# Patient Record
Sex: FEMALE | Race: WHITE | NOT HISPANIC OR LATINO | Employment: OTHER | URBAN - METROPOLITAN AREA
[De-identification: names, ages, dates, MRNs, and addresses within clinical notes are randomized per-mention and may not be internally consistent; named-entity substitution may affect disease eponyms.]

---

## 2017-01-11 ENCOUNTER — TELEPHONE (OUTPATIENT)
Dept: PHYSICAL THERAPY | Facility: CLINIC | Age: 82
End: 2017-01-11

## 2017-02-08 DIAGNOSIS — I10 BENIGN ESSENTIAL HYPERTENSION: Primary | ICD-10-CM

## 2017-02-08 DIAGNOSIS — E78.5 HYPERLIPIDEMIA LDL GOAL <130: ICD-10-CM

## 2017-02-08 DIAGNOSIS — E87.6 LOW BLOOD POTASSIUM: ICD-10-CM

## 2017-02-08 RX ORDER — CAPTOPRIL 25 MG/1
TABLET ORAL
Qty: 180 TABLET | Refills: 1 | Status: SHIPPED | OUTPATIENT
Start: 2017-02-08 | End: 2017-07-17

## 2017-02-08 RX ORDER — POTASSIUM CHLORIDE 750 MG/1
TABLET, EXTENDED RELEASE ORAL
Qty: 180 TABLET | Refills: 1 | Status: ON HOLD | OUTPATIENT
Start: 2017-02-08 | End: 2017-11-27

## 2017-02-08 RX ORDER — PRAVASTATIN SODIUM 10 MG
TABLET ORAL
Qty: 90 TABLET | Refills: 1 | Status: SHIPPED | OUTPATIENT
Start: 2017-02-08 | End: 2017-07-17

## 2017-02-08 RX ORDER — HYDROCHLOROTHIAZIDE 25 MG/1
TABLET ORAL
Qty: 180 TABLET | Refills: 1 | Status: SHIPPED | OUTPATIENT
Start: 2017-02-08 | End: 2017-07-17

## 2017-02-08 NOTE — TELEPHONE ENCOUNTER
Routing refill request to provider for review/approval because:  Labs not current:  Creatinine and FLP    Naa Armendariz, RN  [facility]

## 2017-02-08 NOTE — TELEPHONE ENCOUNTER
Capoten      Last Written Prescription Date: 10/17/16  Last Fill Quantity: 180, # refills: 1  Last Office Visit with [facility] or [facility] prescribing provider: 9/27/16       POTASSIUM   Date Value Ref Range Status   04/18/2016 3.4 3.4 - 5.3 mmol/L Final     CREATININE   Date Value Ref Range Status   05/11/2015 0.57 0.52 - 1.04 mg/dL Final     BP Readings from Last 3 Encounters:   10/19/16 136/74   09/27/16 161/88   08/17/16 148/72     HCTZ      Last Written Prescription Date: 10/17/16  Last Fill Quantity: 180, # refills: 1  Last Office Visit with [facility] or [facility] prescribing provider: 9/27/16       POTASSIUM   Date Value Ref Range Status   04/18/2016 3.4 3.4 - 5.3 mmol/L Final     CREATININE   Date Value Ref Range Status   05/11/2015 0.57 0.52 - 1.04 mg/dL Final     BP Readings from Last 3 Encounters:   10/19/16 136/74   09/27/16 161/88   08/17/16 148/72     Potassium      Last Written Prescription Date: 10/17/16  Last Fill Quantity: 180, # refills: 1  Last Office Visit with [facility] or [facility] prescribing provider: 9/27/16       POTASSIUM   Date Value Ref Range Status   04/18/2016 3.4 3.4 - 5.3 mmol/L Final     CREATININE   Date Value Ref Range Status   05/11/2015 0.57 0.52 - 1.04 mg/dL Final     BP Readings from Last 3 Encounters:   10/19/16 136/74   09/27/16 161/88   08/17/16 148/72     Pravastatin     Last Written Prescription Date: 10/17/16  Last Fill Quantity: 90, # refills: 1  Last Office Visit with [facility] or [facility] prescribing provider: 9/27/16       CHOL      170   5/11/2015  HDL       85   5/11/2015  LDL       72   5/11/2015  TRIG       64   5/11/2015  CHOLHDLRATIO      2.0   5/11/2015

## 2017-02-09 DIAGNOSIS — M54.50 CHRONIC LOW BACK PAIN WITHOUT SCIATICA, UNSPECIFIED BACK PAIN LATERALITY: Primary | ICD-10-CM

## 2017-02-09 DIAGNOSIS — G89.29 CHRONIC LOW BACK PAIN WITHOUT SCIATICA, UNSPECIFIED BACK PAIN LATERALITY: Primary | ICD-10-CM

## 2017-02-09 RX ORDER — MELOXICAM 7.5 MG/1
TABLET ORAL
Qty: 90 TABLET | Refills: 0 | OUTPATIENT
Start: 2017-02-09

## 2017-02-09 NOTE — TELEPHONE ENCOUNTER
[facility]me      Last Written Prescription Date: 12/22/16  Last Quantity: 90, # refills: 0  Last Office Visit with [facility] or [facility] prescribing provider: 9/27/16       CREATININE   Date Value Ref Range Status   05/11/2015 0.57 0.52 - 1.04 mg/dL Final     AST       30   5/11/2015  ALT       27   5/11/2015  BP Readings from Last 3 Encounters:   10/19/16 136/74   09/27/16 161/88   08/17/16 148/72

## 2017-02-09 NOTE — TELEPHONE ENCOUNTER
Prescription was sent 12/22/16 for #90 . Patient should have medication through 3/22/17.   Pharmacy notified via E-Prescribe refusal.     Naa Armendariz RN  [facility]

## 2017-02-15 ENCOUNTER — TRANSFERRED RECORDS (OUTPATIENT)
Dept: HEALTH INFORMATION MANAGEMENT | Facility: CLINIC | Age: 82
End: 2017-02-15

## 2017-04-25 ENCOUNTER — OFFICE VISIT (OUTPATIENT)
Dept: FAMILY MEDICINE | Facility: OTHER | Age: 82
End: 2017-04-25
Payer: COMMERCIAL

## 2017-04-25 VITALS
BODY MASS INDEX: 17.71 KG/M2 | HEART RATE: 94 BPM | TEMPERATURE: 97.1 F | RESPIRATION RATE: 16 BRPM | OXYGEN SATURATION: 98 % | DIASTOLIC BLOOD PRESSURE: 84 MMHG | WEIGHT: 84.4 LBS | HEIGHT: 58 IN | SYSTOLIC BLOOD PRESSURE: 136 MMHG

## 2017-04-25 DIAGNOSIS — M25.552 HIP PAIN, LEFT: Primary | ICD-10-CM

## 2017-04-25 DIAGNOSIS — F41.1 GENERALIZED ANXIETY DISORDER: ICD-10-CM

## 2017-04-25 DIAGNOSIS — E78.5 HYPERLIPIDEMIA LDL GOAL <130: ICD-10-CM

## 2017-04-25 DIAGNOSIS — G47.9 SLEEP DISORDER: ICD-10-CM

## 2017-04-25 LAB
ALBUMIN SERPL-MCNC: 3.9 G/DL (ref 3.4–5)
ALP SERPL-CCNC: 63 U/L (ref 40–150)
ALT SERPL W P-5'-P-CCNC: 25 U/L (ref 0–50)
ANION GAP SERPL CALCULATED.3IONS-SCNC: 10 MMOL/L (ref 3–14)
AST SERPL W P-5'-P-CCNC: 21 U/L (ref 0–45)
BILIRUB SERPL-MCNC: 0.2 MG/DL (ref 0.2–1.3)
BUN SERPL-MCNC: 23 MG/DL (ref 7–30)
CALCIUM SERPL-MCNC: 9.2 MG/DL (ref 8.5–10.1)
CHLORIDE SERPL-SCNC: 95 MMOL/L (ref 94–109)
CO2 SERPL-SCNC: 28 MMOL/L (ref 20–32)
CREAT SERPL-MCNC: 0.48 MG/DL (ref 0.52–1.04)
GFR SERPL CREATININE-BSD FRML MDRD: ABNORMAL ML/MIN/1.7M2
GLUCOSE SERPL-MCNC: 76 MG/DL (ref 70–99)
LDLC SERPL DIRECT ASSAY-MCNC: 66 MG/DL
POTASSIUM SERPL-SCNC: 3.9 MMOL/L (ref 3.4–5.3)
PROT SERPL-MCNC: 6.9 G/DL (ref 6.8–8.8)
SODIUM SERPL-SCNC: 133 MMOL/L (ref 133–144)
TSH SERPL DL<=0.05 MIU/L-ACNC: 2.4 MU/L (ref 0.4–4)

## 2017-04-25 PROCEDURE — 36415 COLL VENOUS BLD VENIPUNCTURE: CPT | Performed by: FAMILY MEDICINE

## 2017-04-25 PROCEDURE — 80053 COMPREHEN METABOLIC PANEL: CPT | Performed by: FAMILY MEDICINE

## 2017-04-25 PROCEDURE — 99213 OFFICE O/P EST LOW 20 MIN: CPT | Performed by: FAMILY MEDICINE

## 2017-04-25 PROCEDURE — 84443 ASSAY THYROID STIM HORMONE: CPT | Performed by: FAMILY MEDICINE

## 2017-04-25 PROCEDURE — 83721 ASSAY OF BLOOD LIPOPROTEIN: CPT | Performed by: FAMILY MEDICINE

## 2017-04-25 RX ORDER — DIAZEPAM 5 MG
5 TABLET ORAL 2 TIMES DAILY
Qty: 180 TABLET | Refills: 3 | Status: ON HOLD | OUTPATIENT
Start: 2017-04-25 | End: 2017-11-27

## 2017-04-25 ASSESSMENT — PAIN SCALES - GENERAL: PAINLEVEL: EXTREME PAIN (8)

## 2017-04-25 NOTE — PROGRESS NOTES
SUBJECTIVE:                                                    Yuni Otoole is a 81 year old female who presents to clinic today for the following health issues:    Chief Complaint   Patient presents with     Musculoskeletal Problem     c/o both hips hurting left usually worse than right, when walks feels like left foot turns inward         Problem list and histories reviewed & adjusted, as indicated.    C: NEGATIVE for fever, chills, change in weight  E/M: NEGATIVE for ear, mouth and throat problems  R: NEGATIVE for significant cough or SOB  CV: NEGATIVE for chest pain, palpitations or peripheral edema  MUSCULOSKELETAL: hip pain and joint instability left hip    OBJECTIVE:                                                    There were no vitals taken for this visit.  There is no height or weight on file to calculate BMI.    See dictated note     ASSESSMENT/PLAN:                                                    Refer ortho    Jonathan Vincent MD, MD  [facility]

## 2017-04-25 NOTE — LETTER
[facility]  [address] [facility] [address]  Phone: [phone]          April 26, 2017    Yuni Otoole  [address] [place] [facility] [address]          Dear Yuni,      LAB RESULTS:     The results of your recent labs were essentially normal.  If you have any further questions or problems, please contact our office.          Sincerely,      SEDA Vincent M.D.

## 2017-04-25 NOTE — PROGRESS NOTES
SUBJECTIVE:  Ms. Yuni Otoole is a 81-year-old woman in accompanied by her .       Yuni is coming down the pedersen with an awkward gait using a walker.  She is severely kyphotic bent over really looking at the floor and tends to walk with a gait with a bit of in-turning of the left foot.  She has been having a lot of pain in the hips, especially left hip and is aware of a klonking feeling in the hip when she moves.  She has had a longstanding problem of progressive spine difficulties and seen by [facility] in the past.        MEDICATIONS:  She uses occasional hydrocodone for pain; has been on Meloxicam which she has not felt has been helping and will discontinue it in favor of some Tylenol.        She is asking for a different walker; the one she uses has got wheels in front but not on the back.  She finds it too heavy to move and is requesting 1 with wheels and a brake.  I do call the drugstore find out that these are available there with 4 wheels, brakes, a basket and also a seat and on discount day would cost only about $70.  She and her  are agreeable to just purchase this rather than trying to get it through her insurance, which is partly involved with a prior fall accident.  Reviewing her chart, she did have pain in the left hip area in the past.  I had injected the trochanteric bursa with apparently some improvement.      OBJECTIVE:  On examination, severely kyphotic, left hip protrudes outward and when I move her leg there is an audible klonking; clicking sound certainly suggesting this is in the joint.  Somewhat tender over the trochanteric bursa, not particularly tender over the sacroiliac.      ASSESSMENT AND PLAN:   I am going to ask for an orthopedic consult see what they think of her hip problem and in-turning leg.  If they want special imaging they could certainly go ahead and do so.  The question is whether she should be seen by a Pain Center.  She is willing to do so if it is  indicated.  She has gone to physical therapy and they have decided they cannot help her any further.        She is due for labs today, these are drawn and we will notify her of results.         DAWSON JENSEN M.D.             D: 2017 14:06   T: 2017 14:49   MT: [id number]      Name:     RYDER KULKARNI   MRN:      [id number]        Account:      [id number]   :      1935           Visit Date:   2017      Document: [id number]

## 2017-04-25 NOTE — MR AVS SNAPSHOT
After Visit Summary   4/25/2017    Yuni Otoole    MRN: [id number]           Patient Information     Date Of Birth          1935        Visit Information        Provider Department      4/25/2017 11:30 AM Jonathan Vincent MD [facility]        Today's Diagnoses     Hip pain, left    -  1    Generalized anxiety disorder        Hyperlipidemia LDL goal <130        Sleep disorder           Follow-ups after your visit        Additional Services     ORTHOPEDICS ADULT REFERRAL       Your provider has referred you to: [facility]: [facility] [phone]   [email or web address]    Please be aware that coverage of these services is subject to the terms and limitations of your health insurance plan.  Call member services at your health plan with any benefit or coverage questions.      Please bring the following to your appointment:    >>   Any x-rays, CTs or MRIs which have been performed.  Contact the facility where they were done to arrange for  prior to your scheduled appointment.  Any new CT, MRI or other procedures ordered by your specialist must be performed at a [place] facility or coordinated by your clinic's referral office.    >>   List of current medications   >>   This referral request   >>   Any documents/labs given to you for this referral                  Who to contact     If you have questions or need follow up information about today's clinic visit or your schedule please contact [facility] directly at [phone].  Normal or non-critical lab and imaging results will be communicated to you by MyChart, letter or phone within 4 business days after the clinic has received the results. If you do not hear from us within 7 days, please contact the clinic through MyChart or phone. If you have a critical or abnormal lab result, we will notify you by phone as soon as possible.  Submit refill requests through  "[employer] or call your pharmacy and they will forward the refill request to us. Please allow 3 business days for your refill to be completed.          Additional Information About Your Visit        MyChart Information     [employer] gives you secure access to your electronic health record. If you see a primary care provider, you can also send messages to your care team and make appointments. If you have questions, please call your primary care clinic.  If you do not have a primary care provider, please call [phone] and they will assist you.        Care EveryWhere ID     This is your Care EveryWhere ID. This could be used by other organizations to access your [facility] medical records  [id number]        Your Vitals Were     Pulse Temperature Respirations Height Pulse Oximetry BMI (Body Mass Index)    94 97.1  F (36.2  C) (Temporal) 16 4' 10\" (1.473 m) 98% 17.64 kg/m2       Blood Pressure from Last 3 Encounters:   04/25/17 136/84   10/19/16 136/74   09/27/16 161/88    Weight from Last 3 Encounters:   04/25/17 84 lb 6.4 oz (38.3 kg)   09/27/16 87 lb 11.2 oz (39.8 kg)   08/17/16 83 lb 11.2 oz (38 kg)              We Performed the Following     Comprehensive metabolic panel     LDL cholesterol direct     ORTHOPEDICS ADULT REFERRAL     TSH          Where to get your medicines      Some of these will need a paper prescription and others can be bought over the counter.  Ask your nurse if you have questions.     Bring a paper prescription for each of these medications     diazepam 5 MG tablet          Primary Care Provider Office Phone # Fax #    Jonathan Vincent [phone]       [facility] [address] [facility] [address]        Thank you!     Thank you for choosing [facility]  for your care. Our goal is always to provide you with excellent care. Hearing back from our patients is one way we can continue to improve our services. Please take a few minutes to complete the " written survey that you may receive in the mail after your visit with us. Thank you!             Your Updated Medication List - Protect others around you: Learn how to safely use, store and throw away your medicines at www.disposemymeds.org.          This list is accurate as of: 4/25/17 12:39 PM.  Always use your most recent med list.                   Brand Name Dispense Instructions for use    aspirin 81 MG tablet      Take 1 tablet by mouth daily.       calcium + D 600-200 MG-UNIT Tabs   Generic drug:  calcium carbonate-vitamin D     30    1 TABLET DAILY       captopril 25 MG tablet    CAPOTEN    180 tablet    Take 1 tablet by mouth  twice a day       diazepam 5 MG tablet    VALIUM    180 tablet    Take 1 tablet (5 mg) by mouth 2 times daily       hydrochlorothiazide 25 MG tablet    HYDRODIURIL    180 tablet    Take 2 tablets by mouth  daily       HYDROcodone-acetaminophen 5-325 MG per tablet    NORCO    90 tablet    Take 1-2 tablets by mouth every 4 hours as needed for moderate to severe pain or pain       meloxicam 7.5 MG tablet    MOBIC    90 tablet    Take 1 tablet by mouth  daily       MULTIVITAMIN TABS   OR      1 tab daily       order for DME     1 Device    Equipment being ordered: Shower chair       potassium chloride SA 10 MEQ CR tablet    K-DUR/KLOR-CON M    180 tablet    Take 1 tablet by mouth  twice a day       pravastatin 10 MG tablet    PRAVACHOL    90 tablet    Take 1 tablet by mouth  daily at bedtime

## 2017-05-01 ENCOUNTER — OFFICE VISIT (OUTPATIENT)
Dept: ORTHOPEDICS | Facility: CLINIC | Age: 82
End: 2017-05-01
Payer: COMMERCIAL

## 2017-05-01 ENCOUNTER — RADIANT APPOINTMENT (OUTPATIENT)
Dept: GENERAL RADIOLOGY | Facility: CLINIC | Age: 82
End: 2017-05-01
Attending: ORTHOPAEDIC SURGERY
Payer: COMMERCIAL

## 2017-05-01 VITALS — BODY MASS INDEX: 17.63 KG/M2 | HEIGHT: 58 IN | TEMPERATURE: 96 F | WEIGHT: 84 LBS

## 2017-05-01 DIAGNOSIS — M16.0 PRIMARY OSTEOARTHRITIS OF BOTH HIPS: Primary | ICD-10-CM

## 2017-05-01 DIAGNOSIS — M25.552 HIP PAIN, LEFT: ICD-10-CM

## 2017-05-01 PROCEDURE — 99203 OFFICE O/P NEW LOW 30 MIN: CPT | Performed by: ORTHOPAEDIC SURGERY

## 2017-05-01 PROCEDURE — 73502 X-RAY EXAM HIP UNI 2-3 VIEWS: CPT | Mod: TC

## 2017-05-01 NOTE — PROGRESS NOTES
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 She has 4 children and 5 grandkids. She enjoys playing on the computer and playing solitaire on the computer.      She is being seen for   Chief Complaints and History of Present Illnesses   Patient presents with     Consult     left hip pain per Dr. Vincent         History of Present Illness:   Mechanism of Injury: patient had a fall on 4/18/2016. She had a vertebrae fracture at that time and was hospitalized for a while. Patient states she did not have much for hip pain at that time because she was not moving around but when she got out of the hospital she started to have this left hip pain.  Location: left hip pain, in the groin and also posterior hip  Duration of Pain: since about a week after/approximately 4/18/2016.  Rating of Pain: 8 out of 10 today  Pain Quality: ache and sharp  Pain is better with: rest  Pain is worse with: rotation of the hips, ambulation  Treatment so far consists of: heat which does help, Norco that helps sometimes and sometimes not. Mobic which does not seem to help and she is discontinuing now because it bothers her stomach. Patient also had physical therapy that was formal physical therapy in August 2016 that did not seem to help. Patient has not had any cortisone injections into her hip joint..   Associated Features: none other than her  noticing that the right hip sticks out a lot more.   Prior history of related problems: no previous surgery or major injuries to the hips.  Pain is Limiting: ambulation. Patient currently is ambulating with a walker.  Here to: assess what she can do next.  The Pain Has: gotten worse  Additional History: none    Patient's past medical, surgical, social and family histories reviewed.     Past Medical History:   Diagnosis Date     Allergic rhinitis,  cause unspecified      Pure hypercholesterolemia      Unspecified essential hypertension          Past Surgical History:   Procedure Laterality Date     CAROTID ENDARTERECTOMY  11/07/08     COLONOSCOPY  05/30/07    Diverticulosis-return in 5 yrs     HC DRAIN/INJ MAJOR JOINT/BURSA W/O US  2009    Right SI Joint     HC DRAIN/INJ MAJOR JOINT/BURSA W/O US  2010    Right interarticular hip injection     HC INJ EPIDURAL CERVICAL/THORACIC W/WO CONTRAST  2010    C6-7     HC INJ EPIDURAL LUMBAR/SACRAL W/WO CONTRAST  2009    L5-S1     HC INJ TRANSFORAMIN EPIDURAL, LUMB/SACR SINGLE  2010     HC REMOVAL OF TONSILS,14+ Y/O      Tonsils 14+y.o.       Medications:    Current Outpatient Prescriptions on File Prior to Visit:  diazepam (VALIUM) 5 MG tablet Take 1 tablet (5 mg) by mouth 2 times daily   captopril (CAPOTEN) 25 MG tablet Take 1 tablet by mouth  twice a day   potassium chloride SA (K-DUR/KLOR-CON M) 10 MEQ CR tablet Take 1 tablet by mouth  twice a day   hydrochlorothiazide (HYDRODIURIL) 25 MG tablet Take 2 tablets by mouth  daily   pravastatin (PRAVACHOL) 10 MG tablet Take 1 tablet by mouth  daily at bedtime   HYDROcodone-acetaminophen (NORCO) 5-325 MG per tablet Take 1-2 tablets by mouth every 4 hours as needed for moderate to severe pain or pain   meloxicam (MOBIC) 7.5 MG tablet Take 1 tablet by mouth  daily   order for DME Equipment being ordered: Shower chair   aspirin 81 MG tablet Take 1 tablet by mouth daily.   CALCIUM + D 600-200 MG-UNIT OR TABS 1 TABLET DAILY   MULTIVITAMIN TABS   OR 1 tab daily     No current facility-administered medications on file prior to visit.     Allergies   Allergen Reactions     Atorvastatin Calcium      Was on Lipitor and has muscle problem       Social History     Occupational History      (retired)      [employer].      Retired     Social History Main Topics     Smoking status: Former Smoker     Packs/day: 0.50     Years: 10.00     Quit date: 1/1/1996     Smokeless  "tobacco: Never Used     Alcohol use No     Drug use: No     Sexual activity: Not Currently       Family History   Problem Relation Age of Onset     CANCER Mother      colon cancer  at age 95 or 96   surgery done     GASTROINTESTINAL DISEASE Mother      stomach problems     OSTEOPOROSIS Mother      EYE* Mother      cataract and macular degen.     Cardiovascular Father       at age 60  MI     Hypertension Father      ?     Circulatory Paternal Grandmother      legs     CEREBROVASCULAR DISEASE Paternal Grandmother      Obesity Paternal Grandmother      Gynecology Sister      hysterectomy     Lipids Brother      was on meds.  ? still     Obesity Maternal Grandmother      Musculoskeletal Disorder Daughter      cancerous tumor left upper arm/shoulder- was told it was a breast type cancer     DIABETES Maternal Aunt        REVIEW OF SYSTEMS  10 point review systems performed otherwise negative as noted as per history of present illness.    Physical Exam:  Vitals: Temp 96  F (35.6  C)  Ht 1.473 m (4' 10\")  Wt 38.1 kg (84 lb)  BMI 17.56 kg/m2  BMI= Body mass index is 17.56 kg/(m^2).    Constitutional: healthy, alert and no acute distress   Psychiatric: mentation appears normal and affect normal/bright  NEURO: no focal deficits, CMS intact bilateral lower extremity  RESP: Normal with easy respirations and no use of accessory muscles to breathe, no audible wheezing or retractions  CV: warm calves and soft and nontender.  SKIN: No erythema, rashes, excoriation, or breakdown. No evidence of infection.   MUSCULOSKELETAL:    INSPECTION of bilateral hips: I can palpated deformity on the right greater than the left. The hip protrudes out laterally.    PALPATION: slight tenderness to palpation of the lateral and posterior left hip. No tenderness on palpation of the right hip and no tenderness on palpation of the bilateral thighs.    ROM: patient able to hip flex and internally next early rotate bilateral hips however the left " and right internal and external rotation is limited and all range of motion causes some pain but more on the left and the right. With range of motion on bilateral hips we are able to palpated significant crepitus.     STRENGTH: 5 out of 5 hip flexion and quad strength bilaterally.    SPECIAL TEST: none  GAIT: not evaluated today patient is in wheelchair.  Lymph: no palpable lymph nodes    Diagnostic Modalities:  Previous imaging reviewed.  We also reviewed the x-rays that were done today AP pelvis as well as lateral of the left hip. These x-rays show severe degenerative joint disease of bilateral hips and also migration superiorly in the acetabulum's. On the right greater than the left it appears that the humorous is riding on the superior rim of the acetabulum. No fractures location or tumor.  Independent visualization of the images was performed.    Impression: 1. Bilateral severe hip degenerative joint disease, right greater than the left.    Plan:  All of the above pertinent physical exam and imaging modalities findings was reviewed with Yuni and her .                                          CONSERVATIVE CARE:    Patient Instructions:  1. We can see that you have very bad arthritis on accessories. The right hip actually looks worse than the left although you are having more pain in the left.  2. Your thighbone is wearing into your pelvis and making the socket larger.  3. There is no cartilage left in your hip socket anymore.  4. We know you tried physical therapy but that did not help.  5. Mobic has been stopped secondary to stomach issues and it did not help you that much.  6. You can continue doing heat since it does help you.  7. We also offered you a cortisone injection into the left hip joint under x-ray guidance that they do in radiology. We put in an order for this today.  8. The only option is to replace the hips but this would be a very complicated surgery and you would probably have to have  both of them done.  9. Call us if you want the right one injected, we can put the order in for you.  10. Follow up with Jina Payton MD and/or Marlon Marshall PA-C on an as needed basis.   Re-x-ray on return: No unless there is injury or a change in her condition.    Scribed by Marlon Marshall PA-C on 5/1/2017 at 3:12 PM, based on Dr. Jina Payton's statements to me.    This note was dictated with Cloudmeter.    MARIA LUISA Mtz MD

## 2017-05-01 NOTE — PROGRESS NOTES
Appropriate assistive devices provided during their visit. yes (Yes, No, N/A) walker (list device)    Exam table and/or cart  placed in the lowest position. yes (Yes, No, N/A)    Brakes on tables/carts/wheelchairs used at all times. yes (Yes, No, N/A)    Non slip footwear applied. n/a (Yes, No, NA)    Patient was accompanied by staff throughout visit. yes (Yes, No, N/A)    Equipment safety straps used. n/a (Yes, No, N/A)    Assist with toileting. n/a (Yes, No, N/A)

## 2017-05-01 NOTE — MR AVS SNAPSHOT
After Visit Summary   5/1/2017    uYni Otoole    MRN: [id number]           Patient Information     Date Of Birth          1935        Visit Information        Provider Department      5/1/2017 2:30 PM Jina Payton MD [facility]        Today's Diagnoses     Primary osteoarthritis of both hips    -  1    Hip pain, left          Care Instructions    Encounter Diagnoses   Name Primary?     Hip pain, left      Primary osteoarthritis of both hips Yes     Rest, ice and elevate above heart level as needed for pain control  1. We can see that you have very bad arthritis on accessories. The right hip actually looks worse than the left although you are having more pain in the left.  2. Your thighbone is wearing into your pelvis and making the socket larger.  3. There is no cartilage left in your hip socket anymore.  4. We know you tried physical therapy but that did not help.  5. Mobic has been stopped secondary to stomach issues and it did not help you that much.  6. You can continue doing heat since it does help you.  7. We also offered you a cortisone injection into the left hip joint under x-ray guidance that they do in radiology. We put in an order for this today.  8. The only option is to replace the hips but this would be a very complicated surgery and you would probably have to have both of them done.  9. Call us if you want the right one injected, we can put the order in for you.  10. Follow up with Jina Payton MD and/or Marlon Marshall PA-C on an as needed basis.   [employer] and [employer] may offer reliable information regarding your diagnosis and treatment plan.    THANK YOU for coming in today. If you receive a survey via [employer] or mail please let us know if there was anything you especially appreciated today or if there is any way we can improve our clinic. We appreciate your input.    GENERAL INFORMATION:  Our hours are:  Monday :     Clinic 7:30 AM-430 PM  ([facility])  Tuesday:      Operating Room All Day ([facility])  Wednesday: Clinic 7:30 AM - 11:15 AM ([facility])             Clinic 1:00 PM - 4:00PM ([facility])  Thursday:     Administrative Day  Friday:          Clinic 7:30 AM - 11:15 AM ([facility])            Clinic 1:00 PM - 4:00 PM ([facility])    We are not in the office Thursdays. Therefore non- urgent calls and medical messages received on Thursday will be addressed when we are back in the office on Wednesday. Urgent matters will be reviewed and addressed by one of our partners in the office as needed.    If lab work was done today as part of your evaluation you will generally be contacted via [employer]Comm, mail, or phone with the results within 1-5 days. If there is an alarming result we will contact you by phone. Lab results come back at varying times, I generally wait until all labs are resulted before making comments on results. Please note labs are automatically released to ArrayComm (if you have signed up for it) once available-at times you may see these prior to my having a chance to review them as well.    If you need refills please contact your pharmacist. They will send a refill request to me to review. Please allow 3 business days for us to process all refill requests. All narcotic refills should be handled in the clinic at the time of your visit.         Follow-ups after your visit        Who to contact     If you have questions or need follow up information about today's clinic visit or your schedule please contact [facility] directly at [phone].  Normal or non-critical lab and imaging results will be communicated to you by Habeashart, letter or phone within 4 business days after the clinic has received the results. If you do not hear from us within 7 days, please contact the clinic through [employer] or  "phone. If you have a critical or abnormal lab result, we will notify you by phone as soon as possible.  Submit refill requests through [employer]mo or call your pharmacy and they will forward the refill request to us. Please allow 3 business days for your refill to be completed.          Additional Information About Your Visit        Isonashart Information     Dexmo gives you secure access to your electronic health record. If you see a primary care provider, you can also send messages to your care team and make appointments. If you have questions, please call your primary care clinic.  If you do not have a primary care provider, please call [phone] and they will assist you.        Care EveryWhere ID     This is your Care EveryWhere ID. This could be used by other organizations to access your [place] medical records  [id number]        Your Vitals Were     Temperature Height BMI (Body Mass Index)             96  F (35.6  C) 1.473 m (4' 10\") 17.56 kg/m2          Blood Pressure from Last 3 Encounters:   04/25/17 136/84   10/19/16 136/74   09/27/16 161/88    Weight from Last 3 Encounters:   05/01/17 38.1 kg (84 lb)   04/25/17 38.3 kg (84 lb 6.4 oz)   09/27/16 39.8 kg (87 lb 11.2 oz)               Primary Care Provider Office Phone # Fax #    Jonathan Vincent [phone]       [place] [address] [facility] [address]        Thank you!     Thank you for choosing [facility]  for your care. Our goal is always to provide you with excellent care. Hearing back from our patients is one way we can continue to improve our services. Please take a few minutes to complete the written survey that you may receive in the mail after your visit with us. Thank you!             Your Updated Medication List - Protect others around you: Learn how to safely use, store and throw away your medicines at www.disposemymeds.org.          This list is accurate as of: 5/1/17  3:22 PM.  " Always use your most recent med list.                   Brand Name Dispense Instructions for use    aspirin 81 MG tablet      Take 1 tablet by mouth daily.       calcium + D 600-200 MG-UNIT Tabs   Generic drug:  calcium carbonate-vitamin D     30    1 TABLET DAILY       captopril 25 MG tablet    CAPOTEN    180 tablet    Take 1 tablet by mouth  twice a day       diazepam 5 MG tablet    VALIUM    180 tablet    Take 1 tablet (5 mg) by mouth 2 times daily       hydrochlorothiazide 25 MG tablet    HYDRODIURIL    180 tablet    Take 2 tablets by mouth  daily       HYDROcodone-acetaminophen 5-325 MG per tablet    NORCO    90 tablet    Take 1-2 tablets by mouth every 4 hours as needed for moderate to severe pain or pain       meloxicam 7.5 MG tablet    MOBIC    90 tablet    Take 1 tablet by mouth  daily       MULTIVITAMIN TABS   OR      1 tab daily       order for DME     1 Device    Equipment being ordered: Shower chair       potassium chloride SA 10 MEQ CR tablet    K-DUR/KLOR-CON M    180 tablet    Take 1 tablet by mouth  twice a day       pravastatin 10 MG tablet    PRAVACHOL    90 tablet    Take 1 tablet by mouth  daily at bedtime

## 2017-05-01 NOTE — PROGRESS NOTES
Yuni Otoole is a 81 year old female who is seen in consultation at the request of Dr. diaz  History of Present illness:  Yuni presents for evaluation of:  1.) left hip  2.)   Onset:  mid, April and 2016    Symptoms brought on by fall.   Location:  left hip.    Character:  sharp.    Progression of symptoms:  worse.    Previous similar pain: YES.   Pain Level:  8/10.   Previous treatments:  heat. norco  Currently on Blood thinners? no  Diagnosis of Diabetes? no

## 2017-05-01 NOTE — LETTER
5/1/2017       RE: Yuni Otoole  [address]  [facility] [address]           Dear Colleague,    Thank you for referring your patient, Yuni Otoole, to the [facility]. Please see a copy of my visit note below.    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 She has 4 children and 5 grandkids. She enjoys playing on the computer and playing solitaire on the computer.      She is being seen for   Chief Complaints and History of Present Illnesses   Patient presents with     Consult     left hip pain per Dr. Vincent         History of Present Illness:   Mechanism of Injury: patient had a fall on 4/18/2016. She had a vertebrae fracture at that time and was hospitalized for a while. Patient states she did not have much for hip pain at that time because she was not moving around but when she got out of the hospital she started to have this left hip pain.  Location: left hip pain, in the groin and also posterior hip  Duration of Pain: since about a week after/approximately 4/18/2016.  Rating of Pain: 8 out of 10 today  Pain Quality: ache and sharp  Pain is better with: rest  Pain is worse with: rotation of the hips, ambulation  Treatment so far consists of: heat which does help, Norco that helps sometimes and sometimes not. Mobic which does not seem to help and she is discontinuing now because it bothers her stomach. Patient also had physical therapy that was formal physical therapy in August 2016 that did not seem to help. Patient has not had any cortisone injections into her hip joint..   Associated Features: none other than her  noticing that the right hip sticks out a lot more.   Prior history of related problems: no previous surgery or major injuries to the hips.  Pain is Limiting: ambulation. Patient currently is ambulating with a  walker.  Here to: assess what she can do next.  The Pain Has: gotten worse  Additional History: none    Patient's past medical, surgical, social and family histories reviewed.     Past Medical History:   Diagnosis Date     Allergic rhinitis, cause unspecified      Pure hypercholesterolemia      Unspecified essential hypertension          Past Surgical History:   Procedure Laterality Date     CAROTID ENDARTERECTOMY  11/07/08     COLONOSCOPY  05/30/07    Diverticulosis-return in 5 yrs     HC DRAIN/INJ MAJOR JOINT/BURSA W/O US  2009    Right SI Joint     HC DRAIN/INJ MAJOR JOINT/BURSA W/O US  2010    Right interarticular hip injection     HC INJ EPIDURAL CERVICAL/THORACIC W/WO CONTRAST  2010    C6-7     HC INJ EPIDURAL LUMBAR/SACRAL W/WO CONTRAST  2009    L5-S1     HC INJ TRANSFORAMIN EPIDURAL, LUMB/SACR SINGLE  2010     HC REMOVAL OF TONSILS,12+ Y/O      Tonsils 14+y.o.       Medications:    Current Outpatient Prescriptions on File Prior to Visit:  diazepam (VALIUM) 5 MG tablet Take 1 tablet (5 mg) by mouth 2 times daily   captopril (CAPOTEN) 25 MG tablet Take 1 tablet by mouth  twice a day   potassium chloride SA (K-DUR/KLOR-CON M) 10 MEQ CR tablet Take 1 tablet by mouth  twice a day   hydrochlorothiazide (HYDRODIURIL) 25 MG tablet Take 2 tablets by mouth  daily   pravastatin (PRAVACHOL) 10 MG tablet Take 1 tablet by mouth  daily at bedtime   HYDROcodone-acetaminophen (NORCO) 5-325 MG per tablet Take 1-2 tablets by mouth every 4 hours as needed for moderate to severe pain or pain   meloxicam (MOBIC) 7.5 MG tablet Take 1 tablet by mouth  daily   order for DME Equipment being ordered: Shower chair   aspirin 81 MG tablet Take 1 tablet by mouth daily.   CALCIUM + D 600-200 MG-UNIT OR TABS 1 TABLET DAILY   MULTIVITAMIN TABS   OR 1 tab daily     No current facility-administered medications on file prior to visit.     Allergies   Allergen Reactions     Atorvastatin Calcium      Was on Lipitor and has muscle problem  "      Social History     Occupational History      (retired)      [employer].      Retired     Social History Main Topics     Smoking status: Former Smoker     Packs/day: 0.50     Years: 10.00     Quit date: 1996     Smokeless tobacco: Never Used     Alcohol use No     Drug use: No     Sexual activity: Not Currently       Family History   Problem Relation Age of Onset     CANCER Mother      colon cancer  at age 95 or 96   surgery done     GASTROINTESTINAL DISEASE Mother      stomach problems     OSTEOPOROSIS Mother      EYE* Mother      cataract and macular degen.     Cardiovascular Father       at age 60  MI     Hypertension Father      ?     Circulatory Paternal Grandmother      legs     CEREBROVASCULAR DISEASE Paternal Grandmother      Obesity Paternal Grandmother      Gynecology Sister      hysterectomy     Lipids Brother      was on meds.  ? still     Obesity Maternal Grandmother      Musculoskeletal Disorder Daughter      cancerous tumor left upper arm/shoulder- was told it was a breast type cancer     DIABETES Maternal Aunt        REVIEW OF SYSTEMS  10 point review systems performed otherwise negative as noted as per history of present illness.    Physical Exam:  Vitals: Temp 96  F (35.6  C)  Ht 1.473 m (4' 10\")  Wt 38.1 kg (84 lb)  BMI 17.56 kg/m2  BMI= Body mass index is 17.56 kg/(m^2).    Constitutional: healthy, alert and no acute distress   Psychiatric: mentation appears normal and affect normal/bright  NEURO: no focal deficits, CMS intact bilateral lower extremity  RESP: Normal with easy respirations and no use of accessory muscles to breathe, no audible wheezing or retractions  CV: warm calves and soft and nontender.  SKIN: No erythema, rashes, excoriation, or breakdown. No evidence of infection.   MUSCULOSKELETAL:    INSPECTION of bilateral hips: I can palpated deformity on the right greater than the left. The hip protrudes out laterally.    PALPATION: slight tenderness to " palpation of the lateral and posterior left hip. No tenderness on palpation of the right hip and no tenderness on palpation of the bilateral thighs.    ROM: patient able to hip flex and internally next early rotate bilateral hips however the left and right internal and external rotation is limited and all range of motion causes some pain but more on the left and the right. With range of motion on bilateral hips we are able to palpated significant crepitus.     STRENGTH: 5 out of 5 hip flexion and quad strength bilaterally.    SPECIAL TEST: none  GAIT: not evaluated today patient is in wheelchair.  Lymph: no palpable lymph nodes    Diagnostic Modalities:  Previous imaging reviewed.  We also reviewed the x-rays that were done today AP pelvis as well as lateral of the left hip. These x-rays show severe degenerative joint disease of bilateral hips and also migration superiorly in the acetabulum's. On the right greater than the left it appears that the humorous is riding on the superior rim of the acetabulum. No fractures location or tumor.  Independent visualization of the images was performed.    Impression: 1. Bilateral severe hip degenerative joint disease, right greater than the left.    Plan:  All of the above pertinent physical exam and imaging modalities findings was reviewed with Yuni and her .                                          CONSERVATIVE CARE:    Patient Instructions:  1. We can see that you have very bad arthritis on accessories. The right hip actually looks worse than the left although you are having more pain in the left.  2. Your thighbone is wearing into your pelvis and making the socket larger.  3. There is no cartilage left in your hip socket anymore.  4. We know you tried physical therapy but that did not help.  5. Mobic has been stopped secondary to stomach issues and it did not help you that much.  6. You can continue doing heat since it does help you.  7. We also offered you a  cortisone injection into the left hip joint under x-ray guidance that they do in radiology. We put in an order for this today.  8. The only option is to replace the hips but this would be a very complicated surgery and you would probably have to have both of them done.  9. Call us if you want the right one injected, we can put the order in for you.  10. Follow up with Jina Payton MD and/or Marlon Marshall PA-C on an as needed basis.   Re-x-ray on return: No unless there is injury or a change in her condition.    Scribed by Marlon Marshall PA-C on 5/1/2017 at 3:12 PM, based on Dr. Jina Payton's statements to me.    This note was dictated with [employer].    MARIA LUISA Mtz MD      Yuni Otoole is a 81 year old female who is seen in consultation at the request of Dr. diaz  History of Present illness:  Yuni presents for evaluation of:  1.) left hip  2.)   Onset:  mid, April and 2016    Symptoms brought on by fall.   Location:  left hip.    Character:  sharp.    Progression of symptoms:  worse.    Previous similar pain: YES.   Pain Level:  8/10.   Previous treatments:  heat. norco  Currently on Blood thinners? no  Diagnosis of Diabetes? no      Again, thank you for allowing me to participate in the care of your patient.        Sincerely,              Jina Payton MD

## 2017-05-01 NOTE — PATIENT INSTRUCTIONS
Encounter Diagnoses   Name Primary?     Hip pain, left      Primary osteoarthritis of both hips Yes     Rest, ice and elevate above heart level as needed for pain control  1. We can see that you have very bad arthritis on accessories. The right hip actually looks worse than the left although you are having more pain in the left.  2. Your thighbone is wearing into your pelvis and making the socket larger.  3. There is no cartilage left in your hip socket anymore.  4. We know you tried physical therapy but that did not help.  5. Mobic has been stopped secondary to stomach issues and it did not help you that much.  6. You can continue doing heat since it does help you.  7. We also offered you a cortisone injection into the left hip joint under x-ray guidance that they do in radiology. We put in an order for this today.  8. The only option is to replace the hips but this would be a very complicated surgery and you would probably have to have both of them done.  9. Call us if you want the right one injected, we can put the order in for you.  10. Follow up with Jina Payton MD and/or Marlon Marshall PA-C on an as needed basis.   [employer] and [email or web address] may offer reliable information regarding your diagnosis and treatment plan.    THANK YOU for coming in today. If you receive a survey via [employer] or mail please let us know if there was anything you especially appreciated today or if there is any way we can improve our clinic. We appreciate your input.    GENERAL INFORMATION:  Our hours are:  Monday :     Clinic 7:30 AM-430 PM ([facility])  Tuesday:      Operating Room All Day ([facility])  Wednesday: Clinic 7:30 AM - 11:15 AM ([facility])             Clinic 1:00 PM - 4:00PM ([facility])  Thursday:     Administrative Day  Friday:          Clinic 7:30 AM - 11:15 AM ([facility])            Clinic 1:00 PM -  4:00 PM ([facility])    We are not in the office Thursdays. Therefore non- urgent calls and medical messages received on Thursday will be addressed when we are back in the office on Wednesday. Urgent matters will be reviewed and addressed by one of our partners in the office as needed.    If lab work was done today as part of your evaluation you will generally be contacted via [employer], mail, or phone with the results within 1-5 days. If there is an alarming result we will contact you by phone. Lab results come back at varying times, I generally wait until all labs are resulted before making comments on results. Please note labs are automatically released to [employer] (if you have signed up for it) once available-at times you may see these prior to my having a chance to review them as well.    If you need refills please contact your pharmacist. They will send a refill request to me to review. Please allow 3 business days for us to process all refill requests. All narcotic refills should be handled in the clinic at the time of your visit.

## 2017-05-01 NOTE — NURSING NOTE
"Chief Complaint   Patient presents with     Consult     left hip pain per Dr. Vincent       Initial Temp 96  F (35.6  C)  Ht 1.473 m (4' 10\")  Wt 38.1 kg (84 lb)  BMI 17.56 kg/m2 Estimated body mass index is 17.56 kg/(m^2) as calculated from the following:    Height as of this encounter: 1.473 m (4' 10\").    Weight as of this encounter: 38.1 kg (84 lb).  Medication Reconciliation: complete    BP completed using cuff size: NA (Not Taken)    Verenice Wagner MA      "

## 2017-05-09 ENCOUNTER — HOSPITAL ENCOUNTER (OUTPATIENT)
Dept: GENERAL RADIOLOGY | Facility: CLINIC | Age: 82
Discharge: HOME | End: 2017-05-09
Attending: ORTHOPAEDIC SURGERY | Admitting: ORTHOPAEDIC SURGERY
Payer: MEDICARE

## 2017-05-09 DIAGNOSIS — M16.0 PRIMARY OSTEOARTHRITIS OF BOTH HIPS: ICD-10-CM

## 2017-05-09 PROCEDURE — 20610 DRAIN/INJ JOINT/BURSA W/O US: CPT | Mod: LT

## 2017-05-09 PROCEDURE — 25000125 ZZHC RX 250: Performed by: RADIOLOGY

## 2017-05-09 PROCEDURE — 25500064 ZZH RX 255 OP 636: Performed by: RADIOLOGY

## 2017-05-09 PROCEDURE — S0020 INJECTION, BUPIVICAINE HYDRO: HCPCS | Performed by: RADIOLOGY

## 2017-05-09 PROCEDURE — 25000128 H RX IP 250 OP 636: Performed by: RADIOLOGY

## 2017-05-09 RX ORDER — BUPIVACAINE HYDROCHLORIDE 2.5 MG/ML
10 INJECTION, SOLUTION EPIDURAL; INFILTRATION; INTRACAUDAL ONCE
Status: COMPLETED | OUTPATIENT
Start: 2017-05-09 | End: 2017-05-09

## 2017-05-09 RX ORDER — LIDOCAINE HYDROCHLORIDE 10 MG/ML
5 INJECTION, SOLUTION EPIDURAL; INFILTRATION; INTRACAUDAL; PERINEURAL ONCE
Status: COMPLETED | OUTPATIENT
Start: 2017-05-09 | End: 2017-05-09

## 2017-05-09 RX ORDER — TRIAMCINOLONE ACETONIDE 40 MG/ML
40 INJECTION, SUSPENSION INTRA-ARTICULAR; INTRAMUSCULAR ONCE
Status: COMPLETED | OUTPATIENT
Start: 2017-05-09 | End: 2017-05-09

## 2017-05-09 RX ORDER — IOPAMIDOL 408 MG/ML
50 INJECTION, SOLUTION INTRAVASCULAR ONCE
Status: COMPLETED | OUTPATIENT
Start: 2017-05-09 | End: 2017-05-09

## 2017-05-09 RX ADMIN — TRIAMCINOLONE ACETONIDE 40 MG: 40 INJECTION, SUSPENSION INTRA-ARTICULAR; INTRAMUSCULAR at 13:45

## 2017-05-09 RX ADMIN — BUPIVACAINE HYDROCHLORIDE 4 ML: 2.5 INJECTION, SOLUTION EPIDURAL; INFILTRATION; INTRACAUDAL at 13:45

## 2017-05-09 RX ADMIN — LIDOCAINE HYDROCHLORIDE 8 ML: 10 INJECTION, SOLUTION EPIDURAL; INFILTRATION; INTRACAUDAL; PERINEURAL at 13:45

## 2017-05-09 RX ADMIN — SODIUM BICARBONATE 2 MEQ: 84 INJECTION, SOLUTION INTRAVENOUS at 13:45

## 2017-05-09 RX ADMIN — IOPAMIDOL 1 ML: 408 INJECTION, SOLUTION INTRAVASCULAR at 13:45

## 2017-05-17 ENCOUNTER — TELEPHONE (OUTPATIENT)
Dept: ORTHOPEDICS | Facility: CLINIC | Age: 82
End: 2017-05-17

## 2017-05-17 DIAGNOSIS — M16.11 OSTEOARTHRITIS OF RIGHT HIP, UNSPECIFIED OSTEOARTHRITIS TYPE: Primary | ICD-10-CM

## 2017-05-17 NOTE — TELEPHONE ENCOUNTER
Please put in an order for a right hip interarticular injection.  I have reviewed the chart and she can have this.  Thank you.  I called and let her know that we are ok with doing this.  I had to leave a voicemail.   She will just need the order placed.  Thank you

## 2017-05-17 NOTE — TELEPHONE ENCOUNTER
Reason for Call: Request for an order or referral:    Order or referral being requested: Would like to get right hip injection done. She had the left hip done and she was told that if she wanted to do the other hip to call us and we would put the order in.      Date needed: at your convenience    Has the patient been seen by the PCP for this problem? YES    Additional comments: none    Phone number Patient can be reached at:  Home number on file [phone] (home)     Best Time:  any    Can we leave a detailed message on this number?  YES    Call taken on 5/17/2017 at 2:25 PM by Pam Zaragoza

## 2017-05-19 NOTE — TELEPHONE ENCOUNTER
Patient called back. Would like injection to be scheduled in [place]. Told her MD will be back in office Monday. Patient will expect to hear back from us then.    Pt requested to talk with radiology. Transferred.

## 2017-05-22 NOTE — TELEPHONE ENCOUNTER
Pt notified that the orders for her injection would be placed today and she could call and schedule later today.

## 2017-06-06 ENCOUNTER — HOSPITAL ENCOUNTER (OUTPATIENT)
Dept: GENERAL RADIOLOGY | Facility: CLINIC | Age: 82
Discharge: HOME | End: 2017-06-06
Attending: ORTHOPAEDIC SURGERY | Admitting: ORTHOPAEDIC SURGERY
Payer: MEDICARE

## 2017-06-06 DIAGNOSIS — M16.11 OSTEOARTHRITIS OF RIGHT HIP, UNSPECIFIED OSTEOARTHRITIS TYPE: ICD-10-CM

## 2017-06-06 PROCEDURE — 25000128 H RX IP 250 OP 636: Performed by: RADIOLOGY

## 2017-06-06 PROCEDURE — 25500064 ZZH RX 255 OP 636: Performed by: RADIOLOGY

## 2017-06-06 PROCEDURE — 25000125 ZZHC RX 250: Performed by: RADIOLOGY

## 2017-06-06 PROCEDURE — 20610 DRAIN/INJ JOINT/BURSA W/O US: CPT | Mod: RT

## 2017-06-06 PROCEDURE — S0020 INJECTION, BUPIVICAINE HYDRO: HCPCS | Performed by: RADIOLOGY

## 2017-06-06 RX ORDER — TRIAMCINOLONE ACETONIDE 40 MG/ML
40 INJECTION, SUSPENSION INTRA-ARTICULAR; INTRAMUSCULAR ONCE
Status: COMPLETED | OUTPATIENT
Start: 2017-06-06 | End: 2017-06-06

## 2017-06-06 RX ORDER — BUPIVACAINE HYDROCHLORIDE 2.5 MG/ML
10 INJECTION, SOLUTION EPIDURAL; INFILTRATION; INTRACAUDAL ONCE
Status: COMPLETED | OUTPATIENT
Start: 2017-06-06 | End: 2017-06-06

## 2017-06-06 RX ORDER — LIDOCAINE HYDROCHLORIDE 10 MG/ML
10 INJECTION, SOLUTION INFILTRATION; PERINEURAL ONCE
Status: COMPLETED | OUTPATIENT
Start: 2017-06-06 | End: 2017-06-06

## 2017-06-06 RX ORDER — IOPAMIDOL 408 MG/ML
50 INJECTION, SOLUTION INTRAVASCULAR ONCE
Status: COMPLETED | OUTPATIENT
Start: 2017-06-06 | End: 2017-06-06

## 2017-06-06 RX ADMIN — IOPAMIDOL 2 ML: 408 INJECTION, SOLUTION INTRAVASCULAR at 13:54

## 2017-06-06 RX ADMIN — SODIUM BICARBONATE 0.3 MEQ: 84 INJECTION, SOLUTION INTRAVENOUS at 13:50

## 2017-06-06 RX ADMIN — BUPIVACAINE HYDROCHLORIDE 4 ML: 2.5 INJECTION, SOLUTION EPIDURAL; INFILTRATION; INTRACAUDAL at 13:55

## 2017-06-06 RX ADMIN — LIDOCAINE HYDROCHLORIDE 1.2 ML: 10 INJECTION, SOLUTION INFILTRATION; PERINEURAL at 13:50

## 2017-06-06 RX ADMIN — TRIAMCINOLONE ACETONIDE 40 MG: 40 INJECTION, SUSPENSION INTRA-ARTICULAR; INTRAMUSCULAR at 13:55

## 2017-06-06 NOTE — PROGRESS NOTES
Appropriate assistive devices provided during their visit. yes (Yes, No, N/A) wheelchair (list device)    Exam table and/or cart  placed in the lowest position. yes (Yes, No, N/A)    Brakes on tables/carts/wheelchairs used at all times. yes (Yes, No, N/A)    Non slip footwear applied. n/a (Yes, No, NA)    Patient was accompanied by staff throughout visit. yes (Yes, No, N/A)    Equipment safety straps used. n/a (Yes, No, N/A)    Assist with toileting. n/a (Yes, No, N/A)

## 2017-06-20 ENCOUNTER — OFFICE VISIT (OUTPATIENT)
Dept: FAMILY MEDICINE | Facility: OTHER | Age: 82
End: 2017-06-20
Payer: COMMERCIAL

## 2017-06-20 VITALS
RESPIRATION RATE: 16 BRPM | SYSTOLIC BLOOD PRESSURE: 142 MMHG | TEMPERATURE: 98.2 F | OXYGEN SATURATION: 100 % | WEIGHT: 83.5 LBS | DIASTOLIC BLOOD PRESSURE: 65 MMHG | HEART RATE: 82 BPM | HEIGHT: 58 IN | BODY MASS INDEX: 17.53 KG/M2

## 2017-06-20 DIAGNOSIS — M25.552 HIP PAIN, LEFT: Primary | ICD-10-CM

## 2017-06-20 PROCEDURE — 99213 OFFICE O/P EST LOW 20 MIN: CPT | Performed by: FAMILY MEDICINE

## 2017-06-20 ASSESSMENT — PAIN SCALES - GENERAL: PAINLEVEL: SEVERE PAIN (7)

## 2017-06-20 NOTE — MR AVS SNAPSHOT
After Visit Summary   6/20/2017    Yuni Otoole    MRN: [id number]           Patient Information     Date Of Birth          1935        Visit Information        Provider Department      6/20/2017 2:30 PM Jonathan Vincent MD [facility]        Today's Diagnoses     Hip pain, left    -  1       Follow-ups after your visit        Your next 10 appointments already scheduled     Jun 22, 2017 10:00 AM CDT   XR INJECTION with PHXR1, PH RAD   [facility] ([facility])    [address] [place] [address] [phone]           Stop drinking 1 hour before the exam.  You may take your medicines as usual, except for blood thinners (Coumadin, Plavix, Ticlid, Persantine, Aggrenox, Pletal, Effient, Brilliant). Talk to your doctor if you take these.  Tell your doctor if:   You have ever had an allergic reaction to X-ray dye (contrast fluid).   There is a chance you may be pregnant.  Please bring a list of your current medicines to your exam. Include vitamins, minerals and over-the-counter medicines.  Please call the Imaging Department at your exam site with any questions.              Future tests that were ordered for you today     Open Future Orders        Priority Expected Expires Ordered    XR Joint Injection Major Left Routine 6/20/2017 6/20/2018 6/20/2017            Who to contact     If you have questions or need follow up information about today's clinic visit or your schedule please contact [facility] directly at [phone].  Normal or non-critical lab and imaging results will be communicated to you by MyChart, letter or phone within 4 business days after the clinic has received the results. If you do not hear from us within 7 days, please contact the clinic through MyChart or phone. If you have a critical or abnormal lab result, we will notify you by phone as soon as possible.  Submit refill requests through [employer] or call  "your pharmacy and they will forward the refill request to us. Please allow 3 business days for your refill to be completed.          Additional Information About Your Visit        [employer]ap[employer] Information     [employer] gives you secure access to your electronic health record. If you see a primary care provider, you can also send messages to your care team and make appointments. If you have questions, please call your primary care clinic.  If you do not have a primary care provider, please call [phone] and they will assist you.        Care EveryWhere ID     This is your Care EveryWhere ID. This could be used by other organizations to access your [place] medical records  [id number]        Your Vitals Were     Pulse Temperature Respirations Height Pulse Oximetry BMI (Body Mass Index)    82 98.2  F (36.8  C) (Temporal) 16 4' 10\" (1.473 m) 100% 17.45 kg/m2       Blood Pressure from Last 3 Encounters:   06/20/17 142/65   04/25/17 136/84   10/19/16 136/74    Weight from Last 3 Encounters:   06/20/17 83 lb 8 oz (37.9 kg)   05/01/17 84 lb (38.1 kg)   04/25/17 84 lb 6.4 oz (38.3 kg)               Primary Care Provider Office Phone # Fax #    Jonathan Vincent [phone]       [facility] [address] [facility] [address]        Thank you!     Thank you for choosing [facility]  for your care. Our goal is always to provide you with excellent care. Hearing back from our patients is one way we can continue to improve our services. Please take a few minutes to complete the written survey that you may receive in the mail after your visit with us. Thank you!             Your Updated Medication List - Protect others around you: Learn how to safely use, store and throw away your medicines at www.disposemymeds.org.          This list is accurate as of: 6/20/17  3:29 PM.  Always use your most recent med list.                   Brand Name Dispense Instructions for use    aspirin 81 MG " tablet      Take 1 tablet by mouth daily.       calcium + D 600-200 MG-UNIT Tabs   Generic drug:  calcium carbonate-vitamin D     30    1 TABLET DAILY       captopril 25 MG tablet    CAPOTEN    180 tablet    Take 1 tablet by mouth  twice a day       diazepam 5 MG tablet    VALIUM    180 tablet    Take 1 tablet (5 mg) by mouth 2 times daily       hydrochlorothiazide 25 MG tablet    HYDRODIURIL    180 tablet    Take 2 tablets by mouth  daily       HYDROcodone-acetaminophen 5-325 MG per tablet    NORCO    90 tablet    Take 1-2 tablets by mouth every 4 hours as needed for moderate to severe pain or pain       order for DME     1 Device    Equipment being ordered: Shower chair       potassium chloride SA 10 MEQ CR tablet    K-DUR/KLOR-CON M    180 tablet    Take 1 tablet by mouth  twice a day       pravastatin 10 MG tablet    PRAVACHOL    90 tablet    Take 1 tablet by mouth  daily at bedtime

## 2017-06-20 NOTE — PROGRESS NOTES
"SUBJECTIVE:                                                    Yuni Otoole is a 81 year old female who presents to clinic today for the following health issues:    Chief Complaint   Patient presents with     Pain     recheck     Neck Pain     numbness in both hands and right arm         Problem list and histories reviewed & adjusted, as indicated.    C: NEGATIVE for fever, chills, change in weight  E/M: NEGATIVE for ear, mouth and throat problems  R: NEGATIVE for significant cough or SOB  CV: NEGATIVE for chest pain, palpitations or peripheral edema  MUSCULOSKELETAL: arthralgias severe    OBJECTIVE:                                                    /65  Pulse 82  Temp 98.2  F (36.8  C) (Temporal)  Resp 16  Ht 4' 10\" (1.473 m)  Wt 83 lb 8 oz (37.9 kg)  SpO2 100%  BMI 17.45 kg/m2  Body mass index is 17.45 kg/(m^2).    See dictated note     ASSESSMENT/PLAN:                                                    Refer radiology beatriz left hip joint injection    Jonathan Vincent MD, MD  [facility]          "

## 2017-06-21 NOTE — PROGRESS NOTES
SPECIALTIES CLINIC NOTE       SUBJECTIVE:  The patient Ryder Kulkarni is a 81-year-old woman with ongoing chronic pain, severe kyphoscoliosis, joint pain and an abnormal gait who walks with a walker.  She presents with worsening pain, particularly in her left hip and groin area.  We have seen her in the past.  She underwent a fairly successful injection of the left hip approximately 6 weeks ago, but now the pain is recurring.  Several weeks ago she had an injection of the right hip which was less successful, but that side has not been as bad.  She continues to have neck pain and is followed for that elsewhere.        The patient took a narcotic pain medication in the past, and this was particularly helpful.  However, it causing constipation, and she has discontinued that.  NSAIDs and other interventions have not been particularly helpful.  The heavy damp weather makes her symptoms worsen.        OBJECTIVE:  The patient has tenderness in the left hip area, not over the trochanter or sacroiliac.  She tends to turn her leg in somewhat when she walks with her walker.  She is so kyphotic she cannot even straighten up and generally looks toward the floor.  She is petite, and her activity is severely restricted.      IMPRESSION/PLAN:  The patient is really wondering whether she could try another injection of the hip.  Dr. Turner had done this quite successfully in the past.  Ordinarily we would not re-inject so soon, but her hip is so degenerative I do not think we can do any harm with another injection.  If Dr. Turner is willing to attempt this we will place a referral.         DAWSON JENSEN M.D.             D: 2017 15:34   T: 2017 02:40   MT: [id number]      Name:     RYDER KULKARNI   MRN:      [id number]        Account:      [id number]   :      1935           Visit Date:   2017      Document: [id number]       cc: Chandrakant Turner MD

## 2017-06-22 ENCOUNTER — HOSPITAL ENCOUNTER (OUTPATIENT)
Dept: GENERAL RADIOLOGY | Facility: CLINIC | Age: 82
Discharge: HOME | End: 2017-06-22
Attending: FAMILY MEDICINE | Admitting: FAMILY MEDICINE
Payer: MEDICARE

## 2017-06-22 DIAGNOSIS — M25.552 HIP PAIN, LEFT: ICD-10-CM

## 2017-06-22 PROCEDURE — S0020 INJECTION, BUPIVICAINE HYDRO: HCPCS | Performed by: RADIOLOGY

## 2017-06-22 PROCEDURE — 20610 DRAIN/INJ JOINT/BURSA W/O US: CPT | Mod: LT

## 2017-06-22 PROCEDURE — 25000125 ZZHC RX 250: Performed by: RADIOLOGY

## 2017-06-22 PROCEDURE — 25500064 ZZH RX 255 OP 636: Performed by: RADIOLOGY

## 2017-06-22 RX ORDER — TRIAMCINOLONE ACETONIDE 40 MG/ML
40 INJECTION, SUSPENSION INTRA-ARTICULAR; INTRAMUSCULAR ONCE
Status: COMPLETED | OUTPATIENT
Start: 2017-06-22 | End: 2017-06-22

## 2017-06-22 RX ORDER — LIDOCAINE HYDROCHLORIDE 10 MG/ML
20 INJECTION, SOLUTION INFILTRATION; PERINEURAL ONCE
Status: COMPLETED | OUTPATIENT
Start: 2017-06-22 | End: 2017-06-22

## 2017-06-22 RX ORDER — IOPAMIDOL 408 MG/ML
50 INJECTION, SOLUTION INTRAVASCULAR ONCE
Status: COMPLETED | OUTPATIENT
Start: 2017-06-22 | End: 2017-06-22

## 2017-06-22 RX ORDER — BUPIVACAINE HYDROCHLORIDE 2.5 MG/ML
10 INJECTION, SOLUTION EPIDURAL; INFILTRATION; INTRACAUDAL ONCE
Status: COMPLETED | OUTPATIENT
Start: 2017-06-22 | End: 2017-06-22

## 2017-06-22 RX ADMIN — TRIAMCINOLONE ACETONIDE 40 MG: 40 INJECTION, SUSPENSION INTRA-ARTICULAR; INTRAMUSCULAR at 10:12

## 2017-06-22 RX ADMIN — LIDOCAINE HYDROCHLORIDE 1 ML: 10 INJECTION, SOLUTION INFILTRATION; PERINEURAL at 10:08

## 2017-06-22 RX ADMIN — IOPAMIDOL 1 ML: 408 INJECTION, SOLUTION INTRAVASCULAR at 10:11

## 2017-06-22 RX ADMIN — BUPIVACAINE HYDROCHLORIDE 4 ML: 2.5 INJECTION, SOLUTION EPIDURAL; INFILTRATION; INTRACAUDAL at 10:12

## 2017-06-22 NOTE — PROGRESS NOTES
Appropriate assistive devices provided during their visit. yes (Yes, No, N/A) wheelchair (list device)    Exam table and/or cart  placed in the lowest position. yes(Yes, No, N/A)    Brakes on tables/carts/wheelchairs used at all times. yes (Yes, No, N/A)    Non slip footwear applied. N/A (Yes, No, NA)    Patient was accompanied by staff throughout visit. Yes (Yes, No, N/A)    Equipment safety straps used. N/A (Yes, No, N/A)    Assist with toileting. N/A (Yes, No, N/A)

## 2017-07-10 ENCOUNTER — TELEPHONE (OUTPATIENT)
Dept: NEUROSURGERY | Facility: CLINIC | Age: 82
End: 2017-07-10

## 2017-07-10 DIAGNOSIS — M54.12 CERVICAL RADICULOPATHY: ICD-10-CM

## 2017-07-10 DIAGNOSIS — S12.9XXD CERVICAL COMPRESSION FRACTURE, WITH ROUTINE HEALING, SUBSEQUENT ENCOUNTER: Primary | ICD-10-CM

## 2017-07-10 NOTE — TELEPHONE ENCOUNTER
Patient has appt scheduled with Bozena Stroud CNP on 7/18/17 at [facility] for f/u on cervical fracture. Patient will need x-ray prior.Ok per Bozena to order Cervical MRI d/t patient's reported symptoms and will review at her clinic appt on 7/18. Orders placed for x-ray and MRI in EPIC. Spoke to patient and in agreement with plan. Provided patient with central scheduling number and will call to schedule imaging.

## 2017-07-10 NOTE — TELEPHONE ENCOUNTER
Pt saw Bozena last July 2016 and thought that she wanted her to have another MRI done but nothing written in chart about this. Pt also wants to update Bozena with what has been going on with her. Pt stated that she is having a lot of pain in her neck, pain on the right side at base of neck radiates tingling pain up into her head, left arm is numb most of the time and has no feeling in left hand so she is dropping things a lot. Pt stated she is still using her walker but says it is hard on her shoulders. Informed pt that Bozena or one of the nurses will contact her what she should do.

## 2017-07-16 ENCOUNTER — HOSPITAL ENCOUNTER (OUTPATIENT)
Dept: MRI IMAGING | Facility: CLINIC | Age: 82
End: 2017-07-16
Attending: NURSE PRACTITIONER
Payer: MEDICARE

## 2017-07-16 ENCOUNTER — HOSPITAL ENCOUNTER (OUTPATIENT)
Dept: GENERAL RADIOLOGY | Facility: CLINIC | Age: 82
Discharge: HOME | End: 2017-07-16
Attending: NURSE PRACTITIONER | Admitting: NURSE PRACTITIONER
Payer: MEDICARE

## 2017-07-16 DIAGNOSIS — S12.9XXD CERVICAL COMPRESSION FRACTURE, WITH ROUTINE HEALING, SUBSEQUENT ENCOUNTER: ICD-10-CM

## 2017-07-16 DIAGNOSIS — M54.12 CERVICAL RADICULOPATHY: ICD-10-CM

## 2017-07-16 LAB — RADIOLOGIST FLAGS: ABNORMAL

## 2017-07-16 PROCEDURE — 72040 X-RAY EXAM NECK SPINE 2-3 VW: CPT | Mod: TC

## 2017-07-16 PROCEDURE — 72141 MRI NECK SPINE W/O DYE: CPT

## 2017-07-17 DIAGNOSIS — I10 BENIGN ESSENTIAL HYPERTENSION: ICD-10-CM

## 2017-07-17 DIAGNOSIS — E78.5 HYPERLIPIDEMIA LDL GOAL <130: ICD-10-CM

## 2017-07-17 NOTE — TELEPHONE ENCOUNTER
Hydrochlorothiazide,      Last Written Prescription Date: 2/8/17  Last Fill Quantity: 180, # refills: 1  Last Office Visit with [facility] prescribing provider: 6/20/17  Next 5 appointments (look out 90 days)     Jul 18, 2017 11:00 AM CDT   Return Visit with Bozena Stroud NP   [facility]    [address] [place] [address]   [phone]                   Potassium   Date Value Ref Range Status   04/25/2017 3.9 3.4 - 5.3 mmol/L Final     Creatinine   Date Value Ref Range Status   04/25/2017 0.48 (L) 0.52 - 1.04 mg/dL Final     BP Readings from Last 3 Encounters:   06/20/17 142/65   04/25/17 136/84   10/19/16 136/74     Captopril,       Last Written Prescription Date: 2/8/17  Last Fill Quantity: 180, # refills: 1  Last Office Visit with [facility] prescribing provider: 6/20/17  Next 5 appointments (look out 90 days)     Jul 18, 2017 11:00 AM CDT   Return Visit with Bozena Stroud NP   [facility]    [address] [place] [address]   [phone]                   Potassium   Date Value Ref Range Status   04/25/2017 3.9 3.4 - 5.3 mmol/L Final     Creatinine   Date Value Ref Range Status   04/25/2017 0.48 (L) 0.52 - 1.04 mg/dL Final     BP Readings from Last 3 Encounters:   06/20/17 142/65   04/25/17 136/84   10/19/16 136/74       Pravastatin      Last Written Prescription Date: 2/8/17  Last Fill Quantity: 90, # refills: 1  Last Office Visit with [facility] prescribing provider: 6/20/17  Next 5 appointments (look out 90 days)     Jul 18, 2017 11:00 AM CDT   Return Visit with Bozena Stroud NP   [facility]    [address] [facility] [address]   [phone]                   Lab Results   Component Value Date    CHOL 170 05/11/2015     Lab Results   Component Value Date    HDL 85 05/11/2015     Lab Results   Component  Value Date    LDL 66 04/25/2017    LDL 72 05/11/2015     Lab Results   Component Value Date    TRIG 64 05/11/2015     Lab Results   Component Value Date    CHOLHDLRATIO 2.0 05/11/2015

## 2017-07-18 ENCOUNTER — OFFICE VISIT (OUTPATIENT)
Dept: NEUROSURGERY | Facility: CLINIC | Age: 82
End: 2017-07-18
Payer: COMMERCIAL

## 2017-07-18 VITALS — DIASTOLIC BLOOD PRESSURE: 101 MMHG | OXYGEN SATURATION: 97 % | HEART RATE: 90 BPM | SYSTOLIC BLOOD PRESSURE: 189 MMHG

## 2017-07-18 DIAGNOSIS — M50.30 DEGENERATIVE DISC DISEASE, CERVICAL: Primary | ICD-10-CM

## 2017-07-18 PROCEDURE — 99213 OFFICE O/P EST LOW 20 MIN: CPT | Performed by: NURSE PRACTITIONER

## 2017-07-18 RX ORDER — CAPTOPRIL 25 MG/1
TABLET ORAL
Qty: 180 TABLET | Refills: 1 | Status: ON HOLD | OUTPATIENT
Start: 2017-07-18 | End: 2017-11-27

## 2017-07-18 RX ORDER — HYDROCHLOROTHIAZIDE 25 MG/1
TABLET ORAL
Qty: 180 TABLET | Refills: 1 | Status: ON HOLD | OUTPATIENT
Start: 2017-07-18 | End: 2017-11-27

## 2017-07-18 RX ORDER — PRAVASTATIN SODIUM 10 MG
TABLET ORAL
Qty: 90 TABLET | Refills: 1 | Status: ON HOLD | OUTPATIENT
Start: 2017-07-18 | End: 2017-11-27

## 2017-07-18 NOTE — PROGRESS NOTES
"[facility]  Neurosurgery followup:    HPI: Yuni Otoole is a 81 year female who was last seen on 7/19/16 for ongoing evaluation and treatment of C7 fracture that she sustained after a fall on 4/18/16.  She is here today for follow up with complaints of right sided neck pain.  She states she still has intermittent pain in her right arm.  She has started to notice pain radiating into the LUE with numbness in the right index finger.  She has noted weakness as well.  She states she normally has some weakness to RUE with difficulty raising her arm but this is not new.  She has been using a walker as she states she has trouble \"staying steady\" but she denies falls.  She denies pain in BLE or changes to bowel or bladder.      Exam: Patient using clinic wheelchair; ambulates slowly with assist of family.  Constitutional:  Alert, well nourished, NAD.  HEENT: Normocephalic, atraumatic.   Pulm:  Without shortness of breath   CV:  No pitting edema of BLE.     Neurological:  Awake  Alert  Oriented x 3  Motor exam:     Strength decreased to RUE when compared to LUE  Strength 5/5 BLE    Clonus negative  DTRs 1+ symmetric     Able to spontaneously move U/E bilaterally  Sensation intact throughout all U/E dermatomes    Cervical examination with tenderness to left posterior neck and upper trapezius musculature.  ROM limited due to pain.    Imaging:  MRI of Cervical Spine 7/16/17:  IMPRESSION:  1. Probable fracture line through the tip of the dens with new edema  in the dens and soft tissue swelling of the transverse cervical  ligament. These findings were not present on 7/13/2016. There is also joint effusions of the right C1-C2 and bilateral occipital condyle-C1.  2. Increased spinal canal narrowing at the level of C1 due to joint effusion and increased thickness of the transverse cervical ligament.  3. Marked kinking of the cervical spinal cord is again seen at C2-C3 due to anterolisthesis of C2 on C3. No abnormal " signal identified within the visualized spinal cord. This finding is not significantlychanged since prior.    A/P:   Cervical DDD  Cervical Radiculpathy  R/o odontoid fracture    Yuni Otoole is a 81 year female who was last seen on 7/19/16 for ongoing evaluation and treatment of C7 fracture that she sustained after a fall on 4/18/16.  She is here today for follow up with complaints of right sided neck pain.  She states she still has intermittent pain in her right arm.  She has started to notice pain radiating into the LUE with numbness in the right index finger.  She has noted weakness as well.  She states she normally has some weakness to RUE with difficulty raising her arm but this is not new. We reviewed her cervical MRI results today.  Dr. Felix had reviewed previously as well and due to possible odontoid fracture he is recommending a CT of cervical spine and referral to [facility] neurosurgery.  They will schedule the CT within the next 1-2 days.  She was advised to wear her hard collar 24/7 until results of CT reviewed.  We will call her with results and direction regarding brace.  She does have a brace at home but is concerned it is too big.  When orthotics calls to fit her for a brace she will bring her previous brace to hopefully be modified.  The patient and her family will contact us if worsening symptoms such as increased pain, increased weakness or changes to bowel or bladder or difficulty with gait.    Patient Instructions   -Schedule CT of cervical spine  -We will call you with results.  -Wear hard collar continuously until results of CT  -Schedule with [facility] for consult per Dr. Felix (someone will contact you)  -Please contact the clinic if pain persists at [phone].          Bozena Stroud [facility]  [facility]  [address]  [address] [place] [address]    Tel [phone]  Pager [phone]

## 2017-07-18 NOTE — TELEPHONE ENCOUNTER
HCTZ  Routing refill request to provider for review/approval because:  Labs out of range:  Creatinine    Captopril  Routing refill request to provider for review/approval because:  Labs out of range:  Creatinine    Pravastatin  Routing refill request to provider for review/approval because:  Labs not current:  MANJU Armendariz RN  [facility]

## 2017-07-18 NOTE — MR AVS SNAPSHOT
After Visit Summary   7/18/2017    Yuni Otoole    MRN: [id number]           Patient Information     Date Of Birth          1935        Visit Information        Provider Department      7/18/2017 1:00 PM Bozena Stroud NP [facility]        Today's Diagnoses     Degenerative disc disease, cervical    -  1      Care Instructions    -Schedule CT of cervical spine  -We will call you with results.  -Wear hard collar continuously until results of CT  -Schedule with [facility] neurosurgery for consult per Dr. Felix (someone will contact you)  -Please contact the clinic if pain persists at [phone].            Follow-ups after your visit        Additional Services     NEUROSURGERY REFERRAL       Your provider has referred you to: [facility]: Neurosurgery Clinic - [place] [phone]   [email or web address]    Please be aware that coverage of these services is subject to the terms and limitations of your health insurance plan.  Call member services at your health plan with any benefit or coverage questions.      Please bring the following with you to your appointment:    (1) Any X-Rays, CTs or MRIs which have been performed.  Contact the facility where they were done to arrange for  prior to your scheduled appointment.   (2) List of current medications  (3) This referral request   (4) Any documents/labs given to you for this referral            ORTHOTICS REFERRAL       **This referral order prints off in the [facility]  (Orthotics & Prosthetics) Central Scheduling Office**    The [facility] Central Scheduling Staff will contact the patient to schedule appointments.     Central Scheduling Contact Information: [phone] ([place])    Orthotics: Cervical Collar/Aspen; needs to be fitted.    Please be aware that coverage of these services is subject to the terms and limitations of your health insurance plan.  Call  member services at your health plan with any benefit or coverage questions.      Please bring the following to your appointment:    >>   Any x-rays, CTs or MRIs which have been performed.  Contact the facility where they were done to arrange for  prior to your scheduled appointment.    >>   List of current medications   >>   This referral request   >>   Any documents/labs given to you for this referral                  Future tests that were ordered for you today     Open Future Orders        Priority Expected Expires Ordered    CT Cervical Spine w/o Contrast Routine  7/18/2018 7/18/2017            Who to contact     If you have questions or need follow up information about today's clinic visit or your schedule please contact [facility] directly at [phone].  Normal or non-critical lab and imaging results will be communicated to you by MyChart, letter or phone within 4 business days after the clinic has received the results. If you do not hear from us within 7 days, please contact the clinic through [employer] or phone. If you have a critical or abnormal lab result, we will notify you by phone as soon as possible.  Submit refill requests through [employer] or call your pharmacy and they will forward the refill request to us. Please allow 3 business days for your refill to be completed.          Additional Information About Your Visit        [employer] Information     [employer] gives you secure access to your electronic health record. If you see a primary care provider, you can also send messages to your care team and make appointments. If you have questions, please call your primary care clinic.  If you do not have a primary care provider, please call [phone] and they will assist you.        Care EveryWhere ID     This is your Care EveryWhere ID. This could be used by other organizations to access your [place] medical records  [id number]        Your Vitals Were     Pulse Pulse Oximetry  Breastfeeding?             90 97% No          Blood Pressure from Last 3 Encounters:   07/18/17 (!) 189/101   06/20/17 142/65   04/25/17 136/84    Weight from Last 3 Encounters:   06/20/17 83 lb 8 oz (37.9 kg)   05/01/17 84 lb (38.1 kg)   04/25/17 84 lb 6.4 oz (38.3 kg)              We Performed the Following     NEUROSURGERY REFERRAL     ORTHOTICS REFERRAL        Primary Care Provider Office Phone # Fax #    Jonathan Vincent [phone]       [facility] [address] [facility] [address]        Equal Access to Services     CARLOS MCKNIGHT : Hadii mata connoro Sodana, waaxda luqadaha, qaybta kaalmada adesaundrayada, elias bruno . So [facility] [phone].    ATENCIÓN: Si habla español, tiene a pierson disposición servicios gratuitos de asistencia lingüística. Llame al [phone].    We comply with applicable federal civil rights laws and [place] laws. We do not discriminate on the basis of race, color, national origin, age, disability sex, sexual orientation or gender identity.            Thank you!     Thank you for choosing [facility]  for your care. Our goal is always to provide you with excellent care. Hearing back from our patients is one way we can continue to improve our services. Please take a few minutes to complete the written survey that you may receive in the mail after your visit with us. Thank you!             Your Updated Medication List - Protect others around you: Learn how to safely use, store and throw away your medicines at www.disposemymeds.org.          This list is accurate as of: 7/18/17  1:31 PM.  Always use your most recent med list.                   Brand Name Dispense Instructions for use Diagnosis    aspirin 81 MG tablet      Take 1 tablet by mouth daily.        calcium + D 600-200 MG-UNIT Tabs   Generic drug:  calcium carbonate-vitamin D     30    1 TABLET DAILY    Scoliosis, Osteopenia       captopril 25 MG  tablet    CAPOTEN    180 tablet    Take 1 tablet by mouth  twice a day    Benign essential hypertension       diazepam 5 MG tablet    VALIUM    180 tablet    Take 1 tablet (5 mg) by mouth 2 times daily    Generalized anxiety disorder, Sleep disorder       hydrochlorothiazide 25 MG tablet    HYDRODIURIL    180 tablet    Take 2 tablets by mouth  daily    Benign essential hypertension       HYDROcodone-acetaminophen 5-325 MG per tablet    NORCO    90 tablet    Take 1-2 tablets by mouth every 4 hours as needed for moderate to severe pain or pain    Closed nondisplaced fracture of seventh cervical vertebra, unspecified fracture morphology, sequela       order for DME     1 Device    Equipment being ordered: Shower chair    Nondisplaced fracture of seventh cervical vertebra, unspecified fracture morphology, initial encounter (H)       potassium chloride SA 10 MEQ CR tablet    K-DUR/KLOR-CON M    180 tablet    Take 1 tablet by mouth  twice a day    Low blood potassium       pravastatin 10 MG tablet    PRAVACHOL    90 tablet    Take 1 tablet by mouth  daily at bedtime    Hyperlipidemia LDL goal <130

## 2017-07-18 NOTE — NURSING NOTE
"Yuni Otoole is a 81 year old female who presents for:  Chief Complaint   Patient presents with     Neurologic Problem     follow up c-7 fracture         Initial Vitals:  There were no vitals taken for this visit. Estimated body mass index is 17.45 kg/(m^2) as calculated from the following:    Height as of 6/20/17: 4' 10\" (1.473 m).    Weight as of 6/20/17: 83 lb 8 oz (37.9 kg).. There is no height or weight on file to calculate BSA. BP completed using cuff size: pediatric  Data Unavailable    Do you feel safe in your environment?  Yes  Do you need any refills today? No    Nursing Comments: follow up c-7 fracture         Glo Maddox    "

## 2017-07-18 NOTE — PATIENT INSTRUCTIONS
-Schedule CT of cervical spine  -We will call you with results.  -Wear hard collar continuously until results of CT  -Schedule with [facility] for consult per Dr. Felix (someone will contact you)  -Please contact the clinic if pain persists at [phone].

## 2017-07-20 ENCOUNTER — HOSPITAL ENCOUNTER (OUTPATIENT)
Dept: CT IMAGING | Facility: CLINIC | Age: 82
Discharge: HOME | End: 2017-07-20
Attending: NURSE PRACTITIONER | Admitting: NURSE PRACTITIONER
Payer: MEDICARE

## 2017-07-20 DIAGNOSIS — M50.30 DEGENERATIVE DISC DISEASE, CERVICAL: ICD-10-CM

## 2017-07-20 PROCEDURE — 72125 CT NECK SPINE W/O DYE: CPT

## 2017-07-20 NOTE — PROGRESS NOTES
Appropriate assistive devices provided during their visit. y (Yes, No, N/A) wheelchair (list device)    Exam table and/or cart  placed in the lowest position. y (Yes, No, N/A)    Brakes on tables/carts/wheelchairs used at all times. y (Yes, No, N/A)    Non slip footwear applied. y (Yes, No, NA)    Patient was accompanied by staff throughout visit. y (Yes, No, N/A)    Equipment safety straps used. na (Yes, No, N/A)    Assist with toileting. na (Yes, No, N/A)

## 2017-07-25 ENCOUNTER — TELEPHONE (OUTPATIENT)
Dept: NEUROSURGERY | Facility: CLINIC | Age: 82
End: 2017-07-25

## 2017-07-25 NOTE — TELEPHONE ENCOUNTER
Attempt to call pt, but line was busy.  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 Felix.

## 2017-07-25 NOTE — TELEPHONE ENCOUNTER
Called and spoke to patient and informed her per Bozena Stroud CNP her C7 fracture has healed so she can wear the brace per comfort and we still want her to f/u with [facility] for spine consult. Patient verbalized understanding and provided patient with phone number to [facility] neuro[facility] to call to schedule. Patient said she is going to think about it as she may go somewhere in [place] for a second opinion. Patient did not state where.

## 2017-08-02 ENCOUNTER — OFFICE VISIT (OUTPATIENT)
Dept: FAMILY MEDICINE | Facility: OTHER | Age: 82
End: 2017-08-02
Payer: COMMERCIAL

## 2017-08-02 VITALS
TEMPERATURE: 97.6 F | OXYGEN SATURATION: 97 % | DIASTOLIC BLOOD PRESSURE: 87 MMHG | RESPIRATION RATE: 16 BRPM | HEIGHT: 58 IN | SYSTOLIC BLOOD PRESSURE: 129 MMHG | WEIGHT: 83.5 LBS | HEART RATE: 84 BPM | BODY MASS INDEX: 17.53 KG/M2

## 2017-08-02 DIAGNOSIS — M54.2 NECK PAIN: Primary | ICD-10-CM

## 2017-08-02 PROCEDURE — 99213 OFFICE O/P EST LOW 20 MIN: CPT | Performed by: FAMILY MEDICINE

## 2017-08-02 ASSESSMENT — PAIN SCALES - GENERAL: PAINLEVEL: SEVERE PAIN (7)

## 2017-08-02 NOTE — PROGRESS NOTES
"SUBJECTIVE:                                                    Yuni Otoole is a 81 year old female who presents to clinic today for the following health issues:    Chief Complaint   Patient presents with     Neck Pain     c/o right side of neck going up skull into head causing an itching sensation in scalp becoming painful and left arm and fingers becoming numb unable to hold things in hand,          Problem list and histories reviewed & adjusted, as indicated.    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         ASSESSMENT/PLAN:                                                    Refer neurosurgery [facility] as recommended but not set up at [place] visit. Refer , [facility] neursurgery    Jonathan Vincent MD, MD  [facility]          "

## 2017-08-02 NOTE — MR AVS SNAPSHOT
"              After Visit Summary   8/2/2017    Yuni Otoole    MRN: [id number]           Patient Information     Date Of Birth          1935        Visit Information        Provider Department      8/2/2017 11:45 AM Jonathan Vincent MD [facility]         Follow-ups after your visit        Who to contact     If you have questions or need follow up information about today's clinic visit or your schedule please contact [facility] directly at [phone].  Normal or non-critical lab and imaging results will be communicated to you by [employer], letter or phone within 4 business days after the clinic has received the results. If you do not hear from us within 7 days, please contact the clinic through [employer] or phone. If you have a critical or abnormal lab result, we will notify you by phone as soon as possible.  Submit refill requests through [employer] or call your pharmacy and they will forward the refill request to us. Please allow 3 business days for your refill to be completed.          Additional Information About Your Visit        MyChart Information     [employer] gives you secure access to your electronic health record. If you see a primary care provider, you can also send messages to your care team and make appointments. If you have questions, please call your primary care clinic.  If you do not have a primary care provider, please call [phone] and they will assist you.        Care EveryWhere ID     This is your Care EveryWhere ID. This could be used by other organizations to access your [facility] medical records  [id number]        Your Vitals Were     Pulse Temperature Respirations Height Pulse Oximetry BMI (Body Mass Index)    84 97.6  F (36.4  C) (Temporal) 16 4' 10\" (1.473 m) 97% 17.45 kg/m2       Blood Pressure from Last 3 Encounters:   08/02/17 129/87   07/18/17 (!) 189/101   06/20/17 142/65    Weight from Last 3 Encounters:   08/02/17 83 lb 8 oz (37.9 kg)   06/20/17 " 83 lb 8 oz (37.9 kg)   05/01/17 84 lb (38.1 kg)              Today, you had the following     No orders found for display       Primary Care Provider Office Phone # Fax #    Jonathan Vincent [phone]       [facility] [address] [facility] [address]        Equal Access to Services     [facility] Peninsula[facility] : Hadii aad ku hadasho Soomaali, waaxda luqadaha, qaybta kaalmada adeegyada, waxay idiin hayaan adeeg kharash laearnestn ah. So [facility] [phone].    ATENCIÓN: Si habla español, tiene a pierson disposición servicios gratuitos de asistencia lingüística. Haame al [phone].    We comply with applicable federal civil rights laws and [place] laws. We do not discriminate on the basis of race, color, national origin, age, disability sex, sexual orientation or gender identity.            Thank you!     Thank you for choosing [facility]  for your care. Our goal is always to provide you with excellent care. Hearing back from our patients is one way we can continue to improve our services. Please take a few minutes to complete the written survey that you may receive in the mail after your visit with us. Thank you!             Your Updated Medication List - Protect others around you: Learn how to safely use, store and throw away your medicines at www.disposemymeds.org.          This list is accurate as of: 8/2/17  2:08 PM.  Always use your most recent med list.                   Brand Name Dispense Instructions for use Diagnosis    aspirin 81 MG tablet      Take 1 tablet by mouth daily.        calcium + D 600-200 MG-UNIT Tabs   Generic drug:  calcium carbonate-vitamin D     30    1 TABLET DAILY    Scoliosis, Osteopenia       captopril 25 MG tablet    CAPOTEN    180 tablet    Take 1 tablet by mouth  twice a day    Benign essential hypertension       diazepam 5 MG tablet    VALIUM    180 tablet    Take 1 tablet (5 mg) by mouth 2 times daily    Generalized anxiety disorder, Sleep  disorder       hydrochlorothiazide 25 MG tablet    HYDRODIURIL    180 tablet    Take 2 tablets by mouth  daily    Benign essential hypertension       HYDROcodone-acetaminophen 5-325 MG per tablet    NORCO    90 tablet    Take 1-2 tablets by mouth every 4 hours as needed for moderate to severe pain or pain    Closed nondisplaced fracture of seventh cervical vertebra, unspecified fracture morphology, sequela       order for DME     1 Device    Equipment being ordered: Shower chair    Nondisplaced fracture of seventh cervical vertebra, unspecified fracture morphology, initial encounter (H)       potassium chloride SA 10 MEQ CR tablet    K-DUR/KLOR-CON M    180 tablet    Take 1 tablet by mouth  twice a day    Low blood potassium       pravastatin 10 MG tablet    PRAVACHOL    90 tablet    Take 1 tablet by mouth  daily at bedtime    Hyperlipidemia LDL goal <130

## 2017-08-03 ENCOUNTER — TELEPHONE (OUTPATIENT)
Dept: FAMILY MEDICINE | Facility: OTHER | Age: 82
End: 2017-08-03

## 2017-08-03 NOTE — TELEPHONE ENCOUNTER
Called and scheduled patient to be seen by Dr Felix at [facility] in [place].  Patient has agreed to this plan.    Alesia Stearns XRO/

## 2017-08-14 ENCOUNTER — TELEPHONE (OUTPATIENT)
Dept: NEUROSURGERY | Facility: CLINIC | Age: 82
End: 2017-08-14

## 2017-08-14 NOTE — TELEPHONE ENCOUNTER
Spoke with patient. Clarified with patient that Dr. Felix is recommending patient get in for consult at [facility] at [facility] for chin on chest deformity. Patient states she had not set that appt up yet. Patient stated that someone she knows has seen Dr Emmanuel and was highly recommended. Patient was inquiring about a second opinion with Dr Emmanuel at [facility] since its closer to patient's home. Informed patient I will discuss with Dr. Emmanuel and his team and get back to patient.     Dr. Emmanuel in agreement with Dr. Felix and recommends patient f/u at [facility]. Called and informed patient. Provided phone number to [facility] to set up appt. Patient verbalized understanding.

## 2017-09-19 ENCOUNTER — OFFICE VISIT (OUTPATIENT)
Dept: NEUROSURGERY | Facility: CLINIC | Age: 82
End: 2017-09-19

## 2017-09-19 ENCOUNTER — PRE VISIT (OUTPATIENT)
Dept: NEUROSURGERY | Facility: CLINIC | Age: 82
End: 2017-09-19

## 2017-09-19 VITALS
HEART RATE: 84 BPM | WEIGHT: 84 LBS | DIASTOLIC BLOOD PRESSURE: 78 MMHG | SYSTOLIC BLOOD PRESSURE: 168 MMHG | BODY MASS INDEX: 17.63 KG/M2 | HEIGHT: 58 IN

## 2017-09-19 DIAGNOSIS — M47.812 CERVICAL SPONDYLOSIS WITHOUT MYELOPATHY: ICD-10-CM

## 2017-09-19 DIAGNOSIS — M50.30 DEGENERATIVE DISC DISEASE, CERVICAL: Primary | ICD-10-CM

## 2017-09-19 DIAGNOSIS — M43.8X2 SWAN NECK DEFORMITY OF CERVICAL SPINE: ICD-10-CM

## 2017-09-19 ASSESSMENT — PAIN SCALES - GENERAL: PAINLEVEL: EXTREME PAIN (8)

## 2017-09-19 NOTE — LETTER
9/19/2017       RE: Yuni Otoole  [address]  [place] [address]     Dear Colleague,    Thank you for referring your patient, Yuni Otoole, to the [facility] NEUROSURGERY at [facility]. Please see a copy of my visit note below.      Neurosurgery Clinic Consult  Date of Visit: 9/19/2017  Referred for: Cervical deformity  Referring Provider:  Dany Felix      Dear Dr. Felix,    We were happy to see Yuni Otoole, a pleasant 82 year old year old female for chin on chest deformity.    HPI: Ms. Otoole is well established at [facility] where she was seen after a fall sustained in April 2016 in which she suffered a fracture of C7. She was seen by nurse practitioner Bozena Stroud. She did rather well, healed nicely, but in follow-up was noted to have right-sided neck pain,, intermittent right arm pain and pain radiating into the left upper extremity with numbness. She also had at least one episode where her left leg just wouldn't work.  Plain film Imaging revealed the C7 fracture to be healed, but an MRI revealed cervical stenosis, and probable dens fracture with soft tissue to swelling and markedly kinking of the cervical spinal cord. There was no spinal cord signal change noted  A CT scan was ordered to better define the bony anatomy. The CT did confirm the C7 fracture was healed. But given the significant issues, as well as a reversal of cervical lordosis from prior autofusion below the level of C3 the patient was recommended to seek a consultation at the [facility]. There was some delay in her coming here because she [place] north of [facility], and she wished to go instead to [place]. After some more discussion she realized the [facility] has resources for this complex problem not available to her in the [place] area. She was referred directly to a neurosurgeon but because of availability was scheduled for her appointment in the [place]  clinic.  Treatment thus far has been essentially only for the fracture, which was a collar, now she can no longer sustain it as her head droops too far forward.     She describes pain in the back of her neck, down the backs of both arms, and numbness into 2 fingers of the left hand. She feels generally weak and deconditioned, and had one episode where her left leg did not work, she is in a wheelchair today but is able to walk ordinarily, and even goes up and down stairs so long that she holds tightly to the railing. Bowel and bladder function are unchanged recently.    She presents for evaluation, and treatment recommendations with the understanding that we are a surgical team.      Current Outpatient Prescriptions:      pravastatin (PRAVACHOL) 10 MG tablet, Take 1 tablet by mouth  daily at bedtime, Disp: 90 tablet, Rfl: 1     captopril (CAPOTEN) 25 MG tablet, Take 1 tablet by mouth  twice a day, Disp: 180 tablet, Rfl: 1     hydrochlorothiazide (HYDRODIURIL) 25 MG tablet, Take 2 tablets by mouth  daily, Disp: 180 tablet, Rfl: 1     diazepam (VALIUM) 5 MG tablet, Take 1 tablet (5 mg) by mouth 2 times daily, Disp: 180 tablet, Rfl: 3     potassium chloride SA (K-DUR/KLOR-CON M) 10 MEQ CR tablet, Take 1 tablet by mouth  twice a day, Disp: 180 tablet, Rfl: 1     HYDROcodone-acetaminophen (NORCO) 5-325 MG per tablet, Take 1-2 tablets by mouth every 4 hours as needed for moderate to severe pain or pain, Disp: 90 tablet, Rfl: 0     order for DME, Equipment being ordered: Shower chair, Disp: 1 Device, Rfl: 0     aspirin 81 MG tablet, Take 1 tablet by mouth daily., Disp: , Rfl:      CALCIUM + D 600-200 MG-UNIT OR TABS, 1 TABLET DAILY, Disp: 30, Rfl: 0    Allergies   Allergen Reactions     Atorvastatin Calcium      Was on Lipitor and has muscle problem       Past Medical History:   Diagnosis Date     Allergic rhinitis, cause unspecified      Pure hypercholesterolemia      Unspecified essential hypertension        Past Surgical  History:   Procedure Laterality Date     CAROTID ENDARTERECTOMY  08     COLONOSCOPY  07    Diverticulosis-return in 5 yrs     HC DRAIN/INJ MAJOR JOINT/BURSA W/O US      Right SI Joint     HC DRAIN/INJ MAJOR JOINT/BURSA W/O US      Right interarticular hip injection     HC INJ EPIDURAL CERVICAL/THORACIC W/WO CONTRAST      C6-7     HC INJ EPIDURAL LUMBAR/SACRAL W/WO CONTRAST      L5-S1     HC INJ TRANSFORAMIN EPIDURAL, LUMB/SACR SINGLE       HC REMOVAL OF TONSILS,14+ Y/O      Tonsils 14+y.o.       Family History   Problem Relation Age of Onset     CANCER Mother      colon cancer  at age 95 or 96   surgery done     GASTROINTESTINAL DISEASE Mother      stomach problems     OSTEOPOROSIS Mother      EYE* Mother      cataract and macular degen.     Cardiovascular Father       at age 60  MI     Hypertension Father      ?     Circulatory Paternal Grandmother      legs     CEREBROVASCULAR DISEASE Paternal Grandmother      Obesity Paternal Grandmother      Gynecology Sister      hysterectomy     Lipids Brother      was on meds.  ? still     Obesity Maternal Grandmother      Musculoskeletal Disorder Daughter      cancerous tumor left upper arm/shoulder- was told it was a breast type cancer     DIABETES Maternal Aunt        Social History     Social History     Marital status:      Spouse name: Mateus     Number of children: 4     Years of education: 12     Occupational History      (retired)      [employer].      Retired     Social History Main Topics     Smoking status: Former Smoker     Packs/day: 0.50     Years: 10.00     Quit date: 1996     Smokeless tobacco: Never Used     Alcohol use No     Drug use: No     Sexual activity: Not Currently     Other Topics Concern      Service No     Blood Transfusions No     Caffeine Concern No     4-6 cups / day     Occupational Exposure No      (retired now)     Hobby Hazards No     gardening     Sleep  "Concern No     Stress Concern No     Weight Concern Yes     w'd like to be 10# less     Special Diet No     Back Care Yes     wears a magnet belt at home     Exercise Yes     gardening and flower beds-walking in the school     Bike Helmet No     Seat Belt Yes     Self-Exams Yes     Social History Narrative     Problem list and 13 point review of systems: is reviewed in Epic and is negative with the exception of those symptoms associated with HPI and PMH.      OBJECTIVE:   /78  Pulse 84  Ht 1.473 m (4' 10\")  Wt 38.1 kg (84 lb)  BMI 17.56 kg/m2    Imaging:  These are the pertinent radiologist's findings from:  CT cervical 7/20/17  1. Radiolucent lesion in the odontoid is probably an erosion from gout  or CPPD and not of fracture.  2. Interval healing of C7 fracture.  3. Acquired fusions from C3 through T1 as described above.  4. Reversal of lordosis and C2-C3 subluxation causing moderate spinal  canal stenosis at C2-C3.  MRI cervical without 7/16/17  1. Probable fracture line through the tip of the dens with new edema  in the dens and soft tissue swelling of the transverse cervical  ligament. These findings were not present on 7/13/2016. There is also  joint effusions of the right C1-C2 and bilateral occipital condyle-C1.  2. Increased spinal canal narrowing at the level of C1 due to joint  effusion and increased thickness of the transverse cervical ligament.  3. Marked kinking of the cervical spinal cord is again seen at C2-C3  due to anterolisthesis of C2 on C3. No abnormal signal identified  within the visualized spinal cord. This finding is not significantly  changed since prior.  4. Additional levels of degenerative change throughout the cervical  spine as described above are not significantly change since prior.  Cervical spine images with dynamic views 7/16 /17  Lateral views were obtained of the cervical spine in both  flexion and extension. No acute fracture is demonstrated. Again seen  is moderate " anterior subluxation of C2 on C3. This is similar to the  previous study and does not significantly change between flexion and  extension. Again seen is moderately prominent kyphosis of the mid to  upper cervical spine. Vertebral body alignment is otherwise normal.  Again suspected is bone bridging across the anterior aspect of the  C3-C4 disc space. There is marked disc height loss elsewhere. No other  abnormality is demonstrated. I see no significant change since a  previous study.   See the full report in EPIC/Media tab.  I personally reviewed the images with the patient.      Exam:  *   Well developed, well nourished frail-appearing female found seated comfortably in wheelchair.   She is accompanied by daughter and .    *  Mental Status  A&O X3.  Bright, alert, affable, interactive. Language fluid, fund of knowledge intact. Good historian.  Mood and affect congruent and WNL.  *  Cranial Nerves  II: Able to read printed forms, VF full to gross confrontation.  III: IV, VI:  PERRLA, EOMI, No nystagmus, no ptosis.  V: Sensation intact in bilateral V1, V2, and V3. Jaw clench symmetric.    VII:  Intact to voice bilaterally.  IX:  Pushes tongue against bilateral cheeks.  X:  Palate elevates, uvula midline, phonation intact.  XI: Elevates shoulders, head turn intact.  XII: Tongue midline. No fasciculations.  *  Cervical Spine:  DTR's at Biceps, Triceps, and Brachioradialis 2/4 and symmetric.  Sensation intact.    No Elias's. No Phalen's.  No Tinel's. No fasciculations.   Muscle bulk diminished symmetrically and tone WNL.  Upper Extremity Strength. Deconditioned throughout              RIGHT                LEFT     Deltoid              5/5                   5/5       Biceps              5/5                   5/5        Triceps              5/5                   5/5       Wrist Extensor              5/5                   5/5                     5/5                    5/5       Interossei              5/5                    5/5       EPL              5/5                    5/5       Pinch              5/5                  5/5            *  Lumbar Spine:    DTR's Patellar and Achilles 2/4 and symmetric.   No fasciculations.  Muscle diminished symmetrically and tone WNL.  No Clonus.  Sensation intact.    Lower Extremity Strength                   RIGHT                   LEFT     Iliopsoas                    5/5                      5/5       Quad                    5/5                        5/5       Hamstring                      5/5                        5/5         Gastrocs                    5/5                        5/5       Tib. Anterior                     5/5                        5/5       EHL                      5/5                        5/5       Babinski               mute                                     mute                *  Structural Exam  Inspection of the spine reveals chin on chest deformity cervical spine.    Cervical spine ROM minimal. Moderate posterior spasming. Mild tenderness to palpation.  Unable to assess Spurling's or L'Hermitte's as there is no cervical motion.   Gait did not assess.    ASSESSMENT/PLAN:  1. Degenerative disc disease, cervical    2. Cervical spondylosis without myelopathy    3. [place] neck deformity of cervical spine      Reversal of cervical lordosis into cervical kyphosis, autofusion of multiple cervical levels, cervical listhesis C2/3, severe stenosis C2/3. Over all resulting in both chin on chest deformity as well as severe cervical stenosis, fortunately without a edmond myelopathy. She was referred for 2 issues:  the chin on chest deformity,  and secondly stenosis.  The issues of course are can her deformity be corrected to an extent to restore some functionality, and can stenosis be alleviated by simple decompression or will fusion also be required. Given the stenosis is at the level of the deformity essentially fusion will almost certainly be required. Minimally to  C3, but possibly more distally for stability.    The cervical lordosis from her autofusion below the level of C3 really cannot be corrected. And so we will have to create construct that takes that into account.    Other issues she has that would be nice to address are:  arm pain bilaterally right greater than left, and this is most likely coming from right C4 5 and C5 6. Left arm pain coming from C5 6 and C6 7  The question can this be decompressed from posteriorly, and then does the fusion need to be extended C7, which would be huge.       Dr. Yuan's schedule is booked solid for new patients all the way through to nearly November. Because of the scheduling mix up where going to get this nice patient in a little early by having her come onto my schedule, and be presented to Dr. Yuan through me as a new patient later on this week.   Since I really don't want her going into another winter, with its ice and snow and fall danger with her cervical spine looking as it does.      I answered all of their questions, which indicated good understanding of the situation.  They are willing to move forward with the recommendations. I supplied them with business cards and telephone numbers and urged them to call should they have questions, comments, or concerns. It's been a pleasure participating in the care of this nice patient. We thank you for your confidence in the [facility], Department of Neurosurgery.      Best Regards,    Lana Hauser PA-C  [facility]  Department of Neurosurgery  Phone: [phone]  Fax: [phone]        Total time: 60 minutes with more than 30 minutes spent in direct face to face contact reviewing films, providing education, counseling, the importance of good health habits including cessation of nicotine, non-operative therapies,and indications for surgery as well as further follow up.    This note was generated using voice recognition software. While edited for  content some inaccurate phrasing may be found.    Again, thank you for allowing me to participate in the care of your patient.      Sincerely,    Lana Hauser PA-C

## 2017-09-19 NOTE — MR AVS SNAPSHOT
After Visit Summary   9/19/2017    Yuni Otoole    MRN: [id number]           Patient Information     Date Of Birth          1935        Visit Information        Provider Department      9/19/2017 12:30 PM Lana Hauser PA-C [facility] Neurosurgery        Today's Diagnoses     Degenerative disc disease, cervical    -  1    Cervical spondylosis without myelopathy        Rock neck deformity of cervical spine           Follow-ups after your visit        Your next 10 appointments already scheduled     Sep 21, 2017  1:30 PM CDT   (Arrive by 1:15 PM)   Return Visit with MARIA LUISA Alcaraz [facility] ([facility])    [address]  [address] [facility] [address] [phone]              Who to contact     Please call your clinic at [phone] to:    Ask questions about your health    Make or cancel appointments    Discuss your medicines    Learn about your test results    Speak to your doctor   If you have compliments or concerns about an experience at your clinic, or if you wish to file a complaint, please contact [facility] Patient Relations at [phone] or email us at [email or web address]         Additional Information About Your Visit        MyChart Information     [employer] gives you secure access to your electronic health record. If you see a primary care provider, you can also send messages to your care team and make appointments. If you have questions, please call your primary care clinic.  If you do not have a primary care provider, please call [phone] and they will assist you.      [employer] is an electronic gateway that provides easy, online access to your medical records. With [employer], you can request a clinic appointment, read your test results, renew a prescription or communicate with your care team.     To access your existing account, please contact your [facility]  "Clinic or call [phone] for assistance.        Care EveryWhere ID     This is your Care EveryWhere ID. This could be used by other organizations to access your [place] medical records  [id number]        Your Vitals Were     Pulse Height BMI (Body Mass Index)             84 1.473 m (4' 10\") 17.56 kg/m2          Blood Pressure from Last 3 Encounters:   09/19/17 168/78   08/02/17 129/87   07/18/17 (!) 189/101    Weight from Last 3 Encounters:   09/19/17 38.1 kg (84 lb)   08/02/17 37.9 kg (83 lb 8 oz)   06/20/17 37.9 kg (83 lb 8 oz)              Today, you had the following     No orders found for display       Primary Care Provider Office Phone # Fax #    Jonathan Vincent [phone]       [address] [facility] [address]        Equal Access to Services     CARLOS MCKNIGHT : Hadii mata ku hadasho Soomaali, waaxda luqadaha, qaybta kaalmada adeegyada, elias bruno . So [facility] [phone].    ATENCIÓN: Si habla español, tiene a pierson disposición servicios gratuitos de asistencia lingüística. Llame al [phone].    We comply with applicable federal civil rights laws and [place] laws. We do not discriminate on the basis of race, color, national origin, age, disability sex, sexual orientation or gender identity.            Thank you!     Thank you for choosing [facility]  for your care. Our goal is always to provide you with excellent care. Hearing back from our patients is one way we can continue to improve our services. Please take a few minutes to complete the written survey that you may receive in the mail after your visit with us. Thank you!             Your Updated Medication List - Protect others around you: Learn how to safely use, store and throw away your medicines at www.disposemymeds.org.          This list is accurate as of: 9/19/17 11:59 PM.  Always use your most recent med list.                   Brand Name Dispense Instructions for use " Diagnosis    aspirin 81 MG tablet      Take 1 tablet by mouth daily.        calcium + D 600-200 MG-UNIT Tabs   Generic drug:  calcium carbonate-vitamin D     30    1 TABLET DAILY    Scoliosis, Osteopenia       captopril 25 MG tablet    CAPOTEN    180 tablet    Take 1 tablet by mouth  twice a day    Benign essential hypertension       diazepam 5 MG tablet    VALIUM    180 tablet    Take 1 tablet (5 mg) by mouth 2 times daily    Generalized anxiety disorder, Sleep disorder       hydrochlorothiazide 25 MG tablet    HYDRODIURIL    180 tablet    Take 2 tablets by mouth  daily    Benign essential hypertension       HYDROcodone-acetaminophen 5-325 MG per tablet    NORCO    90 tablet    Take 1-2 tablets by mouth every 4 hours as needed for moderate to severe pain or pain    Closed nondisplaced fracture of seventh cervical vertebra, unspecified fracture morphology, sequela       order for DME     1 Device    Equipment being ordered: Shower chair    Nondisplaced fracture of seventh cervical vertebra, unspecified fracture morphology, initial encounter (H)       potassium chloride SA 10 MEQ CR tablet    K-DUR/KLOR-CON M    180 tablet    Take 1 tablet by mouth  twice a day    Low blood potassium       pravastatin 10 MG tablet    PRAVACHOL    90 tablet    Take 1 tablet by mouth  daily at bedtime    Hyperlipidemia LDL goal <130

## 2017-09-19 NOTE — PROGRESS NOTES
Neurosurgery Clinic Consult  Date of Visit: 9/19/2017  Referred for: Cervical deformity  Referring Provider:  Dany Felix      Dear Dr. Felix,    We were happy to see Yuni Otoole, a pleasant 82 year old year old female for chin on chest deformity.    HPI: Ms. Otoole is well established at [facility] where she was seen after a fall sustained in April 2016 in which she suffered a fracture of C7. She was seen by nurse practitioner Bozena Stroud. She did rather well, healed nicely, but in follow-up was noted to have right-sided neck pain,, intermittent right arm pain and pain radiating into the left upper extremity with numbness. She also had at least one episode where her left leg just wouldn't work.  Plain film Imaging revealed the C7 fracture to be healed, but an MRI revealed cervical stenosis, and probable dens fracture with soft tissue to swelling and markedly kinking of the cervical spinal cord. There was no spinal cord signal change noted  A CT scan was ordered to better define the bony anatomy. The CT did confirm the C7 fracture was healed. But given the significant issues, as well as a reversal of cervical lordosis from prior autofusion below the level of C3 the patient was recommended to seek a consultation at the [facility]. There was some delay in her coming here because she [place] north of [facility], and she wished to go instead to [place]. After some more discussion she realized the [place] has resources for this complex problem not available to her in the [place] area. She was referred directly to a neurosurgeon but because of availability was scheduled for her appointment in the [place] clinic.  Treatment thus far has been essentially only for the fracture, which was a collar, now she can no longer sustain it as her head droops too far forward.     She describes pain in the back of her neck, down the backs of both arms, and numbness into 2 fingers of the left  hand. She feels generally weak and deconditioned, and had one episode where her left leg did not work, she is in a wheelchair today but is able to walk ordinarily, and even goes up and down stairs so long that she holds tightly to the railing. Bowel and bladder function are unchanged recently.    She presents for evaluation, and treatment recommendations with the understanding that we are a surgical team.      Current Outpatient Prescriptions:      pravastatin (PRAVACHOL) 10 MG tablet, Take 1 tablet by mouth  daily at bedtime, Disp: 90 tablet, Rfl: 1     captopril (CAPOTEN) 25 MG tablet, Take 1 tablet by mouth  twice a day, Disp: 180 tablet, Rfl: 1     hydrochlorothiazide (HYDRODIURIL) 25 MG tablet, Take 2 tablets by mouth  daily, Disp: 180 tablet, Rfl: 1     diazepam (VALIUM) 5 MG tablet, Take 1 tablet (5 mg) by mouth 2 times daily, Disp: 180 tablet, Rfl: 3     potassium chloride SA (K-DUR/KLOR-CON M) 10 MEQ CR tablet, Take 1 tablet by mouth  twice a day, Disp: 180 tablet, Rfl: 1     HYDROcodone-acetaminophen (NORCO) 5-325 MG per tablet, Take 1-2 tablets by mouth every 4 hours as needed for moderate to severe pain or pain, Disp: 90 tablet, Rfl: 0     order for DME, Equipment being ordered: Shower chair, Disp: 1 Device, Rfl: 0     aspirin 81 MG tablet, Take 1 tablet by mouth daily., Disp: , Rfl:      CALCIUM + D 600-200 MG-UNIT OR TABS, 1 TABLET DAILY, Disp: 30, Rfl: 0    Allergies   Allergen Reactions     Atorvastatin Calcium      Was on Lipitor and has muscle problem       Past Medical History:   Diagnosis Date     Allergic rhinitis, cause unspecified      Pure hypercholesterolemia      Unspecified essential hypertension        Past Surgical History:   Procedure Laterality Date     CAROTID ENDARTERECTOMY  11/07/08     COLONOSCOPY  05/30/07    Diverticulosis-return in 5 yrs     HC DRAIN/INJ MAJOR JOINT/BURSA W/O US  2009    Right SI Joint     HC DRAIN/INJ MAJOR JOINT/BURSA W/O US  2010    Right interarticular hip  injection     HC INJ EPIDURAL CERVICAL/THORACIC W/WO CONTRAST      C6-7     HC INJ EPIDURAL LUMBAR/SACRAL W/WO CONTRAST      L5-S1     HC INJ TRANSFORAMIN EPIDURAL, LUMB/SACR SINGLE  2010     HC REMOVAL OF TONSILS,14+ Y/O      Tonsils 14+y.o.       Family History   Problem Relation Age of Onset     CANCER Mother      colon cancer  at age 95 or 96   surgery done     GASTROINTESTINAL DISEASE Mother      stomach problems     OSTEOPOROSIS Mother      EYE* Mother      cataract and macular degen.     Cardiovascular Father       at age 60  MI     Hypertension Father      ?     Circulatory Paternal Grandmother      legs     CEREBROVASCULAR DISEASE Paternal Grandmother      Obesity Paternal Grandmother      Gynecology Sister      hysterectomy     Lipids Brother      was on meds.  ? still     Obesity Maternal Grandmother      Musculoskeletal Disorder Daughter      cancerous tumor left upper arm/shoulder- was told it was a breast type cancer     DIABETES Maternal Aunt        Social History     Social History     Marital status:      Spouse name: Mateus     Number of children: 4     Years of education: 12     Occupational History      (retired)      [employer].      Retired     Social History Main Topics     Smoking status: Former Smoker     Packs/day: 0.50     Years: 10.00     Quit date: 1996     Smokeless tobacco: Never Used     Alcohol use No     Drug use: No     Sexual activity: Not Currently     Other Topics Concern      Service No     Blood Transfusions No     Caffeine Concern No     4-6 cups / day     Occupational Exposure No      (retired now)     Hobby Hazards No     gardening     Sleep Concern No     Stress Concern No     Weight Concern Yes     w'd like to be 10# less     Special Diet No     Back Care Yes     wears a magnet belt at home     Exercise Yes     gardening and flower beds-walking in the school     Bike Helmet No     Seat Belt Yes     Self-Exams Yes  "    Social History Narrative     Problem list and 13 point review of systems: is reviewed in Epic and is negative with the exception of those symptoms associated with HPI and PMH.      OBJECTIVE:   /78  Pulse 84  Ht 1.473 m (4' 10\")  Wt 38.1 kg (84 lb)  BMI 17.56 kg/m2    Imaging:  These are the pertinent radiologist's findings from:  CT cervical 7/20/17  1. Radiolucent lesion in the odontoid is probably an erosion from gout  or CPPD and not of fracture.  2. Interval healing of C7 fracture.  3. Acquired fusions from C3 through T1 as described above.  4. Reversal of lordosis and C2-C3 subluxation causing moderate spinal  canal stenosis at C2-C3.  MRI cervical without 7/16/17  1. Probable fracture line through the tip of the dens with new edema  in the dens and soft tissue swelling of the transverse cervical  ligament. These findings were not present on 7/13/2016. There is also  joint effusions of the right C1-C2 and bilateral occipital condyle-C1.  2. Increased spinal canal narrowing at the level of C1 due to joint  effusion and increased thickness of the transverse cervical ligament.  3. Marked kinking of the cervical spinal cord is again seen at C2-C3  due to anterolisthesis of C2 on C3. No abnormal signal identified  within the visualized spinal cord. This finding is not significantly  changed since prior.  4. Additional levels of degenerative change throughout the cervical  spine as described above are not significantly change since prior.  Cervical spine images with dynamic views 7/16 /17  Lateral views were obtained of the cervical spine in both  flexion and extension. No acute fracture is demonstrated. Again seen  is moderate anterior subluxation of C2 on C3. This is similar to the  previous study and does not significantly change between flexion and  extension. Again seen is moderately prominent kyphosis of the mid to  upper cervical spine. Vertebral body alignment is otherwise normal.  Again " suspected is bone bridging across the anterior aspect of the  C3-C4 disc space. There is marked disc height loss elsewhere. No other  abnormality is demonstrated. I see no significant change since a  previous study.   See the full report in EPIC/Media tab.  I personally reviewed the images with the patient.      Exam:  *   Well developed, well nourished frail-appearing female found seated comfortably in wheelchair.   She is accompanied by daughter and .    *  Mental Status  A&O X3.  Bright, alert, affable, interactive. Language fluid, fund of knowledge intact. Good historian.  Mood and affect congruent and WNL.  *  Cranial Nerves  II: Able to read printed forms, VF full to gross confrontation.  III: IV, VI:  PERRLA, EOMI, No nystagmus, no ptosis.  V: Sensation intact in bilateral V1, V2, and V3. Jaw clench symmetric.    VII:  Intact to voice bilaterally.  IX:  Pushes tongue against bilateral cheeks.  X:  Palate elevates, uvula midline, phonation intact.  XI: Elevates shoulders, head turn intact.  XII: Tongue midline. No fasciculations.  *  Cervical Spine:  DTR's at Biceps, Triceps, and Brachioradialis 2/4 and symmetric.  Sensation intact.    No Elias's. No Phalen's.  No Tinel's. No fasciculations.   Muscle bulk diminished symmetrically and tone WNL.  Upper Extremity Strength. Deconditioned throughout              RIGHT                LEFT     Deltoid              5/5                   5/5       Biceps              5/5                   5/5        Triceps              5/5                   5/5       Wrist Extensor              5/5                   5/5                     5/5                    5/5       Interossei              5/5                   5/5       EPL              5/5                    5/5       Pinch              5/5                  5/5            *  Lumbar Spine:    DTR's Patellar and Achilles 2/4 and symmetric.   No fasciculations.  Muscle diminished symmetrically and tone WNL.  No  Clonus.  Sensation intact.    Lower Extremity Strength                   RIGHT                   LEFT     Iliopsoas                    5/5                      5/5       Quad                    5/5                        5/5       Hamstring                      5/5                        5/5         Gastrocs                    5/5                        5/5       Tib. Anterior                     5/5                        5/5       EHL                      5/5                        5/5       Babinski               mute                                     mute                *  Structural Exam  Inspection of the spine reveals chin on chest deformity cervical spine.    Cervical spine ROM minimal. Moderate posterior spasming. Mild tenderness to palpation.  Unable to assess Spurling's or L'Hermitte's as there is no cervical motion.   Gait did not assess.    ASSESSMENT/PLAN:  1. Degenerative disc disease, cervical    2. Cervical spondylosis without myelopathy    3. [place] neck deformity of cervical spine      Reversal of cervical lordosis into cervical kyphosis, autofusion of multiple cervical levels, cervical listhesis C2/3, severe stenosis C2/3. Over all resulting in both chin on chest deformity as well as severe cervical stenosis, fortunately without a edmond myelopathy. She was referred for 2 issues:  the chin on chest deformity,  and secondly stenosis.  The issues of course are can her deformity be corrected to an extent to restore some functionality, and can stenosis be alleviated by simple decompression or will fusion also be required. Given the stenosis is at the level of the deformity essentially fusion will almost certainly be required. Minimally to C3, but possibly more distally for stability.    The cervical lordosis from her autofusion below the level of C3 really cannot be corrected. And so we will have to create construct that takes that into account.    Other issues she has that would be nice to address  are:  arm pain bilaterally right greater than left, and this is most likely coming from right C4 5 and C5 6. Left arm pain coming from C5 6 and C6 7  The question can this be decompressed from posteriorly, and then does the fusion need to be extended C7, which would be huge.       Dr. Yuan's schedule is booked solid for new patients all the way through to nearly November. Because of the scheduling mix up where going to get this nice patient in a little early by having her come onto my schedule, and be presented to Dr. Yuan through me as a new patient later on this week.   Since I really don't want her going into another winter, with its ice and snow and fall danger with her cervical spine looking as it does.      I answered all of their questions, which indicated good understanding of the situation.  They are willing to move forward with the recommendations. I supplied them with business cards and telephone numbers and urged them to call should they have questions, comments, or concerns. It's been a pleasure participating in the care of this nice patient. We thank you for your confidence in the [facility], Department of Neurosurgery.      Best Regards,    Lana Hauser PA-C  [facility]  Department of Neurosurgery  Phone: [phone]  Fax: [phone]        Total time: 60 minutes with more than 30 minutes spent in direct face to face contact reviewing films, providing education, counseling, the importance of good health habits including cessation of nicotine, non-operative therapies,and indications for surgery as well as further follow up.    This note was generated using voice recognition software. While edited for content some inaccurate phrasing may be found.

## 2017-09-21 ENCOUNTER — OFFICE VISIT (OUTPATIENT)
Dept: NEUROSURGERY | Facility: CLINIC | Age: 82
End: 2017-09-21

## 2017-09-21 VITALS
HEART RATE: 74 BPM | TEMPERATURE: 97.8 F | WEIGHT: 87.5 LBS | RESPIRATION RATE: 20 BRPM | SYSTOLIC BLOOD PRESSURE: 172 MMHG | DIASTOLIC BLOOD PRESSURE: 82 MMHG | BODY MASS INDEX: 18.37 KG/M2 | HEIGHT: 58 IN | OXYGEN SATURATION: 97 %

## 2017-09-21 DIAGNOSIS — M47.812 CERVICAL SPONDYLOSIS WITHOUT MYELOPATHY: Primary | ICD-10-CM

## 2017-09-21 DIAGNOSIS — M43.8X2 SWAN NECK DEFORMITY OF CERVICAL SPINE: ICD-10-CM

## 2017-09-21 ASSESSMENT — PAIN SCALES - GENERAL: PAINLEVEL: EXTREME PAIN (8)

## 2017-09-21 NOTE — NURSING NOTE
Chief Complaint   Patient presents with     RECHECK     UMP- CERVICAL DEGENERATIVE DISC DISEASE F/U     Eloy Vasquez, COLTON

## 2017-09-21 NOTE — PROGRESS NOTES
Neurosurgery Clinic Follow-up  Date of Visit: 9/21/2017  Referred for: Cervical deformity  Referring Provider:  Dany Felix      Dear Dr. 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    HPI: Ms. Otoole is well established at [facility] where she was seen after a fall sustained in April 2016 in which she suffered a fracture of C7. She was seen by nurse practitioner Bozena Stroud. She did rather well, healed nicely, but in follow-up was noted to have right-sided neck pain,, intermittent right arm pain and pain radiating into the left upper extremity with numbness. She also had at least one episode where her left leg just wouldn't work.  Plain film Imaging revealed the C7 fracture to be healed, but an MRI revealed cervical stenosis, and probable dens fracture with soft tissue to swelling and markedly kinking of the cervical spinal cord. There was no spinal cord signal change noted  A CT scan was ordered to better define the bony anatomy. The CT did confirm the C7 fracture was healed. But given the significant issues, as well as a reversal of cervical lordosis from prior autofusion below the level of C3 the patient was recommended to seek a consultation at the [facility]. There was some delay in her coming here because she lives north of [facility], and she wished to go instead to [place]. After some more discussion she realized the [place] has resources for this complex problem not available to her in the [place] area. She was referred directly to a neurosurgeon but because of availability was scheduled for her appointment in the [facility].  Treatment thus far has been essentially only for the fracture, which was a collar, now she can no longer sustain it as her head droops too far forward.     She describes pain in the back of her neck, and into the bilateral trapezius. Her left arm is numb,  and all the fingers of the left hand. She can't feel much in the left hand and frequently drops things. Bowel and bladder function are unchanged recently. She used to have some arm pain bilaterally, that is no longer a problem.    She presents for evaluation, and treatment recommendations with the understanding that we are a surgical team.      Current Outpatient Prescriptions:      pravastatin (PRAVACHOL) 10 MG tablet, Take 1 tablet by mouth  daily at bedtime, Disp: 90 tablet, Rfl: 1     captopril (CAPOTEN) 25 MG tablet, Take 1 tablet by mouth  twice a day, Disp: 180 tablet, Rfl: 1     hydrochlorothiazide (HYDRODIURIL) 25 MG tablet, Take 2 tablets by mouth  daily, Disp: 180 tablet, Rfl: 1     diazepam (VALIUM) 5 MG tablet, Take 1 tablet (5 mg) by mouth 2 times daily, Disp: 180 tablet, Rfl: 3     potassium chloride SA (K-DUR/KLOR-CON M) 10 MEQ CR tablet, Take 1 tablet by mouth  twice a day, Disp: 180 tablet, Rfl: 1     HYDROcodone-acetaminophen (NORCO) 5-325 MG per tablet, Take 1-2 tablets by mouth every 4 hours as needed for moderate to severe pain or pain, Disp: 90 tablet, Rfl: 0     order for DME, Equipment being ordered: Shower chair, Disp: 1 Device, Rfl: 0     aspirin 81 MG tablet, Take 1 tablet by mouth daily., Disp: , Rfl:      CALCIUM + D 600-200 MG-UNIT OR TABS, 1 TABLET DAILY, Disp: 30, Rfl: 0    Allergies   Allergen Reactions     Atorvastatin Calcium      Was on Lipitor and has muscle problem       Past medical history, Social, Family, Problem list: reviewed in Epic.      OBJECTIVE:   There were no vitals taken for this visit.    Imaging:  These are the pertinent radiologist's findings from:  CT cervical 7/20/17  1. Radiolucent lesion in the odontoid is probably an erosion from gout  or CPPD and not of fracture.  2. Interval healing of C7 fracture.  3. Acquired fusions from C3 through T1 as described above.  4. Reversal of lordosis and C2-C3 subluxation causing moderate spinal  canal stenosis at  C2-C3.  MRI cervical without 7/16/17  1. Probable fracture line through the tip of the dens with new edema  in the dens and soft tissue swelling of the transverse cervical  ligament. These findings were not present on 7/13/2016. There is also  joint effusions of the right C1-C2 and bilateral occipital condyle-C1.  2. Increased spinal canal narrowing at the level of C1 due to joint  effusion and increased thickness of the transverse cervical ligament.  3. Marked kinking of the cervical spinal cord is again seen at C2-C3  due to anterolisthesis of C2 on C3. No abnormal signal identified  within the visualized spinal cord. This finding is not significantly  changed since prior.  4. Additional levels of degenerative change throughout the cervical  spine as described above are not significantly change since prior.  Cervical spine images with dynamic views 7/16 /17  Lateral views were obtained of the cervical spine in both  flexion and extension. No acute fracture is demonstrated. Again seen  is moderate anterior subluxation of C2 on C3. This is similar to the  previous study and does not significantly change between flexion and  extension. Again seen is moderately prominent kyphosis of the mid to  upper cervical spine. Vertebral body alignment is otherwise normal.  Again suspected is bone bridging across the anterior aspect of the  C3-C4 disc space. There is marked disc height loss elsewhere. No other  abnormality is demonstrated. I see no significant change since a  previous study.   See the full report in EPIC/Media tab.  I personally reviewed the images with the patient.      Exam:  *   Well developed, well nourished frail-appearing female found seated comfortably in wheelchair.   She is accompanied by daughter and .    *  Mental Status  A&O X3.  Bright, alert, affable, interactive. Language fluid, fund of knowledge intact. Good historian.  Mood and affect congruent and WNL.  *  Cranial Nerves  II: Able to read  printed forms, VF full to gross confrontation.  III: IV, VI:  PERRLA, EOMI, No nystagmus, no ptosis.  V: Sensation intact in bilateral V1, V2, and V3. Jaw clench symmetric.    VII:  Intact to voice bilaterally.  IX:  Pushes tongue against bilateral cheeks.  X:  Palate elevates, uvula midline, phonation intact.  XI: Elevates shoulders, head turn intact.  XII: Tongue midline. No fasciculations.  *  Cervical Spine:  DTR's at Biceps, Triceps, and Brachioradialis 3/4 and symmetric.  Sensation intact.    No Elias's. No Phalen's.  No Tinel's. No fasciculations.   Muscle bulk diminished symmetrically and tone WNL.  Upper Extremity Strength. Deconditioned throughout              RIGHT                LEFT     Deltoid              5/5                   5/5       Biceps              5/5                   5/5        Triceps              5/5                   5/5       Wrist Extensor              5/5                   5/5                     5/5                    5/5       Interossei              5/5                   5/5       EPL              5/5                    5/5       Pinch              5/5                  5/5            *  Lumbar Spine:    DTR's Patellar and Achilles 2/4 and symmetric.   No fasciculations.  Muscle diminished symmetrically and tone WNL.  No Clonus.  Sensation intact.    Lower Extremity Strength                   RIGHT                   LEFT     Iliopsoas                    5/5                      5/5       Quad                    5/5                        5/5       Hamstring                      5/5                        5/5         Gastrocs                    5/5                        5/5       Tib. Anterior                     5/5                        5/5       EHL                      5/5                        5/5       Babinski               mute                                     mute                *  Structural Exam  Inspection of the spine reveals chin on chest deformity cervical  spine.    Cervical spine ROM minimal. Moderate posterior spasming. Mild tenderness to palpation.  Unable to assess Spurling's or L'Hermitte's as there is little to no cervical motion.   Gait did not assess.    ASSESSMENT/PLAN:  1. Cervical spondylosis without myelopathy    2. [place] neck deformity of cervical spine    3.      Reversal of cervical lordosis into cervical kyphosis, autofusion of multiple cervical levels, cervical listhesis C2/3, severe stenosis C2/3. Over all resulting in both chin on chest deformity as well as severe cervical stenosis, her reflexes are brisk, and her left arm is numb, she has no Sebas's or clonus.  She was referred for 2 issues:  the chin on chest deformity,  and secondly stenosis.    She was seen in the case reviewed with Dr. Yuan. Posterior cervical decompression proximally can be performed. The challenge will be that she will have to be fused and unfortunately would be fused in a still kyphotic posture. We discussed this with the patient. The advantage would be that her spinal cord would be decompressed, but her posture would not be changed significantly. Her pain may be improved somewhat.    Another option would be to add to the above surgery an osteotomy at the cervical thoracic junction to pull the cervical spine up into a more neutral position so that the patient has a gaze more level with the horizon. This would require extending the hardware down into the upper thoracic spine. This is a much more extensive procedure and we discussed that as well. The advantage would be that she has not only decompression of the cervical stenosis, but a more neutral posture, and her pain may be improved somewhat. As a more extensive procedure she would very likely spend more time in the hospital and more time in rehabilitation, the risks are a bit higher, i.e. infection rate, bleeding, neurologic compromise.    Finally last option is to do nothing. It's not ideal but these are both large  surgeries to consider and we felt it important to mention this as an option.    She did ask some questions about pain control in the interim. We explained we are a surgical team, and as such confine our pain management to the postoperative phase. Pain medication for ongoing chronic pain we defer to the primary care team or a pain management team.   If she were interested in pain management techniques other than simple medications we could refer her to PMR or pain management for consideration of cervical injection therapy: e.g. FREDY, or trigger points.     We answered all of their questions. The questions indicated good understanding of the issues at hand. She would like to think about how to proceed and let us know what she would like to do.She has our business cards and telephone numbers and urged them to call should they have questions, comments, or concerns. It's been a pleasure participating in the care of this nice patient. We thank you for your confidence in the [facility], Department of Neurosurgery.    Best Regards,    Lana Hauser PA-C  [facility]  Department of Neurosurgery  Phone: [phone]  Fax: [phone]    This patient was seen, examined and discussed with Dr. Yuan.  This note was generated using voice recognition software. While edited for content some inaccurate phrasing may be found.

## 2017-09-21 NOTE — MR AVS SNAPSHOT
After Visit Summary   9/21/2017    Yuni Otoole    MRN: [id number]           Patient Information     Date Of Birth          1935        Visit Information        Provider Department      9/21/2017 1:30 PM Lana Hauser PA-C [facility] Neurosurgery        Today's Diagnoses     Cervical spondylosis without myelopathy    -  1    [place] neck deformity of cervical spine           Follow-ups after your visit        Who to contact     Please call your clinic at [phone] to:    Ask questions about your health    Make or cancel appointments    Discuss your medicines    Learn about your test results    Speak to your doctor   If you have compliments or concerns about an experience at your clinic, or if you wish to file a complaint, please contact [facility] Patient Relations at [phone] or email us at [email or web address]         Additional Information About Your Visit        MyChart Information     [employer] gives you secure access to your electronic health record. If you see a primary care provider, you can also send messages to your care team and make appointments. If you have questions, please call your primary care clinic.  If you do not have a primary care provider, please call [phone] and they will assist you.      [employer] is an electronic gateway that provides easy, online access to your medical records. With [employer], you can request a clinic appointment, read your test results, renew a prescription or communicate with your care team.     To access your existing account, please contact your [facility] or call [phone] for assistance.        Care EveryWhere ID     This is your Care EveryWhere ID. This could be used by other organizations to access your [place] medical records  [id number]        Your Vitals Were     Pulse Temperature Respirations Height Pulse Oximetry Breastfeeding?    74 97.8  F (36.6  C) 20 1.473 m  "(4' 10\") 97% No    BMI (Body Mass Index)                   18.29 kg/m2            Blood Pressure from Last 3 Encounters:   09/21/17 172/82   09/19/17 168/78   08/02/17 129/87    Weight from Last 3 Encounters:   09/21/17 39.7 kg (87 lb 8 oz)   09/19/17 38.1 kg (84 lb)   08/02/17 37.9 kg (83 lb 8 oz)               Primary Care Provider Office Phone # Fax #    Jonathan Vincent [phone]       [address] [facility] [address]        Equal Access to Services     [facility]: Hadii mata Maldonado, waaxda luamarilis, qaybta kaalmada shena, elias bruno . So [facility] [phone].    ATENCIÓN: Si habla español, tiene a pierson disposición servicios gratuitos de asistencia lingüística. [facility] [phone].    We comply with applicable federal civil rights laws and [place] laws. We do not discriminate on the basis of race, color, national origin, age, disability sex, sexual orientation or gender identity.            Thank you!     Thank you for choosing [facility]  for your care. Our goal is always to provide you with excellent care. Hearing back from our patients is one way we can continue to improve our services. Please take a few minutes to complete the written survey that you may receive in the mail after your visit with us. Thank you!             Your Updated Medication List - Protect others around you: Learn how to safely use, store and throw away your medicines at www.disposemymeds.org.          This list is accurate as of: 9/21/17  3:57 PM.  Always use your most recent med list.                   Brand Name Dispense Instructions for use Diagnosis    aspirin 81 MG tablet      Take 1 tablet by mouth daily.        calcium + D 600-200 MG-UNIT Tabs   Generic drug:  calcium carbonate-vitamin D     30    1 TABLET DAILY    Scoliosis, Osteopenia       captopril 25 MG tablet    CAPOTEN    180 tablet    Take 1 tablet by mouth  twice a day    Benign " essential hypertension       diazepam 5 MG tablet    VALIUM    180 tablet    Take 1 tablet (5 mg) by mouth 2 times daily    Generalized anxiety disorder, Sleep disorder       hydrochlorothiazide 25 MG tablet    HYDRODIURIL    180 tablet    Take 2 tablets by mouth  daily    Benign essential hypertension       HYDROcodone-acetaminophen 5-325 MG per tablet    NORCO    90 tablet    Take 1-2 tablets by mouth every 4 hours as needed for moderate to severe pain or pain    Closed nondisplaced fracture of seventh cervical vertebra, unspecified fracture morphology, sequela       order for DME     1 Device    Equipment being ordered: Shower chair    Nondisplaced fracture of seventh cervical vertebra, unspecified fracture morphology, initial encounter (H)       potassium chloride SA 10 MEQ CR tablet    K-DUR/KLOR-CON M    180 tablet    Take 1 tablet by mouth  twice a day    Low blood potassium       pravastatin 10 MG tablet    PRAVACHOL    90 tablet    Take 1 tablet by mouth  daily at bedtime    Hyperlipidemia LDL goal <130

## 2017-09-21 NOTE — LETTER
9/21/2017       RE: Yuni Otoole  [address]  [facility] [address]     Dear Colleague,    Thank you for referring your patient, Yuni Otoole, to the [facility] NEUROSURGERY at [facility]. Please see a copy of my visit note below.      Neurosurgery Clinic Follow-up  Date of Visit: 9/21/2017  Referred for: Cervical deformity  Referring Provider:  Dany Felix      Dear Dr. 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    HPI: Ms. Otoole is well established at [facility] where she was seen after a fall sustained in April 2016 in which she suffered a fracture of C7. She was seen by nurse practitioner Bozena Stroud. She did rather well, healed nicely, but in follow-up was noted to have right-sided neck pain,, intermittent right arm pain and pain radiating into the left upper extremity with numbness. She also had at least one episode where her left leg just wouldn't work.  Plain film Imaging revealed the C7 fracture to be healed, but an MRI revealed cervical stenosis, and probable dens fracture with soft tissue to swelling and markedly kinking of the cervical spinal cord. There was no spinal cord signal change noted  A CT scan was ordered to better define the bony anatomy. The CT did confirm the C7 fracture was healed. But given the significant issues, as well as a reversal of cervical lordosis from prior autofusion below the level of C3 the patient was recommended to seek a consultation at the [facility]. There was some delay in her coming here because she [place] north of [facility], and she wished to go instead to [place]. After some more discussion she realized the [place] has resources for this complex problem not available to her in the [place] area. She was referred directly to a neurosurgeon but because of availability was scheduled for her appointment  in the [facility] clinic.  Treatment thus far has been essentially only for the fracture, which was a collar, now she can no longer sustain it as her head droops too far forward.     She describes pain in the back of her neck, and into the bilateral trapezius. Her left arm is numb, and all the fingers of the left hand. She can't feel much in the left hand and frequently drops things. Bowel and bladder function are unchanged recently. She used to have some arm pain bilaterally, that is no longer a problem.    She presents for evaluation, and treatment recommendations with the understanding that we are a surgical team.      Current Outpatient Prescriptions:      pravastatin (PRAVACHOL) 10 MG tablet, Take 1 tablet by mouth  daily at bedtime, Disp: 90 tablet, Rfl: 1     captopril (CAPOTEN) 25 MG tablet, Take 1 tablet by mouth  twice a day, Disp: 180 tablet, Rfl: 1     hydrochlorothiazide (HYDRODIURIL) 25 MG tablet, Take 2 tablets by mouth  daily, Disp: 180 tablet, Rfl: 1     diazepam (VALIUM) 5 MG tablet, Take 1 tablet (5 mg) by mouth 2 times daily, Disp: 180 tablet, Rfl: 3     potassium chloride SA (K-DUR/KLOR-CON M) 10 MEQ CR tablet, Take 1 tablet by mouth  twice a day, Disp: 180 tablet, Rfl: 1     HYDROcodone-acetaminophen (NORCO) 5-325 MG per tablet, Take 1-2 tablets by mouth every 4 hours as needed for moderate to severe pain or pain, Disp: 90 tablet, Rfl: 0     order for DME, Equipment being ordered: Shower chair, Disp: 1 Device, Rfl: 0     aspirin 81 MG tablet, Take 1 tablet by mouth daily., Disp: , Rfl:      CALCIUM + D 600-200 MG-UNIT OR TABS, 1 TABLET DAILY, Disp: 30, Rfl: 0    Allergies   Allergen Reactions     Atorvastatin Calcium      Was on Lipitor and has muscle problem       Past medical history, Social, Family, Problem list: reviewed in Epic.      OBJECTIVE:   There were no vitals taken for this visit.    Imaging:  These are the pertinent radiologist's findings from:  CT cervical 7/20/17  1. Radiolucent lesion  in the odontoid is probably an erosion from gout  or CPPD and not of fracture.  2. Interval healing of C7 fracture.  3. Acquired fusions from C3 through T1 as described above.  4. Reversal of lordosis and C2-C3 subluxation causing moderate spinal  canal stenosis at C2-C3.  MRI cervical without 7/16/17  1. Probable fracture line through the tip of the dens with new edema  in the dens and soft tissue swelling of the transverse cervical  ligament. These findings were not present on 7/13/2016. There is also  joint effusions of the right C1-C2 and bilateral occipital condyle-C1.  2. Increased spinal canal narrowing at the level of C1 due to joint  effusion and increased thickness of the transverse cervical ligament.  3. Marked kinking of the cervical spinal cord is again seen at C2-C3  due to anterolisthesis of C2 on C3. No abnormal signal identified  within the visualized spinal cord. This finding is not significantly  changed since prior.  4. Additional levels of degenerative change throughout the cervical  spine as described above are not significantly change since prior.  Cervical spine images with dynamic views 7/16 /17  Lateral views were obtained of the cervical spine in both  flexion and extension. No acute fracture is demonstrated. Again seen  is moderate anterior subluxation of C2 on C3. This is similar to the  previous study and does not significantly change between flexion and  extension. Again seen is moderately prominent kyphosis of the mid to  upper cervical spine. Vertebral body alignment is otherwise normal.  Again suspected is bone bridging across the anterior aspect of the  C3-C4 disc space. There is marked disc height loss elsewhere. No other  abnormality is demonstrated. I see no significant change since a  previous study.   See the full report in EPIC/Media tab.  I personally reviewed the images with the patient.      Exam:  *   Well developed, well nourished frail-appearing female found seated  comfortably in wheelchair.   She is accompanied by daughter and .    *  Mental Status  A&O X3.  Bright, alert, affable, interactive. Language fluid, fund of knowledge intact. Good historian.  Mood and affect congruent and WNL.  *  Cranial Nerves  II: Able to read printed forms, VF full to gross confrontation.  III: IV, VI:  PERRLA, EOMI, No nystagmus, no ptosis.  V: Sensation intact in bilateral V1, V2, and V3. Jaw clench symmetric.    VII:  Intact to voice bilaterally.  IX:  Pushes tongue against bilateral cheeks.  X:  Palate elevates, uvula midline, phonation intact.  XI: Elevates shoulders, head turn intact.  XII: Tongue midline. No fasciculations.  *  Cervical Spine:  DTR's at Biceps, Triceps, and Brachioradialis 3/4 and symmetric.  Sensation intact.    No Elias's. No Phalen's.  No Tinel's. No fasciculations.   Muscle bulk diminished symmetrically and tone WNL.  Upper Extremity Strength. Deconditioned throughout              RIGHT                LEFT     Deltoid              5/5                   5/5       Biceps              5/5                   5/5        Triceps              5/5                   5/5       Wrist Extensor              5/5                   5/5                     5/5                    5/5       Interossei              5/5                   5/5       EPL              5/5                    5/5       Pinch              5/5                  5/5            *  Lumbar Spine:    DTR's Patellar and Achilles 2/4 and symmetric.   No fasciculations.  Muscle diminished symmetrically and tone WNL.  No Clonus.  Sensation intact.    Lower Extremity Strength                   RIGHT                   LEFT     Iliopsoas                    5/5                      5/5       Quad                    5/5                        5/5       Hamstring                      5/5                        5/5         Gastrocs                    5/5                        5/5       Tib. Anterior                      5/5                        5/5       EHL                      5/5                        5/5       Babinski               mute                                     mute                *  Structural Exam  Inspection of the spine reveals chin on chest deformity cervical spine.    Cervical spine ROM minimal. Moderate posterior spasming. Mild tenderness to palpation.  Unable to assess Spurling's or L'Hermitte's as there is little to no cervical motion.   Gait did not assess.    ASSESSMENT/PLAN:  1. Cervical spondylosis without myelopathy    2. [place] neck deformity of cervical spine    3.      Reversal of cervical lordosis into cervical kyphosis, autofusion of multiple cervical levels, cervical listhesis C2/3, severe stenosis C2/3. Over all resulting in both chin on chest deformity as well as severe cervical stenosis, her reflexes are brisk, and her left arm is numb, she has no Sebas's or clonus.  She was referred for 2 issues:  the chin on chest deformity,  and secondly stenosis.    She was seen in the case reviewed with Dr. Yuan. Posterior cervical decompression proximally can be performed. The challenge will be that she will have to be fused and unfortunately would be fused in a still kyphotic posture. We discussed this with the patient. The advantage would be that her spinal cord would be decompressed, but her posture would not be changed significantly. Her pain may be improved somewhat.    Another option would be to add to the above surgery an osteotomy at the cervical thoracic junction to pull the cervical spine up into a more neutral position so that the patient has a gaze more level with the horizon. This would require extending the hardware down into the upper thoracic spine. This is a much more extensive procedure and we discussed that as well. The advantage would be that she has not only decompression of the cervical stenosis, but a more neutral posture, and her pain may be improved somewhat. As a more  extensive procedure she would very likely spend more time in the hospital and more time in rehabilitation, the risks are a bit higher, i.e. infection rate, bleeding, neurologic compromise.    Finally last option is to do nothing. It's not ideal but these are both large surgeries to consider and we felt it important to mention this as an option.    She did ask some questions about pain control in the interim. We explained we are a surgical team, and as such confine our pain management to the postoperative phase. Pain medication for ongoing chronic pain we defer to the primary care team or a pain management team.   If she were interested in pain management techniques other than simple medications we could refer her to PMR or pain management for consideration of cervical injection therapy: e.g. FREDY, or trigger points.     We answered all of their questions. The questions indicated good understanding of the issues at hand. She would like to think about how to proceed and let us know what she would like to do.She has our business cards and telephone numbers and urged them to call should they have questions, comments, or concerns. It's been a pleasure participating in the care of this nice patient. We thank you for your confidence in the [facility], Department of Neurosurgery.    Best Regards,    Lana Hauser PA-C  [facility]  Department of Neurosurgery  Phone: [phone]  Fax: [phone]    This patient was seen, examined and discussed with Dr. Yuan.  This note was generated using voice recognition software. While edited for content some inaccurate phrasing may be found.    Again, thank you for allowing me to participate in the care of your patient.      Sincerely,    Lana Hauser PA-C

## 2017-10-11 ENCOUNTER — OFFICE VISIT (OUTPATIENT)
Dept: FAMILY MEDICINE | Facility: OTHER | Age: 82
End: 2017-10-11
Payer: COMMERCIAL

## 2017-10-11 VITALS
RESPIRATION RATE: 16 BRPM | SYSTOLIC BLOOD PRESSURE: 138 MMHG | TEMPERATURE: 96.3 F | DIASTOLIC BLOOD PRESSURE: 70 MMHG | HEART RATE: 76 BPM | OXYGEN SATURATION: 98 %

## 2017-10-11 DIAGNOSIS — Z23 NEED FOR PROPHYLACTIC VACCINATION AND INOCULATION AGAINST INFLUENZA: ICD-10-CM

## 2017-10-11 DIAGNOSIS — M81.0 AGE RELATED OSTEOPOROSIS: ICD-10-CM

## 2017-10-11 DIAGNOSIS — M43.8X2 SWAN NECK DEFORMITY OF CERVICAL SPINE: Primary | ICD-10-CM

## 2017-10-11 DIAGNOSIS — M54.2 NECK PAIN: Primary | ICD-10-CM

## 2017-10-11 PROCEDURE — 99213 OFFICE O/P EST LOW 20 MIN: CPT | Mod: 25 | Performed by: FAMILY MEDICINE

## 2017-10-11 PROCEDURE — G0008 ADMIN INFLUENZA VIRUS VAC: HCPCS | Performed by: FAMILY MEDICINE

## 2017-10-11 PROCEDURE — 90662 IIV NO PRSV INCREASED AG IM: CPT | Performed by: FAMILY MEDICINE

## 2017-10-11 RX ORDER — FENTANYL 25 UG/1
1 PATCH TRANSDERMAL
Qty: 4 PATCH | Refills: 0 | Status: ON HOLD | OUTPATIENT
Start: 2017-10-11 | End: 2017-11-27

## 2017-10-11 ASSESSMENT — PAIN SCALES - GENERAL: PAINLEVEL: MODERATE PAIN (5)

## 2017-10-11 NOTE — NURSING NOTE
"Chief Complaint   Patient presents with     Knee Pain     bilateral knee pain, x2 weeks       Initial /70 (BP Location: Right arm, Patient Position: Chair, Cuff Size: Adult Regular)  Pulse 76  Temp 96.3  F (35.7  C) (Oral)  Resp 16  SpO2 98% Estimated body mass index is 18.29 kg/(m^2) as calculated from the following:    Height as of 9/21/17: 4' 10\" (1.473 m).    Weight as of 9/21/17: 87 lb 8 oz (39.7 kg).  Medication Reconciliation: complete       Steph DE ANDA LPN      "

## 2017-10-11 NOTE — PROGRESS NOTES
SUBJECTIVE:   Yuni Otoole is a 82 year old female who presents to clinic today for the following health issues:      Joint Pain    Onset: 2 weeks    Description:   Location: left knee and right knee  Character: weakness    Intensity: moderate, severe    Progression of Symptoms: worse    Accompanying Signs & Symptoms:  Other symptoms: swelling    History:   Previous similar pain: no       Precipitating factors:   Trauma or overuse: YES    Alleviating factors:  Improved by: nothing    Therapies Tried and outcome: Rest, Tylenol; slight relief              Problem list and histories reviewed & adjusted, as indicated.  Additional history: as documented    Labs reviewed in EPIC    Reviewed and updated as needed this visit by clinical staffTobacco  Allergies  Med Hx  Surg Hx  Fam Hx  Soc Hx      Reviewed and updated as needed this visit by Provider

## 2017-10-11 NOTE — PROGRESS NOTES
Injectable Influenza Immunization Documentation    1.  Is the person to be vaccinated sick today?   No    2. Does the person to be vaccinated have an allergy to a component   of the vaccine?   No    3. Has the person to be vaccinated ever had a serious reaction   to influenza vaccine in the past?   No    4. Has the person to be vaccinated ever had Guillain-Barré syndrome?   No  Prior to injection verified patient identity using patient's name and date of birth.    Form completed by Beata Maza MA     10/11/2017

## 2017-10-11 NOTE — MR AVS SNAPSHOT
After Visit Summary   10/11/2017    Yuni Otoole    MRN: [id number]           Patient Information     Date Of Birth          1935        Visit Information        Provider Department      10/11/2017 2:30 PM Jonathan Vincent MD [facility]        Today's Diagnoses     Neck pain    -  1       Follow-ups after your visit        Future tests that were ordered for you today     Open Future Orders        Priority Expected Expires Ordered    Dexa hip/pelvis/spine [[id number]] Routine  10/11/2018 10/11/2017    OB/GYN REFERRAL Routine 10/27/2017 10/11/2018 10/11/2017            Who to contact     If you have questions or need follow up information about today's clinic visit or your schedule please contact [facility] directly at [phone].  Normal or non-critical lab and imaging results will be communicated to you by MyChart, letter or phone within 4 business days after the clinic has received the results. If you do not hear from us within 7 days, please contact the clinic through [employer] or phone. If you have a critical or abnormal lab result, we will notify you by phone as soon as possible.  Submit refill requests through [employer] or call your pharmacy and they will forward the refill request to us. Please allow 3 business days for your refill to be completed.          Additional Information About Your Visit        MyChart Information     [employer] gives you secure access to your electronic health record. If you see a primary care provider, you can also send messages to your care team and make appointments. If you have questions, please call your primary care clinic.  If you do not have a primary care provider, please call [phone] and they will assist you.        Care EveryWhere ID     This is your Care EveryWhere ID. This could be used by other organizations to access your [place] medical records  [id number]        Your Vitals Were     Pulse Temperature Respirations  Pulse Oximetry          76 96.3  F (35.7  C) (Oral) 16 98%         Blood Pressure from Last 3 Encounters:   10/11/17 138/70   09/21/17 172/82   09/19/17 168/78    Weight from Last 3 Encounters:   09/21/17 87 lb 8 oz (39.7 kg)   09/19/17 84 lb (38.1 kg)   08/02/17 83 lb 8 oz (37.9 kg)              Today, you had the following     No orders found for display         Today's Medication Changes          These changes are accurate as of: 10/11/17  3:33 PM.  If you have any questions, ask your nurse or doctor.               Start taking these medicines.        Dose/Directions    fentaNYL 25 mcg/hr 72 hr patch   Commonly known as:  DURAGESIC   Used for:  Neck pain   Started by:  Jonathan Vincent MD        Dose:  1 patch   Place 1 patch onto the skin every 72 hours   Quantity:  4 patch   Refills:  0            Where to get your medicines      Some of these will need a paper prescription and others can be bought over the counter.  Ask your nurse if you have questions.     Bring a paper prescription for each of these medications     fentaNYL 25 mcg/hr 72 hr patch                Primary Care Provider Office Phone # Fax #    Jonathan Vincent [phone]       [address] [facility] [address]        Equal Access to Services     CARLOS MCKNIGHT AH: Hadii mata jean-baptiste hadasho Soomaali, waaxda luqadaha, qaybta kaalmada adeegyada, elias pierre hayquique ramirez. So [facility] [phone].    ATENCIÓN: Si habla español, tiene a pierson disposición servicios gratuitos de asistencia lingüística. Llame al [phone].    We comply with applicable federal civil rights laws and [place] laws. We do not discriminate on the basis of race, color, national origin, age, disability, sex, sexual orientation, or gender identity.            Thank you!     Thank you for choosing [facility]  for your care. Our goal is always to provide you with excellent care. Hearing back from our patients is one way we can  continue to improve our services. Please take a few minutes to complete the written survey that you may receive in the mail after your visit with us. Thank you!             Your Updated Medication List - Protect others around you: Learn how to safely use, store and throw away your medicines at www.disposemymeds.org.          This list is accurate as of: 10/11/17  3:33 PM.  Always use your most recent med list.                   Brand Name Dispense Instructions for use Diagnosis    aspirin 81 MG tablet      Take 1 tablet by mouth daily.        calcium + D 600-200 MG-UNIT Tabs   Generic drug:  calcium carbonate-vitamin D     30    1 TABLET DAILY    Scoliosis, Osteopenia       captopril 25 MG tablet    CAPOTEN    180 tablet    Take 1 tablet by mouth  twice a day    Benign essential hypertension       diazepam 5 MG tablet    VALIUM    180 tablet    Take 1 tablet (5 mg) by mouth 2 times daily    Generalized anxiety disorder, Sleep disorder       fentaNYL 25 mcg/hr 72 hr patch    DURAGESIC    4 patch    Place 1 patch onto the skin every 72 hours    Neck pain       hydrochlorothiazide 25 MG tablet    HYDRODIURIL    180 tablet    Take 2 tablets by mouth  daily    Benign essential hypertension       HYDROcodone-acetaminophen 5-325 MG per tablet    NORCO    90 tablet    Take 1-2 tablets by mouth every 4 hours as needed for moderate to severe pain or pain    Closed nondisplaced fracture of seventh cervical vertebra, unspecified fracture morphology, sequela       order for DME     1 Device    Equipment being ordered: Shower chair    Nondisplaced fracture of seventh cervical vertebra, unspecified fracture morphology, initial encounter       potassium chloride SA 10 MEQ CR tablet    K-DUR/KLOR-CON M    180 tablet    Take 1 tablet by mouth  twice a day    Low blood potassium       pravastatin 10 MG tablet    PRAVACHOL    90 tablet    Take 1 tablet by mouth  daily at bedtime    Hyperlipidemia LDL goal <130

## 2017-10-12 NOTE — PROGRESS NOTES
SUBJECTIVE:  Ryder Kulkarni is an 82-year-old woman in accompanied by her , has severe spinal problems with scoliosis, kyphosis, [place] neck, chin on chest and neck deformity.  This has been causing compromise of the cervical nerves into her arms.  The arms are becoming weak and numb and painful, has a lot of neck pain.  She also has low back pain.  Has been using some opiate for pain.  She had been referred the [facility] and they had agreed to doing extensive surgery on her neck to alleviate the nerve compression and also straighten her neck.  It would be quite an extensive procedure, the patient and her  have been discussing it and feel that they do not have any choice but to go ahead with it.  In the meantime, she has been wheelchair bound, is getting contractured, she has got pretty obvious quad atrophy, stiffness in her knees.  She cannot fully extend the left leg.  She also cannot raise her arms up and her head jets forward staring downward.  We do discuss this, I am going to see if we can arrange some physical therapy for her arms and legs.  They will call the [place] and agree to the surgery in which case she would likely need a preop.  Sounds like it would be a couple months before she can get this scheduled.  In the meantime, her pain is persistent.  I am going to start her on a fentanyl patch 25 mcg to be changed every 72 hours, physical therapy preferably would be done in her home or possibly at the [facility] as that is likely where she would be recuperating from the surgery and would continue the therapy.  They are going to investigate the options through physical therapy at the nursing home.         DAWSON JENSEN M.D.             D: 10/11/2017 16:11   T: 10/12/2017 18:09   MT: [id number]      Name:     RYDER KULKARNI   MRN:      [id number]        Account:      [id number]   :      1935           Visit Date:   10/11/2017      Document: [id number]

## 2017-10-30 DIAGNOSIS — M54.2 NECK PAIN: ICD-10-CM

## 2017-10-30 DIAGNOSIS — M41.9 SCOLIOSIS: Primary | ICD-10-CM

## 2017-10-31 ENCOUNTER — TELEPHONE (OUTPATIENT)
Dept: FAMILY MEDICINE | Facility: OTHER | Age: 82
End: 2017-10-31

## 2017-10-31 DIAGNOSIS — M41.35 THORACOGENIC SCOLIOSIS OF THORACOLUMBAR REGION: Primary | ICD-10-CM

## 2017-10-31 NOTE — TELEPHONE ENCOUNTER
Reason for Call: Request for an order or referral:    Order or referral being requested: Pt needs orders to be changed to home health from [facility]. She is now unable to leave the home, so she cannot have her PT done at the [facility]. The orders need to read RN, PT, OT, and home health aide. Please fax to [phone]. Home care RN will call back with more detailed orders after the first home visit. Can an MD only place orders due to Dr. Raya absence. They need this addressed asap    Date needed: as soon as possible    Has the patient been seen by the PCP for this problem? YES    Additional comments:     Phone number Patient can be reached at:  Other phone number:  [phone]    Best Time:  any    Can we leave a detailed message on this number?  YES    Call taken on 10/31/2017 at 9:51 AM by Aliza Stovall

## 2017-11-07 NOTE — TELEPHONE ENCOUNTER
Patients  Ed calling to follow up on message for Physical Therapy.   [phone]  Is there a covering provider able to please orders?  Thank you,  Saba Jay  Patient Representative

## 2017-11-10 ENCOUNTER — HOSPITAL ENCOUNTER (INPATIENT)
Facility: CLINIC | Age: 82
LOS: 17 days | Discharge: NURSING, REHAB OR LONG-TERM CARE | DRG: 453 | End: 2017-11-27
Attending: NEUROLOGICAL SURGERY | Admitting: NEUROLOGICAL SURGERY
Payer: MEDICARE

## 2017-11-10 ENCOUNTER — HOSPITAL ENCOUNTER (EMERGENCY)
Facility: CLINIC | Age: 82
Discharge: ANOTHER HOSPITAL | End: 2017-11-10
Attending: FAMILY MEDICINE | Admitting: FAMILY MEDICINE
Payer: MEDICARE

## 2017-11-10 ENCOUNTER — APPOINTMENT (OUTPATIENT)
Dept: GENERAL RADIOLOGY | Facility: CLINIC | Age: 82
End: 2017-11-10
Attending: FAMILY MEDICINE
Payer: MEDICARE

## 2017-11-10 VITALS
OXYGEN SATURATION: 96 % | TEMPERATURE: 98.8 F | BODY MASS INDEX: 18.18 KG/M2 | SYSTOLIC BLOOD PRESSURE: 146 MMHG | WEIGHT: 87 LBS | DIASTOLIC BLOOD PRESSURE: 100 MMHG | RESPIRATION RATE: 14 BRPM

## 2017-11-10 DIAGNOSIS — I10 BENIGN ESSENTIAL HYPERTENSION: ICD-10-CM

## 2017-11-10 DIAGNOSIS — E78.5 HYPERLIPIDEMIA LDL GOAL <130: ICD-10-CM

## 2017-11-10 DIAGNOSIS — M41.9 SCOLIOSIS: ICD-10-CM

## 2017-11-10 DIAGNOSIS — E87.6 LOW BLOOD POTASSIUM: ICD-10-CM

## 2017-11-10 DIAGNOSIS — M47.12 CERVICAL SPONDYLOSIS WITH MYELOPATHY: ICD-10-CM

## 2017-11-10 DIAGNOSIS — M85.80 OSTEOPENIA: ICD-10-CM

## 2017-11-10 DIAGNOSIS — Z98.1 S/P CERVICAL SPINAL FUSION: ICD-10-CM

## 2017-11-10 DIAGNOSIS — M48.02 CERVICAL STENOSIS OF SPINE: Primary | ICD-10-CM

## 2017-11-10 LAB
ALBUMIN SERPL-MCNC: 3.5 G/DL (ref 3.4–5)
ALBUMIN UR-MCNC: NEGATIVE MG/DL
ALP SERPL-CCNC: 68 U/L (ref 40–150)
ALT SERPL W P-5'-P-CCNC: 18 U/L (ref 0–50)
ANION GAP SERPL CALCULATED.3IONS-SCNC: 8 MMOL/L (ref 3–14)
APPEARANCE UR: CLEAR
AST SERPL W P-5'-P-CCNC: 16 U/L (ref 0–45)
BASE EXCESS BLDV CALC-SCNC: 4.4 MMOL/L
BASOPHILS # BLD AUTO: 0 10E9/L (ref 0–0.2)
BASOPHILS NFR BLD AUTO: 0.2 %
BILIRUB SERPL-MCNC: 0.4 MG/DL (ref 0.2–1.3)
BILIRUB UR QL STRIP: NEGATIVE
BUN SERPL-MCNC: 11 MG/DL (ref 7–30)
CALCIUM SERPL-MCNC: 8.4 MG/DL (ref 8.5–10.1)
CHLORIDE SERPL-SCNC: 91 MMOL/L (ref 94–109)
CO2 SERPL-SCNC: 28 MMOL/L (ref 20–32)
COLOR UR AUTO: ABNORMAL
CREAT SERPL-MCNC: 0.38 MG/DL (ref 0.52–1.04)
DIFFERENTIAL METHOD BLD: NORMAL
EOSINOPHIL # BLD AUTO: 0 10E9/L (ref 0–0.7)
EOSINOPHIL NFR BLD AUTO: 0.1 %
ERYTHROCYTE [DISTWIDTH] IN BLOOD BY AUTOMATED COUNT: 13.2 % (ref 10–15)
GFR SERPL CREATININE-BSD FRML MDRD: >90 ML/MIN/1.7M2
GLUCOSE SERPL-MCNC: 92 MG/DL (ref 70–99)
GLUCOSE UR STRIP-MCNC: NEGATIVE MG/DL
HCO3 BLDV-SCNC: 29 MMOL/L (ref 21–28)
HCT VFR BLD AUTO: 35.5 % (ref 35–47)
HGB BLD-MCNC: 11.8 G/DL (ref 11.7–15.7)
HGB UR QL STRIP: NEGATIVE
IMM GRANULOCYTES # BLD: 0 10E9/L (ref 0–0.4)
IMM GRANULOCYTES NFR BLD: 0.2 %
KETONES UR STRIP-MCNC: 20 MG/DL
LEUKOCYTE ESTERASE UR QL STRIP: NEGATIVE
LIPASE SERPL-CCNC: 126 U/L (ref 73–393)
LYMPHOCYTES # BLD AUTO: 0.8 10E9/L (ref 0.8–5.3)
LYMPHOCYTES NFR BLD AUTO: 8.8 %
MCH RBC QN AUTO: 29.9 PG (ref 26.5–33)
MCHC RBC AUTO-ENTMCNC: 33.2 G/DL (ref 31.5–36.5)
MCV RBC AUTO: 90 FL (ref 78–100)
MONOCYTES # BLD AUTO: 0.5 10E9/L (ref 0–1.3)
MONOCYTES NFR BLD AUTO: 5.1 %
MUCOUS THREADS #/AREA URNS LPF: PRESENT /LPF
NEUTROPHILS # BLD AUTO: 7.5 10E9/L (ref 1.6–8.3)
NEUTROPHILS NFR BLD AUTO: 85.6 %
NITRATE UR QL: NEGATIVE
NT-PROBNP SERPL-MCNC: 241 PG/ML (ref 0–1800)
O2/TOTAL GAS SETTING VFR VENT: 21 %
PCO2 BLDV: 44 MM HG (ref 40–50)
PH BLDV: 7.43 PH (ref 7.32–7.43)
PH UR STRIP: 7 PH (ref 5–7)
PLATELET # BLD AUTO: 372 10E9/L (ref 150–450)
PO2 BLDV: 23 MM HG (ref 25–47)
POTASSIUM SERPL-SCNC: 3.4 MMOL/L (ref 3.4–5.3)
PROT SERPL-MCNC: 6.5 G/DL (ref 6.8–8.8)
RBC # BLD AUTO: 3.95 10E12/L (ref 3.8–5.2)
RBC #/AREA URNS AUTO: 1 /HPF (ref 0–2)
SODIUM SERPL-SCNC: 127 MMOL/L (ref 133–144)
SOURCE: ABNORMAL
SP GR UR STRIP: 1.01 (ref 1–1.03)
TROPONIN I SERPL-MCNC: <0.015 UG/L (ref 0–0.04)
UROBILINOGEN UR STRIP-MCNC: 0 MG/DL (ref 0–2)
WBC # BLD AUTO: 8.8 10E9/L (ref 4–11)
WBC #/AREA URNS AUTO: 1 /HPF (ref 0–2)

## 2017-11-10 PROCEDURE — 36415 COLL VENOUS BLD VENIPUNCTURE: CPT | Performed by: FAMILY MEDICINE

## 2017-11-10 PROCEDURE — 84484 ASSAY OF TROPONIN QUANT: CPT | Performed by: FAMILY MEDICINE

## 2017-11-10 PROCEDURE — 71010 XR CHEST PORT 1 VW: CPT | Mod: TC

## 2017-11-10 PROCEDURE — 81001 URINALYSIS AUTO W/SCOPE: CPT | Performed by: FAMILY MEDICINE

## 2017-11-10 PROCEDURE — 99285 EMERGENCY DEPT VISIT HI MDM: CPT | Mod: 25 | Performed by: FAMILY MEDICINE

## 2017-11-10 PROCEDURE — 12000001 ZZH R&B MED SURG/OB UMMC

## 2017-11-10 PROCEDURE — 96361 HYDRATE IV INFUSION ADD-ON: CPT | Performed by: FAMILY MEDICINE

## 2017-11-10 PROCEDURE — 82803 BLOOD GASES ANY COMBINATION: CPT | Performed by: FAMILY MEDICINE

## 2017-11-10 PROCEDURE — 80053 COMPREHEN METABOLIC PANEL: CPT | Performed by: FAMILY MEDICINE

## 2017-11-10 PROCEDURE — 25000128 H RX IP 250 OP 636: Performed by: FAMILY MEDICINE

## 2017-11-10 PROCEDURE — 83880 ASSAY OF NATRIURETIC PEPTIDE: CPT | Performed by: FAMILY MEDICINE

## 2017-11-10 PROCEDURE — 85025 COMPLETE CBC W/AUTO DIFF WBC: CPT | Performed by: FAMILY MEDICINE

## 2017-11-10 PROCEDURE — 99284 EMERGENCY DEPT VISIT MOD MDM: CPT | Mod: Z6 | Performed by: FAMILY MEDICINE

## 2017-11-10 PROCEDURE — 96360 HYDRATION IV INFUSION INIT: CPT | Performed by: FAMILY MEDICINE

## 2017-11-10 PROCEDURE — 83690 ASSAY OF LIPASE: CPT | Performed by: FAMILY MEDICINE

## 2017-11-10 RX ORDER — PRAVASTATIN SODIUM 10 MG
10 TABLET ORAL AT BEDTIME
Status: DISCONTINUED | OUTPATIENT
Start: 2017-11-11 | End: 2017-11-20

## 2017-11-10 RX ORDER — POTASSIUM CHLORIDE 750 MG/1
10 TABLET, EXTENDED RELEASE ORAL 2 TIMES DAILY
Status: DISCONTINUED | OUTPATIENT
Start: 2017-11-11 | End: 2017-11-12

## 2017-11-10 RX ORDER — SODIUM CHLORIDE 9 MG/ML
1000 INJECTION, SOLUTION INTRAVENOUS CONTINUOUS
Status: DISCONTINUED | OUTPATIENT
Start: 2017-11-10 | End: 2017-11-10 | Stop reason: HOSPADM

## 2017-11-10 RX ORDER — CAPTOPRIL 25 MG/1
25 TABLET ORAL 2 TIMES DAILY
Status: DISCONTINUED | OUTPATIENT
Start: 2017-11-11 | End: 2017-11-12

## 2017-11-10 RX ORDER — HYDROCODONE BITARTRATE AND ACETAMINOPHEN 5; 325 MG/1; MG/1
1 TABLET ORAL EVERY 6 HOURS PRN
Status: DISCONTINUED | OUTPATIENT
Start: 2017-11-10 | End: 2017-11-19

## 2017-11-10 RX ORDER — DIAZEPAM 5 MG
5 TABLET ORAL 2 TIMES DAILY PRN
Status: DISCONTINUED | OUTPATIENT
Start: 2017-11-10 | End: 2017-11-17 | Stop reason: ALTCHOICE

## 2017-11-10 RX ADMIN — SODIUM CHLORIDE 1000 ML: 9 INJECTION, SOLUTION INTRAVENOUS at 16:52

## 2017-11-10 ASSESSMENT — ENCOUNTER SYMPTOMS
VOMITING: 0
SHORTNESS OF BREATH: 0
BLOOD IN STOOL: 0
FREQUENCY: 1
ABDOMINAL PAIN: 0
NAUSEA: 0
APPETITE CHANGE: 1
DIARRHEA: 0
FATIGUE: 1
COUGH: 0
BACK PAIN: 1
DYSURIA: 0
CHILLS: 1
FEVER: 0
CONSTIPATION: 0
DIFFICULTY URINATING: 1

## 2017-11-10 NOTE — LETTER
"Transition Communication Hand-off for Care Transitions to Next Level of Care Provider    Name: Yuni Otoole  MRN #: [id number]  Primary Care Provider: Jonathan Vincent MD     Primary Clinic: [address] [facility] [address]     Reason for Hospitalization:  S/P cervical spinal fusion [Z98.1]  Admit Date/Time: 11/10/2017 10:52 PM  Discharge Date:   11/27/17  Payor Source: Payor: MEDICA / Plan: MEDICA PRIME SOLUTION / Product Type: Indemnity /              Reason for Communication Hand-off Referral:     Discharge Plan:    Social Work Services Discharge Note     Patient Name:  Yuni Otoole     Anticipated Discharge Date:  11/27/17     Discharge Disposition:   [facility] ([phone])     Following MD:  Facility Assignment     Pre-Admission Screening (PAS) online form has been completed.  The Level of Care (LOC) is:  Determined  Confirmation Code is:  [id number].  Patient/caregiver informed of referral to [facility] Line for Pre-Admission Screening for skilled nursing facility (SNF) placement and to expect a phone call post discharge from SNF.     Additional Services/Equipment Arranged:  Confirmed readiness for discharge with Kat Beebe NP.   Confirmed acceptance to [facility] with Admissions ([place]).  Arranged for [employer] ([phone]) to provide stretcher transport at 2pm,  Completed a \"Physicians Certification for Transportation,\" form.       Patient / Family response to discharge plan:  Pt and son (Rex) voice understanding of the discharge plan and agreement with the discharge plan.     Persons notified of above discharge plan:  Pt, son (Rex), 6A nursing and Kat Beebe (NP)     Staff Discharge Instructions:  Please fax discharge orders and signed hard scripts for any controlled substances  (SW will complete this task).  Please print a packet and send with patient.      CTS Handoff completed:  YES     Medicare Notice of Rights provided to the patient/family:  " YES     LANG Zapata  Social Work, 6A  Phone:  [phone]  Pager:  [phone]  11/27/2017

## 2017-11-10 NOTE — ED PROVIDER NOTES
History     Chief Complaint   Patient presents with     Generalized Weakness     HPI  Yuni Otoole is a 82 year old female, PMHx of HTN, HLD and C7 fracture in 2016, who presents to the ED for generalized weakness. Patient was seen in clinic three weeks ago for follow up on neck pain associated with a fall last year. She was prescribed a fentanyl patch and told to start PT. The following day, she applied the fentanyl patch and began feeling weak. She removed the patch, but continued to feel weak. Patient tried another patch the following day, had similar feelings of weakness, and again removed the patch. Since that time, patient has had progressive generalized weakness. She has been unable to ambulate on her own. She feels equally weak in her arms and legs. Patient also has had appetite loss. Over the last few days, the patient developed chills and general malaise. She has also had difficulty urinating and increased urinary frequency. No dysuria or hematuria. No CP or SOB. No N/V/D or abdominal pain. No rashes. No new medications. Of note, patient sustained C7 fracture following a fall about one year ago. She saw a neurosurgeon last month for persistent symptoms associated with the fracture, including neck pain, left UE numbness and pain and one episode of leg weakness. MRI showed cervical stenosis, likely dens fracture and kinking of cervical spinal cord. Neurosurgery recommended surgical intervention, but she is undecided about pursuing that at this point.     Problem List:    Patient Active Problem List    Diagnosis Date Noted     Neck pain 10/11/2017     Priority: Medium     Benign essential hypertension 10/17/2016     Priority: Medium     Health Care Home 04/22/2016     Priority: Medium     Status:  Accepted  Care Coordinator:  Mulu Kearns    See Letters for [facility] Care Plan  Date:  April 22, 2016          C7 cervical fracture (H) 04/18/2016     Priority: Medium     Low back pain without sciatica 12/22/2015      Priority: Medium     Advanced directives, counseling/discussion 10/24/2011     Priority: Medium     Advance Directive Problem List Overview:   Name Relationship Phone    Primary Health Care Agent            Alternative Health Care Agent          Discussed advance care planning with patient; information given to patient to review. at last visit. Faby Lzieth [facility], 10/24/2011          HYPERLIPIDEMIA LDL GOAL <130 10/31/2010     Priority: Medium     Osteopenia 2009     Priority: Medium     Scoliosis 2009     Priority: Medium     Anxiety state 10/19/2007     Priority: Medium     Problem list name updated by automated process. Provider to review          Past Medical History:    Past Medical History:   Diagnosis Date     Allergic rhinitis, cause unspecified      Pure hypercholesterolemia      Unspecified essential hypertension        Past Surgical History:    Past Surgical History:   Procedure Laterality Date     CAROTID ENDARTERECTOMY  08     COLONOSCOPY  07    Diverticulosis-return in 5 yrs     HC DRAIN/INJ MAJOR JOINT/BURSA W/O US      Right SI Joint     HC DRAIN/INJ MAJOR JOINT/BURSA W/O US      Right interarticular hip injection     HC INJ EPIDURAL CERVICAL/THORACIC W/WO CONTRAST      C6-7     HC INJ EPIDURAL LUMBAR/SACRAL W/WO CONTRAST      L5-S1     HC INJ TRANSFORAMIN EPIDURAL, LUMB/SACR SINGLE  2010     HC REMOVAL OF TONSILS,12+ Y/O      Tonsils 14+y.o.       Family History:    Family History   Problem Relation Age of Onset     CANCER Mother      colon cancer  at age 95 or 96   surgery done     GASTROINTESTINAL DISEASE Mother      stomach problems     OSTEOPOROSIS Mother      EYE* Mother      cataract and macular degen.     Cardiovascular Father       at age 60  MI     Hypertension Father      ?     Circulatory Paternal Grandmother      legs     CEREBROVASCULAR DISEASE Paternal Grandmother      Obesity Paternal Grandmother      Gynecology Sister      hysterectomy      Lipids Brother      was on meds.  ? still     Obesity Maternal Grandmother      Musculoskeletal Disorder Daughter      cancerous tumor left upper arm/shoulder- was told it was a breast type cancer     DIABETES Maternal Aunt        Social History:  Marital Status:   [2]  Social History   Substance Use Topics     Smoking status: Former Smoker     Packs/day: 0.50     Years: 10.00     Quit date: 1/1/1996     Smokeless tobacco: Never Used     Alcohol use No        Medications:      pravastatin (PRAVACHOL) 10 MG tablet   captopril (CAPOTEN) 25 MG tablet   hydrochlorothiazide (HYDRODIURIL) 25 MG tablet   HYDROcodone-acetaminophen (NORCO) 5-325 MG per tablet   fentaNYL (DURAGESIC) 25 mcg/hr 72 hr patch   diazepam (VALIUM) 5 MG tablet   potassium chloride SA (K-DUR/KLOR-CON M) 10 MEQ CR tablet   order for DME   aspirin 81 MG tablet   CALCIUM + D 600-200 MG-UNIT OR TABS     Review of Systems   Constitutional: Positive for appetite change, chills and fatigue. Negative for fever.   Respiratory: Negative for cough and shortness of breath.    Cardiovascular: Negative for chest pain and leg swelling.   Gastrointestinal: Negative for abdominal pain, blood in stool, constipation, diarrhea, nausea and vomiting.   Genitourinary: Positive for difficulty urinating and frequency. Negative for dysuria.   Musculoskeletal: Positive for back pain.   Skin: Negative for rash.     Physical Exam   BP: (!) 166/91  Heart Rate: 86  Temp: 98.8  F (37.1  C)  Resp: 14  Weight: 39.5 kg (87 lb)  SpO2: 97 %  Lying Orthostatic BP: 172/95  Lying Orthostatic Pulse: 97 bpm  Sitting Orthostatic BP: 146/100  Sitting Orthostatic Pulse: 92 bpm    Physical Exam   Nursing note and vitals reviewed.     General Appearance: Elderly appearing female, No acute distress  HEENT: Normocephalic, Atraumatic, PERRL, EOMI, Oropharynx clear and moist  Neck: No adenopathy, limited ROM due to pain  CV: Regular rate and rhythm, No murmurs, rubs or gallops, Good radial and  DP pulses  Respiratory: Clear to ausculation bilaterally, Normal work of breathing, No wheezes or crackles  Abdomen: soft, minimal pain with palpation of RLQ, no guarding or rebound tenderness  Neurologic: alert and oriented x3, CN II-XII intact, 3/5 strength in bilateral LE, 4/5 strength in bilateral UE, normal sensation throughout.   Skin: Warm, dry, no rashes    ED Course     ED Course     Procedures         Results for orders placed or performed during the hospital encounter of 11/10/17   XR Chest Port 1 View    Narrative    PORTABLE CHEST ONE VIEW   11/10/2017  6:22 PM      HISTORY: Weakness.    COMPARISON: None.    FINDINGS: Upright portable chest. The heart size is normal. The lungs  are clear. No pneumothorax. The thoracic aorta is calcified. The right  humeral head appears to be dislocated superiorly and medially. The  left humeral head is subluxed superiorly suggesting rotator cuff tear.      Impression    IMPRESSION:  1. No acute chest abnormality.  2. 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 The right  humeral head may be dislocated.   UA with Microscopic   Result Value Ref Range    Color Urine Straw     Appearance Urine Clear     Glucose Urine Negative NEG^Negative mg/dL    Bilirubin Urine Negative NEG^Negative    Ketones Urine 20 (A) NEG^Negative mg/dL    Specific Gravity Urine 1.010 1.003 - 1.035    Blood Urine Negative NEG^Negative    pH Urine 7.0 5.0 - 7.0 pH    Protein Albumin Urine Negative NEG^Negative mg/dL    Urobilinogen mg/dL 0.0 0.0 - 2.0 mg/dL    Nitrite Urine Negative NEG^Negative    Leukocyte Esterase Urine Negative NEG^Negative    Source Catheterized Urine     WBC Urine 1 0 - 2 /HPF    RBC Urine 1 0 - 2 /HPF    Mucous Urine Present (A) NEG^Negative /LPF   Blood gas venous   Result Value Ref Range    Ph Venous 7.43 7.32 - 7.43 pH    PCO2 Venous 44 40 - 50 mm Hg    PO2 Venous 23 (L) 25 - 47 mm Hg    Bicarbonate Venous 29 (H) 21 - 28 mmol/L    Base Excess Venous 4.4 mmol/L     FIO2 21    CBC with platelets differential   Result Value Ref Range    WBC 8.8 4.0 - 11.0 10e9/L    RBC Count 3.95 3.8 - 5.2 10e12/L    Hemoglobin 11.8 11.7 - 15.7 g/dL    Hematocrit 35.5 35.0 - 47.0 %    MCV 90 78 - 100 fl    MCH 29.9 26.5 - 33.0 pg    MCHC 33.2 31.5 - 36.5 g/dL    RDW 13.2 10.0 - 15.0 %    Platelet Count 372 150 - 450 10e9/L    Diff Method Automated Method     % Neutrophils 85.6 %    % Lymphocytes 8.8 %    % Monocytes 5.1 %    % Eosinophils 0.1 %    % Basophils 0.2 %    % Immature Granulocytes 0.2 %    Absolute Neutrophil 7.5 1.6 - 8.3 10e9/L    Absolute Lymphocytes 0.8 0.8 - 5.3 10e9/L    Absolute Monocytes 0.5 0.0 - 1.3 10e9/L    Absolute Eosinophils 0.0 0.0 - 0.7 10e9/L    Absolute Basophils 0.0 0.0 - 0.2 10e9/L    Abs Immature Granulocytes 0.0 0 - 0.4 10e9/L   Comprehensive metabolic panel   Result Value Ref Range    Sodium 127 (L) 133 - 144 mmol/L    Potassium 3.4 3.4 - 5.3 mmol/L    Chloride 91 (L) 94 - 109 mmol/L    Carbon Dioxide 28 20 - 32 mmol/L    Anion Gap 8 3 - 14 mmol/L    Glucose 92 70 - 99 mg/dL    Urea Nitrogen 11 7 - 30 mg/dL    Creatinine 0.38 (L) 0.52 - 1.04 mg/dL    GFR Estimate >90 >60 mL/min/1.7m2    GFR Estimate If Black >90 >60 mL/min/1.7m2    Calcium 8.4 (L) 8.5 - 10.1 mg/dL    Bilirubin Total 0.4 0.2 - 1.3 mg/dL    Albumin 3.5 3.4 - 5.0 g/dL    Protein Total 6.5 (L) 6.8 - 8.8 g/dL    Alkaline Phosphatase 68 40 - 150 U/L    ALT 18 0 - 50 U/L    AST 16 0 - 45 U/L   Lipase   Result Value Ref Range    Lipase 126 73 - 393 U/L   Nt probnp inpatient (BNP)   Result Value Ref Range    N-Terminal Pro BNP Inpatient 241 0 - 1800 pg/mL   Troponin I   Result Value Ref Range    Troponin I ES <0.015 0.000 - 0.045 ug/L     Medications   0.9% sodium chloride BOLUS (1,000 mLs Intravenous New Bag 11/10/17 7890)     Followed by   0.9% sodium chloride infusion (not administered)       Patient presents today for generalized weakness. Weakness began about three weeks ago when she started using  fentanyl patches for neck pain. Since that time, she has had progressively worsening weakness of her arms and legs.  Over the last few days, she has felt chilled and had appetite loss. Patient also endorses urinary retention and increased urinary frequency. No N/V/D, abdominal pain or changes in bowel habits. BP elevated, but vitals otherwise stable. Exam largely unremarkable, patient has symmetric UE and LE weakness but has no focal neurologic deficits. She is unable to stand without assistance. Labs notable for mild hyponatremia. Troponin, BNP, lipase WNL. UA without signs of infection. CXR shows severe shoulder arthritis but no signs of infection or other abnormality. Spoke with on call [facility] neurosurgeon, Dr. Merritt. They recommend transferring patient to their facility as the patient's weakness is likely related to spinal cord compression.  They don't feel like any conservative management like therapy would be helpful at this point.  Patient agreeable to this plan.          Assessments & Plan (with Medical Decision Making)  Cervical Spondylosis with myelopathy     I have reviewed the nursing notes.    I have reviewed the findings, diagnosis, plan and need for follow up with the patient.    Current Discharge Medication List        Final diagnoses:   Cervical spondylosis with myelopathy     Guerita Dawson, MS3  [facility]    I, Guerita Dawson, am serving as a scribe; to document services personally performed by Leobardo Engle MD - based on data collection and the provider's statements to me.    Provider Disclosure:  I agree with above History, Review of Systems, Physical exam and Plan. I have reviewed the content of the documentation and have edited it as needed. I have personally performed the services documented here and the documentation accurately represents those services and the decisions I have made.    Oniel Booth MD       11/10/2017   [facility] EMERGENCY DEPARTMENT     Oniel Booth  MD Paddy  11/1935

## 2017-11-10 NOTE — IP AVS SNAPSHOT
` ` Patient Information     Patient Name Sex     Yuni Otoole ([id number]) Female 1935       Room Bed    6214 6214-01      Patient Demographics     Address Phone    [address] [facility] [address] [phone] (Home)      Patient Ethnicity & Race     Ethnic Group Patient Race    American White      Emergency Contact(s)     Name Relation Home Work Mobile    Mateus Otoole Spouse [phone]      Allyssa Daughter none  [phone]    Mulu Roldan Daughter [phone]    Rex Otoole Son   [phone]    Marlene Daughter   [phone]      Documents on File        Status Date Received Description       Documents for the Patient    Privacy Notice - [place] Received 10/22/10     Insurance Card Received () 10/22/10     Face Sheet Received () 10/09/08     Face Sheet Received () 07     Insurance Card Received () 10/22/10     Insurance Card Received () 10/22/10     Privacy Notice - [facility]       Insurance Card Received () 10/22/10     External Medication Information Consent Accepted () 09     Face Sheet Received () 10/21/09     External Medication Information Consent Accepted () 08/25/10     Patient ID Received 09/10/14 exp 2018 [place]     Consent for Services - Hospital/Clinic Received () 10/22/10     Face Sheet Received ()      HIM FRANCISCO JAVIER Authorization  12/23/10 DR GAMEZ -2-10    Insurance Card Received () 11     Consent for Services - Hospital/Clinic Received () 11     External Medication Information Consent Patient Refused () 11     Insurance Card Received () 12     HIM FRANCISCO JAVIER Authorization   patient    Consent for Services - Hospital/Clinic Received () 12     Physical Therapy Certification Received () 12     External Medication Information Consent Accepted 10/15/12     Insurance Card Received () 12 Medicare / Medica     Consent for EHR Access  13 Copied from existing Consent for services - C/HOD collected on 2012    [facility] Specified Other       Insurance Card Received () 13 Jung Britt    Insurance Card Received () 13 Medicare    Consent for Services - Hospital/Clinic Received () 13     Physical Therapy Certification Received 10/25/13     Physical Therapy Re-Certification Received 13     Insurance Card Received () 14 MEDICA    Insurance Card Received 14 MEDICARE    HIM FRANCISCO JAVIER Authorization - File Only  14 [facility] - 3/18/2014    HIM FRANCISCO AJVIER Authorization - File Only Received 14 CD all hip pelvis xrays    Consent for Services - Hospital/Clinic Received () 09/10/14     HIM FRANCISCO JAVIER Authorization - File Only   CD with images/patient     Physical Therapy Certification Received 14 to 12/10/14    Physical Therapy Re-Certification Received 02/20/15 12-5-14 to 1-4-15    Business/Insurance/Care Coordination/Health Form - Patient  06/19/15 ADULT REHABILITATION ATTENDANCE POLICY    Consent for Services - Hospital/Clinic Received () 09/29/15     Consent for Services/Privacy Notice - Hospital/Clinic Received () 16     Insurance Card Received 16 medicare    HIM FRANCISCO JAVIER Authorization  16 RYDER KULKARNI 16    Physical Therapy Certification Received 16 start and end 16    HIM FRANCISCO JAVIER Authorization - File Only  16 [facility] - 16    HIM FRANCISCO JAVIER Authorization - File Only  16 RYDER KULKARNI - 16    Physical Therapy Certification Received 16 through 10-7-16    HIM FRANCISCO JAVIER Authorization  16     Physical Therapy Re-Certification Received 16 to 10-16-16    Physical Therapy Re-Certification   10-19-16    Physical Therapy Re-Certification   16    Business/Insurance/Care Coordination/Health Form - Patient  17 ADULT  REHABILITATION ATTENDANCE POLICY    Consent for Services/Privacy Notice - Hospital/Clinic Received 04/25/17     Care Everywhere Prospective Auth Received 11/10/17     Physical Therapy Re-Certification Received (Deleted) 07/18/12     Physical Therapy Re-Certification Received (Deleted) 08/09/12     Physical Therapy Re-Certification Received (Deleted) 09/04/12     Patient Photo   Photo of Patient       Documents for the Encounter    CMS IM for Patient Signature Received 11/24/17     EMS/Ambulance Record  11/14/17 [facility] AMBULANCE SERVICE    Assessment/Questionnaire  11/14/17 MRI HISTORY QUESTIONNAIRE AND CONTRAST NOTICE    Consent for Services - Informed  11/21/17 INFORMED CONSENT FOR SURGERY OR DESIGNATED PROCEDURE (LONG VERSION)    Transfusion Record  11/21/17 AUTOLOGOUS CELL SAVING PROCESSING RECORD    Transfer Form  11/21/17 EMTALA TRANSFER FORM    Monitoring Device Output  11/21/17 WAVEFORM REPORT      Admission Information     Attending Provider Admitting Provider Admission Type Admission Date/Time    Enma López MD Hunt, Casey Cornelius MD Urgent 11/10/17  2252    Discharge Date Hospital Service Auth/Cert Status Service Area     Neurosurgery [facility]    Unit Room/Bed Admission Status       UU A 6214/6214-01 Admission (Confirmed)       Admission     Complaint    Cervical stenosis w/ myopathy, Cervical stenosis of spine, Cervical Stenosis, Cervical Myelopathy , Cervical Myelopathy       Hospital Account     Name Acct ID Class Status Primary Coverage    Yuni Otoole [id number] Inpatient Open MEDICARE - MEDICARE FOR HB SUPPLEMENT            Guarantor Account (for Hospital Account #[id number])     Name Relation to Pt Service Area Active? Acct Type    Yuni Otoole  FCS Yes Personal/Family    Address Phone          [address] [address] [phone](H)              Coverage Information (for Hospital Account #[id number])     1.  MEDICARE/MEDICARE FOR HB SUPPLEMENT     F/O Payor/Plan Precert #    MEDICARE/MEDICARE FOR HB SUPPLEMENT     Subscriber Subscriber #    Yuni Otoole [id number]    Address Phone    [employer] CLAIMS  [address]  [place] [address] [phone]          2. MEDICA/MEDICA PRIME SOLUTION     F/O Payor/Plan Precert #    MEDICA/MEDICA PRIME SOLUTION     Subscriber Subscriber #    Yuni Otoole [id number]    Address Phone    [address] [address]  [place] [address] [phone]

## 2017-11-10 NOTE — IP AVS SNAPSHOT
"    [address] [facility]: [phone]                                              INTERAGENCY TRANSFER FORM - PHYSICIAN ORDERS   11/10/2017                    Hospital Admission Date: 11/10/2017  RYDER KULKARNI   : 1935  Sex: Female        Attending Provider: Enma López MD     Allergies:  Atorvastatin Calcium    Infection:  None   Service:  NEUROSURGERY    Ht:  1.499 m (4' 11\")   Wt:  42 kg (92 lb 9.5 oz)   Admission Wt:  36.5 kg (80 lb 8 oz)    BMI:  18.7 kg/m 2   BSA:  1.32 m 2            Patient PCP Information     Provider PCP Type    Jonathan Vincent MD, MD General      ED Clinical Impression     Diagnosis Description Comment Added By Time Added    Cervical stenosis of spine [M48.02] Cervical stenosis of spine [M48.02]  Carina Beebe APRN CNP 2017 12:00 PM    S/P cervical spinal fusion [Z98.1] S/P cervical spinal fusion [Z98.1]  Carina Beebe APRN CNP 2017 12:01 PM    Hyperlipidemia LDL goal <130 [E78.5] Hyperlipidemia LDL goal <130 [E78.5]  Carina Beebe APRN CNP 2017 12:02 PM    Benign essential hypertension [I10] Benign essential hypertension [I10]  Carina Beebe APRN CNP 2017 12:02 PM    Scoliosis [M41.9] Scoliosis [M41.9]  Carina Beebe APRN CNP 2017 12:05 PM    Osteopenia [M85.80] Osteopenia [M85.80]  Carina Beebe APRN CNP 2017 12:05 PM    Low blood potassium [E87.6] Low blood potassium [E87.6]  Carina Beebe APRN CNP 2017 12:05 PM      Hospital Problems as of 2017              Priority Class Noted POA    Cervical stenosis of spine Medium  11/10/2017 Yes      Non-Hospital Problems as of 2017              Priority Class Noted    Anxiety state Medium  10/19/2007    Scoliosis Medium  8/3/2009    Osteopenia Medium  2009    HYPERLIPIDEMIA LDL GOAL <130 Medium  10/31/2010    Advanced directives, counseling/discussion Medium  10/24/2011    " Low back pain without sciatica Medium  12/22/2015    C7 cervical fracture (H) Medium  4/18/2016    Health Care Home Medium  4/22/2016    Benign essential hypertension Medium  10/17/2016    Neck pain Medium  10/11/2017      Code Status History     Date Active Date Inactive Code Status Order ID Comments User Context    11/27/2017 12:31 PM  Full Code [id number]  Carina Beebe APRN CNP Outpatient    4/18/2016  7:36 PM 4/20/2016  5:20 PM Full Code [id number]  Jabier Abbasi MD Inpatient    11/3/2006  3:57 PM 11/3/2006  3:57 PM None None survey mailed 11-3-06 Belinda Chadwick Demographics         Medication Review      START taking        Dose / Directions Comments    acetaminophen 325 MG tablet   Commonly known as:  TYLENOL   Used for:  S/P cervical spinal fusion        Dose:  650 mg   Take 2 tablets (650 mg) by mouth every 4 hours   Quantity:  100 tablet   Refills:  0    Indication: Mild to Moderate Post-Operative Pain; Fever       calcium carbonate 1250 MG tablet   Commonly known as:  OS-ANNETTA 500 mg [place]. Ca   Used for:  S/P cervical spinal fusion        Dose:  1250 mg   Take 1 tablet (1,250 mg) by mouth 2 times daily (with meals)   Quantity:  90 tablet   Refills:  0    Indication: Vitamin Supplementation       cyclobenzaprine 5 MG tablet   Commonly known as:  FLEXERIL   Used for:  S/P cervical spinal fusion        Dose:  2.5 mg   Take 0.5 tablets (2.5 mg) by mouth 3 times daily as needed for muscle spasms   Quantity:  60 tablet   Refills:  0    Indication: Neck Pain; Cervical Paraspinous Muscle Spasm       heparin sodium PF 5000 UNIT/0.5ML injection   Used for:  S/P cervical spinal fusion        Dose:  5000 Units   Inject 0.5 mLs (5,000 Units) Subcutaneous every 8 hours   Refills:  0    - Indication: DVT Prophylaxis    - Note: May discontinue as patient becomes more ambulatory       metoprolol 25 MG tablet   Commonly known as:  LOPRESSOR   Used for:  S/P cervical spinal fusion        Dose:  12.5 mg   Take  0.5 tablets (12.5 mg) by mouth 2 times daily   Quantity:  60 tablet   Refills:  0    Indication: Hypertension       oxyCODONE IR 5 MG tablet   Commonly known as:  ROXICODONE   Used for:  S/P cervical spinal fusion        Dose:  5 mg   Take 1 tablet (5 mg) by mouth every 3 hours as needed for moderate to severe pain   Quantity:  90 tablet   Refills:  0    Indication: Moderate to Severe Post-Operative Pain       polyethylene glycol Packet   Commonly known as:  MIRALAX/GLYCOLAX   Used for:  S/P cervical spinal fusion        Dose:  17 g   17 g by Oral or Feeding Tube route 3 times daily as needed for constipation   Quantity:  7 packet   Refills:  0    Indication: Prevention of Constipation with Concurrent use of Opioid Analagesics       senna-docusate 8.6-50 MG per tablet   Commonly known as:  SENOKOT-S;PERICOLACE   Used for:  S/P cervical spinal fusion        Dose:  2 tablet   2 tablets by Oral or Feeding Tube route 2 times daily   Quantity:  100 tablet   Refills:  0    Indication: Prevention of Constipation with Concurrent use of Opioid Analgesics       sodium chloride 1 GM tablet   Used for:  S/P cervical spinal fusion        Dose:  1 g   Take 1 tablet (1 g) by mouth 2 times daily (with meals)   Refills:  0    Indication: Hyponatremia         CONTINUE these medications which may have CHANGED, or have new prescriptions. If we are uncertain of the size of tablets/capsules you have at home, strength may be listed as something that might have changed.        Dose / Directions Comments    pravastatin 10 MG tablet   Commonly known as:  PRAVACHOL   This may have changed:  See the new instructions.   Used for:  S/P cervical spinal fusion        Dose:  10 mg   1 tablet (10 mg) by Oral or Feeding Tube route At Bedtime   Quantity:  30 tablet   Refills:  0    Indication: Hyperlipidemia         CONTINUE these medications which have NOT CHANGED        Dose / Directions Comments    multivitamin CF formula Caps per capsule   Used  for:  S/P cervical spinal fusion        Dose:  1 capsule   Take 1 capsule by mouth daily   Refills:  0    Indication: Supplementation       order for DME   Used for:  Nondisplaced fracture of seventh cervical vertebra, unspecified fracture morphology, initial encounter        Equipment being ordered: Shower chair   Quantity:  1 Device   Refills:  0        vitamin D 2000 UNITS tablet        Dose:  3000 Units   Take 3,000 Units by mouth daily   Quantity:  100 tablet   Refills:  0    Indication: Supplementation       vitamin D 73216 UNIT capsule   Commonly known as:  ERGOCALCIFEROL        Dose:  59489 Units   Take 1 capsule (50,000 Units) by mouth every 7 days   Quantity:  10 capsule   Refills:  0    Indication: Supplementation         STOP taking     aspirin 81 MG tablet           calcium + D 600-200 MG-UNIT Tabs   Generic drug:  calcium carbonate-vitamin D           captopril 25 MG tablet   Commonly known as:  CAPOTEN           diazepam 5 MG tablet   Commonly known as:  VALIUM           fentaNYL 25 mcg/hr 72 hr patch   Commonly known as:  DURAGESIC           hydrochlorothiazide 25 MG tablet   Commonly known as:  HYDRODIURIL           HYDROcodone-acetaminophen 5-325 MG per tablet   Commonly known as:  NORCO           potassium chloride SA 10 MEQ CR tablet   Commonly known as:  K-DUR/KLOR-CON M                   Summary of Visit     Reason for your hospital stay       The patient was hospitalized to undergo a C1-C3 Laminectomy, C2-C3 Posterior Cervical Fusion; C2/3, C3/4, C4/5, and C6/7 Anterior Cervical Decompressions and Fusion; C2/3, C4/5, and C6/7 Facet Osteotomies; C2-T3 Posterior Instrumentation. The procedure was performed by Dr. Enma López on 11/10/2017 for the treatment of kyphotic deformity, unhealed dens fracture, and bilateral upper extremity pain, weakness, and paresthesias.             After Care     Activity - Up ad jake       - The patient must wear Cervical Collar at all times when out of bed  - Up  in Chair TID  - Ambulate with Assist TID; Increase activity as tolerated  - Please continue PT and OT Evaluations and Treatments       Advance Diet as Tolerated       Follow this diet upon discharge: Orders Placed This Encounter      Calorie Counts      Room Service      Snacks/Supplements Adult: ProStat Sugar Free; With Meals      Snacks/Supplements Adult: Between Meals      Fluid restriction 1000 ML FLUID      Regular Diet Adult Thin Liquids (water, ice chips, juice, milk, gelatin, ice cream, etc)      May remove anterior portion of cervical collar for meals/eating       Fall precautions           Desir catheter       To straight gravity drainage. Monitor and record urine output every 8 hours. May discontinue desir catheter as patient increases ambulation.       General info for SNF       Length of Stay Estimate: Short Term Care: Estimated # of Days <30  Condition at Discharge: Improving  Level of care:skilled   Rehabilitation Potential: Good  Admission H&P remains valid and up-to-date: Yes  Recent Chemotherapy: N/A  Use Nursing Home Standing Orders: Yes       Mantoux instructions       Give two-step Mantoux (PPD) Per Facility Policy Yes       Wound care       Site:  Posterior Midline Neck Incision  Instructions:    - Sutures should be removed 4 weeks following surgery by a Neurosurgery Provider at the [facility] ONLY  - After the surgical dressing is removed, keep your incision open to air.   - You may shower on post-operative day #3 (11/13/2017).   - After your incision gets wet, pat your incision dry. Do not vigorously rub your incision.  - Do not apply any creams, gels, lotions, or ointments to your incision.  - Do not submerge your incision in water for 4-6 weeks post-operatively.  - Monitor your incision for signs of infection including redness, swelling, drainage or warmth at the surgical site.       Wound care (specify)       Site:   Anterior Neck Incision  Instructions:    - The steri strips  will come off on their own over a period of 2-3 weeks. DO NOT remove them.   - After the surgical dressing is removed, keep your incision open to air.   - You may shower on post-operative day #3 (11/13/2017).   - After your incision gets wet, pat your incision dry. Do not vigorously rub your incision.  - Do not apply any creams, gels, lotions, or ointments to your incision.  - Do not submerge your incision in water for 4-6 weeks post-operatively.  - Monitor your incision for signs of infection including redness, swelling, drainage or warmth at the surgical site.             Referrals     Occupational Therapy Adult Consult       Evaluate and treat as clinically indicated.    Reason:  S/P Anterior/Posterior Cervical Fusion       Physical Therapy Adult Consult       Evaluate and treat as clinically indicated.    Reason:  S/P Anterior/Posterior Cervical Fusion       Speech Language Path Adult Consult       Evaluate and treat as clinically indicated.    Reason:  S/P Anterior/Posterior Cervical Fusion; Dysphagia             Follow-Up Appointment Instructions     Future Labs/Procedures    Follow Up and recommended labs and tests     Comments:    - Please follow-up in the Neurosurgery Clinic with Lana Hauser PA-C in approximately 2 weeks for wound evaluation and suture removal. Please call [phone] to schedule your appointment.     - Please follow-up with Dr. Enma López in the Neurosurgery Clinic in 3 months (About February 15th) for post-operative evaluation. Please call [phone] to schedule your appointment.    - Please follow-up with Endocrinology upon discharge from TCU for re-evaluation of hyponatremia.     - Please obtain daily Sodium Level and every other day BMP. Goal Sodium is normonatremia. The patient is currently on Salt Tablets 1G BID. Please taper the salt tablets to off according to the patient's sodium levels. Please check Urine Osmolality 1-2 times per week.      Follow-Up Appointment  Instructions     Follow Up and recommended labs and tests       - Please follow-up in the Neurosurgery Clinic with Lana Hauser PA-C in approximately 2 weeks for wound evaluation and suture removal. Please call [phone] to schedule your appointment.     - Please follow-up with Dr. Enma López in the Neurosurgery Clinic in 3 months (About February 15th) for post-operative evaluation. Please call [phone] to schedule your appointment.    - Please follow-up with Endocrinology upon discharge from TCU for re-evaluation of hyponatremia.     - Please obtain daily Sodium Level and every other day BMP. Goal Sodium is normonatremia. The patient is currently on Salt Tablets 1G BID. Please taper the salt tablets to off according to the patient's sodium levels. Please check Urine Osmolality 1-2 times per week.             Statement of Approval     Ordered          11/27/17 1231  I have reviewed and agree with all the recommendations and orders detailed in this document.  EFFECTIVE NOW     Approved and electronically signed by:  Carina Beebe APRN CNP

## 2017-11-10 NOTE — IP AVS SNAPSHOT
` `     [address] [facility]: [phone]                 INTERAGENCY TRANSFER FORM - NOTES (H&P, Discharge Summary, Consults, Procedures, Therapies)   11/10/2017                    Hospital Admission Date: 11/10/2017  RYDER KULKARNI   : 1935  Sex: Female        Patient PCP Information     Provider PCP Type    Jonathan Vincent MD, MD General         History & Physicals      H&P by Mike Parekh MD at 2017 12:17 AM     Author:  Miek Parekh MD Service:  Neurosurgery Author Type:  Resident    Filed:  2017  7:50 AM Date of Service:  2017 12:17 AM Creation Time:  2017 12:17 AM    Status:  Attested :  Mike Parekh MD (Resident)    Cosigner:  Casey Merritt MD at 2017  2:54 PM        Attestation signed by Casey Merritt MD at 2017  2:54 PM        Attestation:  Physician Attestation   I, Casey Merritt, saw this patient with the resident and agree with the resident s findings and plan of care as documented in the resident s note.      I personally reviewed vital signs and imaging.    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  OR soon. The risks benefits and alternatives to the procedure were discussed, questions solicited and answered, and the patient wishes to proceed with surgery.       Casey Merritt  Date of Service (when I saw the patient): 17                               [facility]  Neurosurgery     History and Physical    CC: weakness    HPI:   Ms Kulkarni is an 82 year old female with a PMH of HTN and HLD. She sustained a fall in 2016 and was found to have a non-displaced C7 fracture that was treated with conservative measures. Ms Kulkarni complained of continued neck pain with the development of left arm pain, numbness, and weakness. Repeat images demonstrated a healed C7 fracture, however, a dens fracture was noted with kyphotic deformity.  MRI was concerning for high cervical spinal canal stenosis without cord signal change. She was referred to the [facility] at that time for evaluation. She was seen in clinic on 9/21/17 and offered surgical intervention for her cervical pathology. The patient was not sure if she wanted surgery and failed to follow up with the clinic. Since that time, she has noted a gradual worsening of her weakness. Her hand dexterity has also also suffered. She is at the point where she has no longer been able to feed herself, rather relying on her  to feed her. She has lost the ability to ambulate independently. Ms Otoole also describes urinary hesitancy. She presented to [facility] due to her progress weakness for evaluation. Given the complexity of her cervical pathology, she was transferred to [facility] for further cares.[BL1.1]    Ms Otoole's primary complaint on arrival is weakness. She states the finger tips bilaterally will at times become numb and tingle as well as below her ankles bilaterally. No radicular pain, just neck pain.[BL1.2]    She takes an 81 ASA. Non-smoker. Does not use steroids. Has lost ~20lbs over the last few months.    Past Medical History   has a past medical history of Allergic rhinitis, cause unspecified; Pure hypercholesterolemia; and Unspecified essential hypertension.    Past Surgical History   has a past surgical history that includes REMOVAL OF TONSILS,12+ Y/O; colonoscopy (05/30/07); carotid endarterectomy (11/07/08); INJ EPIDURAL LUMBAR/SACRAL W/WO CONTRAST (2009); DRAIN/INJECT LARGE JOINT/BURSA (2009); DRAIN/INJECT LARGE JOINT/BURSA (2010); INJ EPIDURAL CERVICAL/THORACIC W/WO CONTRAST (2010); and INJ TRANSFORAMIN EPIDURAL, LUMB/SACR SINGLE (2010).    Family History  family history includes CANCER in her mother; CEREBROVASCULAR DISEASE in her paternal grandmother; Cardiovascular in her father; Circulatory in her paternal grandmother; DIABETES in her maternal aunt; EYE* in her  mother; GASTROINTESTINAL DISEASE in her mother; Gynecology in her sister; Hypertension in her father; Lipids in her brother; Musculoskeletal Disorder in her daughter; OSTEOPOROSIS in her mother; Obesity in her maternal grandmother and paternal grandmother.    Social History  - Occupation: retired  - Marital Status:     reports that she quit smoking about 21 years ago. She has a 5.00 pack-year smoking history. She has never used smokeless tobacco. She reports that she does not drink alcohol or use illicit drugs.    Medications  Prescriptions Prior to Admission   Medication Sig Dispense Refill Last Dose     fentaNYL (DURAGESIC) 25 mcg/hr 72 hr patch Place 1 patch onto the skin every 72 hours 4 patch 0 Unknown at Unknown time     pravastatin (PRAVACHOL) 10 MG tablet Take 1 tablet by mouth  daily at bedtime 90 tablet 1 11/9/2017 at 1800     captopril (CAPOTEN) 25 MG tablet Take 1 tablet by mouth  twice a day 180 tablet 1 11/10/2017 at 0700     hydrochlorothiazide (HYDRODIURIL) 25 MG tablet Take 2 tablets by mouth  daily 180 tablet 1 11/10/2017 at 0700     diazepam (VALIUM) 5 MG tablet Take 1 tablet (5 mg) by mouth 2 times daily 180 tablet 3 Unknown at Unknown time     potassium chloride SA (K-DUR/KLOR-CON M) 10 MEQ CR tablet Take 1 tablet by mouth  twice a day 180 tablet 1 Unknown at Unknown time     HYDROcodone-acetaminophen (NORCO) 5-325 MG per tablet Take 1-2 tablets by mouth every 4 hours as needed for moderate to severe pain or pain 90 tablet 0 Past Week at Unknown time     order for DME Equipment being ordered: Shower chair 1 Device 0 Taking     aspirin 81 MG tablet Take 1 tablet by mouth daily.    at 1800     CALCIUM + D 600-200 MG-UNIT OR TABS 1 TABLET DAILY 30 0 Unknown at Unknown time        Allergies  Allergies   Allergen Reactions     Atorvastatin Calcium      Was on Lipitor and has muscle problem       ROS: 10 point ROS of systems were all negative except for pertinent positives noted in  HPI.    PE:  Blood pressure 172/85, pulse 88, temperature 98  F (36.7  C), temperature source Oral, resp. rate 16, SpO2 96 %, not currently breastfeeding.  NEUROLOGIC:  -- Awake; Alert; oriented x 3  -- Follows commands briskly  -- Speech fluent, spontaneous. No aphasia or dysarthria.  -- no gaze preference. No apparent hemineglect.  Cranial Nerves:  -- visual fields full to confrontation, PERRL 3-2mm bilat and brisk, extraocular movements intact  -- face symmetrical, tongue midline  -- sensory V1-V3 intact bilaterally  -- palate elevates symmetrically, uvula midline  -- hearing grossly intact bilat  -- Trapezii 5/5 strength bilat symmetric  -- Cerebellar: intact rapid alternating motions bilaterally    Motor:  Decreased bulk; no tremor, rigidity, or bradykinesia.  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    Plan:  - Neurosurgical intervention warranted, surgical options will be discussed with the patient after a new MRI is obtained.  - Admit to 6A.  - Serial neuro exams  - Pain control  - Bladder scan  - Regular diet  - Nutrition consult[BL1.1]  - Orthopedics consult in AM for right shoulder dislocation[BL1.3]  - Dedicated shoulder xray  - Medicine pre-op evaluation[BL1.4]  - SCDs for DVT proph      The patient was discussed with the neurosurgery chief resident, who agrees with the above stated plan.      Mike  MD Iglesia  Neurosurgery PGY2[BL1.1]         Revision History        User Key Date/Time User Provider Type Action    > BL1.4 2017  7:50 AM Mike Parekh MD Resident Sign     BL1.3 2017  1:10 AM Mike Parekh MD Resident Sign     BL1.2 2017  1:06 AM Mike Parekh MD Resident Sign     BL1.1 2017 12:17 AM Mike Parekh MD Resident                   Discharge Summaries     No notes of this type exist for this encounter.         Consult Notes      Consults by Parvin Walker MD at 2017  1:44 PM     Author:  Parvin Walker MD Service:  Endocrinology Author Type:  Physician    Filed:  2017  9:52 PM Date of Service:  2017  1:44 PM Creation Time:  2017  1:44 PM    Status:  Signed :  Parvin Walker MD (Physician)     Consult Orders:    1. 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 Pt originally developed a nondisplaced C7 fx in 2016 after a fall. This had healed but then pt continued to experience debilitating pain. More recently requiring a fentanyl patch for pain ctrl. Repeating imaging however subsequently revealed a dens fx with kyphotic deformity. Also found evidence concerning for spinal canal stenosis. Pt was seen in neurosurgery clinic on 17 and offered surgery. Pt returned home to consider her options and before able to schedule subsequent evaluation,  presented to [facility] with progressive weakness. Subsequently admitted to [facility] for urgent eval for C spine surgery. Then found to be hyponatremic. Given lengthy C-spin surgery and need for optimization of Na lvl Endocrinology was consulted to assist with mgmt of hyponatremia.[MZ1.2]  The patient denies headaches, nausea, vomiting, confusion.  She describes her appetite as being fair.  She states that she has lost some weight.  According to her , her baseline weight used to be around 100-110 pounds.  Her current weight is 80 pounds.  The  thinks that, at home, she has been eating less. She always makes healthy food choices. The weight loss occurred gradually, over a period of approximately one year. During this hospitalization, she was evaluated by the dietitian.  The patient doesn't remember being diagnosed with osteoporosis in the past. She is not aware of any other fractures, with the exception of the C7 cervical fracture, which occurred after a fall in which he landed and hit the back of her head. The DXA scan from 2009 documents the lowest T score of -2.9 at 33% distal radius. At the hips, the lowest T score was -1.3 at the left femoral neck.  The bone mineral density at the lumbar spine was not interpretable, due to severe degenerative changes. She was prescribed of vitamin D but she hasn't been taking it at home.  During this hospitalization, she was restarted on calcium and vitamin D supplements. As per patient, she went through menopause at age 52.  With the exception of the back pain and some of long-standing constipation, the patient denies any other symptoms.  During this hospitalization, hydrochlorothiazide was discontinued and she was placed on 750 ml water restriction, on 11/11/17. The lab work revealed normal TSH and cortisol levels and mild hyponatremia, intermittently present since 2008, with the lowest sodium of 126, on 11/11/17. Serum and urine osmolality, urinary sodium, suggestive  of SIADH.  Her blood pressure has been normal.  I/O yesterday around 900 ml.[AR1.1]   Past Medical History:   Past Medical History:   Diagnosis Date     Allergic rhinitis, cause unspecified      Pure hypercholesterolemia      Unspecified essential hypertension[MZ1.1]    Subluxation of the right shoulder humeral head  B/L hip dysplasia and OA; ? Avascular necrosis - on XRays from 9/2016  OA shoulders[AR1.1]     Past Surgical History:   Procedure Laterality Date     CAROTID ENDARTERECTOMY  11/07/08     COLONOSCOPY  05/30/07    Diverticulosis-return in 5 yrs     HC DRAIN/INJ MAJOR JOINT/BURSA W/O US  2009    Right SI Joint     HC DRAIN/INJ MAJOR JOINT/BURSA W/O US  2010    Right interarticular hip injection     HC INJ EPIDURAL CERVICAL/THORACIC W/WO CONTRAST  2010    C6-7     HC INJ EPIDURAL LUMBAR/SACRAL W/WO CONTRAST  2009    L5-S1     HC INJ TRANSFORAMIN EPIDURAL, LUMB/SACR SINGLE  2010     HC REMOVAL OF TONSILS,12+ Y/O      Tonsils 14+y.o.   Medications:   Current Facility-Administered Medications   Medication     senna-docusate (SENOKOT-S;PERICOLACE) 8.6-50 MG per tablet 2 tablet     polyethylene glycol (MIRALAX/GLYCOLAX) Packet 17 g     heparin sodium PF injection 5,000 Units     zinc sulfate solution 220 mg     vitamin A capsule 10,000 Units     vitamin D (ERGOCALCIFEROL) capsule 50,000 Units     cholecalciferol (vitamin D3) capsule CAPS 5,000 Units     calcium-vitamin D (CALTRATE) 600-400 MG-UNIT per tablet 1 tablet     ascorbic acid (VITAMIN C) tablet 1,000 mg     metoprolol (LOPRESSOR) half-tab 12.5 mg     hydrALAZINE (APRESOLINE) injection 10-20 mg     naloxone (NARCAN) injection 0.1-0.4 mg     potassium chloride SA (K-DUR/KLOR-CON M) CR tablet 20-40 mEq     potassium chloride (KLOR-CON) Packet 20-40 mEq     potassium chloride 10 mEq in 100 mL sterile water intermittent infusion (premix)     potassium chloride 10 mEq in 100 mL intermittent infusion with 10 mg lidocaine     potassium chloride 20 mEq in 50 mL  intermittent infusion     magnesium sulfate 2 g in NS intermittent infusion (PharMEDium or FV Cmpd)     magnesium sulfate 4 g in 100 mL sterile water (premade)     potassium phosphate 10 mmol in D5W 250 mL intermittent infusion     potassium phosphate 15 mmol in D5W 250 mL intermittent infusion     potassium phosphate 20 mmol in D5W 500 mL intermittent infusion     potassium phosphate 20 mmol in D5W 250 mL intermittent infusion     potassium phosphate 25 mmol in D5W 500 mL intermittent infusion     ondansetron (ZOFRAN-ODT) ODT tab 4 mg    Or     ondansetron (ZOFRAN) injection 4 mg     prochlorperazine (COMPAZINE) injection 5 mg    Or     prochlorperazine (COMPAZINE) tablet 5 mg    Or     prochlorperazine (COMPAZINE) Suppository 12.5 mg     metoclopramide (REGLAN) tablet 5 mg    Or     metoclopramide (REGLAN) injection 5 mg     diazepam (VALIUM) tablet 5 mg     HYDROcodone-acetaminophen (NORCO) 5-325 MG per tablet 1 tablet     pravastatin (PRAVACHOL) tablet 10 mg          Allergies   Allergen Reactions     Atorvastatin Calcium      Was on Lipitor and has muscle problem         Social History:   Social History   Substance Use Topics     Smoking status: Former Smoker     Packs/day: 0.50     Years: 10.00     Quit date: 1996     Smokeless tobacco: Never Used     Alcohol use No       Family History:   Family History   Problem Relation Age of Onset     CANCER Mother      colon cancer  at age 95 or 96   surgery done     GASTROINTESTINAL DISEASE Mother      stomach problems     OSTEOPOROSIS Mother      EYE* Mother      cataract and macular degen.     Cardiovascular Father       at age 60  MI     Hypertension Father      ?     Circulatory Paternal Grandmother      legs     CEREBROVASCULAR DISEASE Paternal Grandmother      Obesity Paternal Grandmother      Gynecology Sister      hysterectomy     Lipids Brother      was on meds.  ? still     Obesity Maternal Grandmother      Musculoskeletal Disorder Daughter       cancerous tumor left upper arm/shoulder- was told it was a breast type cancer     DIABETES Maternal Aunt        Review of System:   A 12-pt ROS was negative except as noted in HPI.      Physical Examination:   Blood pressure 124/69, pulse 71, temperature 98.8  F (37.1  C), temperature source Oral, resp. rate 14, SpO2 96 %, not currently breastfeeding.  General:[MZ1.1] she appears pale, thin, malnourished[AR1.1]  HEENT: EOMI. Sclerae and conjunctivae clear.   Neck:[MZ1.1] no thyromegaly[AR1.1]. No cervical or supraclavicular lymphadenopathy.   CV: S1/S2 heard without murmur  Lungs: normal breath sounds, no crackles or wheezes appreciated.   Abdomen: Soft, non tender, and non-distended. Bowel sounds active  Extremities: No peripheral edema.   Skin: No rash or lesions.   Neuro:[MZ1.1] decreased[AR1.1] proximal strength.[MZ1.1] Unable to elicit knee reflexes.[AR1.1] Tremor[MZ1.1] none[MZ1.2].     Labs and Studies:[MZ1.1]   AM cortisol 21.6  TSH 2.91  Urine Na 82  Urine   Plasma [MZ1.2]    Assessment:[MZ1.1]   Hyponatremia   The patient has mild, chronic hyponatremia.  She doesn't endorse symptoms suggestive of hyponatremia and she appears to be euvolemic. Hypothyroidism and adrenal insufficiency were ruled out by laboratory testing. The serum and urine osmolality, serum sodium, are highly suggestive of SIADH. Prior to this hospitalization, she was taking hydrochlorothiazide, which might had contributed to hyponatremia. In the last couple of days, the patient has been on water restriction, with no significant improvement of the sodium levels.  The e[AR1.1]tiology of SIADH[MZ1.2] is[AR1.1] less obvious.[MZ1.2] While the weight loss might be due to poor po intake, malignancy remains a concern. The CXR from 11/10/17 was unremarkable. The patient has no recent brain imaging tests. The head CT from 4/2016 revealed generalized atrophy of the brain and ischemic vessel disease.   In the context of[AR1.1] imminent  surgery[MZ1.2] and given the mild degree of hyponatremia, we would prefer to increase the[AR1.1] sodium intake rather than treating with ADH receptor antagonist.[MZ1.2]   Recommendations:[MZ1.1]   1[MZ1.2].[MZ1.1] Sodium chloride tablets 3 g at 17:00[MZ1.2] pm today[AR1.1] and 00:00[MZ1.2] midnight[AR1.1]   2. Check BMP in AM  3.[MZ1.2] Continue water restriction   4.[AR1.1] Pt safe for cervical surgery with Na level > 130[MZ1.2]; she will require close monitoring of the sodium level following surgery[AR1.1]  5. Endocrine will continue to follow[MZ1.2]  Osteoporosis   Diagnosis is based on the history of vertebral fracture after falling from a standing height.  Risk factors for osteoporosis identified: age, low weight, family history of osteoporosis, prior history of smoking, questionable malnutrition.  On the prior DXA scan, the lowest bone mineral density was at the wrists level, which raises concern for hyperparathyroidism. We added tissue transglutaminase antibodies and the parathyroid hormone level to her morning labs.[AR1.1]  Patient was seen and discussed with[MZ1.1] attending physician,[MZ1.2] [MZ1.1] Parvin Walker[MZ1.2].     Marlon Cameron MD, PhD  Internal Medicine, PGY2  Pager [phone][MZ1.1]    I have personally examined the patient, reviewed and edited the resident's note and agree with the plan of care.    Parvin Walker MD.[AR1.1]        Revision History        User Key Date/Time User Provider Type Action    > AR1.1 11/14/2017  9:52 PM Parvin Walker MD Physician Sign     MZ1.2 11/14/2017  6:10 PM Marlon Cameron MD Resident Sign     MZ1.1 11/14/2017  1:44 PM Marlon Cameron MD Resident             Consults by Ajit Akins MD at 11/11/2017  3:04 PM     Author:  Ajit Akins MD Service:  General Medicine Author Type:  Physician    Filed:  11/11/2017  3:04 PM Date of Service:  11/11/2017  3:04 PM Creation Time:  11/11/2017  2:31 PM    Status:  Signed :  Ajit Akins,  MD (Physician)     Consult Orders:    1. Internal Medicine Adult IP Consult for [place]: Patient to be seen: Routine within 24 hrs; Call back #: [phone]; pre-op evaluation and clearance for surgery tomorrow (11/12); Consultant may enter orders: Yes [[id number]] ordered by Giulia Chang MD at 11/11/17 0652                        [facility] Consult Service - Internal Medicine Note          Patient: Yuni Otoole  MRN: [id number]  Admission Date: 11/10/2017  Hospital Day # 1    Reason for Consultation  I was asked to see Yuni Otoole For pre-op evaluation evaluation before her C spine surgery..    Assessment & Plan: Yuni Otoole is a 82 year old female with HTN controlled with captopril and hydrochlorthiazide, and hyperlipidemia on statin. She has been struggling with C spine issue and symptoms related to that since her fall in 2016. 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 She has no history of any CAD or heart failure or stroke. No recent stroke or ACS or chest pain episodes.    1-Preop evaluation- Given her age and partially dependent functional status and normal creatinine, no major cardiopulmonary issues, she has a small (0.31%) risk of myocardial infarction or cardiac arrest per [facility] Perioperative cardiac risk assessment score.    -Okay to proceed with surgery as long as patient understands risks and benefits  -Medicine service will continue to follow  -Continue Captopril and Statin  -optimizing pulmonary function and if GA is planned we recommend good pulmonary toilet, Incentive spirometry and RT assessment.  -She has a murmur but asymptomatic and has had carotid USG in 2014 and all her Health maintenance seems to be up to date per chart review.      2- hyponatremia - mild and asymptomatic and likely chronic. 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   -free water restriction up to 800cc/day  -K and mag levels optimized    3-HTN- continuing captopril and holding HCTZ    4-HLD- continuing  statin    CODE: prior per 2016 FC  DVT: per primary service  Diet/fluids: orals     Disposition: depends upon post-op course      Pt's care was discussed with bedside RN    Ajit Akins  Internal Medicine Staff Hospitalist Service   [facility]   Pager: [phone]    Consult Team: Cathryn Barlow 9  Page Cross Cover after 5 pm: pager [phone]     ___________________________________________________________________    Subjective & Interval Hx:  Feels well, unable to use hands and unable to feed herself ?weakness and numbness. Hearing aid batteries not here yet and so she has tough time hearing.    Last 24 hr care team notes reviewed.   ROS:  4 point ROS including Respiratory, CV, GI and , other than that noted in the HPI, is negative.     Medications: Reviewed in EPIC.     Physical Exam:    Blood pressure 131/73, pulse 71, temperature 98.2  F (36.8  C), temperature source Oral, resp. rate 16, SpO2 100 %, not currently breastfeeding.  GENERAL: Appears alert and oriented times three.   HEENT: Eye symmetrical and free of discharge bilaterally. Mucous membranes moist and without lesions.  NECK: Supple and without lymphadenopathy.   CV: RRR, S1S2 present systolic ejection murmur  RESPIRATORY: Respirations regular, even, and unlabored. Lungs CTA throughout.   GI: Soft and non distended with normoactive bowel sounds present in all quadrants. No tenderness, rebound, guarding. No organomegaly.   EXTREMITIES: No peripheral edema. 2+ bilateral pedal pulses.   SKIN: No jaundice. No rashes or lesions.       ADMIT DATE: 11/10/2017  DATE OF CONSULT: 11/11/2017    PCP: Jonathan Vincent  ROS: A 10 point review of systems is negative unless otherwise noted in HPI.     PMH:[RA1.1]  Past Medical History:   Diagnosis Date     Allergic rhinitis, cause unspecified      Pure hypercholesterolemia      Unspecified essential hypertension[RA1.2]        PSH:[RA1.1]  Past Surgical History:   Procedure Laterality Date      CAROTID ENDARTERECTOMY  11/07/08     COLONOSCOPY  05/30/07    Diverticulosis-return in 5 yrs     HC DRAIN/INJ MAJOR JOINT/BURSA W/O US  2009    Right SI Joint     HC DRAIN/INJ MAJOR JOINT/BURSA W/O US  2010    Right interarticular hip injection     HC INJ EPIDURAL CERVICAL/THORACIC W/WO CONTRAST  2010    C6-7     HC INJ EPIDURAL LUMBAR/SACRAL W/WO CONTRAST  2009    L5-S1     HC INJ TRANSFORAMIN EPIDURAL, LUMB/SACR SINGLE  2010     HC REMOVAL OF TONSILS,12+ Y/O      Tonsils 14+y.o.[RA1.2]       MEDICATIONS:[RA1.1]  Current Facility-Administered Medications   Medication     naloxone (NARCAN) injection 0.1-0.4 mg     potassium chloride SA (K-DUR/KLOR-CON M) CR tablet 20-40 mEq     potassium chloride (KLOR-CON) Packet 20-40 mEq     potassium chloride 10 mEq in 100 mL sterile water intermittent infusion (premix)     potassium chloride 10 mEq in 100 mL intermittent infusion with 10 mg lidocaine     potassium chloride 20 mEq in 50 mL intermittent infusion     magnesium sulfate 2 g in NS intermittent infusion ([facility] or [facility])     magnesium sulfate 4 g in 100 mL sterile water (premade)     potassium phosphate 10 mmol in D5W 250 mL intermittent infusion     potassium phosphate 15 mmol in D5W 250 mL intermittent infusion     potassium phosphate 20 mmol in D5W 500 mL intermittent infusion     potassium phosphate 20 mmol in D5W 250 mL intermittent infusion     potassium phosphate 25 mmol in D5W 500 mL intermittent infusion     ondansetron (ZOFRAN-ODT) ODT tab 4 mg    Or     ondansetron (ZOFRAN) injection 4 mg     prochlorperazine (COMPAZINE) injection 5 mg    Or     prochlorperazine (COMPAZINE) tablet 5 mg    Or     prochlorperazine (COMPAZINE) Suppository 12.5 mg     metoclopramide (REGLAN) tablet 5 mg    Or     metoclopramide (REGLAN) injection 5 mg     captopril (CAPOTEN) tablet 25 mg     diazepam (VALIUM) tablet 5 mg     HYDROcodone-acetaminophen (NORCO) 5-325 MG per tablet 1 tablet     potassium chloride SA  (K-DUR/KLOR-CON M) CR tablet 10 mEq     pravastatin (PRAVACHOL) tablet 10 mg[RA1.2]       ALLERGIES:[RA1.1]     Allergies   Allergen Reactions     Atorvastatin Calcium      Was on Lipitor and has muscle problem[RA1.2]       FAMILY HISTORY:[RA1.1]  Family History   Problem Relation Age of Onset     CANCER Mother      colon cancer  at age 95 or 96   surgery done     GASTROINTESTINAL DISEASE Mother      stomach problems     OSTEOPOROSIS Mother      EYE* Mother      cataract and macular degen.     Cardiovascular Father       at age 60  MI     Hypertension Father      ?     Circulatory Paternal Grandmother      legs     CEREBROVASCULAR DISEASE Paternal Grandmother      Obesity Paternal Grandmother      Gynecology Sister      hysterectomy     Lipids Brother      was on meds.  ? still     Obesity Maternal Grandmother      Musculoskeletal Disorder Daughter      cancerous tumor left upper arm/shoulder- was told it was a breast type cancer     DIABETES Maternal Aunt[RA1.2]          PHYSICAL EXAM:[RA1.1]  Blood pressure 146/76, pulse 84, temperature 98.1  F (36.7  C), temperature source Oral, resp. rate 16, SpO2 99 %, not currently breastfeeding.[RA1.2]    LABS:  CMP[RA1.1]  Recent Labs  Lab 17  0801 11/10/17  1723   * 127*   POTASSIUM 3.3* 3.4   CHLORIDE 92* 91*   CO2 27 28   ANIONGAP 7 8   GLC 77 92   BUN 11 11   CR 0.36* 0.38*   GFRESTIMATED >90 >90   GFRESTBLACK >90 >90   ANNETTA 8.9 8.4*   MAG 1.7  --    PHOS 2.9  --    PROTTOTAL  --  6.5*   ALBUMIN 3.1* 3.5   BILITOTAL  --  0.4   ALKPHOS  --  68   AST  --  16   ALT  --  18[RA1.2]     CBC[RA1.1]  Recent Labs  Lab 17  0801 11/10/17  1723   WBC 6.5 8.8   RBC 3.69* 3.95   HGB 10.9* 11.8   HCT 33.1* 35.5   MCV 90 90   MCH 29.5 29.9   MCHC 32.9 33.2   RDW 13.5 13.2    372[RA1.2]     INR[RA1.1]No lab results found in last 7 days.[RA1.2]    IMAGING: official MRI c-spine read pending, CXR: 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 Lungs  "normal.[RA1.1]         Revision History        User Key Date/Time User Provider Type Action    > RA1.2 11/11/2017  3:04 PM Ajit Akins MD Physician Sign     RA1.1 11/11/2017  2:31 PM Ajit Akins MD Physician                      Progress Notes - Physician (Notes from 11/24/17 through 11/27/17)      Progress Notes by Hernan Escobedo at 11/27/2017 12:01 PM     Author:  Hernan Escobedo Service:  (none) Author Type:      Filed:  11/27/2017 12:03 PM Date of Service:  11/27/2017 12:01 PM Creation Time:  11/27/2017 12:01 PM    Status:  Signed :  Hernan Escobedo ([facility])         SPIRITUAL HEALTH SERVICES  SPIRITUAL ASSESSMENT Progress Note  [facility] ([place]) 6A    REFERRAL SOURCE: Follow up visit at [facility].    Yuni and her daughter Marlene were arranging lunch for Yuni when I stopped by for follow up. They celebrated that Yuni would be going home today, \"It won't be as easy for my children.\" Marlene stated in response, \"But dad will be happy.\" Prayer for safe transport requested and shared.    PLAN: Need met. No follow up needed.                                                                                                                                           Hernan Escobedo  [facility] Intern  Pager [phone][NM1.1]         Revision History        User Key Date/Time User Provider Type Action    > NM1.1 11/27/2017 12:03 PM Hernan Escobedo  Sign            Progress Notes by Taye Andrews MD at 11/27/2017  3:37 AM     Author:  Taye Andrews MD Service:  Neurosurgery Author Type:  Resident    Filed:  11/27/2017  3:41 AM Date of Service:  11/27/2017  3:37 AM Creation Time:  11/27/2017  3:37 AM    Status:  Attested :  Taye Andrews MD (Resident)    Cosigner:  Enma López MD at 11/27/2017 11:52 AM        Attestation signed by Enma López MD at 11/27/2017 11:52 AM        Attestation:  Physician Attestation   Enma JEREZ " "Kathy López, personally examined and evaluated this patient.  I discussed the patient with the resident and care team, and agree with the assessment and plan of care as documented in the resident s note of 11/27/17      I personally reviewed vital signs and medications.    Key findings: doing well, significant improvement in strength, swallowing, and nutrition compared to her baseline at admission.  Discharging to rehab today  Upgraded to regular diet  Needs sutures in for 4 weeks- followup in 4 weeks with PA for suture removal with long-cassette xrays, and at 3 months with Dr. López with long-cassette xrays  Wear brace only when out of bed, remove front for PO intake  Enma López  Date of Service (when I saw the patient): 11/27/17                               Neurosurgery Daily Progress Note  11/27/2017    Overnight events/subjective: no acute events overnight, awaiting rehab placement.      O/ /65 (BP Location: Right arm)  Pulse 74  Temp 97.4  F (36.3  C) (Oral)  Resp 16  Ht 1.499 m (4' 11\")  Wt 43.6 kg (96 lb 1.9 oz)  SpO2 94%  BMI 19.41 kg/m2    Exam:   Gen: Laying in bed, comfortable appearing  MS: A&Ox3, Speech fluent and conversant   CN: Pupils round and reactive, extraocular movements intact, face symmetric, tongue midline, uvula & palate elevate symmetrically   Motor:      Delt Bi Tri WE WF    R 1 4- 4+ 4+ 4 4   L 2 4- 4+ 4+ 4 4     IP Quad Ham DF PF EHL   R 3 4+ 4+ 4+ 4+ 4+   L 3 4+ 4+ 4+ 4+ 4+     Sensory: patient endorses intact light touch sensation    Non labored breathing    LABS: reviewed     A/P: Yuni Otoole is a 82 year old POD#12 s/p C1-C3 laminectomies, C2-T3 posterior segmental instrumentation, C2/3, 4/5, 6/7 facette osteotomies. C2/3, 4/5, 6/7 ACDF. C2-T3 posterior fusion w/ autograft and allograft with reduction of cervical spine.     - Neuro checks  - Left subclavian CVC removed   - CTP brace; remove front for PO intake  - Na tabs 1 BID per endocrine " "recommendations.   - Sutures to be left in for 4 weeks  - Post-op images completed, with brace on, upright/sitting  - Diet: DD3 Calorie counts improved.   - Padilla replaced due to high PVRs  - SCDs & SQH for DVT ppx  - PO diet for GI ppx  - PT/OT: TCU    Dispo: Originally anticipated d/c 11/24, now 11/28. Barriers:rehab accepting near her home did not accept placement over the weekend       Please contact the neurosurgery resident on call with questions by dialing * * [phone], then entering [phone] when prompted[SC1.1]           Revision History        User Key Date/Time User Provider Type Action    > SC1.1 11/27/2017  3:41 AM Taye Andrews MD Resident Sign            Progress Notes by Celia Gonzalez BSW at 11/27/2017 11:21 AM     Author:  Celia Gonzalez BSW Service:  (none) Author Type:      Filed:  11/27/2017 11:26 AM Date of Service:  11/27/2017 11:21 AM Creation Time:  11/27/2017 11:21 AM    Status:  Signed :  Celia Gonzalez BSW ()         Social Work Services Discharge Note      Patient Name:  Yuni Otoole     Anticipated Discharge Date:  11/27/17    Discharge Disposition:   [facility] ([phone])    Following MD:  Facility Assignment     Pre-Admission Screening (PAS) online form has been completed.  The Level of Care (LOC) is:  Determined  Confirmation Code is:  [id number].  Patient/caregiver informed of referral to [facility] for Pre-Admission Screening for skilled nursing facility (SNF) placement and to expect a phone call post discharge from SNF.     Additional Services/Equipment Arranged:  Confirmed readiness for discharge with Kat Beebe NP.   Confirmed acceptance to [facility] with Admissions ([place]).  Arranged for [facility] ([phone]) to provide stretcher transport at 2pm,  Completed a \"Physicians Certification for Transportation,\" form.       Patient / Family response to discharge plan:  Pt and son (Rex) voice " understanding of the discharge plan and agreement with the discharge plan.     Persons notified of above discharge plan:  Pt, son (Rex), 6A nursing and Kat Beebe (NP)    Staff Discharge Instructions:  Please fax discharge orders and signed hard scripts for any controlled substances  (SW will complete this task).  Please print a packet and send with patient.     CTS Handoff completed:  YES    Medicare Notice of Rights provided to the patient/family:  YES    LANG Zapata  Social Work, 6A  Phone:  [phone]  Pager:  [phone]  11/27/2017[TK1.1]           Revision History        User Key Date/Time User Provider Type Action    > TK1.1 11/27/2017 11:26 AM Celia Gonzalez BSW  Sign            Progress Notes by Mike Parekh MD at 11/18/2017 10:24 AM     Author:  Mike Parekh MD Service:  Neurosurgery Author Type:  Resident    Filed:  11/18/2017 10:36 AM Date of Service:  11/18/2017 10:24 AM Creation Time:  11/18/2017 10:24 AM    Status:  Attested :  Mike Parekh MD (Resident)    Cosigner:  Jose Snider MD at 11/25/2017  3:58 PM        Attestation signed by Jose Snider MD at 11/25/2017  3:58 PM        Attestation:  I have seen and examined the patient and agree with the residents assessment and plan.                               Neurosurgery Daily Progress Note  11/18/2017    Overnight events/subjective: Transferred to the floor. Padilla replaced d/t urinary retention.    O/ /74 (BP Location: Right arm)  Pulse 84  Temp 98.2  F (36.8  C) (Oral)  Resp 16  Wt 40.9 kg (90 lb 1.6 oz)  SpO2 98%  BMI 18.83 kg/m2    Exam:   Gen: Laying in bed, not in acute distress  MS: A&Ox3, Speech fluent and conversant   CN: Pupils round and reactive, extraocular movements intact, face symmetric, tongue midline, uvula & palate elevate symmetrically   Motor:      Delt Bi Tri WE WF    R 1 4- 4 4 4 4-   L 1 4- 4 4 4 4-     IP Quad Ham DF PF EHL   R 3  4+ 4+ 4+ 4+ 4+   L 3 4+ 4+ 4+ 4+ 4+     Sensory: patient endorses intact light touch sensation, but exhibits extinction of left    Non labored breathing    LABS: reviewed     A/P: Yuni Otoole is a 82 year old POD#3 s/p C1-C3 laminectomies, C2-T3 posterior segmental instrumentation, C2/3, 4/5, 6/7 facette osteotomies. C2/3, 4/5, 6/7 ACDF. C2-T3 posterior fusion w/ autograft and allograft with reduction of cervical spine.     - Neuro checks  - CTP brace; remove front for PO intake  - Suture to be left in for 4 weeks  - Post-op images when tolerated, with brace on, upright/sitting  - Pull posterior KELLIE today; ok to start DVT ppx after drain out  - Diet: Tube feeds + FLD; nutrition following.  - SCDs for DVT ppx  - PO diet for GI ppx  - PT/OT: TCU (OT)    Dispo: Anticipated d/c 11/24. Barriers: removal of surgical drain, evaluation by therapies, ambulating, voiding w/o Padilla or plan to d/c with Padilla, tolerating adequate PO diet to meet nutritional needs.      Please contact the neurosurgery resident on call with questions by dialing * * [phone], then entering [phone] when prompted[BL1.1]             Revision History        User Key Date/Time User Provider Type Action    > BL1.1 11/18/2017 10:36 AM Mike Parekh MD Resident Sign            Progress Notes by Taye Andrews MD at 11/19/2017  8:13 AM     Author:  Taye Andrews MD Service:  Neurosurgery Author Type:  Resident    Filed:  11/19/2017 10:21 AM Date of Service:  11/19/2017  8:13 AM Creation Time:  11/19/2017  8:13 AM    Status:  Attested :  Taye Andrews MD (Resident)    Cosigner:  Jose Snider MD at 11/25/2017  3:58 PM        Attestation signed by Jose Snider MD at 11/25/2017  3:58 PM        Attestation:  I have seen and examined the patient and agree with the residents assessment and plan.                               Neurosurgery Daily Progress Note  11/19/2017    Overnight events/subjective: Drain  "removed last night.  Having frequent loose stools.  No acute events overnight.      O/ /81  Pulse 84  Temp 97.7  F (36.5  C) (Oral)  Resp 16  Wt 40.9 kg (90 lb 1.6 oz)  SpO2 98%  BMI 18.83 kg/m2    Exam:   Gen: Laying in bed, uncomfortable \"skin\" on back and neck  MS: A&Ox3, Speech fluent and conversant   CN: Pupils round and reactive, extraocular movements intact, face symmetric, tongue midline, uvula & palate elevate symmetrically   Motor:      Delt Bi Tri WE WF    R 1 4- 4 4 4 4-   L 1 4- 4 4 4 4-     IP Quad Ham DF PF EHL   R 3 4+ 4+ 4+ 4+ 4+   L 3 4+ 4+ 4+ 4+ 4+     Sensory: patient endorses intact light touch sensation, but exhibits extinction of left    Non labored breathing    LABS: reviewed     A/P: Yuni Otoole is a 82 year old POD#4 s/p C1-C3 laminectomies, C2-T3 posterior segmental instrumentation, C2/3, 4/5, 6/7 facette osteotomies. C2/3, 4/5, 6/7 ACDF. C2-T3 posterior fusion w/ autograft and allograft with reduction of cervical spine.     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  - SCDs for DVT ppx  - PO diet for GI ppx  - PT/OT: TCU (OT)    Dispo: Anticipated d/c 11/24. Barriers: removal of surgical drain, evaluation by therapies, ambulating, voiding w/o Padilla or plan to d/c with Padilla, tolerating adequate PO diet to meet nutritional needs.      Please contact the neurosurgery resident on call with questions by dialing * * [phone], then entering [phone] when prompted[SC1.1]             Revision History        User Key Date/Time User Provider Type Action    > SC1.3 11/19/2017 10:21 AM Taye Andrews MD Resident Sign     SC1.2 11/19/2017  8:57 AM Juliana, " Taye Lawson MD Resident Sign     SC1.1 11/19/2017  8:13 AM Taye Andrews MD Resident             Progress Notes by Celeste Wilkins MSW at 11/25/2017  1:30 PM     Author:  Celeste Wilkins MSW Service:  Social Work Author Type:      Filed:  11/25/2017  2:58 PM Date of Service:  11/25/2017  1:30 PM Creation Time:  11/25/2017  1:30 PM    Status:  Signed :  Celeste Wilkins MSW ()         Social Work Services Progress Note    Hospital Day: 15  Date of Initial Social Work Evaluation:  11/21/17  Collaborated with: SNF admissions at [facility] and [facility], Neurosurgery team[LH1.1], Pt, spouse, son in law[LH1.2]    Data:  Pt is a 82 year old[LH1.1] fe[LH1.2]male being followed by SW for[LH1.1] discharge planning to TCU[LH1.2].[LH1.1]  Plan today is for the naso feeding tube to be removed.[LH1.2]    Intervention:[LH1.1]  SW discussed POC with neurosurgery team.  Pt is medically being followed through the weekend for placement to TCU.  SW has also contacted the following facilities for discharge planning:   [facility] - left a message with admissions.  Family states this is the closest facility and easiest to transport to with pt's spouse.  Family would like to know Monday if this facility has openings.  PH: [phone]  F: [phone]    [facility] - accepted the pt for TCU.  Facility has openings tomorrow and Monday.  Family state they would like this to be a second option as this is further from home.  PH: [phone]  F: [phone]    Family is aware that pt will need a stretcher ride at discharge and are aware that insurance may not cover the cost of the ride.  Monday SW will need to set up a stretcher transport to TCU due to physical limitations of riding in a passenger car.[LH1.2]    Assessment: P[LH1.1]t and family in agreement with TCU discharge planning.  Family preferring [facility] if possible as this is closest to pt's spouse. If [facility]  is unable to accommodate pt's needs, family would choose [facility] as this facility has formally accepted pt for cares once the naso feeding tube has been removed.[LH1.2]    Plan:    Anticipated Disposition:[LH1.1]  TCU[LH1.2]    Barriers to d/c plan:  Medical[LH1.1] stability off naso feeding tube.[LH1.2]    Follow Up:[LH1.1] SW to hand off to weekday unit SW to assist with discharge planning.  Neurosurgery team updated on weekend.    MISAEL Vargas, ANN MARIE  Weekend Adult Acute Care   Pager [phone]  Care Management Dept [phone][LH1.2]         Revision History        User Key Date/Time User Provider Type Action    > LH1.2 11/25/2017  2:58 PM Celeste Wilkins MSW  Sign     LH1.1 11/25/2017  1:30 PM Celeste Wilkins MSW              Progress Notes by Giulia Chang MD at 11/25/2017  1:41 PM     Author:  Giulia Chang MD Service:  Neurosurgery Author Type:  Resident    Filed:  11/25/2017  1:42 PM Date of Service:  11/25/2017  1:41 PM Creation Time:  11/25/2017  1:41 PM    Status:  Signed :  Giulia Chang MD (Resident)         Neurosurgery Brief Note     Okay to remove feeding tube now. Will continue advancing PO diet as tolerated.     Giulia Chang MD  Neurosurgery resident PGY3[LS1.1]     Revision History        User Key Date/Time User Provider Type Action    > LS1.1 11/25/2017  1:42 PM Giulia Chang MD Resident Sign            Progress Notes by Hernan Escobedo at 11/25/2017 10:47 AM     Author:  Hernan Escobedo Service:  (none) Author Type:      Filed:  11/25/2017 10:52 AM Date of Service:  11/25/2017 10:47 AM Creation Time:  11/25/2017 10:47 AM    Status:  Signed :  Hernan Escobedo ()         SPIRITUAL HEALTH SERVICES  SPIRITUAL ASSESSMENT Progress Note  [facility] ([place]) 6A    REFERRAL SOURCE: Length of Stay.    Yuni was accompanied by her  Ed, and son Rex, during my visit. Short  "visit. Reflective conversation with Yuni and family, touching on elements of Yuni's health narrative and goals of care. She expressed a desire to \"eat more solid food,\" but is prevented by \"this tube... which requires me to eat soft foods.\" Ed and Yuni are [employer]ist. Petr Goodman is [employer]. Prayer welcomed and offered. Follow up  care is welcomed.    PLAN: I will follow up once per week for spiritual support while Yuni remains on 6A.                                                                                                                                           Hernan Escobedo   Intern  Pager [phone][NM1.1]         Revision History        User Key Date/Time User Provider Type Action    > NM1.1 11/25/2017 10:52 AM Hernan Escobedo Sign            Progress Notes by Gunjan Ann MD at 11/24/2017  1:04 PM     Author:  Gunjan Ann MD Service:  Endocrinology Author Type:  Physician    Filed:  11/25/2017  9:21 AM Date of Service:  11/24/2017  1:04 PM Creation Time:  11/24/2017  1:04 PM    Status:  Signed :  Gunjan Ann MD (Physician)         [facility] Endocrinology Progress Note          Assessment and Plan:     Assessment and Plan:  Yuni Otoole is a 82 year old  s/p C1-C3 laminectomies, C2-T3 posterior segmental instrumentation, C2/3, 4/5, 6/7 facette osteotomies. C2/3, 4/5, 6/7 ACDF. C2-T3 posterior fusion w/ autograft and allograft with reduction of cervical spine.  This is POD #10.     Endocrinology team following from possible SIADH stand point; which has resulted in hyponatremia.  Cause of SIADH: pain, pain medication, intervention.  Urine osmolality was in the 400's at the time of her admission.[KZ1.1]    Intake/Output Summary (Last 24 hours) at 11/24/17 1307  Last data filed at 11/24/17 0700   Gross per 24 hour   Intake              810 ml   Output             1090 ml   Net             -280 ml[KZ1.2]     Urine total output was 1500 ml yesterday; " "while registered input was 480.  She is on water restriction @ 1000 ml daily.   Last Basic Metabolic Panel:  Lab Results   Component Value Date     11/24/2017           Restart sodium tablets at 1 gm twice per day when discharged to TCU.  Rationale for this is; current intake is much less than urine output and water restriction will not make a difference.     To confirm the dx again; we have ordered urine osmolality.      Na check 1 a week at TCU .      Needs follow up with Nephrology or endocrinology as an outpatient.             Interval History:   Complaining of pain. 'sore everywhere'.               Medications:[KZ1.1]       thiamine  100 mg Oral or Feeding Tube Daily     acetaminophen  650 mg Oral Q4H     ascorbic acid  1,000 mg Oral or Feeding Tube Daily     pravastatin  10 mg Oral or Feeding Tube At Bedtime     metoprolol  12.5 mg Oral or Feeding Tube BID     [START ON 11/25/2017] vitamin D  50,000 Units Oral or Feeding Tube Q7 Days     calcium carbonate  1,250 mg Oral or Feeding Tube BID w/meals     cholecalciferol  3,000 Units Oral or Feeding Tube Daily     vitamin A  10,000 Units Oral or Feeding Tube Daily     zinc sulfate  220 mg Oral or Feeding Tube TID     senna-docusate  2 tablet Oral or Feeding Tube BID     heparin  5,000 Units Subcutaneous Q8H     bisacodyl  10 mg Rectal Daily     heparin lock flush  5-10 mL Intracatheter Q24H     multivitamins with minerals  15 mL Per Feeding Tube Daily[KZ1.3]        Physical Examinations:  /69  Pulse 74  Temp 97.7  F (36.5  C) (Oral)  Resp 16  Ht 1.499 m (4' 11\")  Wt 43.6 kg (96 lb 1.9 oz)  SpO2 95%  BMI 19.41 kg/m2  Body mass index is 19.41 kg/(m^2).  Constitutional: no distress, comfortable, pleasant   Neurological: alert and oriented.   Psychological: appropriate mood           Data:   Last Basic Metabolic Panel:  Lab Results   Component Value Date     11/24/2017      Lab Results   Component Value Date    POTASSIUM 4.1 11/24/2017     Lab " Results   Component Value Date    CHLORIDE 98 11/24/2017     Lab Results   Component Value Date    ANNETTA 8.3 11/24/2017     Lab Results   Component Value Date    CO2 28 11/24/2017     Lab Results   Component Value Date    BUN 22 11/24/2017     Lab Results   Component Value Date    CR 0.40 11/24/2017     Lab Results   Component Value Date     11/24/2017         Patient seen and discussed with Endocrinology attending Dr. Ann.      Janny Martínez MD  Endocrinology Fellow /[phone][KZ1.1]        --- Addendum----  I saw the patient with endocrine fellow Dr. Martínez and directly examined patient and discussed. Agree above note and plan.     Upon discharge to NH, Sodium tablet to resume and recheck Na level once a week at NH. Outpatient follow up need once.     Gunjan Ann MD  Staff Physician  Endocrinology and Metabolism  [facility]  License: [id number]  Pager: [phone][TA1.1]       Revision History        User Key Date/Time User Provider Type Action    > TA1.1 11/25/2017  9:21 AM Gunjan Ann MD Physician Sign     KZ1.3 11/24/2017  1:14 PM Janny Martínez MD Fellow Sign     KZ1.2 11/24/2017  1:07 PM Janny Martínez MD Fellow      KZ1.1 11/24/2017  1:04 PM Janny Martínez MD Fellow             Progress Notes by Celia Gonzalez BSW at 11/24/2017  2:28 PM     Author:  Celia Gonzalez BSW Service:  (none) Author Type:      Filed:  11/24/2017  2:42 PM Date of Service:  11/24/2017  2:28 PM Creation Time:  11/24/2017  2:28 PM    Status:  Signed :  Celia Gonzalez BSW ()         Social Work Services Progress Note[TK1.1]    Hospital Day: 15[TK1.2]  Date of Initial Social Work Evaluation:  11/21/17  Collaborated with:  SNF Admissions Coordinator's, pt's floor nurse (Cyndee), pt's daughter in law (Dior) and Dr. Huerta    Data:  Rehab placement is being pursued.  Per Dr. Huerta, pt's calorie counts have improved and they may be able to  remove the nasal tube feeding this weekend.  If so, pt would be medically ready for discharge.      Intervention:  Spoke with Dr. Huerta.  Spoke with pt's floor nurse (Cyndee).  Per Cyndee, pt will require stretcher transport to the receiving facility as she is not able to tolerate sitting up the length of the ride due to pain .  SW contacted Admissions Coordinator's at the following facilities:  1.  [place] [facility] - spoke with Lavaca, they will not accept pt for admit on the weekend, The soonest they would accept is on Monday.  They accept nasal tube feedings  2.  [place] [facility] - spoke with Emiliano.  Emiliano indicates that they are full with no anticipated weekend openings.  Per Emiliano, they do not accept nasal tube feedings  3.  [facility] - spoke with Lucy and weekend openings are anticipated. Faxed assessment materials.  They do not accept nasal tube feedings  4.  [facility] - spoke with Mónica who states that they may have a weekend opening.  Faxed assessment materials. They do not accept nasal tube feedings.    Returned a call to pt's daughter in law, Dior ([phone]) and updated in regards to discharge planning.    Assessment: Pt is not medically ready for discharge today.    Plan:    Anticipated Disposition:  Short term rehab placement in a community SNF.    Barriers to d/c plan:  Medical readiness    Follow Up:  SW will continue to follow for discharge planning.    LANG Zapata  Social Work, 6A  Phone:  [phone]  Pager:  [phone]  11/24/2017[TK1.1]           Revision History        User Key Date/Time User Provider Type Action    > TK1.2 11/24/2017  2:42 PM Celia Gonzalez BSW  Sign     TK1.1 11/24/2017  2:28 PM Celia Gonzalez BSW              Progress Notes by Mike Parekh MD at 11/24/2017  6:02 AM     Author:  Mike Parekh MD Service:  Neurosurgery Author Type:  Resident    Filed:  11/24/2017  6:05 AM Date of Service:   "11/24/2017  6:02 AM Creation Time:  11/24/2017  6:02 AM    Status:  Attested :  Mike Parekh MD (Resident)    Cosigner:  Enma López MD at 11/24/2017  1:41 PM        Attestation signed by Enma López MD at 11/24/2017  1:41 PM        Attestation:  Physician Attestation   I, Enma López, personally examined and evaluated this patient.  I discussed the patient with the resident and care team, and agree with the assessment and plan of care as documented in the resident s note of 11/24/17     I personally reviewed vital signs, medications and labs.    Key findings:   Strength and sensation stable  Has diffuse \"body aches\" for which we are trying a very low dose of flexeril 2.5mg prn, since oxycodone is only marginally helpful. If confusion results we will need to stop the flexeril.    Complaints of pain have coincided with family members leaving bedside. Nursing has been very attentive and has repositioned patient several times within the last hour and continues to closely care for patient.    If no improvement in PO intake by Monday, will need to consider PEG tube placement, because cannot discharge with nasojejunal tube feeds, and if PO intake is not adequate, due to cachexia preoperatively the patient is at high risk of surgical wound dehiscence and nonunion.        Enma López  Date of Service (when I saw the patient): 11/24/17                               Neurosurgery Daily Progress Note  11/24/2017    Overnight events/subjective: No delirium. Continuing to work with PT/OT/SLP.    O/ /62 (BP Location: Right arm)  Pulse 74  Temp 98.2  F (36.8  C) (Axillary)  Resp 16  Ht 1.499 m (4' 11\")  Wt 43.6 kg (96 lb 1.9 oz)  SpO2 92%  BMI 19.41 kg/m2    Exam:   Gen: Laying in bed, comfortable appearing  MS: A&Ox3, Speech fluent and conversant   CN: Pupils round and reactive, extraocular movements intact, face symmetric, tongue midline, uvula & " palate elevate symmetrically   Motor:      Delt Bi Tri WE WF    R 1 4- 4+ 4+ 4 4   L 2 4- 4+ 4+ 4 4     IP Quad Ham DF PF EHL   R 3 4+ 4+ 4+ 4+ 4+   L 3 4+ 4+ 4+ 4+ 4+     Sensory: patient endorses intact light touch sensation, but exhibits extinction of left    Non labored breathing    LABS: reviewed     A/P: Yuni Otoole is a 82 year old POD#9 s/p C1-C3 laminectomies, C2-T3 posterior segmental instrumentation, C2/3, 4/5, 6/7 facette osteotomies. C2/3, 4/5, 6/7 ACDF. C2-T3 posterior fusion w/ autograft and allograft with reduction of cervical spine.     - Neuro checks  - CTP brace; remove front for PO intake  - Following Na, no salt tabs yesterday per Endocrine; will f/u on AM Na.  - Sutures to be left in for 4 weeks  - Post-op images completed, with brace on, upright/sitting  - Diet: Tube feeds (holding today) + DD3; nutrition following. Continued calorie counts. Nocturnal TFs.   - Remove CVC  - Padilla back in due to high PVRs  - SCDs & SQH for DVT ppx  - PO diet for GI ppx  - PT/OT: TCU    Dispo: Anticipated d/c 11/24. Barriers: tolerating adequate PO diet to meet nutritional needs, rehab placement.      Please contact the neurosurgery resident on call with questions by dialing * [phone], then entering [phone] when prompted[BL1.1]           Revision History        User Key Date/Time User Provider Type Action    > BL1.1 11/24/2017  6:05 AM Mike Parekh MD Resident Sign            Progress Notes by Gunjan Ann MD at 11/23/2017  2:31 PM     Author:  Gunjan Ann MD Service:  Endocrinology Author Type:  Physician    Filed:  11/24/2017  9:57 AM Date of Service:  11/23/2017  2:31 PM Creation Time:  11/23/2017  2:31 PM    Status:  Signed :  Gunjan Ann MD (Physician)         Received a call from primary team regarding this patient. 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   Na 131 this am. On 1000 water restriction.  No  clinical changes per report.  Recommendation given to continue water restriction for now and monitor Na in am.  If Na continues to drop will consider restarting the Na tabs.[KZ1.1]   Janny Martínez[KZ1.2]  Endocrine Fellow.[KZ1.1]   --- Addendum----  I discussed plan the patient with endocrine fellow Dr. Martínez. Agree above note and plan.       Gunjan Ann MD  Staff Physician  Endocrinology and Metabolism  [facility]  License: [id number]  Pager: [phone][TA1.1]       Revision History        User Key Date/Time User Provider Type Action    > TA1.1 11/24/2017  9:57 AM Gunjan Ann MD Physician Sign     KZ1.2 11/23/2017  2:34 PM Janny Martínez MD Fellow Sign     KZ1.1 11/23/2017  2:31 PM Janny Martínez MD Fellow             Progress Notes by Gunjan Ann MD at 11/22/2017  8:46 AM     Author:  Gunjan Ann MD Service:  Endocrinology Author Type:  Physician    Filed:  11/24/2017  9:29 AM Date of Service:  11/22/2017  8:46 AM Creation Time:  11/22/2017  8:46 AM    Status:  Signed :  Gunjan Ann MD (Physician)         Brief Endocrinology Note:    Patient's chart was reviewed. We did not see the patient today. Sodium 137. 25 Vit D total came back <20 (prior to initiation of replacement). Of note, her calcium is low at 6.9, was 8.8 the day prior. She is on calcium replacement 1250 calcium carbonate BID. Will check albumin with BMP in the AM and replace calcium prn.    Plan:  1) repeat Ca level and Albumin in the AM  2) water restriction 1000 cc/day  3) Stop salt tabs  4) 3000 U liquid vit D daily  5) 1250 mg liquid calcium carbonate BID     Chris Garcia MD (PGY-4)  Diabetes and Endocrinology Fellow  [facility][RL1.1]    --- Addendum----  I discussed the patient with Dr. Garcia. Agree above note and plan.     Gunjan Ann MD  Staff Physician  Endocrinology and Metabolism  [facility]  License: [id number]  Pager: [phone][TA1.1]        Revision History        User Key Date/Time User Provider Type Action    > TA1.1 11/24/2017  9:29 AM Gunjan Ann MD Physician Sign     RL1.1 11/22/2017  8:53 AM Chris Garcia MD Fellow Sign            Progress Notes by Mariana Shen at 11/24/2017  9:01 AM     Author:  Mariana Shen Service:  Nutrition Author Type:  Nutrition Associate    Filed:  11/24/2017  9:03 AM Date of Service:  11/24/2017  9:01 AM Creation Time:  11/24/2017  9:01 AM    Status:  Signed :  Mariana Shen (Nutrition Associate)         Calorie Count  Intake recorded for 11/23 Kcals: 1024  Protein: 58g  # Meals Recorded 3 meals  # Supplements Recorded 25% magic cup[AF1.1]    Mariana Shen[AF1.2]  Nutrition Associate[AF1.1]         Revision History        User Key Date/Time User Provider Type Action    > AF1.2 11/24/2017  9:03 AM Mariana Shen Nutrition  Sign     AF1.1 11/24/2017  9:01 AM Mariana Shen Nutrition                       Procedure Notes      Procedures by Kelly Rainey RD at 11/16/2017 10:50 AM     Author:  Kelly Rainey RD Service:  Nutrition Author Type:  Registered Dietitian    Filed:  11/16/2017 10:50 AM Date of Service:  11/16/2017 10:50 AM Creation Time:  11/16/2017 10:49 AM    Status:  Signed :  Kelly Rainey RD (Registered Dietitian)         Bridle Placement:   Reason for bridle placement: Securement of feeding tube requested by RN  Medicine delivered during procedure: lubricating jelly   Procedure: Successful   Location of top of clip on FT: @ 86 cm marker   Condition of nose/skin at time of bridle placement: Unremarkable  Face to Face time with patient: <5  minutes.[EH1.1]             Revision History        User Key Date/Time User Provider Type Action    > EH1.1 11/16/2017 10:50 AM Kelly Rainey RD Registered Dietitian Sign            Procedures by Kelly Rainey RD at 11/16/2017 10:49 AM     Author:  Kelly Rainey RD Service:  Nutrition Author Type:   Registered Dietitian    Filed:  11/16/2017 10:49 AM Date of Service:  11/16/2017 10:49 AM Creation Time:  11/16/2017 10:48 AM    Status:  Signed :  Kelly Rainey RD (Registered Dietitian)         Small Bowel Feeding Tube Placement Assessment  Reason for Feeding Tube Placement: MD request for post pyloric feeding tube  Cortrak Start Time:10:21   Cortrak End Time: 10:29  Medicine Delivered During Procedure: Lidocaine  Placement Successful: Presume post-pyloric (pending AXR confirmation).  Procedure Complications: None  Final Placement Chandrakant at exit of nare 85 cm  Face to Face time with patient: 15 minutes      Kelly Rainey RD, MS, LD[EH1.1]         Revision History        User Key Date/Time User Provider Type Action    > EH1.1 11/16/2017 10:49 AM Kelly Rainey RD Registered Dietitian Sign                  Progress Notes - Therapies (Notes from 11/24/17 through 11/27/17)     No notes of this type exist for this encounter.

## 2017-11-10 NOTE — IP AVS SNAPSHOT
"    [address] [facility] BANK: [phone]                                              INTERAGENCY TRANSFER FORM - LAB / IMAGING / EKG / EMG RESULTS   11/10/2017                    Hospital Admission Date: 11/10/2017  RYDER KULKARNI   : 1935  Sex: Female        Attending Provider: Enma López MD     Allergies:  Atorvastatin Calcium    Infection:  None   Service:  NEUROSURGERY    Ht:  1.499 m (4' 11\")   Wt:  42 kg (92 lb 9.5 oz)   Admission Wt:  36.5 kg (80 lb 8 oz)    BMI:  18.7 kg/m 2   BSA:  1.32 m 2            Patient PCP Information     Provider PCP Type    Jonathan Vincent MD, MD General         Lab Results - 3 Days      Prealbumin [[id number]]  Resulted: 17 1133, Result status: Final result    Ordering provider: Taye Andrews MD  17 0000 Resulting lab: [facility]    Specimen Information    Type Source Collected On   Blood  17 08          Components       Value Reference Range Flag Lab   Prealbumin 18 15 - 45 mg/dL  51            Sodium [[id number]]  Resulted: 17 0854, Result status: Final result    Ordering provider: Taye Andrews MD  17 0000 Resulting lab: [facility]    Specimen Information    Type Source Collected On   Blood  17 08          Components       Value Reference Range Flag Lab   Sodium 134 133 - 144 mmol/L  51            Albumin level [[id number]] (Abnormal)  Resulted: 17 0854, Result status: Final result    Ordering provider: Taye Andrews MD  17 0000 Resulting lab: [facility]    Specimen Information    Type Source Collected On   Blood  17 08          Components       Value Reference Range Flag Lab   Albumin 2.5 3.4 - 5.0 g/dL L 51            Sodium [[id number]] (Abnormal)  Resulted: 17 1829, Result status: Final result    Ordering provider: Mike Parekh MD  17 1000 " Resulting lab: [facility]    Specimen Information    Type Source Collected On   Blood  11/26/17 1757          Components       Value Reference Range Flag Lab   Sodium 132 133 - 144 mmol/L L 51            Basic metabolic panel [[id number]] (Abnormal)  Resulted: 11/26/17 1023, Result status: Final result    Ordering provider: Mike Parekh MD  11/25/17 2200 Resulting lab: [facility]    Specimen Information    Type Source Collected On   Blood  11/26/17 0941          Components       Value Reference Range Flag Lab   Sodium 132 133 - 144 mmol/L L 51   Potassium 4.3 3.4 - 5.3 mmol/L  51   Chloride 96 94 - 109 mmol/L  51   Carbon Dioxide 30 20 - 32 mmol/L  51   Anion Gap 6 3 - 14 mmol/L  51   Glucose 86 70 - 99 mg/dL  51   Urea Nitrogen 18 7 - 30 mg/dL  51   Creatinine 0.35 0.52 - 1.04 mg/dL L 51   GFR Estimate >90 >60 mL/min/1.7m2  51   Comment:  Non  GFR Calc   GFR Estimate If Black >90 >60 mL/min/1.7m2  51   Comment:  African American GFR Calc   Calcium 8.8 8.5 - 10.1 mg/dL  51            Sodium [[id number]] (Abnormal)  Resulted: 11/25/17 1624, Result status: Final result    Ordering provider: Mike Parekh MD  11/25/17 1000 Resulting lab: [facility]    Specimen Information    Type Source Collected On   Blood  11/25/17 1557          Components       Value Reference Range Flag Lab   Sodium 132 133 - 144 mmol/L L 51            Urine Culture Aerobic Bacterial [[id number]] (Abnormal)  Resulted: 11/25/17 1334, Result status: Final result    Ordering provider: Enma López MD  11/17/17 1416 Resulting lab: [facility]    Specimen Information    Type Source Collected On   Catheterized Urine Urine catheter 11/24/17 1730          Components       Value Reference Range Flag Lab   Specimen Description Catheterized Urine      Special Requests Specimen received in  preservative   75   Culture Micro --  A 225   Result:         10,000 to 50,000 colonies/mL  Candida albicans / dubliniensis              Basic metabolic panel [[id number]] (Abnormal)  Resulted: 11/25/17 0941, Result status: Final result    Ordering provider: Mike Parekh MD  11/24/17 2200 Resulting lab: [facility]    Specimen Information    Type Source Collected On   Blood  11/25/17 0909          Components       Value Reference Range Flag Lab   Sodium 132 133 - 144 mmol/L L 51   Potassium 4.3 3.4 - 5.3 mmol/L  51   Chloride 97 94 - 109 mmol/L  51   Carbon Dioxide 27 20 - 32 mmol/L  51   Anion Gap 8 3 - 14 mmol/L  51   Glucose 122 70 - 99 mg/dL H 51   Urea Nitrogen 22 7 - 30 mg/dL  51   Creatinine 0.37 0.52 - 1.04 mg/dL L 51   GFR Estimate >90 >60 mL/min/1.7m2  51   Comment:  Non  GFR Calc   GFR Estimate If Black >90 >60 mL/min/1.7m2  51   Comment:  African American GFR Calc   Calcium 8.4 8.5 - 10.1 mg/dL L 51            Osmolality urine [[id number]]  Resulted: 11/24/17 2006, Result status: Final result    Ordering provider: Janny Martínez MD  11/24/17 1309 Resulting lab: [facility]    Specimen Information    Type Source Collected On   Urine Urine catheter 11/24/17 1730          Components       Value Reference Range Flag Lab   Urine Osmolality 665 100 - 1200 mmol/kg  51            Sodium [[id number]]  Resulted: 11/24/17 1645, Result status: Final result    Ordering provider: Mike Parekh MD  11/24/17 1000 Resulting lab: [facility]    Specimen Information    Type Source Collected On   Blood  11/24/17 1602          Components       Value Reference Range Flag Lab   Sodium 133 133 - 144 mmol/L  51            25 Hydroxyvitamin D2 and D3 [[id number]]  Resulted: 11/24/17 1435, Result status: Final result    Ordering provider: Giulia Chang MD  11/20/17 1835 Resulting lab: [place]  [facility]    Specimen Information    Type Source Collected On   Blood  11/20/17 1927          Components       Value Reference Range Flag Lab   25 OH Vit D2 <5 ug/L  51   25 OH Vit D3 21 ug/L  51   25 OH Vit D total <26 20 - 75 ug/L  51   Comment:         Season, race, dietary intake, and treatment affect the concentration of   25-hydroxy-Vitamin D. Values may decrease during winter months and increase   during summer months. Values 20-29 ug/L may indicate Vitamin D insufficiency   and values <20 ug/L may indicate Vitamin D deficiency.  This test was developed and its performance characteristics determined by the   [facility],  [facility] Laboratory. It has   not been cleared or approved by the FDA. The laboratory is regulated under   CLIA as qualified to perform high-complexity testing. This test is used for   clinical purposes. It should not be regarded as investigational or for   research.              Basic metabolic panel [[id number]] (Abnormal)  Resulted: 11/24/17 0828, Result status: Final result    Ordering provider: Mike Praekh MD  11/23/17 2200 Resulting lab: [facility]    Specimen Information    Type Source Collected On   Blood  11/24/17 0758          Components       Value Reference Range Flag Lab   Sodium 132 133 - 144 mmol/L L 51   Potassium 4.1 3.4 - 5.3 mmol/L  51   Chloride 98 94 - 109 mmol/L  51   Carbon Dioxide 28 20 - 32 mmol/L  51   Anion Gap 6 3 - 14 mmol/L  51   Glucose 118 70 - 99 mg/dL H 51   Urea Nitrogen 22 7 - 30 mg/dL  51   Creatinine 0.40 0.52 - 1.04 mg/dL L 51   GFR Estimate >90 >60 mL/min/1.7m2  51   Comment:  Non  GFR Calc   GFR Estimate If Black >90 >60 mL/min/1.7m2  51   Comment:  African American GFR Calc   Calcium 8.3 8.5 - 10.1 mg/dL L 51            Testing Performed By     Lab - Abbreviation Name Director Address Valid Date Range    51 - Unknown [facility]  "[facility] Unknown [address] [address] 12/31/14 1010 - Present    75 - Unknown [facility] Unknown [address] [facility] [address] 01/15/15 1019 - Present    225 - Unknown [facility] Unknown [address] [address] 12/19/14 0954 - Present            Unresulted Labs (24h ago through future)    Start       Ordered    Unscheduled  Potassium  (Potassium Replacement - \"High\" - Replacement for all levels less than 4.1 mmol/L - UU,UR,UA,RH,SH,PH,WY )  CONDITIONAL (SPECIFY),   Routine     Comments:  Obtain Potassium Level for these conditions:  *IF no potassium result within 24 hrs before initiation of order set, draw potassium level with next lab collect.    *2 HOURS AFTER last IV potassium replacement dose and 4 hours after an oral replacement dose when potassium replacement given for level less than 3.4.  *Next morning after potassium dose.     Repeat Potassium Replacement if necessary.    11/11/17 0154    Unscheduled  Magnesium  (Magnesium Replacement - Adult - \"High\" - Replacement for all levels less than or equal to 2 mg/dL)  CONDITIONAL (SPECIFY),   Routine     Comments:  Obtain Magnesium Level for these conditions:  *IF no magnesium result within 24 hrs before initiation of order set, draw magnesium level with next lab collect.    *2 HOURS AFTER last magnesium replacement dose when magnesium replacement given for level less than 1.6  *Next morning after magnesium dose.     Repeat Magnesium Replacement if necessary.    11/11/17 0154    Unscheduled  Phosphorus  (POTASSIUM Phosphate - \"High\" - Replacement for all levels less than 2.8 mg/dL )  CONDITIONAL (SPECIFY),   Routine     Comments:  Obtain Phosphorus Level for these conditions:  *IF no phosphorus result within 24 hrs before initiation of order set, draw phosphorus level with next lab collect.    *2 HOURS AFTER last phosphorus replacement dose when " phosphorus replacement given for level less than 2.0  *Next morning after phosphorus dose.     Repeat Phosphorus Replacement if necessary.    11/11/17 0154      Encounter-Level Documents:     There are no encounter-level documents.      Order-Level Documents:     There are no order-level documents.

## 2017-11-10 NOTE — IP AVS SNAPSHOT
` `     [address] [facility] BANK: [phone]            Medication Administration Report for Yuni Otoole as of 11/27/17 1336   Legend:    Given Hold Not Given Due Canceled Entry Other Actions    Time Time (Time) Time  Time-Action       Inactive    Active    Linked        Medications 11/21/17 11/22/17 11/23/17 11/24/17 11/25/17 11/26/17 11/27/17    acetaminophen (TYLENOL) tablet 650 mg  Dose: 650 mg Freq: EVERY 4 HOURS Route: PO  Start: 11/26/17 0030   Admin Instructions: Maximum acetaminophen dose from all sources = 75 mg/kg/day not to exceed 4 grams/day.          0009 (650 mg)-Given       0533 (650 mg)-Given       0947 (650 mg)-Given       1244 (650 mg)-Given       1643 (650 mg)-Given       2111 (650 mg)-Given        0142 (650 mg)-Given       (0510)-Not Given [C]       0808 (650 mg)-Given       [ ] 1300       [ ] 1700       [ ] 2100           ascorbic acid (VITAMIN C) tablet 1,000 mg  Dose: 1,000 mg Freq: DAILY Route: ORAL OR FEED  Start: 11/21/17 0800    0939 (1,000 mg)-Given        0858 (1,000 mg)-Given        0908 (1,000 mg)-Given        0831 (1,000 mg)-Given        0924 (1,000 mg)-Given        0946 (1,000 mg)-Given        0808 (1,000 mg)-Given           bisacodyl (DULCOLAX) Suppository 10 mg  Dose: 10 mg Freq: DAILY Route: RE  Start: 11/17/17 1445    (1117)-Not Given [C]        (0859)-Not Given        (0943)-Not Given [C]        (0832)-Not Given        (1043)-Not Given        (0947)-Not Given        (0802)-Not Given           calcium carbonate (OS-ANNETTA 500 mg [place]. Ca) tablet 1,250 mg  Dose: 1,250 mg Freq: 2 TIMES DAILY WITH MEALS Route: PO  Start: 11/25/17 2015   Admin Instructions: OS-ANNETTA 500 = 1250 mg calcium carbonate         (2213)-Not Given [C]        0947 (1,250 mg)-Given       1902 (1,250 mg)-Given        0808 (1,250 mg)-Given       [ ] 1800           cholecalciferol (vitamin D/D-VI-SOL) liquid 3,000 Units  Dose: 3,000 Units Freq: DAILY Route: ORAL OR FEED  Start: 11/21/17 0800    0937 (3,000  Units)-Given        0858 (3,000 Units)-Given        0908 (3,000 Units)-Given        0832 (3,000 Units)-Given        (0922)-Not Given        0946 (3,000 Units)-Given        0904 (3,000 Units)-Given           cyclobenzaprine (FLEXERIL) solution 2.5 mg  Dose: 2.5 mg Freq: 3 TIMES DAILY PRN Route: PO  PRN Reason: muscle spasms  Start: 11/24/17 1337   Admin Instructions: Shake well        1639 (2.5 mg)-Given              dextrose 10 % 1,000 mL infusion  Freq: CONTINUOUS PRN Route: IV  PRN Comment: Hypoglycemia prevention  Start: 11/16/17 0925   Admin Instructions: For Hypoglycemia Prevention for patients on long-acting subcutaneous basal insulin (Glargine, Detemir, NPH) or continuous insulin infusion. Whenever nutrition support is held or interrupted:   1) Infuse IV D10W at nutrition support rate  2) Notify provider for further instructions               heparin lock flush 10 UNIT/ML injection 5-10 mL  Dose: 5-10 mL Freq: EVERY 1 HOUR PRN Route: IK  PRN Reason: other  PRN Comment: to lock each CVC - Open Ended (Tunneled and Non-Tunneled) dormant lumen.  Start: 11/17/17 2226   Admin Instructions: MAX: 5 mL per lumen.     0837 (5 mL)-Given       1539 (5 mL)-Given [C]       2224 (5 mL)-Given         0021 (5 mL)-Given [C]       1356 (5 mL)-Given        2256 (5 mL)-Given         1754 (5 mL)-Given            heparin lock flush 10 UNIT/ML injection 5-10 mL  Dose: 5-10 mL Freq: EVERY 24 HOURS Route: IK  Start: 11/17/17 2230   Admin Instructions: To lock each CVC - Open Ended (Tunneled and Non-Tunneled) dormant lumen. Check PRN heparin flush order to see when last dose of PRN heparin was given before administering.  MAX: 5 mL per lumen..     2220 (5 mL)-Given        2212 (5 mL)-Given        2136 (10 mL)-Given        2255 (10 mL)-Given        0910 (5 mL)-Given       2138 (5 mL)-Given [C]        0938 (5 mL)-Given       (2235)-Not Given [C]        [ ] 2230           heparin sodium PF injection 5,000 Units  Dose: 5,000 Units Freq:  EVERY 8 HOURS Route: SC  Start: 11/19/17 0045   Admin Instructions: High concentration HEParin. Not for line flush or cath care.     0500 (5,000 Units)-Given       1353 (5,000 Units)-Given       2021 (5,000 Units)-Given        0444 (5,000 Units)-Given       1118 (5,000 Units)-Given       2016 (5,000 Units)-Given        0339 (5,000 Units)-Given       1204 (5,000 Units)-Given       2122 (5,000 Units)-Given        0411 (5,000 Units)-Given       1155 (5,000 Units)-Given       1959 (5,000 Units)-Given        0334 (5,000 Units)-Given       1331 (5,000 Units)-Given       2001 (5,000 Units)-Given        0325 (5,000 Units)-Given       1244 (5,000 Units)-Given       2110 (5,000 Units)-Given        0506 (5,000 Units)-Given       [ ] 1200       [ ] 2000           hydrALAZINE (APRESOLINE) injection 10-20 mg  Dose: 10-20 mg Freq: EVERY 30 MIN PRN Route: IV  PRN Reason: high blood pressure  Start: 11/17/17 1911   Admin Instructions: IF Heart Rate less than 60 initiate hydrALAZINE (APRESOLINE) for hypertension. For Systolic Blood Pressure greater than 150 mmHg. Give 10 mg, wait 30 minutes. If not effective then repeat 10 mg. Wait 30 minutes. If not effective then give 20 mg. If still not effective then start niCARdipine (CARDENE) IV infusion IF ORDERED. Notify provider within 1 hour if Blood Pressure parameters are not met.  For ordered doses up to 40 mg, give IV Push undiluted over 1 minute.               labetalol (NORMODYNE/TRANDATE) injection 10 mg  Dose: 10 mg Freq: EVERY 1 HOUR PRN Route: IV  PRN Reason: high blood pressure  PRN Comment: For SBP > 150 mmHg and HR  > 60 bpm  Start: 11/17/17 1911   Admin Instructions: If heart rate greater than or equal to 60 bpm use labetalol first, if heart rate is less than 60 bpm use hydralazine first.    For ordered doses up to 80 mg, give IV Push undiluted. Give each 20 mg over 2 minutes.     0107 (10 mg)-Given                 magnesium sulfate 2 g in NS intermittent infusion (PharMEDium  or FV Cmpd)  Dose: 2 g Freq: DAILY PRN Route: IV  PRN Reason: magnesium supplementation  Start: 11/11/17 0154   Admin Instructions: For Serum Mg++ 1.6 - 2 mg/dL  Give 2 g and recheck magnesium level next AM.     1629 (2 g)-New Bag                 magnesium sulfate 4 g in 100 mL sterile water (premade)  Dose: 4 g Freq: EVERY 4 HOURS PRN Route: IV  PRN Reason: magnesium supplementation  Last Dose: 4 g (11/15/17 2317)  Start: 11/11/17 0154   Admin Instructions: For serum Mg++ less than 1.6 mg/dL  Give 4 g and recheck magnesium level 2 hours after dose, and next AM.               metoprolol (LOPRESSOR) half-tab 12.5 mg  Dose: 12.5 mg Freq: 2 TIMES DAILY Route: PO  Start: 11/25/17 2015        (2214)-Not Given [C]        0945 (12.5 mg)-Given       2111 (12.5 mg)-Given        0904 (12.5 mg)-Given       [ ] 2000           multivitamins with minerals (CERTAVITE/CEROVITE) liquid 15 mL  Dose: 15 mL Freq: DAILY Route: PER FEEDING   Start: 11/16/17 0930    0939 (15 mL)-Given        0857 (15 mL)-Given        0908 (15 mL)-Given        0832 (15 mL)-Given        0923 (15 mL)-Given        0944 (15 mL)-Given        0904 (15 mL)-Given           naloxone (NARCAN) injection 0.1-0.4 mg  Dose: 0.1-0.4 mg Freq: EVERY 2 MIN PRN Route: IV  PRN Reason: opioid reversal  Start: 11/11/17 0000   Admin Instructions: For respiratory rate LESS than or EQUAL to 8.  Partial reversal dose:  0.1 mg titrated q 2 minutes for Analgesia Side Effects Monitoring Sedation Level of 3 (frequently drowsy, arousable, drifts to sleep during conversation).Full reversal dose:  0.4 mg bolus for Analgesia Side Effects Monitoring Sedation Level of 4 (somnolent, minimal or no response to stimulation).  Give IV Push undiluted up to 2mg. Give each 0.4mg over 15 seconds in emergency situations. For non-emergent situations further dilute in 9mL of NS to facilitate titration of response.               ondansetron (ZOFRAN-ODT) ODT tab 4-8 mg  Dose: 4-8 mg Freq: EVERY 6 HOURS  PRN Route: PO  PRN Reasons: nausea,vomiting  Start: 11/15/17 2215   Admin Instructions: This is Step 1 of nausea and vomiting management.  If nausea not resolved in 15 minutes, go to Step 2 prochlorperazine (COMPAZINE). Do not push through foil backing. Peel back foil and gently remove. Place on tongue immediately. Administration with liquid unnecessary      0916 (8 mg)-Given         0846 (8 mg)-Given          0945 (4 mg)-Given          Or  ondansetron (ZOFRAN) injection 4-8 mg  Dose: 4-8 mg Freq: EVERY 6 HOURS PRN Route: IV  PRN Reasons: nausea,vomiting  Start: 11/15/17 2215   Admin Instructions: This is Step 1 of nausea and vomiting management.  If nausea not resolved in 15 minutes, go to Step 2 prochlorperazine (COMPAZINE).  Irritant. For ordered doses up to 4 mg, give IV Push undiluted over 2-5 minutes.                                    oxyCODONE IR (ROXICODONE) tablet 5 mg  Dose: 5 mg Freq: EVERY 3 HOURS PRN Route: PO  PRN Reason: moderate to severe pain  Start: 11/25/17 2300         0009 (5 mg)-Given       (0325)-Not Given [C]       0529 (5 mg)-Given       0944 (5 mg)-Given       1244 (5 mg)-Given       1643 (5 mg)-Given       2103 (5 mg)-Given        0325 (5 mg)-Given       0639 (5 mg)-Given       1045 (5 mg)-Given           polyethylene glycol (MIRALAX/GLYCOLAX) Packet 17 g  Dose: 17 g Freq: 3 TIMES DAILY PRN Route: ORAL OR FEED  PRN Reason: constipation  Start: 11/20/17 2142   Admin Instructions: 1 Packet = 17 grams. Mixed prescribed dose in 8 ounces of water. Follow with 8 oz. of water.               potassium chloride (KLOR-CON) Packet 20-40 mEq  Dose: 20-40 mEq Freq: EVERY 2 HOURS PRN Route: ORAL OR FEED  PRN Reason: potassium supplementation  Start: 11/11/17 0153   Admin Instructions: Use if unable to tolerate tablets.    If Serum K+ 3.4-4.0, dose = 20 mEq x1. Recheck K+ level the next AM.  If Serum K+ 3.0-3.3, dose = 60 mEq po total dose (40 mEq x 1 followed in 2 hours by 20 mEq X1). Recheck K+ level  4 hours after dose and the next AM.  If Serum K+ 2.5-2.9, dose = 80 mEq po total dose (40 mEq Q2H x2). Recheck K+ level 4 hours after dose and the next AM.  If Serum K+ less than 2.5, See IV order.  Dissolve packet contents in 4-8 ounces of cold water or juice.      0857 (40 mEq)-Given       1155 (20 mEq)-Given                potassium chloride 10 mEq in 100 mL intermittent infusion with 10 mg lidocaine  Dose: 10 mEq Freq: EVERY 1 HOUR PRN Route: IV  PRN Reason: potassium supplementation  Start: 11/11/17 0153   Admin Instructions: Infuse via PERIPHERAL LINE. Use potassium with lidocaine for pain with peripheral administration.  If Serum K+ 3.4-4.0, dose = 10 mEq/hr x2 doses. Recheck K+ level the next AM.  If Serum K+ 3.0-3.3, dose = 10 mEq/hr x4 doses (40 mEq IV total dose). Recheck K+ level 2 hours after dose and the next AM.  If Serum K+ less than 3.0, dose = 10 mEq/hr x6 doses (60 mEq IV total dose). Recheck K+ level 2 hours after dose and the next AM.               potassium chloride 10 mEq in 100 mL sterile water intermittent infusion (premix)  Dose: 10 mEq Freq: EVERY 1 HOUR PRN Route: IV  PRN Reason: potassium supplementation  Start: 11/11/17 0153   Admin Instructions: Infuse via PERIPHERAL LINE or CENTRAL LINE. Use for central line replacement if patient weight less than 65 kg, if patient is on TPN with high potassium content or if unit does not stock 20 mEq bags.  If Serum K+ 3.4-4.0, dose = 10 mEq/hr x2 doses. Recheck K+ level the next AM.  If Serum K+ 3.0-3.3, dose = 10 mEq/hr x4 doses (40 mEq IV total dose). Recheck K+ level 2 hours after dose and the next AM.  If Serum K+ less than 3.0, dose = 10 mEq/hr x6 doses (60 mEq IV total dose). Recheck K+ level 2 hours after dose and the next AM.               potassium chloride SA (K-DUR/KLOR-CON M) CR tablet 20-40 mEq  Dose: 20-40 mEq Freq: EVERY 2 HOURS PRN Route: PO  PRN Reason: potassium supplementation  Start: 11/20/17 2148   Admin Instructions: Use if  able to take PO.   If Serum K+ 3.4-4.0, dose = 20 mEq x1. Recheck K+ level the next AM.  If Serum K+ 3.0-3.3, dose = 60 mEq po total dose (40 mEq x1 followed in 2 hours by 20 mEq x1). Recheck K+ level 4 hours after dose and the next AM.  If Serum K+ 2.5-2.9, dose = 80 mEq po total dose (40 mEq Q2H x2). Recheck K+ level 4 hours after dose and the next AM.  If Serum K+ less than 2.5, See IV order.  DO NOT CRUSH.               potassium phosphate 10 mmol in D5W 250 mL intermittent infusion  Dose: 10 mmol Freq: DAILY PRN Route: IV  PRN Reason: phosphorous supplementation  Start: 11/11/17 0154   Admin Instructions: For serum phosphorus level 2.5-2.7  Do not infuse Phosphorus in the same line as TPN.   Give 10 mmol and recheck phosphorus level the next AM.               potassium phosphate 15 mmol in D5W 250 mL intermittent infusion  Dose: 15 mmol Freq: DAILY PRN Route: IV  PRN Reason: phosphorous supplementation  Last Dose: 15 mmol (11/16/17 0228)  Start: 11/11/17 0154   Admin Instructions: For serum phosphorus level 2.0-2.4  Do not infuse Phosphorus in the same line as TPN.   Give 15 mmol and recheck phosphorus level next AM.               potassium phosphate 20 mmol in D5W 250 mL intermittent infusion  Dose: 20 mmol Freq: EVERY 6 HOURS PRN Route: IV  PRN Reason: phosphorous supplementation  Start: 11/11/17 0154   Admin Instructions: For serum phosphorus level 1.1-1.9  For CENTRAL Line ONLY  Do not infuse Phosphorus in the same line as TPN.   Give 20 mmol and recheck phosphorus level 2 hours after dose and next AM.               potassium phosphate 20 mmol in D5W 500 mL intermittent infusion  Dose: 20 mmol Freq: EVERY 6 HOURS PRN Route: IV  PRN Reason: phosphorous supplementation  Start: 11/11/17 0154   Admin Instructions: For serum phosphorus level 1.1-1.9  For peripheral line  Do not infuse Phosphorus in the same line as TPN.   Give 20 mmol and recheck phosphorus level 2 hours after dose and next AM. Repeat if  necessary.               potassium phosphate 25 mmol in D5W 500 mL intermittent infusion  Dose: 25 mmol Freq: EVERY 8 HOURS PRN Route: IV  PRN Reason: phosphorous supplementation  Start: 11/11/17 0154   Admin Instructions: For serum phosphorus level less than 1.1  Do not infuse Phosphorus in the same line as TPN.   Give 25 mmol and recheck phosphorus level 2 hours after dose and next AM.               pravastatin (PRAVACHOL) tablet 10 mg  Dose: 10 mg Freq: AT BEDTIME Route: ORAL OR FEED  Start: 11/21/17 2200    2219 (10 mg)-Given        2212 (10 mg)-Given        2122 (10 mg)-Given        2255 (10 mg)-Given        2138 (10 mg)-Given        2111 (10 mg)-Given        [ ] 2200           prochlorperazine (COMPAZINE) injection 5 mg  Dose: 5 mg Freq: EVERY 6 HOURS PRN Route: IV  PRN Reasons: nausea,vomiting  Start: 11/20/17 2142   Admin Instructions: This is Step 2 of nausea and vomiting management. Give if nausea not resolved 15 minutes after giving ondansetron (ZOFRAN). If nausea not resolved in 15 minutes, go to Step 3 metoclopramide (REGLAN), if ordered.  For ordered doses up to 10 mg, give IV Push undiluted. Each 5mg over 1 minute.              Or  prochlorperazine (COMPAZINE) tablet 5 mg  Dose: 5 mg Freq: EVERY 6 HOURS PRN Route: ORAL OR NG T  PRN Reason: vomiting  Start: 11/20/17 2142   Admin Instructions: This is Step 2 of nausea and vomiting management. Give if nausea not resolved 15 minutes after giving ondansetron (ZOFRAN). If nausea not resolved in 15 minutes, go to Step 3 metoclopramide (REGLAN), if ordered.              Or  prochlorperazine (COMPAZINE) Suppository 12.5 mg  Dose: 12.5 mg Freq: EVERY 12 HOURS PRN Route: RE  PRN Reasons: nausea,vomiting  Start: 11/20/17 2142   Admin Instructions: This is Step 2 of nausea and vomiting management. Give if nausea not resolved 15 minutes after giving ondansetron (ZOFRAN). If nausea not resolved in 15 minutes, go to Step 3 metoclopramide (REGLAN), if ordered.                senna-docusate (SENOKOT-S;PERICOLACE) 8.6-50 MG per tablet 2 tablet  Dose: 2 tablet Freq: 2 TIMES DAILY Route: ORAL OR FEED  Start: 11/21/17 0800    0940 (2 tablet)-Given       2022 (1 tablet)-Given        0856 (2 tablet)-Given       (2157)-Not Given        (0943)-Not Given [C]       2137 (2 tablet)-Given        (0833)-Not Given       1959 (2 tablet)-Given        (1043)-Not Given       2001 (1 tablet)-Given [C]        (0947)-Not Given       (2111)-Not Given        (0802)-Not Given       [ ] 2000           sodium chloride (PF) 0.9% PF flush 10-20 mL  Dose: 10-20 mL Freq: EVERY 1 HOUR PRN Route: IK  PRN Reasons: line flush,post meds or blood draw  Start: 11/17/17 2226   Admin Instructions: to flush CVC - Open Ended (Tunneled and Non-Tunneled).   10 mL post IV meds; 20 mL post blood draw.     0836 (20 mL)-Given       1535 (20 mL)-Given [C]        0724 (20 mL)-Given        0828 (20 mL)-Given        1601 (20 mL)-Given        0909 (20 mL)-Given        0937 (20 mL)-Given       1753 (20 mL)-Given            sodium chloride tablet 1 g  Dose: 1 g Freq: 2 TIMES DAILY WITH MEALS Route: PO  Start: 11/24/17 1845       1907 (1 g)-Given        0923 (1 g)-Given       1858 (1 g)-Given        0946 (1 g)-Given       1902 (1 g)-Given        0904 (1 g)-Given       [ ] 1800           thiamine 100 MG/ML suspension 100 mg  Dose: 100 mg Freq: DAILY Route: ORAL OR FEED  Start: 11/24/17 0800       0832 (100 mg)-Given        0922 (100 mg)-Given        0946 (100 mg)-Given        0904 (100 mg)-Given           vitamin A capsule 10,000 Units  Dose: 10,000 Units Freq: DAILY Route: ORAL OR FEED  Start: 11/21/17 0800    (0946)-Not Given [C]        0858 (10,000 Units)-Given        0909 (10,000 Units)-Given        0829 (10,000 Units)-Given        (0923)-Not Given        0946 (10,000 Units)-Given        0808 (10,000 Units)-Given           vitamin D (ERGOCALCIFEROL) capsule 50,000 Units  Dose: 50,000 Units Freq: EVERY 7 DAYS Route: ORAL OR  FEED  Start: 11/25/17 0800        0924 (50,000 Units)-Given             zinc sulfate solution 220 mg  Dose: 220 mg Freq: 3 TIMES DAILY Route: ORAL OR FEED  Start: 11/21/17 0800   Admin Instructions: Dose expressed in mg zinc sulfate.     0938 (220 mg)-Given       1353 (220 mg)-Given       2021 (220 mg)-Given        0858 (220 mg)-Given       1406 (220 mg)-Given       2211 (220 mg)-Given        0908 (220 mg)-Given       1355 (220 mg)-Given       2123 (220 mg)-Given        0832 (220 mg)-Given       1524 (220 mg)-Given       1958 (220 mg)-Given        0922 (220 mg)-Given       1636 (220 mg)-Given       2004 (220 mg)-Given        0945 (220 mg)-Given       1437 (220 mg)-Given       2113 (220 mg)-Given        0904 (220 mg)-Given       [ ] 1400       [ ] 2000          Discontinued Medications  Medications 11/21/17 11/22/17 11/23/17 11/24/17 11/25/17 11/26/17 11/27/17         Dose: 650 mg Freq: EVERY 4 HOURS Route: PO  Start: 11/22/17 1400   End: 11/26/17 0004   Admin Instructions: Maximum acetaminophen dose from all sources= 75 mg/kg/day not to exceed 4 grams/day.      1406 (650 mg)-Given              (2011)-Not Given        (0248)-Not Given       0339 (650 mg)-Given       0908 (650 mg)-Given       1204 (650 mg)-Given       1649 (650 mg)-Given       2120 (650 mg)-Given        0057 (650 mg)-Given       0411 (650 mg)-Given       0831 (650 mg)-Given       1156 (650 mg)-Given       1639 (650 mg)-Given       1957 (650 mg)-Given       2356-Hold [C]        0335 (650 mg)-Given       1043 (650 mg)-Given       1636 (650 mg)-Given       1637 (650 mg)-Negative       2000 (650 mg)-Given               0004-Med Discontinued          Dose: 650 mg Freq: EVERY 4 HOURS PRN Route: ORAL OR FEED  PRN Reasons: mild pain,fever  Start: 11/20/17 2201   End: 11/26/17 0004   Admin Instructions: Maximum acetaminophen dose from all sources = 75 mg/kg/day not to exceed 4 grams/day.     1629 (650 mg)-Given       2224 (650 mg)-Given        0444 (650  mg)-Given       0857 (650 mg)-Given           0004-Med Discontinued          Dose: 1,250 mg Freq: 2 TIMES DAILY WITH MEALS Route: ORAL OR FEED  Start: 11/21/17 0800   End: 11/25/17 2009   Admin Instructions: Shake Well     0939 (1,250 mg)-Given       1800 (1,250 mg)-Given        0857 (1,250 mg)-Given       1810 (1,250 mg)-Given        0908 (1,250 mg)-Given       1820 (1,250 mg)-Given        0832 (1,250 mg)-Given       1905 (1,250 mg)-Given        0923 (1,250 mg)-Given       1858 (1,250 mg)-Given       2009-Med Discontinued           Dose: 5 mg Freq: 3 TIMES DAILY PRN Route: PO  PRN Reason: muscle spasms  Start: 11/24/17 1326   End: 11/24/17 1337   Admin Instructions: Shake well        1337-Med Discontinued            Dose: 12.5 mg Freq: 2 TIMES DAILY Route: ORAL OR FEED  Start: 11/21/17 0800   End: 11/25/17 2009   Admin Instructions: Shake well.     0938 (12.5 mg)-Given       2021 (12.5 mg)-Given        0857 (12.5 mg)-Given       2212 (12.5 mg)-Given        0908 (12.5 mg)-Given       2123 (12.5 mg)-Given        0831 (12.5 mg)-Given       1958 (12.5 mg)-Given        0922 (12.5 mg)-Given       2001 (12.5 mg)-Given       2009-Med Discontinued           Dose: 5 mg Freq: EVERY 3 HOURS PRN Route: PO  PRN Reason: moderate to severe pain  Start: 11/24/17 1215   End: 11/25/17 2029       1524 (5 mg)-Given       1905 (5 mg)-Given       2356 (5 mg)-Given        0334 (5 mg)-Given       0636 (5 mg)-Given       0922 (5 mg)-Given       1331 (5 mg)-Given       1636 (5 mg)-Given       2001 (5 mg)-Given       2029-Med Discontinued           Dose: 20 mEq Freq: EVERY 1 HOUR PRN Route: IV  PRN Reason: potassium supplementation  Last Dose: 20 mEq (11/16/17 0217)  Start: 11/11/17 0153   End: 11/25/17 7557   Admin Instructions: Infuse via CENTRAL LINE Only.  May need EKG if less than 65 kg or on TPN - Max rate is 0.3 mEq/kg/hr for patients not on EKG monitoring.    If Serum K+ 3.4-4.0, dose = 20 mEq/hr x1 doses. Recheck K+ level the next  AM.  If Serum K+ 3.0-3.3, dose = 20 mEq/hr x2 doses (40 mEq IV total dose).  Recheck K+ level 2 hours after dose and the next AM.  If Serum K+ less than 3.0, dose = 20 mEq/hr x3 doses (60 mEq IV total dose). Recheck K+ level 2 hours after dose and the next AM.         1645-Med Discontinued      Medications 11/21/17 11/22/17 11/23/17 11/24/17 11/25/17 11/26/17 11/27/17

## 2017-11-10 NOTE — IP AVS SNAPSHOT
[address] [address]    Phone:  [phone]                                       After Visit Summary   11/10/2017    Yuni Otoole    MRN: [id number]           After Visit Summary Signature Page     I have received my discharge instructions, and my questions have been answered. I have discussed any challenges I see with this plan with the nurse or doctor.    ..........................................................................................................................................  Patient/Patient Representative Signature      ..........................................................................................................................................  Patient Representative Print Name and Relationship to Patient    ..................................................               ................................................  Date                                            Time    ..........................................................................................................................................  Reviewed by Signature/Title    ...................................................              ..............................................  Date                                                            Time

## 2017-11-10 NOTE — IP AVS SNAPSHOT
"` `           [address] [facility]: [phone]                                              INTERAGENCY TRANSFER FORM - NURSING   11/10/2017                    Hospital Admission Date: 11/10/2017  RYDER KULKARNI   : 1935  Sex: Female        Attending Provider: Enma López MD     Allergies:  Atorvastatin Calcium    Infection:  None   Service:  NEUROSURGERY    Ht:  1.499 m (4' 11\")   Wt:  42 kg (92 lb 9.5 oz)   Admission Wt:  36.5 kg (80 lb 8 oz)    BMI:  18.7 kg/m 2   BSA:  1.32 m 2            Patient PCP Information     Provider PCP Type    Jonathan Vincent MD, MD General      Current Code Status     Date Active Code Status Order ID Comments User Context       Prior      Code Status History     Date Active Date Inactive Code Status Order ID Comments User Context    2017 12:31 PM  Full Code [id number]  Carina Beebe APRN CNP Outpatient    2016  7:36 PM 2016  5:20 PM Full Code [id number]  Jabier Abbasi MD Inpatient    11/3/2006  3:57 PM 11/3/2006  3:57 PM None None survey mailed 11-3-06 Belinda Chadwick Demographics      Advance Directives        Does patient have a scanned Advance Directive/ACP document in EPIC?           No        Hospital Problems as of 2017              Priority Class Noted POA    Cervical stenosis of spine Medium  11/10/2017 Yes      Non-Hospital Problems as of 2017              Priority Class Noted    Anxiety state Medium  10/19/2007    Scoliosis Medium  8/3/2009    Osteopenia Medium  2009    HYPERLIPIDEMIA LDL GOAL <130 Medium  10/31/2010    Advanced directives, counseling/discussion Medium  10/24/2011    Low back pain without sciatica Medium  2015    C7 cervical fracture (H) Medium  2016    Health Care Home Medium  2016    Benign essential hypertension Medium  10/17/2016    Neck pain Medium  10/11/2017      Immunizations     Name Date      Influenza (High Dose) 3 valent vaccine 10/11/17     Influenza " (High Dose) 3 valent vaccine 10/20/15     Influenza (High Dose) 3 valent vaccine 10/22/13     Influenza (High Dose) 3 valent vaccine 10/15/12     Influenza (High Dose) 3 valent vaccine 09/28/11     Pneumococcal 23 valent 10/24/11     TD (ADULT, 7+) 02/25/09     Zoster vaccine, live 02/04/13          END      ASSESSMENT     Discharge Profile Flowsheet     EXPECTED DISCHARGE     Inspection of bony prominences  Full 11/27/17 0834    Expected Discharge Date  11/17/17 11/22/17 0931   Inspection under devices  Full 11/27/17 0834    DISCHARGE NEEDS ASSESSMENT     Skin WDL  ex 11/27/17 0834    Equipment Currently Used at Home  walker, standard 11/18/17 1601   Skin Temperature  warm 11/27/17 0056    Transportation Available  car;family or friend will provide 11/18/17 1601   Skin Moisture  dry 11/27/17 0056    GASTROINTESTINAL (ADULT,PEDIATRIC,OB)     Skin Elasticity  quick return to original state 11/27/17 0056    GI WDL  WDL 11/27/17 0834   Skin Integrity  incision(s);bruise(s) 11/27/17 0834    Abdominal Appearance  contour irregular 11/22/17 1602   Full except areas not inspected   Coccyx;Sacrum;Buttock, right;Buttock, left 11/27/17 0056    All Quadrants Bowel Sounds  audible and normoactive 11/26/17 1725   Skin Color/Characteristics  bruised (ecchymotic);redness blanchable 11/27/17 0834    Last Bowel Movement  11/25/17 11/25/17 1954   SAFETY      GI Signs/Symptoms  fecal incontinence 11/22/17 1602   Safety WDL  WDL 11/27/17 0834    Passing flatus  yes 11/27/17 0834   All Alarms  alarm(s) activated and audible 11/27/17 0834    COMMUNICATION ASSESSMENT     Aspiration Risk Screen  swallowing difficulty 11/27/17 0105    Patient's communication style  spoken language (English or Bilingual) 11/10/17 1536   Airway Safety Measures  all equipment/monitors on and audible;suction equipment;oxygen flowmeter 11/27/17 0105    SKIN     Safety Equipment  oxygen flowmeter 11/27/17 0105                 Assessment WDL (Within Defined  "Limits) Definitions           Safety WDL     Effective: 09/28/15    Row Information: <b>WDL Definition:</b> Bed in low position, wheels locked; call light in reach; upper side rails up x 2; ID band on<br> <font color=\"gray\"><i>Item=AS safety wdl>>List=AS safety wdl>>Version=F14</i></font>      Skin WDL     Effective: 09/28/15    Row Information: <b>WDL Definition:</b> Warm; dry; intact; elastic; without discoloration; pressure points without redness<br> <font color=\"gray\"><i>Item=AS skin wdl>>List=AS skin wdl>>Version=F14</i></font>      Vitals     Vital Signs Flowsheet     COMMENTS     Body Movements  1 11/16/17 1247    Comments  -- 11/16/17 0531   Compliance w/ventilator (intubated patients)  Extubated 11/16/17 1247    VITAL SIGNS     Vocalization (extubated patients)  0 11/16/17 1247    Temp  97.3  F (36.3  C) 11/27/17 1245   Muscle Tension  1 11/16/17 1247    Temp src  Oral 11/27/17 1245   Total  3 11/16/17 1247    Resp  18 11/27/17 1245   ANALGESIA SIDE EFFECTS MONITORING      Pulse  77 11/27/17 1245   Side Effects Monitoring: Respiratory Quality  R 11/27/17 1315    Heart Rate  81 11/27/17 0828   Side Effects Monitoring: Respiratory Depth  N 11/27/17 1315    Pulse/Heart Rate Source  Monitor 11/27/17 0424   Side Effects Monitoring: Sedation Level  S 11/27/17 1315    BP  139/57 11/27/17 1245   HEIGHT AND WEIGHT      BP Location  Right arm 11/27/17 0424   Height  1.499 m (4' 11\") 11/22/17 1430    OXYGEN THERAPY     Height Method  Stated 11/22/17 1430    SpO2  95 % 11/27/17 1245   Weight  42 kg (92 lb 9.5 oz) 11/27/17 1247    O2 Device  None (Room air) 11/27/17 1245   Weight Method  Bed scale 11/27/17 1247    FiO2 (%)  30 % 11/16/17 0914   POSITIONING      Oxygen Delivery  2 LPM 11/21/17 0758   Body Position  side-lying, right 11/27/17 1247    Suction Occurrance  1 11/16/17 1120   Head of Bed (HOB)  HOB at 15 degrees;HOB at 20 degrees 11/27/17 1247    PAIN/COMFORT     Chair  Upright in chair 11/27/17 1140    " Patient Currently in Pain  yes 11/27/17 1149   Positioning/Transfer Devices  pillows 11/27/17 1247    Preferred Pain Scale  CAPA (Clinically Aligned Pain Assessment) ([facility] Adults Only) 11/27/17 1149   DAILY CARE      Pain Location  Back 11/27/17 1149   Activity Management  activity adjusted per tolerance;activity encouraged 11/27/17 0817    Pain Orientation  Mid 11/27/17 1149   Activity Assistance Provided  assistance, 2 people 11/27/17 0817    Pain Descriptors  Aching;Sore 11/27/17 1149   Assistive Device Utilized  mechanical lift 11/27/17 0817    Pain Management Interventions  analgesia administered 11/27/17 0644   Additional Documentation  Activity Device Assistance (Row) 11/19/17 1616    Pain Intervention(s)  Medication (See eMAR);Repositioned 11/27/17 1149   ECG      Response to Interventions  Decrease in pain 11/27/17 1315   ECG Rhythm  Sinus rhythm 11/17/17 1621    CLINICALLY ALIGNED PAIN ASSESSMENT (CAPA) ([facility] ADULTS ONLY)     Ectopy  None 11/17/17 1621    Comfort  tolerable with discomfort 11/27/17 1149   Lead Monitored  Lead II;V 1 11/17/17 1621    Change in Pain  about the same 11/27/17 1149   Equipment  electrodes changed 11/17/17 0757    Pain Control  partially effective 11/27/17 1149   POINT OF CARE TESTING      Functioning  can do most things, but pain gets in the way of some 11/27/17 1149   Puncture Site  arterial line 11/17/17 0754    Sleep  awake with occasional pain 11/27/17 0644   Bedside Glucose (mg/dl )   150 mg/dl 11/16/17 0530    CRITICAL-CARE PAIN OBSERVATION TOOL (CPOT)     DRUG CALCULATION WEIGHT      Facial Expression  1 11/16/17 1247   Drug Calculation Weight  36.5 kg (80 lb 8 oz) 11/15/17 2224            Patient Lines/Drains/Airways Status    Active LINES/DRAINS/AIRWAYS     Name: Placement date: Placement time: Site: Days: Last dressing change:    Urethral Catheter 11/21/17   1230      6     Incision/Surgical Site 11/15/17 Mouth 11/15/17    2104 11     Incision/Surgical Site 11/15/17 Back 11/15/17   2104    11     Incision/Surgical Site 11/15/17 Anterior Neck 11/15/17   2126    11             Patient Lines/Drains/Airways Status    Active PICC/CVC     None            Intake/Output Detail Report     Date Intake             Output       Net    Shift P.O. I.V. NG/GT IV Piggyback Colloid Enteral Blood Components Total Urine Emesis/NG output Drains Blood Total       Day 11/26/17 0000 - 11/26/17 0659 50 -- -- -- -- -- -- 50 700 -- -- -- 700 -650    Felicity 11/26/17 0700 - 11/26/17 1459 120 20 -- -- -- -- -- 140 650 -- -- -- 650 -510    Noc 11/26/17 1500 - 11/26/17 2359 120 20 -- -- -- -- -- 140 400 -- -- -- 400 -260    Day 11/27/17 0000 - 11/27/17 0659 90 -- -- -- -- -- -- 90 550 -- -- -- 550 -460    Felicity 11/27/17 0700 - 11/27/17 1459 480 -- -- -- -- -- -- 480 -- -- -- -- -- 480      Last Void/BM       Most Recent Value    Urine Occurrence 1 at 11/21/2017 1030    Stool Occurrence 1 at 11/25/2017 0359      Case Management/Discharge Planning     Case Management/Discharge Planning Flowsheet     REFERRAL INFORMATION     FINAL RESOURCES      Admission Type  inpatient 11/13/17 1329   Equipment Currently Used at Home  walker, standard 11/18/17 1601    LIVING ENVIRONMENT     MH/ CAREGIVER      Lives With  spouse 11/18/17 1601   Filed Complexity Screen Score  10 11/13/17 1330    Living Arrangements  [place] ([facility]) 11/18/17 1601   ABUSE RISK SCREEN      COPING/STRESS     QUESTION TO PATIENT:  Has a member of your family or a partner(now or in the past) intimidated, hurt, manipulated, or controlled you in any way?  no 11/11/17 1434    Major Change/Loss/Stressor  medical condition/diagnosis 11/11/17 1435   QUESTION TO PATIENT: Do you feel safe going back to the place where you are living?  yes 11/11/17 1434    EXPECTED DISCHARGE     OBSERVATION: Is there reason to believe there has been maltreatment of a vulnerable adult (ie. Physical/Sexual/Emotional abuse, self  neglect, lack of adequate food, shelter, medical care, or financial exploitation)?  no 11/11/17 1434    Expected Discharge Date  11/17/17 11/22/17 0931   (R) MENTAL HEALTH SUICIDE RISK      DISCHARGE PLANNING     Are you depressed or being treated for depression?  No 11/11/17 1434    Transportation Available  car;family or friend will provide 11/18/17 8579

## 2017-11-10 NOTE — IP AVS SNAPSHOT
MRN:[id number]                      After Visit Summary   11/10/2017    Yuni Otoole    MRN: [id number]           Thank you!     Thank you for choosing [facility] for your care. Our goal is always to provide you with excellent care. Hearing back from our patients is one way we can continue to improve our services. Please take a few minutes to complete the written survey that you may receive in the mail after you visit with us. Thank you!        Patient Information     Date Of Birth          1935        Designated Caregiver       Most Recent Value    Caregiver    Will someone help with your care after discharge? yes    Name of designated caregiver Mateus     Phone number of caregiver in chart    Caregiver address same as pt       About your hospital stay     You were admitted on:  November 10, 2017 You last received care in the:  [address] [facility]    You were discharged on:  November 27, 2017        Reason for your hospital stay       The patient was hospitalized to undergo a C1-C3 Laminectomy, C2-C3 Posterior Cervical Fusion; C2/3, C3/4, C4/5, and C6/7 Anterior Cervical Decompressions and Fusion; C2/3, C4/5, and C6/7 Facet Osteotomies; C2-T3 Posterior Instrumentation. The procedure was performed by Dr. Enma López on 11/10/2017 for the treatment of kyphotic deformity, unhealed dens fracture, and bilateral upper extremity pain, weakness, and paresthesias.                  Who to Call     For medical emergencies, please call 911.  For non-urgent questions about your medical care, please call your primary care provider or clinic, [phone]  For questions related to your surgery, please call your surgery clinic        Attending Provider     Provider Specialty    Caesy Merritt MD Neurosurgery    Enma López MD Neurosurgery       Primary Care Provider Office Phone # Fax #    Jonathan Vincent [phone]      After Care Instructions     Activity - Up  ad jake       - The patient must wear Cervical Collar at all times when out of bed  - Up in Chair TID  - Ambulate with Assist TID; Increase activity as tolerated  - Please continue PT and OT Evaluations and Treatments            Advance Diet as Tolerated       Follow this diet upon discharge: Orders Placed This Encounter      Calorie Counts      Room Service      Snacks/Supplements Adult: ProStat Sugar Free; With Meals      Snacks/Supplements Adult: Between Meals      Fluid restriction 1000 ML FLUID      Regular Diet Adult Thin Liquids (water, ice chips, juice, milk, gelatin, ice cream, etc)      May remove anterior portion of cervical collar for meals/eating            Fall precautions           Desir catheter       To straight gravity drainage. Monitor and record urine output every 8 hours. May discontinue desir catheter as patient increases ambulation.            General info for SNF       Length of Stay Estimate: Short Term Care: Estimated # of Days <30  Condition at Discharge: Improving  Level of care:skilled   Rehabilitation Potential: Good  Admission H&P remains valid and up-to-date: Yes  Recent Chemotherapy: N/A  Use Nursing Home Standing Orders: Yes            Mantoux instructions       Give two-step Mantoux (PPD) Per Facility Policy Yes            Wound care       Site:  Posterior Midline Neck Incision  Instructions:    - Sutures should be removed 4 weeks following surgery by a Neurosurgery Provider at the [facility] ONLY  - After the surgical dressing is removed, keep your incision open to air.   - You may shower on post-operative day #3 (11/13/2017).   - After your incision gets wet, pat your incision dry. Do not vigorously rub your incision.  - Do not apply any creams, gels, lotions, or ointments to your incision.  - Do not submerge your incision in water for 4-6 weeks post-operatively.  - Monitor your incision for signs of infection including redness, swelling, drainage or warmth at the  surgical site.            Wound care (specify)       Site:   Anterior Neck Incision  Instructions:    - The steri strips will come off on their own over a period of 2-3 weeks. DO NOT remove them.   - After the surgical dressing is removed, keep your incision open to air.   - You may shower on post-operative day #3 (11/13/2017).   - After your incision gets wet, pat your incision dry. Do not vigorously rub your incision.  - Do not apply any creams, gels, lotions, or ointments to your incision.  - Do not submerge your incision in water for 4-6 weeks post-operatively.  - Monitor your incision for signs of infection including redness, swelling, drainage or warmth at the surgical site.                  Follow-up Appointments     Follow Up and recommended labs and tests       - Please follow-up in the Neurosurgery Clinic with Lana Hauser PA-C in approximately 2 weeks for wound evaluation and suture removal. Please call [phone] to schedule your appointment.     - Please follow-up with Dr. Enma López in the Neurosurgery Clinic in 3 months (About February 15th) for post-operative evaluation. Please call [phone] to schedule your appointment.    - Please follow-up with Endocrinology upon discharge from TCU for re-evaluation of hyponatremia.     - Please obtain daily Sodium Level and every other day BMP. Goal Sodium is normonatremia. The patient is currently on Salt Tablets 1G BID. Please taper the salt tablets to off according to the patient's sodium levels. Please check Urine Osmolality 1-2 times per week.                  Additional Services     Occupational Therapy Adult Consult       Evaluate and treat as clinically indicated.    Reason:  S/P Anterior/Posterior Cervical Fusion            Physical Therapy Adult Consult       Evaluate and treat as clinically indicated.    Reason:  S/P Anterior/Posterior Cervical Fusion            Speech Language Path Adult Consult       Evaluate and treat as clinically  "indicated.    Reason:  S/P Anterior/Posterior Cervical Fusion; Dysphagia                  Pending Results     No orders found from 11/8/2017 to 11/11/2017.            Statement of Approval     Ordered          11/27/17 1231  I have reviewed and agree with all the recommendations and orders detailed in this document.  EFFECTIVE NOW     Approved and electronically signed by:  Carina Beebe APRN CNP             Admission Information     Date & Time Provider Department Dept. Phone    11/10/2017 Enma López MD [address] [facility] [place] [phone]      Your Vitals Were     Blood Pressure Pulse Temperature Respirations Height Weight    139/57 77 97.3  F (36.3  C) (Oral) 18 1.499 m (4' 11\") 42 kg (92 lb 9.5 oz)    Pulse Oximetry BMI (Body Mass Index)                95% 18.7 kg/m2          MyChart Information     [facility] gives you secure access to your electronic health record. If you see a primary care provider, you can also send messages to your care team and make appointments. If you have questions, please call your primary care clinic.  If you do not have a primary care provider, please call [phone] and they will assist you.        Care EveryWhere ID     This is your Care EveryWhere ID. This could be used by other organizations to access your [facility] medical records  [id number]        Equal Access to Services     CARLOS MCKNIGHT : Rajeev Maldonado, waaxda luqadaha, qaybta kaalmada shena, elias ramirez. So [facility] [phone].    ATENCIÓN: Si habla español, tiene a pierson disposición servicios gratuitos de asistencia lingüística. Llame al [phone].    We comply with applicable federal civil rights laws and [place] laws. We do not discriminate on the basis of race, color, national origin, age, disability, sex, sexual orientation, or gender identity.               Review of your medicines      START taking        Dose / Directions    acetaminophen " 325 MG tablet   Commonly known as:  TYLENOL   Used for:  S/P cervical spinal fusion        Dose:  650 mg   Take 2 tablets (650 mg) by mouth every 4 hours   Quantity:  100 tablet   Refills:  0       calcium carbonate 1250 MG tablet   Commonly known as:  OS-ANNETTA 500 mg [place]. Ca   Used for:  S/P cervical spinal fusion        Dose:  1250 mg   Take 1 tablet (1,250 mg) by mouth 2 times daily (with meals)   Quantity:  90 tablet   Refills:  0       cyclobenzaprine 5 MG tablet   Commonly known as:  FLEXERIL   Used for:  S/P cervical spinal fusion        Dose:  2.5 mg   Take 0.5 tablets (2.5 mg) by mouth 3 times daily as needed for muscle spasms   Quantity:  60 tablet   Refills:  0       heparin sodium PF 5000 UNIT/0.5ML injection   Used for:  S/P cervical spinal fusion        Dose:  5000 Units   Inject 0.5 mLs (5,000 Units) Subcutaneous every 8 hours   Refills:  0       metoprolol 25 MG tablet   Commonly known as:  LOPRESSOR   Used for:  S/P cervical spinal fusion        Dose:  12.5 mg   Take 0.5 tablets (12.5 mg) by mouth 2 times daily   Quantity:  60 tablet   Refills:  0       oxyCODONE IR 5 MG tablet   Commonly known as:  ROXICODONE   Used for:  S/P cervical spinal fusion        Dose:  5 mg   Take 1 tablet (5 mg) by mouth every 3 hours as needed for moderate to severe pain   Quantity:  90 tablet   Refills:  0       polyethylene glycol Packet   Commonly known as:  MIRALAX/GLYCOLAX   Used for:  S/P cervical spinal fusion        Dose:  17 g   17 g by Oral or Feeding Tube route 3 times daily as needed for constipation   Quantity:  7 packet   Refills:  0       senna-docusate 8.6-50 MG per tablet   Commonly known as:  SENOKOT-S;PERICOLACE   Used for:  S/P cervical spinal fusion        Dose:  2 tablet   2 tablets by Oral or Feeding Tube route 2 times daily   Quantity:  100 tablet   Refills:  0       sodium chloride 1 GM tablet   Used for:  S/P cervical spinal fusion        Dose:  1 g   Take 1 tablet (1 g) by mouth 2 times daily  (with meals)   Refills:  0         CONTINUE these medicines which may have CHANGED, or have new prescriptions. If we are uncertain of the size of tablets/capsules you have at home, strength may be listed as something that might have changed.        Dose / Directions    pravastatin 10 MG tablet   Commonly known as:  PRAVACHOL   This may have changed:  See the new instructions.   Used for:  S/P cervical spinal fusion        Dose:  10 mg   1 tablet (10 mg) by Oral or Feeding Tube route At Bedtime   Quantity:  30 tablet   Refills:  0         CONTINUE these medicines which have NOT CHANGED        Dose / Directions    multivitamin CF formula Caps per capsule   Used for:  S/P cervical spinal fusion        Dose:  1 capsule   Take 1 capsule by mouth daily   Refills:  0       order for DME   Used for:  Nondisplaced fracture of seventh cervical vertebra, unspecified fracture morphology, initial encounter        Equipment being ordered: Shower chair   Quantity:  1 Device   Refills:  0       vitamin D 2000 UNITS tablet        Dose:  3000 Units   Take 3,000 Units by mouth daily   Quantity:  100 tablet   Refills:  0       vitamin D 24522 UNIT capsule   Commonly known as:  ERGOCALCIFEROL        Dose:  70809 Units   Take 1 capsule (50,000 Units) by mouth every 7 days   Quantity:  10 capsule   Refills:  0         STOP taking     aspirin 81 MG tablet           calcium + D 600-200 MG-UNIT Tabs   Generic drug:  calcium carbonate-vitamin D           captopril 25 MG tablet   Commonly known as:  CAPOTEN           diazepam 5 MG tablet   Commonly known as:  VALIUM           fentaNYL 25 mcg/hr 72 hr patch   Commonly known as:  DURAGESIC           hydrochlorothiazide 25 MG tablet   Commonly known as:  HYDRODIURIL           HYDROcodone-acetaminophen 5-325 MG per tablet   Commonly known as:  NORCO           potassium chloride SA 10 MEQ CR tablet   Commonly known as:  K-DUR/KLOR-CON M                Where to get your medicines      Some of these  will need a paper prescription and others can be bought over the counter. Ask your nurse if you have questions.     Bring a paper prescription for each of these medications     cyclobenzaprine 5 MG tablet    oxyCODONE IR 5 MG tablet                Protect others around you: Learn how to safely use, store and throw away your medicines at www.disposemymeds.org.             Medication List: This is a list of all your medications and when to take them. Check marks below indicate your daily home schedule. Keep this list as a reference.      Medications           Morning Afternoon Evening Bedtime As Needed    acetaminophen 325 MG tablet   Commonly known as:  TYLENOL   Take 2 tablets (650 mg) by mouth every 4 hours   Last time this was given:  650 mg on 11/27/2017  8:08 AM                                calcium carbonate 1250 MG tablet   Commonly known as:  OS-ANNETTA 500 mg [place]. Ca   Take 1 tablet (1,250 mg) by mouth 2 times daily (with meals)   Last time this was given:  1,250 mg on 11/27/2017  8:08 AM                                cyclobenzaprine 5 MG tablet   Commonly known as:  FLEXERIL   Take 0.5 tablets (2.5 mg) by mouth 3 times daily as needed for muscle spasms                                heparin sodium PF 5000 UNIT/0.5ML injection   Inject 0.5 mLs (5,000 Units) Subcutaneous every 8 hours   Last time this was given:  5,000 Units on 11/27/2017  5:06 AM                                metoprolol 25 MG tablet   Commonly known as:  LOPRESSOR   Take 0.5 tablets (12.5 mg) by mouth 2 times daily   Last time this was given:  12.5 mg on 11/27/2017  9:04 AM                                multivitamin CF formula Caps per capsule   Take 1 capsule by mouth daily                                order for DME   Equipment being ordered: Shower chair                                oxyCODONE IR 5 MG tablet   Commonly known as:  ROXICODONE   Take 1 tablet (5 mg) by mouth every 3 hours as needed for moderate to severe pain   Last time  this was given:  5 mg on 11/27/2017 10:45 AM                                polyethylene glycol Packet   Commonly known as:  MIRALAX/GLYCOLAX   17 g by Oral or Feeding Tube route 3 times daily as needed for constipation   Last time this was given:  17 g on 11/17/2017  7:59 PM                                pravastatin 10 MG tablet   Commonly known as:  PRAVACHOL   1 tablet (10 mg) by Oral or Feeding Tube route At Bedtime   Last time this was given:  10 mg on 11/26/2017  9:11 PM                                senna-docusate 8.6-50 MG per tablet   Commonly known as:  SENOKOT-S;PERICOLACE   2 tablets by Oral or Feeding Tube route 2 times daily   Last time this was given:  1 tablet on 11/25/2017  8:01 PM                                sodium chloride 1 GM tablet   Take 1 tablet (1 g) by mouth 2 times daily (with meals)   Last time this was given:  1 g on 11/27/2017  9:04 AM                                vitamin D 2000 UNITS tablet   Take 3,000 Units by mouth daily                                vitamin D 93968 UNIT capsule   Commonly known as:  ERGOCALCIFEROL   Take 1 capsule (50,000 Units) by mouth every 7 days   Last time this was given:  50,000 Units on 11/25/2017  9:24 AM

## 2017-11-11 ENCOUNTER — APPOINTMENT (OUTPATIENT)
Dept: MRI IMAGING | Facility: CLINIC | Age: 82
DRG: 453 | End: 2017-11-11
Attending: STUDENT IN AN ORGANIZED HEALTH CARE EDUCATION/TRAINING PROGRAM
Payer: MEDICARE

## 2017-11-11 ENCOUNTER — ANESTHESIA EVENT (OUTPATIENT)
Dept: SURGERY | Facility: CLINIC | Age: 82
DRG: 453 | End: 2017-11-11
Payer: MEDICARE

## 2017-11-11 ENCOUNTER — APPOINTMENT (OUTPATIENT)
Dept: GENERAL RADIOLOGY | Facility: CLINIC | Age: 82
DRG: 453 | End: 2017-11-11
Attending: STUDENT IN AN ORGANIZED HEALTH CARE EDUCATION/TRAINING PROGRAM
Payer: MEDICARE

## 2017-11-11 LAB
ALBUMIN SERPL-MCNC: 3.1 G/DL (ref 3.4–5)
ANION GAP SERPL CALCULATED.3IONS-SCNC: 7 MMOL/L (ref 3–14)
BUN SERPL-MCNC: 11 MG/DL (ref 7–30)
CALCIUM SERPL-MCNC: 8.9 MG/DL (ref 8.5–10.1)
CHLORIDE SERPL-SCNC: 92 MMOL/L (ref 94–109)
CO2 SERPL-SCNC: 27 MMOL/L (ref 20–32)
CREAT SERPL-MCNC: 0.36 MG/DL (ref 0.52–1.04)
ERYTHROCYTE [DISTWIDTH] IN BLOOD BY AUTOMATED COUNT: 13.5 % (ref 10–15)
GFR SERPL CREATININE-BSD FRML MDRD: >90 ML/MIN/1.7M2
GLUCOSE SERPL-MCNC: 77 MG/DL (ref 70–99)
HCT VFR BLD AUTO: 33.1 % (ref 35–47)
HGB BLD-MCNC: 10.9 G/DL (ref 11.7–15.7)
MAGNESIUM SERPL-MCNC: 1.7 MG/DL (ref 1.6–2.3)
MCH RBC QN AUTO: 29.5 PG (ref 26.5–33)
MCHC RBC AUTO-ENTMCNC: 32.9 G/DL (ref 31.5–36.5)
MCV RBC AUTO: 90 FL (ref 78–100)
PHOSPHATE SERPL-MCNC: 2.9 MG/DL (ref 2.5–4.5)
PLATELET # BLD AUTO: 360 10E9/L (ref 150–450)
POTASSIUM SERPL-SCNC: 3.3 MMOL/L (ref 3.4–5.3)
POTASSIUM SERPL-SCNC: 4.8 MMOL/L (ref 3.4–5.3)
RADIOLOGIST FLAGS: ABNORMAL
RBC # BLD AUTO: 3.69 10E12/L (ref 3.8–5.2)
SODIUM SERPL-SCNC: 126 MMOL/L (ref 133–144)
SODIUM SERPL-SCNC: 130 MMOL/L (ref 133–144)
WBC # BLD AUTO: 6.5 10E9/L (ref 4–11)

## 2017-11-11 PROCEDURE — 12000008 ZZH R&B INTERMEDIATE UMMC

## 2017-11-11 PROCEDURE — 84295 ASSAY OF SERUM SODIUM: CPT | Performed by: STUDENT IN AN ORGANIZED HEALTH CARE EDUCATION/TRAINING PROGRAM

## 2017-11-11 PROCEDURE — A9270 NON-COVERED ITEM OR SERVICE: HCPCS | Mod: GY | Performed by: STUDENT IN AN ORGANIZED HEALTH CARE EDUCATION/TRAINING PROGRAM

## 2017-11-11 PROCEDURE — 25000132 ZZH RX MED GY IP 250 OP 250 PS 637: Mod: GY | Performed by: STUDENT IN AN ORGANIZED HEALTH CARE EDUCATION/TRAINING PROGRAM

## 2017-11-11 PROCEDURE — 36415 COLL VENOUS BLD VENIPUNCTURE: CPT | Performed by: NEUROLOGICAL SURGERY

## 2017-11-11 PROCEDURE — 85027 COMPLETE CBC AUTOMATED: CPT | Performed by: STUDENT IN AN ORGANIZED HEALTH CARE EDUCATION/TRAINING PROGRAM

## 2017-11-11 PROCEDURE — 25000125 ZZHC RX 250: Performed by: STUDENT IN AN ORGANIZED HEALTH CARE EDUCATION/TRAINING PROGRAM

## 2017-11-11 PROCEDURE — 83735 ASSAY OF MAGNESIUM: CPT | Performed by: STUDENT IN AN ORGANIZED HEALTH CARE EDUCATION/TRAINING PROGRAM

## 2017-11-11 PROCEDURE — 86901 BLOOD TYPING SEROLOGIC RH(D): CPT | Performed by: STUDENT IN AN ORGANIZED HEALTH CARE EDUCATION/TRAINING PROGRAM

## 2017-11-11 PROCEDURE — 73010 X-RAY EXAM OF SHOULDER BLADE: CPT | Mod: RT

## 2017-11-11 PROCEDURE — 99233 SBSQ HOSP IP/OBS HIGH 50: CPT | Performed by: INTERNAL MEDICINE

## 2017-11-11 PROCEDURE — 72141 MRI NECK SPINE W/O DYE: CPT

## 2017-11-11 PROCEDURE — 80069 RENAL FUNCTION PANEL: CPT | Performed by: STUDENT IN AN ORGANIZED HEALTH CARE EDUCATION/TRAINING PROGRAM

## 2017-11-11 PROCEDURE — 86923 COMPATIBILITY TEST ELECTRIC: CPT | Performed by: STUDENT IN AN ORGANIZED HEALTH CARE EDUCATION/TRAINING PROGRAM

## 2017-11-11 PROCEDURE — 25000128 H RX IP 250 OP 636: Performed by: STUDENT IN AN ORGANIZED HEALTH CARE EDUCATION/TRAINING PROGRAM

## 2017-11-11 PROCEDURE — 86850 RBC ANTIBODY SCREEN: CPT | Performed by: STUDENT IN AN ORGANIZED HEALTH CARE EDUCATION/TRAINING PROGRAM

## 2017-11-11 PROCEDURE — 84132 ASSAY OF SERUM POTASSIUM: CPT | Performed by: NEUROLOGICAL SURGERY

## 2017-11-11 PROCEDURE — 93010 ELECTROCARDIOGRAM REPORT: CPT | Performed by: INTERNAL MEDICINE

## 2017-11-11 PROCEDURE — 36415 COLL VENOUS BLD VENIPUNCTURE: CPT | Performed by: STUDENT IN AN ORGANIZED HEALTH CARE EDUCATION/TRAINING PROGRAM

## 2017-11-11 PROCEDURE — 86900 BLOOD TYPING SEROLOGIC ABO: CPT | Performed by: STUDENT IN AN ORGANIZED HEALTH CARE EDUCATION/TRAINING PROGRAM

## 2017-11-11 PROCEDURE — 93005 ELECTROCARDIOGRAM TRACING: CPT

## 2017-11-11 RX ORDER — SODIUM CHLORIDE 9 MG/ML
INJECTION, SOLUTION INTRAVENOUS CONTINUOUS
Status: DISCONTINUED | OUTPATIENT
Start: 2017-11-11 | End: 2017-11-12

## 2017-11-11 RX ORDER — ONDANSETRON 2 MG/ML
4 INJECTION INTRAMUSCULAR; INTRAVENOUS EVERY 6 HOURS PRN
Status: DISCONTINUED | OUTPATIENT
Start: 2017-11-11 | End: 2017-11-15

## 2017-11-11 RX ORDER — PROCHLORPERAZINE MALEATE 5 MG
5 TABLET ORAL EVERY 6 HOURS PRN
Status: DISCONTINUED | OUTPATIENT
Start: 2017-11-11 | End: 2017-11-20

## 2017-11-11 RX ORDER — POTASSIUM CHLORIDE 7.45 MG/ML
10 INJECTION INTRAVENOUS
Status: DISCONTINUED | OUTPATIENT
Start: 2017-11-11 | End: 2017-11-11

## 2017-11-11 RX ORDER — POTASSIUM CL/LIDO/0.9 % NACL 10MEQ/0.1L
10 INTRAVENOUS SOLUTION, PIGGYBACK (ML) INTRAVENOUS
Status: DISCONTINUED | OUTPATIENT
Start: 2017-11-11 | End: 2017-11-11

## 2017-11-11 RX ORDER — POTASSIUM CHLORIDE 1.5 G/1.58G
20-40 POWDER, FOR SOLUTION ORAL
Status: DISCONTINUED | OUTPATIENT
Start: 2017-11-11 | End: 2017-11-27 | Stop reason: HOSPADM

## 2017-11-11 RX ORDER — ONDANSETRON 4 MG/1
4 TABLET, ORALLY DISINTEGRATING ORAL EVERY 6 HOURS PRN
Status: DISCONTINUED | OUTPATIENT
Start: 2017-11-11 | End: 2017-11-15

## 2017-11-11 RX ORDER — POTASSIUM CHLORIDE 750 MG/1
20-40 TABLET, EXTENDED RELEASE ORAL
Status: DISCONTINUED | OUTPATIENT
Start: 2017-11-11 | End: 2017-11-20

## 2017-11-11 RX ORDER — POTASSIUM CHLORIDE 29.8 MG/ML
20 INJECTION INTRAVENOUS
Status: DISCONTINUED | OUTPATIENT
Start: 2017-11-11 | End: 2017-11-11

## 2017-11-11 RX ORDER — POTASSIUM CL/LIDO/0.9 % NACL 10MEQ/0.1L
10 INTRAVENOUS SOLUTION, PIGGYBACK (ML) INTRAVENOUS
Status: DISCONTINUED | OUTPATIENT
Start: 2017-11-11 | End: 2017-11-27 | Stop reason: HOSPADM

## 2017-11-11 RX ORDER — NALOXONE HYDROCHLORIDE 0.4 MG/ML
.1-.4 INJECTION, SOLUTION INTRAMUSCULAR; INTRAVENOUS; SUBCUTANEOUS
Status: DISCONTINUED | OUTPATIENT
Start: 2017-11-11 | End: 2017-11-27 | Stop reason: HOSPADM

## 2017-11-11 RX ORDER — METOCLOPRAMIDE HYDROCHLORIDE 5 MG/ML
5 INJECTION INTRAMUSCULAR; INTRAVENOUS EVERY 6 HOURS PRN
Status: DISCONTINUED | OUTPATIENT
Start: 2017-11-11 | End: 2017-11-15

## 2017-11-11 RX ORDER — CEFAZOLIN SODIUM 1 G/3ML
1 INJECTION, POWDER, FOR SOLUTION INTRAMUSCULAR; INTRAVENOUS SEE ADMIN INSTRUCTIONS
Status: DISCONTINUED | OUTPATIENT
Start: 2017-11-11 | End: 2017-11-12 | Stop reason: HOSPADM

## 2017-11-11 RX ORDER — POTASSIUM CHLORIDE 1.5 G/1.58G
20-40 POWDER, FOR SOLUTION ORAL
Status: DISCONTINUED | OUTPATIENT
Start: 2017-11-11 | End: 2017-11-11

## 2017-11-11 RX ORDER — MAGNESIUM SULFATE HEPTAHYDRATE 40 MG/ML
4 INJECTION, SOLUTION INTRAVENOUS EVERY 4 HOURS PRN
Status: DISCONTINUED | OUTPATIENT
Start: 2017-11-11 | End: 2017-11-27 | Stop reason: HOSPADM

## 2017-11-11 RX ORDER — CEFAZOLIN SODIUM 2 G/100ML
2 INJECTION, SOLUTION INTRAVENOUS
Status: DISCONTINUED | OUTPATIENT
Start: 2017-11-11 | End: 2017-11-12 | Stop reason: HOSPADM

## 2017-11-11 RX ORDER — POTASSIUM CHLORIDE 7.45 MG/ML
10 INJECTION INTRAVENOUS
Status: DISCONTINUED | OUTPATIENT
Start: 2017-11-11 | End: 2017-11-27 | Stop reason: HOSPADM

## 2017-11-11 RX ORDER — MAGNESIUM SULFATE HEPTAHYDRATE 40 MG/ML
4 INJECTION, SOLUTION INTRAVENOUS EVERY 4 HOURS PRN
Status: DISCONTINUED | OUTPATIENT
Start: 2017-11-11 | End: 2017-11-11

## 2017-11-11 RX ORDER — POTASSIUM CHLORIDE 29.8 MG/ML
20 INJECTION INTRAVENOUS
Status: DISCONTINUED | OUTPATIENT
Start: 2017-11-11 | End: 2017-11-25 | Stop reason: RX

## 2017-11-11 RX ORDER — METOCLOPRAMIDE 5 MG/1
5 TABLET ORAL EVERY 6 HOURS PRN
Status: DISCONTINUED | OUTPATIENT
Start: 2017-11-11 | End: 2017-11-15

## 2017-11-11 RX ORDER — LIDOCAINE 40 MG/G
CREAM TOPICAL
Status: DISCONTINUED | OUTPATIENT
Start: 2017-11-11 | End: 2017-11-12 | Stop reason: HOSPADM

## 2017-11-11 RX ORDER — POTASSIUM CHLORIDE 750 MG/1
20-40 TABLET, EXTENDED RELEASE ORAL
Status: DISCONTINUED | OUTPATIENT
Start: 2017-11-11 | End: 2017-11-11

## 2017-11-11 RX ORDER — PROCHLORPERAZINE 25 MG
12.5 SUPPOSITORY, RECTAL RECTAL EVERY 12 HOURS PRN
Status: DISCONTINUED | OUTPATIENT
Start: 2017-11-11 | End: 2017-11-20

## 2017-11-11 RX ADMIN — ONDANSETRON 4 MG: 4 TABLET, ORALLY DISINTEGRATING ORAL at 12:50

## 2017-11-11 RX ADMIN — HYDROCODONE BITARTRATE AND ACETAMINOPHEN 1 TABLET: 5; 325 TABLET ORAL at 07:19

## 2017-11-11 RX ADMIN — HYDROCODONE BITARTRATE AND ACETAMINOPHEN 1 TABLET: 5; 325 TABLET ORAL at 14:25

## 2017-11-11 RX ADMIN — SODIUM CHLORIDE: 9 INJECTION, SOLUTION INTRAVENOUS at 20:27

## 2017-11-11 RX ADMIN — CAPTOPRIL 25 MG: 25 TABLET ORAL at 08:32

## 2017-11-11 RX ADMIN — PRAVASTATIN SODIUM 10 MG: 10 TABLET ORAL at 22:46

## 2017-11-11 RX ADMIN — PRAVASTATIN SODIUM 10 MG: 10 TABLET ORAL at 00:23

## 2017-11-11 RX ADMIN — CAPTOPRIL 25 MG: 25 TABLET ORAL at 20:29

## 2017-11-11 RX ADMIN — POTASSIUM CHLORIDE 40 MEQ: 1.5 POWDER, FOR SOLUTION ORAL at 14:42

## 2017-11-11 RX ADMIN — POTASSIUM CHLORIDE 10 MEQ: 750 TABLET, EXTENDED RELEASE ORAL at 00:23

## 2017-11-11 RX ADMIN — CAPTOPRIL 25 MG: 25 TABLET ORAL at 00:27

## 2017-11-11 RX ADMIN — POTASSIUM CHLORIDE 10 MEQ: 750 TABLET, EXTENDED RELEASE ORAL at 20:29

## 2017-11-11 RX ADMIN — POTASSIUM CHLORIDE 10 MEQ: 750 TABLET, EXTENDED RELEASE ORAL at 08:32

## 2017-11-11 RX ADMIN — PROCHLORPERAZINE EDISYLATE 5 MG: 5 INJECTION INTRAMUSCULAR; INTRAVENOUS at 15:16

## 2017-11-11 RX ADMIN — HYDROCODONE BITARTRATE AND ACETAMINOPHEN 1 TABLET: 5; 325 TABLET ORAL at 00:23

## 2017-11-11 RX ADMIN — POTASSIUM CHLORIDE 20 MEQ: 750 TABLET, EXTENDED RELEASE ORAL at 16:45

## 2017-11-11 NOTE — H&P
[facility]  Neurosurgery     History and Physical    CC: weakness    HPI:   Ms Otoole is an 82 year old female with a PMH of HTN and HLD. She sustained a fall in 2016 and was found to have a non-displaced C7 fracture that was treated with conservative measures. Ms Otoole complained of continued neck pain with the development of left arm pain, numbness, and weakness. Repeat images demonstrated a healed C7 fracture, however, a dens fracture was noted with kyphotic deformity. MRI was concerning for high cervical spinal canal stenosis without cord signal change. She was referred to the [facility] at that time for evaluation. She was seen in clinic on 9/21/17 and offered surgical intervention for her cervical pathology. The patient was not sure if she wanted surgery and failed to follow up with the clinic. Since that time, she has noted a gradual worsening of her weakness. Her hand dexterity has also also suffered. She is at the point where she has no longer been able to feed herself, rather relying on her  to feed her. She has lost the ability to ambulate independently. Ms Otoole also describes urinary hesitancy. She presented to [facility] due to her progress weakness for evaluation. Given the complexity of her cervical pathology, she was transferred to [facility] for further cares.    Ms Otoole's primary complaint on arrival is weakness. She states the finger tips bilaterally will at times become numb and tingle as well as below her ankles bilaterally. No radicular pain, just neck pain.    She takes an 81 ASA. Non-smoker. Does not use steroids. Has lost ~20lbs over the last few months.    Past Medical History   has a past medical history of Allergic rhinitis, cause unspecified; Pure hypercholesterolemia; and Unspecified essential hypertension.    Past Surgical History   has a past surgical history that includes REMOVAL OF TONSILS,14+ Y/O; colonoscopy (05/30/07); carotid  endarterectomy (11/07/08); INJ EPIDURAL LUMBAR/SACRAL W/WO CONTRAST (2009); DRAIN/INJECT LARGE JOINT/BURSA (2009); DRAIN/INJECT LARGE JOINT/BURSA (2010); INJ EPIDURAL CERVICAL/THORACIC W/WO CONTRAST (2010); and INJ TRANSFORAMIN EPIDURAL, LUMB/SACR SINGLE (2010).    Family History  family history includes CANCER in her mother; CEREBROVASCULAR DISEASE in her paternal grandmother; Cardiovascular in her father; Circulatory in her paternal grandmother; DIABETES in her maternal aunt; EYE* in her mother; GASTROINTESTINAL DISEASE in her mother; Gynecology in her sister; Hypertension in her father; Lipids in her brother; Musculoskeletal Disorder in her daughter; OSTEOPOROSIS in her mother; Obesity in her maternal grandmother and paternal grandmother.    Social History  - Occupation: retired  - Marital Status:     reports that she quit smoking about 21 years ago. She has a 5.00 pack-year smoking history. She has never used smokeless tobacco. She reports that she does not drink alcohol or use illicit drugs.    Medications  Prescriptions Prior to Admission   Medication Sig Dispense Refill Last Dose     fentaNYL (DURAGESIC) 25 mcg/hr 72 hr patch Place 1 patch onto the skin every 72 hours 4 patch 0 Unknown at Unknown time     pravastatin (PRAVACHOL) 10 MG tablet Take 1 tablet by mouth  daily at bedtime 90 tablet 1 11/9/2017 at 1800     captopril (CAPOTEN) 25 MG tablet Take 1 tablet by mouth  twice a day 180 tablet 1 11/10/2017 at 0700     hydrochlorothiazide (HYDRODIURIL) 25 MG tablet Take 2 tablets by mouth  daily 180 tablet 1 11/10/2017 at 0700     diazepam (VALIUM) 5 MG tablet Take 1 tablet (5 mg) by mouth 2 times daily 180 tablet 3 Unknown at Unknown time     potassium chloride SA (K-DUR/KLOR-CON M) 10 MEQ CR tablet Take 1 tablet by mouth  twice a day 180 tablet 1 Unknown at Unknown time     HYDROcodone-acetaminophen (NORCO) 5-325 MG per tablet Take 1-2 tablets by mouth every 4 hours as needed for moderate to severe  pain or pain 90 tablet 0 Past Week at Unknown time     order for DME Equipment being ordered: Shower chair 1 Device 0 Taking     aspirin 81 MG tablet Take 1 tablet by mouth daily.    at 1800     CALCIUM + D 600-200 MG-UNIT OR TABS 1 TABLET DAILY 30 0 Unknown at Unknown time        Allergies  Allergies   Allergen Reactions     Atorvastatin Calcium      Was on Lipitor and has muscle problem       ROS: 10 point ROS of systems were all negative except for pertinent positives noted in HPI.    PE:  Blood pressure 172/85, pulse 88, temperature 98  F (36.7  C), temperature source Oral, resp. rate 16, SpO2 96 %, not currently breastfeeding.  NEUROLOGIC:  -- Awake; Alert; oriented x 3  -- Follows commands briskly  -- Speech fluent, spontaneous. No aphasia or dysarthria.  -- no gaze preference. No apparent hemineglect.  Cranial Nerves:  -- visual fields full to confrontation, PERRL 3-2mm bilat and brisk, extraocular movements intact  -- face symmetrical, tongue midline  -- sensory V1-V3 intact bilaterally  -- palate elevates symmetrically, uvula midline  -- hearing grossly intact bilat  -- Trapezii 5/5 strength bilat symmetric  -- Cerebellar: intact rapid alternating motions bilaterally    Motor:  Decreased bulk; no tremor, rigidity, or bradykinesia.  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    Plan:  - Neurosurgical intervention warranted,  surgical options will be discussed with the patient after a new MRI is obtained.  - Admit to 6A.  - Serial neuro exams  - Pain control  - Bladder scan  - Regular diet  - Nutrition consult  - Orthopedics consult in AM for right shoulder dislocation  - Dedicated shoulder xray  - Medicine pre-op evaluation  - SCDs for DVT proph      The patient was discussed with the neurosurgery chief resident, who agrees with the above stated plan.      Mike Parekh MD  Neurosurgery PGY2

## 2017-11-11 NOTE — PROGRESS NOTES
"CLINICAL NUTRITION SERVICES - ASSESSMENT NOTE     Nutrition Prescription    RECOMMENDATIONS FOR MDs/PROVIDERS TO ORDER:  -If patient unable to meet at least 2/3rds of calorie/protein needs (~790kcals and 32g protein) will need to consider nutrition support pending POC.  -Consider appetite stimulant  -Recommend starting thera-vit-m (MVI w/ minerals) 1 tablet daily to meet micronutrient needs.     Malnutrition Status:    -Severe    Recommendations already ordered by Registered Dietitian (RD):  -Ordered ensure BID    Future/Additional Recommendations:  -If nutrition support warranted recommend goal: Isosource 1.5 @ goal 35 ml/hr (840 ml/day) to provide 1260 kcals (32 kcal/kg/day), 57 g PRO (1.4 g/kg/day), 647 ml free H2O.       REASON FOR ASSESSMENT  Yuni Otoole is a/an 82 year old female assessed by the dietitian for Provider Order - severe malnutrition    NUTRITION HISTORY  Information obtained from chart and patient's daughter. Pt at procedure at time of visit.     Pt unable to feed herself d/t poor hand dexterity so she relies on her  to feed her. Per daughter pt's appetite has continued to decrease and she has had weight loss. Daughter reports patient weighed ~130lbs 1-1.5 years ago.      Per H&P: Has lost 20lbs over the last few months.     PMH: htn, hld    CURRENT NUTRITION ORDERS  Diet: Regular  Fluid restriction: 750mL (free water only)  Supplements: ensure shake between meals    Calorie counts ordered 11/11-13    LABS  Na-126  K+ 3.3  11/10 +ketones in urine    MEDICATIONS  KCl  K+/mg++/phos lyte replacements    ANTHROPOMETRICS  Height: 58\"  Most Recent Weight: 39.5kg     IBW: 43.2kg  BMI: Underweight BMI <18.5  Weight History:   Wt Readings from Last 10 Encounters:   11/10/17 39.5 kg (87 lb)   09/21/17 39.7 kg (87 lb 8 oz)   09/19/17 38.1 kg (84 lb)   08/02/17 37.9 kg (83 lb 8 oz)   06/20/17 37.9 kg (83 lb 8 oz)   05/01/17 38.1 kg (84 lb)   04/25/17 38.3 kg (84 lb 6.4 oz)   09/27/16 39.8 kg (87 lb " 11.2 oz)   08/17/16 38 kg (83 lb 11.2 oz)   07/19/16 40.8 kg (90 lb)   -Weights stable over last year per hospital records however daughter reports mother has lost up to 43lbs in the last 1-1.5 years (~33% weight loss)  Dosing Weight: 40 kg    ASSESSED NUTRITION NEEDS  Estimated Energy Needs: 0630-1991+ kcals/day (30 - 35 kcals/kg )  Justification: Repletion and Underweight  Estimated Protein Needs: 48-60 grams protein/day (1.2 - 1.5 grams of pro/kg)  Justification: increased needs/repletion  Estimated Fluid Needs: 1 mL/kcal   Justification: Maintenance    PHYSICAL FINDINGS  See malnutrition section below.    MALNUTRITION  % Intake: </=75% for >/= 1 month (severe)  % Weight Loss: > 20% in 1 year (severe)  Subcutaneous Fat Loss: Unable to assess  Muscle Loss: Unable to assess  Fluid Accumulation/Edema: None noted  Malnutrition Diagnosis: Severe malnutrition in the context of chronic illness    NUTRITION DIAGNOSIS  Inadequate oral intake related to decreased appetite/intake and inability to feed self as evidenced by pt's daughter report and ~33% weight loss in 1-1.5 years (per pts UBW) w/ +ketones in urine.    INTERVENTIONS  Implementation  Nutrition Education: Unable to complete w/ pt due to patient not in room at time of visit. Left handouts at bedside: tips to increase calories in your diet and tips to increase protein in your diet.  Discussed with daughter starting supplements between meals.    Goals  Calorie counts to meet at least 2/3rds of estimated needs (~790kcals and 32g protein)     Monitoring/Evaluation  Progress toward goals will be monitored and evaluated per protocol.    Janine Hawkins RD, LD  W/E: [phone]

## 2017-11-11 NOTE — PLAN OF CARE
Problem: Fall Risk (Adult)  Goal: Identify Related Risk Factors and Signs and Symptoms  Related risk factors and signs and symptoms are identified upon initiation of Human Response Clinical Practice Guideline (CPG).   Outcome: No Change  VS with HNT, oral pta antihypertensive given. A&Ox4. Neuros with BUE 2/5, BLE 3/5, weak hand grasp, numbness/tingling in fingertips, intermittent n/t in toes, [place] baseline wears hearing aides,  L<R. Pain managed with norco. Voiding via commode, pt retaining, cont to check pvr's. BS +. PIV SL. Up with A2/GB pivot to commode. Reg. Diet, total feed. Unable to complete admission this shift d/t [place], family bringing hearing aid batteries today. Cont to monitor and with POC.

## 2017-11-11 NOTE — CONSULTS
[facility] Consult Service - Internal Medicine Note          Patient: Yuni Otoole  MRN: [id number]  Admission Date: 11/10/2017  Hospital Day # 1    Reason for Consultation  I was asked to see Yuni Otoole For pre-op evaluation evaluation before her C spine surgery..    Assessment & Plan: Yuni Otoole is a 82 year old female with HTN controlled with captopril and hydrochlorthiazide, and hyperlipidemia on statin. She has been struggling with C spine issue and symptoms related to that since her fall in 2016. 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 She has no history of any CAD or heart failure or stroke. No recent stroke or ACS or chest pain episodes.    1-Preop evaluation- Given her age and partially dependent functional status and normal creatinine, no major cardiopulmonary issues, she has a small (0.31%) risk of myocardial infarction or cardiac arrest per [facility] Perioperative cardiac risk assessment score.    -Okay to proceed with surgery as long as patient understands risks and benefits  -Medicine service will continue to follow  -Continue Captopril and Statin  -optimizing pulmonary function and if GA is planned we recommend good pulmonary toilet, Incentive spirometry and RT assessment.  -She has a murmur but asymptomatic and has had carotid USG in 2014 and all her Health maintenance seems to be up to date per chart review.      2- hyponatremia - mild and asymptomatic and likely chronic. 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   -free water restriction up to 800cc/day  -K and mag levels optimized    3-HTN- continuing captopril and holding HCTZ    4-HLD- continuing statin    CODE: prior per 2016 FC  DVT: per primary service  Diet/fluids: orals     Disposition: depends upon post-op course      Pt's care was discussed with bedside RN    Ajit Akins  Internal Medicine Staff Hospitalist Service   [facility]   Pager: [phone]    Consult Team: Medicine [facility] 9  Page Cross Cover  after 5 pm: pager [phone]     ___________________________________________________________________    Subjective & Interval Hx:  Feels well, unable to use hands and unable to feed herself ?weakness and numbness. Hearing aid batteries not here yet and so she has tough time hearing.    Last 24 hr care team notes reviewed.   ROS:  4 point ROS including Respiratory, CV, GI and , other than that noted in the HPI, is negative.     Medications: Reviewed in EPIC.     Physical Exam:    Blood pressure 131/73, pulse 71, temperature 98.2  F (36.8  C), temperature source Oral, resp. rate 16, SpO2 100 %, not currently breastfeeding.  GENERAL: Appears alert and oriented times three.   HEENT: Eye symmetrical and free of discharge bilaterally. Mucous membranes moist and without lesions.  NECK: Supple and without lymphadenopathy.   CV: RRR, S1S2 present systolic ejection murmur  RESPIRATORY: Respirations regular, even, and unlabored. Lungs CTA throughout.   GI: Soft and non distended with normoactive bowel sounds present in all quadrants. No tenderness, rebound, guarding. No organomegaly.   EXTREMITIES: No peripheral edema. 2+ bilateral pedal pulses.   SKIN: No jaundice. No rashes or lesions.       ADMIT DATE: 11/10/2017  DATE OF CONSULT: 11/11/2017    PCP: Jonathan Vincent  ROS: A 10 point review of systems is negative unless otherwise noted in HPI.     PMH:  Past Medical History:   Diagnosis Date     Allergic rhinitis, cause unspecified      Pure hypercholesterolemia      Unspecified essential hypertension        PSH:  Past Surgical History:   Procedure Laterality Date     CAROTID ENDARTERECTOMY  11/07/08     COLONOSCOPY  05/30/07    Diverticulosis-return in 5 yrs     HC DRAIN/INJ MAJOR JOINT/BURSA W/O US  2009    Right SI Joint     HC DRAIN/INJ MAJOR JOINT/BURSA W/O US  2010    Right interarticular hip injection     HC INJ EPIDURAL CERVICAL/THORACIC W/WO CONTRAST  2010    C6-7     HC INJ EPIDURAL LUMBAR/SACRAL W/WO CONTRAST   2009    L5-S1     HC INJ TRANSFORAMIN EPIDURAL, LUMB/SACR SINGLE  2010     HC REMOVAL OF TONSILS,12+ Y/O      Tonsils 14+y.o.       MEDICATIONS:  Current Facility-Administered Medications   Medication     naloxone (NARCAN) injection 0.1-0.4 mg     potassium chloride SA (K-DUR/KLOR-CON M) CR tablet 20-40 mEq     potassium chloride (KLOR-CON) Packet 20-40 mEq     potassium chloride 10 mEq in 100 mL sterile water intermittent infusion (premix)     potassium chloride 10 mEq in 100 mL intermittent infusion with 10 mg lidocaine     potassium chloride 20 mEq in 50 mL intermittent infusion     magnesium sulfate 2 g in NS intermittent infusion (PharMEDium or FV Cmpd)     magnesium sulfate 4 g in 100 mL sterile water (premade)     potassium phosphate 10 mmol in D5W 250 mL intermittent infusion     potassium phosphate 15 mmol in D5W 250 mL intermittent infusion     potassium phosphate 20 mmol in D5W 500 mL intermittent infusion     potassium phosphate 20 mmol in D5W 250 mL intermittent infusion     potassium phosphate 25 mmol in D5W 500 mL intermittent infusion     ondansetron (ZOFRAN-ODT) ODT tab 4 mg    Or     ondansetron (ZOFRAN) injection 4 mg     prochlorperazine (COMPAZINE) injection 5 mg    Or     prochlorperazine (COMPAZINE) tablet 5 mg    Or     prochlorperazine (COMPAZINE) Suppository 12.5 mg     metoclopramide (REGLAN) tablet 5 mg    Or     metoclopramide (REGLAN) injection 5 mg     captopril (CAPOTEN) tablet 25 mg     diazepam (VALIUM) tablet 5 mg     HYDROcodone-acetaminophen (NORCO) 5-325 MG per tablet 1 tablet     potassium chloride SA (K-DUR/KLOR-CON M) CR tablet 10 mEq     pravastatin (PRAVACHOL) tablet 10 mg       ALLERGIES:     Allergies   Allergen Reactions     Atorvastatin Calcium      Was on Lipitor and has muscle problem       FAMILY HISTORY:  Family History   Problem Relation Age of Onset     CANCER Mother      colon cancer  at age 95 or 96   surgery done     GASTROINTESTINAL DISEASE Mother       stomach problems     OSTEOPOROSIS Mother      EYE* Mother      cataract and macular degen.     Cardiovascular Father       at age 60  MI     Hypertension Father      ?     Circulatory Paternal Grandmother      legs     CEREBROVASCULAR DISEASE Paternal Grandmother      Obesity Paternal Grandmother      Gynecology Sister      hysterectomy     Lipids Brother      was on meds.  ? still     Obesity Maternal Grandmother      Musculoskeletal Disorder Daughter      cancerous tumor left upper arm/shoulder- was told it was a breast type cancer     DIABETES Maternal Aunt          PHYSICAL EXAM:  Blood pressure 146/76, pulse 84, temperature 98.1  F (36.7  C), temperature source Oral, resp. rate 16, SpO2 99 %, not currently breastfeeding.    LABS:  CMP  Recent Labs  Lab 17  0801 11/10/17  1723   * 127*   POTASSIUM 3.3* 3.4   CHLORIDE 92* 91*   CO2 27 28   ANIONGAP 7 8   GLC 77 92   BUN 11 11   CR 0.36* 0.38*   GFRESTIMATED >90 >90   GFRESTBLACK >90 >90   ANNETTA 8.9 8.4*   MAG 1.7  --    PHOS 2.9  --    PROTTOTAL  --  6.5*   ALBUMIN 3.1* 3.5   BILITOTAL  --  0.4   ALKPHOS  --  68   AST  --  16   ALT  --  18     CBC  Recent Labs  Lab 17  0801 11/10/17  1723   WBC 6.5 8.8   RBC 3.69* 3.95   HGB 10.9* 11.8   HCT 33.1* 35.5   MCV 90 90   MCH 29.5 29.9   MCHC 32.9 33.2   RDW 13.5 13.2    372     INRNo lab results found in last 7 days.    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 Lungs normal.

## 2017-11-11 NOTE — PLAN OF CARE
Problem: Patient Care Overview  Goal: Plan of Care/Patient Progress Review  Outcome: No Change  Pt admitted for increased weakness and known cervical stenosis. VSS. Pain controlled with Norco prn q6. Neuros unchanged; Aox4, all ext 2/3, unable to feed self, [place], n/t hands and numbness in ankles. Pt joints very stiff and creaky. Voiding on commode, unable to void via bedpan. No BM. Regular diet, anthony counts, needs to be fed. Up with heavy A2. PIV SL. K+ replaced, needs another 20meq at 1645. Plan for OR tomorrow. Continue to monitor. 750ml H20 restriction d/t NA level, can drink other liquids; had 200ml H2O this shift.

## 2017-11-11 NOTE — PROGRESS NOTES
SPIRITUAL HEALTH SERVICES  SPIRITUAL ASSESSMENT Progress Note  [facility] ([place]) 6A     REFERRAL SOURCE: Hospital  Request    Attempted to visit Yuni but she was sleeping    PLAN: I will try back later or make the on call 11/12 aware of this request     Anastasia Koenig  Chaplain Resident  Pager [phone]

## 2017-11-12 ENCOUNTER — ANESTHESIA (OUTPATIENT)
Dept: SURGERY | Facility: CLINIC | Age: 82
DRG: 453 | End: 2017-11-12
Payer: MEDICARE

## 2017-11-12 LAB
ABO + RH BLD: NORMAL
ABO + RH BLD: NORMAL
ALBUMIN UR-MCNC: NEGATIVE MG/DL
ANION GAP SERPL CALCULATED.3IONS-SCNC: 5 MMOL/L (ref 3–14)
APPEARANCE UR: ABNORMAL
APTT PPP: 25 SEC (ref 22–37)
BILIRUB UR QL STRIP: NEGATIVE
BLD GP AB SCN SERPL QL: NORMAL
BLD PROD TYP BPU: NORMAL
BLOOD BANK CMNT PATIENT-IMP: NORMAL
BUN SERPL-MCNC: 11 MG/DL (ref 7–30)
CALCIUM SERPL-MCNC: 8.6 MG/DL (ref 8.5–10.1)
CHLORIDE SERPL-SCNC: 100 MMOL/L (ref 94–109)
CO2 SERPL-SCNC: 27 MMOL/L (ref 20–32)
COLOR UR AUTO: ABNORMAL
CREAT SERPL-MCNC: 0.42 MG/DL (ref 0.52–1.04)
ERYTHROCYTE [DISTWIDTH] IN BLOOD BY AUTOMATED COUNT: 13.7 % (ref 10–15)
GFR SERPL CREATININE-BSD FRML MDRD: >90 ML/MIN/1.7M2
GLUCOSE SERPL-MCNC: 90 MG/DL (ref 70–99)
GLUCOSE UR STRIP-MCNC: NEGATIVE MG/DL
HCT VFR BLD AUTO: 33.2 % (ref 35–47)
HGB BLD-MCNC: 10.7 G/DL (ref 11.7–15.7)
HGB UR QL STRIP: NEGATIVE
INR PPP: 1.01 (ref 0.86–1.14)
KETONES UR STRIP-MCNC: NEGATIVE MG/DL
LEUKOCYTE ESTERASE UR QL STRIP: NEGATIVE
MAGNESIUM SERPL-MCNC: 1.8 MG/DL (ref 1.6–2.3)
MCH RBC QN AUTO: 29.6 PG (ref 26.5–33)
MCHC RBC AUTO-ENTMCNC: 32.2 G/DL (ref 31.5–36.5)
MCV RBC AUTO: 92 FL (ref 78–100)
NITRATE UR QL: NEGATIVE
NUM BPU REQUESTED: 2
PH UR STRIP: 7 PH (ref 5–7)
PHOSPHATE SERPL-MCNC: 2.5 MG/DL (ref 2.5–4.5)
PLATELET # BLD AUTO: 293 10E9/L (ref 150–450)
POTASSIUM SERPL-SCNC: 4.7 MMOL/L (ref 3.4–5.3)
RBC # BLD AUTO: 3.62 10E12/L (ref 3.8–5.2)
RBC #/AREA URNS AUTO: 1 /HPF (ref 0–2)
SODIUM SERPL-SCNC: 132 MMOL/L (ref 133–144)
SOURCE: ABNORMAL
SP GR UR STRIP: 1.01 (ref 1–1.03)
SPECIMEN EXP DATE BLD: NORMAL
SQUAMOUS #/AREA URNS AUTO: <1 /HPF (ref 0–1)
UROBILINOGEN UR STRIP-MCNC: NORMAL MG/DL (ref 0–2)
WBC # BLD AUTO: 6.7 10E9/L (ref 4–11)
WBC #/AREA URNS AUTO: 6 /HPF (ref 0–2)

## 2017-11-12 PROCEDURE — 86850 RBC ANTIBODY SCREEN: CPT | Performed by: NEUROLOGICAL SURGERY

## 2017-11-12 PROCEDURE — 36415 COLL VENOUS BLD VENIPUNCTURE: CPT | Performed by: STUDENT IN AN ORGANIZED HEALTH CARE EDUCATION/TRAINING PROGRAM

## 2017-11-12 PROCEDURE — A9270 NON-COVERED ITEM OR SERVICE: HCPCS | Mod: GY | Performed by: STUDENT IN AN ORGANIZED HEALTH CARE EDUCATION/TRAINING PROGRAM

## 2017-11-12 PROCEDURE — 85027 COMPLETE CBC AUTOMATED: CPT | Performed by: STUDENT IN AN ORGANIZED HEALTH CARE EDUCATION/TRAINING PROGRAM

## 2017-11-12 PROCEDURE — 99232 SBSQ HOSP IP/OBS MODERATE 35: CPT | Performed by: INTERNAL MEDICINE

## 2017-11-12 PROCEDURE — 25000132 ZZH RX MED GY IP 250 OP 250 PS 637: Mod: GY | Performed by: INTERNAL MEDICINE

## 2017-11-12 PROCEDURE — 81001 URINALYSIS AUTO W/SCOPE: CPT | Performed by: STUDENT IN AN ORGANIZED HEALTH CARE EDUCATION/TRAINING PROGRAM

## 2017-11-12 PROCEDURE — 25000132 ZZH RX MED GY IP 250 OP 250 PS 637: Mod: GY | Performed by: STUDENT IN AN ORGANIZED HEALTH CARE EDUCATION/TRAINING PROGRAM

## 2017-11-12 PROCEDURE — 83735 ASSAY OF MAGNESIUM: CPT | Performed by: STUDENT IN AN ORGANIZED HEALTH CARE EDUCATION/TRAINING PROGRAM

## 2017-11-12 PROCEDURE — 80048 BASIC METABOLIC PNL TOTAL CA: CPT | Performed by: STUDENT IN AN ORGANIZED HEALTH CARE EDUCATION/TRAINING PROGRAM

## 2017-11-12 PROCEDURE — 84100 ASSAY OF PHOSPHORUS: CPT | Performed by: STUDENT IN AN ORGANIZED HEALTH CARE EDUCATION/TRAINING PROGRAM

## 2017-11-12 PROCEDURE — 12000008 ZZH R&B INTERMEDIATE UMMC

## 2017-11-12 PROCEDURE — 86901 BLOOD TYPING SEROLOGIC RH(D): CPT | Performed by: NEUROLOGICAL SURGERY

## 2017-11-12 PROCEDURE — 27210794 ZZH OR GENERAL SUPPLY STERILE: Performed by: NEUROLOGICAL SURGERY

## 2017-11-12 PROCEDURE — 85730 THROMBOPLASTIN TIME PARTIAL: CPT | Performed by: STUDENT IN AN ORGANIZED HEALTH CARE EDUCATION/TRAINING PROGRAM

## 2017-11-12 PROCEDURE — 25000128 H RX IP 250 OP 636: Performed by: STUDENT IN AN ORGANIZED HEALTH CARE EDUCATION/TRAINING PROGRAM

## 2017-11-12 PROCEDURE — 85610 PROTHROMBIN TIME: CPT | Performed by: STUDENT IN AN ORGANIZED HEALTH CARE EDUCATION/TRAINING PROGRAM

## 2017-11-12 PROCEDURE — 36415 COLL VENOUS BLD VENIPUNCTURE: CPT | Performed by: NEUROLOGICAL SURGERY

## 2017-11-12 PROCEDURE — 86900 BLOOD TYPING SEROLOGIC ABO: CPT | Performed by: NEUROLOGICAL SURGERY

## 2017-11-12 RX ORDER — LIDOCAINE 40 MG/G
CREAM TOPICAL
Status: DISCONTINUED | OUTPATIENT
Start: 2017-11-12 | End: 2017-11-12 | Stop reason: HOSPADM

## 2017-11-12 RX ORDER — SODIUM CHLORIDE, SODIUM LACTATE, POTASSIUM CHLORIDE, CALCIUM CHLORIDE 600; 310; 30; 20 MG/100ML; MG/100ML; MG/100ML; MG/100ML
INJECTION, SOLUTION INTRAVENOUS CONTINUOUS
Status: DISCONTINUED | OUTPATIENT
Start: 2017-11-12 | End: 2017-11-12

## 2017-11-12 RX ORDER — SODIUM CHLORIDE, SODIUM LACTATE, POTASSIUM CHLORIDE, CALCIUM CHLORIDE 600; 310; 30; 20 MG/100ML; MG/100ML; MG/100ML; MG/100ML
INJECTION, SOLUTION INTRAVENOUS CONTINUOUS
Status: DISCONTINUED | OUTPATIENT
Start: 2017-11-12 | End: 2017-11-12 | Stop reason: HOSPADM

## 2017-11-12 RX ORDER — HYDRALAZINE HYDROCHLORIDE 20 MG/ML
10-20 INJECTION INTRAMUSCULAR; INTRAVENOUS EVERY 30 MIN PRN
Status: DISCONTINUED | OUTPATIENT
Start: 2017-11-12 | End: 2017-11-17

## 2017-11-12 RX ADMIN — DIAZEPAM 5 MG: 5 TABLET ORAL at 22:22

## 2017-11-12 RX ADMIN — HYDROCODONE BITARTRATE AND ACETAMINOPHEN 1 TABLET: 5; 325 TABLET ORAL at 19:32

## 2017-11-12 RX ADMIN — PRAVASTATIN SODIUM 10 MG: 10 TABLET ORAL at 21:33

## 2017-11-12 RX ADMIN — HYDRALAZINE HYDROCHLORIDE 10 MG: 20 INJECTION INTRAMUSCULAR; INTRAVENOUS at 22:23

## 2017-11-12 RX ADMIN — HYDROCODONE BITARTRATE AND ACETAMINOPHEN 1 TABLET: 5; 325 TABLET ORAL at 06:26

## 2017-11-12 RX ADMIN — CAPTOPRIL 25 MG: 25 TABLET ORAL at 07:41

## 2017-11-12 RX ADMIN — HYDROCODONE BITARTRATE AND ACETAMINOPHEN 1 TABLET: 5; 325 TABLET ORAL at 13:03

## 2017-11-12 RX ADMIN — METOPROLOL TARTRATE 12.5 MG: 25 TABLET, FILM COATED ORAL at 19:32

## 2017-11-12 ASSESSMENT — LIFESTYLE VARIABLES: TOBACCO_USE: 1

## 2017-11-12 NOTE — PLAN OF CARE
Problem: Fall Risk (Adult)  Goal: Identify Related Risk Factors and Signs and Symptoms  Related risk factors and signs and symptoms are identified upon initiation of Human Response Clinical Practice Guideline (CPG).   Outcome: No Change  VSS. A&Ox4. Neuros with BUE and BLE 2/5, weak hand grasp, numbness/tingling in fingertips, intermittent n/t in toes, [place] baseline wears hearing aides,  L<R. Pain managed with norco but declined medication overnight. Voiding via commode, refused toileting offer overnight. BS +. PIV  At 50mL/hr. Up with heavy A2/GB pivot to commode. NPO. Plan for OR today. Cont to monitor and with POC.

## 2017-11-12 NOTE — PLAN OF CARE
Problem: Patient Care Overview  Goal: Plan of Care/Patient Progress Review  Outcome: No Change  Pt admitted for increased weakness and known cervical stenosis, plan for surgery tomorrow . VSS. Pain controlled with Norco prn q6. Neuros unchanged; Aox4, all ext 2/3, unable to feed self, [place], n/t hands and numbness in ankles. Pt joints very stiff and creaky. Voiding on commode, transfers with assist of two and gait belt. Pt being repositioned q2-3hrs. No BM. Regular diet, anthony counts, needs to be fed. NS @ 50cc/hr. K+ replaced, recheck 4.8.  750ml H20 restriction d/t NA level, can drink other liquids; had 360ml H2O this shift. Family present beginning of shift, wants to be updated if surgery time is known. Will continue to monitor.

## 2017-11-12 NOTE — PROGRESS NOTES
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  Plan for posterior and anterior cervical fusion today. MRI cervical spine obtained.     Plan:  - Neuro checks q4h  - Pain control  - NPO with mIVF @ 50 cc/hr  - Nutrition consult appreciated  - Shoulder XR demonstrated severe R shoulder subluxation. Discussed with orthopedics who ok'd for surgery, and recommended normal precautions when moving arm.   - Medicine pre-op evaluation completed.   - SCDs for DVT proph      The patient was discussed with the neurosurgery chief resident, who agrees with the above stated plan.      Giulia Chang MD  Neurosurgery PGY3      Overnight events:   No acute overnight events, stable. Discussed with Dr. Merritt yesterday surgical options and elected to proceed with posterior/anterior fusion today. Consented and marked.     PE:  Blood pressure 134/81, pulse 69, temperature 97.9  F (36.6  C), temperature source Oral, resp. rate 16, SpO2 97 %, not currently breastfeeding.  NEUROLOGIC:  -- Awake; Alert; oriented x 3  -- Follows commands briskly  -- Speech fluent, spontaneous. No aphasia or dysarthria.  -- no gaze preference. No apparent hemineglect.  Cranial Nerves:  -- visual fields full to confrontation, PERRL 3-2mm bilat and brisk, extraocular movements intact  -- face symmetrical, tongue midline  -- sensory V1-V3 intact bilaterally  -- palate elevates symmetrically, uvula midline  -- hearing grossly intact bilat  -- Trapezii 5/5 strength bilat symmetric  -- Cerebellar: intact rapid alternating motions bilaterally    Motor:  Decreased bulk; no tremor, rigidity, or bradykinesia.  Clear muscle wasting without fasciculations  No Pronator Drift     Delt Bi Tri FE IP Quad Hamst TibAnt EHL Gastroc    C5 C6 C7 C8/T1 L2 L3  L4-S1 L4 L5 S1   R 2 4 4 4 2 4 4 5 5 5   L 2 4 4 4 2 4 4 5 5 5     Sensory:   intact to light touch    Reflexes:     Bi Tri BR Roman Pat Ach Bab    C5-6 C7-8 C6 UMN L2-4 S1 UMN   R 2+ 2+ 2+ Norm 3+ 3+ Upgoing   L 2+ 2+ 2+ Norm 3+ 3+ Upgoing     3-4 beats of clonus in LLE    Gait: Deferred

## 2017-11-12 NOTE — ANESTHESIA PREPROCEDURE EVALUATION
Anesthesia Evaluation     . Pt has had prior anesthetic. Type: General    No history of anesthetic complications          ROS/MED HX    ENT/Pulmonary:     (+)tobacco use, Past use , . .    Neurologic: Comment: Cervical stenosis      Cardiovascular: Comment: Pt will hold captopril related to tomorrow's surgery with concern for BP management    (+) hypertension----. : . . . :. . Previous cardiac testing date:results:date: results:ECG reviewed date: results:LAE date: results:          METS/Exercise Tolerance:     Hematologic:     (+) Anemia, -      Musculoskeletal: Comment: LEFT HUMERAL HEAD IS SUBLUXED and unstable. Will be addressed after cervical surgery.         GI/Hepatic:  - neg GI/hepatic ROS       Renal/Genitourinary:  - ROS Renal section negative       Endo:  - neg endo ROS       Psychiatric:  - neg psychiatric ROS       Infectious Disease:  - neg infectious disease ROS       Malignancy:      - no malignancy   Other:    (+) H/O Chronic Pain,H/O chronic opiod use ,                    Physical Exam      Airway   Mallampati: II  TM distance: <3 FB  Neck ROM: limited  Comment: Pt with no loose teeth or dentures. Some missing. All teeth present with expected signs of aging. NO ability to move neck without paraesthesias in limbs.  Did not ask pt to move neck given issues with potentially unstable C-spine.    Dental   (+) missing    Cardiovascular   Rhythm and rate: regular and normal      Pulmonary    breath sounds clear to auscultation                    Anesthesia Plan      History & Physical Review  History and physical reviewed and following examination; no interval change.    ASA Status:  3 .    NPO Status:  > 8 hours    Plan for General and ETT with Intravenous induction. Maintenance will be TIVA.    PONV prophylaxis:  Ondansetron (or other 5HT-3) and Dexamethasone or Solumedrol  Additional equipment: Videolaryngoscope, 2nd IV, Arterial Line, Central Line, Fiberoptic bronchoscope and Difficult Airway Cart (Pt  with good mouth opening. Given concern for C-spine stability will perform awake FOB-assisted intubation with neuro assessment immediately afterwards.)   - Discussed with patient and family with risks of prone positioning for surgery including vision loss and further neck injury.    - Careful positioning of left shoulder as the humerous is dislocated at this time.    - Standard ASA monitors    - Abx per surgical team    - Verbally instructed floor nursing teams to withhold ACE(I) tonight but to give metoprolol as ordered. BP systolic max at ~140 this afternoon.    _____________________________________________  11/15/2017@0725:  Last dose ACEI 11/12.  I have review Dr. López protocol for coag/blood management for major spine surgery and it is reasonable.  Will maintain MAP goal 85-95 (baseline MAP ~) to maintain adequate spinal cord perfusion.  I discussed the risks and benefits of general anesthesia, awake FOB intubation, [place], CVC with the patient, including, but not limited to, bleeding, infection, pneumothorax requiring chest tube, stroke MI, postoperative respiratory failure, and death.  Questions were sought and answered.      Deon Singh MD  Attending Anesthesiologist        Postoperative Care  Postoperative pain management:  IV analgesics and Oral pain medications.  Plan for postoperative opioid use.    Consents  Anesthetic plan, risks, benefits and alternatives discussed with:  Patient, Daughter/Son and Spouse.  Use of blood products discussed: Yes.   Use of blood products discussed with Patient.  Consented to blood products.  .        ANESTHESIA PREOP EVALUATION    Procedure: Procedure(s):  3 Stage Surgery,  Stage 1- Stealth Posterior C1-T3 Fusion, Earl, Prone, Velez, OARM, Monitoring, Synthes Synapse, Cell Saver. Stage 2 -Anterior Supine, Oni fernandez, Reg Table,  Stage 3 Posterior position Earl Table  - Wound Class: I-Clean    HPI: Yuni Otoole is a 82 year old female  presenting for above procedure for cervical stenosis.    PMHx/PSHx/ROS:  Past Medical History:   Diagnosis Date     Allergic rhinitis, cause unspecified      Pure hypercholesterolemia      Unspecified essential hypertension        Past Surgical History:   Procedure Laterality Date     CAROTID ENDARTERECTOMY  11/07/08     COLONOSCOPY  05/30/07    Diverticulosis-return in 5 yrs     HC DRAIN/INJ MAJOR JOINT/BURSA W/O US  2009    Right SI Joint     HC DRAIN/INJ MAJOR JOINT/BURSA W/O US  2010    Right interarticular hip injection     HC INJ EPIDURAL CERVICAL/THORACIC W/WO CONTRAST  2010    C6-7     HC INJ EPIDURAL LUMBAR/SACRAL W/WO CONTRAST  2009    L5-S1     HC INJ TRANSFORAMIN EPIDURAL, LUMB/SACR SINGLE  2010     HC REMOVAL OF TONSILS,12+ Y/O      Tonsils 14+y.o.         Past Anes Hx: No personal or family h/o anesthesia problems    Soc Hx:   Social History   Substance Use Topics     Smoking status: Former Smoker     Packs/day: 0.50     Years: 10.00     Quit date: 1/1/1996     Smokeless tobacco: Never Used     Alcohol use No       Allergies:   Allergies   Allergen Reactions     Atorvastatin Calcium      Was on Lipitor and has muscle problem       Meds:   Prescriptions Prior to Admission   Medication Sig Dispense Refill Last Dose     pravastatin (PRAVACHOL) 10 MG tablet Take 1 tablet by mouth  daily at bedtime 90 tablet 1 11/10/2017 at Unknown time     captopril (CAPOTEN) 25 MG tablet Take 1 tablet by mouth  twice a day 180 tablet 1 11/10/2017 at Unknown time     hydrochlorothiazide (HYDRODIURIL) 25 MG tablet Take 2 tablets by mouth  daily 180 tablet 1 11/10/2017 at Unknown time     potassium chloride SA (K-DUR/KLOR-CON M) 10 MEQ CR tablet Take 1 tablet by mouth  twice a day 180 tablet 1 11/10/2017 at Unknown time     HYDROcodone-acetaminophen (NORCO) 5-325 MG per tablet Take 1-2 tablets by mouth every 4 hours as needed for moderate to severe pain or pain 90 tablet 0 11/10/2017 at Unknown time     aspirin 81 MG tablet  Take 1 tablet by mouth daily.   Past Week at Unknown time     CALCIUM + D 600-200 MG-UNIT OR TABS 1 TABLET DAILY 30 0 11/10/2017 at Unknown time     fentaNYL (DURAGESIC) 25 mcg/hr 72 hr patch Place 1 patch onto the skin every 72 hours 4 patch 0 More than a month at Unknown time     diazepam (VALIUM) 5 MG tablet Take 1 tablet (5 mg) by mouth 2 times daily 180 tablet 3 More than a month at Unknown time     order for DME Equipment being ordered: Shower chair 1 Device 0 Taking       No current outpatient prescriptions on file.       Physical Exam:  Vitals: /80  Pulse 71  Temp 36.3  C (97.4  F) (Oral)  Resp 16  SpO2 98%  BMI= There is no height or weight on file to calculate BMI.      Labs:  UPT: No results found for: HCGQUANT      BMP:  Recent Labs   Lab Test  11/12/17   0546   NA  132*   POTASSIUM  4.7   CHLORIDE  100   CO2  27   BUN  11   CR  0.42*   GLC  90   ANNETTA  8.6     CBC:   Recent Labs   Lab Test  11/11/17   0801   WBC  6.5   RBC  3.69*   HGB  10.9*   HCT  33.1*   MCV  90   MCH  29.5   MCHC  32.9   RDW  13.5   PLT  360     Coags:  Recent Labs   Lab Test  11/12/17   0546   INR  1.01   PTT  25       Assessment/Plan:  - ASA 3  - GETA with standard ASA monitors, IV induction, balanced anesthetic  - PIV x2 with arterial line  - Antibiotics per surgery  - PONV prophylaxis  - Blood products available, possible administration discussed with patient  - Relevant risks, benefits, alternatives and the anesthetic plan were discussed with patient/family or family representative.  All questions were answered and there was agreement to proceed.      Demetrius TYSON, D.O.    11/12/2017  6:37 AM  ______________________________________________________________________  (Below from earlier planned surgery day):  History and physical assessed; Patient examined. I have reviewed and agree with this pre-op assessment and anesthetic plan with addendums as necessary.     Risks and alternatives presented and discussed.      *After  discussion with Dr. Merritt regarding patient taking her captopril this morning, decision made by both anesthesia and neurosurgery to delay surgery due to potential for significant post-induction and intra-operative hypotension due to ace-inhibitor which would be dangerous in this patient with severe cervical stenosis.*    Jayme Matute MD  Staff Anesthesiologist  *[phone]  ____________________________________________________

## 2017-11-12 NOTE — PROGRESS NOTES
Calorie Counts  Intake recorded for: 11/11 Kcals: 1222  Protein: 52g  # Meals Recorded: 100% scrambled eggs with cheese, rice krispy bar, chicken noodle soup, 75% banana bread with butter, 33% pears  # Supplements Recorded: 100% Ensure Shake

## 2017-11-12 NOTE — PROGRESS NOTES
[facility] - Internal Medicine follow up Note   Date of Service: 11/12/2017  Patient: Yuni Otoole  MRN: [id number]  Admission Date: 11/10/2017  Hospital Day # 2     Updates today:  -HTnesive may be in the setting of not receiving HCTZ-starting metoprolol 12.5 bid  -K higher side with being on ACE (captopril), we will stop the scheduled potassium    Assessment & Plan: Yuni Otoole is a 82 year old female with HTN controlled with captopril and hydrochlorthiazide, and hyperlipidemia on statin. She has been struggling with C spine issue and symptoms related to that since her fall in 2016. 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 She has no history of any CAD or heart failure or stroke. No recent stroke or ACS or chest pain episodes.     1-Preop evaluation- Given her age and partially dependent functional status and normal creatinine, no major cardiopulmonary issues, she has a small (0.31%) risk of myocardial infarction or cardiac arrest per [facility] Perioperative cardiac risk assessment score.     -Okay to proceed with surgery as long as patient understands risks and benefits  -Medicine service will continue to follow  -Continue Captopril and Statin  -optimizing pulmonary function and if GA is planned we recommend good pulmonary toilet, Incentive spirometry and RT assessment.  -She has a murmur but asymptomatic and has had carotid USG in 2014 and all her Health maintenance seems to be up to date per chart review.       2- hyponatremia - resolved. mild and asymptomatic and likely chronic. 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   -free water restriction up to 800cc/day  -K and mag levels optimized     3-HTN- continuing captopril and holding HCTZ, started metoprolol.     4-HLD- continuing statin     CODE: prior per 2016 FC  DVT: per primary service  Diet/fluids: orals                                                                   Disposition: depends upon post-op course        Pt's care was  discussed with bedside BULMARO Akins  Internal Medicine Staff Hospitalist Service   [facility]   Pager: [phone]     Consult Team: Cathryn Barlow 9  Page Cross Cover after 5 pm: pager [phone]    ___________________________________________________________________    Subjective & Interval Hx: feels well, no new complains    Last 24 hr care team notes reviewed.   ROS:  4 point ROS including Respiratory, CV, GI and , other than that noted in the HPI, is negative    Medications: Reviewed in EPIC. List below for reference    Physical Exam:    Blood pressure 170/82, pulse 82, temperature 99  F (37.2  C), temperature source Oral, resp. rate 16, SpO2 98 %, not currently breastfeeding.    GENERAL: Appears alert and oriented times three.   HEENT: Eye symmetrical and free of discharge bilaterally. Mucous membranes moist and without lesions.  NECK: Supple and without lymphadenopathy.   CV: RRR, S1S2 present systolic ejection murmur  RESPIRATORY: Respirations regular, even, and unlabored. Lungs CTA throughout.   GI: Soft and non distended with normoactive bowel sounds present in all quadrants. No tenderness, rebound, guarding. No organomegaly.   EXTREMITIES: No peripheral edema. 2+ bilateral pedal pulses.   SKIN: No jaundice. No rashes or lesions.     Lines/Tubes:   Peripheral IV 11/10/17 Left Upper arm (Active)   Site Assessment WDL 11/12/2017 12:16 PM   Line Status Saline locked 11/12/2017 12:16 PM   Phlebitis Scale 0-->no symptoms 11/12/2017 12:16 PM   Infiltration Scale 0 11/12/2017 12:16 PM   Number of days:2       Labs & Studies of Note: I personally reviewed the following studies:labs and imaging    Blood cultures:  Unresulted Labs Ordered in the Past 30 Days of this Admission     No orders found from 9/11/2017 to 11/11/2017.          No results found for: CULT      BMP  Recent Labs  Lab 11/12/17  0546 11/11/17  2138 11/11/17  0801 11/10/17  1723   * 130* 126* 127*   POTASSIUM 4.7 4.8  3.3* 3.4   CHLORIDE 100  --  92* 91*   ANNETTA 8.6  --  8.9 8.4*   CO2 27  --  27 28   BUN 11  --  11 11   CR 0.42*  --  0.36* 0.38*   GLC 90  --  77 92       CBC  Recent Labs  Lab 11/12/17  0546 11/11/17  0801 11/10/17  1723   WBC 6.7 6.5 8.8   RBC 3.62* 3.69* 3.95   HGB 10.7* 10.9* 11.8   HCT 33.2* 33.1* 35.5   MCV 92 90 90   MCH 29.6 29.5 29.9   MCHC 32.2 32.9 33.2   RDW 13.7 13.5 13.2    360 372       INR  Recent Labs  Lab 11/12/17  0546   INR 1.01       LFTs  Recent Labs  Lab 11/11/17  0801 11/10/17  1723   ALKPHOS  --  68   AST  --  16   ALT  --  18   BILITOTAL  --  0.4   PROTTOTAL  --  6.5*   ALBUMIN 3.1* 3.5        PANC  Recent Labs  Lab 11/10/17  1723   LIPASE 126         Medications list for Reference (delete if desired)  Current Facility-Administered Medications   Medication     dexmedetomidine (PRECEDEX) 400 mcg in NaCl 0.9 % 100 mL infusion     phenylephrine (TRICIA-SYNEPHRINE) 50 mg in NaCl 0.9 % 250 mL infusion     SUFentanil (SUFENTA) 100 mcg in NaCl 0.9 % 50 mL infusion     metoprolol (LOPRESSOR) half-tab 12.5 mg     naloxone (NARCAN) injection 0.1-0.4 mg     potassium chloride SA (K-DUR/KLOR-CON M) CR tablet 20-40 mEq     potassium chloride (KLOR-CON) Packet 20-40 mEq     potassium chloride 10 mEq in 100 mL sterile water intermittent infusion (premix)     potassium chloride 10 mEq in 100 mL intermittent infusion with 10 mg lidocaine     potassium chloride 20 mEq in 50 mL intermittent infusion     magnesium sulfate 2 g in NS intermittent infusion (PharMEDium or FV Cmpd)     magnesium sulfate 4 g in 100 mL sterile water (premade)     potassium phosphate 10 mmol in D5W 250 mL intermittent infusion     potassium phosphate 15 mmol in D5W 250 mL intermittent infusion     potassium phosphate 20 mmol in D5W 500 mL intermittent infusion     potassium phosphate 20 mmol in D5W 250 mL intermittent infusion     potassium phosphate 25 mmol in D5W 500 mL intermittent infusion     ondansetron (ZOFRAN-ODT) ODT tab  4 mg    Or     ondansetron (ZOFRAN) injection 4 mg     prochlorperazine (COMPAZINE) injection 5 mg    Or     prochlorperazine (COMPAZINE) tablet 5 mg    Or     prochlorperazine (COMPAZINE) Suppository 12.5 mg     metoclopramide (REGLAN) tablet 5 mg    Or     metoclopramide (REGLAN) injection 5 mg     0.9% sodium chloride infusion     captopril (CAPOTEN) tablet 25 mg     diazepam (VALIUM) tablet 5 mg     HYDROcodone-acetaminophen (NORCO) 5-325 MG per tablet 1 tablet     pravastatin (PRAVACHOL) tablet 10 mg     Ajit Akins MD  Internal Medicine  [phone]

## 2017-11-12 NOTE — PROGRESS NOTES
"SPIRITUAL HEALTH SERVICES  [facility] ([place] 6A  ON-CALL VISIT     REFERRAL SOURCE: On-call  visit with pt, per request for hospital  visit as noted in initial nursing assessment.     Pt had gone down to OR for a procedure, but procedure was cancelled. Pt welcomed  visit, said \"they cancelled my surgery, which would have been a long one, because of a drug I was given this morning.\" Pt said she has good support from family and [employer] ([facility]). I oriented pt to Spiritual Health Services, and shared prayer and encouragement.     PLAN: unit  will be informed of visit.     Renny Vega) Keisha Love M.Div., [facility]  Staff   Pager [phone]                                                                                          "

## 2017-11-12 NOTE — PLAN OF CARE
Problem: Patient Care Overview  Goal: Plan of Care/Patient Progress Review  Outcome: No Change  Pt admitted for increased weakness and known cervical stenosis. VSS. Pain controlled with Norco prn q6. Neuros unchanged; AOx4, all ext 2/3, unable to feed self, [place], n/t hands and numbness in ankles. Pt joints very stiff and creaky. Voiding. No BM. NPO until 1300. Regular diet, anthony counts, needs to be fed. Up with heavy A2. PIV SL. 750ml H20 restriction d/t NA level, can drink other liquids; 100 ml this shift. OR cancelled today d/t HTN meds. Continue to monitor.

## 2017-11-12 NOTE — OR NURSING
Face to face pre op time [id number].  Pt report called up to floor.  Transport back to floor arranged.

## 2017-11-13 ENCOUNTER — APPOINTMENT (OUTPATIENT)
Dept: CT IMAGING | Facility: CLINIC | Age: 82
DRG: 453 | End: 2017-11-13
Attending: NURSE PRACTITIONER
Payer: MEDICARE

## 2017-11-13 DIAGNOSIS — M40.12 OTHER SECONDARY KYPHOSIS, CERVICAL REGION: ICD-10-CM

## 2017-11-13 DIAGNOSIS — G95.9 CERVICAL MYELOPATHY (H): Primary | ICD-10-CM

## 2017-11-13 LAB
ALBUMIN SERPL-MCNC: 3 G/DL (ref 3.4–5)
ANION GAP SERPL CALCULATED.3IONS-SCNC: 7 MMOL/L (ref 3–14)
BLD PROD TYP BPU: NORMAL
BLD PROD TYP BPU: NORMAL
BLD UNIT ID BPU: 0
BLD UNIT ID BPU: 0
BLOOD PRODUCT CODE: NORMAL
BLOOD PRODUCT CODE: NORMAL
BPU ID: NORMAL
BPU ID: NORMAL
BUN SERPL-MCNC: 7 MG/DL (ref 7–30)
CALCIUM SERPL-MCNC: 8.7 MG/DL (ref 8.5–10.1)
CHLORIDE SERPL-SCNC: 95 MMOL/L (ref 94–109)
CO2 SERPL-SCNC: 27 MMOL/L (ref 20–32)
CREAT SERPL-MCNC: 0.32 MG/DL (ref 0.52–1.04)
DEPRECATED CALCIDIOL+CALCIFEROL SERPL-MC: 17 UG/L (ref 20–75)
GFR SERPL CREATININE-BSD FRML MDRD: >90 ML/MIN/1.7M2
GLUCOSE SERPL-MCNC: 92 MG/DL (ref 70–99)
INTERPRETATION ECG - MUSE: NORMAL
OSMOLALITY SERPL: 264 MMOL/KG (ref 280–301)
OSMOLALITY UR: 489 MMOL/KG (ref 100–1200)
POTASSIUM SERPL-SCNC: 3.6 MMOL/L (ref 3.4–5.3)
SODIUM SERPL-SCNC: 126 MMOL/L (ref 133–144)
SODIUM SERPL-SCNC: 129 MMOL/L (ref 133–144)
SODIUM UR-SCNC: 82 MMOL/L
TRANSFUSION STATUS PATIENT QL: NORMAL

## 2017-11-13 PROCEDURE — 84295 ASSAY OF SERUM SODIUM: CPT | Performed by: NURSE PRACTITIONER

## 2017-11-13 PROCEDURE — 80048 BASIC METABOLIC PNL TOTAL CA: CPT | Performed by: INTERNAL MEDICINE

## 2017-11-13 PROCEDURE — 83930 ASSAY OF BLOOD OSMOLALITY: CPT | Performed by: NURSE PRACTITIONER

## 2017-11-13 PROCEDURE — 70498 CT ANGIOGRAPHY NECK: CPT

## 2017-11-13 PROCEDURE — 12000008 ZZH R&B INTERMEDIATE UMMC

## 2017-11-13 PROCEDURE — 25000128 H RX IP 250 OP 636: Performed by: NURSE PRACTITIONER

## 2017-11-13 PROCEDURE — 36415 COLL VENOUS BLD VENIPUNCTURE: CPT | Performed by: NURSE PRACTITIONER

## 2017-11-13 PROCEDURE — A9270 NON-COVERED ITEM OR SERVICE: HCPCS | Mod: GY | Performed by: NEUROLOGICAL SURGERY

## 2017-11-13 PROCEDURE — 25000132 ZZH RX MED GY IP 250 OP 250 PS 637: Mod: GY | Performed by: STUDENT IN AN ORGANIZED HEALTH CARE EDUCATION/TRAINING PROGRAM

## 2017-11-13 PROCEDURE — 83935 ASSAY OF URINE OSMOLALITY: CPT | Performed by: INTERNAL MEDICINE

## 2017-11-13 PROCEDURE — 25000128 H RX IP 250 OP 636: Performed by: STUDENT IN AN ORGANIZED HEALTH CARE EDUCATION/TRAINING PROGRAM

## 2017-11-13 PROCEDURE — A9270 NON-COVERED ITEM OR SERVICE: HCPCS | Mod: GY | Performed by: STUDENT IN AN ORGANIZED HEALTH CARE EDUCATION/TRAINING PROGRAM

## 2017-11-13 PROCEDURE — 36415 COLL VENOUS BLD VENIPUNCTURE: CPT | Performed by: INTERNAL MEDICINE

## 2017-11-13 PROCEDURE — 82040 ASSAY OF SERUM ALBUMIN: CPT | Performed by: INTERNAL MEDICINE

## 2017-11-13 PROCEDURE — 25000132 ZZH RX MED GY IP 250 OP 250 PS 637: Mod: GY | Performed by: NEUROLOGICAL SURGERY

## 2017-11-13 PROCEDURE — 25000132 ZZH RX MED GY IP 250 OP 250 PS 637: Mod: GY | Performed by: INTERNAL MEDICINE

## 2017-11-13 PROCEDURE — 82306 VITAMIN D 25 HYDROXY: CPT | Performed by: INTERNAL MEDICINE

## 2017-11-13 PROCEDURE — 84300 ASSAY OF URINE SODIUM: CPT | Performed by: INTERNAL MEDICINE

## 2017-11-13 PROCEDURE — 25000125 ZZHC RX 250: Performed by: STUDENT IN AN ORGANIZED HEALTH CARE EDUCATION/TRAINING PROGRAM

## 2017-11-13 PROCEDURE — 99232 SBSQ HOSP IP/OBS MODERATE 35: CPT | Performed by: INTERNAL MEDICINE

## 2017-11-13 RX ORDER — MULTIVIT WITH MINERALS/LUTEIN
250 TABLET ORAL DAILY
Status: DISCONTINUED | OUTPATIENT
Start: 2017-11-13 | End: 2017-11-13

## 2017-11-13 RX ORDER — AMOXICILLIN 250 MG
2 CAPSULE ORAL 2 TIMES DAILY
Status: DISCONTINUED | OUTPATIENT
Start: 2017-11-13 | End: 2017-11-20

## 2017-11-13 RX ORDER — ERGOCALCIFEROL 1.25 MG/1
50000 CAPSULE, LIQUID FILLED ORAL
Status: DISCONTINUED | OUTPATIENT
Start: 2017-11-13 | End: 2017-11-16

## 2017-11-13 RX ORDER — HEPARIN SODIUM 5000 [USP'U]/.5ML
5000 INJECTION, SOLUTION INTRAVENOUS; SUBCUTANEOUS EVERY 8 HOURS
Status: DISCONTINUED | OUTPATIENT
Start: 2017-11-13 | End: 2017-11-14

## 2017-11-13 RX ORDER — BISACODYL 10 MG
10 SUPPOSITORY, RECTAL RECTAL ONCE
Status: COMPLETED | OUTPATIENT
Start: 2017-11-13 | End: 2017-11-13

## 2017-11-13 RX ORDER — IOPAMIDOL 755 MG/ML
75 INJECTION, SOLUTION INTRAVASCULAR ONCE
Status: COMPLETED | OUTPATIENT
Start: 2017-11-13 | End: 2017-11-13

## 2017-11-13 RX ORDER — SODIUM CHLORIDE 9 MG/ML
INJECTION, SOLUTION INTRAVENOUS CONTINUOUS
Status: DISCONTINUED | OUTPATIENT
Start: 2017-11-13 | End: 2017-11-13

## 2017-11-13 RX ORDER — POLYETHYLENE GLYCOL 3350 17 G/17G
17 POWDER, FOR SOLUTION ORAL 3 TIMES DAILY
Status: DISCONTINUED | OUTPATIENT
Start: 2017-11-13 | End: 2017-11-19

## 2017-11-13 RX ORDER — ASCORBIC ACID 500 MG
1000 TABLET ORAL DAILY
Status: DISCONTINUED | OUTPATIENT
Start: 2017-11-14 | End: 2017-11-20

## 2017-11-13 RX ADMIN — HEPARIN SODIUM 5000 UNITS: 5000 INJECTION, SOLUTION INTRAVENOUS; SUBCUTANEOUS at 06:21

## 2017-11-13 RX ADMIN — ONDANSETRON 4 MG: 2 INJECTION INTRAMUSCULAR; INTRAVENOUS at 13:20

## 2017-11-13 RX ADMIN — VITAMIN E CAP 100 UNIT 100 UNITS: 100 CAP at 11:30

## 2017-11-13 RX ADMIN — HYDROCODONE BITARTRATE AND ACETAMINOPHEN 1 TABLET: 5; 325 TABLET ORAL at 23:06

## 2017-11-13 RX ADMIN — SODIUM CHLORIDE: 9 INJECTION, SOLUTION INTRAVENOUS at 17:38

## 2017-11-13 RX ADMIN — BISACODYL 10 MG: 10 SUPPOSITORY RECTAL at 21:30

## 2017-11-13 RX ADMIN — CHOLECALCIFEROL CAP 125 MCG (5000 UNIT) 5000 UNITS: 125 CAP at 14:34

## 2017-11-13 RX ADMIN — Medication 1 TABLET: at 18:28

## 2017-11-13 RX ADMIN — PROCHLORPERAZINE EDISYLATE 5 MG: 5 INJECTION INTRAMUSCULAR; INTRAVENOUS at 17:56

## 2017-11-13 RX ADMIN — Medication 220 MG: at 23:06

## 2017-11-13 RX ADMIN — ASCORBIC ACID TAB 250 MG 250 MG: 250 TAB at 11:31

## 2017-11-13 RX ADMIN — PRAVASTATIN SODIUM 10 MG: 10 TABLET ORAL at 21:30

## 2017-11-13 RX ADMIN — HEPARIN SODIUM 5000 UNITS: 5000 INJECTION, SOLUTION INTRAVENOUS; SUBCUTANEOUS at 14:34

## 2017-11-13 RX ADMIN — SENNOSIDES AND DOCUSATE SODIUM 2 TABLET: 8.6; 5 TABLET ORAL at 11:30

## 2017-11-13 RX ADMIN — SODIUM CHLORIDE, PRESERVATIVE FREE 90 ML: 5 INJECTION INTRAVENOUS at 17:27

## 2017-11-13 RX ADMIN — Medication 220 MG: at 11:30

## 2017-11-13 RX ADMIN — POLYETHYLENE GLYCOL 3350 17 G: 17 POWDER, FOR SOLUTION ORAL at 18:27

## 2017-11-13 RX ADMIN — Medication 220 MG: at 18:27

## 2017-11-13 RX ADMIN — IOPAMIDOL 75 ML: 755 INJECTION, SOLUTION INTRAVENOUS at 17:27

## 2017-11-13 RX ADMIN — POLYETHYLENE GLYCOL 3350 17 G: 17 POWDER, FOR SOLUTION ORAL at 23:06

## 2017-11-13 RX ADMIN — METOPROLOL TARTRATE 12.5 MG: 25 TABLET, FILM COATED ORAL at 09:38

## 2017-11-13 RX ADMIN — HYDROCODONE BITARTRATE AND ACETAMINOPHEN 1 TABLET: 5; 325 TABLET ORAL at 01:14

## 2017-11-13 RX ADMIN — Medication 10000 UNITS: at 11:31

## 2017-11-13 RX ADMIN — POLYETHYLENE GLYCOL 3350 17 G: 17 POWDER, FOR SOLUTION ORAL at 11:31

## 2017-11-13 RX ADMIN — SENNOSIDES AND DOCUSATE SODIUM 2 TABLET: 8.6; 5 TABLET ORAL at 21:30

## 2017-11-13 RX ADMIN — HEPARIN SODIUM 5000 UNITS: 5000 INJECTION, SOLUTION INTRAVENOUS; SUBCUTANEOUS at 21:30

## 2017-11-13 RX ADMIN — METOPROLOL TARTRATE 12.5 MG: 25 TABLET, FILM COATED ORAL at 21:30

## 2017-11-13 NOTE — PROGRESS NOTES
Neurosurgery Daily Progress Note  11/13/2017    Overnight events/subjective: Surgery postponed due to possible medication interactions. Continued difficulty with initiating a urine stream.    O/ /78 (BP Location: Right arm)  Pulse 82  Temp 98  F (36.7  C) (Oral)  Resp 16  SpO2 98%  Exam:   Gen: Laying in bed, not in acute distress  MS: A&Ox3, Speech fluent and conversant   CN: Pupils round and reactive, extraocular movements intact, face symmetric, tongue midline, uvula & palate elevate symmetrically, difficult of hearing  Motor:    Delt Bi Tri FE IP Quad Hamst TibAnt EHL Gastroc     C5 C6 C7 C8/T1 L2 L3 L4-S1 L4 L5 S1   R 2 4 4 4 2 4 4 5 5 5   L 2 4 4 4 2 4 4 5 5 5     Sensory: intact to light touch   Unable to test drift testing    Non labored breathing    IMG: reviewed     LABS: reviewed     A/P: Yuni Otoole is a 82 year old with known C2/3 spinal canal stenosis and kyphotic deformity presenting with continued worsening weakness and an inability to care for herself at home.    - Hold PTA captopril  - Hold PTA ASA  - Bladder scan  - 800mL fluid restriction for hyponatremia  - Regular diet  - Medicine pre-op evaluation: safe to proceed with surgery  - Orthopedics shoulder evaluation: safe to proceed with surgery, normal arm manipulation precautions  - Plan to stage case: Stage 1 Wednesday, Stage 2 Friday  - SCDs and SQH for DVT ppx  - PO diet for GI ppx    Per Dr. López:  - Will work with Nutritionist to meet 1.5g protein/kg body weight/day  - Serum albumin level  - Vitamin A, C, E, and zinc supplementation  - cortisol @ 0800 to assess for adrenal insufficiency  - 25-OH vitamin D level and treat to a goal of 50    Dispo: anticipated d/c 11/24, pending surgical intervention and general post-operative barriers to discharge.      Please contact the neurosurgery resident on call with questions by dialing [phone], then entering [phone] when prompted

## 2017-11-13 NOTE — PROGRESS NOTES
Nutrition Brief:    MD consult received regarding patient at high-risk for post-operative wound complications because of exceedingly poor nutritional status. Consulted for goal protein intake of 1.5 grams protein per kg body weight per day. Please also suggest supplement equivocal to Arginaid and Kai.    Nutrition Services already following pt per Provider Order on 11/11 for malnutrition. Please send RD note on 11/11. Pt currently on a Regular diet and is receiving Ensure Plus betw meals. Pt commented that she didn't care for the supplement and would rather consume yogurt and cottage.     Calorie Counts ordered on 11/11 x 3 days. Yesterday's intake poor (347 kcals and 12 g PRO) since pt was NPO for part of the day d/t plan for surgery. Intake on 11/11 was her best intake so far at 1222 kcals and 52 g PRO which meets  31 kcals/kg and 1.3 g PRO/kg.    Intervention;  1. Discontinued Ensure Plus shake per pt request.  2. Order entered for pt to receive cottage cheese at 10 am and yogurt at 2 pm.  3. Provide education and samples of additional protein modulars (Pro-Stat Sugar Free - provides 100 calories and 15 g PRO per serving and Arginaid which contains 25 kcals and 4.5 g L-Arginine per serving. Recommendation is 2 servings per day of Arginaid supplement).     Belinda Briggs RD,LD  Pager [phone]

## 2017-11-13 NOTE — PROGRESS NOTES
[facility] - Internal Medicine Daily Note   Date of Service: 11/13/2017  Patient: Yuni Otoole  MRN: [id number]  Admission Date: 11/10/2017  Hospital Day # 3     New recommendations:    -Please feel free to increase metoprolol to 25 bid for BP control as needed for surgery  -TSH, and urine and serum Osm and urine Na to help diagnose etiology for euvolemic hyponatremia    Assessment & Plan: Yuni Otoole is a 82 year old female with HTN controlled with captopril and hydrochlorthiazide, and hyperlipidemia on statin. She has been struggling with C spine issue and symptoms related to that since her fall in 2016. 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 She has no history of any CAD or heart failure or stroke. No recent stroke or ACS or chest pain episodes.      1-Preop evaluation- Given her age and partially dependent functional status and normal creatinine, no major cardiopulmonary issues, she has a small (0.31%) risk of myocardial infarction or cardiac arrest per [facility] Perioperative cardiac risk assessment score.      -Okay to proceed with surgery as long as patient understands risks and benefits  -Medicine service will continue to follow  -Captopril and Statin-per primary service  -optimizing pulmonary function and if GA is planned we recommend good pulmonary toilet, Incentive spirometry and RT assessment.  -She has a murmur but asymptomatic and has had carotid USG in 2014 and all her Health maintenance seems to be up to date per chart review.        2- hyponatremia -mild asymptomatic and likely chronic. 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   -free water restriction up to 800cc/day  -K and mag levels optimized  -Urine Na and urine and serum Osm pending,  -TSH to rule out hypothyroidism      3-HTN- holding captopril and holding HCTZ, started metoprolol-okay to titrate this as needed and as tolerated.      4-HLD- continuing statin      CODE: prior per 2016 FC  DVT: per primary  service  Diet/fluids: orals                                                                   Disposition: depends upon post-op course          Pt's care was discussed with bedside RN      Ajit Akins  Internal Medicine Staff Hospitalist Service   [facility]   Pager: [phone]  Team: Cathryn Bernardo  Page Cross Cover after 5 pm: pager [phone]     ___________________________________________________________________    Subjective & Interval Hx: feels well, no more difficulty with urination.    Last 24 hr care team notes reviewed.   ROS:  4 point ROS including Respiratory, CV, GI and , other than that noted in the HPI, is negative    Medications: Reviewed in EPIC. List below for reference    Physical Exam:    Blood pressure 138/78, pulse 82, temperature 98  F (36.7  C), temperature source Oral, resp. rate 16, SpO2 98 %, not currently breastfeeding.    GENERAL: Appears alert and oriented times three. Hard of hearing   CV: RRR, S1S2 present systolic ejection murmur  RESPIRATORY: Respirations regular, even, and unlabored. Lungs CTA throughout.   GI: Soft and non distended with normoactive bowel sounds present in all quadrants. No tenderness, rebound, guarding. No organomegaly.   EXTREMITIES: No peripheral edema. 2+ bilateral pedal pulses.   SKIN: No jaundice. No rashes or lesions.   Lines/Tubes:   Peripheral IV 11/10/17 Left Upper arm (Active)   Site Assessment WDL 11/13/2017  3:57 AM   Line Status Saline locked 11/13/2017  3:57 AM   Phlebitis Scale 0-->no symptoms 11/13/2017  3:57 AM   Infiltration Scale 0 11/13/2017  3:57 AM   Number of days:3       Labs & Studies of Note: I personally reviewed the following studies:labs and imaging    Blood cultures:  Unresulted Labs Ordered in the Past 30 Days of this Admission     Date and Time Order Name Status Description    11/13/2017 0759 Vitamin D Deficiency In process           No results found for: CULT      BMP  Recent Labs  Lab 11/13/17  0759  11/12/17  0546 11/11/17  2138 11/11/17  0801 11/10/17  1723   * 132* 130* 126* 127*   POTASSIUM 3.6 4.7 4.8 3.3* 3.4   CHLORIDE 95 100  --  92* 91*   ANNETTA 8.7 8.6  --  8.9 8.4*   CO2 27 27  --  27 28   BUN 7 11  --  11 11   CR 0.32* 0.42*  --  0.36* 0.38*   GLC 92 90  --  77 92       CBC  Recent Labs  Lab 11/12/17  0546 11/11/17  0801 11/10/17  1723   WBC 6.7 6.5 8.8   RBC 3.62* 3.69* 3.95   HGB 10.7* 10.9* 11.8   HCT 33.2* 33.1* 35.5   MCV 92 90 90   MCH 29.6 29.5 29.9   MCHC 32.2 32.9 33.2   RDW 13.7 13.5 13.2    360 372       INR  Recent Labs  Lab 11/12/17  0546   INR 1.01       LFTs  Recent Labs  Lab 11/13/17  0759 11/11/17  0801 11/10/17  1723   ALKPHOS  --   --  68   AST  --   --  16   ALT  --   --  18   BILITOTAL  --   --  0.4   PROTTOTAL  --   --  6.5*   ALBUMIN 3.0* 3.1* 3.5        PANC  Recent Labs  Lab 11/10/17  1723   LIPASE 126         Medications list for Reference (delete if desired)  Current Facility-Administered Medications   Medication     senna-docusate (SENOKOT-S;PERICOLACE) 8.6-50 MG per tablet 2 tablet     polyethylene glycol (MIRALAX/GLYCOLAX) Packet 17 g     heparin sodium PF injection 5,000 Units     zinc sulfate solution 220 mg     ascorbic acid (VITAMIN C) tablet 250 mg     vitamin A capsule 10,000 Units     vitamin E (TOCOPHEROL) capsule 100 Units     0.9% sodium chloride infusion     metoprolol (LOPRESSOR) half-tab 12.5 mg     hydrALAZINE (APRESOLINE) injection 10-20 mg     naloxone (NARCAN) injection 0.1-0.4 mg     potassium chloride SA (K-DUR/KLOR-CON M) CR tablet 20-40 mEq     potassium chloride (KLOR-CON) Packet 20-40 mEq     potassium chloride 10 mEq in 100 mL sterile water intermittent infusion (premix)     potassium chloride 10 mEq in 100 mL intermittent infusion with 10 mg lidocaine     potassium chloride 20 mEq in 50 mL intermittent infusion     magnesium sulfate 2 g in NS intermittent infusion (PharMEDium or FV Cmpd)     magnesium sulfate 4 g in 100 mL sterile water  (premade)     potassium phosphate 10 mmol in D5W 250 mL intermittent infusion     potassium phosphate 15 mmol in D5W 250 mL intermittent infusion     potassium phosphate 20 mmol in D5W 500 mL intermittent infusion     potassium phosphate 20 mmol in D5W 250 mL intermittent infusion     potassium phosphate 25 mmol in D5W 500 mL intermittent infusion     ondansetron (ZOFRAN-ODT) ODT tab 4 mg    Or     ondansetron (ZOFRAN) injection 4 mg     prochlorperazine (COMPAZINE) injection 5 mg    Or     prochlorperazine (COMPAZINE) tablet 5 mg    Or     prochlorperazine (COMPAZINE) Suppository 12.5 mg     metoclopramide (REGLAN) tablet 5 mg    Or     metoclopramide (REGLAN) injection 5 mg     diazepam (VALIUM) tablet 5 mg     HYDROcodone-acetaminophen (NORCO) 5-325 MG per tablet 1 tablet     pravastatin (PRAVACHOL) tablet 10 mg     Ajit Akins MD  Internal Medicine  [phone]

## 2017-11-13 NOTE — PLAN OF CARE
Problem: Patient Care Overview  Goal: Individualization & Mutuality  Outcome: No Change  Pt here with cervical stenosis; plan was for all day surgery yesterday but had to be canceled d/t having BP meds in the AM. AVSS   HTN with in parameters. Neuros unchanged: AOx4, generalized weakness (BUE & BLE 2/3), unable to feed self, [place], and n/t hands and bilat. Feet at times. Pt joints very stiff and creaky. Pain well controlled with PRN [place] this shift. On regular diet with fluid restriction (just water restricted other fluids ok) of 750 ml; pt only drank apple juice this shift. VDSP on commode and incont. In bed while sleeping x1. Plan for OR on Wednesday and possible Friday too if needed. Will continue to monitor and follow with POC.

## 2017-11-13 NOTE — PLAN OF CARE
Problem: Patient Care Overview  Goal: Plan of Care/Patient Progress Review  Outcome: No Change  Pt is on 6A with cervical stenosis and progressive weakness. 2/5 strength in all extremities. Pt was disoriented to place this morning, thinking she was at home. Became agitated and refused to take morning meds. This afternoon, pt has been cooperative and pleasant. Denies pain. Up with lift to commode and recliner. Pt has not had incontinence today. Voiding. Urine osmolality collected. Calorie counts. Total feed. Fair appetite. Now NPO until CT scan this afternoon is completed. Vascular to place new PIV for continuous fluids. 750mL water restriction. No bm. BS present. Zofran given for nausea and gagging. No emesis. Denied pain. Percocet available. Surgery likely on Wednesday. Continue to monitor.

## 2017-11-13 NOTE — PROGRESS NOTES
"    Neurosurgery Attending Progress Note    Yuni Otoole is an 83 yo female admitted 11/11/17 with progressively worsening cervical kyphotic deformity with severe cervical stenosis, admitted with progressive quadriparesis and inability to feed or care for self. Patient was initially seen in clinic by Dr. Yuan 9/21/17, then admitted while Dr. Merritt was on call 11/11/17, and I was asked to take over her care.  She endorses neck pain, weakness/numbness in bilateral upper extremities, inability to feed herself due to inability to grasp utensils, difficulty with urination, loss of appetite, and weight loss.        IMAGING per my own measurement and interpretation:  Xrays: standing 36\" cassette 9/21/17  My impression is of severe cervical kyphoscoliosis with chin on chest deformity, and lumbar kyphoscoliosis with flatback deformity.    CBVA 49 degrees     C2-C7 kyphosis: 63 degrees  C2-C7 SVA: 57 mm  Sagittal vertical alignment (C7 mady line to posterior corner of sacrum): 38mm  T1PA: 5 degrees  Central sacral vertical line (coronal C7 mady line to center of sacrum):15mm to left of midline  Scoliosis: 47 degrees left L1-L4; cervical scoliosis measured from coronal supine CT is 21 degrees left C2-C7    Pelvic Incidence: 61 degrees degrees  Lumbar Lordosis: 26 degrees  PI-LL mismatch: negative 35 degrees    Thoracic kyphosis T5-T12:  34 degrees  T10-L2 kyphosis:0 degrees        Resulted Imaging/Labs:  Bone Density: no DEXA available    MR CERVICAL SPINE W/O CONTRAST 11/11/2017   5.5 mm anterolisthesis of C2 on C3 which has progressed since 7/16/2017 (previously 4.5 mm). Severe spinal canal stenosis at C2-3 level which has worsened since the prior exam. Narrowest point of the canal is measures 3.5 mm. There is severe cord compression at C1-C3.  Severe bilateral foraminal stenosis essentially C3-T1.    Vitamin D:  Vitamin D Deficiency Screening Results:  Lab Results   Component Value Date    VITDT 17 (L) 11/13/2017 "     Nutritional Status:  BMI 18.18    Lab Results   Component Value Date    ALBUMIN 3.0 11/13/2017       Diabetes Screening:  No results found for: A1C    Nicotine Usage:  No (quit smoking cigarettes in 1996)                Physical Exam   Constitutional: Oriented to person, place, and time. Appears frail and malnourished. Unable to hold chin off of chest for more than 1 minute due to severe kyphosis.    Impaired short-term memory  Cognition screening: impaired. Names 5 animals in 60 seconds.  Unable to stand unassisted due to weakness and frailty.    Neck: Severely limited cervical mobility at baseline.   Chin-Brow Vertical Angle 45 degrees while seated    Neurological: CN II-XII intact bilaterally with note made of decreased hearing bilaterally.   Decreased sensation bilateral upper extremities throughout, hands worse than upper arms.      Displays upgoing toes/positive Babinski's sign bilaterally.      +inverted radial reflex bilaterally       +ankle clonus L>R            STRENGTH LEFT RIGHT   Deltoid 2 2 ** right humeral head superior subluxation   Bicep 3 4   Wrist Extensor 2 2   Tricep 2 2   Finger flexion 0 3   Finger abduction 0 3    0 3       Hip Flexion     3     3   Knee Extension 5 5   Ankle Dorsiflexion 5 5   Extensor Hallucis Longus 5 5   Plantar Flexion 5 5         ASSESSMENT:  Yuni Otoole is a 82 year old female with progressively worsening myelopathy due to severe cervical kyphoscoliosis deformity with stenosis, and severe malnutrition with failure to thrive.    PLAN:  I spent 64 (sixty-four) minutes in direct face-to-face time with patient and seven members of her family (her , three children and their spouses) discussing detailed plan.    C2-T3 posterior-anterior-posterior 540-degrees fusion; Part 1: C1-C3 posterior decompression and C2-T3 instrumentation with multilevel osteotomies; Part 2: C4-5, C6-7, possible C2-3 anterior diskectomy and interbody placement possible additional  levels, Part 3: C2-T3 posterior fusion with deformity correction.  Surgery may need to be staged due to patient's age and complexity.     Patient is highest-risk category for perioperative complications due to magnitude of surgery needed, malnutrition, age, suspected osteoporosis/known vitamin D deficiency.    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 I relayed to the patient and family my concern for high risk for perioperative morbidity and mortality.  I told her she absolutely will need a nasojejunal feeding tube for perioperative nutrition, and potentially a PEG tube.  She has high risk of ventilator dependency and potential need for trach.  She has very high risk of instrumentation complications from poor bone density.  She has very high risk of infection and wound non-healing due to poor nutritional status.       Hyponatremia- will check 8am cortisol to ensure no component of adrenal insufficiency. Patient is being treated currently with a fluid restriction.     Malnutrition- This patient is high-risk for post-operative wound healing complications because of exceedingly poor nutritional status.                         -Nutrition consult for goal protein intake of 1.5 grams protein per kg body weight per day- added Arginaid, continue Ensure Plus                        -serum albumin shows severe malnutrition (3.0 on 11/13/17)                        -vitamin A, C, E, and zinc supplementation ordered for wound healing supplementation     Osteoporosis- we cannot check DEXA as inpatient, will need to use intraoperative augmentation techniques if poor bone density encountered;   Vitamin D deficiency- 25-OH vitamin D level is 17; started on ergocalciferol 50,000IU every 3 days x 30 days, and cholecalciferol 5000 IU daily x 6 months; needs to have repeat 25-OH vitamin D level in 4 weeks.    We will obtain a CTA neck to evaluate vertebral artery  course for osteotomy planning.  She will need to have subcutanous heparin held 11/14 PM in preparation for surgery, and type and cross for 4 units of PRBC for surgery, anticipated blood loss will be 1000ml with 10-12 hours of operative time.      I discussed the above co-morbidities with patient and her family, and I also discussed surgical risks in great detail including high risks of possible paralysis and ventilator dependence, possible trach/PEG, possible failure to improve, or fracture of instrumentation or adjacent segment with need for further surgery, possible infection and failure of wound healing.     I spoke about the need for neck immobilization with cervical spine precautions for general endotracheal intubation for anesthesia, and also for the need to keep the blood pressure elevated to ensure continued spinal cord perfusion during induction of anesthesia and during surgery itself until pressure is relieved on the spinal cord.  I discussed surgical risk including bleeding, infection, nerve or spinal cord damage, failure to heal/failure to form fusion/instrumentation failure, CSF leak, weakness/paralysis, numbness, worsening pain or failure to improve including neuropathic pain, recurrence of problem, and potential for development of instability or adjacent segment disease, and potential need for further procedures or surgeries. I discussed potential of failure to improve or even worsening despite surgery.  I specifically discussed injury to the carotid artery and internal jugular vein and vertebral artery with potential bleeding or stroke, injury to the superior laryngeal nerve resulting in hoarseness, retraction injury to the esophagus or penetration injury to the esophagus causing dysphagia with potential need for temporary or permanent feeding tube placement, formation of a hematoma resulting in neurologic deficit, worse C5 palsy resulting in shoulder and bicep plegia, spinal cord injury with  resulting pain, numbness, or weakness that may be permanent or even paralysis.    I discussed the risks of general anesthesia including stroke, heart attack, pneumonia, prolonged intubation, blood clot, pulmonary embolism, and urinary tract infection. I discussed risks of positioning including pressure ulcerations, skin tears or abrasions, pressure neuropathies, or even rarely blindness.        I spent 64 (sixty-four) minutes in direct face-to-face time with patient and seven members of her family (her , three children and their spouses), answering questions to their stated satisfaction. I spent an additional 90 minutes in coordination of care, imaging review, laboratory review, discussing case with other providers including bedside nurse.          Enma López MD    [facility] Department of Neurosurgery  Office: [phone]  Pager: [phone]    11/13/2017  1:20 PM

## 2017-11-13 NOTE — PROGRESS NOTES
Calorie Counts    Intake Recorded For: 11/12 Kcals: 347 Protein: 12g    # Meals Recorded: 2 meals   (First: 100% chicken soup, 4 oz apple juice)    (Second: 100% pudding, orange)    # Supplements Recorded: 0

## 2017-11-13 NOTE — PROGRESS NOTES
83 yo female with progressively worsening cervical kyphotic deformity with severe cervical stenosis, admitted with progressive quadriparesis and inability to feed or care for self. Patient was initially seen in clinic by Dr. Yuan 9/21/17, then admitted while Dr. Merritt was on call 11/11/17, and I was asked to take over her care.    Patient is high-risk for perioperative complications due to malnutrition (BMI 18), age, suspected osteoporosis.  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    Hyponatremia- will check 8am cortisol to ensure no component of adrenal insufficiency. Patient is being treated currently with a fluid restriction.    Malnutrition- This patient is high-risk for post-operative wound healing complications because of exceedingly poor nutritional status.    -Nutrition consult for goal protein intake of 1.5 grams protein per kg body weight per day- asked Nutrition to please also suggest supplement equivocal to Arginaid and Kai.    -Check serum albumin level now for baseline.   -vitamin A, C, E, and zinc supplementation ordered for wound healing supplementation    Osteoporosis- we cannot check DEXA as inpatient, will need to use intraoperative augmentation techniques if poor bone density encountered; check 25-OH vitamin D level now and aggressively treat to obtain goal of 50

## 2017-11-13 NOTE — PLAN OF CARE
Problem: Patient Care Overview  Goal: Plan of Care/Patient Progress Review  Outcome: No Change  Pt admitted for increased weakness and known cervical stenosis. Pt /82 , spoke with MD at beginning of shift, order for scheduled metoprolol. BP still elevated after administration, MD updated. PRN hydralazine given with repeat /72.  Pain controlled with Norco and Valium prn q6. Neuros unchanged; AOx4, all ext 2/3, unable to feed self, [place], n/t hands and numbness in ankles. Pt joints very stiff and creaky. Pt up to chair and commode with heavy assist of 2. Regular diet, anthony counts, needs to be fed. . PIV SL. 750ml H20 restriction d/t NA level, can drink other liquids; 360 ml this shift. Pt has hesitancy with voiding, pt straight cathed for 275ml after voiding 150ml. Continue to monitor.

## 2017-11-14 LAB
ALBUMIN UR-MCNC: 10 MG/DL
AMORPH CRY #/AREA URNS HPF: ABNORMAL /HPF
ANION GAP SERPL CALCULATED.3IONS-SCNC: 8 MMOL/L (ref 3–14)
APPEARANCE UR: ABNORMAL
BACTERIA #/AREA URNS HPF: ABNORMAL /HPF
BILIRUB UR QL STRIP: NEGATIVE
BUN SERPL-MCNC: 18 MG/DL (ref 7–30)
CALCIUM SERPL-MCNC: 9.3 MG/DL (ref 8.5–10.1)
CHLORIDE SERPL-SCNC: 93 MMOL/L (ref 94–109)
CO2 SERPL-SCNC: 26 MMOL/L (ref 20–32)
COLOR UR AUTO: YELLOW
CORTIS SERPL-MCNC: 21.6 UG/DL (ref 4–22)
CREAT SERPL-MCNC: 0.36 MG/DL (ref 0.52–1.04)
GFR SERPL CREATININE-BSD FRML MDRD: >90 ML/MIN/1.7M2
GLUCOSE SERPL-MCNC: 90 MG/DL (ref 70–99)
GLUCOSE UR STRIP-MCNC: NEGATIVE MG/DL
HGB UR QL STRIP: NEGATIVE
KETONES UR STRIP-MCNC: NEGATIVE MG/DL
LEUKOCYTE ESTERASE UR QL STRIP: ABNORMAL
NITRATE UR QL: NEGATIVE
OSMOLALITY SERPL: 269 MMOL/KG (ref 280–301)
PH UR STRIP: 6.5 PH (ref 5–7)
POTASSIUM SERPL-SCNC: 4 MMOL/L (ref 3.4–5.3)
RBC #/AREA URNS AUTO: 1 /HPF (ref 0–2)
SODIUM SERPL-SCNC: 128 MMOL/L (ref 133–144)
SOURCE: ABNORMAL
SP GR UR STRIP: 1.02 (ref 1–1.03)
TSH SERPL DL<=0.005 MIU/L-ACNC: 2.91 MU/L (ref 0.4–4)
UROBILINOGEN UR STRIP-MCNC: 4 MG/DL (ref 0–2)
WBC #/AREA URNS AUTO: 4 /HPF (ref 0–2)

## 2017-11-14 PROCEDURE — 80048 BASIC METABOLIC PNL TOTAL CA: CPT | Performed by: NURSE PRACTITIONER

## 2017-11-14 PROCEDURE — A9270 NON-COVERED ITEM OR SERVICE: HCPCS | Mod: GY | Performed by: NEUROLOGICAL SURGERY

## 2017-11-14 PROCEDURE — 82533 TOTAL CORTISOL: CPT | Performed by: STUDENT IN AN ORGANIZED HEALTH CARE EDUCATION/TRAINING PROGRAM

## 2017-11-14 PROCEDURE — 25000132 ZZH RX MED GY IP 250 OP 250 PS 637: Mod: GY | Performed by: STUDENT IN AN ORGANIZED HEALTH CARE EDUCATION/TRAINING PROGRAM

## 2017-11-14 PROCEDURE — 81001 URINALYSIS AUTO W/SCOPE: CPT | Performed by: NURSE PRACTITIONER

## 2017-11-14 PROCEDURE — 12000008 ZZH R&B INTERMEDIATE UMMC

## 2017-11-14 PROCEDURE — A9270 NON-COVERED ITEM OR SERVICE: HCPCS | Mod: GY | Performed by: STUDENT IN AN ORGANIZED HEALTH CARE EDUCATION/TRAINING PROGRAM

## 2017-11-14 PROCEDURE — 83930 ASSAY OF BLOOD OSMOLALITY: CPT | Performed by: NURSE PRACTITIONER

## 2017-11-14 PROCEDURE — 25000132 ZZH RX MED GY IP 250 OP 250 PS 637: Mod: GY | Performed by: INTERNAL MEDICINE

## 2017-11-14 PROCEDURE — 84443 ASSAY THYROID STIM HORMONE: CPT | Performed by: NURSE PRACTITIONER

## 2017-11-14 PROCEDURE — 36415 COLL VENOUS BLD VENIPUNCTURE: CPT | Performed by: NURSE PRACTITIONER

## 2017-11-14 PROCEDURE — 25000132 ZZH RX MED GY IP 250 OP 250 PS 637: Mod: GY | Performed by: NEUROLOGICAL SURGERY

## 2017-11-14 PROCEDURE — 99232 SBSQ HOSP IP/OBS MODERATE 35: CPT | Performed by: PEDIATRICS

## 2017-11-14 PROCEDURE — 25000128 H RX IP 250 OP 636: Performed by: STUDENT IN AN ORGANIZED HEALTH CARE EDUCATION/TRAINING PROGRAM

## 2017-11-14 PROCEDURE — 36415 COLL VENOUS BLD VENIPUNCTURE: CPT | Performed by: STUDENT IN AN ORGANIZED HEALTH CARE EDUCATION/TRAINING PROGRAM

## 2017-11-14 RX ORDER — SODIUM CHLORIDE 1 G/1
3 TABLET ORAL
Status: COMPLETED | OUTPATIENT
Start: 2017-11-14 | End: 2017-11-14

## 2017-11-14 RX ORDER — CEFAZOLIN SODIUM 1 G/3ML
1 INJECTION, POWDER, FOR SOLUTION INTRAMUSCULAR; INTRAVENOUS SEE ADMIN INSTRUCTIONS
Status: DISCONTINUED | OUTPATIENT
Start: 2017-11-15 | End: 2017-11-15

## 2017-11-14 RX ORDER — SODIUM CHLORIDE 9 MG/ML
INJECTION, SOLUTION INTRAVENOUS CONTINUOUS
Status: DISCONTINUED | OUTPATIENT
Start: 2017-11-15 | End: 2017-11-15

## 2017-11-14 RX ORDER — CIPROFLOXACIN 2 MG/ML
400 INJECTION, SOLUTION INTRAVENOUS ONCE
Status: COMPLETED | OUTPATIENT
Start: 2017-11-15 | End: 2017-11-15

## 2017-11-14 RX ORDER — CEFAZOLIN SODIUM 1 G/3ML
1 INJECTION, POWDER, FOR SOLUTION INTRAMUSCULAR; INTRAVENOUS
Status: COMPLETED | OUTPATIENT
Start: 2017-11-14 | End: 2017-11-15

## 2017-11-14 RX ORDER — CEFTRIAXONE 1 G/1
1 INJECTION, POWDER, FOR SOLUTION INTRAMUSCULAR; INTRAVENOUS ONCE
Status: COMPLETED | OUTPATIENT
Start: 2017-11-15 | End: 2017-11-15

## 2017-11-14 RX ADMIN — Medication 10000 UNITS: at 07:45

## 2017-11-14 RX ADMIN — SODIUM CHLORIDE TAB 1 GM 3 G: 1 TAB at 17:30

## 2017-11-14 RX ADMIN — POLYETHYLENE GLYCOL 3350 17 G: 17 POWDER, FOR SOLUTION ORAL at 14:20

## 2017-11-14 RX ADMIN — Medication 220 MG: at 07:45

## 2017-11-14 RX ADMIN — Medication 1 TABLET: at 17:30

## 2017-11-14 RX ADMIN — PRAVASTATIN SODIUM 10 MG: 10 TABLET ORAL at 22:56

## 2017-11-14 RX ADMIN — SODIUM CHLORIDE TAB 1 GM 3 G: 1 TAB at 23:57

## 2017-11-14 RX ADMIN — HYDROCODONE BITARTRATE AND ACETAMINOPHEN 1 TABLET: 5; 325 TABLET ORAL at 07:44

## 2017-11-14 RX ADMIN — CEFTRIAXONE 1 G: 1 INJECTION, POWDER, FOR SOLUTION INTRAMUSCULAR; INTRAVENOUS at 23:53

## 2017-11-14 RX ADMIN — HYDROCODONE BITARTRATE AND ACETAMINOPHEN 1 TABLET: 5; 325 TABLET ORAL at 17:30

## 2017-11-14 RX ADMIN — Medication 220 MG: at 19:59

## 2017-11-14 RX ADMIN — SENNOSIDES AND DOCUSATE SODIUM 2 TABLET: 8.6; 5 TABLET ORAL at 07:45

## 2017-11-14 RX ADMIN — METOPROLOL TARTRATE 12.5 MG: 25 TABLET, FILM COATED ORAL at 19:58

## 2017-11-14 RX ADMIN — OXYCODONE HYDROCHLORIDE AND ACETAMINOPHEN 1000 MG: 500 TABLET ORAL at 07:45

## 2017-11-14 RX ADMIN — HEPARIN SODIUM 5000 UNITS: 5000 INJECTION, SOLUTION INTRAVENOUS; SUBCUTANEOUS at 06:02

## 2017-11-14 RX ADMIN — Medication 1 TABLET: at 07:45

## 2017-11-14 RX ADMIN — METOPROLOL TARTRATE 12.5 MG: 25 TABLET, FILM COATED ORAL at 07:45

## 2017-11-14 RX ADMIN — POLYETHYLENE GLYCOL 3350 17 G: 17 POWDER, FOR SOLUTION ORAL at 07:45

## 2017-11-14 RX ADMIN — CHOLECALCIFEROL CAP 125 MCG (5000 UNIT) 5000 UNITS: 125 CAP at 07:45

## 2017-11-14 RX ADMIN — Medication 220 MG: at 14:20

## 2017-11-14 NOTE — CONSULTS
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 Pt originally developed a nondisplaced C7 fx in  after a fall. This had healed but then pt continued to experience debilitating pain. More recently requiring a fentanyl patch for pain ctrl. Repeating imaging however subsequently revealed a dens fx with kyphotic deformity. Also found evidence concerning for spinal canal stenosis. Pt was seen in neurosurgery clinic on 17 and offered surgery. Pt returned home to consider her options and before able to schedule subsequent evaluation, presented to [facility] with progressive weakness. Subsequently admitted to [facility] for urgent eval for C spine surgery. Then found to be hyponatremic. Given lengthy C-spin surgery and need for optimization of Na lvl Endocrinology was consulted to assist with mgmt of hyponatremia.  The patient denies headaches, nausea, vomiting, confusion.  She describes her appetite as being fair.  She states that she has lost some weight.  According to her , her baseline weight used to be around 100-110 pounds.  Her current weight is 80 pounds.  The  thinks that, at home, she has been eating less. She always makes healthy food choices. The weight loss occurred gradually, over a period of approximately one year. During this hospitalization, she was evaluated by the dietitian.  The patient doesn't remember being diagnosed with osteoporosis in the past. She is not aware of any other fractures, with the exception of the C7 cervical fracture, which occurred after a fall in which he landed and hit the back of her head. The DXA scan from  documents the lowest T score of -2.9 at 33% distal  radius. At the hips, the lowest T score was -1.3 at the left femoral neck.  The bone mineral density at the lumbar spine was not interpretable, due to severe degenerative changes. She was prescribed of vitamin D but she hasn't been taking it at home.  During this hospitalization, she was restarted on calcium and vitamin D supplements. As per patient, she went through menopause at age 52.  With the exception of the back pain and some of long-standing constipation, the patient denies any other symptoms.  During this hospitalization, hydrochlorothiazide was discontinued and she was placed on 750 ml water restriction, on 11/11/17. The lab work revealed normal TSH and cortisol levels and mild hyponatremia, intermittently present since 2008, with the lowest sodium of 126, on 11/11/17. Serum and urine osmolality, urinary sodium, suggestive of SIADH.  Her blood pressure has been normal.  I/O yesterday around 900 ml.   Past Medical History:   Past Medical History:   Diagnosis Date     Allergic rhinitis, cause unspecified      Pure hypercholesterolemia      Unspecified essential hypertension    Subluxation of the right shoulder humeral head  B/L hip dysplasia and OA; ? Avascular necrosis - on XRays from 9/2016  OA shoulders     Past Surgical History:   Procedure Laterality Date     CAROTID ENDARTERECTOMY  11/07/08     COLONOSCOPY  05/30/07    Diverticulosis-return in 5 yrs     HC DRAIN/INJ MAJOR JOINT/BURSA W/O US  2009    Right SI Joint     HC DRAIN/INJ MAJOR JOINT/BURSA W/O US  2010    Right interarticular hip injection     HC INJ EPIDURAL CERVICAL/THORACIC W/WO CONTRAST  2010    C6-7     HC INJ EPIDURAL LUMBAR/SACRAL W/WO CONTRAST  2009    L5-S1     HC INJ TRANSFORAMIN EPIDURAL, LUMB/SACR SINGLE  2010     HC REMOVAL OF TONSILS,12+ Y/O      Tonsils 14+y.o.   Medications:   Current Facility-Administered Medications   Medication     senna-docusate (SENOKOT-S;PERICOLACE) 8.6-50 MG per tablet 2 tablet     polyethylene glycol  (MIRALAX/GLYCOLAX) Packet 17 g     heparin sodium PF injection 5,000 Units     zinc sulfate solution 220 mg     vitamin A capsule 10,000 Units     vitamin D (ERGOCALCIFEROL) capsule 50,000 Units     cholecalciferol (vitamin D3) capsule CAPS 5,000 Units     calcium-vitamin D (CALTRATE) 600-400 MG-UNIT per tablet 1 tablet     ascorbic acid (VITAMIN C) tablet 1,000 mg     metoprolol (LOPRESSOR) half-tab 12.5 mg     hydrALAZINE (APRESOLINE) injection 10-20 mg     naloxone (NARCAN) injection 0.1-0.4 mg     potassium chloride SA (K-DUR/KLOR-CON M) CR tablet 20-40 mEq     potassium chloride (KLOR-CON) Packet 20-40 mEq     potassium chloride 10 mEq in 100 mL sterile water intermittent infusion (premix)     potassium chloride 10 mEq in 100 mL intermittent infusion with 10 mg lidocaine     potassium chloride 20 mEq in 50 mL intermittent infusion     magnesium sulfate 2 g in NS intermittent infusion (PharMEDium or FV Cmpd)     magnesium sulfate 4 g in 100 mL sterile water (premade)     potassium phosphate 10 mmol in D5W 250 mL intermittent infusion     potassium phosphate 15 mmol in D5W 250 mL intermittent infusion     potassium phosphate 20 mmol in D5W 500 mL intermittent infusion     potassium phosphate 20 mmol in D5W 250 mL intermittent infusion     potassium phosphate 25 mmol in D5W 500 mL intermittent infusion     ondansetron (ZOFRAN-ODT) ODT tab 4 mg    Or     ondansetron (ZOFRAN) injection 4 mg     prochlorperazine (COMPAZINE) injection 5 mg    Or     prochlorperazine (COMPAZINE) tablet 5 mg    Or     prochlorperazine (COMPAZINE) Suppository 12.5 mg     metoclopramide (REGLAN) tablet 5 mg    Or     metoclopramide (REGLAN) injection 5 mg     diazepam (VALIUM) tablet 5 mg     HYDROcodone-acetaminophen (NORCO) 5-325 MG per tablet 1 tablet     pravastatin (PRAVACHOL) tablet 10 mg          Allergies   Allergen Reactions     Atorvastatin Calcium      Was on Lipitor and has muscle problem         Social History:   Social  History   Substance Use Topics     Smoking status: Former Smoker     Packs/day: 0.50     Years: 10.00     Quit date: 1996     Smokeless tobacco: Never Used     Alcohol use No       Family History:   Family History   Problem Relation Age of Onset     CANCER Mother      colon cancer  at age 95 or 96   surgery done     GASTROINTESTINAL DISEASE Mother      stomach problems     OSTEOPOROSIS Mother      EYE* Mother      cataract and macular degen.     Cardiovascular Father       at age 60  MI     Hypertension Father      ?     Circulatory Paternal Grandmother      legs     CEREBROVASCULAR DISEASE Paternal Grandmother      Obesity Paternal Grandmother      Gynecology Sister      hysterectomy     Lipids Brother      was on meds.  ? still     Obesity Maternal Grandmother      Musculoskeletal Disorder Daughter      cancerous tumor left upper arm/shoulder- was told it was a breast type cancer     DIABETES Maternal Aunt        Review of System:   A 12-pt ROS was negative except as noted in HPI.      Physical Examination:   Blood pressure 124/69, pulse 71, temperature 98.8  F (37.1  C), temperature source Oral, resp. rate 14, SpO2 96 %, not currently breastfeeding.  General: she appears pale, thin, malnourished  HEENT: EOMI. Sclerae and conjunctivae clear.   Neck: no thyromegaly. No cervical or supraclavicular lymphadenopathy.   CV: S1/S2 heard without murmur  Lungs: normal breath sounds, no crackles or wheezes appreciated.   Abdomen: Soft, non tender, and non-distended. Bowel sounds active  Extremities: No peripheral edema.   Skin: No rash or lesions.   Neuro: decreased proximal strength. Unable to elicit knee reflexes. Tremor none.     Labs and Studies:   AM cortisol 21.6  TSH 2.91  Urine Na 82  Urine   Plasma     Assessment:   Hyponatremia   The patient has mild, chronic hyponatremia.  She doesn't endorse symptoms suggestive of hyponatremia and she appears to be euvolemic. Hypothyroidism and adrenal  insufficiency were ruled out by laboratory testing. The serum and urine osmolality, serum sodium, are highly suggestive of SIADH. Prior to this hospitalization, she was taking hydrochlorothiazide, which might had contributed to hyponatremia. In the last couple of days, the patient has been on water restriction, with no significant improvement of the sodium levels.  The etiology of SIADH is less obvious. While the weight loss might be due to poor po intake, malignancy remains a concern. The CXR from 11/10/17 was unremarkable. The patient has no recent brain imaging tests. The head CT from 4/2016 revealed generalized atrophy of the brain and ischemic vessel disease.   In the context of imminent surgery and given the mild degree of hyponatremia, we would prefer to increase the sodium intake rather than treating with ADH receptor antagonist.   Recommendations:   1. Sodium chloride tablets 3 g at 17:00 pm today and 00:00 midnight   2. Check BMP in AM  3. Continue water restriction   4. Pt safe for cervical surgery with Na level > 130; she will require close monitoring of the sodium level following surgery  5. Endocrine will continue to follow  Osteoporosis   Diagnosis is based on the history of vertebral fracture after falling from a standing height.  Risk factors for osteoporosis identified: age, low weight, family history of osteoporosis, prior history of smoking, questionable malnutrition.  On the prior DXA scan, the lowest bone mineral density was at the wrists level, which raises concern for hyperparathyroidism. We added tissue transglutaminase antibodies and the parathyroid hormone level to her morning labs.  Patient was seen and discussed with attending physician, Dr. Parvin Walker.     Marlon Cameron MD, PhD  Internal Medicine, PGY2  Pager [phone]    I have personally examined the patient, reviewed and edited the resident's note and agree with the plan of care.    Parvin Walker MD.

## 2017-11-14 NOTE — PLAN OF CARE
Problem: Patient Care Overview  Goal: Plan of Care/Patient Progress Review  Outcome: No Change  Admit for cervical stenosis and kyphotic deformity/myelopathy. Surgery scheduled 11/15. VSS. Neuros intact ex. extremities 2/5. Numbness and tingling BUE and numbness BLE. Alert and oriented x4. Incontinent of urine. Regular diet with total feed, water restriction 750ml/day, hyponatremia 126. Up with 2/lift. PIV SL. Turn and repositioned q2h. Continue to monitor.

## 2017-11-14 NOTE — PROGRESS NOTES
Neurosurgery Daily Progress Note  11/14/2017    Overnight events/subjective: No acute overnight events. Continues to have hyponatremia.    O/ /69 (BP Location: Right arm)  Pulse 71  Temp 98.6  F (37  C) (Oral)  Resp 14  SpO2 98%  Exam:   Gen: Laying in bed, not in acute distress  MS: A&Ox3, Speech fluent and conversant   CN: Pupils round and reactive, extraocular movements intact, face symmetric, tongue midline, uvula & palate elevate symmetrically, difficult of hearing  Motor:    Delt Bi Tri FE IP Quad Hamst TibAnt EHL Gastroc     C5 C6 C7 C8/T1 L2 L3 L4-S1 L4 L5 S1   R 2 4 3 2 3 4 4 4 4 4   L 2 3 3 2 3 4 4 4 4 4     Sensory: intact to light touch   Unable to test drift testing    Non labored breathing    IMG: reviewed     LABS: reviewed     A/P: Yuni Otoole is a 82 year old with known C2/3 spinal canal stenosis and kyphotic deformity presenting with continued worsening weakness and an inability to care for herself at home.    - Hold PTA captopril  - Hold PTA ASA  - Bladder scan  - 750mL fluid restriction for hyponatremia  - Regular diet  - Medicine pre-op evaluation: safe to proceed with surgery  - Orthopedics shoulder evaluation: safe to proceed with surgery, normal arm manipulation precautions  - Plan to stage case: Stage 1 Wednesday, Stage 2 Friday  - SCDs and SQH for DVT ppx  - PO diet for GI ppx    Per Dr. López:  - Working with Nutritionist to meet 1.5g protein/kg body weight/day  - Vitamin A, C, E, and zinc supplementation  - Cortisol @ 0800 to assess for adrenal insufficiency  - 25-OH vitamin D level and treat to a goal of 50  - Will plan for sutures to remain in for 4 weeks following surgey      Dispo: anticipated d/c 11/24, pending surgical intervention and general post-operative barriers to discharge.      Please contact the neurosurgery resident on call with questions by dialing * [phone], then entering [phone] when prompted

## 2017-11-14 NOTE — PROGRESS NOTES
"      Neurosurgery Attending Progress Note     Yuni Otoole is an 83 yo female admitted 11/11/17 with progressively worsening cervical kyphotic deformity with severe cervical stenosis, admitted with progressive quadriparesis and inability to feed or care for self. Patient was initially seen in clinic by Dr. Yuan 9/21/17, then admitted while Dr. Merritt was on call 11/11/17, and I was asked to take over her care.    She endorses neck pain, weakness/numbness in bilateral upper extremities worst in the hands, inability to feed herself due to inability to grasp utensils, difficulty with urination, loss of appetite, and weight loss.         IMAGING per my own measurement and interpretation:  CTA neck 11/13/17 shows vertebral arteries entering foramen transversarium at C6; mild tortuosity at C1.    Xrays: standing 36\" cassette 9/21/17  My impression is of severe cervical kyphoscoliosis with chin on chest deformity, and lumbar kyphoscoliosis with flatback deformity.     CBVA 49 degrees      C2-C7 kyphosis: 63 degrees  C2-C7 SVA: 57 mm  Sagittal vertical alignment (C7 mady line to posterior corner of sacrum): 38mm  T1PA: 5 degrees  Central sacral vertical line (coronal C7 mady line to center of sacrum):15mm to left of midline  Scoliosis: 47 degrees left L1-L4; cervical scoliosis measured from coronal supine CT is 21 degrees left C2-C7     Pelvic Incidence: 61 degrees degrees  Lumbar Lordosis: 26 degrees  PI-LL mismatch: negative 35 degrees     Thoracic kyphosis T5-T12:  34 degrees  T10-L2 kyphosis:0 degrees           Resulted Imaging/Labs:  Bone Density: no DEXA available     MR CERVICAL SPINE W/O CONTRAST 11/11/2017   5.5 mm anterolisthesis of C2 on C3 which has progressed since 7/16/2017 (previously 4.5 mm). Severe spinal canal stenosis at C2-3 level which has worsened since the prior exam. Narrowest point of the canal is measures 3.5 mm. There is severe cord compression at C1-C3.  Severe bilateral foraminal stenosis " "essentially C3-T1.     Vitamin D:  Vitamin D Deficiency Screening Results:        Lab Results   Component Value Date     VITDT 17 (L) 11/13/2017      Nutritional Status:  BMI 18.18           Lab Results   Component Value Date     ALBUMIN 3.0 11/13/2017         Diabetes Screening:  No results found for: A1C     Nicotine Usage:  No (quit smoking cigarettes in 1996)                      Physical Exam   Constitutional: Oriented to person, place, and time.   Appears frail and malnourished. Unable to hold chin off of chest for more than 1 minute due to severe kyphosis.     Impaired short-term memory, names 0 of 3 objects at 5 minutes  Cognition screening: impaired. Names 5 animals in 60 seconds.  Unable to sit or stand unassisted due to weakness and frailty.     Neck: Severely limited cervical mobility at baseline.   Chin-Brow Vertical Angle 45 degrees while seated    Neurological: CN II-XII intact bilaterally with note made of decreased hearing bilaterally.   Decreased sensation bilateral upper extremities throughout, hands worse than upper arms. Describes hands as \"totally numb\"        Displays upgoing toes/positive Babinski's sign bilaterally.      +inverted radial reflex bilaterally       +ankle clonus L>R            STRENGTH LEFT RIGHT   Deltoid 2 2 ** right humeral head superior subluxation   Bicep 3 4   Wrist Extensor 2 2   Tricep 3 3   Finger flexion 3 3   Finger abduction 2 3    3 4         Hip Flexion       3       3   Knee Extension 4 4   Ankle Dorsiflexion 4 4   Extensor Hallucis Longus 4 4   Plantar Flexion 4 4            ASSESSMENT:  Yuni Otoole is a 82 year old female with progressively worsening myelopathy due to severe cervical kyphoscoliosis deformity with stenosis, and severe malnutrition with failure to thrive.     PLAN:  OR tomorrow- Anticipated blood loss will be 1000ml with 10-12 hours of operative time. Will need arterial line and central line. I spoke with Dr. Hinojosa of Anesthesia about need " to avoid IJ central line due to cervical manipulation intraoperatively with anterior and posterior approaches and the need to avoid femoral line because of prone positioning with pressure on inguinal area on Earl table- we will plan for subclavian line in OR. Anesthesia will evaluate patient this afternoon, does not want PICC line placed prior to surgery and prefers to place the arterial line and the subclavian central line in the OR.    She will need to be NPO after midnight, hold all antihypertensive oral medications, have subcutanous heparin held 11/14 PM in preparation for surgery 11/15/17, and type and cross for 4 units of PRBC for surgery,         Surgery plan:  C2-T3 posterior-anterior-posterior 540-degrees fusion; Part 1: C1-C3 posterior decompression and C2-T3 instrumentation with multilevel osteotomies; Part 2: C4-5, C6-7, possible C2-3 anterior diskectomy and interbody placement possible additional levels, Part 3: C2-T3 posterior fusion with deformity correction.  Surgery may need to be staged due to patient's age and complexity.      Hyponatremia- AM cortisol does not show adrenal insufficiency, TSH WNL. Patient has clinical picture consistent with hypotonic euvolemic hyponatremia (serum osm 264, urine osm 489, urine Na 82); Patient is being treated currently with a fluid restriction, but Na levels have fluctuated between 126 and 132 while on this without stable improvement.  Patient will be undergoing large (>12 hour) spinal deformity surgery on 11/15 and ideally we would like normonatremia for surgery. We will consult Endocrinology to see if they can suggest any alternative strategies for gradual sodium correction, and to see if conivaptan is a possibility- HOWEVER, WE MUST AVOID INDUCING ANY HYPOTENSION BECAUSE OF CRITICAL NEED TO MAINTAIN SPINAL CORD PERFUSION PRESSURE NOW AND THROUGHOUT THE PERIOPERATIVE PERIOD.      Malnutrition- This patient is high-risk for post-operative wound healing  complications because of exceedingly poor nutritional status.                         -Nutrition consult input is appreciated for calorie counts and for goal protein intake of 1.5 grams protein per kg body weight per day-             -added Arginaid, continue Ensure Plus                        -serum albumin shows severe malnutrition (3.0 on 11/13/17)                        -vitamin A, C, E, and zinc supplementation ordered for wound healing supplementation                        -I have discussed with patient and her family that due to severe malnutrition and anticipated long recovery from surgery with poor PO intake, that we will place a nasojejunal tube following surgery on POD 0 once in ICU and begin tube feeds ASAP to support nutrition.      Osteoporosis- we cannot check DEXA as inpatient, will need to use intraoperative augmentation techniques if poor bone density encountered as anticipated;     Vitamin D deficiency- 25-OH vitamin D level 17 on 11/13/17; started on ergocalciferol 50,000IU every 3 days x 30 days, and cholecalciferol 5000 IU daily x 6 months; needs to have repeat 25-OH vitamin D level in 4 weeks.     Nocturnal Confusion- patient exhibits signs of memory impairment and mild cognitive impairment on detailed questioning, anticipate high risk for postop delirium, discussed with family and patient. NO BENZODIAZEPINES for this patient- she had significant confusion and somnolence after receiving 5mg of valium previously.    Surgical Plan:        Patient is highest-risk category for perioperative complications due to magnitude of surgery needed, malnutrition, age, suspected osteoporosis/known vitamin D deficiency.     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 I relayed to the patient and family my concern for high risk for perioperative morbidity and mortality.  I told her she absolutely will need a nasojejunal feeding tube for  perioperative nutrition, and potentially a PEG tube.  She has high risk of ventilator dependency and potential need for trach.  She has very high risk of instrumentation complications from poor bone density.  She has very high risk of infection and wound non-healing due to poor nutritional status.        I discussed the above co-morbidities with patient and her family, and I also discussed surgical risks in great detail including high risks of possible paralysis and ventilator dependence, possible trach/PEG, possible failure to improve, or fracture of instrumentation or adjacent segment with need for further surgery, possible infection and failure of wound healing.     I spoke about the need for neck immobilization with cervical spine precautions for general endotracheal intubation for anesthesia, and also for the need to keep the blood pressure elevated to ensure continued spinal cord perfusion during induction of anesthesia and during surgery itself until pressure is relieved on the spinal cord.  I discussed surgical risk including bleeding, infection, nerve or spinal cord damage, failure to heal/failure to form fusion/instrumentation failure, CSF leak, weakness/paralysis, numbness, worsening pain or failure to improve including neuropathic pain, recurrence of problem, and potential for development of instability or adjacent segment disease, and potential need for further procedures or surgeries. I discussed potential of failure to improve or even worsening despite surgery.  I specifically discussed injury to the carotid artery and internal jugular vein and vertebral artery with potential bleeding or stroke, injury to the superior laryngeal nerve resulting in hoarseness, retraction injury to the esophagus or penetration injury to the esophagus causing dysphagia with potential need for temporary or permanent feeding tube placement, formation of a hematoma resulting in neurologic deficit, worse C5 palsy resulting in  shoulder and bicep plegia, spinal cord injury with resulting pain, numbness, or weakness that may be permanent or even paralysis.    I discussed the risks of general anesthesia including stroke, heart attack, pneumonia, prolonged intubation, blood clot, pulmonary embolism, and urinary tract infection. I discussed risks of positioning including pressure ulcerations, skin tears or abrasions, pressure neuropathies, or even rarely blindness.     Patient understands and wishes to proceed.              Enma López MD    [facility] Department of Neurosurgery  Office: [phone]  Pager: [phone]    10:29 AM  November 14, 2017    I spent 25 minutes in patient care with greater than 50% spent in counseling and/or coordination of care.    I performed independent visualization of radiographic imaging and entered my own interpretation, reviewed and/or ordered clinical laboratory tests, reviewed and/or ordered tests in radiology and Reviewed and summarized old records and/or discussed this case with another health care provider

## 2017-11-14 NOTE — PROGRESS NOTES
Calorie Count  Intake recorded for 11/13  Kcals: 1039  Protein: 41g  # Meals Recorded: 100% 3 apple juices, squash, milk, chicken breast, corn muffin, 50% oatmeal, 25% pudding   # Supplements Recorded: 0

## 2017-11-14 NOTE — PLAN OF CARE
Problem: Pain, Acute (Adult)  Goal: Identify Related Risk Factors and Signs and Symptoms  Related risk factors and signs and symptoms are identified upon initiation of Human Response Clinical Practice Guideline (CPG).   Pt is on 6A for cervical stenosis and progressive weakness. VSS. Disoriented to place. Neuro unchanged: Bilateral uppers and lowers 2/5, fingers numb/tingly. Denied pain most of shift; c/o pain at 2240 will give PRN norco. Up with 2 and lift. T&R Q2. Incontinent x1; voiding spontaneously late in shift. Regular diet; good intake. Total feed and anthony counts. Free water restriction of 750 mL per day. PIV SL. CT completed today. No BM; supp given awaiting result. Pt c/o nausea x1; compazine given with relief. OR likely on Wednesday. Continue to monitor and follow current POC.     Update as of 2315: Pt had 1 large BM; formed.

## 2017-11-14 NOTE — PROGRESS NOTES
[facility] - Internal Medicine Daily Note   Date of Service: 11/14/2017  Patient: Yuni Otoole  MRN: [id number]  Admission Date: 11/10/2017  Hospital Day # 4     No new recommendations    Assessment & Plan: Yuni Otoole is a 82 year old female with HTN controlled with captopril and hydrochlorthiazide, and hyperlipidemia on statin. She has been struggling with C spine issue and symptoms related to that since her fall in 2016. 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 She has no history of any CAD or heart failure or stroke. No recent stroke or ACS or chest pain episodes.        1- Hyponatremia -mild asymptomatic and likely chronic. Urine studies suggest SIADH (had been assessed to be eurovlemic at time.   -Free water restriction up to 100cc/day  -K and mag levels optimized  -TSH wnl      2. Preop evaluation (completed 11/13, please see original note)-         3-HTN- holding captopril and holding HCTZ, started metoprolol-okay to titrate this as needed and as tolerated.      4-HLD- continuing statin      CODE: prior per 2016 FC  DVT: per primary service  Diet/fluids: orals                                                                   Disposition: depends upon post-op course      Discussed with Dr. Ryan Butcher,   Internal Medicine Hospitalist Service   [facility]   Pager: [phone]  Team: Cathryn Barlow 9  Page Cross Cover after 5 pm: pager [phone]     ___________________________________________________________________    Subjective & Interval Hx:   No new issues, feels mildly sore from sitting up in chair in the morning.     Last 24 hr care team notes reviewed.   ROS:  4 point ROS including Respiratory, CV, GI and , other than that noted in the HPI, is negative    Medications: Reviewed in EPIC. List below for reference    Physical Exam:    Blood pressure 124/69, pulse 71, temperature 98.8  F (37.1  C), temperature source  Oral, resp. rate 14, SpO2 96 %, not currently breastfeeding.    GENERAL: Appears alert and oriented times three. Hard of hearing   CV: RRR, S1S2 present systolic ejection murmur  RESPIRATORY: Respirations regular, even, and unlabored. Lungs CTA throughout.   GI: Soft and non distended with normoactive bowel sounds present in all quadrants. No tenderness, rebound, guarding. No organomegaly.   EXTREMITIES: No peripheral edema. 2+ bilateral pedal pulses.   SKIN: No jaundice. No rashes or lesions.   Lines/Tubes:   Peripheral IV 11/10/17 Left Upper arm (Active)   Site Assessment WDL 11/13/2017  3:57 AM   Line Status Saline locked 11/13/2017  3:57 AM   Phlebitis Scale 0-->no symptoms 11/13/2017  3:57 AM   Infiltration Scale 0 11/13/2017  3:57 AM   Number of days:3       Labs & Studies of Note: I personally reviewed the following studies:labs and imaging      BMP    Recent Labs  Lab 11/14/17  0752 11/13/17  1539 11/13/17  0759 11/12/17  0546 11/11/17  2138 11/11/17  0801   * 126* 129* 132* 130* 126*   POTASSIUM 4.0  --  3.6 4.7 4.8 3.3*   CHLORIDE 93*  --  95 100  --  92*   ANNETTA 9.3  --  8.7 8.6  --  8.9   CO2 26  --  27 27  --  27   BUN 18  --  7 11  --  11   CR 0.36*  --  0.32* 0.42*  --  0.36*   GLC 90  --  92 90  --  77       CBC    Recent Labs  Lab 11/12/17  0546 11/11/17  0801 11/10/17  1723   WBC 6.7 6.5 8.8   RBC 3.62* 3.69* 3.95   HGB 10.7* 10.9* 11.8   HCT 33.2* 33.1* 35.5   MCV 92 90 90   MCH 29.6 29.5 29.9   MCHC 32.2 32.9 33.2   RDW 13.7 13.5 13.2    360 372       INR    Recent Labs  Lab 11/12/17  0546   INR 1.01       LFTs    Recent Labs  Lab 11/13/17  0759 11/11/17  0801 11/10/17  1723   ALKPHOS  --   --  68   AST  --   --  16   ALT  --   --  18   BILITOTAL  --   --  0.4   PROTTOTAL  --   --  6.5*   ALBUMIN 3.0* 3.1* 3.5        PANC    Recent Labs  Lab 11/10/17  1723   LIPASE 126         Medications list for Reference (delete if desired)  Current Facility-Administered Medications   Medication      senna-docusate (SENOKOT-S;PERICOLACE) 8.6-50 MG per tablet 2 tablet     polyethylene glycol (MIRALAX/GLYCOLAX) Packet 17 g     heparin sodium PF injection 5,000 Units     zinc sulfate solution 220 mg     vitamin A capsule 10,000 Units     vitamin D (ERGOCALCIFEROL) capsule 50,000 Units     cholecalciferol (vitamin D3) capsule CAPS 5,000 Units     calcium-vitamin D (CALTRATE) 600-400 MG-UNIT per tablet 1 tablet     ascorbic acid (VITAMIN C) tablet 1,000 mg     metoprolol (LOPRESSOR) half-tab 12.5 mg     hydrALAZINE (APRESOLINE) injection 10-20 mg     naloxone (NARCAN) injection 0.1-0.4 mg     potassium chloride SA (K-DUR/KLOR-CON M) CR tablet 20-40 mEq     potassium chloride (KLOR-CON) Packet 20-40 mEq     potassium chloride 10 mEq in 100 mL sterile water intermittent infusion (premix)     potassium chloride 10 mEq in 100 mL intermittent infusion with 10 mg lidocaine     potassium chloride 20 mEq in 50 mL intermittent infusion     magnesium sulfate 2 g in NS intermittent infusion (PharMEDium or FV Cmpd)     magnesium sulfate 4 g in 100 mL sterile water (premade)     potassium phosphate 10 mmol in D5W 250 mL intermittent infusion     potassium phosphate 15 mmol in D5W 250 mL intermittent infusion     potassium phosphate 20 mmol in D5W 500 mL intermittent infusion     potassium phosphate 20 mmol in D5W 250 mL intermittent infusion     potassium phosphate 25 mmol in D5W 500 mL intermittent infusion     ondansetron (ZOFRAN-ODT) ODT tab 4 mg    Or     ondansetron (ZOFRAN) injection 4 mg     prochlorperazine (COMPAZINE) injection 5 mg    Or     prochlorperazine (COMPAZINE) tablet 5 mg    Or     prochlorperazine (COMPAZINE) Suppository 12.5 mg     metoclopramide (REGLAN) tablet 5 mg    Or     metoclopramide (REGLAN) injection 5 mg     diazepam (VALIUM) tablet 5 mg     HYDROcodone-acetaminophen (NORCO) 5-325 MG per tablet 1 tablet     pravastatin (PRAVACHOL) tablet 10 mg       I have seen this patient with the resident  teaching team,  examined patient independently, and agree with above note and exam.    Jabier Davis MD  Med-Peds Hospitalist, pager [phone]

## 2017-11-15 ENCOUNTER — APPOINTMENT (OUTPATIENT)
Dept: GENERAL RADIOLOGY | Facility: CLINIC | Age: 82
DRG: 453 | End: 2017-11-15
Attending: NEUROLOGICAL SURGERY
Payer: MEDICARE

## 2017-11-15 ENCOUNTER — APPOINTMENT (OUTPATIENT)
Dept: GENERAL RADIOLOGY | Facility: CLINIC | Age: 82
DRG: 453 | End: 2017-11-15
Attending: ANESTHESIOLOGY
Payer: MEDICARE

## 2017-11-15 LAB
ANION GAP SERPL CALCULATED.3IONS-SCNC: 7 MMOL/L (ref 3–14)
ANION GAP SERPL CALCULATED.3IONS-SCNC: 8 MMOL/L (ref 3–14)
APTT PPP: 24 SEC (ref 22–37)
APTT PPP: 25 SEC (ref 22–37)
APTT PPP: 26 SEC (ref 22–37)
APTT PPP: 26 SEC (ref 22–37)
APTT PPP: 27 SEC (ref 22–37)
APTT PPP: 27 SEC (ref 22–37)
APTT PPP: 28 SEC (ref 22–37)
APTT PPP: 29 SEC (ref 22–37)
BASE DEFICIT BLDA-SCNC: 2.3 MMOL/L
BASE DEFICIT BLDA-SCNC: 2.6 MMOL/L
BASE DEFICIT BLDA-SCNC: 2.6 MMOL/L
BASE DEFICIT BLDA-SCNC: 2.7 MMOL/L
BASE DEFICIT BLDA-SCNC: 3.1 MMOL/L
BASE DEFICIT BLDA-SCNC: 3.2 MMOL/L
BASE DEFICIT BLDA-SCNC: 3.4 MMOL/L
BASE DEFICIT BLDA-SCNC: 3.9 MMOL/L
BASE DEFICIT BLDA-SCNC: 4.3 MMOL/L
BASE DEFICIT BLDA-SCNC: 5 MMOL/L
BASE EXCESS BLDA CALC-SCNC: 0.1 MMOL/L
BLD PROD TYP BPU: NORMAL
BLD UNIT ID BPU: 0
BLOOD PRODUCT CODE: NORMAL
BPU ID: NORMAL
BUN SERPL-MCNC: 14 MG/DL (ref 7–30)
BUN SERPL-MCNC: 21 MG/DL (ref 7–30)
CA-I BLD-MCNC: 4.4 MG/DL (ref 4.4–5.2)
CA-I BLD-MCNC: 4.4 MG/DL (ref 4.4–5.2)
CA-I BLD-MCNC: 4.5 MG/DL (ref 4.4–5.2)
CA-I BLD-MCNC: 4.6 MG/DL (ref 4.4–5.2)
CALCIUM SERPL-MCNC: 7.2 MG/DL (ref 8.5–10.1)
CALCIUM SERPL-MCNC: 8.6 MG/DL (ref 8.5–10.1)
CHLORIDE SERPL-SCNC: 100 MMOL/L (ref 94–109)
CHLORIDE SERPL-SCNC: 110 MMOL/L (ref 94–109)
CO2 SERPL-SCNC: 21 MMOL/L (ref 20–32)
CO2 SERPL-SCNC: 27 MMOL/L (ref 20–32)
CREAT SERPL-MCNC: 0.32 MG/DL (ref 0.52–1.04)
CREAT SERPL-MCNC: 0.39 MG/DL (ref 0.52–1.04)
ERYTHROCYTE [DISTWIDTH] IN BLOOD BY AUTOMATED COUNT: 14 % (ref 10–15)
ERYTHROCYTE [DISTWIDTH] IN BLOOD BY AUTOMATED COUNT: 14.4 % (ref 10–15)
FIBRINOGEN PPP-MCNC: 209 MG/DL (ref 200–420)
FIBRINOGEN PPP-MCNC: 214 MG/DL (ref 200–420)
FIBRINOGEN PPP-MCNC: 217 MG/DL (ref 200–420)
FIBRINOGEN PPP-MCNC: 224 MG/DL (ref 200–420)
FIBRINOGEN PPP-MCNC: 227 MG/DL (ref 200–420)
FIBRINOGEN PPP-MCNC: 231 MG/DL (ref 200–420)
FIBRINOGEN PPP-MCNC: 235 MG/DL (ref 200–420)
FIBRINOGEN PPP-MCNC: 240 MG/DL (ref 200–420)
FIBRINOGEN PPP-MCNC: 243 MG/DL (ref 200–420)
FIBRINOGEN PPP-MCNC: 245 MG/DL (ref 200–420)
FIBRINOGEN PPP-MCNC: 257 MG/DL (ref 200–420)
GFR SERPL CREATININE-BSD FRML MDRD: >90 ML/MIN/1.7M2
GFR SERPL CREATININE-BSD FRML MDRD: >90 ML/MIN/1.7M2
GLUCOSE BLD-MCNC: 118 MG/DL (ref 70–99)
GLUCOSE BLD-MCNC: 119 MG/DL (ref 70–99)
GLUCOSE BLD-MCNC: 120 MG/DL (ref 70–99)
GLUCOSE BLD-MCNC: 121 MG/DL (ref 70–99)
GLUCOSE BLD-MCNC: 121 MG/DL (ref 70–99)
GLUCOSE BLD-MCNC: 123 MG/DL (ref 70–99)
GLUCOSE BLD-MCNC: 125 MG/DL (ref 70–99)
GLUCOSE BLD-MCNC: 125 MG/DL (ref 70–99)
GLUCOSE BLD-MCNC: 127 MG/DL (ref 70–99)
GLUCOSE BLD-MCNC: 132 MG/DL (ref 70–99)
GLUCOSE BLD-MCNC: 135 MG/DL (ref 70–99)
GLUCOSE BLD-MCNC: 144 MG/DL (ref 70–99)
GLUCOSE BLDC GLUCOMTR-MCNC: 128 MG/DL (ref 70–99)
GLUCOSE BLDC GLUCOMTR-MCNC: 139 MG/DL (ref 70–99)
GLUCOSE BLDC GLUCOMTR-MCNC: 91 MG/DL (ref 70–99)
GLUCOSE SERPL-MCNC: 125 MG/DL (ref 70–99)
GLUCOSE SERPL-MCNC: 98 MG/DL (ref 70–99)
HCO3 BLD-SCNC: 20 MMOL/L (ref 21–28)
HCO3 BLD-SCNC: 20 MMOL/L (ref 21–28)
HCO3 BLD-SCNC: 21 MMOL/L (ref 21–28)
HCO3 BLD-SCNC: 22 MMOL/L (ref 21–28)
HCO3 BLD-SCNC: 24 MMOL/L (ref 21–28)
HCT VFR BLD AUTO: 31.7 % (ref 35–47)
HCT VFR BLD AUTO: 34.2 % (ref 35–47)
HGB BLD-MCNC: 10.3 G/DL (ref 11.7–15.7)
HGB BLD-MCNC: 10.6 G/DL (ref 11.7–15.7)
HGB BLD-MCNC: 11 G/DL (ref 11.7–15.7)
HGB BLD-MCNC: 8.4 G/DL (ref 11.7–15.7)
HGB BLD-MCNC: 8.6 G/DL (ref 11.7–15.7)
HGB BLD-MCNC: 8.7 G/DL (ref 11.7–15.7)
HGB BLD-MCNC: 9 G/DL (ref 11.7–15.7)
HGB BLD-MCNC: 9 G/DL (ref 11.7–15.7)
HGB BLD-MCNC: 9.2 G/DL (ref 11.7–15.7)
HGB BLD-MCNC: 9.3 G/DL (ref 11.7–15.7)
HGB BLD-MCNC: 9.3 G/DL (ref 11.7–15.7)
HGB BLD-MCNC: 9.7 G/DL (ref 11.7–15.7)
HGB BLD-MCNC: 9.7 G/DL (ref 11.7–15.7)
HGB BLD-MCNC: 9.9 G/DL (ref 11.7–15.7)
INR PPP: 0.97 (ref 0.86–1.14)
INR PPP: 1.05 (ref 0.86–1.14)
INR PPP: 1.06 (ref 0.86–1.14)
INR PPP: 1.08 (ref 0.86–1.14)
INR PPP: 1.12 (ref 0.86–1.14)
INR PPP: 1.15 (ref 0.86–1.14)
INR PPP: 1.15 (ref 0.86–1.14)
INR PPP: 1.17 (ref 0.86–1.14)
INR PPP: 1.17 (ref 0.86–1.14)
INR PPP: 1.19 (ref 0.86–1.14)
INR PPP: 1.2 (ref 0.86–1.14)
LACTATE BLD-SCNC: 1.5 MMOL/L (ref 0.7–2)
MAGNESIUM SERPL-MCNC: 1.4 MG/DL (ref 1.6–2.3)
MCH RBC QN AUTO: 29.8 PG (ref 26.5–33)
MCH RBC QN AUTO: 30 PG (ref 26.5–33)
MCHC RBC AUTO-ENTMCNC: 32.2 G/DL (ref 31.5–36.5)
MCHC RBC AUTO-ENTMCNC: 33.4 G/DL (ref 31.5–36.5)
MCV RBC AUTO: 90 FL (ref 78–100)
MCV RBC AUTO: 93 FL (ref 78–100)
O2/TOTAL GAS SETTING VFR VENT: 100 %
O2/TOTAL GAS SETTING VFR VENT: 100 %
O2/TOTAL GAS SETTING VFR VENT: 36 %
O2/TOTAL GAS SETTING VFR VENT: 37 %
O2/TOTAL GAS SETTING VFR VENT: 37 %
O2/TOTAL GAS SETTING VFR VENT: 50 %
O2/TOTAL GAS SETTING VFR VENT: 60 %
O2/TOTAL GAS SETTING VFR VENT: ABNORMAL %
PCO2 BLD: 32 MM HG (ref 35–45)
PCO2 BLD: 34 MM HG (ref 35–45)
PCO2 BLD: 34 MM HG (ref 35–45)
PCO2 BLD: 35 MM HG (ref 35–45)
PCO2 BLD: 36 MM HG (ref 35–45)
PCO2 BLD: 37 MM HG (ref 35–45)
PCO2 BLD: 37 MM HG (ref 35–45)
PCO2 BLD: 38 MM HG (ref 35–45)
PCO2 BLD: 39 MM HG (ref 35–45)
PH BLD: 7.36 PH (ref 7.35–7.45)
PH BLD: 7.36 PH (ref 7.35–7.45)
PH BLD: 7.37 PH (ref 7.35–7.45)
PH BLD: 7.38 PH (ref 7.35–7.45)
PH BLD: 7.39 PH (ref 7.35–7.45)
PH BLD: 7.39 PH (ref 7.35–7.45)
PH BLD: 7.4 PH (ref 7.35–7.45)
PH BLD: 7.41 PH (ref 7.35–7.45)
PH BLD: 7.45 PH (ref 7.35–7.45)
PHOSPHATE SERPL-MCNC: 2.4 MG/DL (ref 2.5–4.5)
PLATELET # BLD AUTO: 171 10E9/L (ref 150–450)
PLATELET # BLD AUTO: 191 10E9/L (ref 150–450)
PLATELET # BLD AUTO: 203 10E9/L (ref 150–450)
PLATELET # BLD AUTO: 214 10E9/L (ref 150–450)
PLATELET # BLD AUTO: 237 10E9/L (ref 150–450)
PLATELET # BLD AUTO: 249 10E9/L (ref 150–450)
PLATELET # BLD AUTO: 257 10E9/L (ref 150–450)
PLATELET # BLD AUTO: 273 10E9/L (ref 150–450)
PLATELET # BLD AUTO: 276 10E9/L (ref 150–450)
PLATELET # BLD AUTO: 298 10E9/L (ref 150–450)
PLATELET # BLD AUTO: 298 10E9/L (ref 150–450)
PLATELET # BLD AUTO: 307 10E9/L (ref 150–450)
PO2 BLD: 158 MM HG (ref 80–105)
PO2 BLD: 171 MM HG (ref 80–105)
PO2 BLD: 176 MM HG (ref 80–105)
PO2 BLD: 209 MM HG (ref 80–105)
PO2 BLD: 236 MM HG (ref 80–105)
PO2 BLD: 236 MM HG (ref 80–105)
PO2 BLD: 242 MM HG (ref 80–105)
PO2 BLD: 262 MM HG (ref 80–105)
PO2 BLD: 266 MM HG (ref 80–105)
PO2 BLD: 269 MM HG (ref 80–105)
PO2 BLD: 274 MM HG (ref 80–105)
PO2 BLD: 328 MM HG (ref 80–105)
PO2 BLD: 469 MM HG (ref 80–105)
POTASSIUM BLD-SCNC: 2.9 MMOL/L (ref 3.4–5.3)
POTASSIUM BLD-SCNC: 3.3 MMOL/L (ref 3.4–5.3)
POTASSIUM BLD-SCNC: 3.6 MMOL/L (ref 3.4–5.3)
POTASSIUM BLD-SCNC: 3.7 MMOL/L (ref 3.4–5.3)
POTASSIUM BLD-SCNC: 3.8 MMOL/L (ref 3.4–5.3)
POTASSIUM BLD-SCNC: 3.8 MMOL/L (ref 3.4–5.3)
POTASSIUM BLD-SCNC: 3.9 MMOL/L (ref 3.4–5.3)
POTASSIUM BLD-SCNC: 4 MMOL/L (ref 3.4–5.3)
POTASSIUM SERPL-SCNC: 3.3 MMOL/L (ref 3.4–5.3)
POTASSIUM SERPL-SCNC: 3.7 MMOL/L (ref 3.4–5.3)
PTH-INTACT SERPL-MCNC: 52 PG/ML (ref 12–72)
RBC # BLD AUTO: 3.53 10E12/L (ref 3.8–5.2)
RBC # BLD AUTO: 3.69 10E12/L (ref 3.8–5.2)
SODIUM BLD-SCNC: 130 MMOL/L (ref 133–144)
SODIUM BLD-SCNC: 131 MMOL/L (ref 133–144)
SODIUM BLD-SCNC: 132 MMOL/L (ref 133–144)
SODIUM BLD-SCNC: 133 MMOL/L (ref 133–144)
SODIUM BLD-SCNC: 134 MMOL/L (ref 133–144)
SODIUM SERPL-SCNC: 134 MMOL/L (ref 133–144)
SODIUM SERPL-SCNC: 139 MMOL/L (ref 133–144)
TRANSFUSION STATUS PATIENT QL: NORMAL
TTG IGA SER-ACNC: <1 U/ML
WBC # BLD AUTO: 14.6 10E9/L (ref 4–11)
WBC # BLD AUTO: 6.6 10E9/L (ref 4–11)

## 2017-11-15 PROCEDURE — 36000078 ZZH SURGERY LEVEL 6 W FLUORO 1ST 30 MIN - UMMC: Performed by: NEUROLOGICAL SURGERY

## 2017-11-15 PROCEDURE — 84132 ASSAY OF SERUM POTASSIUM: CPT | Performed by: ANESTHESIOLOGY

## 2017-11-15 PROCEDURE — 27210995 ZZH RX 272: Performed by: NEUROLOGICAL SURGERY

## 2017-11-15 PROCEDURE — 82330 ASSAY OF CALCIUM: CPT | Performed by: STUDENT IN AN ORGANIZED HEALTH CARE EDUCATION/TRAINING PROGRAM

## 2017-11-15 PROCEDURE — 82803 BLOOD GASES ANY COMBINATION: CPT | Performed by: ANESTHESIOLOGY

## 2017-11-15 PROCEDURE — 86900 BLOOD TYPING SEROLOGIC ABO: CPT | Performed by: STUDENT IN AN ORGANIZED HEALTH CARE EDUCATION/TRAINING PROGRAM

## 2017-11-15 PROCEDURE — 20000004 ZZH R&B ICU UMMC

## 2017-11-15 PROCEDURE — 40000196 ZZH STATISTIC RAPCV CVP MONITORING

## 2017-11-15 PROCEDURE — 94002 VENT MGMT INPAT INIT DAY: CPT

## 2017-11-15 PROCEDURE — 40000170 ZZH STATISTIC PRE-PROCEDURE ASSESSMENT II: Performed by: NEUROLOGICAL SURGERY

## 2017-11-15 PROCEDURE — 85027 COMPLETE CBC AUTOMATED: CPT | Performed by: STUDENT IN AN ORGANIZED HEALTH CARE EDUCATION/TRAINING PROGRAM

## 2017-11-15 PROCEDURE — 83970 ASSAY OF PARATHORMONE: CPT | Performed by: STUDENT IN AN ORGANIZED HEALTH CARE EDUCATION/TRAINING PROGRAM

## 2017-11-15 PROCEDURE — 85049 AUTOMATED PLATELET COUNT: CPT | Performed by: NEUROLOGICAL SURGERY

## 2017-11-15 PROCEDURE — 0RG2071 FUSION OF 2 OR MORE CERVICAL VERTEBRAL JOINTS WITH AUTOLOGOUS TISSUE SUBSTITUTE, POSTERIOR APPROACH, POSTERIOR COLUMN, OPEN APPROACH: ICD-10-PCS | Performed by: NEUROLOGICAL SURGERY

## 2017-11-15 PROCEDURE — 25000128 H RX IP 250 OP 636: Performed by: NEUROLOGICAL SURGERY

## 2017-11-15 PROCEDURE — 83516 IMMUNOASSAY NONANTIBODY: CPT | Performed by: STUDENT IN AN ORGANIZED HEALTH CARE EDUCATION/TRAINING PROGRAM

## 2017-11-15 PROCEDURE — 25000125 ZZHC RX 250: Performed by: STUDENT IN AN ORGANIZED HEALTH CARE EDUCATION/TRAINING PROGRAM

## 2017-11-15 PROCEDURE — 84132 ASSAY OF SERUM POTASSIUM: CPT | Performed by: NEUROLOGICAL SURGERY

## 2017-11-15 PROCEDURE — 40000277 XR SURGERY CARM FLUORO LESS THAN 5 MIN W STILLS: Mod: TC

## 2017-11-15 PROCEDURE — 25000128 H RX IP 250 OP 636: Performed by: NURSE ANESTHETIST, CERTIFIED REGISTERED

## 2017-11-15 PROCEDURE — 00NW0ZZ RELEASE CERVICAL SPINAL CORD, OPEN APPROACH: ICD-10-PCS | Performed by: NEUROLOGICAL SURGERY

## 2017-11-15 PROCEDURE — 85730 THROMBOPLASTIN TIME PARTIAL: CPT | Performed by: NEUROLOGICAL SURGERY

## 2017-11-15 PROCEDURE — 87081 CULTURE SCREEN ONLY: CPT | Performed by: NEUROLOGICAL SURGERY

## 2017-11-15 PROCEDURE — 87641 MR-STAPH DNA AMP PROBE: CPT | Performed by: STUDENT IN AN ORGANIZED HEALTH CARE EDUCATION/TRAINING PROGRAM

## 2017-11-15 PROCEDURE — 40000275 ZZH STATISTIC RCP TIME EA 10 MIN

## 2017-11-15 PROCEDURE — 80048 BASIC METABOLIC PNL TOTAL CA: CPT | Performed by: STUDENT IN AN ORGANIZED HEALTH CARE EDUCATION/TRAINING PROGRAM

## 2017-11-15 PROCEDURE — 37000009 ZZH ANESTHESIA TECHNICAL FEE, EACH ADDTL 15 MIN: Performed by: NEUROLOGICAL SURGERY

## 2017-11-15 PROCEDURE — 00000146 ZZHCL STATISTIC GLUCOSE BY METER IP

## 2017-11-15 PROCEDURE — 86850 RBC ANTIBODY SCREEN: CPT | Performed by: STUDENT IN AN ORGANIZED HEALTH CARE EDUCATION/TRAINING PROGRAM

## 2017-11-15 PROCEDURE — 25000566 ZZH SEVOFLURANE, EA 15 MIN: Performed by: NEUROLOGICAL SURGERY

## 2017-11-15 PROCEDURE — 25000128 H RX IP 250 OP 636

## 2017-11-15 PROCEDURE — 25000125 ZZHC RX 250: Performed by: NEUROLOGICAL SURGERY

## 2017-11-15 PROCEDURE — 0RG4071 FUSION OF CERVICOTHORACIC VERTEBRAL JOINT WITH AUTOLOGOUS TISSUE SUBSTITUTE, POSTERIOR APPROACH, POSTERIOR COLUMN, OPEN APPROACH: ICD-10-PCS | Performed by: NEUROLOGICAL SURGERY

## 2017-11-15 PROCEDURE — 37000008 ZZH ANESTHESIA TECHNICAL FEE, 1ST 30 MIN: Performed by: NEUROLOGICAL SURGERY

## 2017-11-15 PROCEDURE — 25000125 ZZHC RX 250: Performed by: ANESTHESIOLOGY

## 2017-11-15 PROCEDURE — 85384 FIBRINOGEN ACTIVITY: CPT | Performed by: NEUROLOGICAL SURGERY

## 2017-11-15 PROCEDURE — 82947 ASSAY GLUCOSE BLOOD QUANT: CPT | Performed by: NEUROLOGICAL SURGERY

## 2017-11-15 PROCEDURE — 87640 STAPH A DNA AMP PROBE: CPT | Performed by: STUDENT IN AN ORGANIZED HEALTH CARE EDUCATION/TRAINING PROGRAM

## 2017-11-15 PROCEDURE — 36415 COLL VENOUS BLD VENIPUNCTURE: CPT | Performed by: STUDENT IN AN ORGANIZED HEALTH CARE EDUCATION/TRAINING PROGRAM

## 2017-11-15 PROCEDURE — 40000014 ZZH STATISTIC ARTERIAL MONITORING DAILY

## 2017-11-15 PROCEDURE — 25000128 H RX IP 250 OP 636: Performed by: STUDENT IN AN ORGANIZED HEALTH CARE EDUCATION/TRAINING PROGRAM

## 2017-11-15 PROCEDURE — A9270 NON-COVERED ITEM OR SERVICE: HCPCS | Mod: GY | Performed by: STUDENT IN AN ORGANIZED HEALTH CARE EDUCATION/TRAINING PROGRAM

## 2017-11-15 PROCEDURE — 25000125 ZZHC RX 250: Performed by: NURSE ANESTHETIST, CERTIFIED REGISTERED

## 2017-11-15 PROCEDURE — 82803 BLOOD GASES ANY COMBINATION: CPT | Performed by: STUDENT IN AN ORGANIZED HEALTH CARE EDUCATION/TRAINING PROGRAM

## 2017-11-15 PROCEDURE — 82803 BLOOD GASES ANY COMBINATION: CPT | Performed by: NEUROLOGICAL SURGERY

## 2017-11-15 PROCEDURE — 40000986 XR CHEST PORT 1 VW

## 2017-11-15 PROCEDURE — 82040 ASSAY OF SERUM ALBUMIN: CPT | Performed by: STUDENT IN AN ORGANIZED HEALTH CARE EDUCATION/TRAINING PROGRAM

## 2017-11-15 PROCEDURE — 82330 ASSAY OF CALCIUM: CPT | Performed by: NEUROLOGICAL SURGERY

## 2017-11-15 PROCEDURE — 85384 FIBRINOGEN ACTIVITY: CPT | Performed by: STUDENT IN AN ORGANIZED HEALTH CARE EDUCATION/TRAINING PROGRAM

## 2017-11-15 PROCEDURE — 82947 ASSAY GLUCOSE BLOOD QUANT: CPT | Performed by: ANESTHESIOLOGY

## 2017-11-15 PROCEDURE — 0RB30ZZ EXCISION OF CERVICAL VERTEBRAL DISC, OPEN APPROACH: ICD-10-PCS | Performed by: NEUROLOGICAL SURGERY

## 2017-11-15 PROCEDURE — 86923 COMPATIBILITY TEST ELECTRIC: CPT | Performed by: STUDENT IN AN ORGANIZED HEALTH CARE EDUCATION/TRAINING PROGRAM

## 2017-11-15 PROCEDURE — 86901 BLOOD TYPING SEROLOGIC RH(D): CPT | Performed by: STUDENT IN AN ORGANIZED HEALTH CARE EDUCATION/TRAINING PROGRAM

## 2017-11-15 PROCEDURE — 85610 PROTHROMBIN TIME: CPT | Performed by: STUDENT IN AN ORGANIZED HEALTH CARE EDUCATION/TRAINING PROGRAM

## 2017-11-15 PROCEDURE — 85610 PROTHROMBIN TIME: CPT | Performed by: NEUROLOGICAL SURGERY

## 2017-11-15 PROCEDURE — 4A11X4G MONITORING OF PERIPHERAL NERVOUS ELECTRICAL ACTIVITY, INTRAOPERATIVE, EXTERNAL APPROACH: ICD-10-PCS | Performed by: NEUROLOGICAL SURGERY

## 2017-11-15 PROCEDURE — 84295 ASSAY OF SERUM SODIUM: CPT | Performed by: ANESTHESIOLOGY

## 2017-11-15 PROCEDURE — C1713 ANCHOR/SCREW BN/BN,TIS/BN: HCPCS | Performed by: NEUROLOGICAL SURGERY

## 2017-11-15 PROCEDURE — 25000128 H RX IP 250 OP 636: Performed by: ANESTHESIOLOGY

## 2017-11-15 PROCEDURE — 8E09XBZ COMPUTER ASSISTED PROCEDURE OF HEAD AND NECK REGION: ICD-10-PCS | Performed by: NEUROLOGICAL SURGERY

## 2017-11-15 PROCEDURE — 83605 ASSAY OF LACTIC ACID: CPT | Performed by: ANESTHESIOLOGY

## 2017-11-15 PROCEDURE — 27211020 ZZHC OR CELL SAVER OPNP: Performed by: NEUROLOGICAL SURGERY

## 2017-11-15 PROCEDURE — 92200048 ZZHC NEURO MONITORING SERVICE, 7 HOURS (TIER): Performed by: NEUROLOGICAL SURGERY

## 2017-11-15 PROCEDURE — 83735 ASSAY OF MAGNESIUM: CPT | Performed by: NEUROLOGICAL SURGERY

## 2017-11-15 PROCEDURE — 36000076 ZZH SURGERY LEVEL 6 EA 15 ADDTL MIN - UMMC: Performed by: NEUROLOGICAL SURGERY

## 2017-11-15 PROCEDURE — 80048 BASIC METABOLIC PNL TOTAL CA: CPT | Performed by: NEUROLOGICAL SURGERY

## 2017-11-15 PROCEDURE — 84295 ASSAY OF SERUM SODIUM: CPT | Performed by: NEUROLOGICAL SURGERY

## 2017-11-15 PROCEDURE — 0RG7071 FUSION OF 2 TO 7 THORACIC VERTEBRAL JOINTS WITH AUTOLOGOUS TISSUE SUBSTITUTE, POSTERIOR APPROACH, POSTERIOR COLUMN, OPEN APPROACH: ICD-10-PCS | Performed by: NEUROLOGICAL SURGERY

## 2017-11-15 PROCEDURE — 82330 ASSAY OF CALCIUM: CPT | Performed by: ANESTHESIOLOGY

## 2017-11-15 PROCEDURE — P9041 ALBUMIN (HUMAN),5%, 50ML: HCPCS | Performed by: NURSE ANESTHETIST, CERTIFIED REGISTERED

## 2017-11-15 PROCEDURE — 25000132 ZZH RX MED GY IP 250 OP 250 PS 637: Mod: GY | Performed by: STUDENT IN AN ORGANIZED HEALTH CARE EDUCATION/TRAINING PROGRAM

## 2017-11-15 PROCEDURE — C1762 CONN TISS, HUMAN(INC FASCIA): HCPCS | Performed by: NEUROLOGICAL SURGERY

## 2017-11-15 PROCEDURE — 0RG20A0 FUSION OF 2 OR MORE CERVICAL VERTEBRAL JOINTS WITH INTERBODY FUSION DEVICE, ANTERIOR APPROACH, ANTERIOR COLUMN, OPEN APPROACH: ICD-10-PCS | Performed by: NEUROLOGICAL SURGERY

## 2017-11-15 PROCEDURE — 84100 ASSAY OF PHOSPHORUS: CPT | Performed by: NEUROLOGICAL SURGERY

## 2017-11-15 PROCEDURE — P9016 RBC LEUKOCYTES REDUCED: HCPCS | Performed by: STUDENT IN AN ORGANIZED HEALTH CARE EDUCATION/TRAINING PROGRAM

## 2017-11-15 PROCEDURE — 92200051 ZZHC NEURO MONITORING SERVICE, EA ADDITIONAL HOUR (TIER_HR): Performed by: NEUROLOGICAL SURGERY

## 2017-11-15 PROCEDURE — 27210794 ZZH OR GENERAL SUPPLY STERILE: Performed by: NEUROLOGICAL SURGERY

## 2017-11-15 PROCEDURE — 0PS30ZZ REPOSITION CERVICAL VERTEBRA, OPEN APPROACH: ICD-10-PCS | Performed by: NEUROLOGICAL SURGERY

## 2017-11-15 PROCEDURE — 85027 COMPLETE CBC AUTOMATED: CPT | Performed by: NEUROLOGICAL SURGERY

## 2017-11-15 DEVICE — IMPLANTABLE DEVICE: Type: IMPLANTABLE DEVICE | Site: SPINE CERVICAL | Status: FUNCTIONAL

## 2017-11-15 DEVICE — GRAFT BONE CRUSH CANC 30ML 400080: Type: IMPLANTABLE DEVICE | Site: SPINE CERVICAL | Status: FUNCTIONAL

## 2017-11-15 DEVICE — GRAFT BONE PUTTY DBX 02.5ML 038025: Type: IMPLANTABLE DEVICE | Site: SPINE CERVICAL | Status: FUNCTIONAL

## 2017-11-15 RX ORDER — FENTANYL CITRATE 50 UG/ML
25-50 INJECTION, SOLUTION INTRAMUSCULAR; INTRAVENOUS
Status: DISCONTINUED | OUTPATIENT
Start: 2017-11-15 | End: 2017-11-15

## 2017-11-15 RX ORDER — CEFAZOLIN SODIUM 500 MG/2.2ML
500 INJECTION, POWDER, FOR SOLUTION INTRAMUSCULAR; INTRAVENOUS SEE ADMIN INSTRUCTIONS
Status: DISCONTINUED | OUTPATIENT
Start: 2017-11-15 | End: 2017-11-15 | Stop reason: HOSPADM

## 2017-11-15 RX ORDER — PROPOFOL 10 MG/ML
5-75 INJECTION, EMULSION INTRAVENOUS CONTINUOUS
Status: DISCONTINUED | OUTPATIENT
Start: 2017-11-15 | End: 2017-11-16

## 2017-11-15 RX ORDER — POTASSIUM CHLORIDE 29.8 MG/ML
INJECTION INTRAVENOUS PRN
Status: DISCONTINUED | OUTPATIENT
Start: 2017-11-15 | End: 2017-11-15

## 2017-11-15 RX ORDER — SODIUM CHLORIDE, SODIUM LACTATE, POTASSIUM CHLORIDE, CALCIUM CHLORIDE 600; 310; 30; 20 MG/100ML; MG/100ML; MG/100ML; MG/100ML
INJECTION, SOLUTION INTRAVENOUS CONTINUOUS PRN
Status: DISCONTINUED | OUTPATIENT
Start: 2017-11-15 | End: 2017-11-15

## 2017-11-15 RX ORDER — PROPOFOL 10 MG/ML
INJECTION, EMULSION INTRAVENOUS CONTINUOUS PRN
Status: DISCONTINUED | OUTPATIENT
Start: 2017-11-15 | End: 2017-11-15

## 2017-11-15 RX ORDER — ONDANSETRON 2 MG/ML
4 INJECTION INTRAMUSCULAR; INTRAVENOUS EVERY 30 MIN PRN
Status: DISCONTINUED | OUTPATIENT
Start: 2017-11-15 | End: 2017-11-15

## 2017-11-15 RX ORDER — ALBUTEROL SULFATE 0.83 MG/ML
2.5 SOLUTION RESPIRATORY (INHALATION) EVERY 4 HOURS PRN
Status: DISCONTINUED | OUTPATIENT
Start: 2017-11-15 | End: 2017-11-17

## 2017-11-15 RX ORDER — NALOXONE HYDROCHLORIDE 0.4 MG/ML
.1-.4 INJECTION, SOLUTION INTRAMUSCULAR; INTRAVENOUS; SUBCUTANEOUS
Status: DISCONTINUED | OUTPATIENT
Start: 2017-11-15 | End: 2017-11-15

## 2017-11-15 RX ORDER — SODIUM CHLORIDE, SODIUM LACTATE, POTASSIUM CHLORIDE, CALCIUM CHLORIDE 600; 310; 30; 20 MG/100ML; MG/100ML; MG/100ML; MG/100ML
INJECTION, SOLUTION INTRAVENOUS CONTINUOUS
Status: DISCONTINUED | OUTPATIENT
Start: 2017-11-15 | End: 2017-11-15

## 2017-11-15 RX ORDER — HYDROMORPHONE HYDROCHLORIDE 1 MG/ML
.3-.5 INJECTION, SOLUTION INTRAMUSCULAR; INTRAVENOUS; SUBCUTANEOUS EVERY 5 MIN PRN
Status: DISCONTINUED | OUTPATIENT
Start: 2017-11-15 | End: 2017-11-15

## 2017-11-15 RX ORDER — ONDANSETRON 4 MG/1
4-8 TABLET, ORALLY DISINTEGRATING ORAL EVERY 6 HOURS PRN
Status: DISCONTINUED | OUTPATIENT
Start: 2017-11-15 | End: 2017-11-27 | Stop reason: HOSPADM

## 2017-11-15 RX ORDER — DEXMEDETOMIDINE HYDROCHLORIDE 4 UG/ML
0.2-1.2 INJECTION, SOLUTION INTRAVENOUS CONTINUOUS
Status: DISCONTINUED | OUTPATIENT
Start: 2017-11-15 | End: 2017-11-15 | Stop reason: HOSPADM

## 2017-11-15 RX ORDER — FENTANYL CITRATE 50 UG/ML
50 INJECTION, SOLUTION INTRAMUSCULAR; INTRAVENOUS ONCE
Status: COMPLETED | OUTPATIENT
Start: 2017-11-15 | End: 2017-11-15

## 2017-11-15 RX ORDER — ALBUMIN, HUMAN INJ 5% 5 %
SOLUTION INTRAVENOUS CONTINUOUS PRN
Status: DISCONTINUED | OUTPATIENT
Start: 2017-11-15 | End: 2017-11-15

## 2017-11-15 RX ORDER — LABETALOL HYDROCHLORIDE 5 MG/ML
INJECTION, SOLUTION INTRAVENOUS PRN
Status: DISCONTINUED | OUTPATIENT
Start: 2017-11-15 | End: 2017-11-15

## 2017-11-15 RX ORDER — ONDANSETRON 2 MG/ML
4-8 INJECTION INTRAMUSCULAR; INTRAVENOUS EVERY 6 HOURS PRN
Status: DISCONTINUED | OUTPATIENT
Start: 2017-11-15 | End: 2017-11-27 | Stop reason: HOSPADM

## 2017-11-15 RX ORDER — FENTANYL CITRATE 50 UG/ML
INJECTION, SOLUTION INTRAMUSCULAR; INTRAVENOUS PRN
Status: DISCONTINUED | OUTPATIENT
Start: 2017-11-15 | End: 2017-11-15

## 2017-11-15 RX ORDER — MEPERIDINE HYDROCHLORIDE 50 MG/ML
12.5 INJECTION INTRAMUSCULAR; INTRAVENOUS; SUBCUTANEOUS EVERY 5 MIN PRN
Status: DISCONTINUED | OUTPATIENT
Start: 2017-11-15 | End: 2017-11-15

## 2017-11-15 RX ORDER — BUPIVACAINE HYDROCHLORIDE AND EPINEPHRINE 5; 5 MG/ML; UG/ML
INJECTION, SOLUTION PERINEURAL PRN
Status: DISCONTINUED | OUTPATIENT
Start: 2017-11-15 | End: 2017-11-15 | Stop reason: HOSPADM

## 2017-11-15 RX ORDER — LABETALOL HYDROCHLORIDE 5 MG/ML
10 INJECTION, SOLUTION INTRAVENOUS
Status: DISCONTINUED | OUTPATIENT
Start: 2017-11-15 | End: 2017-11-15 | Stop reason: CLARIF

## 2017-11-15 RX ORDER — FENTANYL CITRATE 50 UG/ML
25-50 INJECTION, SOLUTION INTRAMUSCULAR; INTRAVENOUS
Status: DISCONTINUED | OUTPATIENT
Start: 2017-11-15 | End: 2017-11-16

## 2017-11-15 RX ORDER — MEPERIDINE HYDROCHLORIDE 50 MG/ML
12.5 INJECTION INTRAMUSCULAR; INTRAVENOUS; SUBCUTANEOUS
Status: DISCONTINUED | OUTPATIENT
Start: 2017-11-15 | End: 2017-11-15

## 2017-11-15 RX ORDER — HYDROMORPHONE HYDROCHLORIDE 1 MG/ML
.3-.5 INJECTION, SOLUTION INTRAMUSCULAR; INTRAVENOUS; SUBCUTANEOUS EVERY 10 MIN PRN
Status: DISCONTINUED | OUTPATIENT
Start: 2017-11-15 | End: 2017-11-15

## 2017-11-15 RX ORDER — GLYCOPYRROLATE 0.2 MG/ML
INJECTION, SOLUTION INTRAMUSCULAR; INTRAVENOUS PRN
Status: DISCONTINUED | OUTPATIENT
Start: 2017-11-15 | End: 2017-11-15

## 2017-11-15 RX ORDER — PROPOFOL 10 MG/ML
INJECTION, EMULSION INTRAVENOUS PRN
Status: DISCONTINUED | OUTPATIENT
Start: 2017-11-15 | End: 2017-11-15

## 2017-11-15 RX ORDER — NITROGLYCERIN 10 MG/100ML
INJECTION INTRAVENOUS PRN
Status: DISCONTINUED | OUTPATIENT
Start: 2017-11-15 | End: 2017-11-15

## 2017-11-15 RX ORDER — ONDANSETRON 4 MG/1
4 TABLET, ORALLY DISINTEGRATING ORAL EVERY 30 MIN PRN
Status: DISCONTINUED | OUTPATIENT
Start: 2017-11-15 | End: 2017-11-15

## 2017-11-15 RX ORDER — SODIUM CHLORIDE 9 MG/ML
INJECTION, SOLUTION INTRAVENOUS
Status: DISCONTINUED
Start: 2017-11-15 | End: 2017-11-16 | Stop reason: HOSPADM

## 2017-11-15 RX ORDER — LABETALOL HYDROCHLORIDE 5 MG/ML
10 INJECTION, SOLUTION INTRAVENOUS
Status: DISCONTINUED | OUTPATIENT
Start: 2017-11-15 | End: 2017-11-15

## 2017-11-15 RX ADMIN — ALBUMIN (HUMAN): 12.5 SOLUTION INTRAVENOUS at 09:15

## 2017-11-15 RX ADMIN — FENTANYL CITRATE 25 MCG: 50 INJECTION, SOLUTION INTRAMUSCULAR; INTRAVENOUS at 14:50

## 2017-11-15 RX ADMIN — SODIUM CHLORIDE, POTASSIUM CHLORIDE, SODIUM LACTATE AND CALCIUM CHLORIDE: 600; 310; 30; 20 INJECTION, SOLUTION INTRAVENOUS at 19:39

## 2017-11-15 RX ADMIN — PHENYLEPHRINE HYDROCHLORIDE 50 MCG: 10 INJECTION, SOLUTION INTRAMUSCULAR; INTRAVENOUS; SUBCUTANEOUS at 14:56

## 2017-11-15 RX ADMIN — NOREPINEPHRINE BITARTRATE 6.4 MCG: 1 INJECTION INTRAVENOUS at 09:39

## 2017-11-15 RX ADMIN — CEFAZOLIN 500 MG: 225 INJECTION, POWDER, FOR SOLUTION INTRAMUSCULAR; INTRAVENOUS at 19:34

## 2017-11-15 RX ADMIN — PHENYLEPHRINE HYDROCHLORIDE 50 MCG: 10 INJECTION, SOLUTION INTRAMUSCULAR; INTRAVENOUS; SUBCUTANEOUS at 13:54

## 2017-11-15 RX ADMIN — NITROGLYCERIN 100 MCG: 10 INJECTION INTRAVENOUS at 21:28

## 2017-11-15 RX ADMIN — NOREPINEPHRINE BITARTRATE 6.4 MCG: 1 INJECTION INTRAVENOUS at 15:10

## 2017-11-15 RX ADMIN — CEFAZOLIN 500 MG: 225 INJECTION, POWDER, FOR SOLUTION INTRAMUSCULAR; INTRAVENOUS at 17:38

## 2017-11-15 RX ADMIN — CEFAZOLIN 1 G: 1 INJECTION, POWDER, FOR SOLUTION INTRAMUSCULAR; INTRAVENOUS at 09:34

## 2017-11-15 RX ADMIN — VASOPRESSIN 1 UNITS: 20 INJECTION, SOLUTION INTRAMUSCULAR; SUBCUTANEOUS at 09:39

## 2017-11-15 RX ADMIN — PHENYLEPHRINE HYDROCHLORIDE 50 MCG: 10 INJECTION, SOLUTION INTRAMUSCULAR; INTRAVENOUS; SUBCUTANEOUS at 13:23

## 2017-11-15 RX ADMIN — NOREPINEPHRINE BITARTRATE 6.4 MCG: 1 INJECTION INTRAVENOUS at 11:17

## 2017-11-15 RX ADMIN — SODIUM CHLORIDE, POTASSIUM CHLORIDE, SODIUM LACTATE AND CALCIUM CHLORIDE: 600; 310; 30; 20 INJECTION, SOLUTION INTRAVENOUS at 09:00

## 2017-11-15 RX ADMIN — PHENYLEPHRINE HYDROCHLORIDE 200 MCG: 10 INJECTION, SOLUTION INTRAMUSCULAR; INTRAVENOUS; SUBCUTANEOUS at 08:28

## 2017-11-15 RX ADMIN — CEFAZOLIN 500 MG: 225 INJECTION, POWDER, FOR SOLUTION INTRAMUSCULAR; INTRAVENOUS at 13:30

## 2017-11-15 RX ADMIN — NOREPINEPHRINE BITARTRATE 6.4 MCG: 1 INJECTION INTRAVENOUS at 09:19

## 2017-11-15 RX ADMIN — ALBUMIN (HUMAN): 12.5 SOLUTION INTRAVENOUS at 10:04

## 2017-11-15 RX ADMIN — HYDROCODONE BITARTRATE AND ACETAMINOPHEN 1 TABLET: 5; 325 TABLET ORAL at 00:07

## 2017-11-15 RX ADMIN — SODIUM CHLORIDE: 9 INJECTION, SOLUTION INTRAVENOUS at 01:15

## 2017-11-15 RX ADMIN — POTASSIUM CHLORIDE 20 MEQ: 29.8 INJECTION, SOLUTION INTRAVENOUS at 10:24

## 2017-11-15 RX ADMIN — DEXMEDETOMIDINE HYDROCHLORIDE 10 MCG: 100 INJECTION, SOLUTION INTRAVENOUS at 07:57

## 2017-11-15 RX ADMIN — SODIUM CHLORIDE: 9 INJECTION, SOLUTION INTRAVENOUS at 11:00

## 2017-11-15 RX ADMIN — CEFAZOLIN 500 MG: 225 INJECTION, POWDER, FOR SOLUTION INTRAMUSCULAR; INTRAVENOUS at 15:30

## 2017-11-15 RX ADMIN — FENTANYL CITRATE 25 MCG: 50 INJECTION, SOLUTION INTRAMUSCULAR; INTRAVENOUS at 20:10

## 2017-11-15 RX ADMIN — PROPOFOL 75 MCG/KG/MIN: 10 INJECTION, EMULSION INTRAVENOUS at 22:50

## 2017-11-15 RX ADMIN — NOREPINEPHRINE BITARTRATE 6.4 MCG: 1 INJECTION INTRAVENOUS at 15:36

## 2017-11-15 RX ADMIN — SODIUM CHLORIDE: 9 INJECTION, SOLUTION INTRAVENOUS at 10:15

## 2017-11-15 RX ADMIN — CEFAZOLIN 500 MG: 225 INJECTION, POWDER, FOR SOLUTION INTRAMUSCULAR; INTRAVENOUS at 11:30

## 2017-11-15 RX ADMIN — FENTANYL CITRATE 25 MCG: 50 INJECTION, SOLUTION INTRAMUSCULAR; INTRAVENOUS at 17:25

## 2017-11-15 RX ADMIN — VASOPRESSIN 1 UNITS: 20 INJECTION, SOLUTION INTRAMUSCULAR; SUBCUTANEOUS at 08:34

## 2017-11-15 RX ADMIN — PHENYLEPHRINE HYDROCHLORIDE 50 MCG: 10 INJECTION, SOLUTION INTRAMUSCULAR; INTRAVENOUS; SUBCUTANEOUS at 13:52

## 2017-11-15 RX ADMIN — NOREPINEPHRINE BITARTRATE 0.03 MCG/KG/MIN: 1 INJECTION INTRAVENOUS at 11:54

## 2017-11-15 RX ADMIN — PROPOFOL 35 MCG/KG/MIN: 10 INJECTION, EMULSION INTRAVENOUS at 20:36

## 2017-11-15 RX ADMIN — REMIFENTANIL HYDROCHLORIDE 0.05 MCG/KG/MIN: 1 INJECTION, POWDER, LYOPHILIZED, FOR SOLUTION INTRAVENOUS at 09:01

## 2017-11-15 RX ADMIN — Medication 0.2 MG: at 07:55

## 2017-11-15 RX ADMIN — LABETALOL HYDROCHLORIDE 5 MG: 5 INJECTION, SOLUTION INTRAVENOUS at 21:09

## 2017-11-15 RX ADMIN — Medication 2.5 MG/HR: at 23:01

## 2017-11-15 RX ADMIN — CIPROFLOXACIN 400 MG: 2 INJECTION, SOLUTION INTRAVENOUS at 01:31

## 2017-11-15 RX ADMIN — PHENYLEPHRINE HYDROCHLORIDE 0.5 MCG/KG/MIN: 10 INJECTION, SOLUTION INTRAMUSCULAR; INTRAVENOUS; SUBCUTANEOUS at 09:15

## 2017-11-15 RX ADMIN — MAGNESIUM SULFATE IN WATER 4 G: 40 INJECTION, SOLUTION INTRAVENOUS at 23:17

## 2017-11-15 RX ADMIN — NOREPINEPHRINE BITARTRATE 3.2 MCG: 1 INJECTION INTRAVENOUS at 12:44

## 2017-11-15 RX ADMIN — HYDRALAZINE HYDROCHLORIDE 10 MG: 20 INJECTION INTRAMUSCULAR; INTRAVENOUS at 01:27

## 2017-11-15 RX ADMIN — PROPOFOL 40 MG: 10 INJECTION, EMULSION INTRAVENOUS at 08:14

## 2017-11-15 RX ADMIN — PHENYLEPHRINE HYDROCHLORIDE 200 MCG: 10 INJECTION, SOLUTION INTRAMUSCULAR; INTRAVENOUS; SUBCUTANEOUS at 08:24

## 2017-11-15 RX ADMIN — NOREPINEPHRINE BITARTRATE 6.4 MCG: 1 INJECTION INTRAVENOUS at 08:56

## 2017-11-15 RX ADMIN — REMIFENTANIL HYDROCHLORIDE: 1 INJECTION, POWDER, LYOPHILIZED, FOR SOLUTION INTRAVENOUS at 20:03

## 2017-11-15 RX ADMIN — NOREPINEPHRINE BITARTRATE 6.4 MCG: 1 INJECTION INTRAVENOUS at 10:20

## 2017-11-15 RX ADMIN — PHENYLEPHRINE HYDROCHLORIDE 100 MCG: 10 INJECTION, SOLUTION INTRAMUSCULAR; INTRAVENOUS; SUBCUTANEOUS at 18:53

## 2017-11-15 RX ADMIN — FENTANYL CITRATE 25 MCG: 50 INJECTION, SOLUTION INTRAMUSCULAR; INTRAVENOUS at 08:10

## 2017-11-15 RX ADMIN — VASOPRESSIN 1 UNITS: 20 INJECTION, SOLUTION INTRAMUSCULAR; SUBCUTANEOUS at 11:22

## 2017-11-15 RX ADMIN — NITROGLYCERIN 100 MCG: 10 INJECTION INTRAVENOUS at 21:38

## 2017-11-15 RX ADMIN — PROPOFOL 20 MG: 10 INJECTION, EMULSION INTRAVENOUS at 09:05

## 2017-11-15 RX ADMIN — FENTANYL CITRATE 50 MCG: 50 INJECTION, SOLUTION INTRAMUSCULAR; INTRAVENOUS at 21:59

## 2017-11-15 RX ADMIN — NOREPINEPHRINE BITARTRATE 6.4 MCG: 1 INJECTION INTRAVENOUS at 15:41

## 2017-11-15 RX ADMIN — TRANEXAMIC ACID 370 MG: 100 INJECTION, SOLUTION INTRAVENOUS at 09:13

## 2017-11-15 RX ADMIN — PHENYLEPHRINE HYDROCHLORIDE 100 MCG: 10 INJECTION, SOLUTION INTRAMUSCULAR; INTRAVENOUS; SUBCUTANEOUS at 12:50

## 2017-11-15 RX ADMIN — HYDRALAZINE HYDROCHLORIDE 10 MG: 20 INJECTION INTRAMUSCULAR; INTRAVENOUS at 22:28

## 2017-11-15 RX ADMIN — LABETALOL HYDROCHLORIDE 10 MG: 5 INJECTION, SOLUTION INTRAVENOUS at 21:19

## 2017-11-15 RX ADMIN — TRANEXAMIC ACID 1 MG/KG/HR: 100 INJECTION, SOLUTION INTRAVENOUS at 09:31

## 2017-11-15 RX ADMIN — NOREPINEPHRINE BITARTRATE 6.4 MCG: 1 INJECTION INTRAVENOUS at 11:09

## 2017-11-15 RX ADMIN — NOREPINEPHRINE BITARTRATE 3.2 MCG: 1 INJECTION INTRAVENOUS at 12:35

## 2017-11-15 RX ADMIN — PHENYLEPHRINE HYDROCHLORIDE 50 MCG: 10 INJECTION, SOLUTION INTRAMUSCULAR; INTRAVENOUS; SUBCUTANEOUS at 13:16

## 2017-11-15 RX ADMIN — VASOPRESSIN 1 UNITS: 20 INJECTION, SOLUTION INTRAMUSCULAR; SUBCUTANEOUS at 08:41

## 2017-11-15 RX ADMIN — VASOPRESSIN 1 UNITS: 20 INJECTION, SOLUTION INTRAMUSCULAR; SUBCUTANEOUS at 08:46

## 2017-11-15 RX ADMIN — PHENYLEPHRINE HYDROCHLORIDE 50 MCG: 10 INJECTION, SOLUTION INTRAMUSCULAR; INTRAVENOUS; SUBCUTANEOUS at 15:04

## 2017-11-15 RX ADMIN — PROPOFOL 20 MG: 10 INJECTION, EMULSION INTRAVENOUS at 09:04

## 2017-11-15 RX ADMIN — PHENYLEPHRINE HYDROCHLORIDE 200 MCG: 10 INJECTION, SOLUTION INTRAMUSCULAR; INTRAVENOUS; SUBCUTANEOUS at 08:49

## 2017-11-15 NOTE — ANESTHESIA PROCEDURE NOTES
Central Line Procedure Note  Staff:     Anesthesiologist:  NAJMA BARRERA  Location: In OR after induction  Procedure Start/Stop Times:     patient identified, IV checked, site marked, risks and benefits discussed, informed consent, monitors and equipment checked, pre-op evaluation and at physician/surgeon's request      Correct Patient: Yes      Correct Position: Yes      Correct Site: Yes      Correct Procedure: Yes      Correct Laterality:  Yes    Site Marked:  Yes  Line Placement:     Procedure:  Central Line    Insertion laterality:  Left    Insertion site:  Subclavian    Position:  Trendelenburg      Maximal Sterile Barriers: All elements of maximal sterile barrier technique followed      (Maximal sterile barriers include:   Sterile gown, Sterile Gloves, Mask, Cap, Whole body draped, hand hygiene and acceptable skin prep).Skin Prep: Chloraprep         Injection Technique:  Ultrasound guided and Seldinger Technique    Sterile Ultrasound Technique:  Sterile probe cover and Sterile gel    Vein evaluated via U/S for patency/adequacy of catheter insertion and is adequate.  Using realtime U/S imaging the vein was punctured, and needle was observed entering vein on U/S      A permanent image is NOT entered into the patient's record.      Local skin infiltration:  None    Catheter size:  7 Fr, 3 lumen, 20 cm    Catheter length at skin (cm):  17    Cath secured with: suture      Dressing:  Tegaderm and Biopatch    Complications:  None obvious    Blood aspirated all lumens: Yes      All Lumens Flushed: Yes      Verification method:  Placement to be verified post-op  Assessment/Narrative:      Under direct ultrasound guidance, wire was confirmed to be intravenous prior to dilation.  After the procedure, lung sliding was confirmed at 3 points in the left thorax, ruling out PTX.

## 2017-11-15 NOTE — ANESTHESIA PROCEDURE NOTES
Arterial Line Procedure Note  Staff:     Anesthesiologist:  NAJMA BARRERA  Location: In OR After Induction  Procedure Start/Stop Times:     patient identified, IV checked, site marked, risks and benefits discussed, informed consent, monitors and equipment checked, pre-op evaluation and at physician/surgeon's request      Correct Patient: Yes      Correct Position: Yes      Correct Site: Yes      Correct Procedure: Yes      Correct Laterality:  Yes    Site Marked:  Yes  Line Placement:     Procedure:  Arterial Line    Insertion Site:  Radial    Insertion laterality:  Left    Skin Prep: Chloraprep      Patient Prep: patient draped, mask, sterile gloves, hat and hand hygiene      Local skin infiltration:  None    Ultrasound Guided?: Yes      Artery evaluated via ultrasound confirming patency.   Using realtime imaging, the artery was punctured and the needle was observed entering the artery.      A permanent image is entered into patient's chart.      Catheter size:  20 gauge, Quick cath    Cath secured with: suture      Dressing:  Tegaderm    Complications:  None obvious    Arterial waveform: Yes      IBP within 10% of NIBP: Yes    Assessment/Narrative:      There was resistance to passing wire at ~15cm depth (~10cm past end of catheter).  I attempted to thread both ends of wire past this resistance, but was unable to.  Had good flow via catheter with good arterial waveform, so I secured catheter.

## 2017-11-15 NOTE — PLAN OF CARE
Problem: Pain, Acute (Adult)  Goal: Identify Related Risk Factors and Signs and Symptoms  Related risk factors and signs and symptoms are identified upon initiation of Human Response Clinical Practice Guideline (CPG).   Outcome: Improving  Pt is on 6A awaiting surgery for cervical stenosis and progressive weakness. VSS except HTN within parameters. A&Ox4. Neuro include BUE and BLE 2/5, c/o n/t in bilateral hands, denies n/t in other extremities.   Pain controlled w/ PRN Norco x2. Up with 2 and lift. T&R Q2; occasionally declines repositioning, pt educated on importance. SC at 0930 for 275mL. Pt able to void at 1600 for 270; PVR of 101mL.  Tolerating a reg diet w/good intake; pt is a total feed. Free water restriction of 750 mL per day (400mL consumed this shift). PIV SL. No BM this shift, only a smear, bowel meds given. Plan is for OR tomorrow morning. Continue to monitor and follow POC.

## 2017-11-15 NOTE — PLAN OF CARE
Problem: Patient Care Overview  Goal: Plan of Care/Patient Progress Review  Outcome: Improving  Admit for s/p cervical stenosis and myelopathy and general weakness. VSS ex HTN 170s/90s. Hydralazine given @ 0130.  BP 130s/60s. Neuros unchanged. General weakness, all extremity 2/5. Alert and oriented x4. Surgical prep done at 0445. NPO at midnight. PIV @ 50ml/hr in between antibx. Up with lift and two. Unable to void and straight cath for 500ml @0500. OR @0600, report given PACU @0615.

## 2017-11-15 NOTE — PROGRESS NOTES
Neurosurgery Daily Progress Note  11/15/2017    Overnight events/subjective: No acute overnight events. UTI found, will Tx with IV abx d/t upcoming procedure tomorrow.    O/ /80  Pulse 84  Temp 98  F (36.7  C) (Oral)  Resp 16  Wt 36.5 kg (80 lb 8 oz)  SpO2 94%  BMI 16.82 kg/m2  Exam:   Gen: Laying in bed, not in acute distress  MS: A&Ox3, Speech fluent and conversant   CN: Pupils round and reactive, extraocular movements intact, face symmetric, tongue midline, uvula & palate elevate symmetrically, difficult of hearing  Motor:    Delt Bi Tri FE IP Quad Hamst TibAnt EHL Gastroc     C5 C6 C7 C8/T1 L2 L3 L4-S1 L4 L5 S1   R 2 4 3 2 3 4 4 4 4 4   L 2 3 3 2 3 4 4 4 4 4     Sensory: intact to light touch   Unable to test drift testing    Non labored breathing    IMG: reviewed     LABS: reviewed     A/P: Yuni Otoole is a 82 year old with known C2/3 spinal canal stenosis and kyphotic deformity presenting with continued worsening weakness and an inability to care for herself at home.    - Hold PTA captopril  - Hold PTA ASA  - Bladder scan  - 750mL fluid restriction for hyponatremia  - NPO  - mIVF while NPO  - 1x IV Cipro & IV Ceftriaxone for UTI  - Medicine pre-op evaluation: safe to proceed with surgery  - Orthopedics shoulder evaluation: safe to proceed with surgery, normal arm manipulation precautions  - To OR today  - SCDs for DVT ppx    Per Dr. López:  - Working with Nutritionist to meet 1.5g protein/kg body weight/day  - Vitamin A, C, E, and zinc supplementation  - 25-OH vitamin D level and treat to a goal of 50  - Will plan for sutures to remain in for 4 weeks following surgey      Dispo: anticipated d/c 11/24, pending surgical intervention and general post-operative barriers to discharge.      Please contact the neurosurgery resident on call with questions by dialing [phone], then entering [phone] when prompted

## 2017-11-15 NOTE — PLAN OF CARE
Problem: Patient Care Overview  Goal: Plan of Care/Patient Progress Review  Outcome: No Change  Cared for pt from 2714-5327. AVSS. Pt was unable to void despite attempts - Straight cath'd for 350ml of cloudy yellow and malodorous urine x1. Iv ABX ordered but have not received from pharmacy yet - night shift RN will give once available. Repositioned q 2 hours. Plan is for OR tomorrow and possible again on Friday. Will continue to monitor and follow with POC.

## 2017-11-16 ENCOUNTER — APPOINTMENT (OUTPATIENT)
Dept: GENERAL RADIOLOGY | Facility: CLINIC | Age: 82
DRG: 453 | End: 2017-11-16
Attending: NEUROLOGICAL SURGERY
Payer: MEDICARE

## 2017-11-16 LAB
ABO + RH BLD: NORMAL
ABO + RH BLD: NORMAL
ALBUMIN SERPL-MCNC: 3.5 G/DL (ref 3.4–5)
ALBUMIN SERPL-MCNC: 3.7 G/DL (ref 3.4–5)
ANION GAP SERPL CALCULATED.3IONS-SCNC: 8 MMOL/L (ref 3–14)
APTT PPP: 27 SEC (ref 22–37)
BACTERIA SPEC CULT: NORMAL
BACTERIA SPEC CULT: NORMAL
BASE DEFICIT BLDA-SCNC: 3.9 MMOL/L
BLD GP AB SCN SERPL QL: NORMAL
BLD PROD TYP BPU: NORMAL
BLOOD BANK CMNT PATIENT-IMP: NORMAL
BUN SERPL-MCNC: 14 MG/DL (ref 7–30)
CA-I BLD-MCNC: 4.5 MG/DL (ref 4.4–5.2)
CALCIUM SERPL-MCNC: 7.9 MG/DL (ref 8.5–10.1)
CHLORIDE SERPL-SCNC: 107 MMOL/L (ref 94–109)
CO2 SERPL-SCNC: 20 MMOL/L (ref 20–32)
CREAT SERPL-MCNC: 0.33 MG/DL (ref 0.52–1.04)
ERYTHROCYTE [DISTWIDTH] IN BLOOD BY AUTOMATED COUNT: 14.7 % (ref 10–15)
FIBRINOGEN PPP-MCNC: 246 MG/DL (ref 200–420)
FIBRINOGEN PPP-MCNC: 305 MG/DL (ref 200–420)
GFR SERPL CREATININE-BSD FRML MDRD: >90 ML/MIN/1.7M2
GLUCOSE BLDC GLUCOMTR-MCNC: 132 MG/DL (ref 70–99)
GLUCOSE BLDC GLUCOMTR-MCNC: 148 MG/DL (ref 70–99)
GLUCOSE BLDC GLUCOMTR-MCNC: 150 MG/DL (ref 70–99)
GLUCOSE SERPL-MCNC: 149 MG/DL (ref 70–99)
HCO3 BLD-SCNC: 20 MMOL/L (ref 21–28)
HCT VFR BLD AUTO: 34.5 % (ref 35–47)
HGB BLD-MCNC: 11.6 G/DL (ref 11.7–15.7)
INR PPP: 1.07 (ref 0.86–1.14)
LACTATE BLD-SCNC: 1.4 MMOL/L (ref 0.7–2)
MAGNESIUM SERPL-MCNC: 3 MG/DL (ref 1.6–2.3)
MCH RBC QN AUTO: 30.4 PG (ref 26.5–33)
MCHC RBC AUTO-ENTMCNC: 33.6 G/DL (ref 31.5–36.5)
MCV RBC AUTO: 90 FL (ref 78–100)
MRSA DNA SPEC QL NAA+PROBE: ABNORMAL
MRSA DNA SPEC QL NAA+PROBE: NEGATIVE
NUM BPU REQUESTED: 6
O2/TOTAL GAS SETTING VFR VENT: 40 %
PCO2 BLD: 32 MM HG (ref 35–45)
PH BLD: 7.4 PH (ref 7.35–7.45)
PLATELET # BLD AUTO: 182 10E9/L (ref 150–450)
PO2 BLD: 193 MM HG (ref 80–105)
POTASSIUM SERPL-SCNC: 4.3 MMOL/L (ref 3.4–5.3)
RBC # BLD AUTO: 3.82 10E12/L (ref 3.8–5.2)
SODIUM SERPL-SCNC: 135 MMOL/L (ref 133–144)
SPECIMEN EXP DATE BLD: NORMAL
SPECIMEN SOURCE: ABNORMAL
SPECIMEN SOURCE: NORMAL
SPECIMEN SOURCE: NORMAL
WBC # BLD AUTO: 16.1 10E9/L (ref 4–11)

## 2017-11-16 PROCEDURE — 25000132 ZZH RX MED GY IP 250 OP 250 PS 637: Mod: GY | Performed by: NURSE PRACTITIONER

## 2017-11-16 PROCEDURE — 00000146 ZZHCL STATISTIC GLUCOSE BY METER IP

## 2017-11-16 PROCEDURE — 40000275 ZZH STATISTIC RCP TIME EA 10 MIN

## 2017-11-16 PROCEDURE — 82330 ASSAY OF CALCIUM: CPT | Performed by: STUDENT IN AN ORGANIZED HEALTH CARE EDUCATION/TRAINING PROGRAM

## 2017-11-16 PROCEDURE — 94003 VENT MGMT INPAT SUBQ DAY: CPT

## 2017-11-16 PROCEDURE — 25000128 H RX IP 250 OP 636

## 2017-11-16 PROCEDURE — 25000125 ZZHC RX 250: Performed by: STUDENT IN AN ORGANIZED HEALTH CARE EDUCATION/TRAINING PROGRAM

## 2017-11-16 PROCEDURE — A9270 NON-COVERED ITEM OR SERVICE: HCPCS | Mod: GY | Performed by: INTERNAL MEDICINE

## 2017-11-16 PROCEDURE — A9270 NON-COVERED ITEM OR SERVICE: HCPCS | Mod: GY | Performed by: STUDENT IN AN ORGANIZED HEALTH CARE EDUCATION/TRAINING PROGRAM

## 2017-11-16 PROCEDURE — 25000128 H RX IP 250 OP 636: Performed by: STUDENT IN AN ORGANIZED HEALTH CARE EDUCATION/TRAINING PROGRAM

## 2017-11-16 PROCEDURE — 83605 ASSAY OF LACTIC ACID: CPT | Performed by: STUDENT IN AN ORGANIZED HEALTH CARE EDUCATION/TRAINING PROGRAM

## 2017-11-16 PROCEDURE — 25000128 H RX IP 250 OP 636: Performed by: NURSE PRACTITIONER

## 2017-11-16 PROCEDURE — 85730 THROMBOPLASTIN TIME PARTIAL: CPT | Performed by: STUDENT IN AN ORGANIZED HEALTH CARE EDUCATION/TRAINING PROGRAM

## 2017-11-16 PROCEDURE — 25000132 ZZH RX MED GY IP 250 OP 250 PS 637: Mod: GY | Performed by: STUDENT IN AN ORGANIZED HEALTH CARE EDUCATION/TRAINING PROGRAM

## 2017-11-16 PROCEDURE — 25000132 ZZH RX MED GY IP 250 OP 250 PS 637: Mod: GY | Performed by: NEUROLOGICAL SURGERY

## 2017-11-16 PROCEDURE — 80048 BASIC METABOLIC PNL TOTAL CA: CPT | Performed by: STUDENT IN AN ORGANIZED HEALTH CARE EDUCATION/TRAINING PROGRAM

## 2017-11-16 PROCEDURE — 44500 INTRO GASTROINTESTINAL TUBE: CPT

## 2017-11-16 PROCEDURE — 27210429 ZZH NUTRITION PRODUCT INTERMEDIATE LITER

## 2017-11-16 PROCEDURE — 82040 ASSAY OF SERUM ALBUMIN: CPT | Performed by: STUDENT IN AN ORGANIZED HEALTH CARE EDUCATION/TRAINING PROGRAM

## 2017-11-16 PROCEDURE — 20000004 ZZH R&B ICU UMMC

## 2017-11-16 PROCEDURE — 40000196 ZZH STATISTIC RAPCV CVP MONITORING

## 2017-11-16 PROCEDURE — 40000014 ZZH STATISTIC ARTERIAL MONITORING DAILY

## 2017-11-16 PROCEDURE — 85384 FIBRINOGEN ACTIVITY: CPT | Performed by: STUDENT IN AN ORGANIZED HEALTH CARE EDUCATION/TRAINING PROGRAM

## 2017-11-16 PROCEDURE — 83735 ASSAY OF MAGNESIUM: CPT | Performed by: STUDENT IN AN ORGANIZED HEALTH CARE EDUCATION/TRAINING PROGRAM

## 2017-11-16 PROCEDURE — 85027 COMPLETE CBC AUTOMATED: CPT | Performed by: STUDENT IN AN ORGANIZED HEALTH CARE EDUCATION/TRAINING PROGRAM

## 2017-11-16 PROCEDURE — 82803 BLOOD GASES ANY COMBINATION: CPT | Performed by: STUDENT IN AN ORGANIZED HEALTH CARE EDUCATION/TRAINING PROGRAM

## 2017-11-16 PROCEDURE — 85610 PROTHROMBIN TIME: CPT | Performed by: STUDENT IN AN ORGANIZED HEALTH CARE EDUCATION/TRAINING PROGRAM

## 2017-11-16 PROCEDURE — 25000132 ZZH RX MED GY IP 250 OP 250 PS 637: Mod: GY | Performed by: INTERNAL MEDICINE

## 2017-11-16 PROCEDURE — 40000986 XR ABDOMEN PORT F1 VW

## 2017-11-16 RX ORDER — FENTANYL CITRATE 50 UG/ML
INJECTION, SOLUTION INTRAMUSCULAR; INTRAVENOUS
Status: COMPLETED
Start: 2017-11-16 | End: 2017-11-16

## 2017-11-16 RX ORDER — LABETALOL HYDROCHLORIDE 5 MG/ML
10 INJECTION, SOLUTION INTRAVENOUS
Status: DISCONTINUED | OUTPATIENT
Start: 2017-11-16 | End: 2017-11-16

## 2017-11-16 RX ORDER — SODIUM CHLORIDE 9 MG/ML
INJECTION, SOLUTION INTRAVENOUS CONTINUOUS
Status: DISCONTINUED | OUTPATIENT
Start: 2017-11-16 | End: 2017-11-17

## 2017-11-16 RX ORDER — CALCIUM CARBONATE 1250 MG/5ML
1250 SUSPENSION ORAL 2 TIMES DAILY WITH MEALS
Status: DISCONTINUED | OUTPATIENT
Start: 2017-11-16 | End: 2017-11-20

## 2017-11-16 RX ORDER — LABETALOL HYDROCHLORIDE 5 MG/ML
10 INJECTION, SOLUTION INTRAVENOUS
Status: DISCONTINUED | OUTPATIENT
Start: 2017-11-16 | End: 2017-11-17

## 2017-11-16 RX ORDER — FENTANYL CITRATE 50 UG/ML
25-50 INJECTION, SOLUTION INTRAMUSCULAR; INTRAVENOUS
Status: DISCONTINUED | OUTPATIENT
Start: 2017-11-16 | End: 2017-11-19

## 2017-11-16 RX ADMIN — POTASSIUM PHOSPHATE, MONOBASIC AND POTASSIUM PHOSPHATE, DIBASIC 15 MMOL: 224; 236 INJECTION, SOLUTION INTRAVENOUS at 02:28

## 2017-11-16 RX ADMIN — FENTANYL CITRATE 50 MCG: 50 INJECTION INTRAMUSCULAR; INTRAVENOUS at 02:11

## 2017-11-16 RX ADMIN — SODIUM CHLORIDE: 9 INJECTION, SOLUTION INTRAVENOUS at 10:31

## 2017-11-16 RX ADMIN — FENTANYL CITRATE 50 MCG: 50 INJECTION INTRAMUSCULAR; INTRAVENOUS at 04:19

## 2017-11-16 RX ADMIN — HYDRALAZINE HYDROCHLORIDE 10 MG: 20 INJECTION INTRAMUSCULAR; INTRAVENOUS at 00:15

## 2017-11-16 RX ADMIN — MULTIVITAMIN 15 ML: LIQUID ORAL at 14:09

## 2017-11-16 RX ADMIN — FENTANYL CITRATE 25 MCG: 50 INJECTION INTRAMUSCULAR; INTRAVENOUS at 08:55

## 2017-11-16 RX ADMIN — OXYCODONE HYDROCHLORIDE AND ACETAMINOPHEN 1000 MG: 500 TABLET ORAL at 14:08

## 2017-11-16 RX ADMIN — Medication 220 MG: at 21:11

## 2017-11-16 RX ADMIN — POLYETHYLENE GLYCOL 3350 17 G: 17 POWDER, FOR SOLUTION ORAL at 21:10

## 2017-11-16 RX ADMIN — FENTANYL CITRATE 50 MCG: 50 INJECTION INTRAMUSCULAR; INTRAVENOUS at 16:06

## 2017-11-16 RX ADMIN — FENTANYL CITRATE 50 MCG: 50 INJECTION INTRAMUSCULAR; INTRAVENOUS at 13:00

## 2017-11-16 RX ADMIN — Medication 220 MG: at 14:10

## 2017-11-16 RX ADMIN — FENTANYL CITRATE 25 MCG: 50 INJECTION INTRAMUSCULAR; INTRAVENOUS at 09:23

## 2017-11-16 RX ADMIN — POLYETHYLENE GLYCOL 3350 17 G: 17 POWDER, FOR SOLUTION ORAL at 14:09

## 2017-11-16 RX ADMIN — NOREPINEPHRINE BITARTRATE 0.03 MCG/KG/MIN: 1 INJECTION INTRAVENOUS at 02:01

## 2017-11-16 RX ADMIN — LIDOCAINE HYDROCHLORIDE 5 ML: 20 SOLUTION ORAL; TOPICAL at 10:18

## 2017-11-16 RX ADMIN — HYDROCODONE BITARTRATE AND ACETAMINOPHEN 1 TABLET: 5; 325 TABLET ORAL at 21:05

## 2017-11-16 RX ADMIN — HYDRALAZINE HYDROCHLORIDE 10 MG: 20 INJECTION INTRAMUSCULAR; INTRAVENOUS at 09:34

## 2017-11-16 RX ADMIN — SENNOSIDES AND DOCUSATE SODIUM 2 TABLET: 8.6; 5 TABLET ORAL at 21:10

## 2017-11-16 RX ADMIN — FENTANYL CITRATE 25 MCG: 50 INJECTION INTRAMUSCULAR; INTRAVENOUS at 12:03

## 2017-11-16 RX ADMIN — FENTANYL CITRATE 25 MCG: 50 INJECTION INTRAMUSCULAR; INTRAVENOUS at 07:36

## 2017-11-16 RX ADMIN — HYDRALAZINE HYDROCHLORIDE 10 MG: 20 INJECTION INTRAMUSCULAR; INTRAVENOUS at 12:17

## 2017-11-16 RX ADMIN — FENTANYL CITRATE 25 MCG: 50 INJECTION INTRAMUSCULAR; INTRAVENOUS at 11:39

## 2017-11-16 RX ADMIN — HYDROCODONE BITARTRATE AND ACETAMINOPHEN 1 TABLET: 5; 325 TABLET ORAL at 14:08

## 2017-11-16 RX ADMIN — SENNOSIDES AND DOCUSATE SODIUM 2 TABLET: 8.6; 5 TABLET ORAL at 14:08

## 2017-11-16 RX ADMIN — FENTANYL CITRATE 50 MCG: 50 INJECTION INTRAMUSCULAR; INTRAVENOUS at 23:34

## 2017-11-16 RX ADMIN — CALCIUM CARBONATE 1250 MG: 1250 SUSPENSION ORAL at 21:06

## 2017-11-16 RX ADMIN — FENTANYL CITRATE 50 MCG: 50 INJECTION INTRAMUSCULAR; INTRAVENOUS at 21:26

## 2017-11-16 RX ADMIN — FENTANYL CITRATE 25 MCG: 50 INJECTION INTRAMUSCULAR; INTRAVENOUS at 10:47

## 2017-11-16 RX ADMIN — POTASSIUM CHLORIDE 20 MEQ: 29.8 INJECTION, SOLUTION INTRAVENOUS at 00:32

## 2017-11-16 RX ADMIN — POTASSIUM CHLORIDE 20 MEQ: 29.8 INJECTION, SOLUTION INTRAVENOUS at 02:17

## 2017-11-16 RX ADMIN — METOPROLOL TARTRATE 6.25 MG: 25 TABLET, FILM COATED ORAL at 21:10

## 2017-11-16 RX ADMIN — Medication 10 MG: at 13:45

## 2017-11-16 RX ADMIN — Medication 3000 UNITS: at 15:10

## 2017-11-16 RX ADMIN — FENTANYL CITRATE 25 MCG: 50 INJECTION INTRAMUSCULAR; INTRAVENOUS at 06:25

## 2017-11-16 ASSESSMENT — PAIN DESCRIPTION - DESCRIPTORS
DESCRIPTORS: ACHING

## 2017-11-16 NOTE — PROGRESS NOTES
Medicine team has signed off given admission to the ICU after surgery, and resolution of issues for which we were consulted.    Please page me 8am-5pm through 11/20  if further medicine consultation would be useful after she leaves the unit.   If off hours help needed, or help needed after 11/20 please contact medicine triage per ramona.    Jabier Davis MD  Med-Peds Hospitalist  [phone]

## 2017-11-16 NOTE — PROGRESS NOTES
Endocrinology Progress Note     Assessment:    1. Hyponatremia   Yuni Otoole is a 82 year old lady with a h/o HTN, HLD and C7 fx in the fall of 2016, c/w progressive quadriparesis 2/2 C-spine stenosis and kyphosis, who has been having has mild and asymptomatic hyponatremia, intermittently present since 2008. Hypothyroidism and adrenal insufficiency were ruled out by laboratory testing. The serum and urine osmolality, serum sodium, confimed SIADH. The etiology of SIADH is less obvious. The CXR from 11/10/17 was unremarkable. The patient has no recent brain imaging tests. The head CT from 4/2016 revealed generalized atrophy of the brain and ischemic vessel disease. Prior to this hospitalization, she was taking hydrochlorothiazide, which might had contributed to hyponatremia.   In an attempt to correct the hyponatremia prior to surgery, she was placed on 750 ml water restriction on 11/11/17 and she received 2 dosages of 3 gm salt tablets prior to surgery, when her sodium level normalized to 134.   She underwent cervical spine laminectomy/fusion yesterday. Her postop Na remains normal.   2. Malnutrition, gradual weight loss. Baseline weight 100-110 lbs.   On TFs.   3. Osteoporosis   Hypocalcemia noted on today's labs. Recommended IV calcium gluconate and resuming calcium and vitamin D supplements via TFs, at 3000 U liquid vitamin D daily and 500 mg calcium BID (1250 mg liquid calcium carbonate, BID). Check D2 and D3 level with next labwork.     Interval History:  Na today 135. Pt was extubated today, 30 minutes prior to the visit. Sedation is wearing off. She prefers not to talk, but she is nodding appropriately to answer questions. No focal motor deficit. A PEG tube was placed this morning. Target rate 35 cc/hr Isosource, around the clock. She is also receiving NS, at 50 cc/hr. Off pressors.   Calcium lowish on labwork.     Review of Systems:   The Review of Systems is negative other than pain related to surgery.       Physical Exam:  Vitals: /84 (BP Location: Right arm)  Pulse 84  Temp 99.9  F (37.7  C) (Axillary)  Resp 18  Wt 40.3 kg (88 lb 13.5 oz)  SpO2 98%  BMI 18.57 kg/m2  BMI= Body mass index is 18.57 kg/(m^2).  General: she appears pale, thin, malnourished  HEENT: EOMI. Sclerae and conjunctivae clear.   CV: S1/S2 heard with systolic murmur  Lungs: normal breath sounds on auscultation of the anterior chest.   Abdomen: Soft, non tender, and non-distended. Bowel sounds active  Extremities: No peripheral edema.   Skin: pale   Neuro: weakness, no focal motor deficit moted    Recent Labs  Lab 11/16/17  0803 11/16/17  0423 11/16/17  0410 11/15/17  2349 11/15/17  2204 11/15/17  2200 11/15/17  2000 11/15/17  1900 11/15/17  1800 11/15/17  1700  11/15/17  0626   GLC  --  149*  --   --   --  125* 119* 123* 127* 125*  < >  --    *  --  150* 139* 128*  --   --   --   --   --   --  91   < > = values in this interval not displayed.  Lab Results   Component Value Date    WBC 16.1 (H) 11/16/2017    HGB 11.6 (L) 11/16/2017    HCT 34.5 (L) 11/16/2017    MCV 90 11/16/2017     11/16/2017     Lab Results   Component Value Date     11/16/2017     11/15/2017     11/15/2017    POTASSIUM 4.3 11/16/2017    POTASSIUM 3.3 (L) 11/15/2017    POTASSIUM 3.3 (L) 11/15/2017    CHLORIDE 107 11/16/2017    CHLORIDE 110 (H) 11/15/2017    CHLORIDE 100 11/15/2017    CO2 20 11/16/2017    CO2 21 11/15/2017    CO2 27 11/15/2017     (H) 11/16/2017     (H) 11/15/2017     (H) 11/15/2017     Lab Results   Component Value Date    BUN 14 11/16/2017    BUN 14 11/15/2017    BUN 21 11/15/2017     Lab Results   Component Value Date    TSH 2.91 11/14/2017    TSH 2.40 04/25/2017    TSH 2.37 05/11/2015     Lab Results   Component Value Date    AST 16 11/10/2017    AST 21 04/25/2017    AST 30 05/11/2015    ALT 18 11/10/2017    ALT 25 04/25/2017    ALT 27 05/11/2015    ALKPHOS 68 11/10/2017    ALKPHOS 63  04/25/2017    ALKPHOS 68 05/11/2015

## 2017-11-16 NOTE — ANESTHESIA CARE TRANSFER NOTE
Patient: Yuni Otoole    Procedure(s):  Cervical 1-3 Posterior Decompression With Laminectomies, O-Arm/ Stealth Assisted Cervical 2-Thoracic 3 Posterior Instrumented Fusion With Osteotomies Cervical 2-3, Cervical C4-5; Cervical 6-7 ; Right Approach Cervical 2-3, Cervical 4-5 and Cervical 6-7 Anterior Cervical Discectomy And Fusion, [place] Cervical Traction; Cervical 2-Thoracic 3 Posterior Fusion With Autograft And Allograft - Wound Class: I-Clean   - Wound Class: I-Clean   - Wound Class: I-Clean   - Wound Class: I-Clean    Diagnosis: Cervical Myelopathy   Diagnosis Additional Information: No value filed.    Anesthesia Type:   General, ETT     Note:  Airway :ETT (Ambu bag)  Patient transferred to:ICU  Comments: Pt transported to ICU with monitors in place, propofol at 150mcg/kg/min. VSS other than HTN that increased with transport that was minimally responsive to nitroglycerine and labetalol. Discussed likelihood of increased pain with nursing staff on arrival with removal of remifentanil. Report given to nursing staff on arrival.ICU Handoff: Call for PAUSE to initiate/utilize ICU HANDOFF, Identified Patient, Identified Responsible Provider, Reviewed the Pertinent Medical History, Discussed Surgical Course, Reviewed Intra-OP Anesthesia Management and Issues during Anesthesia, Set Expectations for Post Procedure Period and Allowed Opportunity for Questions and Acknowledgement of Understanding      Vitals: (Last set prior to Anesthesia Care Transfer)    CRNA VITALS  11/15/2017 2053 - 11/15/2017 2150      11/15/2017             Resp Rate (observed): 22                Electronically Signed By: Edin Love MD  November 15, 2017  9:50 PM

## 2017-11-16 NOTE — BRIEF OP NOTE
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 Posterior segmental instrumentation C2-T3  2. Cervical laminectomy C1-C3  3. Facet Osteotomies Cervical 2-3, Cervical 4-5, Cervical 6-7,  4. Use of intraoperative neuromonitoring  5. Use of intraoperative Fluoroscopy  6. Use of intraoperative neuronavigation    Part 2:   1. Right Approach Cervical 2-3, Cervical 4-5 and Cervical 6-7 Anterior Cervical Discectomy And Fusion  2. [place] Cervical Traction;   3. Use of intraoperative microscope  4. Use of intraoperative monitoring  5. Use of intraoperative fluoroscopy    Part 3:   1. Cervical 2-Thoracic 3 Posterior Fusion With Autograft And Allograft  2. Reduction of cervical spine  3. Use of intraoperative monitoring  4. Use of intraoperative fluoroscopy  Surgeon: MD Rene  Co surgeon: MD René  Assistant(s): MD Alena   Anesthesia: GETA  EBL: 725 cc  Fluids:   600 cc PRBC  275 cc Cell saver  1700 cc crystalloid  1250 cc albumin    UOP: 800 cc  Drains: anterior: 7 Danish flat KELLIE to bulb suction; posterior: 7 Danish flat KELLIE to bulb suction  Specimens: none  Implants: synthes synapse system posteriorly; zero P lordotic implants anteriorly   Findings:   Significant stenosis at C1, well decompressed. Good reduction seen on final XR  Complications: None.  Condition: stable  Post op location:ICU  Comments: See dictated report for full details.    Plan:  - admit to 4 A post op  -  brace post op, will obtain tomorrow; may be off for extubation; no C spine precautions  - Upright XR when able, in   - needs NJ feeding tube placed tomorrow  - drains to bulb suction  - extubate as able  - Pain control   - will attempt to obtain exam tonight- hold sedation  Page [phone] with questions

## 2017-11-16 NOTE — PROGRESS NOTES
Neuroscience Intensive Care Progress Note  2017    Summary: Yuni Otoole is a 82 year old year old female admitted on 11/10/2017 with weakness and found to have cervical kyphoscoliosis with chin on chest deformity. Now s/p laminectoy C1-3; osteotomies C2-3, C4-5, C6/7; C2-3, C4-5, C6-7 ant cervical discectomy and fusion; and C2-T3 posterior fusion on POD#1.     Problem List:  1. Cervical kyphoscolosis s/p ant/post fusion  2. S/P intubation post-op    24 hour events:  Patient underwent surgery on 11/15. Intubated and off sedation when seen.    24 Hour Vital Signs Summary:  Temperatures:  Current - Temp: 99.9  F (37.7  C); Max - Temp  Av.9  F (37.2  C)  Min: 98.5  F (36.9  C)  Max: 99.9  F (37.7  C)  Respiration range: Resp  Av.3  Min: 16  Max: 18  Pulse range: No Data Recorded  Blood pressure range: Systolic (24hrs), Av , Min:158 , Max:181   ; Diastolic (24hrs), Av, Min:84, Max:100    Pulse oximetry range: SpO2  Av.3 %  Min: 98 %  Max: 100 %    Ventilator Settings  Ventilation Mode: CPAP/PS  FiO2 (%): 30 %  Rate Set (breaths/minute): 12 breaths/min  Tidal Volume Set (mL): 350 mL  PEEP (cm H2O): 5 cmH2O  Pressure Support (cm H2O): 7 cmH2O  Oxygen Concentration (%): 40 %  Resp: 18      Intake/Output Summary (Last 24 hours) at 17 1006  Last data filed at 17 0800   Gross per 24 hour   Intake           3408.8 ml   Output             2203 ml   Net           1205.8 ml       Arterial Line BP: ()/(36-94) 143/70  MAP:  [66 mmHg-138 mmHg] 98 mmHg  BP - Mean:  [114-128] 114  CVP:  [4 mmHg-12 mmHg] 4 mmHg    Current Medications:    lidocaine (viscous)  5 mL Topical Once     multivitamins with minerals  15 mL Per Feeding Tube Daily     senna-docusate  2 tablet Oral BID     polyethylene glycol  17 g Oral TID     zinc sulfate  220 mg Oral TID     vitamin A  10,000 Units Oral Daily     vitamin D  50,000 Units Oral Q3 Days     cholecalciferol  5,000 Units Oral Daily     calcium-vitamin  D  1 tablet Oral BID w/meals     ascorbic acid  1,000 mg Oral Daily     metoprolol  12.5 mg Oral BID     pravastatin  10 mg Oral At Bedtime       PRN Medications:  fentaNYL, IV fluid REPLACEMENT ONLY, albuterol, ondansetron **OR** ondansetron, hydrALAZINE, naloxone, potassium chloride, potassium chloride, potassium chloride, potassium chloride with lidocaine, potassium chloride, magnesium sulfate, magnesium sulfate, potassium phosphate (KPHOS) in D5W IV, potassium phosphate (KPHOS) in D5W IV, potassium phosphate (KPHOS) in D5W IV, potassium phosphate (KPHOS) in D5W IV, potassium phosphate (KPHOS) in D5W IV, prochlorperazine **OR** prochlorperazine **OR** prochlorperazine, diazepam, HYDROcodone-acetaminophen    Infusions:    norepinephrine Stopped (11/16/17 0838)     IV fluid REPLACEMENT ONLY       niCARdipine 40 mg in 200 mL 0.9% NaCl Stopped (11/16/17 0024)     propofol (DIPRIVAN) infusion 10 mcg/kg/min (11/16/17 0939)       Allergies   Allergen Reactions     Atorvastatin Calcium      Was on Lipitor and has muscle problem       Physical Examination:  /84 (BP Location: Right arm)  Pulse 84  Temp 99.9  F (37.7  C) (Axillary)  Resp 18  Wt 40.3 kg (88 lb 13.5 oz)  SpO2 98%  BMI 18.57 kg/m2    GENERAL: NAD, lying on bed  RESPIRATORY: No respiratory distress, no use of accessory muscles, intubated  EXTREMITIES: No edema.     NEUROLOGIC:  Mental Status: Fully alert, follows commands  Cranial Nerves: PERRL, EOMI. Face appears symmetric.   Motor: Mildly weak throughout UE proximal strength worse than distal strength. LE with slightly more power than UE  Sensory: Intact/symmetric to light touch throughout upper and lower extremities.   Coordination: Unable to test  Station/Gait: Deferred        Labs/Studies:  Recent Labs   Lab Test  11/16/17 0423  11/15/17   2200  11/15/17   2000  11/15/17   1900   11/15/17   0636   NA  135  139  134  133   < >  134   POTASSIUM  4.3  3.3*  3.3*  3.7   < >  3.7   CHLORIDE  107   110*   --    --    --   100   CO2  20  21   --    --    --   27   ANIONGAP  8  8   --    --    --   7   GLC  149*  125*  119*  123*   < >  98   BUN  14  14   --    --    --   21   CR  0.33*  0.32*   --    --    --   0.39*   ANNETTA  7.9*  7.2*   --    --    --   8.6   WBC  16.1*  14.6*   --    --    --   6.6   RBC  3.82  3.53*   --    --    --   3.69*   HGB  11.6*  10.6*  9.2*  10.3*   < >  11.0*   PLT  182  171   --   191   < >  307    < > = values in this interval not displayed.       Recent Labs   Lab Test  11/16/17   0423  11/15/17   2200  11/15/17   2000   INR  1.07  1.19*  1.20*   PTT  27  29  28           Recent Labs  Lab 11/16/17  0404 11/15/17  2342 11/15/17  2000 11/15/17  1900   PH 7.40 7.40 7.37 7.36   PCO2 32* 32* 34* 38   PO2 193* 262* 266* 176*   HCO3 20* 20* 20* 21   O2PER 40.0 60.0 57% 36       Assessment/Plan  Yuni Otoole is a 82 year old year old female admitted by [facility] [place] for anterior/posterior fusion of cervical spine now POD#1.    Neuro:  # Anterior/Posterior cervical fusion:  Surgery completed on 11/15, not staged. Patient stable in ICU post-op.   -Post-op recs per neurosurgery.  -Frequent neurochecks    Resp:  # Intubated post-op  Respiratory status improving. No evidence of damage to phrenic nerve - no respiratory distress. On pressure support trial when seen this morning. Plan was to extubate if stable on pressure support.    CV:  # HTN  -Continue home metoprolol    MAP goal 85-90 per neurosurgery    Renal:  # Hyponatremia:  -Continue 750 ml fluid restriction per medicine recs  -Strict I/O    Electrolyte replacement protocol    Endo:  No active concerns.    Heme:  No active issues.    GI:  No active issues  NG tube being placed today for nutrition    ID:  No active issues, afebrile. WBC increased could be 2/2 surgery. Continue to monitor    FEN: NG tube being placed  PPX:    DVT: per NSG recs    GI: none    Code Status: FULL      Dispo: NSG primary    Patient discussed with staff physician,   César.      Jj Lovell  Neurology PGY-2

## 2017-11-16 NOTE — PROGRESS NOTES
[facility]  Neurosurgery Daily ICU Note:          Assessment   Yuni Otoole is a 82 year old female who is postoperative day #1 from C1-C3 laminectomies, C2-T3 posterior segmental instrumentation, C2/3, 4/5, 6/7 facette osteotomies. C2/3, 4/5, 6/7 ACDF. C2-T3 posterior fusion w/ autograft and allograft with reduction of cervical spine.           Plan 1.   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 Notify MD for acute changes in exam.    Pain control     brace    Keep sutures in for 4 weeks    2x surgical drains in place, will leave for today    Hold sedation    Post op XRs when tolerated   2. CVS: hemodynamically stable    Hydralazine and labetolol PRN    Continuous cardiac monitoring while in ICU    MAP goal 85-90; levophed gtt available to titrate to meet goal    PTA Metoprolol, Pravastatin  3. Pulmonary: intubated    Continuous pulse oximetry    Plan to wean ventilator for extubation  4. GI:     Placement of a feeding tube today    Nutrition following; to recommend tube feeds    Bowel regimen. PRN anti-emetics.    Continued dietary supplements to promote wound healing  5. Renal: no issues    750cc fluid restriction / day per Medicine; minimizing IVF received    Padilla in, will remove when possible    Electrolyte replacement protocol    Continue to monitor intake/output    Hyponatremia - treated w/ salt tabs per Nephrology recs; has remained WNL  6. ID: afebrile, normal WBC count    Continue to monitor for fevers and/or signs of infection  7. Endocrine:     No ongoing concerns  8. Heme: no issues    Platelets > 100,000    INR < 1.5    Hemoglobin > 8  9. Prophylaxis    DVT: SCDs while in bed  10. Disposition: Surgical ICU    PT/OT    Above patient and plan has been discussed with the neurosurgery chief resident.     Please dial * * [phone] and enter [phone] to reach the on-call neurosurgery  resident if you have questions.          Subjective     Tolerated procedure well. Sedation weaned. Patient has been slow to return to her normal mental baseline.          Objective   Temp:  [98.5  F (36.9  C)-99.9  F (37.7  C)] 99.9  F (37.7  C)  Heart Rate:  [] 109  Resp:  [16-18] 18  BP: (158-181)/() 158/84  MAP:  [66 mmHg-138 mmHg] 98 mmHg  Arterial Line BP: ()/(36-94) 143/70  FiO2 (%):  [30 %-60 %] 30 %  SpO2:  [98 %-100 %] 98 %    Ventilation Mode: CPAP/PS  FiO2 (%): 30 %  Rate Set (breaths/minute): 12 breaths/min  Tidal Volume Set (mL): 350 mL  PEEP (cm H2O): 5 cmH2O  Pressure Support (cm H2O): 7 cmH2O  Oxygen Concentration (%): 40 %  Resp: 18    I/O last 3 completed shifts:  In: 4279.8 [I.V.:2429.8]  Out: 2261 [Urine:1500; Drains:211; Blood:550]    Physical Exam  General: NAD, intermittent gagging on ETT  Incisions: clean, dry, dressing intact  Neurologic    Mental Status:       -- Follows commands x4, nods head appropriately to questions      -- no gaze preference. No apparent hemineglect.    Cranial Nerves:      -- PERRL 3-2mm bilat and brisk      -- resistant to opening eyes    Motor:    Delt Bi Tri WE WF    R 1 3 3 2 2 3   L 1 3 3 2 2 3    IP Quad Ham DF PF    R 2 4 4 4 4    L 2 4 4 4 4    * Exam off of sedation, patient demonstrated understanding of movements intended, but had difficulty with sustained effort.        Labs and Imaging   BMP  Recent Labs  Lab 11/16/17  0423 11/15/17  2200 11/15/17  2000 11/15/17  1900  11/15/17  0636 11/14/17  0752    139 134 133  < > 134 128*   POTASSIUM 4.3 3.3* 3.3* 3.7  < > 3.7 4.0   CHLORIDE 107 110*  --   --   --  100 93*   CO2 20 21  --   --   --  27 26   BUN 14 14  --   --   --  21 18   CR 0.33* 0.32*  --   --   --  0.39* 0.36*   ANNETTA 7.9* 7.2*  --   --   --  8.6 9.3   < > = values in this interval not displayed.    CBC  Recent Labs  Lab 11/16/17  0423 11/15/17  2200 11/15/17  2000 11/15/17  1900 11/15/17  1800  11/15/17  0636  11/12/17  0546   WBC 16.1* 14.6*  --   --   --   --  6.6 6.7   HGB 11.6* 10.6* 9.2* 10.3* 9.3*  < > 11.0* 10.7*    171  --  191 203  < > 307 293   < > = values in this interval not displayed.    COAGS  Recent Labs  Lab 11/16/17  0423 11/15/17  2342 11/15/17  2200 11/15/17  2000 11/15/17  1900   INR 1.07  --  1.19* 1.20* 1.17*   PTT 27  --  29 28 28   FIBR 305 246  --  209 217       ABG  Recent Labs  Lab 11/16/17  0404 11/15/17  2342 11/15/17  2000 11/15/17  1900   PH 7.40 7.40 7.37 7.36   PCO2 32* 32* 34* 38   PO2 193* 262* 266* 176*   HCO3 20* 20* 20* 21       IMAGING: post-op images pending       Please contact the neurosurgery resident on call with questions by dialing [phone], then entering [phone] when prompted

## 2017-11-16 NOTE — PROCEDURES
Bridle Placement:   Reason for bridle placement: Securement of feeding tube requested by RN  Medicine delivered during procedure: lubricating jelly   Procedure: Successful   Location of top of clip on FT: @ 86 cm marker   Condition of nose/skin at time of bridle placement: Unremarkable  Face to Face time with patient: <5  minutes.

## 2017-11-16 NOTE — PROGRESS NOTES
CLINICAL NUTRITION SERVICES - BRIEF NOTE    Nutrition Prescription    RECOMMENDATIONS FOR MDs/PROVIDERS TO ORDER:  - total daily fluids/adjustments per MD  - replacement of electrolytes with EN initiation as indicated  - diet advancement per SLP recommendations for safe PO  - consider discontinuing 5000 units of daily vitamin D while pt is receiving 50,000 units (or vise versa) to prevent potential negative effects of excess vitamin D (including bone loss).  - consider d/c'ing high vitamin A dosage after 10 days due to potential for toxicity.   - consider d/c'ing high dosage of zinc after 10 days and or add additional copper supplementation to prevent zinc associated copper deficiency.    Recommendations already ordered by Registered Dietitian (RD):  - Once FT placed and confirmed post-pyloric, recommend, per prior recommendations: Isosource 1.5 @ goal 35 ml/hr (840 ml/day) to provide 1260 kcals (32 kcal/kg/day), 57 g PRO (1.4 g/kg/day), 647 ml free H2O.  - Initiate @ 15 ml/hr and advance by 10 ml q8hr as tolerated   - Do not start or advance until lytes (Mg++,K+) WNL and phos>1.9   - Recommend 30-60 ml q4hr fluid flushes for tube patency. Additional fluids and/or adjustments per MD.    - Order multivitamin/mineral (15 ml/day via FT) to help ensure micronutrient needs being met with suspected hypermetabolic demands and potential interruptions to TF infusions.    Future/Additional Recommendations:  - FT position   - EN initiation/tolerance/lytes  - diet advancement and EN adjustments      Nutrition Progress Note - f/u for progress towards previous nutrition POC (see previous 11/11 reassessment for details)     Bay Cruz RD, LD  Neuro ICU  Pager: [phone]

## 2017-11-16 NOTE — PLAN OF CARE
Problem: Patient Care Overview  Goal: Individualization & Mutuality  D: postop to 4A at 2130 following an all day spinal surgery.   A/I:   Neuro: sedation kept to a minimum. Rousing to voice/touch but fights to open eyes. Periorbital/eyelid swelling, JUAN EOMs. Pupils 3-4mm, brisk, equal.   Weakly lifts arms off bed, weak to no  strength. Gross motor movement of legs, but not wiggling toes.   Grimacing at times, nods yes/no to pain--managed with total 150mcg of prn fentanyl.   CV: SR-ST [id number]. No ectopy. Hypertensive as high as 207/90s: responded well to hydralazine.   CVPs 11, 12, 6.   Temps: 98.0-98.7ax.   Resp: vented on CMV. clear-coarse. Quite diminished in bases. Rate set at 12, but RR consistently 16-19.   GI: hypo-normoactive bowel sounds.   : UOP 40-70/h.   Drains/Incison: anterior KELLIE average output ~10mL/hr. posterior ~20mL/hr. Dressings CDI.   Lines: left TL subclavianleft radial a-line, leaking. . Lost both PIVs due to bleeding/leaking.   Labs: K, Mag, Phos all replaced. Hgb stable at 11.6.   P: pain control. feeding tube. Fit with neck brace. Possible extubation.   Family: daughters Marlene & Allyssa at bedside last night, home for night, to return this morning. They report vanessa Hardwick is primary contact.

## 2017-11-16 NOTE — OP NOTE
Pre-operative diagnosis:   1. 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 Cervical stenosis with myelopathy  3. Malnutrition  4. Osteoporosis and vitamin D deficiency  5. SIADH-induced hyponatremia    Post-operative diagnosis: same   Procedure: three intraoperative position changes:  Part 1:  1. Stealth-guided posterior segmental instrumentation C2-T3  2. Cervical laminectomy C1-C3  3. Facet osteotomies Cervical 2-3, Cervical 4-5, Cervical 6-7,  4. Use of intraoperative neuromonitoring  5. Use of intraoperative fluoroscopy  6. Use of intraoperative neuronavigation     Part 2:   1. Right Approach Cervical 2-3, Cervical 4-5 and Cervical 6-7 Anterior Cervical Discectomy And Fusion  2. B[place] tongs placement for cervical traction for deformity reduction  3. Use of intraoperative microscope  4. Use of intraoperative neuromonitoring  5. Use of intraoperative fluoroscopy     Part 3:   1. Cervical 2-Thoracic 3 Posterior Fusion With Autograft And Allograft  2. Reduction of cervical spine with osteotomy closure and kyphoscoliosis correction  3. Use of intraoperative neuromonitoring  4. Use of intraoperative fluoroscopy  Surgeon: Enma López MD  Co surgeon:  Casey Merritt MD  Assistant(s): Mila Carvajal MD, PGY-6 resident  Anesthesia: GETA  EBL: 725 cc  Fluids:   600 cc PRBC  275 cc Cell saver  1700 cc crystalloid  1250 cc albumin     UOP: 800 cc  Drains: anterior: 7 [place] flat KELLIE to bulb suction; posterior: 7 [place] flat KELLIE to bulb suction  Specimens: none  Implants: Synthes synapse screws with 3.5mm to 5mm transitional rods posteriorly; zero P lordotic anterior implants   Findings: Significant stenosis at C1-C2, well decompressed. Good reduction seen on final XR    Indications for Surgery:  Yuni Otoole is a 82 year old female presenting to the Emergency room with numbness in her hands bilaterally and severe bilateral upper extremity and proximal lower extremity weakness, referable  to severe C1-C2 cervical stenosis with progressive kyphotic deformity and kyphoscoliosis with chin on chest deformity. Also notable is severe malnutrition from poor oral intake without given of 3 and severe vitamin D deficiency with osteoporosis. The patient had previously seen Dr. Yuan in clinic who recommended considering surgical intervention, however the patient was lost to follow-up and presented in extremis. My partner on call at her admission Dr. Merritt asked me to take over surgical care and I met with the patient and her family in detail to explain operative risks, benefits, and alternatives.    Due to her 63 degrees rigid kyphosis from C2-C7 and severe cord compression with 3mm canal diameter at C1-C2, I recommended a C2-T3 posterior-anterior-posterior 540-degrees fusion; Part 1: C1-C3 posterior decompression and C2-T3 instrumentation with multilevel osteotomies; Part 2: C4-5, C6-7, possible C2-3 anterior diskectomy and interbody placement possible additional levels, Part 3: C2-T3 posterior fusion with deformity correction.  Surgery may need to be staged due to patient's age and complexity.      I explained that the patient is highest-risk category for perioperative complications due to magnitude of surgery needed, malnutrition, age, suspected osteoporosis/known vitamin D deficiency.     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 I relayed to the patient and family my concern for high risk for perioperative morbidity and mortality.  I told her she absolutely will need a nasojejunal feeding tube for perioperative nutrition, and potentially a PEG tube.  She has high risk of ventilator dependency and potential need for trach.  She has very high risk of instrumentation complications from poor bone density.  She has very high risk of infection and wound non-healing due to poor nutritional status.  We have been managing her SIADH-induced  hyponatremia with the assistance of Endocrinology,and ruled out hypothyroidism and adrenal insufficiency. We have been treating her malnutrition with goal protein intake of 1.5 grams protein per kg body weight per day with Arginaid and Ensure Plus as well as vitamin A, C, E, and zinc supplementation ordered for wound healing supplementation. Her 25-OH vitamin D level is 17; we started her on ergocalciferol 50,000IU every 3 days x 30 days, and cholecalciferol 5000 IU daily x 6 months; needs to have repeat 25-OH vitamin D level in 4 weeks.     I discussed the above co-morbidities with patient and her family, and I also discussed surgical risks in great detail including high risks of possible paralysis and ventilator dependence, possible trach/PEG, possible failure to improve, or fracture of instrumentation or adjacent segment with need for further surgery, possible infection and failure of wound healing.     I spoke about the need for neck immobilization with cervical spine precautions for general endotracheal intubation for anesthesia, and also for the need to keep the blood pressure elevated to ensure continued spinal cord perfusion during induction of anesthesia and during surgery itself until pressure is relieved on the spinal cord.  I discussed surgical risk including bleeding, infection, nerve or spinal cord damage, failure to heal/failure to form fusion/instrumentation failure, CSF leak, weakness/paralysis, numbness, worsening pain or failure to improve including neuropathic pain, recurrence of problem, and potential for development of instability or adjacent segment disease, and potential need for further procedures or surgeries. I discussed potential of failure to improve or even worsening despite surgery.  I specifically discussed injury to the carotid artery and internal jugular vein and vertebral artery with potential bleeding or stroke, injury to the superior laryngeal nerve resulting in hoarseness,  retraction injury to the esophagus or penetration injury to the esophagus causing dysphagia with potential need for temporary or permanent feeding tube placement, formation of a hematoma resulting in neurologic deficit, worse C5 palsy resulting in shoulder and bicep plegia, spinal cord injury with resulting pain, numbness, or weakness that may be permanent or even paralysis.    I discussed the risks of general anesthesia including stroke, heart attack, pneumonia, prolonged intubation, blood clot, pulmonary embolism, and urinary tract infection. I discussed risks of positioning including pressure ulcerations, skin tears or abrasions, pressure neuropathies, or even rarely blindness.     The patient and her family understood and wished to proceed.    Operative course/intraoperative findings:  The patient was identified and informed consent was verified. She was taken to operating room 21 where an arterial line was placed to monitor mean arterial pressure which was closely monitored between a map of 85-95 for the entire surgery, a subclavian central line was placed, and general endotracheal anesthesia was induced using awake fiberoptic intubation due to her severe cervical stenosis. A tranexamic acid bolus and infusion was begun, a Padilla catheter was placed, perioperative antibiotics were administered prior to skin incision, and neural monitoring leads were attached and pre-positional baselines were obtained. The patient was noted to have significantly decreased amount of sensory upper extremity potentials compared to lower extremities at baseline. C5 EMGs were also poor. The complex spine protocol with intraoperative labs checked every hour and resulted on a visualized dry erase board was instituted. The operating room team was briefed in detail on the plan which I illustrated on a poster board with step-by-step assignment of the protocol for all planned stages of surgery.    The [place] head tucker was attached, and  the patient was positioned prone on the Earl 4 post table with all extremities carefully padded. Post positional neural monitoring baselines were obtained and were stable. The patient's posterior cervical spine and lower occipital scalp were shaved prepped and draped in the standard sterile fashion. A timeout was performed. A midline skin incision extending from the base of the foramen magnum through approximately T3 was made with a 10 blade scalpel and monopolar electrocautery was used to perform subperiosteal exposure from C1 through T3. The Stealth spinous process tracker was attached and Stealth guided screw placement was begun starting at C2. The pedicles were significantly thin on the right side at C2 necessitating navigation with significant difficulty and a modifier 22 on the instrumentation. Navigation was used to identify the starting point and trajectory followed by burring a starting point followed by drilling the intended screw tract followed by under tapping the screw. This process was continued from C3 through T3. The screws were not placed at this time due to the need to do facet osteotomies and mass effect of the screw head interference with that, therefore we completed the osteotomies of decompression and then proceeded to insert the screws prior to closure.  The Synthes Synapse system was used with screws as follows:    Left C2 3.5 x 20 mm, right C2 4.0 x 34 mm, left C3 3.5 x 14 mm, right C3 lateral mass screw later removed due to reduction of C2-C3 with interference screw heads, bilateral C4 3.5 x 14 mm bilateral C5 3.5 x 14 mm bilateral C6 3.5 x 14 mm with later removal of left C6 screw due to realignment after reduction, left C7 pedicle screw 3.5 x 25 mm, the right C7 pedicle was skipped due to small pedicle size, left T1 5.5 x 25mm, left T2 5 x 25 mm, right T2 5 x 30 mm, bilateral T3 5 x 30 mm. Check CT spin was performed and adjustments in screws were made as follows: Left T2 and left T3  were redirected medially and superiorly to optimize purchase, Right T2 was redirected more medially for optimize purchase, left C2 was backed out slightly to reduce prominence towards the C1-C2 joint.     We then turned our attention towards the C1-C2 and C3 decompression. The C1 posterior arch was not able to be visualized and beginning the decompression because it was subluxed beneath the occipital bone at the foramen magnum. We therefore used a [place] to remove the posterior elements of C2 and C3 and used a high-speed electric drill to complete C2 and C3 laminectomies accompanied with a Kerrison 1 and #2. All bone was saved for local harvest autograft. We then were able to increase our visualization of the C1 posterior arch. There was an excessively large amount of scar tissue over the arch and this required extensive dissection to actually find the bony plane, adding a modifier 22 to the decompression. Kerrison #1 rongeur was used to remove the posterior arch after drilling it thinly with high-speed drill. Ella probe was used to palpate the lateral recesses and the lateral masses C1 could be palpated, and the base of the foramen magnum was visualized. The dura was well decompressed.    We then performed facet osteotomies at C2-C3 with complete posterior element resection and facet osteotomies bilaterally at C4-C5 and C6-C7 again saving all bone for local harvest autograft. Facet osteotomies were completed by using a high-speed electric drill to find the dura at the lateral recess followed by using Kerrison rongeur and angled curettes to dissect the opening of the foramen and performed complete foraminotomies followed by further resection of the lateral masses for adequate lateral release.    Ligamentum flavum was then resected bilaterally through the midline to allow for complete mobilization and decompression of the canal for osteotomy closure.    The incision was irrigated with saline and then provisional  temporary closure was performed with plans to then complete the anterior stage and returned to the posterior stage for osteotomy reduction and tomas placement. The incision was closed with 0 Vicryl running suture the fascia followed by 2-0 running nylon suture on the skin followed by placement of an [place] sterile dressing.    Neural monitoring signals remained at baseline throughout the first portion of the procedure and the patient was then turned supine onto the transport gurney in the [place] head tucker was removed. Bunn-Wells tongs were then placed to allow for cervical traction in the supine position for the anterior cervical approach. Next    [facility] and Bunn-Wells traction was attached with 15 pounds with neural monitoring verified as stable. Fluoroscopy was used to visualize mobilization. There is approximately a 15  reduction in the kyphosis with the posterior osteotomies alone but this was still significant and anterior approach was needed.    The patient's anterior cervical spine was prepped and draped in the standard sterile fashion and another timeout was performed.    Avoiding the pre-existing right carotid endarterectomy incision and using fluoroscopy to center the planned incision between C2 and C7, a incision was made from the midline to the medial border of the sternal head of the sternocleidomastoid muscle with a 10 blade scalpel and dissection was carried out with Metzenbaum scissors and bipolar electrocautery through the platysma and the subcutaneous tissues. The cervical fascial planes were followed deep until the anterior cervical spine was identified. Using fluoroscopy to verify level, we identified the C2-C3 disc space. The disc was largely gone and there were severe degenerative changes within the disc space. The microscope was brought into the field, [place] distractor pins are placed at C2 and C3 in a converging fashion to allow for  reduction with opening the distractor. Discectomy was completed with a 15 scalpel and curettes. The drill was used to visualize the posterior longitudinal ligament. Mobility of the C2-C3 interspace was verified. Interbody graft placement was then performed using the Synthes 0 profile lordotic implant system filled with DBX putty with an 8 mm cage at C2-C3 under fluoroscopic visualization. A single screw was placed to secure the graft but still allow for mobilization and reduction of the level posteriorly.    The [place] pins were then removed and we turned our attention to the C4-C5 level placing [place] pins at that level, although we are unable to identify the disc space because complete ossification and there is no movement with gentle distraction despite posterior osteotomies. Therefore we performed an opening wedge osteotomy at the site of the disc space which was already auto fused at C4-C5 using fluoroscopic assistance to ensure correct trajectory. The entire anterior osteophyte that had grown through the C4-C5 disc space was completely resected using high-speed electric drill. A Synthes 10 mm 0 profile cage was then placed in the C4-C5 disc space with a single screw placed to secure the graft.     We then turned our attention to the C6-C7 disc space. Again the [place] pins were placed there and there was no movement with distraction. Fluoroscopic assistance was used to drill the disc space open and perform an anterior wedge osteotomy at C6-C7, with placement of a 9 mm 0-profile cage filled with DBX and the site again with a screw through the single vertebral body to allow for posterior reduction and mobilization.    Final AP and lateral views were obtained and the incision was irrigated with saline and hemostasis verified. A 7 mm flat Earl-Hutchinson drain was placed in the subfascial space and the incision was closed with 0 Vicryl running suture in the platysma layer followed by 2-0 Vicryl running suture the  subcutaneous layer followed by 4-0 Monocryl subcuticular stitch on the skin, Dermabond, and a sterile dressing. The drain secured with 3-0 nylon. All neural monitoring signals remained stable throughout the completion of the procedure anteriorly.    The patient was then positioned prone on the C-Flex tucker on the [place] [id number] poster table. The pre-existing dressing was removed and the incision was prepped for suture removal followed by reprepping the incision and prepping draping the incision in sterile fashion. A timeout was performed. The incision was reopened and irrigated. Neuro monitoring baselines were verified and then gentle extension through the C-Flex was performed for osteotomy closure under direct visualization with fluoroscopic imaging.    The assistance of Dr. Csaey Merritt was required for osteotomy closure and deformity correction, due to complexity of maneuver, and there was no qualified resident available to assist.     Neural monitoring baselines remained stable. Tapered rods were custom cut and contoured. Titanium 3.5 cervical taper to 5.5 thoracic rods were placed one on the right and 2 on the left with the side-to-side connector on the left between C5 and C7. Set plugs are provisionally tightened and AP and lateral fluoroscopic views were taken, with coronal plane benders used on the right versus scoliosis correction. Alignment was verified to be significantly corrected with approximately 0  of kyphosis from C2 to C7 compared to 63  preoperatively. Scoliosis was reduced to less than 5  from 21 .    The set plugs were torqued off to 's recommended torque and the side-to-side connector was tightened. Final AP and lateral views were taken. The incision was irrigated with saline and hemostasis verified. High speed electric drill was used for perform posterior element decortication with posterior lateral arthrodesis performed with locally harvested autograft as well as supplemental  allograft. The canal was verified to be widely decompressed at C1 to C3. 2 g of vancomycin powder placed in the incision. A 10 mm flat Earl-Hutchinson drain was placed in the subfascial space and tunneled to exit the incision inferiorly into the right secured to the skin with a 3-0 nylon suture. Incision was closed with 0 Vicryl interrupted sutures in the fascia followed by 0 Vicryl running suture in the fascia, followed by 2-0 Vicryl interrupted sutures in the subcutaneous layer followed by 3-0 nylon vertical mattress sutures on the skin. A sterile dressing was applied.    The patient was positioned supine on the transport gurney and all neural monitoring signals remained at baseline with no intraoperative changes.    There were no intraoperative complications.    The patient was transported to the ICU in stable condition still intubated.    I was present and personally performed all key portions of the procedure and was present for the entire procedure except for the end of closing at which time I was immediately available while I met with the patient's family and updated them on her care.

## 2017-11-16 NOTE — PROGRESS NOTES
[facility], [place]     Extubation Procedure Note     Patient extubated at: November 16, 2017, 11:17 AM   Supplemental Oxygen: Via nasal cannula at 2 liters per minute   Cough: The cough is good and productive   Secretion Mode: Able to clear   Secretion Amount: Moderate amount, moderately thick and pale yellow in color   Respiratory Exam:: Breath sounds: clear and diminished     Location: lower lobes   Skin Exam:: Patient color: natural   Patient Status: Currently appears comfortable   Arterial Blood Gasses: pH Arterial (pH)   Date Value   11/16/2017 7.40     pO2 Arterial (mm Hg)   Date Value   11/16/2017 193 (H)     pCO2 Arterial (mm Hg)   Date Value   11/16/2017 32 (L)     Bicarbonate Arterial (mmol/L)   Date Value   11/16/2017 20 (L)              Recorded by Christopher Calzada

## 2017-11-16 NOTE — ANESTHESIA POSTPROCEDURE EVALUATION
Patient: Yuni Otoole    Procedure(s):  Cervical 1-3 Posterior Decompression With Laminectomies, O-Arm/ Stealth Assisted Cervical 2-Thoracic 3 Posterior Instrumented Fusion With Osteotomies Cervical 2-3, Cervical C4-5; Cervical 6-7 ; Right Approach Cervical 2-3, Cervical 4-5 and Cervical 6-7 Anterior Cervical Discectomy And Fusion, B[place] Cervical Traction; Cervical 2-Thoracic 3 Posterior Fusion With Autograft And Allograft - Wound Class: I-Clean   - Wound Class: I-Clean   - Wound Class: I-Clean   - Wound Class: I-Clean    Diagnosis:Cervical Myelopathy   Diagnosis Additional Information: No value filed.    Anesthesia Type:  General, ETT    Note:  Anesthesia Post Evaluation    Patient location during evaluation: PACU  Patient participation: Able to fully participate in evaluation  Level of consciousness: awake and alert  Pain management: adequate  Airway patency: patent  Cardiovascular status: hemodynamically stable  Respiratory status: acceptable  Hydration status: stable  PONV: none     Anesthetic complications: None          Last vitals:  Vitals:    11/15/17 0150 11/15/17 0415 11/15/17 0648   BP: 134/67 139/77 139/80   Pulse:  84 84   Resp:  16 16   Temp:  36.3  C (97.4  F) 36.7  C (98  F)   SpO2:  94%          Electronically Signed By: Kane Hernadez MD  November 15, 2017  9:54 PM

## 2017-11-16 NOTE — PROCEDURES
Small Bowel Feeding Tube Placement Assessment  Reason for Feeding Tube Placement: MD request for post pyloric feeding tube  Cortrak Start Time:10:21   Cortrak End Time: 10:29  Medicine Delivered During Procedure: Lidocaine  Placement Successful: Presume post-pyloric (pending AXR confirmation).  Procedure Complications: None  Final Placement [place] at exit of nare 85 cm  Face to Face time with patient: 15 minutes      Kelly Rainey RD, MS, LD

## 2017-11-16 NOTE — PHARMACY-CONSULT NOTE
Pharmacy Tube Feeding Consult    Medication reviewed for administration by feeding tube and for potential food/drug interactions.  Recommendation: The following medications are only available as a capsule and can't be give via feeding tube: Vitamin D 30063 units, Vitamin A 27609 units.   Pharmacy will continue to follow as new medications are ordered.

## 2017-11-17 ENCOUNTER — APPOINTMENT (OUTPATIENT)
Dept: SPEECH THERAPY | Facility: CLINIC | Age: 82
DRG: 453 | End: 2017-11-17
Attending: NURSE PRACTITIONER
Payer: MEDICARE

## 2017-11-17 ENCOUNTER — APPOINTMENT (OUTPATIENT)
Dept: OCCUPATIONAL THERAPY | Facility: CLINIC | Age: 82
DRG: 453 | End: 2017-11-17
Attending: NEUROLOGICAL SURGERY
Payer: MEDICARE

## 2017-11-17 LAB
ALBUMIN UR-MCNC: 10 MG/DL
ANION GAP SERPL CALCULATED.3IONS-SCNC: 8 MMOL/L (ref 3–14)
APPEARANCE UR: CLEAR
BILIRUB UR QL STRIP: NEGATIVE
BUN SERPL-MCNC: 15 MG/DL (ref 7–30)
CA-I BLD-MCNC: 4.8 MG/DL (ref 4.4–5.2)
CALCIUM SERPL-MCNC: 7.8 MG/DL (ref 8.5–10.1)
CHLORIDE SERPL-SCNC: 107 MMOL/L (ref 94–109)
CO2 SERPL-SCNC: 23 MMOL/L (ref 20–32)
COLOR UR AUTO: YELLOW
CREAT SERPL-MCNC: 0.28 MG/DL (ref 0.52–1.04)
ERYTHROCYTE [DISTWIDTH] IN BLOOD BY AUTOMATED COUNT: 14.9 % (ref 10–15)
GFR SERPL CREATININE-BSD FRML MDRD: >90 ML/MIN/1.7M2
GLUCOSE SERPL-MCNC: 144 MG/DL (ref 70–99)
GLUCOSE UR STRIP-MCNC: NEGATIVE MG/DL
HCT VFR BLD AUTO: 30.6 % (ref 35–47)
HGB BLD-MCNC: 10.1 G/DL (ref 11.7–15.7)
HGB UR QL STRIP: NEGATIVE
HYALINE CASTS #/AREA URNS LPF: 1 /LPF (ref 0–2)
KETONES UR STRIP-MCNC: NEGATIVE MG/DL
LEUKOCYTE ESTERASE UR QL STRIP: NEGATIVE
MAGNESIUM SERPL-MCNC: 2.1 MG/DL (ref 1.6–2.3)
MCH RBC QN AUTO: 30.1 PG (ref 26.5–33)
MCHC RBC AUTO-ENTMCNC: 33 G/DL (ref 31.5–36.5)
MCV RBC AUTO: 91 FL (ref 78–100)
MUCOUS THREADS #/AREA URNS LPF: PRESENT /LPF
NITRATE UR QL: NEGATIVE
PH UR STRIP: 6 PH (ref 5–7)
PHOSPHATE SERPL-MCNC: 1.5 MG/DL (ref 2.5–4.5)
PHOSPHATE SERPL-MCNC: 2.4 MG/DL (ref 2.5–4.5)
PLATELET # BLD AUTO: 171 10E9/L (ref 150–450)
POTASSIUM SERPL-SCNC: 3.3 MMOL/L (ref 3.4–5.3)
POTASSIUM SERPL-SCNC: 4.7 MMOL/L (ref 3.4–5.3)
RBC # BLD AUTO: 3.35 10E12/L (ref 3.8–5.2)
RBC #/AREA URNS AUTO: 3 /HPF (ref 0–2)
SODIUM SERPL-SCNC: 138 MMOL/L (ref 133–144)
SOURCE: ABNORMAL
SP GR UR STRIP: 1.01 (ref 1–1.03)
TRANS CELLS #/AREA URNS HPF: <1 /HPF (ref 0–1)
UROBILINOGEN UR STRIP-MCNC: NORMAL MG/DL (ref 0–2)
WBC # BLD AUTO: 10.6 10E9/L (ref 4–11)
WBC #/AREA URNS AUTO: 3 /HPF (ref 0–2)

## 2017-11-17 PROCEDURE — 84132 ASSAY OF SERUM POTASSIUM: CPT | Performed by: NURSE PRACTITIONER

## 2017-11-17 PROCEDURE — 25000128 H RX IP 250 OP 636: Performed by: NURSE PRACTITIONER

## 2017-11-17 PROCEDURE — A9270 NON-COVERED ITEM OR SERVICE: HCPCS | Mod: GY | Performed by: STUDENT IN AN ORGANIZED HEALTH CARE EDUCATION/TRAINING PROGRAM

## 2017-11-17 PROCEDURE — 25000132 ZZH RX MED GY IP 250 OP 250 PS 637: Mod: GY | Performed by: NEUROLOGICAL SURGERY

## 2017-11-17 PROCEDURE — 12000008 ZZH R&B INTERMEDIATE UMMC

## 2017-11-17 PROCEDURE — 84100 ASSAY OF PHOSPHORUS: CPT | Performed by: NURSE PRACTITIONER

## 2017-11-17 PROCEDURE — 81001 URINALYSIS AUTO W/SCOPE: CPT | Performed by: NEUROLOGICAL SURGERY

## 2017-11-17 PROCEDURE — 25000125 ZZHC RX 250: Performed by: STUDENT IN AN ORGANIZED HEALTH CARE EDUCATION/TRAINING PROGRAM

## 2017-11-17 PROCEDURE — 97530 THERAPEUTIC ACTIVITIES: CPT | Mod: GO | Performed by: OCCUPATIONAL THERAPIST

## 2017-11-17 PROCEDURE — 85027 COMPLETE CBC AUTOMATED: CPT | Performed by: STUDENT IN AN ORGANIZED HEALTH CARE EDUCATION/TRAINING PROGRAM

## 2017-11-17 PROCEDURE — 80048 BASIC METABOLIC PNL TOTAL CA: CPT | Performed by: STUDENT IN AN ORGANIZED HEALTH CARE EDUCATION/TRAINING PROGRAM

## 2017-11-17 PROCEDURE — 82330 ASSAY OF CALCIUM: CPT | Performed by: STUDENT IN AN ORGANIZED HEALTH CARE EDUCATION/TRAINING PROGRAM

## 2017-11-17 PROCEDURE — 25000132 ZZH RX MED GY IP 250 OP 250 PS 637: Mod: GY | Performed by: NURSE PRACTITIONER

## 2017-11-17 PROCEDURE — 97165 OT EVAL LOW COMPLEX 30 MIN: CPT | Mod: GO | Performed by: OCCUPATIONAL THERAPIST

## 2017-11-17 PROCEDURE — 25000132 ZZH RX MED GY IP 250 OP 250 PS 637: Mod: GY | Performed by: STUDENT IN AN ORGANIZED HEALTH CARE EDUCATION/TRAINING PROGRAM

## 2017-11-17 PROCEDURE — 40000133 ZZH STATISTIC OT WARD VISIT: Performed by: OCCUPATIONAL THERAPIST

## 2017-11-17 PROCEDURE — 84100 ASSAY OF PHOSPHORUS: CPT | Performed by: STUDENT IN AN ORGANIZED HEALTH CARE EDUCATION/TRAINING PROGRAM

## 2017-11-17 PROCEDURE — 25000128 H RX IP 250 OP 636: Performed by: STUDENT IN AN ORGANIZED HEALTH CARE EDUCATION/TRAINING PROGRAM

## 2017-11-17 PROCEDURE — 40000225 ZZH STATISTIC SLP WARD VISIT: Performed by: SPEECH-LANGUAGE PATHOLOGIST

## 2017-11-17 PROCEDURE — L0200 CERV COL SUPP ADJ BAR & THOR: HCPCS

## 2017-11-17 PROCEDURE — 92610 EVALUATE SWALLOWING FUNCTION: CPT | Mod: GN | Performed by: SPEECH-LANGUAGE PATHOLOGIST

## 2017-11-17 PROCEDURE — A9270 NON-COVERED ITEM OR SERVICE: HCPCS | Mod: GY | Performed by: NEUROLOGICAL SURGERY

## 2017-11-17 PROCEDURE — 83735 ASSAY OF MAGNESIUM: CPT | Performed by: STUDENT IN AN ORGANIZED HEALTH CARE EDUCATION/TRAINING PROGRAM

## 2017-11-17 PROCEDURE — 82306 VITAMIN D 25 HYDROXY: CPT | Performed by: STUDENT IN AN ORGANIZED HEALTH CARE EDUCATION/TRAINING PROGRAM

## 2017-11-17 PROCEDURE — A9270 NON-COVERED ITEM OR SERVICE: HCPCS | Mod: GY | Performed by: INTERNAL MEDICINE

## 2017-11-17 PROCEDURE — 25000132 ZZH RX MED GY IP 250 OP 250 PS 637: Mod: GY | Performed by: INTERNAL MEDICINE

## 2017-11-17 RX ORDER — LIDOCAINE 40 MG/G
CREAM TOPICAL
Status: DISCONTINUED | OUTPATIENT
Start: 2017-11-17 | End: 2017-11-22

## 2017-11-17 RX ORDER — HEPARIN SODIUM,PORCINE 10 UNIT/ML
5-10 VIAL (ML) INTRAVENOUS EVERY 24 HOURS
Status: DISCONTINUED | OUTPATIENT
Start: 2017-11-17 | End: 2017-11-27 | Stop reason: HOSPADM

## 2017-11-17 RX ORDER — HYDRALAZINE HYDROCHLORIDE 20 MG/ML
10-20 INJECTION INTRAMUSCULAR; INTRAVENOUS EVERY 30 MIN PRN
Status: DISCONTINUED | OUTPATIENT
Start: 2017-11-17 | End: 2017-11-27 | Stop reason: HOSPADM

## 2017-11-17 RX ORDER — THIAMINE HYDROCHLORIDE 100 MG/ML
100 INJECTION, SOLUTION INTRAMUSCULAR; INTRAVENOUS DAILY
Status: DISCONTINUED | OUTPATIENT
Start: 2017-11-17 | End: 2017-11-23

## 2017-11-17 RX ORDER — HEPARIN SODIUM,PORCINE 10 UNIT/ML
5-10 VIAL (ML) INTRAVENOUS
Status: DISCONTINUED | OUTPATIENT
Start: 2017-11-17 | End: 2017-11-27 | Stop reason: HOSPADM

## 2017-11-17 RX ORDER — LABETALOL HYDROCHLORIDE 5 MG/ML
10 INJECTION, SOLUTION INTRAVENOUS
Status: DISCONTINUED | OUTPATIENT
Start: 2017-11-17 | End: 2017-11-27 | Stop reason: HOSPADM

## 2017-11-17 RX ORDER — ERGOCALCIFEROL 1.25 MG/1
50000 CAPSULE, LIQUID FILLED ORAL
Status: DISCONTINUED | OUTPATIENT
Start: 2017-11-18 | End: 2017-11-20

## 2017-11-17 RX ORDER — BISACODYL 10 MG
10 SUPPOSITORY, RECTAL RECTAL DAILY
Status: DISCONTINUED | OUTPATIENT
Start: 2017-11-17 | End: 2017-11-27 | Stop reason: HOSPADM

## 2017-11-17 RX ADMIN — POTASSIUM PHOSPHATE, MONOBASIC AND POTASSIUM PHOSPHATE, DIBASIC 20 MMOL: 224; 236 INJECTION, SOLUTION INTRAVENOUS at 06:24

## 2017-11-17 RX ADMIN — POTASSIUM CHLORIDE 40 MEQ: 1.5 POWDER, FOR SOLUTION ORAL at 05:56

## 2017-11-17 RX ADMIN — FENTANYL CITRATE 50 MCG: 50 INJECTION INTRAMUSCULAR; INTRAVENOUS at 02:45

## 2017-11-17 RX ADMIN — OXYCODONE HYDROCHLORIDE AND ACETAMINOPHEN 1000 MG: 500 TABLET ORAL at 09:46

## 2017-11-17 RX ADMIN — HYDRALAZINE HYDROCHLORIDE 10 MG: 20 INJECTION INTRAMUSCULAR; INTRAVENOUS at 16:22

## 2017-11-17 RX ADMIN — CALCIUM CARBONATE 1250 MG: 1250 SUSPENSION ORAL at 17:42

## 2017-11-17 RX ADMIN — HYDRALAZINE HYDROCHLORIDE 10 MG: 20 INJECTION INTRAMUSCULAR; INTRAVENOUS at 01:37

## 2017-11-17 RX ADMIN — SENNOSIDES AND DOCUSATE SODIUM 2 TABLET: 8.6; 5 TABLET ORAL at 09:46

## 2017-11-17 RX ADMIN — Medication 220 MG: at 14:09

## 2017-11-17 RX ADMIN — THIAMINE HYDROCHLORIDE 100 MG: 100 INJECTION, SOLUTION INTRAMUSCULAR; INTRAVENOUS at 10:22

## 2017-11-17 RX ADMIN — PRAVASTATIN SODIUM 10 MG: 10 TABLET ORAL at 21:37

## 2017-11-17 RX ADMIN — POTASSIUM & SODIUM PHOSPHATES POWDER PACK 280-160-250 MG 1 PACKET: 280-160-250 PACK at 19:59

## 2017-11-17 RX ADMIN — POTASSIUM & SODIUM PHOSPHATES POWDER PACK 280-160-250 MG 1 PACKET: 280-160-250 PACK at 11:13

## 2017-11-17 RX ADMIN — Medication 10 MG: at 01:14

## 2017-11-17 RX ADMIN — FENTANYL CITRATE 50 MCG: 50 INJECTION INTRAMUSCULAR; INTRAVENOUS at 11:13

## 2017-11-17 RX ADMIN — PRAVASTATIN SODIUM 10 MG: 10 TABLET ORAL at 00:37

## 2017-11-17 RX ADMIN — HYDROCODONE BITARTRATE AND ACETAMINOPHEN 1 TABLET: 5; 325 TABLET ORAL at 09:46

## 2017-11-17 RX ADMIN — FENTANYL CITRATE 50 MCG: 50 INJECTION INTRAMUSCULAR; INTRAVENOUS at 00:36

## 2017-11-17 RX ADMIN — HYDRALAZINE HYDROCHLORIDE 20 MG: 20 INJECTION INTRAMUSCULAR; INTRAVENOUS at 02:14

## 2017-11-17 RX ADMIN — Medication 10 MG: at 02:35

## 2017-11-17 RX ADMIN — Medication 3000 UNITS: at 09:47

## 2017-11-17 RX ADMIN — CALCIUM CARBONATE 1250 MG: 1250 SUSPENSION ORAL at 09:45

## 2017-11-17 RX ADMIN — Medication 10 MG: at 14:09

## 2017-11-17 RX ADMIN — POLYETHYLENE GLYCOL 3350 17 G: 17 POWDER, FOR SOLUTION ORAL at 19:59

## 2017-11-17 RX ADMIN — POTASSIUM & SODIUM PHOSPHATES POWDER PACK 280-160-250 MG 1 PACKET: 280-160-250 PACK at 15:41

## 2017-11-17 RX ADMIN — Medication 10 MG: at 16:32

## 2017-11-17 RX ADMIN — FENTANYL CITRATE 50 MCG: 50 INJECTION INTRAMUSCULAR; INTRAVENOUS at 14:12

## 2017-11-17 RX ADMIN — METOPROLOL TARTRATE 6.25 MG: 100 TABLET, FILM COATED ORAL at 21:57

## 2017-11-17 RX ADMIN — POLYETHYLENE GLYCOL 3350 17 G: 17 POWDER, FOR SOLUTION ORAL at 14:09

## 2017-11-17 RX ADMIN — Medication 10000 UNITS: at 09:47

## 2017-11-17 RX ADMIN — MULTIVITAMIN 15 ML: LIQUID ORAL at 09:45

## 2017-11-17 RX ADMIN — HYDROCODONE BITARTRATE AND ACETAMINOPHEN 1 TABLET: 5; 325 TABLET ORAL at 03:07

## 2017-11-17 RX ADMIN — SENNOSIDES AND DOCUSATE SODIUM 2 TABLET: 8.6; 5 TABLET ORAL at 19:59

## 2017-11-17 RX ADMIN — Medication 10 MG: at 11:12

## 2017-11-17 RX ADMIN — Medication 220 MG: at 21:37

## 2017-11-17 RX ADMIN — Medication 10 MG: at 15:41

## 2017-11-17 RX ADMIN — HYDROCODONE BITARTRATE AND ACETAMINOPHEN 1 TABLET: 5; 325 TABLET ORAL at 15:41

## 2017-11-17 RX ADMIN — FENTANYL CITRATE 50 MCG: 50 INJECTION INTRAMUSCULAR; INTRAVENOUS at 20:12

## 2017-11-17 RX ADMIN — METOPROLOL TARTRATE 6.25 MG: 25 TABLET, FILM COATED ORAL at 09:45

## 2017-11-17 RX ADMIN — Medication 220 MG: at 09:46

## 2017-11-17 RX ADMIN — POTASSIUM CHLORIDE 20 MEQ: 1.5 POWDER, FOR SOLUTION ORAL at 09:46

## 2017-11-17 RX ADMIN — POTASSIUM PHOSPHATE, MONOBASIC AND POTASSIUM PHOSPHATE, DIBASIC 15 MMOL: 224; 236 INJECTION, SOLUTION INTRAVENOUS at 15:41

## 2017-11-17 RX ADMIN — SODIUM CHLORIDE, PRESERVATIVE FREE 10 ML: 5 INJECTION INTRAVENOUS at 23:14

## 2017-11-17 RX ADMIN — POLYETHYLENE GLYCOL 3350 17 G: 17 POWDER, FOR SOLUTION ORAL at 09:45

## 2017-11-17 RX ADMIN — HYDRALAZINE HYDROCHLORIDE 10 MG: 20 INJECTION INTRAMUSCULAR; INTRAVENOUS at 16:14

## 2017-11-17 RX ADMIN — POTASSIUM & SODIUM PHOSPHATES POWDER PACK 280-160-250 MG 1 PACKET: 280-160-250 PACK at 09:45

## 2017-11-17 RX ADMIN — HYDROCODONE BITARTRATE AND ACETAMINOPHEN 1 TABLET: 5; 325 TABLET ORAL at 22:03

## 2017-11-17 RX ADMIN — Medication 10 MG: at 10:04

## 2017-11-17 ASSESSMENT — PAIN DESCRIPTION - DESCRIPTORS
DESCRIPTORS: ACHING

## 2017-11-17 NOTE — PROGRESS NOTES
11/17/17 1611   Quick Adds   Type of Visit Initial Occupational Therapy Evaluation   Living Environment   Lives With spouse   Living Arrangements other (see comments)  ([facility])   Home Accessibility tub/shower is not walk in   Number of Stairs to Enter Home 0   Number of Stairs Within Home 0   Stair Railings at Home none   Transportation Available car;family or friend will provide   Living Environment Comment  present at all times to assist   Self-Care   Dominant Hand right   Usual Activity Tolerance fair   Current Activity Tolerance poor   Regular Exercise no   Equipment Currently Used at Home walker, standard   Activity/Exercise/Self-Care Comment Pt requiring increased A for ADLs per    Functional Level Prior   Ambulation 1-->assistive equipment   Transferring 1-->assistive equipment   Toileting 1-->assistive equipment   Bathing 2-->assistive person   Dressing 2-->assistive person   Eating 0-->independent   Communication 0-->understands/communicates without difficulty   Swallowing 0-->swallows foods/liquids without difficulty   Cognition 0 - no cognition issues reported   Fall history within last six months no   Which of the above functional risks had a recent onset or change? ambulation;transferring;toileting;bathing;dressing;eating;swallowing;cognition   General Information   Onset of Illness/Injury or Date of Surgery - Date 11/10/17   Referring Physician Enma López MD   Patient/Family Goals Statement Pt would like to return home   Additional Occupational Profile Info/Pertinent History of Current Problem Yuni Otoole is a 82 year old female who is postoperative day #2 from C1-C3 laminectomies, C2-T3 posterior segmental instrumentation, C2/3, 4/5, 6/7 facette osteotomies. C2/3, 4/5, 6/7 ACDF. C2-T3 posterior fusion w/ autograft and allograft with reduction of cervical spine.    Precautions/Limitations fall precautions;spinal precautions   General Observations Pt supine in bed    General Info Comments Activity: Ambulate with assist   Cognitive Status Examination   Orientation person   Level of Consciousness alert   Able to Follow Commands mild impairment   Personal Safety (Cognitive) mild impairment   Memory impaired   Cognitive Comment Pt would benefit from further monitoring and assessment of cognition    Visual Perception   Visual Perception No deficits were identified;Wears glasses   Sensory Examination   Sensory Quick Adds No deficits were identified   Pain Assessment   Patient Currently in Pain Yes, see Vital Sign flowsheet   Integumentary/Edema   Integumentary/Edema no deficits were identifed   Posture   Posture forward head position   Range of Motion (ROM)   ROM Comment Pt stiff in all joints   Strength   Strength Comments not tested due to post op precautions. Appears significantly deconditioned   Hand Strength   Hand Strength Comments decreased   Muscle Tone Assessment   Muscle Tone Quick Adds No deficits were identified   Coordination   Upper Extremity Coordination No deficits were identified   Transfer Skill: Bed to Chair/Chair to Bed   Level of [place]: Bed to Chair maximum assist (25% patients effort)   Physical Assist/Nonphysical Assist: Bed to Chair 2 persons   Transfer Skill: Sit to Stand   Level of [place]: Sit/Stand maximum assist (25% patients effort)   Physical Assist/Nonphysical Assist: Sit/Stand 2 persons   Lower Body Dressing   Level of [place]: Dress Lower Body dependent (less than 25% patients effort)   Instrumental Activities of Daily Living (IADL)   IADL Comments  able to assist   Activities of Daily Living Analysis   Impairments Contributing to Impaired Activities of Daily Living balance impaired;cognition impaired;coordination impaired;fear and anxiety;pain;post surgical precautions;strength decreased;ROM decreased   General Therapy Interventions   Planned Therapy Interventions IADL retraining;ADL retraining;balance training;bed mobility  "training;cognition;risk factor education;progressive activity/exercise;home program guidelines;transfer training;strengthening   Clinical Impression   Criteria for Skilled Therapeutic Interventions Met yes, treatment indicated   OT Diagnosis decreased ADL I   Influenced by the following impairments medical status, post op precautions   Assessment of Occupational Performance 5 or more Performance Deficits   Identified Performance Deficits dressing, bathing, toileting, transfers, home management,    Clinical Decision Making (Complexity) Low complexity   Therapy Frequency 5 times/wk   Predicted Duration of Therapy Intervention (days/wks) 2 weeks   Anticipated Equipment Needs at Discharge (TBD)   Anticipated Discharge Disposition Transitional Care Facility   Risks and Benefits of Treatment have been explained. Yes   Patient, Family & other staff in agreement with plan of care Yes   [facility] TM \"6 Clicks\"   2016, Trustees of [facility], under license to [employer].  All rights reserved.   6 Clicks Short Forms Daily Activity Inpatient Short Form   [facility]  \"6 Clicks\" Daily Activity Inpatient Short Form   1. Putting on and taking off regular lower body clothing? 2 - A Lot   2. Bathing (including washing, rinsing, drying)? 2 - A Lot   3. Toileting, which includes using toilet, bedpan or urinal? 2 - A Lot   4. Putting on and taking off regular upper body clothing? 2 - A Lot   5. Taking care of personal grooming such as brushing teeth? 2 - A Lot   6. Eating meals? 1 - Total   Daily Activity Raw Score (Score out of 24.Lower scores equate to lower levels of function) 11   Total Evaluation Time   Total Evaluation Time (Minutes) 5     "

## 2017-11-17 NOTE — PLAN OF CARE
Problem: Patient Care Overview  Goal: Plan of Care/Patient Progress Review  Outcome: Improving  D/I/A  Pulm: extubated today, sats 95%on RA, lungs clear/diminished, weak cough  CV: BPs variable, levo and nicardipine D/Diomedes, PRNs for HTN. SR-ST with no ectopy  : Padilla removed at 1200, bladder scanned 250 at 1800.  GI: NJ placed and TF started. No BM this shift, bowel meds given  Neuro: lethargic but A/Ox3. Moves all extremities 2/5 strength. Dilaudid and oxycodone for pain control  Skin: Dressings CDI, Drains with bloody output posterior>anterior  Gtt's: NS 50, TKO    P: Continue pain control, monitor hemodynamics. Straight cath PRN. Orthosis consult tomorrow, needs activity and therapy orders.

## 2017-11-17 NOTE — PLAN OF CARE
Problem: Patient Care Overview  Goal: Plan of Care/Patient Progress Review  Discharge Planner SLP   Patient plan for discharge: Unknown  Current status: Clinical swallow eval completed per orders. Pt demonstrates mild to moderate oropharyngeal dysphagia at this time as characterized by delayed initiation of the swallow. Multiple swallows on large swallows of water and intermittent throat clearing toward the end of the session. Pt demonstrates minimally hoarse vocal quality during evaluation. Pt wearing Aspen collar collar through out evaluation. Pt demonstrated adequate cough strength upon cues. Recommend Full liquid diet. Sit pt upright for all po intake. Encourage small bites/sips and slow rate. Pt will require assist with po intake as she has difficulty with moving her arms. SLP to follow per POC.   Barriers to return to prior living situation: Post operative recovery, limited ability to feed herself, bracing.   Recommendations for discharge: Pt will likely benefit from inpatient rehab  Rationale for recommendations: Expected need for therapy during her recovery period.        Entered by: Heike Stearns 11/17/2017 4:18 PM

## 2017-11-17 NOTE — PROGRESS NOTES
[facility]  Neurosurgery Daily ICU Note:          Assessment   Yuni Otoole is a 82 year old female who is postoperative day #2 from C1-C3 laminectomies, C2-T3 posterior segmental instrumentation, C2/3, 4/5, 6/7 facette osteotomies. C2/3, 4/5, 6/7 ACDF. C2-T3 posterior fusion w/ autograft and allograft with reduction of cervical spine.           Plan 1.   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 Notify MD for acute changes in exam.    Pain control     brace - ordered, pending measurements    Keep sutures in for 4 weeks    2x surgical drains in place, will leave for today    Post op XRs when tolerated, with brace on, upright/sitting  2. CVS: hemodynamically stable    Hydralazine and labetolol PRN    Continuous cardiac monitoring while in ICU    PTA Metoprolol, Pravastatin  3. Pulmonary: intubated    Continuous pulse oximetry    Plan to wean ventilator for extubation  4. GI:     Tube feeds running    Nutrition following    Bowel regimen. PRN anti-emetics.    Continued dietary supplements to promote wound healing    Hypophosphatemia, added NeutraPhos to dietary regiment  5. Renal: no issues    750cc fluid restriction / day per Medicine; minimizing IVF received    Padilla out    Electrolyte replacement protocol    Continue to monitor intake/output    Hyponatremia - treated w/ salt tabs per Nephrology recs; has remained WNL  6. ID: afebrile, normal WBC count    Continue to monitor for fevers and/or signs of infection  7. Endocrine:     No ongoing concerns  8. Heme: no issues    Platelets > 100,000    INR < 1.5    Hemoglobin > 8  9. Prophylaxis    DVT: SCDs while in bed  10. Disposition: TTF    PT/OT    Above patient and plan has been discussed with the neurosurgery chief resident.     Please dial * [phone] and [phone] to reach the on-call neurosurgery resident if you have questions.           Subjective     Extubated yesterday. Stable respiratory and cardiac status.            Objective   Temp:  [98.2  F (36.8  C)-99.6  F (37.6  C)] 98.2  F (36.8  C)  Heart Rate:  [] 91  Resp:  [12-16] 16  BP: (192)/(94) 192/94  MAP:  [71 mmHg-140 mmHg] 140 mmHg  Arterial Line BP: (103-211)/(52-93) 211/93  SpO2:  [95 %-99 %] 98 %    Ventilation Mode: CPAP/PS  FiO2 (%): 30 %  Rate Set (breaths/minute): 12 breaths/min  Tidal Volume Set (mL): 350 mL  PEEP (cm H2O): 5 cmH2O  Pressure Support (cm H2O): 7 cmH2O  Oxygen Concentration (%): 40 %  Resp: 16    I/O last 3 completed shifts:  In: 903 [I.V.:583; NG/GT:245]  Out: 934 [Urine:715; Drains:219]    Physical Exam  General: NAD, intermittent gagging on ETT  Incisions: clean, dry, dressing intact  Neurologic    Mental Status:       -- Follows commands x4, nods head appropriately to questions      -- no gaze preference. No apparent hemineglect.    Cranial Nerves:      -- PERRL 3-2mm bilat and brisk      -- resistant to opening eyes    Motor:    Delt Bi Tri WE WF    R 2 4- 4 4 2 4-   L 2 4 4+ 4- 2 4-    IP Quad Ham DF PF    R 4 5 5 5 5    L 4 5 5 5 5            Labs and Imaging   BMP  Recent Labs  Lab 11/17/17  0401 11/16/17  0423 11/15/17  2200 11/15/17  2000  11/15/17  0636    135 139 134  < > 134   POTASSIUM 3.3* 4.3 3.3* 3.3*  < > 3.7   CHLORIDE 107 107 110*  --   --  100   CO2 23 20 21  --   --  27   BUN 15 14 14  --   --  21   CR 0.28* 0.33* 0.32*  --   --  0.39*   ANNETTA 7.8* 7.9* 7.2*  --   --  8.6   < > = values in this interval not displayed.    CBC  Recent Labs  Lab 11/17/17 0401 11/16/17 0423 11/15/17  2200 11/15/17  2000 11/15/17  1900  11/15/17  0636   WBC 10.6 16.1* 14.6*  --   --   --  6.6   HGB 10.1* 11.6* 10.6* 9.2* 10.3*  < > 11.0*    182 171  --  191  < > 307   < > = values in this interval not displayed.    ANEUDY    Recent Labs  Lab 11/16/17  0423 11/15/17  2342 11/15/17  2200 11/15/17  2000 11/15/17  1900   INR 1.07  --  1.19* 1.20*  1.17*   PTT 27  --  29 28 28   FIBR 305 246  --  209 217       ABG    Recent Labs  Lab 11/16/17  0404 11/15/17  2342 11/15/17  2000 11/15/17  1900   PH 7.40 7.40 7.37 7.36   PCO2 32* 32* 34* 38   PO2 193* 262* 266* 176*   HCO3 20* 20* 20* 21       IMAGING: post-op images pending       Please contact the neurosurgery resident on call with questions by dialing [phone], then entering [phone] when prompted

## 2017-11-17 NOTE — PROGRESS NOTES
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 Hypothyroidism and adrenal insufficiency were ruled out by laboratory testing. The serum and urine osmolality, serum sodium, confimed SIADH. The etiology of SIADH is less obvious. The CXR from 11/10/17 was unremarkable. The patient has no recent brain imaging tests. The head CT from 4/2016 revealed generalized atrophy of the brain and ischemic vessel disease. Prior to this hospitalization, she was taking hydrochlorothiazide, which might had contributed to hyponatremia. In an attempt to correct the hyponatremia prior to surgery, she was placed on 750 ml water restriction on 11/11/17 and she received 2 dosages of 3 gm salt tablets prior to surgery, when her sodium level normalized to 134. She underwent cervical spine laminectomy/fusion 11/15/17. Her postop Na remains normal.  Malnutrition, gradual weight loss - Baseline weight 100-110 lbs. On TFs with goal rate of 35 cc/hr.  Osteoporosis - Hypocalcemia noted on labs from 11/15/17. Recommended IV calcium gluconate and resuming calcium and vitamin D supplements via TFs, at 3000 U liquid vitamin D daily and 500 mg elemental calcium BID (1250 mg liquid calcium carbonate, BID). D3 level pending.     Interval History:  Post-op day #2. Lying flat in bed with a neck brace. Appears comfortable. Na today 138. Tube feeds at 30 cc/hr. Tolerating fine. Did eat a little apple sauce this morning which is encouraging. Pain is tolerable.     Review of Systems:   The Review of Systems is negative other than pain related to surgery.      Physical Exam:  Vitals: /84 (BP Location: Right arm)  Pulse 84  Temp 99  F (37.2  C) (Axillary)  Resp 12  Wt 40.9 kg (90 lb 1.6 oz)   SpO2 99%  BMI 18.83 kg/m2  BMI= Body mass index is 18.83 kg/(m^2).  General: she appears pale, thin, malnourished  HEENT: EOMI. Sclerae and conjunctivae clear.   CV: S1/S2 heard with systolic murmur  Lungs: normal breath sounds on auscultation of the anterior chest.   Abdomen: Soft, non tender, and non-distended. Bowel sounds active  Extremities: No peripheral edema.   Skin: pale   Neuro: weakness, no focal motor deficit moted    Recent Labs  Lab 11/17/17  0401 11/16/17  1601 11/16/17  0803 11/16/17  0423 11/16/17  0410 11/15/17  2349 11/15/17  2204 11/15/17  2200 11/15/17  2000 11/15/17  1900 11/15/17  1800  11/15/17  0626   *  --   --  149*  --   --   --  125* 119* 123* 127*  < >  --    BGM  --  148* 132*  --  150* 139* 128*  --   --   --   --   --  91   < > = values in this interval not displayed.  Lab Results   Component Value Date    WBC 10.6 11/17/2017    HGB 10.1 (L) 11/17/2017    HCT 30.6 (L) 11/17/2017    MCV 91 11/17/2017     11/17/2017     Lab Results   Component Value Date     11/17/2017     11/16/2017     11/15/2017    POTASSIUM 3.3 (L) 11/17/2017    POTASSIUM 4.3 11/16/2017    POTASSIUM 3.3 (L) 11/15/2017    CHLORIDE 107 11/17/2017    CHLORIDE 107 11/16/2017    CHLORIDE 110 (H) 11/15/2017    CO2 23 11/17/2017    CO2 20 11/16/2017    CO2 21 11/15/2017     (H) 11/17/2017     (H) 11/16/2017     (H) 11/15/2017     Lab Results   Component Value Date    BUN 15 11/17/2017    BUN 14 11/16/2017    BUN 14 11/15/2017     Lab Results   Component Value Date    TSH 2.91 11/14/2017    TSH 2.40 04/25/2017    TSH 2.37 05/11/2015     Lab Results   Component Value Date    AST 16 11/10/2017    AST 21 04/25/2017    AST 30 05/11/2015    ALT 18 11/10/2017    ALT 25 04/25/2017    ALT 27 05/11/2015    ALKPHOS 68 11/10/2017    ALKPHOS 63 04/25/2017    ALKPHOS 68 05/11/2015       Chris Garcia MD (PGY-4)  Diabetes and Endocrinology Fellow  [facility]  Pager:  [phone]    I have personally examined the patient, reviewed and edited the fellow's note and agree with the plan of care.    Parvin Walker MD.

## 2017-11-17 NOTE — PROGRESS NOTES
CLINICAL NUTRITION SERVICES - REASSESSMENT NOTE     Nutrition Prescription    RECOMMENDATIONS FOR MDs/PROVIDERS TO ORDER:  - total daily fluids/adjustments per MD. Current regimen + patency flushes provides = 827 ml/day.   - Recommend thiamine be started due to presumed malnutrition PTA.  - Electrolyte replacement protocol; Recommend order enteral Phos supplementation (NeutraPhos 1 pkt TID-QID) in addition to IV replacement. Also recommend change to high intensity IV Phos replacement protocol (replace Phos <2.7 mg/dL).   - For additional micronutrient recs see note 11/16.  - More aggressive bowel regimen if indicated. Per chart, LBM was 11/13.  - Consider SLP assessment prior to pt receiving currently ordered snacks?  - If pt suspected to require EN for > 4 weeks, may need to consider long-term EN?    Malnutrition Status:    - Severe malnutrition in the context of chronic illness    Recommendations already ordered by Registered Dietitian (RD):  - Continue currently ordered regimen. Isosource 1.5 @ goal 35 ml/hr (840 ml/day) to provide 1260 kcals (32 kcal/kg/day), 57 g PRO (1.4 g/kg/day), 647 ml free H2O.  - Do not advance until lytes (Mg++,K+) WNL and phos>1.9     Future/Additional Recommendations:  - EN advancement/tolerance/lytes   - micronutrient regimen      EVALUATION OF THE PROGRESS TOWARD GOALS   Diet: NPO  Nutrition Support:  Isosource 1.5 @ goal 35 ml/hr (840 ml/day) to provide 1260 kcals (32 kcal/kg/day), 57 g PRO (1.4 g/kg/day), 647 ml free H2O.  Intake: EN: minimal thus far. EN just initiated 11/16 and not at goal (currently at 15 ml/hr).   Calorie counts:   11/11: Kcals: 1222   Protein: 52g  11/12   Kcals: 347     Protein: 12g  11/13   Kcals: 1039   Protein: 41g  --------------------------------------------------------------------------------------------  869 kcals (22 kcals/kg) and 35 gm PRO (0.9 gm/kg)     NEW FINDINGS   Nutrition/GI: pt started EN via NDT (Cortrak) and unable to advance due to lytes.  LBM per flow sheets is 11/13. Abd feels slightly firm, however, pt brace appears to be possibly pushing down on abd.     Meds/labs: K+ 3.3 (L); phosphorus: 1.5 (L); creatinine 0.28 (L); ionized Ca+ 4.8 (WNL); vitamin D 11/13/17: 17 (L).   Vitamin C 1000 x 1 daily  Vitamin D 3000 units x 1 daily  Certavite 15 ml x 1 daily  Vitamin A 10,000 units x 1 daily  Zinc 220 mg x 3 daily  Thiamine: 100 mg daily   Bowel meds   IVF @ 25 ml/hr     Skin: Leonard scale score 13 and nutrition sub-score 2. Pt skin is very fragile. Doesn't appear excessively dry.     Weights: trending up from admit weight: 80 lbs --> 90 lbs = 13% weight increase over 3 days; fluid status is playing role in dramatic weight change over short time frame. Pt had weight loss over time PTA largely due to anatomy/inability to eat.     MALNUTRITION  % Intake: </=75% for >/= 1 month (severe)  % Weight Loss: > 20% in 1 year (severe)  Subcutaneous Fat Loss: Facial region:, Upper arm: and Lower arm: moderate  Muscle Loss: Facial & jaw region:, Scapular bone:, Thoracic region (clavicle, acromium bone, deltoid, trapezius, pectoral):, Upper arm (bicep, tricep): , Lower arm  (forearm): all severe Upper leg (quadricep, hamstring): , Patellar region: and Posterior calf: severe   Fluid Accumulation/Edema: Mild in hands?   Malnutrition Diagnosis: Severe malnutrition in the context of chronic illness    Previous Goals   Calorie counts to meet at least 2/3rds of estimated needs (~790kcals and 32g protein)  Evaluation: Met    Previous Nutrition Diagnosis  Inadequate oral intake related to decreased appetite/intake and inability to feed self as evidenced by pt's daughter report and ~33% weight loss in 1-1.5 years (per pts UBW) w/ +ketones in urine.  Evaluation: No change    CURRENT NUTRITION DIAGNOSIS  Inadequate protein-energy intake related to disruption to EN advancement and current diet status as evidenced by pt NPO, TFs not yet at goal, and pt not meeting goal kcal/PRO  provisions at this time with ~ 14 kcals/kg and ~0.6 gm PRO per dosing weight.    INTERVENTIONS  Implementation  Enteral Nutrition - continue regimen as ordered   Multivitamin/mineral supplement therapy: recommendations made; discussed with NP  Nutrition-related medication management: recommendations made    Goals  Total avg nutritional intake to meet a minimum of 30 kcal/kg and 1.2 g PRO/kg daily (per dosing wt 40 kg).    Monitoring/Evaluation  Progress toward goals will be monitored and evaluated per protocol.     Bay Cruz RD, LD  Neuro ICU  Pager: [phone]

## 2017-11-17 NOTE — PLAN OF CARE
Problem: Patient Care Overview  Goal: Plan of Care/Patient Progress Review  Outcome: Improving  D: 82 year old female who is postoperative day #2 from C1-C3 laminectomies, C2-T3 posterior segmental instrumentation, C2/3, 4/5, 6/7 facette osteotomies. C2/3, 4/5, 6/7 ACDF. C2-T3 posterior fusion w/ autograft and allograft with reduction of cervical spine  I/A: PERRL, A&Ox4, lethargic, weak, 3/5 strength, follow commands and makes needs known. NSR/ST, prn pain meds and antihypertensives to keep SBP < 140 overnight. 2L NC. NJ with tube feeds at 25 mL/hr, held at this rate d/t low phos, phos being replaced this am. Bladder scan and straight cath overnight, no BM since 11/13, bowel meds given. Bruising on anterior neck, posterior dressing CDI. More drainage from posterior KELLIE than anterior KELLIE. Please see flowsheets for for vitals and assessments.  P: Continue pain control, monitor hemodynamics. Straight cath PRN. Up with assist, and orthosis consult today.

## 2017-11-17 NOTE — PROGRESS NOTES
S: order received to see patient on [facility]4A for eval/fitting of a cervical thoracic orthosis () as ordered by Dr Parekh  O/G: support, stabilize and immobilize spine  A: patient seen for eval.  Patient was fit with an [place] .  Patient was logrolled and posterior section placed behind the patient.  Anterior section was then placed on patient and adjusted to achieve a customized/optimal fit.   Instructed on donning, doffing, use and care.  Patient was provided directions that came with the brace.    P: provided our number to contact if any issues arise    [place]  Instruction Sheet

## 2017-11-17 NOTE — PLAN OF CARE
Problem: Patient Care Overview  Goal: Plan of Care/Patient Progress Review  OT/4A:    Discharge Planner OT   Patient plan for discharge: rehab  Current status: Pt dependent lift bed to chair, max A supine to sit EOB. Pt limited by pain, stiffness, and strength   Barriers to return to prior living situation: strength, post op precautions   Recommendations for discharge: TCU  Rationale for recommendations: to progress ADL I       Entered by: Belinda Flynn 11/17/2017 4:31 PM

## 2017-11-17 NOTE — PROGRESS NOTES
11/17/17 1526   General Information   Onset Date 11/10/17   Start of Care Date 11/17/17   Referring Physician Amanda BEE CNP   Patient Profile Review/OT: Additional Occupational Profile Info See Profile for full history and prior level of function   Patient/Family Goals Statement Pt stated desire to eat/drink   Swallowing Evaluation Bedside swallow evaluation   Behaviorial Observations WFL (within functional limits)   Mode of current nutrition NJ   Respiratory Status O2 Supply   Type of O2 supply Nasal cannula   Comments Orders received and appreciated. 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 Now s/p laminectoy C1-3; osteotomies C2-3, C4-5, C6/7; C2-3, C4-5, C6-7 ant cervical discectomy and fusion; and C2-T3 posterior fusion.   Clinical Swallow Evaluation   Oral Musculature generally intact   Dentition present and adequate   Mucosal Quality good   Mandibular Strength and Mobility intact   Oral Labial Strength and Mobility WFL   Lingual Strength and Mobility WFL   Velar Elevation intact   Buccal Strength and Mobility intact   Laryngeal Function Cough;Throat clear;Swallow;Voicing initiated   Clinical Swallow Eval: Thin Liquid Texture Trial   Mode of Presentation, Thin Liquids spoon;straw;fed by clinician   Volume of Liquid or Food Presented ice chip x2, tsp x2, straw x15   Oral Phase of Swallow WFL   Pharyngeal Phase of Swallow repeated swallows   Diagnostic Statement Pt demonstrated intermittent repeated swallows however when she took smaller sips this decreased.    Clinical Swallow Eval: Puree Solid Texture Trial   Mode of Presentation, Puree spoon;fed by clinician   Volume of Puree Presented tsp x half cup applesauce   Oral Phase, Puree WFL   Pharyngeal Phase, Puree repeated swallows   Diagnostic Statement Pt demonstrated intermittent throat clearing.    Swallow Compensations   Swallow Compensations Alternate  viscosity of consistencies;Reduce amounts   Esophageal Phase of Swallow   Patient reports or presents with symptoms of esophageal dysphagia No   General Therapy Interventions   Planned Therapy Interventions Dysphagia Treatment   Dysphagia treatment Modified diet education;Oropharyngeal exercise training;Instruction of safe swallow strategies;Compensatory strategies for swallowing   Swallow Eval: Clinical Impressions   Skilled Criteria for Therapy Intervention Skilled criteria met.  Treatment indicated.   Functional Assessment Scale (FAS) 4   Treatment Diagnosis Mild to moderate oropharyngeal dysphagia   Diet texture recommendations Full liquid   Recommended Feeding/Eating Techniques alternate between small bites and sips of food/liquid;hard swallow w/ each bite or sip;small sips/bites   Demonstrates Need for Referral to Another Service occupational therapy;physical therapy;respiratory therapy;dietitian   Therapy Frequency daily   Predicted Duration of Therapy Intervention (days/wks) 3 weeks   Anticipated Discharge Disposition inpatient rehabilitation facility   Risks and Benefits of Treatment have been explained. Yes   Patient, family and/or staff in agreement with Plan of Care Yes   Clinical Impression Comments Clinical swallow eval completed per orders. Pt demonstrates mild to moderate oropharyngeal dysphagia at this time as characterized by delayed initiation of the swallow. Multiple swallows on large swallows of water and intermittent throat clearing toward the end of the session. Pt demonstrates minimally hoarse vocal quality during evaluation. Pt wearing Aspen collar collar through out evaluation. Pt demonstrated adequate cough strength upon cues. Recommend Full liquid diet. Sit pt upright for all po intake. Encourage small bites/sips and slow rate. SLP to follow.    Total Evaluation Time   Total Evaluation Time (Minutes) 15

## 2017-11-17 NOTE — PLAN OF CARE
Problem: Patient Care Overview  Goal: Plan of Care/Patient Progress Review  PT 4A: Cancel - PT orders acknowledged and appreciated. Pt working with OT upon check-in. Will re-schedule and initiate as appropriate. Thank you for your referral.

## 2017-11-17 NOTE — PROGRESS NOTES
Anterior KELLIE drain removed at bedside.  Prior to removal KELLIE removed from suction, insertion site covered with steri strips.  Patient tolerated well.

## 2017-11-18 ENCOUNTER — APPOINTMENT (OUTPATIENT)
Dept: PHYSICAL THERAPY | Facility: CLINIC | Age: 82
DRG: 453 | End: 2017-11-18
Attending: NEUROLOGICAL SURGERY
Payer: MEDICARE

## 2017-11-18 LAB
ANION GAP SERPL CALCULATED.3IONS-SCNC: 8 MMOL/L (ref 3–14)
BUN SERPL-MCNC: 12 MG/DL (ref 7–30)
CALCIUM SERPL-MCNC: 8.5 MG/DL (ref 8.5–10.1)
CHLORIDE SERPL-SCNC: 97 MMOL/L (ref 94–109)
CO2 SERPL-SCNC: 25 MMOL/L (ref 20–32)
CREAT SERPL-MCNC: 0.27 MG/DL (ref 0.52–1.04)
GFR SERPL CREATININE-BSD FRML MDRD: >90 ML/MIN/1.7M2
GLUCOSE BLDC GLUCOMTR-MCNC: 127 MG/DL (ref 70–99)
GLUCOSE SERPL-MCNC: 124 MG/DL (ref 70–99)
MAGNESIUM SERPL-MCNC: 1.9 MG/DL (ref 1.6–2.3)
PHOSPHATE SERPL-MCNC: 2.7 MG/DL (ref 2.5–4.5)
POTASSIUM SERPL-SCNC: 4 MMOL/L (ref 3.4–5.3)
SODIUM SERPL-SCNC: 130 MMOL/L (ref 133–144)

## 2017-11-18 PROCEDURE — A9270 NON-COVERED ITEM OR SERVICE: HCPCS | Mod: GY | Performed by: INTERNAL MEDICINE

## 2017-11-18 PROCEDURE — 25000128 H RX IP 250 OP 636: Performed by: STUDENT IN AN ORGANIZED HEALTH CARE EDUCATION/TRAINING PROGRAM

## 2017-11-18 PROCEDURE — A9270 NON-COVERED ITEM OR SERVICE: HCPCS | Mod: GY | Performed by: STUDENT IN AN ORGANIZED HEALTH CARE EDUCATION/TRAINING PROGRAM

## 2017-11-18 PROCEDURE — A9270 NON-COVERED ITEM OR SERVICE: HCPCS | Mod: GY | Performed by: NEUROLOGICAL SURGERY

## 2017-11-18 PROCEDURE — 97162 PT EVAL MOD COMPLEX 30 MIN: CPT | Mod: GP

## 2017-11-18 PROCEDURE — 84100 ASSAY OF PHOSPHORUS: CPT | Performed by: NEUROLOGICAL SURGERY

## 2017-11-18 PROCEDURE — 40000193 ZZH STATISTIC PT WARD VISIT

## 2017-11-18 PROCEDURE — 25000132 ZZH RX MED GY IP 250 OP 250 PS 637: Mod: GY | Performed by: STUDENT IN AN ORGANIZED HEALTH CARE EDUCATION/TRAINING PROGRAM

## 2017-11-18 PROCEDURE — 25000125 ZZHC RX 250: Performed by: STUDENT IN AN ORGANIZED HEALTH CARE EDUCATION/TRAINING PROGRAM

## 2017-11-18 PROCEDURE — 97530 THERAPEUTIC ACTIVITIES: CPT | Mod: GP

## 2017-11-18 PROCEDURE — 83735 ASSAY OF MAGNESIUM: CPT | Performed by: NEUROLOGICAL SURGERY

## 2017-11-18 PROCEDURE — 00000146 ZZHCL STATISTIC GLUCOSE BY METER IP

## 2017-11-18 PROCEDURE — 80048 BASIC METABOLIC PNL TOTAL CA: CPT | Performed by: NEUROLOGICAL SURGERY

## 2017-11-18 PROCEDURE — 25000128 H RX IP 250 OP 636: Performed by: NURSE PRACTITIONER

## 2017-11-18 PROCEDURE — 25000132 ZZH RX MED GY IP 250 OP 250 PS 637: Mod: GY | Performed by: NEUROLOGICAL SURGERY

## 2017-11-18 PROCEDURE — 36592 COLLECT BLOOD FROM PICC: CPT | Performed by: NEUROLOGICAL SURGERY

## 2017-11-18 PROCEDURE — 25000132 ZZH RX MED GY IP 250 OP 250 PS 637: Mod: GY | Performed by: INTERNAL MEDICINE

## 2017-11-18 PROCEDURE — 12000003 ZZH R&B CRITICAL UMMC

## 2017-11-18 PROCEDURE — 27210429 ZZH NUTRITION PRODUCT INTERMEDIATE LITER

## 2017-11-18 RX ORDER — SODIUM CHLORIDE 1 G/1
3 TABLET ORAL 2 TIMES DAILY WITH MEALS
Status: DISCONTINUED | OUTPATIENT
Start: 2017-11-19 | End: 2017-11-19

## 2017-11-18 RX ORDER — QUETIAPINE FUMARATE 25 MG/1
25 TABLET, FILM COATED ORAL ONCE
Status: DISCONTINUED | OUTPATIENT
Start: 2017-11-18 | End: 2017-11-18

## 2017-11-18 RX ORDER — ERGOCALCIFEROL 1.25 MG/1
50000 CAPSULE, LIQUID FILLED ORAL
Status: DISCONTINUED | OUTPATIENT
Start: 2017-11-18 | End: 2017-11-18

## 2017-11-18 RX ADMIN — POTASSIUM & SODIUM PHOSPHATES POWDER PACK 280-160-250 MG 1 PACKET: 280-160-250 PACK at 20:12

## 2017-11-18 RX ADMIN — Medication 12.5 MG: at 23:03

## 2017-11-18 RX ADMIN — HYDRALAZINE HYDROCHLORIDE 10 MG: 20 INJECTION INTRAMUSCULAR; INTRAVENOUS at 12:00

## 2017-11-18 RX ADMIN — MULTIVITAMIN 15 ML: LIQUID ORAL at 08:41

## 2017-11-18 RX ADMIN — SODIUM CHLORIDE, PRESERVATIVE FREE 5 ML: 5 INJECTION INTRAVENOUS at 07:26

## 2017-11-18 RX ADMIN — CALCIUM CARBONATE 1250 MG: 1250 SUSPENSION ORAL at 12:23

## 2017-11-18 RX ADMIN — SODIUM CHLORIDE, PRESERVATIVE FREE 5 ML: 5 INJECTION INTRAVENOUS at 23:04

## 2017-11-18 RX ADMIN — HYDRALAZINE HYDROCHLORIDE 20 MG: 20 INJECTION INTRAMUSCULAR; INTRAVENOUS at 21:26

## 2017-11-18 RX ADMIN — HYDROCODONE BITARTRATE AND ACETAMINOPHEN 1 TABLET: 5; 325 TABLET ORAL at 12:25

## 2017-11-18 RX ADMIN — HYDROCODONE BITARTRATE AND ACETAMINOPHEN 1 TABLET: 5; 325 TABLET ORAL at 04:21

## 2017-11-18 RX ADMIN — HYDRALAZINE HYDROCHLORIDE 10 MG: 20 INJECTION INTRAMUSCULAR; INTRAVENOUS at 04:20

## 2017-11-18 RX ADMIN — Medication 220 MG: at 15:45

## 2017-11-18 RX ADMIN — FENTANYL CITRATE 50 MCG: 50 INJECTION INTRAMUSCULAR; INTRAVENOUS at 08:30

## 2017-11-18 RX ADMIN — THIAMINE HYDROCHLORIDE 100 MG: 100 INJECTION, SOLUTION INTRAMUSCULAR; INTRAVENOUS at 08:29

## 2017-11-18 RX ADMIN — METOPROLOL TARTRATE 6.25 MG: 100 TABLET, FILM COATED ORAL at 08:47

## 2017-11-18 RX ADMIN — POTASSIUM CHLORIDE 20 MEQ: 1.5 POWDER, FOR SOLUTION ORAL at 12:24

## 2017-11-18 RX ADMIN — Medication 220 MG: at 20:12

## 2017-11-18 RX ADMIN — POTASSIUM & SODIUM PHOSPHATES POWDER PACK 280-160-250 MG 1 PACKET: 280-160-250 PACK at 12:31

## 2017-11-18 RX ADMIN — Medication 2 G: at 12:25

## 2017-11-18 RX ADMIN — OXYCODONE HYDROCHLORIDE AND ACETAMINOPHEN 1000 MG: 500 TABLET ORAL at 08:42

## 2017-11-18 RX ADMIN — PRAVASTATIN SODIUM 10 MG: 10 TABLET ORAL at 21:25

## 2017-11-18 RX ADMIN — HYDRALAZINE HYDROCHLORIDE 10 MG: 20 INJECTION INTRAMUSCULAR; INTRAVENOUS at 01:22

## 2017-11-18 RX ADMIN — Medication 220 MG: at 08:40

## 2017-11-18 RX ADMIN — POTASSIUM & SODIUM PHOSPHATES POWDER PACK 280-160-250 MG 1 PACKET: 280-160-250 PACK at 15:45

## 2017-11-18 RX ADMIN — POTASSIUM PHOSPHATE, MONOBASIC AND POTASSIUM PHOSPHATE, DIBASIC 10 MMOL: 224; 236 INJECTION, SOLUTION INTRAVENOUS at 15:45

## 2017-11-18 RX ADMIN — METOPROLOL TARTRATE 12.5 MG: 100 TABLET, FILM COATED ORAL at 18:05

## 2017-11-18 RX ADMIN — POTASSIUM & SODIUM PHOSPHATES POWDER PACK 280-160-250 MG 1 PACKET: 280-160-250 PACK at 08:41

## 2017-11-18 RX ADMIN — HYDROCODONE BITARTRATE AND ACETAMINOPHEN 1 TABLET: 5; 325 TABLET ORAL at 18:06

## 2017-11-18 RX ADMIN — Medication 3000 UNITS: at 12:23

## 2017-11-18 RX ADMIN — CALCIUM CARBONATE 1250 MG: 1250 SUSPENSION ORAL at 18:05

## 2017-11-18 ASSESSMENT — PAIN DESCRIPTION - DESCRIPTORS
DESCRIPTORS: ACHING

## 2017-11-18 NOTE — PLAN OF CARE
Problem: Patient Care Overview  Goal: Plan of Care/Patient Progress Review  Discharge Planner PT   Patient plan for discharge: did not state preference  Current status: Evaluation complete and treatment indicated. Engaged pt in rolling with max to total A, supine > EOB with max A x 2, pt tolerated sitting EOB ~4 minutes with mod A x 1-2, EOB > supine total A x 2, dependent boosting. Total A x 2 for donning  for OOB mobility. Pt unsafe to perform standing or pivot transfer today and recliner unavailable at time of session. Rec pt up with use of mechanical lift + Ax2 and only to recliner chair for safety with nursing. Significant joint crepitus in all major of body with any mobility.   Barriers to return to prior living situation: medical status, current mobility, home set-up  Recommendations for discharge: TCU  Rationale for recommendations: Pt is well below her baseline for mobility. Pt limited by impairments to ROM, strength, sensation, balance, activity tolerance, and spinal precautions.        Entered by: Lucy Bah 11/18/2017 4:45 PM

## 2017-11-18 NOTE — PROGRESS NOTES
11/18/17 1400   Quick Adds   Type of Visit Initial PT Evaluation      Language English   Living Environment   Lives With spouse   Living Arrangements house  ([facility])   Home Accessibility tub/shower is not walk in   Number of Stairs to Enter Home 0   Number of Stairs Within Home 0   Stair Railings at Home none   Transportation Available car;family or friend will provide   Living Environment Comment Pt reporting her  is always at home and can assist if needed.    Self-Care   Dominant Hand right   Usual Activity Tolerance fair   Current Activity Tolerance poor   Regular Exercise no   Equipment Currently Used at Home walker, standard   Activity/Exercise/Self-Care Comment Pt reporting her  assists with her ADLs and IADLs. Pt denies regular exercise.    Functional Level Prior   Ambulation 1-->assistive equipment   Transferring 1-->assistive equipment   Toileting 1-->assistive equipment   Bathing 2-->assistive person   Dressing 2-->assistive person   Fall history within last six months no   Which of the above functional risks had a recent onset or change? ambulation;transferring;toileting;bathing;dressing   Prior Functional Level Comment Pt reporting he  assists with much of ADLs in home and with bathing/dressing.  Pt reporting she has not been walking very much at home recently secondary to total body pain. She reports that she often sits in her office chair and scoots around the house.    General Information   Onset of Illness/Injury or Date of Surgery - Date 11/17/17  (date of PT orders)   Referring Physician Enma López MD   Patient/Family Goals Statement to become mobile again   Pertinent History of Current Problem (include personal factors and/or comorbidities that impact the POC) Pt is a 82 year old POD#3 s/p C1-C3 laminectomies, C2-T3 posterior segmental instrumentation, C2/3, 4/5, 6/7 facette osteotomies. C2/3, 4/5, 6/7 ACDF. C2-T3 posterior fusion w/ autograft and  allograft with reduction of cervical spine.  - per neurosurgery resident note   Precautions/Limitations fall precautions;spinal precautions   Weight-Bearing Status - LUE other (see comments)  (no lifting >10#)   Weight-Bearing Status - RUE other (see comments)  (no lifting >10#)   General Observations Pt lying in bed, appearing frail and fatigued.    General Info Comments Activity: ambulate with assist   Cognitive Status Examination   Orientation person;time   Level of Consciousness alert  (but intermittently somnolent)   Follows Commands and Answers Questions 100% of the time;able to follow single-step instructions   Personal Safety and Judgment intact   Memory impaired   Pain Assessment   Patient Currently in Pain Yes, see Vital Sign flowsheet  (RN aware)   Posture    Posture Forward head position   Range of Motion (ROM)   ROM Comment Shoulder flex to ~90 deg (limited by pain), hip flex to ~80 deg, knee flex to ~50 deg, ankle DF to neutral. All other joints within age appropriate range   Strength   Strength Comments Shoulder flex 2/5, elbow flex 2+/5,  strength poor. Hip flex 2/5, knee ext 3/5, knee flex 3/5, ankle DF 2/5,    Bed Mobility   Bed Mobility Comments total A for rolling   Transfer Skills   Transfer Comments total A - unable to compelte sit <> stand   Gait   Gait Comments Unable to compelte - inappropriate to assess   Balance   Balance Comments Very poor sitting balance - mod A x 2 to maintain sitting EOB.    Sensory Examination   Sensory Perception Comments Pt reporting baseline numbness/tingling in arms and hands   General Therapy Interventions   Planned Therapy Interventions ADL retraining;balance training;bed mobility training;fine motor coordination training;gait training;joint mobilization;neuromuscular re-education;ROM;strengthening;stretching;transfer training;risk factor education;home program guidelines;progressive activity/exercise   Clinical Impression   Criteria for Skilled Therapeutic  "Intervention yes, treatment indicated   PT Diagnosis Impaired functional mobility   Influenced by the following impairments Impaired strength, ROM, activity tolerance, post-op precautions, balance   Functional limitations due to impairments Decreased IND with mobility, limiting return to community   Clinical Presentation Evolving/Changing   Clinical Presentation Rationale Pt with good family support, below baseline mobility, increased O2 needs,    Clinical Decision Making (Complexity) Moderate complexity   Therapy Frequency` 5 times/week   Predicted Duration of Therapy Intervention (days/wks) 1.5 weeks   Anticipated Equipment Needs at Discharge (TBD pending discharge location)   Anticipated Discharge Disposition Transitional Care Facility   Risk & Benefits of therapy have been explained Yes   Patient, Family & other staff in agreement with plan of care Yes   [facility] AM-PAC TM \"6 Clicks\"   2016, Trustees of [facility], under license to [employer].  All rights reserved.   6 Clicks Short Forms Basic Mobility Inpatient Short Form   [facility] AM-PAC  \"6 Clicks\" V.2 Basic Mobility Inpatient Short Form   1. Turning from your back to your side while in a flat bed without using bedrails? 2 - A Lot   2. Moving from lying on your back to sitting on the side of a flat bed without using bedrails? 2 - A Lot   3. Moving to and from a bed to a chair (including a wheelchair)? 1 - Total   4. Standing up from a chair using your arms (e.g., wheelchair, or bedside chair)? 1 - Total   5. To walk in hospital room? 1 - Total   6. Climbing 3-5 steps with a railing? 1 - Total   Basic Mobility Raw Score (Score out of 24.Lower scores equate to lower levels of function) 8   Total Evaluation Time   Total Evaluation Time (Minutes) 10     "

## 2017-11-18 NOTE — PLAN OF CARE
Problem: Pain, Acute (Adult)  Goal: Identify Related Risk Factors and Signs and Symptoms  Related risk factors and signs and symptoms are identified upon initiation of Human Response Clinical Practice Guideline (CPG).   Outcome: No Change  Pt transferred from  this shift. POD #2 C2-T3 fusion. VSS except 2L O2 with sats in mid 90's. A&O x4. Neuro unchanged: BUE 2/5 tingly and BLE 2/5. Periorbital edema. Anterior incision site with ecchymosis after MD removed drain; MD notified and assessed; continue to monitor. Pt denies difficult swallow. Posterior neck dressing scant drainage; KELLIE x1 to posterior neck with bloody/red output. Pt c/o neck pain controlled with IV fentanyl and [place]. L central line; HL. Padilla in place for retention; good output. 2 BM this shift. Full liquid diet. TF at 25 cc/hr with goal of 35; advance after lytes in AM. Up with 2 and lift. T&R Q2. Cervical orthotic when OOB. Continue to monitor and follow current POC.

## 2017-11-18 NOTE — PLAN OF CARE
Problem: Patient Care Overview  Goal: Plan of Care/Patient Progress Review  Outcome: Improving  Alert and oriented. [place]. numbess and tingling of upper extremities, present prior. Can move all extremities, but very weak. SR 70-80s. -180's. Gave PRN labetolol and hydralazine. MD changed parameters to be SBP <170. Afebrile. 2L  NC to RA intermittently. NJ in place with TF at 25, goal is 35. Replacing phosphrus, waiting to advance. Full liquid diet. No BM, needing suppository. Straight cath X1, inserted desir. Art line DC'ed. Triple lumen with NS at 25. Pain control adequate with Norco and fentanyl. Up to chair with sling. KELLIE anterior drain removed by MD. KELLIE posterior drain in place with serosang drainage.     Plan: transfer to . Continue to work with PT/OT. Work on appetite and eating more on own.

## 2017-11-18 NOTE — PROGRESS NOTES
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 Hypothyroidism and adrenal insufficiency were ruled out by laboratory testing. The serum and urine osmolality, serum sodium, confimed SIADH. The etiology of SIADH is less obvious. The CXR from 11/10/17 was unremarkable. The patient has no recent brain imaging tests. The head CT from 4/2016 revealed generalized atrophy of the brain and ischemic vessel disease. Prior to this hospitalization, she was taking hydrochlorothiazide, which might had contributed to hyponatremia. In an attempt to correct the hyponatremia prior to surgery, she was placed on 750 ml water restriction on 11/11/17 and she received 2 dosages of 3 gm salt tablets prior to surgery, when her sodium level normalized to 134. She underwent cervical spine laminectomy/fusion 11/15/17. Her postop Na remained normal until today, when it decreased to 130.   2. Malnutrition, gradual weight loss - Baseline weight 100-110 lbs. On TFs with goal rate of 35 cc/hr. Currently at 25 cc/hr.   3. Osteoporosis - Hypocalcemia noted on labs from 11/15/17. After surgery, recommended resuming calcium and vitamin D supplements via TFs, at 3000 U liquid vitamin D daily and 500 mg elemental calcium BID (1250 mg liquid calcium carbonate, BID).  Calcium level normal on today's labs.   Plan:  Re institute water restriction at 750 cc/day (small amounts of water in addition to the TFs)  Continue to monitor the sodium level q 12 hrs  F/up pending vitamin D level     Interval History:  Post-op day #2. Lying flat in bed. Appears comfortable. Pain is improving. On clear liquids, with poor appetite.    Some diarrhea related to tube feeds. Legs feel weak. She continues  to experience numbness and tingling sensation in her fingers and toes, present even prior to surgery and not significantly changed.     Physical Exam:  Vitals: /74 (BP Location: Right arm)  Pulse 84  Temp 98.2  F (36.8  C) (Oral)  Resp 16  Wt 40.9 kg (90 lb 1.6 oz)  SpO2 99%  BMI 18.83 kg/m2  BMI= Body mass index is 18.83 kg/(m^2).  General: she appears pale, thin, malnourished  HEENT: EOMI. Sclerae and conjunctivae clear.   Extremities: No peripheral edema.   Skin: pale   Neuro: no focal motor deficit moted    Recent Labs  Lab 11/18/17  0803 11/18/17  0727 11/17/17  0401 11/16/17  1601 11/16/17  0803 11/16/17  0423 11/16/17  0410 11/15/17  2349 11/15/17  2204 11/15/17  2200 11/15/17  2000 11/15/17  1900   GLC  --  124* 144*  --   --  149*  --   --   --  125* 119* 123*   *  --   --  148* 132*  --  150* 139* 128*  --   --   --      Lab Results   Component Value Date    WBC 10.6 11/17/2017    HGB 10.1 (L) 11/17/2017    HCT 30.6 (L) 11/17/2017    MCV 91 11/17/2017     11/17/2017     Lab Results   Component Value Date     (L) 11/18/2017     11/17/2017     11/16/2017    POTASSIUM 4.0 11/18/2017    POTASSIUM 4.7 11/17/2017    POTASSIUM 3.3 (L) 11/17/2017    CHLORIDE 97 11/18/2017    CHLORIDE 107 11/17/2017    CHLORIDE 107 11/16/2017    CO2 25 11/18/2017    CO2 23 11/17/2017    CO2 20 11/16/2017     (H) 11/18/2017     (H) 11/17/2017     (H) 11/16/2017     Lab Results   Component Value Date    BUN 12 11/18/2017    BUN 15 11/17/2017    BUN 14 11/16/2017     Lab Results   Component Value Date    TSH 2.91 11/14/2017    TSH 2.40 04/25/2017    TSH 2.37 05/11/2015     Lab Results   Component Value Date    AST 16 11/10/2017    AST 21 04/25/2017    AST 30 05/11/2015    ALT 18 11/10/2017    ALT 25 04/25/2017    ALT 27 05/11/2015    ALKPHOS 68 11/10/2017    ALKPHOS 63 04/25/2017    ALKPHOS 68 05/11/2015

## 2017-11-18 NOTE — PROGRESS NOTES
Neurosurgery Daily Progress Note  11/18/2017    Overnight events/subjective: Transferred to the floor. Padilla replaced d/t urinary retention.    O/ /74 (BP Location: Right arm)  Pulse 84  Temp 98.2  F (36.8  C) (Oral)  Resp 16  Wt 40.9 kg (90 lb 1.6 oz)  SpO2 98%  BMI 18.83 kg/m2    Exam:   Gen: Laying in bed, not in acute distress  MS: A&Ox3, Speech fluent and conversant   CN: Pupils round and reactive, extraocular movements intact, face symmetric, tongue midline, uvula & palate elevate symmetrically   Motor:      Delt Bi Tri WE WF    R 1 4- 4 4 4 4-   L 1 4- 4 4 4 4-     IP Quad Ham DF PF EHL   R 3 4+ 4+ 4+ 4+ 4+   L 3 4+ 4+ 4+ 4+ 4+     Sensory: patient endorses intact light touch sensation, but exhibits extinction of left    Non labored breathing    LABS: reviewed     A/P: Yuni Otoole is a 82 year old POD#3 s/p C1-C3 laminectomies, C2-T3 posterior segmental instrumentation, C2/3, 4/5, 6/7 facette osteotomies. C2/3, 4/5, 6/7 ACDF. C2-T3 posterior fusion w/ autograft and allograft with reduction of cervical spine.     - Neuro checks  - CTP brace; remove front for PO intake  - Suture to be left in for 4 weeks  - Post-op images when tolerated, with brace on, upright/sitting  - Pull posterior KELLIE today; ok to start DVT ppx after drain out  - Diet: Tube feeds + FLD; nutrition following.  - SCDs for DVT ppx  - PO diet for GI ppx  - PT/OT: TCU (OT)    Dispo: Anticipated d/c 11/24. Barriers: removal of surgical drain, evaluation by therapies, ambulating, voiding w/o Padilla or plan to d/c with Padilla, tolerating adequate PO diet to meet nutritional needs.      Please contact the neurosurgery resident on call with questions by dialing [phone], then entering [phone] when prompted

## 2017-11-18 NOTE — PLAN OF CARE
Problem: Patient Care Overview  Goal: Plan of Care/Patient Progress Review  SLP: Dysphagia therapy cancelled upon attempts x3.  Pt initially refusing PO, then with other providers, and then refusing PO again.  ST to follow as indicated on POC.

## 2017-11-18 NOTE — PLAN OF CARE
"Problem: Patient Care Overview  Goal: Plan of Care/Patient Progress Review  Outcome: No Change  POD #3 from C1-C3 laminectomies, C2-T3 posterior segmental instrumentation, C2/3, 4/5, 6/7 facette osteotomies. C2/3, 4/5, 6/7 ACDF. C2-T3 posterior fusion w/ autograft and allograft with reduction of cervical spine. Hypertensive at times, hydralazine given.  Labetalol ordered from pharm, and will use PRN if needed. Neuros: Numb/tingling bilat arms.  Strength all extremities 2/5.  L  <R.  Periorbital edema.  C/o back and anterior neck pain managed with Norco and Fentanyl IV.  Sleeping between cares.  Anterior neck incision covered with primapore, no new drainage.  Posterior neck dressing in place, slight drainage around KELLIE.  KELLIE x1 to posterior back with serosang drainage.  IV access: TL IJ, 2 ports at TKO. Mag, K+ and Phos replaced.  Lab order for redraw of lytes placed for tomorrow am.  Tube feeding: Isosource 1.5.  Currently at 35 mL/hr since 1500.  Tube feeding held from 3954-0709 d/t patient stating that she is feeling full.  Padilla in place for retention, with adequate urine output.  Loose/watery BM x1, bowel meds held in am.  Full liq diet, taking very minimal bites: 5 bites of pudding at 1500 and sips of water throughout the day.  Patient stated that she has \"no appetite for the food\".  On [place] counts.  Up with A2 and mech lift.  Turned/repositioned Q2hrs. OOB, and dangled at bedside with the help of Physical therapy. Recliner chair was ordered for the next time that she is OOB.   Cervical orthotic brace on when OOB.           "

## 2017-11-18 NOTE — PLAN OF CARE
Problem: Patient Care Overview  Goal: Plan of Care/Patient Progress Review  Outcome: No Change  POD #3 from C1-C3 laminectomies, C2-T3 posterior segmental instrumentation, C2/3, 4/5, 6/7 facette osteotomies. C2/3, 4/5, 6/7 ACDF. C2-T3 posterior fusion w/ autograft and allograft with reduction of cervical spine. Hypertensive with 's, given Hydralazine x2, BP recheck within parameters. Sats mid 90's 2L NC. A&O x4. Neuro's include tingling to bilat arms, all extremities 2/5 and L  <R. Periorbital edema. C/o pain, given PRN Norco with relief. Anterior incision site unchanged with primapore at 0530 pt reporting some neck soreness and difficulty swallowing, [facility] paged, no new orders. Posterior neck dressing with scant drainage. KELLIE x1 to posterior back with serosang drainage. TL IJ HL. Padilla in place for retention, adequate urine output. Loose/watery BM x2. Full liquid diet. TF via NG at 25 cc/hr, can be advanced to 35 this AM. Up with 2 and lift. Turn and reposition Q2, last repositioned at 0645. Cervical orthotic brace when OOB. Continue to monitor and follow current POC.

## 2017-11-19 ENCOUNTER — APPOINTMENT (OUTPATIENT)
Dept: PHYSICAL THERAPY | Facility: CLINIC | Age: 82
DRG: 453 | End: 2017-11-19
Attending: NEUROLOGICAL SURGERY
Payer: MEDICARE

## 2017-11-19 LAB
ANION GAP SERPL CALCULATED.3IONS-SCNC: 8 MMOL/L (ref 3–14)
BLD PROD TYP BPU: NORMAL
BLD UNIT ID BPU: 0
BLOOD PRODUCT CODE: NORMAL
BPU ID: NORMAL
BUN SERPL-MCNC: 9 MG/DL (ref 7–30)
CALCIUM SERPL-MCNC: 8.8 MG/DL (ref 8.5–10.1)
CHLORIDE SERPL-SCNC: 94 MMOL/L (ref 94–109)
CO2 SERPL-SCNC: 28 MMOL/L (ref 20–32)
CREAT SERPL-MCNC: 0.28 MG/DL (ref 0.52–1.04)
GFR SERPL CREATININE-BSD FRML MDRD: >90 ML/MIN/1.7M2
GLUCOSE SERPL-MCNC: 126 MG/DL (ref 70–99)
MAGNESIUM SERPL-MCNC: 1.9 MG/DL (ref 1.6–2.3)
PHOSPHATE SERPL-MCNC: 3.2 MG/DL (ref 2.5–4.5)
POTASSIUM SERPL-SCNC: 4 MMOL/L (ref 3.4–5.3)
SODIUM SERPL-SCNC: 129 MMOL/L (ref 133–144)
SODIUM SERPL-SCNC: 130 MMOL/L (ref 133–144)
TRANSFUSION STATUS PATIENT QL: NORMAL

## 2017-11-19 PROCEDURE — 84295 ASSAY OF SERUM SODIUM: CPT | Performed by: STUDENT IN AN ORGANIZED HEALTH CARE EDUCATION/TRAINING PROGRAM

## 2017-11-19 PROCEDURE — 36592 COLLECT BLOOD FROM PICC: CPT | Performed by: NEUROLOGICAL SURGERY

## 2017-11-19 PROCEDURE — A9270 NON-COVERED ITEM OR SERVICE: HCPCS | Mod: GY | Performed by: INTERNAL MEDICINE

## 2017-11-19 PROCEDURE — 25000132 ZZH RX MED GY IP 250 OP 250 PS 637: Mod: GY | Performed by: STUDENT IN AN ORGANIZED HEALTH CARE EDUCATION/TRAINING PROGRAM

## 2017-11-19 PROCEDURE — 25000125 ZZHC RX 250: Performed by: STUDENT IN AN ORGANIZED HEALTH CARE EDUCATION/TRAINING PROGRAM

## 2017-11-19 PROCEDURE — 25000128 H RX IP 250 OP 636: Performed by: STUDENT IN AN ORGANIZED HEALTH CARE EDUCATION/TRAINING PROGRAM

## 2017-11-19 PROCEDURE — 25000132 ZZH RX MED GY IP 250 OP 250 PS 637: Mod: GY | Performed by: NEUROLOGICAL SURGERY

## 2017-11-19 PROCEDURE — 97110 THERAPEUTIC EXERCISES: CPT | Mod: GP

## 2017-11-19 PROCEDURE — A9270 NON-COVERED ITEM OR SERVICE: HCPCS | Mod: GY | Performed by: STUDENT IN AN ORGANIZED HEALTH CARE EDUCATION/TRAINING PROGRAM

## 2017-11-19 PROCEDURE — 84100 ASSAY OF PHOSPHORUS: CPT | Performed by: NEUROLOGICAL SURGERY

## 2017-11-19 PROCEDURE — 12000008 ZZH R&B INTERMEDIATE UMMC

## 2017-11-19 PROCEDURE — 25000132 ZZH RX MED GY IP 250 OP 250 PS 637: Mod: GY | Performed by: INTERNAL MEDICINE

## 2017-11-19 PROCEDURE — 25000128 H RX IP 250 OP 636: Performed by: NURSE PRACTITIONER

## 2017-11-19 PROCEDURE — 97530 THERAPEUTIC ACTIVITIES: CPT | Mod: GP

## 2017-11-19 PROCEDURE — 36592 COLLECT BLOOD FROM PICC: CPT | Performed by: STUDENT IN AN ORGANIZED HEALTH CARE EDUCATION/TRAINING PROGRAM

## 2017-11-19 PROCEDURE — 83735 ASSAY OF MAGNESIUM: CPT | Performed by: NEUROLOGICAL SURGERY

## 2017-11-19 PROCEDURE — 40000193 ZZH STATISTIC PT WARD VISIT

## 2017-11-19 PROCEDURE — 80048 BASIC METABOLIC PNL TOTAL CA: CPT | Performed by: NEUROLOGICAL SURGERY

## 2017-11-19 RX ORDER — HYDROCODONE BITARTRATE AND ACETAMINOPHEN 5; 325 MG/1; MG/1
1-2 TABLET ORAL EVERY 6 HOURS PRN
Status: DISCONTINUED | OUTPATIENT
Start: 2017-11-19 | End: 2017-11-20

## 2017-11-19 RX ORDER — POLYETHYLENE GLYCOL 3350 17 G/17G
17 POWDER, FOR SOLUTION ORAL 3 TIMES DAILY PRN
Status: DISCONTINUED | OUTPATIENT
Start: 2017-11-19 | End: 2017-11-20

## 2017-11-19 RX ORDER — HEPARIN SODIUM 5000 [USP'U]/.5ML
5000 INJECTION, SOLUTION INTRAVENOUS; SUBCUTANEOUS EVERY 8 HOURS
Status: DISCONTINUED | OUTPATIENT
Start: 2017-11-19 | End: 2017-11-27 | Stop reason: HOSPADM

## 2017-11-19 RX ORDER — SODIUM CHLORIDE 1 G/1
1 TABLET ORAL 3 TIMES DAILY
Status: DISCONTINUED | OUTPATIENT
Start: 2017-11-19 | End: 2017-11-21

## 2017-11-19 RX ADMIN — Medication 220 MG: at 20:17

## 2017-11-19 RX ADMIN — THIAMINE HYDROCHLORIDE 100 MG: 100 INJECTION, SOLUTION INTRAMUSCULAR; INTRAVENOUS at 08:37

## 2017-11-19 RX ADMIN — HYDRALAZINE HYDROCHLORIDE 20 MG: 20 INJECTION INTRAMUSCULAR; INTRAVENOUS at 03:32

## 2017-11-19 RX ADMIN — CALCIUM CARBONATE 1250 MG: 1250 SUSPENSION ORAL at 08:36

## 2017-11-19 RX ADMIN — HEPARIN SODIUM 5000 UNITS: 5000 INJECTION, SOLUTION INTRAVENOUS; SUBCUTANEOUS at 17:06

## 2017-11-19 RX ADMIN — SODIUM CHLORIDE, PRESERVATIVE FREE 5 ML: 5 INJECTION INTRAVENOUS at 10:23

## 2017-11-19 RX ADMIN — HYDRALAZINE HYDROCHLORIDE 20 MG: 20 INJECTION INTRAMUSCULAR; INTRAVENOUS at 12:29

## 2017-11-19 RX ADMIN — HYDRALAZINE HYDROCHLORIDE 10 MG: 20 INJECTION INTRAMUSCULAR; INTRAVENOUS at 10:18

## 2017-11-19 RX ADMIN — HYDROCODONE BITARTRATE AND ACETAMINOPHEN 2 TABLET: 5; 325 TABLET ORAL at 08:57

## 2017-11-19 RX ADMIN — SODIUM CHLORIDE TAB 1 GM 3 G: 1 TAB at 08:36

## 2017-11-19 RX ADMIN — SODIUM CHLORIDE TAB 1 GM 1 G: 1 TAB at 20:17

## 2017-11-19 RX ADMIN — POTASSIUM & SODIUM PHOSPHATES POWDER PACK 280-160-250 MG 1 PACKET: 280-160-250 PACK at 17:06

## 2017-11-19 RX ADMIN — HEPARIN SODIUM 5000 UNITS: 5000 INJECTION, SOLUTION INTRAVENOUS; SUBCUTANEOUS at 08:45

## 2017-11-19 RX ADMIN — MULTIVITAMIN 15 ML: LIQUID ORAL at 08:35

## 2017-11-19 RX ADMIN — POTASSIUM CHLORIDE 20 MEQ: 1.5 POWDER, FOR SOLUTION ORAL at 14:50

## 2017-11-19 RX ADMIN — Medication 220 MG: at 14:50

## 2017-11-19 RX ADMIN — HYDROCODONE BITARTRATE AND ACETAMINOPHEN 1 TABLET: 5; 325 TABLET ORAL at 03:33

## 2017-11-19 RX ADMIN — CALCIUM CARBONATE 1250 MG: 1250 SUSPENSION ORAL at 17:06

## 2017-11-19 RX ADMIN — SODIUM CHLORIDE TAB 1 GM 1 G: 1 TAB at 14:50

## 2017-11-19 RX ADMIN — HYDRALAZINE HYDROCHLORIDE 10 MG: 20 INJECTION INTRAMUSCULAR; INTRAVENOUS at 17:06

## 2017-11-19 RX ADMIN — HEPARIN SODIUM 5000 UNITS: 5000 INJECTION, SOLUTION INTRAVENOUS; SUBCUTANEOUS at 03:32

## 2017-11-19 RX ADMIN — PRAVASTATIN SODIUM 10 MG: 10 TABLET ORAL at 21:52

## 2017-11-19 RX ADMIN — POTASSIUM & SODIUM PHOSPHATES POWDER PACK 280-160-250 MG 1 PACKET: 280-160-250 PACK at 12:20

## 2017-11-19 RX ADMIN — HYDROCODONE BITARTRATE AND ACETAMINOPHEN 2 TABLET: 5; 325 TABLET ORAL at 14:50

## 2017-11-19 RX ADMIN — METOPROLOL TARTRATE 12.5 MG: 100 TABLET, FILM COATED ORAL at 20:17

## 2017-11-19 RX ADMIN — POTASSIUM & SODIUM PHOSPHATES POWDER PACK 280-160-250 MG 1 PACKET: 280-160-250 PACK at 08:36

## 2017-11-19 RX ADMIN — Medication 3000 UNITS: at 08:35

## 2017-11-19 RX ADMIN — METOPROLOL TARTRATE 12.5 MG: 100 TABLET, FILM COATED ORAL at 08:45

## 2017-11-19 RX ADMIN — SODIUM CHLORIDE, PRESERVATIVE FREE 5 ML: 5 INJECTION INTRAVENOUS at 18:41

## 2017-11-19 RX ADMIN — SODIUM CHLORIDE, PRESERVATIVE FREE 5 ML: 5 INJECTION INTRAVENOUS at 20:34

## 2017-11-19 RX ADMIN — Medication 10000 UNITS: at 08:35

## 2017-11-19 RX ADMIN — POTASSIUM & SODIUM PHOSPHATES POWDER PACK 280-160-250 MG 1 PACKET: 280-160-250 PACK at 20:17

## 2017-11-19 RX ADMIN — OXYCODONE HYDROCHLORIDE AND ACETAMINOPHEN 1000 MG: 500 TABLET ORAL at 08:33

## 2017-11-19 RX ADMIN — Medication 2 G: at 14:51

## 2017-11-19 RX ADMIN — Medication 220 MG: at 08:37

## 2017-11-19 ASSESSMENT — PAIN DESCRIPTION - DESCRIPTORS
DESCRIPTORS: ACHING
DESCRIPTORS: ACHING

## 2017-11-19 NOTE — PLAN OF CARE
"Problem: Patient Care Overview  Goal: Plan of Care/Patient Progress Review  Outcome: No Change  [id number]: POD #3 from C1-C3 laminectomies, C2-T3 posterior segmental instrumentation, C2/3, 4/5, 6/7 facette osteotomies. C2/3, 4/5, 6/7 ACDF. C2-T3 posterior fusion w/ autograft and allograft with reduction of cervical spine. Hypertensive at times, hydralazine given.  Labetalol ordered from pharm, and will use PRN if needed. Neuros: Numb/tingling bilat arms.  Strength all extremities 2/5.  L  <R.  Periorbital edema.  C/o back and anterior neck pain managed with Norco and Fentanyl IV.  Sleeping between cares.  Anterior neck incision covered with primapore, no new drainage.  Posterior neck dressing in place, slight drainage around KELLIE.  KELLIE x1 to posterior back with serosang drainage.  IV access: TL IJ, 2 ports at TKO. Mag, K+ and Phos replaced.  Lab order for redraw of lytes placed for tomorrow am.  Tube feeding: Isosource 1.5.  Currently at 35 mL/hr since 1500.  Tube feeding held from 0849-8207 d/t patient stating that she is feeling full.  Padilla in place for retention, with adequate urine output.  Loose/watery BM x1, bowel meds held in am.  Full liq diet, taking very minimal bites: 5 bites of pudding at 1500 and sips of water throughout the day.  Patient stated that she has \"no appetite for the food\".  On [place] counts.  Up with A2 and mech lift.  Turned/repositioned Q2hrs. OOB, and dangled at bedside with the help of Physical therapy. Recliner chair was ordered for the next time that she is OOB.   Cervical orthotic brace on when OOB.      1800: Ate 10 bites orange jello.         "

## 2017-11-19 NOTE — PLAN OF CARE
Problem: Patient Care Overview  Goal: Plan of Care/Patient Progress Review  Outcome: No Change  POD #4 from C1-C3 laminectomies, C2-T3 posterior segmental instrumentation, C2/3, 4/5, 6/7 facette osteotomies. C2/3, 4/5, 6/7 ACDF. C2-T3 posterior fusion w/ autograft and allograft with reduction of cervical spine. Hypertensive, given Hydralazine x2, BP recheck within parameters. Sats mid 90's 2.5L NC. A&O x4. Neuro's include tingling to bilat arms, all extremities 2/5 and L  <R. Periorbital edema. C/o pain, given PRN Norco with relief. Primapore to anterior incision. Posterior KELLIE pulled last evening, Primapore placed over steri strips, moderate drainage on dressing. TL IJ, two lumen at TKO, brown lumen HL. Padilla in place for retention, adequate urine output. Loose/watery BM x1. Full liquid diet. TF via NG at 25 cc/hr, can be advanced to 35 this AM. Up with 2 and lift. Turn and reposition Q2, last repositioned at 0630. Cervical orthotic brace when OOB. Continue to monitor and follow current POC.

## 2017-11-19 NOTE — PROGRESS NOTES
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 Hypothyroidism and adrenal insufficiency were ruled out by laboratory testing. The serum and urine osmolality, serum sodium, confimed SIADH. The etiology of SIADH is less obvious. The CXR from 11/10/17 was unremarkable. The patient has no recent brain imaging tests. The head CT from 4/2016 revealed generalized atrophy of the brain and ischemic vessel disease. Prior to this hospitalization, she was taking hydrochlorothiazide, which might had contributed to hyponatremia. In an attempt to correct the hyponatremia prior to surgery, she was placed on 750 ml water restriction on 11/11/17 and she received 2 dosages of 3 gm salt tablets prior to surgery, when her sodium level normalized to 134. She underwent cervical spine laminectomy/fusion 11/15/17. Her postop Na remained normal until yesterday, when it decreased to 130. Today, it was 129.   2. Malnutrition, gradual weight loss - Baseline weight 100-110 lbs. On TFs, at goal rate of 35 cc/hr.   3. Osteoporosis - Hypocalcemia noted on labs from 11/15/17. After surgery, recommended resuming calcium and vitamin D supplements via TFs, at 3000 U liquid vitamin D daily and 500 mg elemental calcium BID (1250 mg liquid calcium carbonate, BID).  Calcium level normal on today's labs.   Plan:  Liquid restriction at 850 cc/day (small amounts of PO water in addition to the TFs)  Reduce the maintenance IV fluids to minimum required   Salt tablets at 1 gram TID; the dose can be increased based on the Na levels  Avoid aggressive sodium replacement, as long as the hyponatremia remains mild   F/up pending vitamin D level     Interval History:  Post-op day #3.  Appears  comfortable. Pain is improving. No appetite today but she had a good dinner, yesterday.    Some diarrhea related to tube feeds. Denies confusion, headaches, nausea.   Maintenance fluids running at 30 cc/hr. She received 3 grams of sodium today, in am (ordered by the primary team).     Physical Exam:  Vitals: /84  Pulse 84  Temp 97.7  F (36.5  C) (Oral)  Resp 16  Wt 40.9 kg (90 lb 1.6 oz)  SpO2 98%  BMI 18.83 kg/m2  BMI= Body mass index is 18.83 kg/(m^2).  General: she appears pale, thin, malnourished  HEENT: EOMI. Sclerae and conjunctivae clear.   Extremities: No peripheral edema.   Skin: pale   Neuro: no focal motor deficit noted  Psych: alert, oriented, answer questions appropriately    Recent Labs  Lab 11/19/17  0733 11/18/17  0803 11/18/17  0727 11/17/17  0401 11/16/17  1601 11/16/17  0803 11/16/17  0423 11/16/17  0410 11/15/17  2349 11/15/17  2204 11/15/17  2200 11/15/17  2000   *  --  124* 144*  --   --  149*  --   --   --  125* 119*   BGM  --  127*  --   --  148* 132*  --  150* 139* 128*  --   --      Lab Results   Component Value Date    WBC 10.6 11/17/2017    HGB 10.1 (L) 11/17/2017    HCT 30.6 (L) 11/17/2017    MCV 91 11/17/2017     11/17/2017     Lab Results   Component Value Date     (L) 11/19/2017     (L) 11/18/2017     11/17/2017    POTASSIUM 4.0 11/19/2017    POTASSIUM 4.0 11/18/2017    POTASSIUM 4.7 11/17/2017    CHLORIDE 94 11/19/2017    CHLORIDE 97 11/18/2017    CHLORIDE 107 11/17/2017    CO2 28 11/19/2017    CO2 25 11/18/2017    CO2 23 11/17/2017     (H) 11/19/2017     (H) 11/18/2017     (H) 11/17/2017     Lab Results   Component Value Date    BUN 9 11/19/2017    BUN 12 11/18/2017    BUN 15 11/17/2017     Lab Results   Component Value Date    TSH 2.91 11/14/2017    TSH 2.40 04/25/2017    TSH 2.37 05/11/2015     Lab Results   Component Value Date    AST 16 11/10/2017    AST 21 04/25/2017    AST 30 05/11/2015    ALT 18 11/10/2017     ALT 25 04/25/2017    ALT 27 05/11/2015    ALKPHOS 68 11/10/2017    ALKPHOS 63 04/25/2017    ALKPHOS 68 05/11/2015

## 2017-11-19 NOTE — PLAN OF CARE
Problem: Patient Care Overview  Goal: Plan of Care/Patient Progress Review  Discharge Planner PT   Patient plan for discharge: not stated   Current status: Pt performed rolling R and L with mod A x 2. Performed supine>sit via log roll and mod-max A x 2 for management of trunk and LEs. Sat at EOB with mod A for sitting balance. Displays LE extension in sitting. Total A to mihaela brace while seated EOB. Utilized OH lift to dependently transfer pt from bed>chair. Requires supervision for sitting balance. Foot stool under feet for increased support. Performed PROM to LEs  - increased crepitus throughout.    Barriers to return to prior living situation: level of A, medical needs, functional mobility   Recommendations for discharge: TCU   Rationale for recommendations: Pt would benefit from continued skilled PT to address deficits in ROM, strength, and activity tolerance.        Entered by: Yris Hinojosa 11/19/2017 11:42 AM     \

## 2017-11-19 NOTE — PLAN OF CARE
Problem: Patient Care Overview  Goal: Plan of Care/Patient Progress Review  Outcome: No Change   POD #4  from C1-C3 laminectomies, C2-T3 posterior segmental instrumentation, C2/3, 4/5, 6/7 facette osteotomies. C2/3, 4/5, 6/7 ACDF. C2-T3 posterior fusion w/ autograft and allograft with reduction of cervical spine. Hypertensive at times, hydralazine given, MD is aware.   Labetalol ordered from pharm, and will use PRN if needed. Neuros:Tingling bilat arms.  Strength all extremities 2/5.  L  <R.  C/o back and anterior neck pain managed with [place].   Anterior neck incision covered with primapore, no new drainage.  Posterior neck dressing in place, slight drainage at former KELLIE site.  IV access: TL IJ, 1 ports at TKO. Tube feeding: Isosource 1.5.  Currently at 35 mL/hr, goal.  Padilla in place for retention, with adequate urine output.  Loose/watery BM x1, bowel meds held in am.  Full liq diet.  Appetite better today.  On Kenrick counts.  Up with A2 and mech lift.  In chair this afternoon. Turned/repositioned Q2hrs. Waiting for recliner chair when available, currently in high back chair. Cervical orthotic brace on when OOB.  Family visiting.

## 2017-11-19 NOTE — PROGRESS NOTES
"Neurosurgery Daily Progress Note  11/19/2017    Overnight events/subjective: Drain removed last night.  Having frequent loose stools.  No acute events overnight.      O/ /81  Pulse 84  Temp 97.7  F (36.5  C) (Oral)  Resp 16  Wt 40.9 kg (90 lb 1.6 oz)  SpO2 98%  BMI 18.83 kg/m2    Exam:   Gen: Laying in bed, uncomfortable \"skin\" on back and neck  MS: A&Ox3, Speech fluent and conversant   CN: Pupils round and reactive, extraocular movements intact, face symmetric, tongue midline, uvula & palate elevate symmetrically   Motor:      Delt Bi Tri WE WF    R 1 4- 4 4 4 4-   L 1 4- 4 4 4 4-     IP Quad Ham DF PF EHL   R 3 4+ 4+ 4+ 4+ 4+   L 3 4+ 4+ 4+ 4+ 4+     Sensory: patient endorses intact light touch sensation, but exhibits extinction of left    Non labored breathing    LABS: reviewed     A/P: Yuni Otoole is a 82 year old POD#4 s/p C1-C3 laminectomies, C2-T3 posterior segmental instrumentation, C2/3, 4/5, 6/7 facette osteotomies. C2/3, 4/5, 6/7 ACDF. C2-T3 posterior fusion w/ autograft and allograft with reduction of cervical spine.     - Neuro checks  - CTP brace; remove front for PO intake  - Restarted salt tabs and fluid restriction for recurrent hyponatremia due to SIADH  - Suture to be left in for 4 weeks  - Post-op images when tolerated, with brace on, upright/sitting  - Pull posterior KELLIE today; ok to start DVT ppx after drain out  - Diet: Tube feeds + FLD; nutrition following.  - SCDs for DVT ppx  - PO diet for GI ppx  - PT/OT: TCU (OT)    Dispo: Anticipated d/c 11/24. Barriers: removal of surgical drain, evaluation by therapies, ambulating, voiding w/o Padilla or plan to d/c with Padilla, tolerating adequate PO diet to meet nutritional needs.      Please contact the neurosurgery resident on call with questions by dialing * [phone], then entering [phone] when prompted          "

## 2017-11-20 ENCOUNTER — APPOINTMENT (OUTPATIENT)
Dept: GENERAL RADIOLOGY | Facility: CLINIC | Age: 82
DRG: 453 | End: 2017-11-20
Attending: STUDENT IN AN ORGANIZED HEALTH CARE EDUCATION/TRAINING PROGRAM
Payer: MEDICARE

## 2017-11-20 ENCOUNTER — APPOINTMENT (OUTPATIENT)
Dept: OCCUPATIONAL THERAPY | Facility: CLINIC | Age: 82
DRG: 453 | End: 2017-11-20
Attending: NEUROLOGICAL SURGERY
Payer: MEDICARE

## 2017-11-20 ENCOUNTER — APPOINTMENT (OUTPATIENT)
Dept: SPEECH THERAPY | Facility: CLINIC | Age: 82
DRG: 453 | End: 2017-11-20
Attending: NEUROLOGICAL SURGERY
Payer: MEDICARE

## 2017-11-20 LAB
ALBUMIN UR-MCNC: NEGATIVE MG/DL
ANION GAP SERPL CALCULATED.3IONS-SCNC: 8 MMOL/L (ref 3–14)
APPEARANCE UR: CLEAR
BASOPHILS # BLD AUTO: 0 10E9/L (ref 0–0.2)
BASOPHILS NFR BLD AUTO: 0.1 %
BILIRUB UR QL STRIP: NEGATIVE
BUN SERPL-MCNC: 11 MG/DL (ref 7–30)
CALCIUM SERPL-MCNC: 8.8 MG/DL (ref 8.5–10.1)
CHLORIDE SERPL-SCNC: 94 MMOL/L (ref 94–109)
CO2 SERPL-SCNC: 28 MMOL/L (ref 20–32)
COLOR UR AUTO: YELLOW
CREAT SERPL-MCNC: 0.28 MG/DL (ref 0.52–1.04)
DIFFERENTIAL METHOD BLD: ABNORMAL
EOSINOPHIL # BLD AUTO: 0 10E9/L (ref 0–0.7)
EOSINOPHIL NFR BLD AUTO: 0.4 %
ERYTHROCYTE [DISTWIDTH] IN BLOOD BY AUTOMATED COUNT: 13.6 % (ref 10–15)
GFR SERPL CREATININE-BSD FRML MDRD: >90 ML/MIN/1.7M2
GLUCOSE SERPL-MCNC: 125 MG/DL (ref 70–99)
GLUCOSE UR STRIP-MCNC: NEGATIVE MG/DL
HCT VFR BLD AUTO: 30.2 % (ref 35–47)
HGB BLD-MCNC: 9.7 G/DL (ref 11.7–15.7)
HGB UR QL STRIP: ABNORMAL
IMM GRANULOCYTES # BLD: 0 10E9/L (ref 0–0.4)
IMM GRANULOCYTES NFR BLD: 0.3 %
KETONES UR STRIP-MCNC: NEGATIVE MG/DL
LEUKOCYTE ESTERASE UR QL STRIP: NEGATIVE
LYMPHOCYTES # BLD AUTO: 0.7 10E9/L (ref 0.8–5.3)
LYMPHOCYTES NFR BLD AUTO: 8.5 %
MAGNESIUM SERPL-MCNC: 1.9 MG/DL (ref 1.6–2.3)
MCH RBC QN AUTO: 30.2 PG (ref 26.5–33)
MCHC RBC AUTO-ENTMCNC: 32.1 G/DL (ref 31.5–36.5)
MCV RBC AUTO: 94 FL (ref 78–100)
MONOCYTES # BLD AUTO: 0.6 10E9/L (ref 0–1.3)
MONOCYTES NFR BLD AUTO: 7.8 %
MUCOUS THREADS #/AREA URNS LPF: PRESENT /LPF
NEUTROPHILS # BLD AUTO: 6.6 10E9/L (ref 1.6–8.3)
NEUTROPHILS NFR BLD AUTO: 82.9 %
NITRATE UR QL: NEGATIVE
NRBC # BLD AUTO: 0 10*3/UL
NRBC BLD AUTO-RTO: 0 /100
PH UR STRIP: 7.5 PH (ref 5–7)
PLATELET # BLD AUTO: 246 10E9/L (ref 150–450)
POTASSIUM SERPL-SCNC: 4.1 MMOL/L (ref 3.4–5.3)
RBC # BLD AUTO: 3.21 10E12/L (ref 3.8–5.2)
RBC #/AREA URNS AUTO: 13 /HPF (ref 0–2)
SODIUM SERPL-SCNC: 130 MMOL/L (ref 133–144)
SODIUM SERPL-SCNC: 130 MMOL/L (ref 133–144)
SOURCE: ABNORMAL
SP GR UR STRIP: 1.01 (ref 1–1.03)
UROBILINOGEN UR STRIP-MCNC: 4 MG/DL (ref 0–2)
WBC # BLD AUTO: 8 10E9/L (ref 4–11)
WBC #/AREA URNS AUTO: 1 /HPF (ref 0–2)

## 2017-11-20 PROCEDURE — 40000985 XR THORACIC SPINE 2 VW

## 2017-11-20 PROCEDURE — A9270 NON-COVERED ITEM OR SERVICE: HCPCS | Mod: GY | Performed by: STUDENT IN AN ORGANIZED HEALTH CARE EDUCATION/TRAINING PROGRAM

## 2017-11-20 PROCEDURE — 80048 BASIC METABOLIC PNL TOTAL CA: CPT | Performed by: CLINICAL NURSE SPECIALIST

## 2017-11-20 PROCEDURE — 12000008 ZZH R&B INTERMEDIATE UMMC

## 2017-11-20 PROCEDURE — 82306 VITAMIN D 25 HYDROXY: CPT | Performed by: STUDENT IN AN ORGANIZED HEALTH CARE EDUCATION/TRAINING PROGRAM

## 2017-11-20 PROCEDURE — 25000132 ZZH RX MED GY IP 250 OP 250 PS 637: Mod: GY | Performed by: STUDENT IN AN ORGANIZED HEALTH CARE EDUCATION/TRAINING PROGRAM

## 2017-11-20 PROCEDURE — 27210429 ZZH NUTRITION PRODUCT INTERMEDIATE LITER

## 2017-11-20 PROCEDURE — 25000128 H RX IP 250 OP 636: Performed by: NURSE PRACTITIONER

## 2017-11-20 PROCEDURE — 25000128 H RX IP 250 OP 636: Performed by: STUDENT IN AN ORGANIZED HEALTH CARE EDUCATION/TRAINING PROGRAM

## 2017-11-20 PROCEDURE — 83735 ASSAY OF MAGNESIUM: CPT | Performed by: CLINICAL NURSE SPECIALIST

## 2017-11-20 PROCEDURE — A9270 NON-COVERED ITEM OR SERVICE: HCPCS | Mod: GY | Performed by: INTERNAL MEDICINE

## 2017-11-20 PROCEDURE — 25000132 ZZH RX MED GY IP 250 OP 250 PS 637: Mod: GY | Performed by: INTERNAL MEDICINE

## 2017-11-20 PROCEDURE — 12000003 ZZH R&B CRITICAL UMMC

## 2017-11-20 PROCEDURE — 36592 COLLECT BLOOD FROM PICC: CPT | Performed by: CLINICAL NURSE SPECIALIST

## 2017-11-20 PROCEDURE — 36592 COLLECT BLOOD FROM PICC: CPT | Performed by: STUDENT IN AN ORGANIZED HEALTH CARE EDUCATION/TRAINING PROGRAM

## 2017-11-20 PROCEDURE — 40000225 ZZH STATISTIC SLP WARD VISIT: Performed by: SPEECH-LANGUAGE PATHOLOGIST

## 2017-11-20 PROCEDURE — 40000133 ZZH STATISTIC OT WARD VISIT

## 2017-11-20 PROCEDURE — 85025 COMPLETE CBC W/AUTO DIFF WBC: CPT | Performed by: NURSE PRACTITIONER

## 2017-11-20 PROCEDURE — 92526 ORAL FUNCTION THERAPY: CPT | Mod: GN | Performed by: SPEECH-LANGUAGE PATHOLOGIST

## 2017-11-20 PROCEDURE — 25000132 ZZH RX MED GY IP 250 OP 250 PS 637: Mod: GY | Performed by: NEUROLOGICAL SURGERY

## 2017-11-20 PROCEDURE — 84295 ASSAY OF SERUM SODIUM: CPT | Performed by: STUDENT IN AN ORGANIZED HEALTH CARE EDUCATION/TRAINING PROGRAM

## 2017-11-20 PROCEDURE — 36592 COLLECT BLOOD FROM PICC: CPT | Performed by: NURSE PRACTITIONER

## 2017-11-20 PROCEDURE — 72040 X-RAY EXAM NECK SPINE 2-3 VW: CPT

## 2017-11-20 PROCEDURE — 81001 URINALYSIS AUTO W/SCOPE: CPT | Performed by: STUDENT IN AN ORGANIZED HEALTH CARE EDUCATION/TRAINING PROGRAM

## 2017-11-20 PROCEDURE — 97530 THERAPEUTIC ACTIVITIES: CPT | Mod: GO

## 2017-11-20 RX ORDER — ACETAMINOPHEN 325 MG/1
325 TABLET ORAL EVERY 4 HOURS PRN
Status: DISCONTINUED | OUTPATIENT
Start: 2017-11-20 | End: 2017-11-20

## 2017-11-20 RX ORDER — PROCHLORPERAZINE MALEATE 5 MG
5 TABLET ORAL EVERY 6 HOURS PRN
Status: DISCONTINUED | OUTPATIENT
Start: 2017-11-20 | End: 2017-11-27 | Stop reason: HOSPADM

## 2017-11-20 RX ORDER — POTASSIUM CHLORIDE 750 MG/1
20-40 TABLET, EXTENDED RELEASE ORAL
Status: DISCONTINUED | OUTPATIENT
Start: 2017-11-20 | End: 2017-11-27 | Stop reason: HOSPADM

## 2017-11-20 RX ORDER — HYDROCODONE BITARTRATE AND ACETAMINOPHEN 5; 325 MG/1; MG/1
1-2 TABLET ORAL EVERY 6 HOURS PRN
Status: DISCONTINUED | OUTPATIENT
Start: 2017-11-20 | End: 2017-11-22

## 2017-11-20 RX ORDER — POLYETHYLENE GLYCOL 3350 17 G/17G
17 POWDER, FOR SOLUTION ORAL 3 TIMES DAILY PRN
Status: DISCONTINUED | OUTPATIENT
Start: 2017-11-20 | End: 2017-11-27 | Stop reason: HOSPADM

## 2017-11-20 RX ORDER — ACETAMINOPHEN 325 MG/1
650 TABLET ORAL EVERY 4 HOURS PRN
Status: DISCONTINUED | OUTPATIENT
Start: 2017-11-20 | End: 2017-11-26 | Stop reason: DRUGHIGH

## 2017-11-20 RX ORDER — PRAVASTATIN SODIUM 10 MG
10 TABLET ORAL AT BEDTIME
Status: DISCONTINUED | OUTPATIENT
Start: 2017-11-21 | End: 2017-11-27 | Stop reason: HOSPADM

## 2017-11-20 RX ORDER — QUETIAPINE FUMARATE 25 MG/1
25 TABLET, FILM COATED ORAL ONCE
Status: COMPLETED | OUTPATIENT
Start: 2017-11-20 | End: 2017-11-21

## 2017-11-20 RX ORDER — ERGOCALCIFEROL 1.25 MG/1
50000 CAPSULE, LIQUID FILLED ORAL
Status: DISCONTINUED | OUTPATIENT
Start: 2017-11-25 | End: 2017-11-27 | Stop reason: HOSPADM

## 2017-11-20 RX ORDER — DIPHENHYDRAMINE HCL 25 MG
50 CAPSULE ORAL
Status: COMPLETED | OUTPATIENT
Start: 2017-11-20 | End: 2017-11-20

## 2017-11-20 RX ORDER — AMOXICILLIN 250 MG
2 CAPSULE ORAL 2 TIMES DAILY
Status: DISCONTINUED | OUTPATIENT
Start: 2017-11-21 | End: 2017-11-27 | Stop reason: HOSPADM

## 2017-11-20 RX ORDER — CALCIUM CARBONATE 1250 MG/5ML
1250 SUSPENSION ORAL 2 TIMES DAILY WITH MEALS
Status: DISCONTINUED | OUTPATIENT
Start: 2017-11-21 | End: 2017-11-25

## 2017-11-20 RX ORDER — PROCHLORPERAZINE 25 MG
12.5 SUPPOSITORY, RECTAL RECTAL EVERY 12 HOURS PRN
Status: DISCONTINUED | OUTPATIENT
Start: 2017-11-20 | End: 2017-11-27 | Stop reason: HOSPADM

## 2017-11-20 RX ORDER — ASCORBIC ACID 500 MG
1000 TABLET ORAL DAILY
Status: DISCONTINUED | OUTPATIENT
Start: 2017-11-21 | End: 2017-11-27 | Stop reason: HOSPADM

## 2017-11-20 RX ADMIN — Medication 10 MG: at 16:35

## 2017-11-20 RX ADMIN — THIAMINE HYDROCHLORIDE 100 MG: 100 INJECTION, SOLUTION INTRAMUSCULAR; INTRAVENOUS at 12:24

## 2017-11-20 RX ADMIN — SODIUM CHLORIDE, PRESERVATIVE FREE 5 ML: 5 INJECTION INTRAVENOUS at 12:24

## 2017-11-20 RX ADMIN — SODIUM CHLORIDE, PRESERVATIVE FREE 5 ML: 5 INJECTION INTRAVENOUS at 19:23

## 2017-11-20 RX ADMIN — HEPARIN SODIUM 5000 UNITS: 5000 INJECTION, SOLUTION INTRAVENOUS; SUBCUTANEOUS at 12:04

## 2017-11-20 RX ADMIN — CALCIUM CARBONATE 1250 MG: 1250 SUSPENSION ORAL at 11:51

## 2017-11-20 RX ADMIN — ACETAMINOPHEN 325 MG: 325 TABLET, FILM COATED ORAL at 23:00

## 2017-11-20 RX ADMIN — CALCIUM CARBONATE 1250 MG: 1250 SUSPENSION ORAL at 20:08

## 2017-11-20 RX ADMIN — Medication 220 MG: at 16:36

## 2017-11-20 RX ADMIN — ACETAMINOPHEN 325 MG: 325 TABLET, FILM COATED ORAL at 21:42

## 2017-11-20 RX ADMIN — HEPARIN SODIUM 5000 UNITS: 5000 INJECTION, SOLUTION INTRAVENOUS; SUBCUTANEOUS at 20:08

## 2017-11-20 RX ADMIN — METOPROLOL TARTRATE 12.5 MG: 100 TABLET, FILM COATED ORAL at 20:08

## 2017-11-20 RX ADMIN — PRAVASTATIN SODIUM 10 MG: 10 TABLET ORAL at 21:42

## 2017-11-20 RX ADMIN — HYDROCODONE BITARTRATE AND ACETAMINOPHEN 2 TABLET: 5; 325 TABLET ORAL at 01:02

## 2017-11-20 RX ADMIN — SODIUM CHLORIDE, PRESERVATIVE FREE 10 ML: 5 INJECTION INTRAVENOUS at 11:09

## 2017-11-20 RX ADMIN — Medication 3000 UNITS: at 11:51

## 2017-11-20 RX ADMIN — HYDRALAZINE HYDROCHLORIDE 10 MG: 20 INJECTION INTRAMUSCULAR; INTRAVENOUS at 16:54

## 2017-11-20 RX ADMIN — SODIUM CHLORIDE TAB 1 GM 1 G: 1 TAB at 16:36

## 2017-11-20 RX ADMIN — METOPROLOL TARTRATE 12.5 MG: 100 TABLET, FILM COATED ORAL at 11:50

## 2017-11-20 RX ADMIN — MULTIVITAMIN 15 ML: LIQUID ORAL at 11:50

## 2017-11-20 RX ADMIN — Medication 220 MG: at 11:50

## 2017-11-20 RX ADMIN — Medication 220 MG: at 20:13

## 2017-11-20 RX ADMIN — SODIUM CHLORIDE, PRESERVATIVE FREE 5 ML: 5 INJECTION INTRAVENOUS at 20:08

## 2017-11-20 RX ADMIN — ACETAMINOPHEN 325 MG: 325 TABLET, FILM COATED ORAL at 16:36

## 2017-11-20 RX ADMIN — SODIUM CHLORIDE TAB 1 GM 1 G: 1 TAB at 11:50

## 2017-11-20 RX ADMIN — SODIUM CHLORIDE TAB 1 GM 1 G: 1 TAB at 20:08

## 2017-11-20 RX ADMIN — HYDRALAZINE HYDROCHLORIDE 20 MG: 20 INJECTION INTRAMUSCULAR; INTRAVENOUS at 03:55

## 2017-11-20 RX ADMIN — HEPARIN SODIUM 5000 UNITS: 5000 INJECTION, SOLUTION INTRAVENOUS; SUBCUTANEOUS at 01:02

## 2017-11-20 RX ADMIN — ACETAMINOPHEN 325 MG: 325 TABLET, FILM COATED ORAL at 11:50

## 2017-11-20 RX ADMIN — HYDRALAZINE HYDROCHLORIDE 20 MG: 20 INJECTION INTRAMUSCULAR; INTRAVENOUS at 04:44

## 2017-11-20 RX ADMIN — ACETAMINOPHEN 325 MG: 325 TABLET, FILM COATED ORAL at 03:47

## 2017-11-20 RX ADMIN — OXYCODONE HYDROCHLORIDE AND ACETAMINOPHEN 1000 MG: 500 TABLET ORAL at 11:49

## 2017-11-20 RX ADMIN — DIPHENHYDRAMINE HYDROCHLORIDE 50 MG: 25 CAPSULE ORAL at 23:01

## 2017-11-20 NOTE — PLAN OF CARE
Problem: Patient Care Overview  Goal: Plan of Care/Patient Progress Review  Outcome: No Change   [id number]: POD #4  from C1-C3 laminectomies, C2-T3 posterior segmental instrumentation, C2/3, 4/5, 6/7 facette osteotomies. C2/3, 4/5, 6/7 ACDF. C2-T3 posterior fusion w/ autograft and allograft with reduction of cervical spine. Hypertensive at times, hydralazine given, MD is aware.   Labetalol ordered from pharm, and will use PRN if needed. Neuros:Tingling bilat arms.  Strength all extremities 2/5.  L  <R.  C/o back and anterior neck pain managed with [place].   Anterior neck incision covered with primapore, no new drainage.  Posterior neck dressing in place, slight drainage at former KELLIE site.  IV access: TL IJ, 1 ports at TKO. Tube feeding: Isosource 1.5.  Currently at 35 mL/hr, goal.  Padilla in place for retention, with adequate urine output.  Loose/watery BM x1, bowel meds held in am.  Full liq diet.  Appetite better today.  On Kenrick counts.  Up with A2 and mech lift.  In chair this afternoon. Turned/repositioned Q2hrs. Waiting for recliner chair when available, currently using high back chair. Cervical orthotic brace on when OOB. K+ and Magnesium replaced.  Lab draws for K+ and Magnesium orders placed for am.   IS education done.  IS use encouraged. PCD on.

## 2017-11-20 NOTE — PLAN OF CARE
Problem: Patient Care Overview  Goal: Plan of Care/Patient Progress Review  Discharge Planner OT   Patient plan for discharge: did not state  Current status: Patient dependent on lift/sling for transfer bed<> recliner. Minimal participation in simple self care tasks due to weakness, pain and confusion. Patient able to state correct date, but appeared to be hallucinating at times, believing she was holding on to items that are not there. Monitor for delirium.  Barriers to return to prior living situation: Pain; unable to care for self; post op precautions, profound weakness.  Recommendations for discharge: TCU  Rationale for recommendations: To increase ability to participate in ADL's and for transfer/ mobility training.       Entered by: Aliza Unger 11/20/2017 5:09 PM

## 2017-11-20 NOTE — PLAN OF CARE
Problem: Patient Care Overview  Goal: Plan of Care/Patient Progress Review  Outcome: No Change  Responsible for patient 1930-2330. AVSS on 3L NC. Tingling in BL UE, poor strength. Pain controlled with prn Norco, none needed this shift. Anterior neck incision covered with primapore, no new drainage. Psterior neck dressing in place, slight drainage at former KELLIE site. Triple lumen IJ with blue lumen TKO, other lumens hep locked. NJ with TF at 35mL/hr. Padilla in place for retention, adequate urine output. Passing gas and having loose BMs, none this shift. Full liq diet without nausea, on calorie counts. Total feed 2/2 weakness. Up with A2 and mech lift. Turned/repositioned Q2hrs. Cervical orthotic brace on when OOB. IS with reminders, pneumo boots on. [place].     Continue POC.

## 2017-11-20 NOTE — PLAN OF CARE
"Problem: Patient Care Overview  Goal: Plan of Care/Patient Progress Review  Outcome: No Change  D/I:Anterior incision with steri strip. Post incision with sutures and dressing. Primipore replaced over old drain site ,was saturated.Up to chair with brace on and lift. This AM was anxious and confused. Saying that we were poisoning her so she would not take and food or meds from us and that this place was going to be in flames soon so, I had to take her home.\"Get me out of here now!!\"Would not answer all of the orientation questions, stating \"I don't have to tell you\". Did say that she was\" in a [place] hospital--or maybe a correction, I don't know.\" and could tell me her birthdate. Later was oriented and cooperative. But is still hallucinating at times. WALKER 2/5. L  less than R.Up to chair with lift and  on. Was not comfortable. Tylenol given and got back to bed after 1 hour. ST passed her to DD3 diet, but she did not want any of her lunch. Iso 1.5 going at 35 cc/hr. Incontinent of loose stool x 1. Randy DC'd at 2:40pm. Attends on.  P:Offer food as tolerated.Continue with cares as ordered.            "

## 2017-11-20 NOTE — PROGRESS NOTES
"Neurosurgery Daily Progress Note  11/20/2017    Overnight events/subjective: No acute events overnight.  Shoulders feel sore from working. Would like to work on standing.    O/ /77 (BP Location: Right arm)  Pulse 84  Temp 97  F (36.1  C) (Oral)  Resp 16  Wt 40.9 kg (90 lb 1.6 oz)  SpO2 97%  BMI 18.83 kg/m2    Exam:   Gen: Laying in bed, \"skin\" on back and neck more comfortable tonight  MS: A&Ox3, Speech fluent and conversant   CN: Pupils round and reactive, extraocular movements intact, face symmetric, tongue midline, uvula & palate elevate symmetrically   Motor:      Delt Bi Tri WE WF    R 1 4+ 4 4- 4 4-   L 1 4+ 4 4- 4 4-     IP Quad Ham DF PF EHL   R 4 4+ 4+ 4+ 4+ 4+   L 4 4+ 4+ 4+ 4+ 4+     Sensory: patient endorses intact light touch sensation, but exhibits extinction of left    Non labored breathing    LABS: reviewed     A/P: Yuni Otoole is a 82 year old POD#5 s/p C1-C3 laminectomies, C2-T3 posterior segmental instrumentation, C2/3, 4/5, 6/7 facette osteotomies. C2/3, 4/5, 6/7 ACDF. C2-T3 posterior fusion w/ autograft and allograft with reduction of cervical spine.     - Neuro checks  - CTP brace; remove front for PO intake  - Restarted salt tabs and fluid restriction for recurrent hyponatremia due to SIADH; monitoring Na  - Sutures to be left in for 4 weeks  - Post-op images when tolerated, with brace on, upright/sitting  - Diet: Tube feeds + FLD; nutrition following.  - SCDs & SQH for DVT ppx  - PO diet for GI ppx  - PT/OT: TCU    Dispo: Anticipated d/c 11/24. Barriers: ambulating, voiding w/o Padilla or plan to d/c with Padilla, tolerating adequate PO diet to meet nutritional needs.      Please contact the neurosurgery resident on call with questions by dialing * [phone], then entering [phone] when prompted          "

## 2017-11-20 NOTE — PROGRESS NOTES
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 Hypothyroidism and adrenal insufficiency were ruled out by laboratory testing. The serum and urine osmolality, serum sodium, confimed SIADH. The etiology of SIADH is less obvious. The CXR from 11/10/17 was unremarkable. The patient has no recent brain imaging tests. The head CT from 4/2016 revealed generalized atrophy of the brain and ischemic vessel disease. Prior to this hospitalization, she was taking hydrochlorothiazide, which might had contributed to hyponatremia.    Hyponatremia due to SIADH - Prior to surgery Na 127 so placed on water restriction 750 ml and received 2x 3 gm salt tabs prior to surgery. Na normalized to 134 until day #3 post-op when it dropped to 129. She was started on salt tabs and fluid restriction (in addition to tube feeds). Today's Na is 130.    Malnutrition - gradual weight loss, baseline weight 100-110 lbs. On TFs at goal rate of 35 cc/hr.    Osteoporosis - Hypocalcemia noted on labs 11/15/17. After surgery started calcium and vit D supplement. Calcium normal.     Recommendations:   1) water restriction 850 cc/day  2) minimize IV fluids  3) salt tab 1 g TID   4) 3000 U liquid vit D daily  5) 1250 mg liquid calcium carbonate BID  6) f/u 25 vit D level    Subjective:  Post-op day #4. Pain is well controlled. While she is able to answer orientation questions appropriately, she is paranoid this morning thinking that her room will burn down or that her food will be poisoned. \"You've got to get me outta here.\"    Physical Examination:  Blood pressure 141/64, pulse 84, temperature 98.9  F (37.2  C), temperature source Axillary, resp. " rate 16, weight 40.9 kg (90 lb 1.6 oz), SpO2 98 %, not currently breastfeeding.  Gen: pale, malnourished  HEENT: EOMI  Extremities: no peripheral edema  Neuro: no focal deficits  Psych: aox3, but confused and paranoid    Labs:   Component      Latest Ref Rng & Units 11/20/2017   Sodium      133 - 144 mmol/L 130 (L)   Potassium      3.4 - 5.3 mmol/L 4.1   Chloride      94 - 109 mmol/L 94   Carbon Dioxide      20 - 32 mmol/L 28   Anion Gap      3 - 14 mmol/L 8   Glucose      70 - 99 mg/dL 125 (H)   Urea Nitrogen      7 - 30 mg/dL 11   Creatinine      0.52 - 1.04 mg/dL 0.28 (L)   GFR Estimate      >60 mL/min/1.7m2 >90   GFR Estimate If Black      >60 mL/min/1.7m2 >90   Calcium      8.5 - 10.1 mg/dL 8.8   Magnesium      1.6 - 2.3 mg/dL 1.9     Chris Garcia MD (PGY-4)  Diabetes and Endocrinology Fellow  [facility]  Pager: [phone]     --- Addendum----  I saw the patient with endocrine fellow Dr. Garcia and directly examined patient and discussed. Agree above note and plan.     We spent 25 minutes with this patient face to face and explained the conditions and plans.     Gunjan Ann MD  Staff Physician  Endocrinology and Metabolism  [facility]  License: [id number]  Pager: [phone]

## 2017-11-20 NOTE — PROGRESS NOTES
Calorie Counts  Intake recorded for: 11/19  Kcals: 427  Protein: 8g  # Meals Recorded: 100% 2 cream soups, apple juice, 50% cream of wheat, jello, lemonade, 25% pudding, yogurt  # Supplements Recorded: 0

## 2017-11-20 NOTE — PLAN OF CARE
Problem: Patient Care Overview  Goal: Plan of Care/Patient Progress Review  Outcome: No Change  POD #5 from C1-C3 laminectomies, C2-T3 posterior segmental instrumentation, C2/3, 4/5, 6/7 facette osteotomies. C2/3, 4/5, 6/7 ACDF. C2-T3 posterior fusion w/ autograft and allograft with reduction of cervical spine. Hypertensive, given Hydralazine x2, BP recheck within parameters. Sats mid 90's 2.5L NC. A&Ox4. Neuro's include tingling to bilat arms, all extremities 2/5 and L  <R. C/o pain, given PRN Norco. Pt c/o of soreness and pain, MD Parekh pagegabriela, Tylenol ordered and given. Primapore to anterior incision. Primapore to posterior back, dried drainage. TL IJ, one lumen at TKO. Padilla in place for retention, adequate urine output. No BM overnight. Full liquid diet. TF via NG at 35 cc/hr. Up with 2 and lift. Turn and reposition Q2, last repositioned at 0630. Cervical orthotic brace when OOB. Pt more confused overnight, pagegabriela Parekh MD with update, UA/UC ordered. Sample sent down at 0630. Continue to monitor and follow current POC.

## 2017-11-20 NOTE — PLAN OF CARE
Problem: Patient Care Overview  Goal: Plan of Care/Patient Progress Review  Discharge Planner SLP   Patient plan for discharge: home  Current status: Pt exhibiting confusion at beginning of session and needing encouragement to participate in therapy. Intermittent to occasional s/sx of aspiration noted with thin liquids, most frequently with serials sips via straw. Recommend upgrade to dysphagia diet level 3 and continue thin liquids via small single cup sips. Recommend feeding assist, alternate solids/liquids, upright and alert for PO trials. SLP will continue to follow to assess tolerance of diet and appropriateness for diet upgrade.   Barriers to return to prior living situation: cognition, tolerance of diet, confusion  Recommendations for discharge: TCU with ongoing SLP services at this time  Rationale for recommendations: not at baseline diet       Entered by: Nelia Adams 11/20/2017 9:58 AM

## 2017-11-21 ENCOUNTER — APPOINTMENT (OUTPATIENT)
Dept: SPEECH THERAPY | Facility: CLINIC | Age: 82
DRG: 453 | End: 2017-11-21
Attending: NEUROLOGICAL SURGERY
Payer: MEDICARE

## 2017-11-21 ENCOUNTER — APPOINTMENT (OUTPATIENT)
Dept: PHYSICAL THERAPY | Facility: CLINIC | Age: 82
DRG: 453 | End: 2017-11-21
Attending: NEUROLOGICAL SURGERY
Payer: MEDICARE

## 2017-11-21 ENCOUNTER — APPOINTMENT (OUTPATIENT)
Dept: OCCUPATIONAL THERAPY | Facility: CLINIC | Age: 82
DRG: 453 | End: 2017-11-21
Attending: NEUROLOGICAL SURGERY
Payer: MEDICARE

## 2017-11-21 LAB
ANION GAP SERPL CALCULATED.3IONS-SCNC: 6 MMOL/L (ref 3–14)
BUN SERPL-MCNC: 12 MG/DL (ref 7–30)
CALCIUM SERPL-MCNC: 8.8 MG/DL (ref 8.5–10.1)
CHLORIDE SERPL-SCNC: 96 MMOL/L (ref 94–109)
CO2 SERPL-SCNC: 29 MMOL/L (ref 20–32)
CREAT SERPL-MCNC: 0.34 MG/DL (ref 0.52–1.04)
DEPRECATED CALCIDIOL+CALCIFEROL SERPL-MC: <20 UG/L (ref 20–75)
GFR SERPL CREATININE-BSD FRML MDRD: >90 ML/MIN/1.7M2
GLUCOSE SERPL-MCNC: 115 MG/DL (ref 70–99)
MAGNESIUM SERPL-MCNC: 1.8 MG/DL (ref 1.6–2.3)
POTASSIUM SERPL-SCNC: 4.1 MMOL/L (ref 3.4–5.3)
SODIUM SERPL-SCNC: 131 MMOL/L (ref 133–144)
SODIUM SERPL-SCNC: 132 MMOL/L (ref 133–144)
VITAMIN D2 SERPL-MCNC: <5 UG/L
VITAMIN D3 SERPL-MCNC: 15 UG/L

## 2017-11-21 PROCEDURE — 25000128 H RX IP 250 OP 636: Performed by: STUDENT IN AN ORGANIZED HEALTH CARE EDUCATION/TRAINING PROGRAM

## 2017-11-21 PROCEDURE — 25000132 ZZH RX MED GY IP 250 OP 250 PS 637: Mod: GY | Performed by: STUDENT IN AN ORGANIZED HEALTH CARE EDUCATION/TRAINING PROGRAM

## 2017-11-21 PROCEDURE — 36592 COLLECT BLOOD FROM PICC: CPT | Performed by: NURSE PRACTITIONER

## 2017-11-21 PROCEDURE — A9270 NON-COVERED ITEM OR SERVICE: HCPCS | Mod: GY | Performed by: INTERNAL MEDICINE

## 2017-11-21 PROCEDURE — 40000225 ZZH STATISTIC SLP WARD VISIT: Performed by: SPEECH-LANGUAGE PATHOLOGIST

## 2017-11-21 PROCEDURE — 40000193 ZZH STATISTIC PT WARD VISIT

## 2017-11-21 PROCEDURE — 97530 THERAPEUTIC ACTIVITIES: CPT | Mod: GP

## 2017-11-21 PROCEDURE — 25000125 ZZHC RX 250: Performed by: STUDENT IN AN ORGANIZED HEALTH CARE EDUCATION/TRAINING PROGRAM

## 2017-11-21 PROCEDURE — 36592 COLLECT BLOOD FROM PICC: CPT | Performed by: STUDENT IN AN ORGANIZED HEALTH CARE EDUCATION/TRAINING PROGRAM

## 2017-11-21 PROCEDURE — 25000132 ZZH RX MED GY IP 250 OP 250 PS 637: Mod: GY | Performed by: INTERNAL MEDICINE

## 2017-11-21 PROCEDURE — 97112 NEUROMUSCULAR REEDUCATION: CPT | Mod: GP

## 2017-11-21 PROCEDURE — 83735 ASSAY OF MAGNESIUM: CPT | Performed by: NURSE PRACTITIONER

## 2017-11-21 PROCEDURE — 25000128 H RX IP 250 OP 636: Performed by: NURSE PRACTITIONER

## 2017-11-21 PROCEDURE — A9270 NON-COVERED ITEM OR SERVICE: HCPCS | Mod: GY | Performed by: STUDENT IN AN ORGANIZED HEALTH CARE EDUCATION/TRAINING PROGRAM

## 2017-11-21 PROCEDURE — 92526 ORAL FUNCTION THERAPY: CPT | Mod: GN | Performed by: SPEECH-LANGUAGE PATHOLOGIST

## 2017-11-21 PROCEDURE — 27210429 ZZH NUTRITION PRODUCT INTERMEDIATE LITER

## 2017-11-21 PROCEDURE — 97535 SELF CARE MNGMENT TRAINING: CPT | Mod: GO

## 2017-11-21 PROCEDURE — 97530 THERAPEUTIC ACTIVITIES: CPT | Mod: GO

## 2017-11-21 PROCEDURE — 40000133 ZZH STATISTIC OT WARD VISIT

## 2017-11-21 PROCEDURE — 84295 ASSAY OF SERUM SODIUM: CPT | Performed by: NURSE PRACTITIONER

## 2017-11-21 PROCEDURE — 12000003 ZZH R&B CRITICAL UMMC

## 2017-11-21 PROCEDURE — 80048 BASIC METABOLIC PNL TOTAL CA: CPT | Performed by: STUDENT IN AN ORGANIZED HEALTH CARE EDUCATION/TRAINING PROGRAM

## 2017-11-21 RX ORDER — SODIUM CHLORIDE 1 G/1
1 TABLET ORAL 2 TIMES DAILY WITH MEALS
Status: DISCONTINUED | OUTPATIENT
Start: 2017-11-21 | End: 2017-11-22

## 2017-11-21 RX ADMIN — QUETIAPINE FUMARATE 25 MG: 25 TABLET ORAL at 01:11

## 2017-11-21 RX ADMIN — CALCIUM CARBONATE 1250 MG: 1250 SUSPENSION ORAL at 09:39

## 2017-11-21 RX ADMIN — SODIUM CHLORIDE, PRESERVATIVE FREE 5 ML: 5 INJECTION INTRAVENOUS at 08:37

## 2017-11-21 RX ADMIN — OXYCODONE HYDROCHLORIDE AND ACETAMINOPHEN 1000 MG: 500 TABLET ORAL at 09:39

## 2017-11-21 RX ADMIN — HEPARIN SODIUM 5000 UNITS: 5000 INJECTION, SOLUTION INTRAVENOUS; SUBCUTANEOUS at 20:21

## 2017-11-21 RX ADMIN — Medication 2 G: at 16:29

## 2017-11-21 RX ADMIN — ACETAMINOPHEN 650 MG: 325 TABLET, FILM COATED ORAL at 22:24

## 2017-11-21 RX ADMIN — METOPROLOL TARTRATE 12.5 MG: 100 TABLET, FILM COATED ORAL at 09:38

## 2017-11-21 RX ADMIN — THIAMINE HYDROCHLORIDE 100 MG: 100 INJECTION, SOLUTION INTRAMUSCULAR; INTRAVENOUS at 09:38

## 2017-11-21 RX ADMIN — HYDROCODONE BITARTRATE AND ACETAMINOPHEN 1 TABLET: 5; 325 TABLET ORAL at 23:32

## 2017-11-21 RX ADMIN — HYDROCODONE BITARTRATE AND ACETAMINOPHEN 1 TABLET: 5; 325 TABLET ORAL at 20:22

## 2017-11-21 RX ADMIN — Medication 220 MG: at 13:53

## 2017-11-21 RX ADMIN — SODIUM CHLORIDE TAB 1 GM 1 G: 1 TAB at 18:00

## 2017-11-21 RX ADMIN — CALCIUM CARBONATE 1250 MG: 1250 SUSPENSION ORAL at 18:00

## 2017-11-21 RX ADMIN — PRAVASTATIN SODIUM 10 MG: 10 TABLET ORAL at 22:19

## 2017-11-21 RX ADMIN — SENNOSIDES AND DOCUSATE SODIUM 2 TABLET: 8.6; 5 TABLET ORAL at 09:40

## 2017-11-21 RX ADMIN — MULTIVITAMIN 15 ML: LIQUID ORAL at 09:39

## 2017-11-21 RX ADMIN — Medication 220 MG: at 20:21

## 2017-11-21 RX ADMIN — HEPARIN SODIUM 5000 UNITS: 5000 INJECTION, SOLUTION INTRAVENOUS; SUBCUTANEOUS at 13:53

## 2017-11-21 RX ADMIN — Medication 220 MG: at 09:38

## 2017-11-21 RX ADMIN — SENNOSIDES AND DOCUSATE SODIUM 1 TABLET: 8.6; 5 TABLET ORAL at 20:22

## 2017-11-21 RX ADMIN — METOPROLOL TARTRATE 12.5 MG: 100 TABLET, FILM COATED ORAL at 20:21

## 2017-11-21 RX ADMIN — Medication 3000 UNITS: at 09:37

## 2017-11-21 RX ADMIN — SODIUM CHLORIDE TAB 1 GM 1 G: 1 TAB at 09:39

## 2017-11-21 RX ADMIN — SODIUM CHLORIDE, PRESERVATIVE FREE 5 ML: 5 INJECTION INTRAVENOUS at 15:39

## 2017-11-21 RX ADMIN — Medication 10 MG: at 01:07

## 2017-11-21 RX ADMIN — ACETAMINOPHEN 650 MG: 325 TABLET, FILM COATED ORAL at 16:29

## 2017-11-21 RX ADMIN — SODIUM CHLORIDE, PRESERVATIVE FREE 5 ML: 5 INJECTION INTRAVENOUS at 22:20

## 2017-11-21 RX ADMIN — SODIUM CHLORIDE, PRESERVATIVE FREE 5 ML: 5 INJECTION INTRAVENOUS at 22:24

## 2017-11-21 RX ADMIN — HEPARIN SODIUM 5000 UNITS: 5000 INJECTION, SOLUTION INTRAVENOUS; SUBCUTANEOUS at 05:00

## 2017-11-21 ASSESSMENT — PAIN DESCRIPTION - DESCRIPTORS
DESCRIPTORS: ACHING
DESCRIPTORS: ACHING

## 2017-11-21 NOTE — PLAN OF CARE
Problem: Patient Care Overview  Goal: Plan of Care/Patient Progress Review  Discharge Planner SLP   Patient plan for discharge: home today  Current status: Pt with difficult positioning this am as Pt tended to hold head in extended positioning and needed max cues to bring down to neutral positioning as able. Continues to need max encouragement for PO intake.  Recommend continue with dysphagia diet level 3 with thin liquids. Pt at increased risk for aspiration with head in extended positioning so will need cues to bring to more neutral positioning as able.  Pt will need 1:1 assistance with feeding and encourage PO intake. Pt to be given small bites/sips and remain upright for all PO  Barriers to return to prior living situation: safety concerns; dysphagia  Recommendations for discharge: TCU  Rationale for recommendations: Pt will need ongoing swallow tx at discharge to address ongoing dysphagia w/ poor PO intake.        Entered by: Alivia Unger 11/21/2017 10:57 AM

## 2017-11-21 NOTE — PLAN OF CARE
Problem: Patient Care Overview  Goal: Plan of Care/Patient Progress Review  Outcome: Declining  Pt is on 6a POD #5 of C1-C3 laminectomies, C2-T3 posterior segmental instrumentation, C2/3, 4/5, 6/7 facette osteotomies. C2/3, 4/5, 6/7 ACDF. C2-T3 posterior fusion w/ autograft and allograft with reduction of cervical spine. Pt is alert. Confused. Disoriented to situation and place. Able to say correct month and year. When reminded she is in the hospital, she is able to state that she had back surgery. Having auditory and visual hallucinations, centering around family members. Irritable. Charge nurse notified on several occasions about patient's continued hallucinations. Generalized weakness 2/5 all extremities. L hand  slightly weaker than R. Weak dorsi/plantarflexion. Labetalol and hydralazine given this aftternoon. Pt's BP returned to normal range. HR tachy in 110s. Temp at 2000 was 100.1. Requiring 2L oxygen by nasal cannula. Right anterior neck incision with steri strip dressing, no drainage. Posterior neck incision sutured. Inferior portion of dressing rolled up and was cut off on day shift, partially exposing incision. Primapore at old drain site is cdi. Tylenol given for neck pain during repositioning.  brace at bedside. Poor appetite on DD3 diet, only eating a few bites of yogurt before refusing. Kenrick counts. NG tube with TF at goal of 35cc/hr. Hyponatremic, with 850mL free water restriction. Bowel meds held d/t diarrhea. Pt had a large urine occurrence at 1940 with incontinence. Up with lift. Continue plan of care.

## 2017-11-21 NOTE — PLAN OF CARE
Problem: Patient Care Overview  Goal: Plan of Care/Patient Progress Review  Pt is on 6A POD #5 C1-C3 laminectomies, C2-T3 posterior segumental instrumentation, C2/3, 4/5, 6/7 facette osteotomies. C2/3, 4/5, 6/7 ACDF. C2-T3 posterior fusion with autograft and allograft with reduction of cervical spine. Has been febrile in the 99's but has since decreased. HR tachy in the 110's. Hypertensive, labetalol given x1, BP returned to normal parameters. 2L NC. Pt has been alert, but confused with ongoing hallucinations. JUAN orientation d/t being uncooperative during most of assessment. Generalized weakness 2/5 strength all extremities. R anterior neck incision with steri strips. Posterior neck incision sutured. TF @ goal rate of 35 ml via NG tube. Free water FL of 850 ml d/t hyponatremia. Up with lift and brace. Incont of urine. No BM overnight. One time dose of Seroquel given, pt sleeping comfortably rest of night. Will continue to monitor and follow POC.

## 2017-11-21 NOTE — PLAN OF CARE
Problem: Patient Care Overview  Goal: Plan of Care/Patient Progress Review  Discharge Planner OT   Patient plan for discharge: Patient agreeable to rehab stay  Current status: Patient seen for transfer to chair with lift, mod A for bilateral roll to mihaela brace. Patient demos significant extensor tone with volitional movement. Patient total Ax2 lift to chair. Patient brushes hair with mod A at elbow, decreased co-contraction at elbow and forearm for wrist control. Patient requires max A for hand to mouth for self-feeding, will benefit from family or staff assist until further training.  Barriers to return to prior living situation: Extensor tone, UE weakness, post-surgical precautions  Recommendations for discharge: TCU  Rationale for recommendations: Increase participation and IND in ADLs.       Entered by: Silvia Santiago 11/21/2017 3:02 PM

## 2017-11-21 NOTE — PROGRESS NOTES
Social Work: Assessment with Discharge Plan    Patient Name:  Yuni Otoole  :  1935  Age:  82 year old  MRN:  [id number]  Risk/Complexity Score:  Filed Complexity Screen Score: 10  Completed assessment with:  Patient, pt's  Mateus. Other family was present as well (pt's daughter, pt's daughter in law, son, granddaughter)     Presenting Information   Reason for Referral:  Discharge plan  Date of Intake:  2017  Referral Source:  Physician  Decision Maker:  Patient   Alternate Decision Maker:  Patients  Mateus-ANAI   Health Care Directive:  Provided education and Declined completing  Living Situation:  House-Live in a one story town home. Patient reported that there are no stairs. The shower/tub is not a walk in. Patients  supportive and able to assist at home as needed. Patient previously used a walker to ambulate.   Previous Functional Status:  Patients  previously assisted pt with most her ADL's and IADL's. Patients  does assist patient with bathing and dressing.    Patient and family understanding of hospitalization:  Patient appears to have a good understanding of hospitlization but does express some anxiety about discharging from the hospital. SW provided assurance to pt that the medical team will make determination about when she is ready to DC. Pt's family appeared to be involved and have appropriate understanding   Cultural/Language/Spiritual Considerations:  English speaking.   Adjustment to Illness:  Patient appeared to be anxious, expressing that she did not know if she was ready to DC yet. Patients family was very supportive of patients needs and offered support to patient. SW provided assistance in explaining DC process and what to expect.     Physical Health  Reason for Admission:  No diagnosis found.  Services Needed/Recommended:  TCU    Mental Health/Chemical Dependency  Diagnosis:  Per chart review patient does have a diagnosis of Anxiety State  F41.1. No hx. Of CD   Support/Services in Place:  This was not discussed, as several family members were present.   Services Needed/Recommended:  None reported at this time.     Support System  Significant relationship at present time:  Patients  Mateus   Family of origin is available for support:  Yes, pt's , patients children and other family are available for support and assistance as needed.   Other support available:  Friends   Gaps in support system:  None reported   Patient is caregiver to:  None     Provider Information   Primary Care Physician:  Jonathan Vincent   [phone]   Clinic:  [address] [place] [id number]      :  None reported     Financial   Income Source:  custodial, SSI   Financial Concerns:  None reported   Insurance:    Payor/Plan Subscriber Name Rel Member # Group #   MEDICARE - MEDICARE F* GLENDAUSRYDER  [id number]       ATTN CLAIMS, [address]   MEDICA - MEDICA PRIME* VINAYAKROHITHMARCELLO FERNANDEZRYDER  [id number]      [address] [address]       Discharge Plan   Patient and family discharge goal:  TCU   Provided education on discharge plan:  YES  Patient agreeable to discharge plan:  YES  A list of Medicare Certified Facilities was provided to the patient and/or family to encourage patient choice. Patient's choices for facility are:  Yes, patient requested referrals be sent to the following facilities; 1. [facility] 2. [facility]  Will NH provide Skilled rehabilitation or complex medical:  YES  General information regarding anticipated insurance coverage and possible out of pocket cost was discussed. Patient and patient's family are aware patient may incur the cost of transportation to the facility, pending insurance payment: YES  Barriers to discharge:  NG tube, medical stability     Discharge Recommendations   Anticipated Disposition:  Facility:  TCU tbd. Referrals pending  Transportation Needs:  Medical:  Wheelchair-Pt's family aware of private pay  cost for transport and would like this set up.   Name of Transportation Company and Phone:  [email or web address] [facility] [phone]    Additional comments   Patients family is aware that NG tube is a barrier to finding TCU placement and that additional referrals may need to be sent pending the decision made by the medical team whether pt will need to DC with NG tube or not based on pt's diet. Patient and family prefer patient is close to home. Pt's  and children are supportive of DC plan and willing to consider additional TCU options if pt needs to DC with NG tube and if it cannot be managed by either referral listed below. Choice was provided to patient and pt's family. NISHA will continue to follow and provide assistance with setting up a ride. Main contact: Pt's dtr. In [facility] ([phone]).     Referrals Pending (faxed via [employer]):   1. [facility]   2. [facility]  , Coverage 6A-Tuesday  Pgr: [phone]    Wed-Friday Please contact:  Primary 6A NISHA-Celia GARCIA   Phone: [phone]  Pager: [phone]

## 2017-11-21 NOTE — PROGRESS NOTES
Neurosurgery Daily Progress Note  11/21/2017    Overnight events/subjective: Delirium overnight.    O/ /69  Pulse 84  Temp 98.7  F (37.1  C) (Oral)  Resp 16  Wt 45.2 kg (99 lb 10.4 oz)  SpO2 95%  BMI 20.83 kg/m2    Exam:   Gen: Laying in bed, talking to people not present  MS: A&Ox1 (self), Speech fluent and conversant   CN: Pupils round and reactive, extraocular movements intact, face symmetric, tongue midline, uvula & palate elevate symmetrically   Motor: Patient refused to participate in detailed motor exam. Moved elbows antigravity and hips antigravity to noxious stimuli.   Sensory: response to noxious stimuli in all 4 extremities    Non labored breathing    LABS: reviewed     A/P: Yuni Otoole is a 82 year old POD#6 s/p C1-C3 laminectomies, C2-T3 posterior segmental instrumentation, C2/3, 4/5, 6/7 facette osteotomies. C2/3, 4/5, 6/7 ACDF. C2-T3 posterior fusion w/ autograft and allograft with reduction of cervical spine.     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 Continued calorie counts.  - Bladder scan q6h and after any incontinence, will replace Padilla if significant PVRs of continued incontinence  - SCDs & SQH for DVT ppx  - PO diet for GI ppx  - PT/OT: TCU    Dispo: Anticipated d/c 11/24. Barriers: tolerating adequate PO diet to meet nutritional needs or rehab facility willing to accept tube feeds      Please contact the neurosurgery resident on call with questions by dialing * [phone], then entering [phone] when prompted

## 2017-11-21 NOTE — PROGRESS NOTES
Calorie Counts  Intake recorded for: 11/20  Kcals: 108  Protein: 5g  # Meals Recorded: 100% packet of lobo crackers, 25% milk  # Supplements Recorded: 0

## 2017-11-21 NOTE — PLAN OF CARE
Problem: Patient Care Overview  Goal: Plan of Care/Patient Progress Review  Outcome: No Change    POC: [id number]. No change in pt status. Pt continues to hallucinate. MD ordered Benadryl, wondering if hallucinations d/t ICU psychosis. Pt has not been sleeping. Just gave pt Benadryl. Avoiding Norco if possible d/t hallucinations and just giving Tylenol instead. Tylenol dose increased to 650mg q 4 hrs. Pt last repositioned at 2300 and brief changed, incontinent of urine.

## 2017-11-21 NOTE — PROGRESS NOTES
-Total PO intake yesterday is recorded as 108kcal (additionally has been getting tube feeds at 35cc/hour)    -Hold tube feeds today and measure PO intake to see if adequate nutritional needs can be met by oral intake    -Bladder scan q6 hours and after any incontinent voids- replace Padilla today if significant PVR or if continued incontinence

## 2017-11-22 ENCOUNTER — APPOINTMENT (OUTPATIENT)
Dept: OCCUPATIONAL THERAPY | Facility: CLINIC | Age: 82
DRG: 453 | End: 2017-11-22
Attending: NEUROLOGICAL SURGERY
Payer: MEDICARE

## 2017-11-22 ENCOUNTER — APPOINTMENT (OUTPATIENT)
Dept: SPEECH THERAPY | Facility: CLINIC | Age: 82
DRG: 453 | End: 2017-11-22
Attending: NEUROLOGICAL SURGERY
Payer: MEDICARE

## 2017-11-22 ENCOUNTER — APPOINTMENT (OUTPATIENT)
Dept: PHYSICAL THERAPY | Facility: CLINIC | Age: 82
DRG: 453 | End: 2017-11-22
Attending: NEUROLOGICAL SURGERY
Payer: MEDICARE

## 2017-11-22 LAB
ANION GAP SERPL CALCULATED.3IONS-SCNC: 8 MMOL/L (ref 3–14)
BUN SERPL-MCNC: 15 MG/DL (ref 7–30)
CALCIUM SERPL-MCNC: 6.9 MG/DL (ref 8.5–10.1)
CHLORIDE SERPL-SCNC: 106 MMOL/L (ref 94–109)
CO2 SERPL-SCNC: 23 MMOL/L (ref 20–32)
CREAT SERPL-MCNC: 0.32 MG/DL (ref 0.52–1.04)
GFR SERPL CREATININE-BSD FRML MDRD: >90 ML/MIN/1.7M2
GLUCOSE SERPL-MCNC: 75 MG/DL (ref 70–99)
POTASSIUM SERPL-SCNC: 3.3 MMOL/L (ref 3.4–5.3)
POTASSIUM SERPL-SCNC: 4.6 MMOL/L (ref 3.4–5.3)
SODIUM SERPL-SCNC: 129 MMOL/L (ref 133–144)
SODIUM SERPL-SCNC: 137 MMOL/L (ref 133–144)

## 2017-11-22 PROCEDURE — 25000128 H RX IP 250 OP 636: Performed by: STUDENT IN AN ORGANIZED HEALTH CARE EDUCATION/TRAINING PROGRAM

## 2017-11-22 PROCEDURE — 36592 COLLECT BLOOD FROM PICC: CPT | Performed by: STUDENT IN AN ORGANIZED HEALTH CARE EDUCATION/TRAINING PROGRAM

## 2017-11-22 PROCEDURE — 92526 ORAL FUNCTION THERAPY: CPT | Mod: GN

## 2017-11-22 PROCEDURE — 25000128 H RX IP 250 OP 636: Performed by: NURSE PRACTITIONER

## 2017-11-22 PROCEDURE — 12000003 ZZH R&B CRITICAL UMMC

## 2017-11-22 PROCEDURE — 97535 SELF CARE MNGMENT TRAINING: CPT | Mod: GO

## 2017-11-22 PROCEDURE — 40000133 ZZH STATISTIC OT WARD VISIT

## 2017-11-22 PROCEDURE — 97530 THERAPEUTIC ACTIVITIES: CPT | Mod: GP

## 2017-11-22 PROCEDURE — 97110 THERAPEUTIC EXERCISES: CPT | Mod: GO

## 2017-11-22 PROCEDURE — 25000132 ZZH RX MED GY IP 250 OP 250 PS 637: Mod: GY | Performed by: STUDENT IN AN ORGANIZED HEALTH CARE EDUCATION/TRAINING PROGRAM

## 2017-11-22 PROCEDURE — 80048 BASIC METABOLIC PNL TOTAL CA: CPT | Performed by: STUDENT IN AN ORGANIZED HEALTH CARE EDUCATION/TRAINING PROGRAM

## 2017-11-22 PROCEDURE — 84295 ASSAY OF SERUM SODIUM: CPT | Performed by: STUDENT IN AN ORGANIZED HEALTH CARE EDUCATION/TRAINING PROGRAM

## 2017-11-22 PROCEDURE — 25000125 ZZHC RX 250: Performed by: STUDENT IN AN ORGANIZED HEALTH CARE EDUCATION/TRAINING PROGRAM

## 2017-11-22 PROCEDURE — 25000132 ZZH RX MED GY IP 250 OP 250 PS 637: Mod: GY | Performed by: NURSE PRACTITIONER

## 2017-11-22 PROCEDURE — 84132 ASSAY OF SERUM POTASSIUM: CPT | Performed by: STUDENT IN AN ORGANIZED HEALTH CARE EDUCATION/TRAINING PROGRAM

## 2017-11-22 PROCEDURE — A9270 NON-COVERED ITEM OR SERVICE: HCPCS | Mod: GY | Performed by: NURSE PRACTITIONER

## 2017-11-22 PROCEDURE — A9270 NON-COVERED ITEM OR SERVICE: HCPCS | Mod: GY | Performed by: STUDENT IN AN ORGANIZED HEALTH CARE EDUCATION/TRAINING PROGRAM

## 2017-11-22 PROCEDURE — 40000193 ZZH STATISTIC PT WARD VISIT

## 2017-11-22 PROCEDURE — 40000225 ZZH STATISTIC SLP WARD VISIT

## 2017-11-22 RX ORDER — OXYCODONE HCL 5 MG/5 ML
5-10 SOLUTION, ORAL ORAL EVERY 6 HOURS PRN
Status: DISCONTINUED | OUTPATIENT
Start: 2017-11-22 | End: 2017-11-24

## 2017-11-22 RX ADMIN — HYDROCODONE BITARTRATE AND ACETAMINOPHEN 1 TABLET: 5; 325 TABLET ORAL at 11:17

## 2017-11-22 RX ADMIN — METOPROLOL TARTRATE 12.5 MG: 100 TABLET, FILM COATED ORAL at 22:12

## 2017-11-22 RX ADMIN — METOPROLOL TARTRATE 12.5 MG: 100 TABLET, FILM COATED ORAL at 08:57

## 2017-11-22 RX ADMIN — Medication 10000 UNITS: at 08:58

## 2017-11-22 RX ADMIN — PRAVASTATIN SODIUM 10 MG: 10 TABLET ORAL at 22:12

## 2017-11-22 RX ADMIN — THIAMINE HYDROCHLORIDE 100 MG: 100 INJECTION, SOLUTION INTRAMUSCULAR; INTRAVENOUS at 08:59

## 2017-11-22 RX ADMIN — OXYCODONE HYDROCHLORIDE 5 MG: 5 SOLUTION ORAL at 20:13

## 2017-11-22 RX ADMIN — OXYCODONE HYDROCHLORIDE 5 MG: 5 SOLUTION ORAL at 15:16

## 2017-11-22 RX ADMIN — OXYCODONE HYDROCHLORIDE 5 MG: 5 SOLUTION ORAL at 22:11

## 2017-11-22 RX ADMIN — SENNOSIDES AND DOCUSATE SODIUM 2 TABLET: 8.6; 5 TABLET ORAL at 08:56

## 2017-11-22 RX ADMIN — MULTIVITAMIN 15 ML: LIQUID ORAL at 08:57

## 2017-11-22 RX ADMIN — CALCIUM CARBONATE 1250 MG: 1250 SUSPENSION ORAL at 08:57

## 2017-11-22 RX ADMIN — Medication 3000 UNITS: at 08:58

## 2017-11-22 RX ADMIN — OXYCODONE HYDROCHLORIDE 5 MG: 5 SOLUTION ORAL at 14:05

## 2017-11-22 RX ADMIN — HEPARIN SODIUM 5000 UNITS: 5000 INJECTION, SOLUTION INTRAVENOUS; SUBCUTANEOUS at 11:18

## 2017-11-22 RX ADMIN — ACETAMINOPHEN 650 MG: 325 TABLET, FILM COATED ORAL at 08:57

## 2017-11-22 RX ADMIN — Medication 220 MG: at 08:58

## 2017-11-22 RX ADMIN — CALCIUM CARBONATE 1250 MG: 1250 SUSPENSION ORAL at 18:10

## 2017-11-22 RX ADMIN — POTASSIUM CHLORIDE 40 MEQ: 1.5 POWDER, FOR SOLUTION ORAL at 08:57

## 2017-11-22 RX ADMIN — Medication 220 MG: at 14:06

## 2017-11-22 RX ADMIN — ONDANSETRON 8 MG: 4 TABLET, ORALLY DISINTEGRATING ORAL at 09:16

## 2017-11-22 RX ADMIN — ACETAMINOPHEN 650 MG: 160 SOLUTION ORAL at 14:06

## 2017-11-22 RX ADMIN — OXYCODONE HYDROCHLORIDE AND ACETAMINOPHEN 1000 MG: 500 TABLET ORAL at 08:58

## 2017-11-22 RX ADMIN — HYDROCODONE BITARTRATE AND ACETAMINOPHEN 1 TABLET: 5; 325 TABLET ORAL at 08:57

## 2017-11-22 RX ADMIN — POTASSIUM CHLORIDE 20 MEQ: 1.5 POWDER, FOR SOLUTION ORAL at 11:55

## 2017-11-22 RX ADMIN — HEPARIN SODIUM 5000 UNITS: 5000 INJECTION, SOLUTION INTRAVENOUS; SUBCUTANEOUS at 20:16

## 2017-11-22 RX ADMIN — SODIUM CHLORIDE, PRESERVATIVE FREE 5 ML: 5 INJECTION INTRAVENOUS at 22:12

## 2017-11-22 RX ADMIN — HEPARIN SODIUM 5000 UNITS: 5000 INJECTION, SOLUTION INTRAVENOUS; SUBCUTANEOUS at 04:44

## 2017-11-22 RX ADMIN — CALCIUM GLUCONATE 3 G: 98 INJECTION, SOLUTION INTRAVENOUS at 13:16

## 2017-11-22 RX ADMIN — Medication 220 MG: at 22:11

## 2017-11-22 RX ADMIN — ACETAMINOPHEN 650 MG: 325 TABLET, FILM COATED ORAL at 04:44

## 2017-11-22 NOTE — PLAN OF CARE
Problem: Patient Care Overview  Goal: Plan of Care/Patient Progress Review  Outcome: No Change  VSS, A&O X4. Slightly lethargic when awaking, alert all day, opening eyes spontaneously.  Disoriented to time.  2/5 strengths to all extremities, barely able to lift off of bed.  Denies sensation deficits. Incontinent of urine and stool in morning.  PVR of 300, Padilla reinserted.  Padilla with good yellow urine output.  TF stopped around 1400 after a good 50% of intake at lunch.  Calorie counts started.  Will contact neurosurgery with dinner intake.  How much she eats will determine if she needs TF overnight.  APAP given once for sore L hip.  Coccyx and inner thigh with some incontinence associated dermatitis.  Cleansed multiple times and barrier cream applied.  Mechanical lift for transfers.  Possible dc tomorrow to TCU in [place] or [facility].

## 2017-11-22 NOTE — PLAN OF CARE
Problem: Patient Care Overview  Goal: Plan of Care/Patient Progress Review  PT 6A:    Discharge Planner PT   Patient plan for discharge: REhab  Current status: Max A x2 bed mobility, min-max A x2 for sitting EOB.  Impaired extensor tone significantly limits safety with mobility  Barriers to return to prior living situation: heavy caregiver depenence  Recommendations for discharge: TCU  Rationale for recommendations: Ongoing skilled PT is indicated to maximize (I) functional mobility and ADL       Entered by: Alesia Galeano 11/21/2017 6:06 PM

## 2017-11-22 NOTE — PROGRESS NOTES
Began caring for pt at 1900. Pt opens eyes spontaneously. Disoriented to time but otherwise alert and oriented. Padilla patent with adequate output. Norco and Tylenol given x 1 for shoulder pain. Bm x 1 this shift. Extremity remain weak, unable to lift legs off bed for assessment. Per provider TF re-started at 35 mL's/hr and should run until 0600, calorie counts continue. Will report off to oncoming staff.

## 2017-11-22 NOTE — PROGRESS NOTES
CLINICAL NUTRITION SERVICES - REASSESSMENT NOTE     Nutrition Prescription    RECOMMENDATIONS FOR MDs/PROVIDERS TO ORDER:  - If planning to do nocturnal TF please consider/follow recommendations below.     Malnutrition Status:    - Severe malnutrition in the context of chronic illness    Recommendations already ordered by Registered Dietitian (RD):  - Continue with calorie counts: already ordered by MD     Future/Additional Recommendations:  Monitor PO intake  - If supplemental TF needed would recommend restarting previous TF regimen of Isosource 1.5 at goal rate of 35 mL/hr (840 mL/day) to provide 1260 kcals (32 kcal/kg/day), 57 g PRO (1.4 g/kg/day), 655 mL free H2O, 143 g CHO and 7 g fiber daily.  --Start TF at 10 ml/hr and advance by 10 ml Q4hr to goal of 35 ml/hr. Only advance if K+/Mg++/Phos are WNL.   --Flush 30 ml free water Q4hr for tube patency   --If nocturnal TF are desired recommend waiting 24 hr at goal rate before cycling TF. Then would cycle to cycle to 18 hrs (8 pm to 2 pm) @ goal rate of 45 ml/hr (810 ml) which provides 1215 kcals and 55 g PRO.   --Monitor pt's tolerance with increased rate.           EVALUATION OF THE PROGRESS TOWARD GOALS   Diet: Dysphagia Diet level 3, free water restriction 850 (only water)  Nutrition Support:  TF stopped on 11/21  Per neurosurgery not on 11/21: holding TF to see if adequate nutrition needs can be met by oral intake. Ongoing kcal counts. Per neurosurgery note on 11/22: will run nocturnal TF's is continued poor PO intake.     Intake:    Calorie counts:   11/19: Kcals: 427      Protein: 8g  11/20   Kcals: 108     Protein: 5g  11/21   Kcal: 761        Protein: 30g   Average PO intake over the past three day: 432 kcals (11 kcals/kg) and 14 gm PRO (0.35 gm/kg) The pt is meeting < 40% of estimated nutritional needs.   Unable to speak with pt this morning, she was sleeping. Family member  was in the room and she gave a brief nutrition update. She states the pt ate well  yesterday, however during the evening hours she did not eat much. This morning for breakfast pt ate a few bites of oatmeal and a few bites of sausage with gravy. The family member stated she is not aware of any GI concerns; she states that the pt is just in a lot of pain.     NEW FINDINGS   GI: Pt has had a BM today.      Meds/labs: Na+: hyponatremia 11/20: 130 (L), 11/21: 131 (L) --> 132 (L); vitamin D 11/13/17: 17 (L).   Vitamin C 1000 x 1 daily  Calcium Carbonate 1,250 mg x 2 daily  Vitamin D 3000 units x 1 daily  Certavite 15 ml x 1 daily  Vitamin A 10,000 units x 1 daily  Zinc 220 mg x 3 daily  Thiamine: 100 mg daily   Bowel meds      Weights: trending up from admit weight: 80 lbs (11/14) --> 99 lbs (11/20)= 19% weight increase over 6 days; fluid status is playing role in dramatic weight change over short time frame. Pt had weight loss over time PTA largely due to anatomy/inability to eat.     MALNUTRITION  % Intake: </=75% for >/= 1 month (severe)  % Weight Loss: > 20% in 1 year (severe)  Subcutaneous Fat Loss: Facial region:, Upper arm: and Lower arm: moderate  Muscle Loss: Facial & jaw region:, Scapular bone:, Thoracic region (clavicle, acromium bone, deltoid, trapezius, pectoral):, Upper arm (bicep, tricep): , Lower arm  (forearm): all severe Upper leg (quadricep, hamstring): , Patellar region: and Posterior calf: severe   Fluid Accumulation/Edema: Mild in hands?   Malnutrition Diagnosis: Severe malnutrition in the context of chronic illness    Previous Goals   Total avg nutritional intake to meet a minimum of 30 kcal/kg and 1.2 g PRO/kg daily (per dosing wt 40 kg).  Evaluation: Not met     Previous Nutrition Diagnosis  Inadequate protein-energy intake related to disruption to EN advancement and current diet status as evidenced by pt NPO, TFs not yet at goal, and pt not meeting goal kcal/PRO provisions at this time with ~ 14 kcals/kg and ~0.6 gm PRO per dosing weight  Evaluation:  Continues/improved    CURRENT NUTRITION DIAGNOSIS  Inadequate protein-energy intake related to Nutrition support stopped and low appetite as evidence by TF on hold to encourage PO intake and pt meeting <40% of estimated nutrition needs via PO intake.     INTERVENTIONS  Implementation   Nutrition-related medication management: EN  recommendations made  Discussed option for MNT supplements with family, ordered with meals.      Goals  Total avg nutritional intake to meet a minimum of 30 kcal/kg and 1.2 g PRO/kg daily (per dosing wt 40 kg).    Monitoring/Evaluation  Progress toward goals will be monitored and evaluated per protocol.     Marta Saldaña RD, LD  Unit Pgr: [phone]

## 2017-11-22 NOTE — PLAN OF CARE
Problem: Patient Care Overview  Goal: Plan of Care/Patient Progress Review  Discharge Planner OT   Patient plan for discharge: TCU  Current status: Patient seen for ADLs with built up handles, requires mod A and gravity eliminated at elbow. Patient demos apraxia for grasping ADL objects, slight improvement with increased visualization. Patient tolerates PROM in BUE within pain free limits. Per chair patient with previous R shoulder injury, respected pain and avoided abduction to prevent increase risk of pain.   Barriers to return to prior living situation: BUE apraxia, decrased strength, extensor tone  Recommendations for discharge: TCU  Rationale for recommendations: Increase participation and IND in ADLs.       Entered by: Silvia Santiago 11/22/2017 4:26 PM

## 2017-11-22 NOTE — PROGRESS NOTES
Brief Endocrinology Note:    Patient's chart was reviewed. We did not see the patient today. Sodium 137. 25 Vit D total came back <20 (prior to initiation of replacement). Of note, her calcium is low at 6.9, was 8.8 the day prior. She is on calcium replacement 1250 calcium carbonate BID. Will check albumin with BMP in the AM and replace calcium prn.    Plan:  1) repeat Ca level and Albumin in the AM  2) water restriction 1000 cc/day  3) Stop salt tabs  4) 3000 U liquid vit D daily  5) 1250 mg liquid calcium carbonate BID     Chris Garcia MD (PGY-4)  Diabetes and Endocrinology Fellow  [facility]    --- Addendum----  I discussed the patient with Dr. Garcia. Agree above note and plan.     Gunjan Ann MD  Staff Physician  Endocrinology and Metabolism  [facility]  License: [place] [id number]  Pager: [phone]

## 2017-11-22 NOTE — PLAN OF CARE
Problem: Patient Care Overview  Goal: Plan of Care/Patient Progress Review  Discharge Planner PT   Patient plan for discharge:   Current status: Pt reporting increased pain today. She required mod-max A x 2 for rolling in bed with use of dee sheet. Pt completed supine>sit with max A x 2. Sat at EOB with mod A and marv knee blocking secondary to extensor tone. Attempted to perform sit<>Stand today, however pt unable to successfully perform, despite max A x 2. Utilized OH lift to dependently transfer pt from bed>chair.    Barriers to return to prior living situation: level of A, medical needs, pain   Recommendations for discharge: TCU   Rationale for recommendations: Pt would benefit from cont skilled PT to address deficits in strength, ROM and activity tolerance and thus improve overall functional mobility.        Entered by: Yris Hinojosa 11/22/2017 10:42 AM

## 2017-11-22 NOTE — PLAN OF CARE
Problem: Patient Care Overview  Goal: Plan of Care/Patient Progress Review  Outcome: No Change  POD #7 s/p C1-C3 lami, C2-T3 posterior segmental instrumentation, C2/3, 4/5, 6/7 facette osteotomies. C2/3, 4/5, 6/7 ACDF and C2-T3 posterior fusion with autograft and allograft with reduction of cervical spine. AVSS. A&Ox4, can be forgetful at times. Neuros include [place] (uses marv devices), N/T to hands, and generalized weakness, 2/5 throughout. On DD3 and anthony counts, fair PO intake. Needs assistance with eating. NG intact, used for meds and overnight TF. Desir in place d/t retention, cares completed this AM. Please d/c desir tomorrow to attempt independent voiding prior to possible d/c to TCU on Friday. x2 incontinent small stools today.TL subclavian SL. Up with A2/lift. Turn q2hrs. Needs brace on when OOB. Got pt up in chair x2 today. Pt stating she was having more pain today then the past couple days. PRN oxy and tylenol give for pain. Encouraged pt to continue OOB activity and working with therapies. Family present throughout the day. BA and PCD's on. Uses soft touch call light appropriately. Continue with POC.

## 2017-11-22 NOTE — PLAN OF CARE
Problem: Patient Care Overview  Goal: Plan of Care/Patient Progress Review    Discharge Planner SLP   Patient plan for discharge: TCU, hopeful for today  Current status: Pt seen with brace on while up in chair. Questioning if brace is too large for this petite woman as the brace began to ride up after some time in the chair.  Intermittent s/s of aspiration with thin liquid via single straw sips. Solid trials revealed slowed mastication given restricted jaw ROM with brace pressing on chin. Pt continues with minimal intake; please encourage/assist. Recommending cautiously continue dysphagia diet level 3 and thin liquids. Per neurosurgery note, please remove front of cervical orthotic brace for all PO intake. Would recommend continue NG for supplemental nutrition at this time as pt with limited PO intake; nutrition following. Assist for small bites, single sips and appropriate positioning.  Barriers to return to prior living situation: dysphagia, weakness  Recommendations for discharge: ongoing SLP for dysphagia management  Rationale for recommendations: dysphagia       Entered by: Tiffanie Chatman 11/22/2017 12:13 PM

## 2017-11-22 NOTE — PROGRESS NOTES
Neurosurgery Daily Progress Note  11/22/2017    Overnight events/subjective: Slept well the prior night. No delirium overnight.  Poor PO intake yesterday, ran nocturnal tube feeds overnight.    O/ /65 (BP Location: Right arm)  Pulse 97  Temp 98.6  F (37  C) (Axillary)  Resp 18  Wt 45.2 kg (99 lb 10.4 oz)  SpO2 95%  BMI 20.83 kg/m2    Exam:   Gen: Laying in bed, comfortable appearing  MS: A&Ox3, Speech fluent and conversant   CN: Pupils round and reactive, extraocular movements intact, face symmetric, tongue midline, uvula & palate elevate symmetrically   Motor:      Delt Bi Tri WE WF    R 1 4- 4+ 4+ 4 4-   L 2 4- 4+ 4+ 4 4-     IP Quad Ham DF PF EHL   R 2 4+ 4+ 4+ 4+ 4+   L 2 4+ 4+ 4+ 4+ 4+     Sensory: patient endorses intact light touch sensation, but exhibits extinction of left    Non labored breathing    LABS: reviewed     A/P: Yuni Otoole is a 82 year old POD#7 s/p C1-C3 laminectomies, C2-T3 posterior segmental instrumentation, C2/3, 4/5, 6/7 facette osteotomies. C2/3, 4/5, 6/7 ACDF. C2-T3 posterior fusion w/ autograft and allograft with reduction of cervical spine.     - Neuro checks  - CTP brace; remove front for PO intake  - Discontinue salt tabs today per Endocrinology recs  - Sutures to be left in for 4 weeks  - Post-op images completed, with brace on, upright/sitting  - Diet: Tube feeds (holding today) + DD3; nutrition following. Continued calorie counts. Will run nocturnal TFs if continued poor PO intake.   - Padilla back in due to high PVRs  - SCDs & SQH for DVT ppx  - PO diet for GI ppx  - PT/OT: TCU    Dispo: Anticipated d/c 11/24. Barriers: tolerating adequate PO diet to meet nutritional needs.      Please contact the neurosurgery resident on call with questions by dialing * [phone], then entering [phone] when prompted

## 2017-11-22 NOTE — PROGRESS NOTES
Calorie Counts  Intake recorded for: 11/21  Kcals: 761  Protein: 30g  # Meals Recorded: 2 meals (First - 100% squash, 75% ice cream, brownie, 50% roast turkey and egg noodles with gravy)      (Second - 100% pears, ice cream, 25% milk, less than 25% chicken charlene pasta)  # Supplements Recorded: 0

## 2017-11-22 NOTE — PLAN OF CARE
Problem: Patient Care Overview  Goal: Plan of Care/Patient Progress Review  Outcome: No Change  Pt POD#7 s/p C1-C3 laminectomies, C2-T3 posterior segmental instrumentation, C2/3, 4/5, 6/7 facette osteotomies. C2/3, 4/5, 6/7 ACDF. C2-T3 posterior fusion w/ autograft and allograft with reduction of cervical spine. AVSS. A&Ox4; forgetful. Neuro s intact except 2/5 strength throughout. Pt on DD3 diet with TF supplementation overnight at 35cc/hr; stopped at 0600. Pt on calorie counts. Padilla in place s/t retention. Up with a lift; turn and reposition every 2 hours and prn. Pt has TL L subclavian; SL. Possible DC today to TCU. Continue POC.

## 2017-11-22 NOTE — PROGRESS NOTES
Social Work Services Progress Note    Hospital Day: 13  Date of Initial Social Work Evaluation:  11/21/17  Collaborated with:  Pt's daughter in law (Dior [phone]), Kat Beebe, Neuro Surgery NP, SNF Admissions Coordinator's    Data:  Rehab placement is being pursued at [facility] and [facility].  Pt has a NG in place.  Per  Kat Beebe, Neuro Surgery NP, pt is not medically ready for discharge.  Per Kat,  pt needs improved nutrition before she can be discharged.    Intervention:  NISHA phoned the Admissions Coordinator at [facility] ([facility]il[facility]) and provided an update.  Per Emiliano, they are not accepting admits on 11/23/17.  Per [facility], they do not accept NG's and thus, pt's NG would need to be discontinued.  [facility] is unsure about bed availability on 11/24/17.  NISHA phoned Admissions at [facility] ([facility]).  Per [facility], they accept NG's.  [facility] indicates that they are not accepting admits on 11/24/17 as they will not have pharmacy availability.        NISHA updated pt's daughter in law Dior and requested 3rd and 4th choice options in the event that [facility] or [facility] cannot accept.  Doir indicates that [facility] in [place] is 3rd choice and the [facility] is 4th choice.  NISHA sent referrals to both facility's.    Assessment:  Pt and family support pursuit of rehab placement.    Plan:    Anticipated Disposition: Rehab placement at a community SNF    Barriers to d/c plan:  Medical readiness/nutrition    Follow Up:  SW will continue to follow for discharge planning.    LANG Zapata  Social Work, 6A  Phone:  [phone]  Pager:  [phone]  11/22/2017

## 2017-11-23 ENCOUNTER — APPOINTMENT (OUTPATIENT)
Dept: PHYSICAL THERAPY | Facility: CLINIC | Age: 82
DRG: 453 | End: 2017-11-23
Attending: NEUROLOGICAL SURGERY
Payer: MEDICARE

## 2017-11-23 ENCOUNTER — APPOINTMENT (OUTPATIENT)
Dept: SPEECH THERAPY | Facility: CLINIC | Age: 82
DRG: 453 | End: 2017-11-23
Attending: NEUROLOGICAL SURGERY
Payer: MEDICARE

## 2017-11-23 ENCOUNTER — APPOINTMENT (OUTPATIENT)
Dept: OCCUPATIONAL THERAPY | Facility: CLINIC | Age: 82
DRG: 453 | End: 2017-11-23
Attending: NEUROLOGICAL SURGERY
Payer: MEDICARE

## 2017-11-23 LAB
ALBUMIN SERPL-MCNC: 2.4 G/DL (ref 3.4–5)
ANION GAP SERPL CALCULATED.3IONS-SCNC: 6 MMOL/L (ref 3–14)
BUN SERPL-MCNC: 14 MG/DL (ref 7–30)
CALCIUM SERPL-MCNC: 8.7 MG/DL (ref 8.5–10.1)
CALCIUM SERPL-MCNC: 9.2 MG/DL (ref 8.5–10.1)
CHLORIDE SERPL-SCNC: 95 MMOL/L (ref 94–109)
CO2 SERPL-SCNC: 30 MMOL/L (ref 20–32)
CREAT SERPL-MCNC: 0.37 MG/DL (ref 0.52–1.04)
GFR SERPL CREATININE-BSD FRML MDRD: >90 ML/MIN/1.7M2
GLUCOSE SERPL-MCNC: 123 MG/DL (ref 70–99)
POTASSIUM SERPL-SCNC: 4 MMOL/L (ref 3.4–5.3)
SODIUM SERPL-SCNC: 129 MMOL/L (ref 133–144)
SODIUM SERPL-SCNC: 131 MMOL/L (ref 133–144)

## 2017-11-23 PROCEDURE — 40000225 ZZH STATISTIC SLP WARD VISIT: Performed by: SPEECH-LANGUAGE PATHOLOGIST

## 2017-11-23 PROCEDURE — A9270 NON-COVERED ITEM OR SERVICE: HCPCS | Mod: GY | Performed by: STUDENT IN AN ORGANIZED HEALTH CARE EDUCATION/TRAINING PROGRAM

## 2017-11-23 PROCEDURE — A9270 NON-COVERED ITEM OR SERVICE: HCPCS | Mod: GY | Performed by: NURSE PRACTITIONER

## 2017-11-23 PROCEDURE — 40000193 ZZH STATISTIC PT WARD VISIT

## 2017-11-23 PROCEDURE — 25000132 ZZH RX MED GY IP 250 OP 250 PS 637: Mod: GY | Performed by: NURSE PRACTITIONER

## 2017-11-23 PROCEDURE — 36592 COLLECT BLOOD FROM PICC: CPT | Performed by: STUDENT IN AN ORGANIZED HEALTH CARE EDUCATION/TRAINING PROGRAM

## 2017-11-23 PROCEDURE — 97110 THERAPEUTIC EXERCISES: CPT | Mod: GO

## 2017-11-23 PROCEDURE — 97110 THERAPEUTIC EXERCISES: CPT | Mod: GP

## 2017-11-23 PROCEDURE — 25000132 ZZH RX MED GY IP 250 OP 250 PS 637: Mod: GY | Performed by: STUDENT IN AN ORGANIZED HEALTH CARE EDUCATION/TRAINING PROGRAM

## 2017-11-23 PROCEDURE — 92526 ORAL FUNCTION THERAPY: CPT | Mod: GN | Performed by: SPEECH-LANGUAGE PATHOLOGIST

## 2017-11-23 PROCEDURE — 97530 THERAPEUTIC ACTIVITIES: CPT | Mod: GP

## 2017-11-23 PROCEDURE — 25000128 H RX IP 250 OP 636: Performed by: STUDENT IN AN ORGANIZED HEALTH CARE EDUCATION/TRAINING PROGRAM

## 2017-11-23 PROCEDURE — 80048 BASIC METABOLIC PNL TOTAL CA: CPT | Performed by: STUDENT IN AN ORGANIZED HEALTH CARE EDUCATION/TRAINING PROGRAM

## 2017-11-23 PROCEDURE — 12000003 ZZH R&B CRITICAL UMMC

## 2017-11-23 PROCEDURE — 82310 ASSAY OF CALCIUM: CPT | Performed by: INTERNAL MEDICINE

## 2017-11-23 PROCEDURE — 25000128 H RX IP 250 OP 636: Performed by: NURSE PRACTITIONER

## 2017-11-23 PROCEDURE — 84295 ASSAY OF SERUM SODIUM: CPT | Performed by: STUDENT IN AN ORGANIZED HEALTH CARE EDUCATION/TRAINING PROGRAM

## 2017-11-23 PROCEDURE — 40000133 ZZH STATISTIC OT WARD VISIT

## 2017-11-23 PROCEDURE — 97535 SELF CARE MNGMENT TRAINING: CPT | Mod: GO

## 2017-11-23 PROCEDURE — 82040 ASSAY OF SERUM ALBUMIN: CPT | Performed by: INTERNAL MEDICINE

## 2017-11-23 RX ORDER — SODIUM CHLORIDE 1 G/1
1 TABLET ORAL
Status: DISCONTINUED | OUTPATIENT
Start: 2017-11-23 | End: 2017-11-23

## 2017-11-23 RX ADMIN — ACETAMINOPHEN 650 MG: 160 SOLUTION ORAL at 03:39

## 2017-11-23 RX ADMIN — METOPROLOL TARTRATE 12.5 MG: 100 TABLET, FILM COATED ORAL at 21:23

## 2017-11-23 RX ADMIN — CALCIUM CARBONATE 1250 MG: 1250 SUSPENSION ORAL at 18:20

## 2017-11-23 RX ADMIN — SODIUM CHLORIDE, PRESERVATIVE FREE 10 ML: 5 INJECTION INTRAVENOUS at 21:36

## 2017-11-23 RX ADMIN — OXYCODONE HYDROCHLORIDE 5 MG: 5 SOLUTION ORAL at 15:08

## 2017-11-23 RX ADMIN — METOPROLOL TARTRATE 12.5 MG: 100 TABLET, FILM COATED ORAL at 09:08

## 2017-11-23 RX ADMIN — ACETAMINOPHEN 650 MG: 160 SOLUTION ORAL at 16:49

## 2017-11-23 RX ADMIN — OXYCODONE HYDROCHLORIDE 5 MG: 5 SOLUTION ORAL at 12:04

## 2017-11-23 RX ADMIN — THIAMINE HYDROCHLORIDE 100 MG: 100 INJECTION, SOLUTION INTRAMUSCULAR; INTRAVENOUS at 13:55

## 2017-11-23 RX ADMIN — OXYCODONE HYDROCHLORIDE 5 MG: 5 SOLUTION ORAL at 21:22

## 2017-11-23 RX ADMIN — OXYCODONE HYDROCHLORIDE 5 MG: 5 SOLUTION ORAL at 09:44

## 2017-11-23 RX ADMIN — HEPARIN SODIUM 5000 UNITS: 5000 INJECTION, SOLUTION INTRAVENOUS; SUBCUTANEOUS at 21:22

## 2017-11-23 RX ADMIN — Medication 3000 UNITS: at 09:08

## 2017-11-23 RX ADMIN — OXYCODONE HYDROCHLORIDE 5 MG: 5 SOLUTION ORAL at 06:51

## 2017-11-23 RX ADMIN — PRAVASTATIN SODIUM 10 MG: 10 TABLET ORAL at 21:22

## 2017-11-23 RX ADMIN — CALCIUM CARBONATE 1250 MG: 1250 SUSPENSION ORAL at 09:08

## 2017-11-23 RX ADMIN — ACETAMINOPHEN 650 MG: 160 SOLUTION ORAL at 09:08

## 2017-11-23 RX ADMIN — Medication 10000 UNITS: at 09:09

## 2017-11-23 RX ADMIN — HEPARIN SODIUM 5000 UNITS: 5000 INJECTION, SOLUTION INTRAVENOUS; SUBCUTANEOUS at 03:39

## 2017-11-23 RX ADMIN — OXYCODONE HYDROCHLORIDE 5 MG: 5 SOLUTION ORAL at 03:39

## 2017-11-23 RX ADMIN — HEPARIN SODIUM 5000 UNITS: 5000 INJECTION, SOLUTION INTRAVENOUS; SUBCUTANEOUS at 12:04

## 2017-11-23 RX ADMIN — MULTIVITAMIN 15 ML: LIQUID ORAL at 09:08

## 2017-11-23 RX ADMIN — SODIUM CHLORIDE, PRESERVATIVE FREE 5 ML: 5 INJECTION INTRAVENOUS at 13:56

## 2017-11-23 RX ADMIN — SODIUM CHLORIDE, PRESERVATIVE FREE 5 ML: 5 INJECTION INTRAVENOUS at 00:21

## 2017-11-23 RX ADMIN — SENNOSIDES AND DOCUSATE SODIUM 2 TABLET: 8.6; 5 TABLET ORAL at 21:37

## 2017-11-23 RX ADMIN — OXYCODONE HYDROCHLORIDE 5 MG: 5 SOLUTION ORAL at 18:45

## 2017-11-23 RX ADMIN — ACETAMINOPHEN 650 MG: 160 SOLUTION ORAL at 12:04

## 2017-11-23 RX ADMIN — OXYCODONE HYDROCHLORIDE AND ACETAMINOPHEN 1000 MG: 500 TABLET ORAL at 09:08

## 2017-11-23 RX ADMIN — Medication 220 MG: at 09:08

## 2017-11-23 RX ADMIN — Medication 220 MG: at 21:23

## 2017-11-23 RX ADMIN — Medication 220 MG: at 13:55

## 2017-11-23 RX ADMIN — ACETAMINOPHEN 650 MG: 160 SOLUTION ORAL at 21:20

## 2017-11-23 NOTE — PLAN OF CARE
Problem: Pain, Acute (Adult)  Goal: Identify Related Risk Factors and Signs and Symptoms  Related risk factors and signs and symptoms are identified upon initiation of Human Response Clinical Practice Guideline (CPG).   Outcome: No Change  VSS. A&Ox4. Neuros with [place], N/t in hand and int. In feet, uppers 2/5, lower 3/5. Given oxy and tylenol for pain. Anterior and posterior incision. Voidin. BS+. PIV SL. Up with A2/lift. DD3 with anthony counts. Cont to monitor and with POC.

## 2017-11-23 NOTE — PROGRESS NOTES
"Neurosurgery Daily Progress Note  11/23/2017    Overnight events/subjective: Slept well the prior night. No delirium overnight.  Improved PO intake yesterday.    O/ /70 (BP Location: Right arm)  Pulse 74  Temp 95.9  F (35.5  C) (Oral)  Resp 16  Ht 1.499 m (4' 11\")  Wt 45.2 kg (99 lb 10.4 oz)  SpO2 94%  BMI 20.83 kg/m2    Exam:   Gen: Laying in bed, comfortable appearing  MS: A&Ox3, Speech fluent and conversant   CN: Pupils round and reactive, extraocular movements intact, face symmetric, tongue midline, uvula & palate elevate symmetrically   Motor:      Delt Bi Tri WE WF    R 1 4- 4+ 4+ 4 4-   L 2 4- 4+ 4+ 4 4-     IP Quad Ham DF PF EHL   R 2 4+ 4+ 4+ 4+ 4+   L 2 4+ 4+ 4+ 4+ 4+     Sensory: patient endorses intact light touch sensation, but exhibits extinction of left    Non labored breathing    LABS: reviewed     A/P: Yuni Otoole is a 82 year old POD#8 s/p C1-C3 laminectomies, C2-T3 posterior segmental instrumentation, C2/3, 4/5, 6/7 facette osteotomies. C2/3, 4/5, 6/7 ACDF. C2-T3 posterior fusion w/ autograft and allograft with reduction of cervical spine.     - Neuro checks  - CTP brace; remove front for PO intake  - Following Na, will touch base with Endocrine if continued hyponatremia  - Sutures to be left in for 4 weeks  - Post-op images completed, with brace on, upright/sitting  - Diet: Tube feeds (holding today) + DD3; nutrition following. Continued calorie counts. Will run nocturnal TFs if continued poor PO intake.   - Remove CVC  - Padilla back in due to high PVRs  - SCDs & SQH for DVT ppx  - PO diet for GI ppx  - PT/OT: TCU    Dispo: Anticipated d/c 11/24. Barriers: tolerating adequate PO diet to meet nutritional needs, rehab placement.      Please contact the neurosurgery resident on call with questions by dialing * [phone], then entering [phone] when prompted      I have reviewed the history.I have seen and examined the patient myself and agree with the assessment and plan above.  R. " MD Pai

## 2017-11-23 NOTE — PROGRESS NOTES
Calorie Counts  Intake recorded for: 11/22  Kcals: 916  Protein: 60g  # Meals Recorded: 3 meals (First - 75% sausage with gravy, 50% oatmeal, 25% milk, coffee)      (Second - 100% peaches, tuna salad sandwich on whole wheat bread)      (Third - 100% egg noodles, peaches, 75% turkey with gravy, carrots, 33% tomato soup)  # Supplements Recorded: 0

## 2017-11-23 NOTE — PLAN OF CARE
Problem: Patient Care Overview  Goal: Plan of Care/Patient Progress Review  Discharge Planner PT   Patient plan for discharge: rehab   Current status: Pt performed rolling R and L in bed for placement of brace and universal sling with mod-max A. Requires vc for sequencing and passive motion for reaching and flexion of opp LE. Pt reporting inc R shoulder pain today. Utilized OH lift to dependently transfer pt from bed>chair. Total A to reposition in chair. To promote improvement in LE strength and ROM, pt performed long sitting LE therex.   Barriers to return to prior living situation: level of A, current functional mobility, medical needs   Recommendations for discharge: TCU   Rationale for recommendations: Pt would benefit from continued skilled PT to address deficits in strength, ROM and activity tolerance and to promote progression of overall functional mobility.        Entered by: Yris Hinojosa 11/23/2017 11:34 AM

## 2017-11-23 NOTE — PLAN OF CARE
Problem: Patient Care Overview  Goal: Plan of Care/Patient Progress Review  Outcome: No Change  D/I:Able to lift arms and legs off bed now.Still having pain in neck/throat(she cannot tell which) and bilateral shoulders. Tylenol scheduled, form fitted pillow,Hot packs, ice chips and oxycodone Q 3 hours to help with that. Up to chair with lift and brace on. Incisions D/I Hemovac dressing removed.Padilla patent. One small BM this shift.was able to feed self banana this AM, but could not use spoon. Arms were very tired after breakfast and therapy.  P:would like therapy scheduled a little further from meals so her arms do not get so tired.Rehab soon.

## 2017-11-23 NOTE — PROGRESS NOTES
Received a call from primary team regarding this patient. 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   Na 131 this am. On 1000 water restriction.  No clinical changes per report.  Recommendation given to continue water restriction for now and monitor Na in am.  If Na continues to drop will consider restarting the Na tabs.   Janny Martínez  Endocrine Fellow.   --- Addendum----  I discussed plan the patient with endocrine fellow Dr. Martínez. Agree above note and plan.       Gunjan Ann MD  Staff Physician  Endocrinology and Metabolism  [facility]  License: [id number]  Pager: [phone]

## 2017-11-23 NOTE — PLAN OF CARE
Problem: Patient Care Overview  Goal: Plan of Care/Patient Progress Review  Discharge Planner SLP   Patient plan for discharge: return home to former level of independence  Current status: Pt seen for swallow tx- improved with thin liquid intake- no s/s of aspiration with thin liquids by straw. Oral prep with soft solids ( DD3) remains slower and also pt with effortful swallow. Pt did no have cervical orthotic brace on when seen for tx. Recommend continue with current DD3 with thin liquids. Pt needs to be upright for all po, take small bites/sips, alternate solids and liquids, effortful swallow x2 prn, pace self- eat slowly. NG TF to support nutritional intake. SLP to f/u for diet tolerance/ education/ readiness for further diet advancement  Barriers to return to prior living situation: weakness, dysphagia  Recommendations for discharge: likely continued dysphagia tx- pending progres IP  Rationale for recommendations: Pt not yet at baseline diet       Entered by: Quiana Calderon 11/23/2017 9:21 AM

## 2017-11-23 NOTE — PLAN OF CARE
Problem: Patient Care Overview  Goal: Plan of Care/Patient Progress Review  Discharge Planner OT   Patient plan for discharge: TCU  Current status: Instruction with /pinch tasks to improve strength and hand function. Pt requires max A for feeding this meal due to faigue and pain. Left in chair with all needs in reach.  Barriers to return to prior living situation: lift transfers, weakness, pain, medical status, precuations  Recommendations for discharge: TCU  Rationale for recommendations: Continue rehab to address ADL and transfer deficits        Entered by: Taylor Ferreira 11/23/2017 12:11 PM

## 2017-11-24 ENCOUNTER — APPOINTMENT (OUTPATIENT)
Dept: SPEECH THERAPY | Facility: CLINIC | Age: 82
DRG: 453 | End: 2017-11-24
Attending: NEUROLOGICAL SURGERY
Payer: MEDICARE

## 2017-11-24 LAB
ANION GAP SERPL CALCULATED.3IONS-SCNC: 6 MMOL/L (ref 3–14)
BUN SERPL-MCNC: 22 MG/DL (ref 7–30)
CALCIUM SERPL-MCNC: 8.3 MG/DL (ref 8.5–10.1)
CHLORIDE SERPL-SCNC: 98 MMOL/L (ref 94–109)
CO2 SERPL-SCNC: 28 MMOL/L (ref 20–32)
CREAT SERPL-MCNC: 0.4 MG/DL (ref 0.52–1.04)
DEPRECATED CALCIDIOL+CALCIFEROL SERPL-MC: <26 UG/L (ref 20–75)
GFR SERPL CREATININE-BSD FRML MDRD: >90 ML/MIN/1.7M2
GLUCOSE SERPL-MCNC: 118 MG/DL (ref 70–99)
OSMOLALITY UR: 665 MMOL/KG (ref 100–1200)
POTASSIUM SERPL-SCNC: 4.1 MMOL/L (ref 3.4–5.3)
SODIUM SERPL-SCNC: 132 MMOL/L (ref 133–144)
SODIUM SERPL-SCNC: 133 MMOL/L (ref 133–144)
VITAMIN D2 SERPL-MCNC: <5 UG/L
VITAMIN D3 SERPL-MCNC: 21 UG/L

## 2017-11-24 PROCEDURE — 84295 ASSAY OF SERUM SODIUM: CPT | Performed by: STUDENT IN AN ORGANIZED HEALTH CARE EDUCATION/TRAINING PROGRAM

## 2017-11-24 PROCEDURE — 40000225 ZZH STATISTIC SLP WARD VISIT

## 2017-11-24 PROCEDURE — 25000132 ZZH RX MED GY IP 250 OP 250 PS 637: Mod: GY | Performed by: STUDENT IN AN ORGANIZED HEALTH CARE EDUCATION/TRAINING PROGRAM

## 2017-11-24 PROCEDURE — A9270 NON-COVERED ITEM OR SERVICE: HCPCS | Mod: GY | Performed by: STUDENT IN AN ORGANIZED HEALTH CARE EDUCATION/TRAINING PROGRAM

## 2017-11-24 PROCEDURE — A9270 NON-COVERED ITEM OR SERVICE: HCPCS | Mod: GY | Performed by: NURSE PRACTITIONER

## 2017-11-24 PROCEDURE — 12000008 ZZH R&B INTERMEDIATE UMMC

## 2017-11-24 PROCEDURE — 36592 COLLECT BLOOD FROM PICC: CPT | Performed by: STUDENT IN AN ORGANIZED HEALTH CARE EDUCATION/TRAINING PROGRAM

## 2017-11-24 PROCEDURE — 25000132 ZZH RX MED GY IP 250 OP 250 PS 637: Mod: GY | Performed by: NEUROLOGICAL SURGERY

## 2017-11-24 PROCEDURE — 87086 URINE CULTURE/COLONY COUNT: CPT | Performed by: NEUROLOGICAL SURGERY

## 2017-11-24 PROCEDURE — 25000132 ZZH RX MED GY IP 250 OP 250 PS 637: Mod: GY | Performed by: NURSE PRACTITIONER

## 2017-11-24 PROCEDURE — 25000128 H RX IP 250 OP 636: Performed by: STUDENT IN AN ORGANIZED HEALTH CARE EDUCATION/TRAINING PROGRAM

## 2017-11-24 PROCEDURE — 83935 ASSAY OF URINE OSMOLALITY: CPT | Performed by: INTERNAL MEDICINE

## 2017-11-24 PROCEDURE — 80048 BASIC METABOLIC PNL TOTAL CA: CPT | Performed by: STUDENT IN AN ORGANIZED HEALTH CARE EDUCATION/TRAINING PROGRAM

## 2017-11-24 PROCEDURE — 25000125 ZZHC RX 250: Performed by: STUDENT IN AN ORGANIZED HEALTH CARE EDUCATION/TRAINING PROGRAM

## 2017-11-24 PROCEDURE — 87106 FUNGI IDENTIFICATION YEAST: CPT | Performed by: NEUROLOGICAL SURGERY

## 2017-11-24 PROCEDURE — 92526 ORAL FUNCTION THERAPY: CPT | Mod: GN

## 2017-11-24 RX ORDER — SODIUM CHLORIDE 1 G/1
1 TABLET ORAL 2 TIMES DAILY WITH MEALS
Status: DISCONTINUED | OUTPATIENT
Start: 2017-11-24 | End: 2017-11-27 | Stop reason: HOSPADM

## 2017-11-24 RX ORDER — CYCLOBENZAPRINE HCL 10 MG
2.5 TABLET ORAL 3 TIMES DAILY PRN
Status: DISCONTINUED | OUTPATIENT
Start: 2017-11-24 | End: 2017-11-27 | Stop reason: HOSPADM

## 2017-11-24 RX ORDER — OXYCODONE HCL 5 MG/5 ML
5 SOLUTION, ORAL ORAL
Status: DISCONTINUED | OUTPATIENT
Start: 2017-11-24 | End: 2017-11-25

## 2017-11-24 RX ORDER — CYCLOBENZAPRINE HCL 10 MG
5 TABLET ORAL 3 TIMES DAILY PRN
Status: DISCONTINUED | OUTPATIENT
Start: 2017-11-24 | End: 2017-11-24

## 2017-11-24 RX ADMIN — CALCIUM CARBONATE 1250 MG: 1250 SUSPENSION ORAL at 08:32

## 2017-11-24 RX ADMIN — OXYCODONE HYDROCHLORIDE 5 MG: 5 SOLUTION ORAL at 11:55

## 2017-11-24 RX ADMIN — PRAVASTATIN SODIUM 10 MG: 10 TABLET ORAL at 22:55

## 2017-11-24 RX ADMIN — Medication 100 MG: at 08:32

## 2017-11-24 RX ADMIN — ONDANSETRON 8 MG: 4 TABLET, ORALLY DISINTEGRATING ORAL at 08:46

## 2017-11-24 RX ADMIN — Medication 220 MG: at 15:24

## 2017-11-24 RX ADMIN — SENNOSIDES AND DOCUSATE SODIUM 2 TABLET: 8.6; 5 TABLET ORAL at 19:59

## 2017-11-24 RX ADMIN — OXYCODONE HYDROCHLORIDE 5 MG: 5 SOLUTION ORAL at 19:05

## 2017-11-24 RX ADMIN — Medication 3000 UNITS: at 08:32

## 2017-11-24 RX ADMIN — METOPROLOL TARTRATE 12.5 MG: 100 TABLET, FILM COATED ORAL at 08:31

## 2017-11-24 RX ADMIN — ACETAMINOPHEN 650 MG: 160 SOLUTION ORAL at 16:39

## 2017-11-24 RX ADMIN — Medication 220 MG: at 08:32

## 2017-11-24 RX ADMIN — Medication 220 MG: at 19:58

## 2017-11-24 RX ADMIN — OXYCODONE HYDROCHLORIDE 5 MG: 5 SOLUTION ORAL at 00:58

## 2017-11-24 RX ADMIN — SODIUM CHLORIDE TAB 1 GM 1 G: 1 TAB at 19:07

## 2017-11-24 RX ADMIN — HEPARIN SODIUM 5000 UNITS: 5000 INJECTION, SOLUTION INTRAVENOUS; SUBCUTANEOUS at 04:11

## 2017-11-24 RX ADMIN — Medication 2.5 MG: at 16:39

## 2017-11-24 RX ADMIN — MULTIVITAMIN 15 ML: LIQUID ORAL at 08:32

## 2017-11-24 RX ADMIN — OXYCODONE HYDROCHLORIDE 5 MG: 5 SOLUTION ORAL at 04:11

## 2017-11-24 RX ADMIN — ACETAMINOPHEN 650 MG: 160 SOLUTION ORAL at 08:31

## 2017-11-24 RX ADMIN — OXYCODONE HYDROCHLORIDE 5 MG: 5 SOLUTION ORAL at 23:56

## 2017-11-24 RX ADMIN — OXYCODONE HYDROCHLORIDE 5 MG: 5 SOLUTION ORAL at 15:24

## 2017-11-24 RX ADMIN — ACETAMINOPHEN 650 MG: 160 SOLUTION ORAL at 11:56

## 2017-11-24 RX ADMIN — OXYCODONE HYDROCHLORIDE 5 MG: 5 SOLUTION ORAL at 08:31

## 2017-11-24 RX ADMIN — HEPARIN SODIUM 5000 UNITS: 5000 INJECTION, SOLUTION INTRAVENOUS; SUBCUTANEOUS at 11:55

## 2017-11-24 RX ADMIN — ACETAMINOPHEN 650 MG: 160 SOLUTION ORAL at 00:57

## 2017-11-24 RX ADMIN — HEPARIN SODIUM 5000 UNITS: 5000 INJECTION, SOLUTION INTRAVENOUS; SUBCUTANEOUS at 19:59

## 2017-11-24 RX ADMIN — ACETAMINOPHEN 650 MG: 160 SOLUTION ORAL at 19:57

## 2017-11-24 RX ADMIN — METOPROLOL TARTRATE 12.5 MG: 100 TABLET, FILM COATED ORAL at 19:58

## 2017-11-24 RX ADMIN — SODIUM CHLORIDE, PRESERVATIVE FREE 10 ML: 5 INJECTION INTRAVENOUS at 22:55

## 2017-11-24 RX ADMIN — ACETAMINOPHEN 650 MG: 160 SOLUTION ORAL at 04:11

## 2017-11-24 RX ADMIN — CALCIUM CARBONATE 1250 MG: 1250 SUSPENSION ORAL at 19:05

## 2017-11-24 RX ADMIN — Medication 10000 UNITS: at 08:29

## 2017-11-24 RX ADMIN — SODIUM CHLORIDE, PRESERVATIVE FREE 5 ML: 5 INJECTION INTRAVENOUS at 22:56

## 2017-11-24 RX ADMIN — OXYCODONE HYDROCHLORIDE AND ACETAMINOPHEN 1000 MG: 500 TABLET ORAL at 08:31

## 2017-11-24 NOTE — PLAN OF CARE
Problem: Patient Care Overview  Goal: Plan of Care/Patient Progress Review  Outcome: Improving  AVSS. Neuros unchanged: generalized weakness, N&T in hands and ankles and confused at times. Slept well overnight. Pain partially controlled with prn oxycodne 3mg q 3 hrs and scheduled tylenol. PIV SL. Plan is for discharge to TCU today. Pt's family is concerned about pt getting nauseated when putting on brace - if TCU will be able to handle this issue - Day RN updated. TF at 70ml overnight per report started at 2300 - to be turned off at 1100. Will continue to monitor and follow with POC.

## 2017-11-24 NOTE — PROGRESS NOTES
"Neurosurgery Daily Progress Note  11/24/2017    Overnight events/subjective: No delirium. Continuing to work with PT/OT/SLP.    O/ /62 (BP Location: Right arm)  Pulse 74  Temp 98.2  F (36.8  C) (Axillary)  Resp 16  Ht 1.499 m (4' 11\")  Wt 43.6 kg (96 lb 1.9 oz)  SpO2 92%  BMI 19.41 kg/m2    Exam:   Gen: Laying in bed, comfortable appearing  MS: A&Ox3, Speech fluent and conversant   CN: Pupils round and reactive, extraocular movements intact, face symmetric, tongue midline, uvula & palate elevate symmetrically   Motor:      Delt Bi Tri WE WF    R 1 4- 4+ 4+ 4 4   L 2 4- 4+ 4+ 4 4     IP Quad Ham DF PF EHL   R 3 4+ 4+ 4+ 4+ 4+   L 3 4+ 4+ 4+ 4+ 4+     Sensory: patient endorses intact light touch sensation, but exhibits extinction of left    Non labored breathing    LABS: reviewed     A/P: Yuni Otoole is a 82 year old POD#9 s/p C1-C3 laminectomies, C2-T3 posterior segmental instrumentation, C2/3, 4/5, 6/7 facette osteotomies. C2/3, 4/5, 6/7 ACDF. C2-T3 posterior fusion w/ autograft and allograft with reduction of cervical spine.     - Neuro checks  - CTP brace; remove front for PO intake  - Following Na, no salt tabs yesterday per Endocrine; will f/u on AM Na.  - Sutures to be left in for 4 weeks  - Post-op images completed, with brace on, upright/sitting  - Diet: Tube feeds (holding today) + DD3; nutrition following. Continued calorie counts. Nocturnal TFs.   - Remove CVC  - Padilla back in due to high PVRs  - SCDs & SQH for DVT ppx  - PO diet for GI ppx  - PT/OT: TCU    Dispo: Anticipated d/c 11/24. Barriers: tolerating adequate PO diet to meet nutritional needs, rehab placement.      Please contact the neurosurgery resident on call with questions by dialing * [phone], then entering [phone] when prompted        "

## 2017-11-24 NOTE — PLAN OF CARE
Problem: Patient Care Overview  Goal: Plan of Care/Patient Progress Review    Discharge Planner SLP   Patient plan for discharge: TCU  Current status: Given reduced mastication efficiency and endurance, pt continues to benefit from modified diet textures for reduced aspiration risk. Continue dysphagia diet level 3 and thin liquids. Pt should sit upright, double swallow PRN, and alternate consistencies. Advised RN to consider trial of PO medications.  Barriers to return to prior living situation: activity tolerance, pain, intermittent confusion, weakness  Recommendations for discharge: ongoing SLP at TCU for dysphagia management  Rationale for recommendations: dysphagia, slowly improving       Entered by: Tiffanie Chatman 11/24/2017 9:39 AM

## 2017-11-24 NOTE — PROGRESS NOTES
[facility] Endocrinology Progress Note          Assessment and Plan:     Assessment and Plan:  Yuni Otoole is a 82 year old  s/p C1-C3 laminectomies, C2-T3 posterior segmental instrumentation, C2/3, 4/5, 6/7 facette osteotomies. C2/3, 4/5, 6/7 ACDF. C2-T3 posterior fusion w/ autograft and allograft with reduction of cervical spine.  This is POD #10.     Endocrinology team following from possible SIADH stand point; which has resulted in hyponatremia.  Cause of SIADH: pain, pain medication, intervention.  Urine osmolality was in the 400's at the time of her admission.    Intake/Output Summary (Last 24 hours) at 11/24/17 1307  Last data filed at 11/24/17 0700   Gross per 24 hour   Intake              810 ml   Output             1090 ml   Net             -280 ml     Urine total output was 1500 ml yesterday; while registered input was 480.  She is on water restriction @ 1000 ml daily.   Last Basic Metabolic Panel:  Lab Results   Component Value Date     11/24/2017           Restart sodium tablets at 1 gm twice per day when discharged to TCU.  Rationale for this is; current intake is much less than urine output and water restriction will not make a difference.     To confirm the dx again; we have ordered urine osmolality.      Na check 1 a week at TCU .      Needs follow up with Nephrology or endocrinology as an outpatient.             Interval History:   Complaining of pain. 'sore everywhere'.               Medications:       thiamine  100 mg Oral or Feeding Tube Daily     acetaminophen  650 mg Oral Q4H     ascorbic acid  1,000 mg Oral or Feeding Tube Daily     pravastatin  10 mg Oral or Feeding Tube At Bedtime     metoprolol  12.5 mg Oral or Feeding Tube BID     [START ON 11/25/2017] vitamin D  50,000 Units Oral or Feeding Tube Q7 Days     calcium carbonate  1,250 mg Oral or Feeding Tube BID w/meals     cholecalciferol  3,000 Units Oral or Feeding Tube Daily     vitamin A  10,000 Units Oral or Feeding  "Tube Daily     zinc sulfate  220 mg Oral or Feeding Tube TID     senna-docusate  2 tablet Oral or Feeding Tube BID     heparin  5,000 Units Subcutaneous Q8H     bisacodyl  10 mg Rectal Daily     heparin lock flush  5-10 mL Intracatheter Q24H     multivitamins with minerals  15 mL Per Feeding Tube Daily        Physical Examinations:  /69  Pulse 74  Temp 97.7  F (36.5  C) (Oral)  Resp 16  Ht 1.499 m (4' 11\")  Wt 43.6 kg (96 lb 1.9 oz)  SpO2 95%  BMI 19.41 kg/m2  Body mass index is 19.41 kg/(m^2).  Constitutional: no distress, comfortable, pleasant   Neurological: alert and oriented.   Psychological: appropriate mood           Data:   Last Basic Metabolic Panel:  Lab Results   Component Value Date     11/24/2017      Lab Results   Component Value Date    POTASSIUM 4.1 11/24/2017     Lab Results   Component Value Date    CHLORIDE 98 11/24/2017     Lab Results   Component Value Date    ANNETTA 8.3 11/24/2017     Lab Results   Component Value Date    CO2 28 11/24/2017     Lab Results   Component Value Date    BUN 22 11/24/2017     Lab Results   Component Value Date    CR 0.40 11/24/2017     Lab Results   Component Value Date     11/24/2017         Patient seen and discussed with Endocrinology attending Dr. Ann.      Janny Martínez MD  Endocrinology Fellow [phone]        --- Addendum----  I saw the patient with endocrine fellow Dr. Martínez and directly examined patient and discussed. Agree above note and plan.     Upon discharge to NH, Sodium tablet to resume and recheck Na level once a week at NH. Outpatient follow up need once.     Gunjan Ann MD  Staff Physician  Endocrinology and Metabolism  [facility]  License: [id number]  Pager: [phone]    "

## 2017-11-24 NOTE — PROGRESS NOTES
Calorie Count  Intake recorded for 11/23 Kcals: 1024  Protein: 58g  # Meals Recorded 3 meals  # Supplements Recorded 25% magic cup    Mariana Shen  Nutrition Associate

## 2017-11-24 NOTE — PLAN OF CARE
"Problem: Patient Care Overview  Goal: Plan of Care/Patient Progress Review  Patient sleeping, arousable. Neck incisions intact. Turned and repositioned. TF started at 70cc/hr. Received oxycodone for pain. Neuros unchanged. /61 (BP Location: Right arm)  Pulse 74  Temp 98  F (36.7  C) (Axillary)  Resp 16  Ht 1.499 m (4' 11\")  Wt 43.6 kg (96 lb 1.9 oz)  SpO2 92%  BMI 19.41 kg/m2        "

## 2017-11-25 ENCOUNTER — APPOINTMENT (OUTPATIENT)
Dept: PHYSICAL THERAPY | Facility: CLINIC | Age: 82
DRG: 453 | End: 2017-11-25
Attending: NEUROLOGICAL SURGERY
Payer: MEDICARE

## 2017-11-25 LAB
ANION GAP SERPL CALCULATED.3IONS-SCNC: 8 MMOL/L (ref 3–14)
BACTERIA SPEC CULT: ABNORMAL
BUN SERPL-MCNC: 22 MG/DL (ref 7–30)
CALCIUM SERPL-MCNC: 8.4 MG/DL (ref 8.5–10.1)
CHLORIDE SERPL-SCNC: 97 MMOL/L (ref 94–109)
CO2 SERPL-SCNC: 27 MMOL/L (ref 20–32)
CREAT SERPL-MCNC: 0.37 MG/DL (ref 0.52–1.04)
GFR SERPL CREATININE-BSD FRML MDRD: >90 ML/MIN/1.7M2
GLUCOSE SERPL-MCNC: 122 MG/DL (ref 70–99)
Lab: ABNORMAL
POTASSIUM SERPL-SCNC: 4.3 MMOL/L (ref 3.4–5.3)
SODIUM SERPL-SCNC: 132 MMOL/L (ref 133–144)
SODIUM SERPL-SCNC: 132 MMOL/L (ref 133–144)
SPECIMEN SOURCE: ABNORMAL

## 2017-11-25 PROCEDURE — A9270 NON-COVERED ITEM OR SERVICE: HCPCS | Mod: GY | Performed by: STUDENT IN AN ORGANIZED HEALTH CARE EDUCATION/TRAINING PROGRAM

## 2017-11-25 PROCEDURE — 97530 THERAPEUTIC ACTIVITIES: CPT | Mod: GP | Performed by: REHABILITATION PRACTITIONER

## 2017-11-25 PROCEDURE — 80048 BASIC METABOLIC PNL TOTAL CA: CPT | Performed by: STUDENT IN AN ORGANIZED HEALTH CARE EDUCATION/TRAINING PROGRAM

## 2017-11-25 PROCEDURE — 84295 ASSAY OF SERUM SODIUM: CPT | Performed by: STUDENT IN AN ORGANIZED HEALTH CARE EDUCATION/TRAINING PROGRAM

## 2017-11-25 PROCEDURE — 25000132 ZZH RX MED GY IP 250 OP 250 PS 637: Mod: GY | Performed by: NURSE PRACTITIONER

## 2017-11-25 PROCEDURE — A9270 NON-COVERED ITEM OR SERVICE: HCPCS | Mod: GY | Performed by: NURSE PRACTITIONER

## 2017-11-25 PROCEDURE — 36592 COLLECT BLOOD FROM PICC: CPT | Performed by: STUDENT IN AN ORGANIZED HEALTH CARE EDUCATION/TRAINING PROGRAM

## 2017-11-25 PROCEDURE — 12000003 ZZH R&B CRITICAL UMMC

## 2017-11-25 PROCEDURE — 40000193 ZZH STATISTIC PT WARD VISIT: Performed by: REHABILITATION PRACTITIONER

## 2017-11-25 PROCEDURE — 25000128 H RX IP 250 OP 636: Performed by: STUDENT IN AN ORGANIZED HEALTH CARE EDUCATION/TRAINING PROGRAM

## 2017-11-25 PROCEDURE — 25000132 ZZH RX MED GY IP 250 OP 250 PS 637: Mod: GY | Performed by: STUDENT IN AN ORGANIZED HEALTH CARE EDUCATION/TRAINING PROGRAM

## 2017-11-25 PROCEDURE — 27210429 ZZH NUTRITION PRODUCT INTERMEDIATE LITER

## 2017-11-25 RX ORDER — CALCIUM CARBONATE 500(1250)
1250 TABLET ORAL 2 TIMES DAILY WITH MEALS
Status: DISCONTINUED | OUTPATIENT
Start: 2017-11-25 | End: 2017-11-27 | Stop reason: HOSPADM

## 2017-11-25 RX ORDER — OXYCODONE HYDROCHLORIDE 5 MG/1
5 TABLET ORAL
Status: DISCONTINUED | OUTPATIENT
Start: 2017-11-25 | End: 2017-11-27 | Stop reason: HOSPADM

## 2017-11-25 RX ADMIN — METOPROLOL TARTRATE 12.5 MG: 100 TABLET, FILM COATED ORAL at 20:01

## 2017-11-25 RX ADMIN — MULTIVITAMIN 15 ML: LIQUID ORAL at 09:23

## 2017-11-25 RX ADMIN — ERGOCALCIFEROL 50000 UNITS: 1.25 CAPSULE, LIQUID FILLED ORAL at 09:24

## 2017-11-25 RX ADMIN — Medication 220 MG: at 20:04

## 2017-11-25 RX ADMIN — OXYCODONE HYDROCHLORIDE 5 MG: 5 SOLUTION ORAL at 03:34

## 2017-11-25 RX ADMIN — SODIUM CHLORIDE, PRESERVATIVE FREE 5 ML: 5 INJECTION INTRAVENOUS at 21:38

## 2017-11-25 RX ADMIN — CALCIUM CARBONATE 1250 MG: 1250 SUSPENSION ORAL at 18:58

## 2017-11-25 RX ADMIN — METOPROLOL TARTRATE 12.5 MG: 100 TABLET, FILM COATED ORAL at 09:22

## 2017-11-25 RX ADMIN — OXYCODONE HYDROCHLORIDE 5 MG: 5 SOLUTION ORAL at 13:31

## 2017-11-25 RX ADMIN — SODIUM CHLORIDE TAB 1 GM 1 G: 1 TAB at 18:58

## 2017-11-25 RX ADMIN — HEPARIN SODIUM 5000 UNITS: 5000 INJECTION, SOLUTION INTRAVENOUS; SUBCUTANEOUS at 20:01

## 2017-11-25 RX ADMIN — SODIUM CHLORIDE, PRESERVATIVE FREE 5 ML: 5 INJECTION INTRAVENOUS at 09:10

## 2017-11-25 RX ADMIN — OXYCODONE HYDROCHLORIDE 5 MG: 5 SOLUTION ORAL at 06:36

## 2017-11-25 RX ADMIN — ACETAMINOPHEN 650 MG: 160 SOLUTION ORAL at 20:00

## 2017-11-25 RX ADMIN — SODIUM CHLORIDE TAB 1 GM 1 G: 1 TAB at 09:23

## 2017-11-25 RX ADMIN — HEPARIN SODIUM 5000 UNITS: 5000 INJECTION, SOLUTION INTRAVENOUS; SUBCUTANEOUS at 13:31

## 2017-11-25 RX ADMIN — Medication 100 MG: at 09:22

## 2017-11-25 RX ADMIN — OXYCODONE HYDROCHLORIDE 5 MG: 5 SOLUTION ORAL at 20:01

## 2017-11-25 RX ADMIN — ACETAMINOPHEN 650 MG: 160 SOLUTION ORAL at 03:35

## 2017-11-25 RX ADMIN — ACETAMINOPHEN 650 MG: 160 SOLUTION ORAL at 10:43

## 2017-11-25 RX ADMIN — ACETAMINOPHEN 650 MG: 160 SOLUTION ORAL at 16:36

## 2017-11-25 RX ADMIN — OXYCODONE HYDROCHLORIDE 5 MG: 5 SOLUTION ORAL at 16:36

## 2017-11-25 RX ADMIN — OXYCODONE HYDROCHLORIDE AND ACETAMINOPHEN 1000 MG: 500 TABLET ORAL at 09:24

## 2017-11-25 RX ADMIN — OXYCODONE HYDROCHLORIDE 5 MG: 5 SOLUTION ORAL at 09:22

## 2017-11-25 RX ADMIN — HEPARIN SODIUM 5000 UNITS: 5000 INJECTION, SOLUTION INTRAVENOUS; SUBCUTANEOUS at 03:34

## 2017-11-25 RX ADMIN — ACETAMINOPHEN 650 MG: 160 SOLUTION ORAL at 16:37

## 2017-11-25 RX ADMIN — Medication 220 MG: at 16:36

## 2017-11-25 RX ADMIN — SENNOSIDES AND DOCUSATE SODIUM 1 TABLET: 8.6; 5 TABLET ORAL at 20:01

## 2017-11-25 RX ADMIN — CALCIUM CARBONATE 1250 MG: 1250 SUSPENSION ORAL at 09:23

## 2017-11-25 RX ADMIN — Medication 220 MG: at 09:22

## 2017-11-25 RX ADMIN — PRAVASTATIN SODIUM 10 MG: 10 TABLET ORAL at 21:38

## 2017-11-25 NOTE — PROGRESS NOTES
Neurosurgery Brief Note     Okay to remove feeding tube now. Will continue advancing PO diet as tolerated.     Giulia Chang MD  Neurosurgery resident PGY3

## 2017-11-25 NOTE — PLAN OF CARE
Problem: Patient Care Overview  Goal: Plan of Care/Patient Progress Review  Outcome: Improving  AVSS. Neuros unchanged: generalized weakness, Numbness in hands and ankles and confused at times. Slept well overnight. Pain partially controlled with prn oxycodne 3mg q 3 hrs and scheduled tylenol. PIV SL. Plan is for discharge to TCU once placement is found. TF at 70ml overnight to be turned off at 0800. Repositioned q 2 hours. 1 soft smear of BM in brief. Padilla intact continue to have low UOP. Will continue to monitor and follow with POC.

## 2017-11-25 NOTE — PLAN OF CARE
Problem: Patient Care Overview  Goal: Plan of Care/Patient Progress Review  Outcome: No Change  7P-11P  POD#9 s/p C1-C3 laminectomies, C2-T3 posterior segmental instrumentation, C2/3, 4/5, 6/7 facette osteotomies. C2/3, 4/5, 6/7 ACDF. C2-T3 posterior fusion w/ autograft and allograft with reduction of cervical spine. VSS. Neuros stable: generalized weakness, L IJ Hep locked. Up w/2A and lift. T/repo q2hrs and PRN. Pt has blanchable redness in alf area, barrier cream applied. ?Needs  on when OOB. TF running via at 70 mL/h from 8PM-8AM. On DD3 w/thins and anthony counts. Possible removal of NJ tomorrow. Padilla in place for retention. MD notified by previous shift of low UOP. Pt had small BM. Plan is to DC to TCU. Continue to monitor and follow POC.

## 2017-11-25 NOTE — PROGRESS NOTES
Social Work Services Progress Note    Hospital Day: 15  Date of Initial Social Work Evaluation:  11/21/17  Collaborated with: SNF admissions at [facility] and [facility], Neurosurgery team, Pt, spouse, son in law    Data:  Pt is a 82 year old female being followed by SW for discharge planning to TCU.  Plan today is for the naso feeding tube to be removed.    Intervention:  SW discussed POC with neurosurgery team.  Pt is medically being followed through the weekend for placement to TCU.  NISHA has also contacted the following facilities for discharge planning:   [facility] - left a message with admissions.  Family states this is the closest facility and easiest to transport to with pt's spouse.  Family would like to know Monday if this facility has openings.  PH: [phone]  F: [phone]    [facility] - accepted the pt for TCU.  Facility has openings tomorrow and Monday.  Family state they would like this to be a second option as this is further from home.  PH: [phone]  F: [phone]    Family is aware that pt will need a stretcher ride at discharge and are aware that insurance may not cover the cost of the ride.  Monday SW will need to set up a stretcher transport to TCU due to physical limitations of riding in a passenger car.    Assessment: Pt and family in agreement with TCU discharge planning.  Family preferring [facility] if possible as this is closest to pt's spouse. If [facility] is unable to accommodate pt's needs, family would choose [facility] as this facility has formally accepted pt for cares once the naso feeding tube has been removed.    Plan:    Anticipated Disposition:  TCU    Barriers to d/c plan:  Medical stability off naso feeding tube.    Follow Up: SW to hand off to weekday unit SW to assist with discharge planning.  Neurosurgery team updated on weekend.    MISAEL Vargas, APSW  Weekend Adult Acute Care   Pager [phone]  Care Management Dept  [phone]

## 2017-11-25 NOTE — PLAN OF CARE
Problem: Pain, Acute (Adult)  Goal: Identify Related Risk Factors and Signs and Symptoms  Related risk factors and signs and symptoms are identified upon initiation of Human Response Clinical Practice Guideline (CPG).   Outcome: Improving  POD#9 s/p C1-C3 laminectomies, C2-T3 posterior segmental instrumentation, C2/3, 4/5, 6/7 facette osteotomies. C2/3, 4/5, 6/7 ACDF. C2-T3 posterior fusion w/ autograft and allograft with reduction of cervical spine. VSS. Neuros stable: generalized weakness, N/T in hands and ankles. Pain partially controlled w/ PRN oxy q3hrs and scheduled tylenol and ice packs.  Spoke w/MD regarding a muscle relaxant, PRN flexeril ordered and given w/good relief. L IJ Hep locked. Up w/2A and lift. T/repo q2hrs and PRN. Pt has blanchable redness in alf area, barrier cream applied.  Needs  on when OOB. TF cycled at night. On DD3 w/thins; pt had good intake today. Padilla in place for retention. MD notified of low UOP. Pt had BM today.  Plan is to DC to TCU. Continue to monitor and follow POC.

## 2017-11-25 NOTE — PROGRESS NOTES
"Neurosurgery Daily Progress Note  11/25/2017    Overnight events/subjective: No \"body aches\" reported. No confusion. Marginal increase in calorie intake. Stable exam.    O/ /65  Pulse 74  Temp 98.7  F (37.1  C) (Axillary)  Resp 16  Ht 1.499 m (4' 11\")  Wt 43.6 kg (96 lb 1.9 oz)  SpO2 94%  BMI 19.41 kg/m2    Exam:   Gen: Laying in bed, comfortable appearing  MS: A&Ox3, Speech fluent and conversant   CN: Pupils round and reactive, extraocular movements intact, face symmetric, tongue midline, uvula & palate elevate symmetrically   Motor:      Delt Bi Tri WE WF    R 1 4- 4+ 4+ 4 4   L 2 4- 4+ 4+ 4 4     IP Quad Ham DF PF EHL   R 3 4+ 4+ 4+ 4+ 4+   L 3 4+ 4+ 4+ 4+ 4+     Sensory: patient endorses intact light touch sensation, but exhibits extinction of left    Non labored breathing    LABS: reviewed     A/P: Yuni Otoole is a 82 year old POD#10 s/p C1-C3 laminectomies, C2-T3 posterior segmental instrumentation, C2/3, 4/5, 6/7 facette osteotomies. C2/3, 4/5, 6/7 ACDF. C2-T3 posterior fusion w/ autograft and allograft with reduction of cervical spine.     - Neuro checks  - CTP brace; remove front for PO intake  - Following Na, no salt tabs yesterday per Endocrine; will f/u on AM Na.  - Sutures to be left in for 4 weeks  - Post-op images completed, with brace on, upright/sitting  - Diet: Tube feeds (holding today) + DD3; nutrition following. Continued calorie counts. Nocturnal TFs. Will consider PEG tube next week if PO intake remains inadequate.  - Padilla back in due to high PVRs  - SCDs & SQH for DVT ppx  - PO diet for GI ppx  - PT/OT: TCU    Dispo: Anticipated d/c 11/24. Barriers: tolerating adequate PO diet to meet nutritional needs, rehab placement.      Please contact the neurosurgery resident on call with questions by dialing * [phone], then entering [phone] when prompted      I have reviewed the history above and agree with the resident's assessment and plan.  AMBER Palacio MD  "

## 2017-11-25 NOTE — PROGRESS NOTES
"SPIRITUAL HEALTH SERVICES  SPIRITUAL ASSESSMENT Progress Note  [facility] ([place]) 6A    REFERRAL SOURCE: Length of Stay.    Yuni was accompanied by her  Ed, and son Rex, during my visit. Short visit. Reflective conversation with Yuni and family, touching on elements of Yuni's health narrative and goals of care. She expressed a desire to \"eat more solid food,\" but is prevented by \"this tube... which requires me to eat soft foods.\" Ed and Yuni are Religion. Petr Goodman is Lutheran. Prayer welcomed and offered. Follow up  care is welcomed.    PLAN: I will follow up once per week for spiritual support while Yuni remains on 6A.                                                                                                                                           Hernan Escobedo   Intern  Pager [phone]      "

## 2017-11-25 NOTE — PLAN OF CARE
Problem: Patient Care Overview  Goal: Plan of Care/Patient Progress Review  PT - per plan established by the Physical Therapist, according to functional mobility the  discharge recommendation is rehab.  Pt needing mod to max A x 1-2 for all bed mob, and stand pivot transfer to recliner.Discharge Planner PT   Patient plan for discharge: rehab.   Current status: see above.   Barriers to return to prior living situation: weakness, fatigue.   Recommendations for discharge: TCU   Rationale for recommendations: skilled PT to  Progress functional mobility.        Entered by: Giorgi Higuera 11/25/2017 2:09 PM

## 2017-11-26 ENCOUNTER — APPOINTMENT (OUTPATIENT)
Dept: PHYSICAL THERAPY | Facility: CLINIC | Age: 82
DRG: 453 | End: 2017-11-26
Attending: NEUROLOGICAL SURGERY
Payer: MEDICARE

## 2017-11-26 LAB
ANION GAP SERPL CALCULATED.3IONS-SCNC: 6 MMOL/L (ref 3–14)
BUN SERPL-MCNC: 18 MG/DL (ref 7–30)
CALCIUM SERPL-MCNC: 8.8 MG/DL (ref 8.5–10.1)
CHLORIDE SERPL-SCNC: 96 MMOL/L (ref 94–109)
CO2 SERPL-SCNC: 30 MMOL/L (ref 20–32)
CREAT SERPL-MCNC: 0.35 MG/DL (ref 0.52–1.04)
GFR SERPL CREATININE-BSD FRML MDRD: >90 ML/MIN/1.7M2
GLUCOSE SERPL-MCNC: 86 MG/DL (ref 70–99)
POTASSIUM SERPL-SCNC: 4.3 MMOL/L (ref 3.4–5.3)
SODIUM SERPL-SCNC: 132 MMOL/L (ref 133–144)
SODIUM SERPL-SCNC: 132 MMOL/L (ref 133–144)

## 2017-11-26 PROCEDURE — 84295 ASSAY OF SERUM SODIUM: CPT | Performed by: STUDENT IN AN ORGANIZED HEALTH CARE EDUCATION/TRAINING PROGRAM

## 2017-11-26 PROCEDURE — A9270 NON-COVERED ITEM OR SERVICE: HCPCS | Mod: GY | Performed by: STUDENT IN AN ORGANIZED HEALTH CARE EDUCATION/TRAINING PROGRAM

## 2017-11-26 PROCEDURE — 25000132 ZZH RX MED GY IP 250 OP 250 PS 637: Mod: GY | Performed by: STUDENT IN AN ORGANIZED HEALTH CARE EDUCATION/TRAINING PROGRAM

## 2017-11-26 PROCEDURE — 25000128 H RX IP 250 OP 636: Performed by: STUDENT IN AN ORGANIZED HEALTH CARE EDUCATION/TRAINING PROGRAM

## 2017-11-26 PROCEDURE — 12000008 ZZH R&B INTERMEDIATE UMMC

## 2017-11-26 PROCEDURE — 36592 COLLECT BLOOD FROM PICC: CPT | Performed by: STUDENT IN AN ORGANIZED HEALTH CARE EDUCATION/TRAINING PROGRAM

## 2017-11-26 PROCEDURE — 97110 THERAPEUTIC EXERCISES: CPT | Mod: GP

## 2017-11-26 PROCEDURE — 80048 BASIC METABOLIC PNL TOTAL CA: CPT | Performed by: STUDENT IN AN ORGANIZED HEALTH CARE EDUCATION/TRAINING PROGRAM

## 2017-11-26 PROCEDURE — A9270 NON-COVERED ITEM OR SERVICE: HCPCS | Mod: GY | Performed by: NEUROLOGICAL SURGERY

## 2017-11-26 PROCEDURE — 40000193 ZZH STATISTIC PT WARD VISIT

## 2017-11-26 PROCEDURE — 97530 THERAPEUTIC ACTIVITIES: CPT | Mod: GP

## 2017-11-26 PROCEDURE — 25000132 ZZH RX MED GY IP 250 OP 250 PS 637: Mod: GY | Performed by: NEUROLOGICAL SURGERY

## 2017-11-26 RX ORDER — ACETAMINOPHEN 325 MG/1
650 TABLET ORAL EVERY 4 HOURS
Status: DISCONTINUED | OUTPATIENT
Start: 2017-11-26 | End: 2017-11-27 | Stop reason: HOSPADM

## 2017-11-26 RX ADMIN — SODIUM CHLORIDE TAB 1 GM 1 G: 1 TAB at 19:02

## 2017-11-26 RX ADMIN — ACETAMINOPHEN 650 MG: 325 TABLET, FILM COATED ORAL at 05:33

## 2017-11-26 RX ADMIN — SODIUM CHLORIDE, PRESERVATIVE FREE 5 ML: 5 INJECTION INTRAVENOUS at 17:54

## 2017-11-26 RX ADMIN — SODIUM CHLORIDE, PRESERVATIVE FREE 5 ML: 5 INJECTION INTRAVENOUS at 09:38

## 2017-11-26 RX ADMIN — METOPROLOL TARTRATE 12.5 MG: 25 TABLET, FILM COATED ORAL at 09:45

## 2017-11-26 RX ADMIN — Medication 3000 UNITS: at 09:46

## 2017-11-26 RX ADMIN — Medication 220 MG: at 21:13

## 2017-11-26 RX ADMIN — ACETAMINOPHEN 650 MG: 325 TABLET, FILM COATED ORAL at 21:11

## 2017-11-26 RX ADMIN — ACETAMINOPHEN 650 MG: 325 TABLET, FILM COATED ORAL at 12:44

## 2017-11-26 RX ADMIN — ACETAMINOPHEN 650 MG: 325 TABLET, FILM COATED ORAL at 16:43

## 2017-11-26 RX ADMIN — ACETAMINOPHEN 650 MG: 325 TABLET, FILM COATED ORAL at 00:09

## 2017-11-26 RX ADMIN — Medication 220 MG: at 14:37

## 2017-11-26 RX ADMIN — Medication 10000 UNITS: at 09:46

## 2017-11-26 RX ADMIN — OXYCODONE HYDROCHLORIDE 5 MG: 5 TABLET ORAL at 12:44

## 2017-11-26 RX ADMIN — OXYCODONE HYDROCHLORIDE 5 MG: 5 TABLET ORAL at 16:43

## 2017-11-26 RX ADMIN — HEPARIN SODIUM 5000 UNITS: 5000 INJECTION, SOLUTION INTRAVENOUS; SUBCUTANEOUS at 03:25

## 2017-11-26 RX ADMIN — Medication 220 MG: at 09:45

## 2017-11-26 RX ADMIN — CALCIUM 1250 MG: 500 TABLET ORAL at 09:47

## 2017-11-26 RX ADMIN — HEPARIN SODIUM 5000 UNITS: 5000 INJECTION, SOLUTION INTRAVENOUS; SUBCUTANEOUS at 21:10

## 2017-11-26 RX ADMIN — ACETAMINOPHEN 650 MG: 325 TABLET, FILM COATED ORAL at 09:47

## 2017-11-26 RX ADMIN — SODIUM CHLORIDE TAB 1 GM 1 G: 1 TAB at 09:46

## 2017-11-26 RX ADMIN — HEPARIN SODIUM 5000 UNITS: 5000 INJECTION, SOLUTION INTRAVENOUS; SUBCUTANEOUS at 12:44

## 2017-11-26 RX ADMIN — OXYCODONE HYDROCHLORIDE 5 MG: 5 TABLET ORAL at 21:03

## 2017-11-26 RX ADMIN — OXYCODONE HYDROCHLORIDE 5 MG: 5 TABLET ORAL at 09:44

## 2017-11-26 RX ADMIN — MULTIVITAMIN 15 ML: LIQUID ORAL at 09:44

## 2017-11-26 RX ADMIN — OXYCODONE HYDROCHLORIDE 5 MG: 5 TABLET ORAL at 00:09

## 2017-11-26 RX ADMIN — OXYCODONE HYDROCHLORIDE 5 MG: 5 TABLET ORAL at 05:29

## 2017-11-26 RX ADMIN — PRAVASTATIN SODIUM 10 MG: 10 TABLET ORAL at 21:11

## 2017-11-26 RX ADMIN — METOPROLOL TARTRATE 12.5 MG: 25 TABLET, FILM COATED ORAL at 21:11

## 2017-11-26 RX ADMIN — OXYCODONE HYDROCHLORIDE AND ACETAMINOPHEN 1000 MG: 500 TABLET ORAL at 09:46

## 2017-11-26 RX ADMIN — CALCIUM 1250 MG: 500 TABLET ORAL at 19:02

## 2017-11-26 RX ADMIN — Medication 100 MG: at 09:46

## 2017-11-26 NOTE — PROGRESS NOTES
"Neurosurgery Daily Progress Note  11/26/2017    Overnight events/subjective: Feeding tube removed yesterday. Improved PO intake, especially now with the feeding tube out she feels she can eat more.     O/ /78 (BP Location: Right arm)  Pulse 74  Temp 97.9  F (36.6  C) (Axillary)  Resp 16  Ht 1.499 m (4' 11\")  Wt 43.6 kg (96 lb 1.9 oz)  SpO2 94%  BMI 19.41 kg/m2    Exam:   Gen: Laying in bed, comfortable appearing  MS: A&Ox3, Speech fluent and conversant   CN: Pupils round and reactive, extraocular movements intact, face symmetric, tongue midline, uvula & palate elevate symmetrically   Motor:      Delt Bi Tri WE WF    R 1 4- 4+ 4+ 4 4   L 2 4- 4+ 4+ 4 4     IP Quad Ham DF PF EHL   R 3 4+ 4+ 4+ 4+ 4+   L 3 4+ 4+ 4+ 4+ 4+     Sensory: patient endorses intact light touch sensation, but exhibits extinction of left    Non labored breathing    LABS: reviewed     A/P: Yuni Otoole is a 82 year old POD#11 s/p C1-C3 laminectomies, C2-T3 posterior segmental instrumentation, C2/3, 4/5, 6/7 facet   osteotomies. C2/3, 4/5, 6/7 ACDF. C2-T3 posterior fusion w/ autograft and allograft with reduction of cervical spine.     - Neuro checks  - CTP brace; remove front for PO intake  - Na tabs 1 BID per endocrine recommendations.   - Sutures to be left in for 4 weeks  - Post-op images completed, with brace on, upright/sitting  - Diet: DD3, removed FT. Calorie counts improved.   - Padilla back in due to high PVRs  - SCDs & SQH for DVT ppx  - PO diet for GI ppx  - PT/OT: TCU    Dispo: Anticipated d/c 11/24. Barriers:rehab accepting near her home is not accepting over weekend       Please contact the neurosurgery resident on call with questions by dialing [phone], then entering [phone] when prompted      I have reviewed the history.I have seen and examined the patient myself and agree with the assessment and plan above.  AMBER Palacio MD    "

## 2017-11-26 NOTE — PLAN OF CARE
Problem: Patient Care Overview  Goal: Plan of Care/Patient Progress Review  Discharge Planner PT   Patient plan for discharge: Rehab closer to home  Current status: Pt required mod to max A x 1-2  for rolling in bed and supine <>sit transfer to promote bed mobility. Total assist with overhead lift from EOB to recliner with max assist for repositioning to facilitate upright seating and ability to complete feeding. Pt needed max assist of 1 with therapist facilitated multidirectional reaching at EOB to improve sitting balance/functional reach. Pt tolerated supine LE isometric/isotonic exercises and LE passive stretching for tone management.  Barriers to return to prior living situation: Medical needs, level of assist, impaired functional mobility, C spine precautions  Recommendations for discharge: TCU  Rationale for recommendations: Pt is motivated and would benefit from continued skilled therapy services to increase safe mobility.       Entered by: Cipriano Phillips 11/26/2017 4:34 PM

## 2017-11-26 NOTE — PLAN OF CARE
Problem: Patient Care Overview  Goal: Individualization & Mutuality  Outcome: Improving  AVSS. Neuros unchanged: generalized weakness, Numbness in hands and ankles and confused at times. Slept well overnight, refused part of 0400 neuro check d/t sleepy; pt also stated that she was in pain but refused to take pain meds stating too tired at 0400; was more awake at 0530 and took pain meds. Pain partially controlled with prn oxycodone 3mg and scheduled Tylenol. PIV SL. Plan is for discharge to TCU once placement is found. Repositioned q 2 hours. Padilla intact and patent. Will continue to monitor and follow with POC.

## 2017-11-26 NOTE — PLAN OF CARE
Problem: Pain, Acute (Adult)  Goal: Identify Related Risk Factors and Signs and Symptoms  Related risk factors and signs and symptoms are identified upon initiation of Human Response Clinical Practice Guideline (CPG).   Outcome: Improving  POD#10 s/p C1-C3 laminectomies, C2-T3 posterior segmental instrumentation, C2/3, 4/5, 6/7 facette osteotomies. C2/3, 4/5, 6/7 ACDF. C2-T3 posterior fusion w/ autograft and allograft with reduction of cervical spine. VSS. Neuros stable: generalized weakness, N/T in hands and ankles. Pain partially controlled w/ PRN oxy q3hrs and scheduled tylenol and ice packs.  L IJ Hep locked. Up w/2A and lift. T/repo q2hrs and PRN. Up in the chair for 1 hour today.  Pt has blanchable redness in alf area, barrier cream applied.  Needs  on when OOB. NG removed at 1700. On DD3 w/thins; pt had good intake today. Padilla in place for retention, cares done, adequate UOP. Pt had BM today.  Plan is to DC to TCU Sunday or Monday. Continue to monitor and follow POC.

## 2017-11-26 NOTE — PLAN OF CARE
Problem: Pain, Acute (Adult)  Goal: Identify Related Risk Factors and Signs and Symptoms  Related risk factors and signs and symptoms are identified upon initiation of Human Response Clinical Practice Guideline (CPG).   Outcome: Improving  POD#11 s/p C1-C3 laminectomies, C2-T3 posterior segmental instrumentation, C2/3, 4/5, 6/7 facette osteotomies. C2/3, 4/5, 6/7 ACDF. C2-T3 posterior fusion w/ autograft and allograft with reduction of cervical spine. VSS except HTN within parameters. Neuros stable: generalized weakness, N/T in hands and ankles. Pain partially controlled w/ PRN oxy q3hrs, scheduled tylenol and ice packs.  L IJ Hep locked. Up w/2A and lift. T/repo q2hrs and PRN. Up in the chair for 1 hour today.  Pt has blanchable redness in alf area, barrier cream applied.  Needs  on when OOB. On DD3 w/thins; pt had good intake today. Padilla in place for retention, cares done, adequate UOP. No BM today.   Plan is to DC to TCU when placement is found. Continue to monitor and follow POC.

## 2017-11-27 ENCOUNTER — APPOINTMENT (OUTPATIENT)
Dept: SPEECH THERAPY | Facility: CLINIC | Age: 82
DRG: 453 | End: 2017-11-27
Attending: NEUROLOGICAL SURGERY
Payer: MEDICARE

## 2017-11-27 VITALS
SYSTOLIC BLOOD PRESSURE: 139 MMHG | DIASTOLIC BLOOD PRESSURE: 57 MMHG | OXYGEN SATURATION: 95 % | HEART RATE: 77 BPM | HEIGHT: 59 IN | RESPIRATION RATE: 18 BRPM | WEIGHT: 92.59 LBS | BODY MASS INDEX: 18.67 KG/M2 | TEMPERATURE: 97.3 F

## 2017-11-27 LAB
ALBUMIN SERPL-MCNC: 2.5 G/DL (ref 3.4–5)
PREALB SERPL IA-MCNC: 18 MG/DL (ref 15–45)
SODIUM SERPL-SCNC: 134 MMOL/L (ref 133–144)

## 2017-11-27 PROCEDURE — 36415 COLL VENOUS BLD VENIPUNCTURE: CPT | Performed by: STUDENT IN AN ORGANIZED HEALTH CARE EDUCATION/TRAINING PROGRAM

## 2017-11-27 PROCEDURE — 25000125 ZZHC RX 250: Performed by: STUDENT IN AN ORGANIZED HEALTH CARE EDUCATION/TRAINING PROGRAM

## 2017-11-27 PROCEDURE — 40000225 ZZH STATISTIC SLP WARD VISIT: Performed by: SPEECH-LANGUAGE PATHOLOGIST

## 2017-11-27 PROCEDURE — 82040 ASSAY OF SERUM ALBUMIN: CPT | Performed by: STUDENT IN AN ORGANIZED HEALTH CARE EDUCATION/TRAINING PROGRAM

## 2017-11-27 PROCEDURE — 25000132 ZZH RX MED GY IP 250 OP 250 PS 637: Mod: GY | Performed by: STUDENT IN AN ORGANIZED HEALTH CARE EDUCATION/TRAINING PROGRAM

## 2017-11-27 PROCEDURE — 25000128 H RX IP 250 OP 636: Performed by: STUDENT IN AN ORGANIZED HEALTH CARE EDUCATION/TRAINING PROGRAM

## 2017-11-27 PROCEDURE — 84295 ASSAY OF SERUM SODIUM: CPT | Performed by: STUDENT IN AN ORGANIZED HEALTH CARE EDUCATION/TRAINING PROGRAM

## 2017-11-27 PROCEDURE — A9270 NON-COVERED ITEM OR SERVICE: HCPCS | Mod: GY | Performed by: STUDENT IN AN ORGANIZED HEALTH CARE EDUCATION/TRAINING PROGRAM

## 2017-11-27 PROCEDURE — 25000132 ZZH RX MED GY IP 250 OP 250 PS 637: Mod: GY | Performed by: NEUROLOGICAL SURGERY

## 2017-11-27 PROCEDURE — A9270 NON-COVERED ITEM OR SERVICE: HCPCS | Mod: GY | Performed by: NEUROLOGICAL SURGERY

## 2017-11-27 PROCEDURE — 84134 ASSAY OF PREALBUMIN: CPT | Performed by: STUDENT IN AN ORGANIZED HEALTH CARE EDUCATION/TRAINING PROGRAM

## 2017-11-27 PROCEDURE — 92526 ORAL FUNCTION THERAPY: CPT | Mod: GN | Performed by: SPEECH-LANGUAGE PATHOLOGIST

## 2017-11-27 RX ORDER — OXYCODONE HYDROCHLORIDE 5 MG/1
5 TABLET ORAL
Qty: 90 TABLET | Refills: 0 | Status: SHIPPED | OUTPATIENT
Start: 2017-11-27 | End: 2017-12-05

## 2017-11-27 RX ORDER — PRAVASTATIN SODIUM 10 MG
10 TABLET ORAL AT BEDTIME
Qty: 30 TABLET | COMMUNITY
Start: 2017-11-27 | End: 2018-05-07

## 2017-11-27 RX ORDER — SODIUM CHLORIDE 1 G/1
1 TABLET ORAL 2 TIMES DAILY WITH MEALS
COMMUNITY
Start: 2017-11-27 | End: 2017-11-28

## 2017-11-27 RX ORDER — ACETAMINOPHEN 325 MG/1
650 TABLET ORAL EVERY 4 HOURS
Qty: 100 TABLET | Refills: 0 | COMMUNITY
Start: 2017-11-27 | End: 2017-11-28 | Stop reason: DRUGHIGH

## 2017-11-27 RX ORDER — CYCLOBENZAPRINE HCL 5 MG
2.5 TABLET ORAL 3 TIMES DAILY PRN
Qty: 60 TABLET | Refills: 0 | Status: SHIPPED | OUTPATIENT
Start: 2017-11-27 | End: 2018-03-22

## 2017-11-27 RX ORDER — HEPARIN SODIUM 5000 [USP'U]/.5ML
5000 INJECTION, SOLUTION INTRAVENOUS; SUBCUTANEOUS EVERY 8 HOURS
COMMUNITY
Start: 2017-11-27 | End: 2018-01-02

## 2017-11-27 RX ORDER — POLYETHYLENE GLYCOL 3350 17 G/17G
17 POWDER, FOR SOLUTION ORAL DAILY PRN
Qty: 7 PACKET | COMMUNITY
Start: 2017-11-27 | End: 2019-02-21

## 2017-11-27 RX ORDER — CHOLECALCIFEROL (VITAMIN D3) 50 MCG
3000 TABLET ORAL DAILY
Qty: 100 TABLET | Refills: 0 | COMMUNITY
Start: 2017-11-27 | End: 2018-01-10

## 2017-11-27 RX ORDER — METOPROLOL TARTRATE 25 MG/1
12.5 TABLET, FILM COATED ORAL 2 TIMES DAILY
Qty: 60 TABLET | COMMUNITY
Start: 2017-11-27 | End: 2019-05-21

## 2017-11-27 RX ORDER — ERGOCALCIFEROL 1.25 MG/1
50000 CAPSULE, LIQUID FILLED ORAL
Qty: 10 CAPSULE | Refills: 0 | COMMUNITY
Start: 2017-11-27 | End: 2018-01-10 | Stop reason: DRUGHIGH

## 2017-11-27 RX ORDER — CALCIUM CARBONATE 500(1250)
1250 TABLET ORAL 2 TIMES DAILY WITH MEALS
Qty: 90 TABLET | COMMUNITY
Start: 2017-11-27 | End: 2019-02-21

## 2017-11-27 RX ORDER — AMOXICILLIN 250 MG
1 CAPSULE ORAL DAILY
Qty: 100 TABLET | COMMUNITY
Start: 2017-11-27 | End: 2019-05-21

## 2017-11-27 RX ORDER — PEDI MULTIVIT NO.128/VITAMIN K 500 MCG/ML
1 LIQUID (ML) ORAL DAILY
COMMUNITY
Start: 2017-11-27 | End: 2017-11-28

## 2017-11-27 RX ADMIN — MULTIVITAMIN 15 ML: LIQUID ORAL at 09:04

## 2017-11-27 RX ADMIN — ACETAMINOPHEN 650 MG: 325 TABLET, FILM COATED ORAL at 01:42

## 2017-11-27 RX ADMIN — OXYCODONE HYDROCHLORIDE 5 MG: 5 TABLET ORAL at 03:25

## 2017-11-27 RX ADMIN — OXYCODONE HYDROCHLORIDE AND ACETAMINOPHEN 1000 MG: 500 TABLET ORAL at 08:08

## 2017-11-27 RX ADMIN — Medication 3000 UNITS: at 09:04

## 2017-11-27 RX ADMIN — ONDANSETRON 4 MG: 4 TABLET, ORALLY DISINTEGRATING ORAL at 13:52

## 2017-11-27 RX ADMIN — CALCIUM 1250 MG: 500 TABLET ORAL at 08:08

## 2017-11-27 RX ADMIN — HEPARIN SODIUM 5000 UNITS: 5000 INJECTION, SOLUTION INTRAVENOUS; SUBCUTANEOUS at 13:52

## 2017-11-27 RX ADMIN — Medication 220 MG: at 09:04

## 2017-11-27 RX ADMIN — OXYCODONE HYDROCHLORIDE 5 MG: 5 TABLET ORAL at 06:39

## 2017-11-27 RX ADMIN — HEPARIN SODIUM 5000 UNITS: 5000 INJECTION, SOLUTION INTRAVENOUS; SUBCUTANEOUS at 05:06

## 2017-11-27 RX ADMIN — Medication 10000 UNITS: at 08:08

## 2017-11-27 RX ADMIN — ONDANSETRON 4 MG: 4 TABLET, ORALLY DISINTEGRATING ORAL at 09:45

## 2017-11-27 RX ADMIN — OXYCODONE HYDROCHLORIDE 5 MG: 5 TABLET ORAL at 10:45

## 2017-11-27 RX ADMIN — SODIUM CHLORIDE TAB 1 GM 1 G: 1 TAB at 09:04

## 2017-11-27 RX ADMIN — ACETAMINOPHEN 650 MG: 325 TABLET, FILM COATED ORAL at 13:52

## 2017-11-27 RX ADMIN — OXYCODONE HYDROCHLORIDE 5 MG: 5 TABLET ORAL at 13:52

## 2017-11-27 RX ADMIN — METOPROLOL TARTRATE 12.5 MG: 25 TABLET, FILM COATED ORAL at 09:04

## 2017-11-27 RX ADMIN — ACETAMINOPHEN 650 MG: 325 TABLET, FILM COATED ORAL at 08:08

## 2017-11-27 RX ADMIN — Medication 100 MG: at 09:04

## 2017-11-27 NOTE — PLAN OF CARE
POD #12. A&Ox4. Neuros continue to be unchanged with generalized weakness (worse in BLE) and N/T to hands and ankles. Back incision JAKE with sutures, no drainage noted. Anterior neck incision CDI with steri strips. Up with A2/lift. In chair for x1hr this afternoon, turn q2hr while in bed. Small smear this shift. Padilla patent with good UOP. Regular diet with fair PO intake. Needs assistance with eating. PRN oxy and tylenol given for generalized pain. Zofran given pre d/c for transport ride. All belongings packed with pt, reviewed d/c instructions per family request. Pt escorted via [employer] with [employer] transport at 1415.

## 2017-11-27 NOTE — DISCHARGE SUMMARY
[facility] Discharge Summary and Instructions    Yuni Otoole MRN# [id number]   Age: 82 year old YOB: 1935     Date of Admission:  11/10/2017  Date of Discharge::  11/27/2017  Admitting Physician:  Casey Merritt MD  Discharge Physician:  Enma López MD          Admission Diagnoses:   S/P cervical spinal fusion [Z98.1]          Discharge Diagnosis:   S/P cervical spinal fusion [Z98.1]          Procedures:   11/15/2017: C1-C3 Laminectomy; C2-C3 Posterior Cervical Fusion; C2/3, C3/4, C6/7 Anterior Cervical Decompression and Fusion; C2/3, C4/5, and C6/7 Facet Osteotomies; C2-T3 Instrumentation            Brief History of Illness:   Yuni Otoole is a very pleasant 82 year old female patient whose past medical is significant for hypertension and hypercholesteremia. She presented to the [facility] on 11/10/2017 with reports of progressive quadriparesis, radicular pain in the bilateral upper extremities, upper extremity paresthesias and weakness.     The patient reportedly suffered a fall in 2016 during which she sustained a C7 Fracture. The fracture was treated conservatively and healed without surgical intervention. The patient was seen in the Neurosurgery Clinic for follow-up on 9/21/2017 and was noted to have worsening cervical kyhposis as well as a fracture of the dens. Surgical intervention was recommended at that time. However, the patient chose not to pursue surgery at that time. As the patient's symptoms progressed to being unable to feed or care for herself as well as an inability to walk, she came to the Emergency Department of consultation. She was was found to have severe cervical stenosis with progressive quardiparesis. She was deemed a high-risk surgical candidate given her poor nutritional status, age and osteoporosis. However, given the severity of her cervical spine disease and her rapid decline, surgical intervention was not delayed.             Hospital Course:   The patient underwent the above named operation by Dr. Enma López on 11/15/2017. There were no intra-operative complications. The Estimated Blood Loss during the procedure was ~ 750 mL. Post-operatively, the patient was transferred to the Neuroscience ICU for routine post-operative care. She was extubated on post-operative day #1. Post-operative cervical radiographs which were obtained on 11/20/17 demonstrated expected post-operative changes and intact anterior and posterior instrumentation of the cervical spine. Throughout the patient's hospitalization, her upper extremity strength gradually began to improve. She continues to experience notable weakness in the deltoid and shoulder bilaterally. However, upon obtaining shoulder imaging she was found to have subluxation of the right shoulder humeral head in relation to the glenoid as well as a severe rotator cuff tear from many years ago. No orthopedic intervention was performed. The patient's hospitalization was notable for the following issues:    1) Malnutrition:   The patient was found to be in a malnourished state pre-operatively. On post-operative day #1, a post-pyloric feeding tube was inserted at the bedside and the patient was initiated on continuous tube feedings. She was followed by Speech Therapy and was progressed well over a period of several days from a Dysphagia Diet to a Regular Diet with Thin Liquids. She was initiated on Calorie Counts to ensure adequate nutritional intake and was found to be taking sufficient oral intake. Therefore, the nasogastric tube was removed on 11/25/2017. She was also initiated on nutritional supplements to improve her overall nutritional status. Given the complexity of her surgery, ongoing speech therapy was recommended for the patient to ensure safe swallowing. She is on Vitamin D supplementation to facilitate healing of her fusion.     2) Mild Chronic Euvolemic Hyponatremia:  "  Endocrinology for consulted for assistance in the management of the patient's mild chronic hyponatremia. 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 The etiology of the SIADH was thought to be related to pain as well as the extensive surgical intervention. She did not have notable improvement in regard to her sodium level after fluid restriction. She has been started on Salt Tablets 1G BID which she should stay on until seen for follow-up with Endocrinology upon discharge from Transitional Care Unit. While at the Transitional Care Unit, please obtain weekly Sodium Levels and Urine Osmolality Levels.     3) Pain Management:  On the day of discharge, the patient's post-operative pain was well-controlled with oral analgesics. She was tolerating Oxycodone 5 mg Q 3 HR PRN, Tylenol 650 mg Q 4 HR PRN and Flexeril 2.5 mg TID PRN. She primarily complains of pain in the right shoulder blade and posterior midline neck. However, this regimen of pain medications has been adequately controlling her pain.     4) Rehabilitation Needs:  Given the patient's progressive weakness pre-operatively and the extent of her surgical procedure, the patient was felt to benefit from ongoing Physical and Occupational Therapy in the setting of a Transitional Care Unit.     5) DVT Prophylaxis:   The patient is on Heparin 5,000 SQ Q 8 HR for DVT prophylaxis only. As she increases her mobility, this medication may be discontinued.     6) Hypertension:   The patient's blood pressure was well-controlled during her hospitalization without the addition of all of her home anti-hypertensive medications. Her home anti-hypertensive medications may be re-introduced if needed.     Examination:   /57  Pulse 77  Temp 97.3  F (36.3  C) (Oral)  Resp 18  Ht 1.499 m (4' 11\")  Wt 42 kg (92 lb 9.5 oz)  SpO2 95%  BMI 18.7 kg/m2    Gen: Laying in bed, comfortable appearing  MS: A&Ox3, Speech fluent and conversant   CN: Pupils " round and reactive, extraocular movements intact, face symmetric, tongue midline, uvula & palate elevate symmetrically   Motor:        Delt Bi Tri WE WF    R 1 4- 4+ 4+ 4 4   L 2 4- 4+ 4+ 4 4      IP Quad Ham DF PF EHL   R 3 4+ 4+ 4+ 4+ 4+   L 3 4+ 4+ 4+ 4+ 4+      Sensory: patient endorses intact light touch sensation         Discharge Medications:     Current Discharge Medication List      START taking these medications    Details   oxyCODONE IR (ROXICODONE) 5 MG tablet Take 1 tablet (5 mg) by mouth every 3 hours as needed for moderate to severe pain  Qty: 90 tablet, Refills: 0    Comments: Indication: Moderate to Severe Post-Operative Pain  Associated Diagnoses: Cervical stenosis of spine; S/P cervical spinal fusion      acetaminophen (TYLENOL) 325 MG tablet Take 2 tablets (650 mg) by mouth every 4 hours  Qty: 100 tablet, Refills: 0    Comments: Indication: Mild to Moderate Post-Operative Pain; Fever  Associated Diagnoses: Cervical stenosis of spine; S/P cervical spinal fusion      Heparin Sodium, Porcine, (HEPARIN SODIUM PF) 5000 UNIT/0.5ML injection Inject 0.5 mLs (5,000 Units) Subcutaneous every 8 hours    Comments: Indication: DVT Prophylaxis  Note: May discontinue as patient becomes more ambulatory  Associated Diagnoses: Cervical stenosis of spine; S/P cervical spinal fusion      metoprolol (LOPRESSOR) 25 MG tablet Take 0.5 tablets (12.5 mg) by mouth 2 times daily  Qty: 60 tablet    Comments: Indication: Hypertension  Associated Diagnoses: Cervical stenosis of spine; S/P cervical spinal fusion      polyethylene glycol (MIRALAX/GLYCOLAX) Packet 17 g by Oral or Feeding Tube route 3 times daily as needed for constipation  Qty: 7 packet    Comments: Indication: Prevention of Constipation with Concurrent use of Opioid Analagesics  Associated Diagnoses: Cervical stenosis of spine; S/P cervical spinal fusion      senna-docusate (SENOKOT-S;PERICOLACE) 8.6-50 MG per tablet 2 tablets by Oral or Feeding Tube route  2 times daily  Qty: 100 tablet    Comments: Indication: Prevention of Constipation with Concurrent use of Opioid Analgesics  Associated Diagnoses: Cervical stenosis of spine; S/P cervical spinal fusion      calcium carbonate (OS-ANNETTA 500 MG [place]. [place]) 1250 MG tablet Take 1 tablet (1,250 mg) by mouth 2 times daily (with meals)  Qty: 90 tablet    Comments: Indication: Vitamin Supplementation  Associated Diagnoses: Cervical stenosis of spine; S/P cervical spinal fusion      sodium chloride 1 GM tablet Take 1 tablet (1 g) by mouth 2 times daily (with meals)    Comments: Indication: Hyponatremia  Associated Diagnoses: Cervical stenosis of spine; S/P cervical spinal fusion      cyclobenzaprine (FLEXERIL) 5 MG tablet Take 0.5 tablets (2.5 mg) by mouth 3 times daily as needed for muscle spasms  Qty: 60 tablet, Refills: 0    Comments: Indication: Neck Pain; Cervical Paraspinous Muscle Spasm  Associated Diagnoses: Cervical stenosis of spine; S/P cervical spinal fusion         CONTINUE these medications which have CHANGED    Details   pravastatin (PRAVACHOL) 10 MG tablet 1 tablet (10 mg) by Oral or Feeding Tube route At Bedtime  Qty: 30 tablet    Comments: Indication: Hyperlipidemia  Associated Diagnoses: Cervical stenosis of spine; S/P cervical spinal fusion         CONTINUE these medications which have NOT CHANGED    Details   vitamin D (ERGOCALCIFEROL) 26823 UNIT capsule Take 1 capsule (50,000 Units) by mouth every 7 days  Qty: 10 capsule, Refills: 0    Comments: Indication: Supplementation      Cholecalciferol (VITAMIN D) 2000 UNITS tablet Take 3,000 Units by mouth daily  Qty: 100 tablet, Refills: 0    Comments: Indication: Supplementation      multivitamin CF formula (DEKAS PLUS) CAPS per capsule Take 1 capsule by mouth daily    Comments: Indication: Supplementation  Associated Diagnoses: Cervical stenosis of spine; S/P cervical spinal fusion      order for DME Equipment being ordered: Shower chair  Qty: 1 Device, Refills:  0    Associated Diagnoses: Nondisplaced fracture of seventh cervical vertebra, unspecified fracture morphology, initial encounter         STOP taking these medications       fentaNYL (DURAGESIC) 25 mcg/hr 72 hr patch Comments:   Reason for Stopping:         captopril (CAPOTEN) 25 MG tablet Comments:   Reason for Stopping:         hydrochlorothiazide (HYDRODIURIL) 25 MG tablet Comments:   Reason for Stopping:         diazepam (VALIUM) 5 MG tablet Comments:   Reason for Stopping:         potassium chloride SA (K-DUR/KLOR-CON M) 10 MEQ CR tablet Comments:   Reason for Stopping:         HYDROcodone-acetaminophen (NORCO) 5-325 MG per tablet Comments:   Reason for Stopping:         aspirin 81 MG tablet Comments:   Reason for Stopping:         CALCIUM + D 600-200 MG-UNIT OR TABS Comments:   Reason for Stopping:                       Discharge Instructions and Follow-Up:   Discharge diet: Regular     Discharge activity: You may advance activity as tolerated. No strenuous exercise or heay lifting greater than 10 lbs for 4 weeks or until seen and cleared in clinic.     Discharge follow-up: - Follow-up with Lana Hauser PA-C in the Neurosurgery Clinic in 2 weeks for suture removal and wound evaluation with long-cassette x-rays. Please call [phone] to schedule your appointment.     - Follow-up with Dr. Enma López in the Neurosurgery Clinic in 3 months with long cassette x-rays. Please call [phone] to schedule your appointment.    - Follow-up with Dr. Ann in the Endocrinology Clinic in about 2-4 weeks for re-evaluation of hyponatremia.     - Follow-up with your Primary Care Provider upon discharge from TCU for re-evaluation of blood pressure management.      Wound care: Ok to shower,however no scrubbing of the wound and no soaking of the wound, meaning no bathtubs or swimming pools. Pat dry only. Leave wound open to air.       Please call if you have:  1. increased pain, redness, drainage, swelling at your  incision  2. fevers > 101.5 F degrees  3. with any questions or concerns.  You may reach the Neurosurgery clinic at [phone] during regular work hours. ER at [phone].    and ask for the Neurosurgery Resident on call at [phone], during off hours or weekends.         Discharge Disposition:   Discharged to Transitional Care Unit

## 2017-11-27 NOTE — PLAN OF CARE
"Problem: Patient Care Overview  Goal: Plan of Care/Patient Progress Review  Outcome: No Change  POD #12. Pt A&Ox4. C/O of pain \"all over\" with relief of PRN Oxy. VSS. Generalized weakness. Numbness and tingling on bilateral hands and ankles. Assist of two with lift. Sarah-area looks less red, blanchable; barrier cream applied. Padilla cares done. Reposition Q2H. DD3. Continue with POC.       "

## 2017-11-27 NOTE — PROGRESS NOTES
"SPIRITUAL HEALTH SERVICES  SPIRITUAL ASSESSMENT Progress Note  [facility] ([place]) 6A    REFERRAL SOURCE: Follow up visit at  [facility].    Yuni and her daughter Marlene were arranging lunch for Yuni when I stopped by for follow up. They celebrated that Yuni would be going home today, \"It won't be as easy for my children.\" Marlene stated in response, \"But dad will be happy.\" Prayer for safe transport requested and shared.    PLAN: Need met. No follow up needed.                                                                                                                                           Hernan Escobedo   Intern  Pager [phone]      "

## 2017-11-27 NOTE — PROGRESS NOTES
"[place] [facility]  PRIMARY CARE PROVIDER AND CLINIC:  Jonathan Vincent [address]  / [facility] [address]  Chief Complaint   Patient presents with     Hospital F/U     Clinic Care Coordination - Initial     HPI:    Yuni Otoole is a 82 year old  (1935),admitted to the [facility]   from [facility].  Hospital stay 11/10/17 through 11/27/17.  Admitted to this facility for  rehab, medical management and nursing care.  HPI information obtained from: facility chart records, facility staff, patient report, [facility] chart review and Care Everywhere [facility] chart review.  Current issues are:        Hospital Course: Patient admitted to [facility] on 11/10 for planned procedure secondary to fall in 2016. Underwent C1-C3 Lami; C2-C3 Posterior Fusion; C2/3, C3/4, C6/7 Anterior Decompression and Fusion; C2/3, C4/5, and C6/7 Facet Osteotomies; C2-T3 Instrumentation on 11/15.  Experienced HTN inpatient, which they recommended we manage here at TCU.  Chronic hyponatremia noted and endocrine was consulted w/ f/u apt set.  NS f/u apt in 2 weeks for suture removal and films. Discharged to TCU on 11/27.     S/P cervical spinal fusion and Neck pain  Patient presents with moderate anterior cervical pain which is described at \"pulling.\" Currently taking PRN Oxycodone and scheduled Tylenol. PRN Flexeril is ordered, but no doses have been given.  Patient feels weak and has not gotten out of bed due to weakness/pain. Numbness/tingling noted in hands, and a little bit in feet which is equal bilaterally.No reports of nausea. Patient sleeping well. Hgb on 11/20 was 9.7.    Benign essential hypertension  HTN noted inpatient and this was reported by patient as well.  Chart review w/ BPs noted below. Patient denies h/a, dizziness, CP/SOB/Cough.   11/28/2017 08:55 Blood Pressure: 164 / 100 mmHg   11/28/2017 00:33 Blood Pressure: 172 / 99 mmHg   11/27/2017 16:40 Blood " Pressure: 150 / 100 mmHg   Note newly on NA CL tabs    Drug-induced constipation  Patient states that she has only had one BM since surgery and she feels full.  Noting opioid use, immobility, and hospital report of malnutrition. Padilla catheter in place, patient denies burning.    Advanced directives, counseling/discussion  Noting patient's full code status in the hospital.  However with code clarification at SNF pt is clear with me she is DNR/DNI and wants new order reflecting that.     CODE STATUS/ADVANCE DIRECTIVES DISCUSSION:   CPR/Full code   Patient's living condition: lives with spouse    ALLERGIES:Atorvastatin calcium  PAST MEDICAL HISTORY:  has a past medical history of Allergic rhinitis, cause unspecified; Pure hypercholesterolemia; and Unspecified essential hypertension. She also has no past medical history of Diabetes (H).  PAST SURGICAL HISTORY:  has a past surgical history that includes REMOVAL OF TONSILS,14+ Y/O; colonoscopy (05/30/07); carotid endarterectomy (11/07/08); INJ EPIDURAL LUMBAR/SACRAL W/WO CONTRAST (2009); DRAIN/INJECT LARGE JOINT/BURSA (2009); DRAIN/INJECT LARGE JOINT/BURSA (2010); INJ EPIDURAL CERVICAL/THORACIC W/WO CONTRAST (2010); INJ TRANSFORAMIN EPIDURAL, LUMB/SACR SINGLE (2010); Optical Tracking System Fusion Posterior Cervical Three + Levels (N/A, 11/15/2017); Laminectomy cerivcal posterior three+ levels (N/A, 11/15/2017); Optical Tracking System Fusion Cervical Anterior Three + Levels (N/A, 11/15/2017); and Fusion cervical posterior three+ levels (N/A, 11/15/2017).  FAMILY HISTORY: family history includes CANCER in her mother; CEREBROVASCULAR DISEASE in her paternal grandmother; Cardiovascular in her father; Circulatory in her paternal grandmother; DIABETES in her maternal aunt; EYE* in her mother; GASTROINTESTINAL DISEASE in her mother; Gynecology in her sister; Hypertension in her father; Lipids in her brother; Musculoskeletal Disorder in her daughter; OSTEOPOROSIS in her  mother; Obesity in her maternal grandmother and paternal grandmother.  SOCIAL HISTORY:  reports that she quit smoking about 21 years ago. She has a 5.00 pack-year smoking history. She has never used smokeless tobacco. She reports that she does not drink alcohol or use illicit drugs.    Post Discharge Medication Reconciliation Status: discharge medications reconciled, continue medications without change.  Current Outpatient Prescriptions   Medication Sig Dispense Refill     Multiple Vitamins-Minerals (MULTIVITAMIN ADULT PO) Take 1 tablet by mouth daily       acetaminophen (TYLENOL) 650 MG CR tablet Take 650 mg by mouth 3 times daily       OXYCODONE HCL PO Take 5 mg by mouth 3 times daily       AmLODIPine Besylate (NORVASC PO) Take 2.5 mg by mouth At Bedtime       Nutritional Supplements (NUTRITIONAL SUPPLEMENT PO) Take 4 oz by mouth 2 times daily       oxyCODONE IR (ROXICODONE) 5 MG tablet Take 1 tablet (5 mg) by mouth every 3 hours as needed for moderate to severe pain 90 tablet 0     Heparin Sodium, Porcine, (HEPARIN SODIUM PF) 5000 UNIT/0.5ML injection Inject 0.5 mLs (5,000 Units) Subcutaneous every 8 hours       pravastatin (PRAVACHOL) 10 MG tablet 1 tablet (10 mg) by Oral or Feeding Tube route At Bedtime 30 tablet      metoprolol (LOPRESSOR) 25 MG tablet Take 0.5 tablets (12.5 mg) by mouth 2 times daily 60 tablet      polyethylene glycol (MIRALAX/GLYCOLAX) Packet 17 g by Oral or Feeding Tube route 3 times daily as needed for constipation 7 packet      senna-docusate (SENOKOT-S;PERICOLACE) 8.6-50 MG per tablet 2 tablets by Oral or Feeding Tube route 2 times daily 100 tablet      calcium carbonate (OS-ANNETTA 500 MG [place]. [place]) 1250 MG tablet Take 1 tablet (1,250 mg) by mouth 2 times daily (with meals) 90 tablet      cyclobenzaprine (FLEXERIL) 5 MG tablet Take 0.5 tablets (2.5 mg) by mouth 3 times daily as needed for muscle spasms 60 tablet 0     vitamin D (ERGOCALCIFEROL) 26245 UNIT capsule Take 1 capsule (50,000  Units) by mouth every 7 days 10 capsule 0     Cholecalciferol (VITAMIN D) 2000 UNITS tablet Take 3,000 Units by mouth daily 100 tablet 0     order for DME Equipment being ordered: Shower chair 1 Device 0     ROS:  10 point ROS of systems including Constitutional, Eyes, Respiratory, Cardiovascular, Gastroenterology, Genitourinary, Integumentary, Muscularskeletal, Psychiatric were all negative except for pertinent positives noted in my HPI.    Exam:  BP (!) 164/100  Pulse 72  Temp 98.3  F (36.8  C)  Resp 16  SpO2 98%   Suspect inaccurate BP reading and nsg to recheck  GENERAL APPEARANCE:  Alert, in no distress, oriented, cooperative  EYES:  EOM, conjunctivae, lids, pupils and irises normal  RESP:  respiratory effort and palpation of chest normal, lungs clear to auscultation   CV:  Palpation and auscultation of heart done , regular rate and rhythm, no murmur, rub, or gallop, no edema  ABDOMEN:  normal bowel sounds, soft, nontender, no hepatosplenomegaly or other masses, no guarding or rebound  M/S: Digits and nails normal, WALKER 4/5+ strength w/ generalized weakness, equal bilaterally.   SKIN:  Inspection of skin and subcutaneous tissue baseline, Palpation of skin and subcutaneous tissue baseline. Anterior cervical incision is CDI, JAKE, approximated w/ steri-strips. Skin is overall pale.    NEURO: Cranial nerves 2-12 are normal tested and grossly at patient's baseline, Examination of sensation by touch normal except patient's reported numbness/tingling noted above.  PSYCH: oriented X 3, affect and mood normal    Lab/Diagnostic data:  CBC RESULTS:   Recent Labs   Lab Test  11/20/17   1112  11/17/17   0401   WBC  8.0  10.6   RBC  3.21*  3.35*   HGB  9.7*  10.1*   HCT  30.2*  30.6*   MCV  94  91   MCH  30.2  30.1   MCHC  32.1  33.0   RDW  13.6  14.9   PLT  246  171     Last Basic Metabolic Panel:  Recent Labs   Lab Test  11/27/17   0817  11/26/17   1757  11/26/17   0941   11/25/17   0909   NA  134  132*  132*   < >   132*   POTASSIUM   --    --   4.3   --   4.3   CHLORIDE   --    --   96   --   97   ANNETTA   --    --   8.8   --   8.4*   CO2   --    --   30   --   27   BUN   --    --   18   --   22   CR   --    --   0.35*   --   0.37*   GLC   --    --   86   --   122*    < > = values in this interval not displayed.     Liver Function Studies -   Recent Labs   Lab Test  11/27/17   0817  11/23/17   0835   11/10/17   1723  04/25/17   1206   PROTTOTAL   --    --    --   6.5*  6.9   ALBUMIN  2.5*  2.4*   < >  3.5  3.9   BILITOTAL   --    --    --   0.4  0.2   ALKPHOS   --    --    --   68  63   AST   --    --    --   16  21   ALT   --    --    --   18  25    < > = values in this interval not displayed.     TSH   Date Value Ref Range Status   11/14/2017 2.91 0.40 - 4.00 mU/L Final   04/25/2017 2.40 0.40 - 4.00 mU/L Final     ASSESSMENT/PLAN:    S/P cervical spinal fusion and Neck pain  Acute. Stable. Will adjust medication dosing to provide patient with more controlled pain regimen. Discontinue desir and follow-up as ordered below. Recheck HGB post op drop    Hyponatremia  Patient has had long-standing hyponatremia which is stable, will not pursue endocrinology consult and will continue to monitor pe rpt request. Noted low Na 5-7 yrs ago and stable. Will dc na tabs but keep fluid restriction and montior    Benign essential hypertension  Chronic. Elevated. Will start Norvasc as noted below.  Will plan to re-evaluate once pain is better controlled. - also na tabs might be exacerbating htn    Drug-induced constipation  Acute. Likely secondary to opioid use post-surgically. Will clarify bowel med orders as noted below, and implement a bowel regimen on a scheduled basis. Provider encouraged mobility, use of brace, will give nutrition supplements to promote hydration/nutrition,     Advanced directives, counseling/discussion  After counseling and education on advance directive options, and while supporting the patient's autonomy, patient  selected to become DNR/DNI and POLST was completed.     Orders:  1.  Ok to change MVI to stock supply - 1 tablet po QD.  Dx: supplement  2.  D/c NaCL tabs.  3.  Labs 11/30: Na+ (hyponatremia) & Hgb (anemia).  4.  D/c Endocrinology apt.  5.  D/c current Tylenol order.  6.  Start Tylenol ER - 8 hrs - 650 po TID scheduled  7.  Start Oxycodone 5 mg po TID scheduled and keep PRN.  8.  Give Dulcolax suppository x 1 today - repeat x 1 if no results.  Dx: constipation  9.  Start Miralax 17 gms po QD - keep PRN.  Dx: constipation  10.  Change code status to DNR/DNI per patient's wish.   11.  Start Norvasc 2.5 mg po QHS.  Dx: HTN  12.  D/c Padilla today - monitor urine output - if less than 100 cc in 8 hr or bladder pain/distention, straight cath (send UC x 1).  Dx: urine retention  13.  House nutritional supplement 4 oz po BID.  Dx: malnourishment   14. D/C previous lab orders.    This patient was seen along with a nurse practitioner student, *.  The histories and reviews of systems were obtained by her and confirmed by myself. All objective, assessments and plans were completed by myself.    Shaista Mcrae    Electronically signed by:  Alia Granados RN BSN (NP Student)  Shaista Mcrae, CRISTAL CNP

## 2017-11-27 NOTE — PLAN OF CARE
Problem: Patient Care Overview  Goal: Plan of Care/Patient Progress Review  Discharge Planner SLP   Patient plan for discharge: not stated  Current status: Recommend advance to regular textures, continue thin liquids. Mastication slow but adequate with small bites. Pt should be fully upright/alert, continue feeding assistance, alternate solids/liquids, use slow pace, and complete regular oral cares. SLP will continue to follow to ensure tolerance of diet and appropriate use of safe swallowing strategies.   Barriers to return to prior living situation: deconditioning, activity tolerance  Recommendations for discharge: TCU with ongoing SLP services  Rationale for recommendations: Need to demonstrate tolerance of diet upgrade and use of safe swallowing strategies       Entered by: Nelia Adams 11/27/2017 9:23 AM

## 2017-11-27 NOTE — PROGRESS NOTES
"Social Work Services Discharge Note      Patient Name:  Yuni Otoole     Anticipated Discharge Date:  11/27/17    Discharge Disposition:   [facility] ([phone])    Following MD:  Facility Assignment     Pre-Admission Screening (PAS) online form has been completed.  The Level of Care (LOC) is:  Determined  Confirmation Code is:  [id number].  Patient/caregiver informed of referral to [facility] for Pre-Admission Screening for skilled nursing facility (SNF) placement and to expect a phone call post discharge from SNF.     Additional Services/Equipment Arranged:  Confirmed readiness for discharge with Kat Beebe NP.   Confirmed acceptance to [facility] with Admissions ([place]).  Arranged for [employer] ([phone]) to provide stretcher transport at 2pm,  Completed a \"Physicians Certification for Transportation,\" form.       Patient / Family response to discharge plan:  Pt and son (Rex) voice understanding of the discharge plan and agreement with the discharge plan.     Persons notified of above discharge plan:  Pt, son (Rex), 6A nursing and Kat Beebe (NP)    Staff Discharge Instructions:  Please fax discharge orders and signed hard scripts for any controlled substances  (SW will complete this task).  Please print a packet and send with patient.     CTS Handoff completed:  YES    Medicare Notice of Rights provided to the patient/family:  YES    LANG Zapata  Social Work, 6A  Phone:  [phone]  Pager:  [phone]  11/27/2017        "

## 2017-11-27 NOTE — PLAN OF CARE
Problem: Patient Care Overview  Goal: Plan of Care/Patient Progress Review  Occupational Therapy Discharge Summary    Reason for therapy discharge:    Discharged to transitional care facility.    Progress towards therapy goal(s). See goals on Care Plan in [facility] electronic health record for goal details.  Goals partially met.  Barriers to achieving goals:   discharge from facility.    Therapy recommendation(s):    Continued therapy is recommended.  Rationale/Recommendations:  Increase IND and mobility for ADL performance.

## 2017-11-27 NOTE — PROGRESS NOTES
"Neurosurgery Daily Progress Note  11/27/2017    Overnight events/subjective: no acute events overnight, awaiting rehab placement.      O/ /65 (BP Location: Right arm)  Pulse 74  Temp 97.4  F (36.3  C) (Oral)  Resp 16  Ht 1.499 m (4' 11\")  Wt 43.6 kg (96 lb 1.9 oz)  SpO2 94%  BMI 19.41 kg/m2    Exam:   Gen: Laying in bed, comfortable appearing  MS: A&Ox3, Speech fluent and conversant   CN: Pupils round and reactive, extraocular movements intact, face symmetric, tongue midline, uvula & palate elevate symmetrically   Motor:      Delt Bi Tri WE WF    R 1 4- 4+ 4+ 4 4   L 2 4- 4+ 4+ 4 4     IP Quad Ham DF PF EHL   R 3 4+ 4+ 4+ 4+ 4+   L 3 4+ 4+ 4+ 4+ 4+     Sensory: patient endorses intact light touch sensation    Non labored breathing    LABS: reviewed     A/P: Yuni Otoole is a 82 year old POD#12 s/p C1-C3 laminectomies, C2-T3 posterior segmental instrumentation, C2/3, 4/5, 6/7 facette osteotomies. C2/3, 4/5, 6/7 ACDF. C2-T3 posterior fusion w/ autograft and allograft with reduction of cervical spine.     - Neuro checks  - Left subclavian CVC removed   - CTP brace; remove front for PO intake  - Na tabs 1 BID per endocrine recommendations.   - Sutures to be left in for 4 weeks  - Post-op images completed, with brace on, upright/sitting  - Diet: DD3 Calorie counts improved.   - Padilla replaced due to high PVRs  - SCDs & SQH for DVT ppx  - PO diet for GI ppx  - PT/OT: TCU    Dispo: Originally anticipated d/c 11/24, now 11/28. Barriers:rehab accepting near her home did not accept placement over the weekend       Please contact the neurosurgery resident on call with questions by dialing [phone], then entering [phone] when prompted        "

## 2017-11-27 NOTE — PLAN OF CARE
Problem: Patient Care Overview  Goal: Plan of Care/Patient Progress Review  Physical Therapy Discharge Summary    Reason for therapy discharge:    Discharged to transitional care facility.    Progress towards therapy goal(s). See goals on Care Plan in [facility] electronic health record for goal details.  Goals partially met.  Barriers to achieving goals:   discharge from facility.    Therapy recommendation(s):    Continued therapy is recommended.  Rationale/Recommendations:  To improve IND, strength and endurance with all functional mobility.

## 2017-11-28 ENCOUNTER — HOSPITAL LABORATORY (OUTPATIENT)
Dept: NURSING HOME | Facility: OTHER | Age: 82
End: 2017-11-28

## 2017-11-28 ENCOUNTER — NURSING HOME VISIT (OUTPATIENT)
Dept: GERIATRICS | Facility: CLINIC | Age: 82
End: 2017-11-28
Payer: COMMERCIAL

## 2017-11-28 ENCOUNTER — CARE COORDINATION (OUTPATIENT)
Dept: CARE COORDINATION | Facility: CLINIC | Age: 82
End: 2017-11-28

## 2017-11-28 VITALS
OXYGEN SATURATION: 98 % | DIASTOLIC BLOOD PRESSURE: 100 MMHG | HEART RATE: 72 BPM | RESPIRATION RATE: 16 BRPM | TEMPERATURE: 98.3 F | SYSTOLIC BLOOD PRESSURE: 164 MMHG

## 2017-11-28 DIAGNOSIS — I10 BENIGN ESSENTIAL HYPERTENSION: ICD-10-CM

## 2017-11-28 DIAGNOSIS — Z98.1 S/P CERVICAL SPINAL FUSION: Primary | ICD-10-CM

## 2017-11-28 DIAGNOSIS — Z71.89 ADVANCED DIRECTIVES, COUNSELING/DISCUSSION: ICD-10-CM

## 2017-11-28 DIAGNOSIS — K59.03 DRUG-INDUCED CONSTIPATION: ICD-10-CM

## 2017-11-28 DIAGNOSIS — M54.2 NECK PAIN: ICD-10-CM

## 2017-11-28 LAB
ANION GAP SERPL CALCULATED.3IONS-SCNC: 7 MMOL/L (ref 3–14)
BUN SERPL-MCNC: 14 MG/DL (ref 7–30)
CALCIUM SERPL-MCNC: 8.9 MG/DL (ref 8.5–10.1)
CHLORIDE SERPL-SCNC: 98 MMOL/L (ref 94–109)
CO2 SERPL-SCNC: 29 MMOL/L (ref 20–32)
CREAT SERPL-MCNC: 0.51 MG/DL (ref 0.52–1.04)
GFR SERPL CREATININE-BSD FRML MDRD: >90 ML/MIN/1.7M2
GLUCOSE SERPL-MCNC: 85 MG/DL (ref 70–99)
OSMOLALITY UR: 466 MMOL/KG (ref 100–1200)
POTASSIUM SERPL-SCNC: 3.9 MMOL/L (ref 3.4–5.3)
SODIUM SERPL-SCNC: 134 MMOL/L (ref 133–144)

## 2017-11-28 PROCEDURE — 99310 SBSQ NF CARE HIGH MDM 45: CPT | Performed by: NURSE PRACTITIONER

## 2017-11-28 RX ORDER — SENNOSIDES 8.6 MG
650 CAPSULE ORAL 3 TIMES DAILY
COMMUNITY
End: 2017-11-30

## 2017-11-28 NOTE — LETTER
"    11/28/2017        RE: Yuni Otoole  [address] [facility] [address]        [facility]  PRIMARY CARE PROVIDER AND CLINIC:  Jonathan Vincent [address]  / [facility] [address]  Chief Complaint   Patient presents with     Hospital F/U     Clinic Care Coordination - Initial     HPI:    Yuni Otoole is a 82 year old  (1935),admitted to the [facility]   from [facility].  Hospital stay 11/10/17 through 11/27/17.  Admitted to this facility for  rehab, medical management and nursing care.  HPI information obtained from: facility chart records, facility staff, patient report, [facility] chart review and Care Everywhere [facility] chart review.  Current issues are:        Hospital Course: Patient admitted to [facility] on 11/10 for planned procedure secondary to fall in 2016. Underwent C1-C3 Lami; C2-C3 Posterior Fusion; C2/3, C3/4, C6/7 Anterior Decompression and Fusion; C2/3, C4/5, and C6/7 Facet Osteotomies; C2-T3 Instrumentation on 11/15.  Experienced HTN inpatient, which they recommended we manage here at TCU.  Chronic hyponatremia noted and endocrine was consulted w/ f/u apt set.  NS f/u apt in 2 weeks for suture removal and films. Discharged to TCU on 11/27.     S/P cervical spinal fusion and Neck pain  Patient presents with moderate anterior cervical pain which is described at \"pulling.\" Currently taking PRN Oxycodone and scheduled Tylenol. PRN Flexeril is ordered, but no doses have been given.  Patient feels weak and has not gotten out of bed due to weakness/pain. Numbness/tingling noted in hands, and a little bit in feet which is equal bilaterally.No reports of nausea. Patient sleeping well. Hgb on 11/20 was 9.7.    Benign essential hypertension  HTN noted inpatient and this was reported by patient as well.  Chart review w/ BPs noted below. Patient denies h/a, dizziness, CP/SOB/Cough.   11/28/2017 08:55 Blood Pressure: " 164 / 100 mmHg   11/28/2017 00:33 Blood Pressure: 172 / 99 mmHg   11/27/2017 16:40 Blood Pressure: 150 / 100 mmHg   Note newly on NA CL tabs    Drug-induced constipation  Patient states that she has only had one BM since surgery and she feels full.  Noting opioid use, immobility, and hospital report of malnutrition. Padilla catheter in place, patient denies burning.    Advanced directives, counseling/discussion  Noting patient's full code status in the hospital.  However with code clarification at [facility] pt is clear with me she is DNR/DNI and wants new order reflecting that.     CODE STATUS/ADVANCE DIRECTIVES DISCUSSION:   CPR/Full code   Patient's living condition: lives with spouse    ALLERGIES:Atorvastatin calcium  PAST MEDICAL HISTORY:  has a past medical history of Allergic rhinitis, cause unspecified; Pure hypercholesterolemia; and Unspecified essential hypertension. She also has no past medical history of Diabetes (H).  PAST SURGICAL HISTORY:  has a past surgical history that includes REMOVAL OF TONSILS,14+ Y/O; colonoscopy (05/30/07); carotid endarterectomy (11/07/08); INJ EPIDURAL LUMBAR/SACRAL W/WO CONTRAST (2009); DRAIN/INJECT LARGE JOINT/BURSA (2009); DRAIN/INJECT LARGE JOINT/BURSA (2010); INJ EPIDURAL CERVICAL/THORACIC W/WO CONTRAST (2010); INJ TRANSFORAMIN EPIDURAL, LUMB/SACR SINGLE (2010); Optical Tracking System Fusion Posterior Cervical Three + Levels (N/A, 11/15/2017); Laminectomy cerivcal posterior three+ levels (N/A, 11/15/2017); Optical Tracking System Fusion Cervical Anterior Three + Levels (N/A, 11/15/2017); and Fusion cervical posterior three+ levels (N/A, 11/15/2017).  FAMILY HISTORY: family history includes CANCER in her mother; CEREBROVASCULAR DISEASE in her paternal grandmother; Cardiovascular in her father; Circulatory in her paternal grandmother; DIABETES in her maternal aunt; EYE* in her mother; GASTROINTESTINAL DISEASE in her mother; Gynecology in her sister; Hypertension in her father;  Lipids in her brother; Musculoskeletal Disorder in her daughter; OSTEOPOROSIS in her mother; Obesity in her maternal grandmother and paternal grandmother.  SOCIAL HISTORY:  reports that she quit smoking about 21 years ago. She has a 5.00 pack-year smoking history. She has never used smokeless tobacco. She reports that she does not drink alcohol or use illicit drugs.    Post Discharge Medication Reconciliation Status: discharge medications reconciled, continue medications without change.  Current Outpatient Prescriptions   Medication Sig Dispense Refill     Multiple Vitamins-Minerals (MULTIVITAMIN ADULT PO) Take 1 tablet by mouth daily       acetaminophen (TYLENOL) 650 MG CR tablet Take 650 mg by mouth 3 times daily       OXYCODONE HCL PO Take 5 mg by mouth 3 times daily       AmLODIPine Besylate (NORVASC PO) Take 2.5 mg by mouth At Bedtime       Nutritional Supplements (NUTRITIONAL SUPPLEMENT PO) Take 4 oz by mouth 2 times daily       oxyCODONE IR (ROXICODONE) 5 MG tablet Take 1 tablet (5 mg) by mouth every 3 hours as needed for moderate to severe pain 90 tablet 0     Heparin Sodium, Porcine, (HEPARIN SODIUM PF) 5000 UNIT/0.5ML injection Inject 0.5 mLs (5,000 Units) Subcutaneous every 8 hours       pravastatin (PRAVACHOL) 10 MG tablet 1 tablet (10 mg) by Oral or Feeding Tube route At Bedtime 30 tablet      metoprolol (LOPRESSOR) 25 MG tablet Take 0.5 tablets (12.5 mg) by mouth 2 times daily 60 tablet      polyethylene glycol (MIRALAX/GLYCOLAX) Packet 17 g by Oral or Feeding Tube route 3 times daily as needed for constipation 7 packet      senna-docusate (SENOKOT-S;PERICOLACE) 8.6-50 MG per tablet 2 tablets by Oral or Feeding Tube route 2 times daily 100 tablet      calcium carbonate (OS-ANNETTA 500 MG [place]. [place]) 1250 MG tablet Take 1 tablet (1,250 mg) by mouth 2 times daily (with meals) 90 tablet      cyclobenzaprine (FLEXERIL) 5 MG tablet Take 0.5 tablets (2.5 mg) by mouth 3 times daily as needed for muscle spasms 60  tablet 0     vitamin D (ERGOCALCIFEROL) 00819 UNIT capsule Take 1 capsule (50,000 Units) by mouth every 7 days 10 capsule 0     Cholecalciferol (VITAMIN D) 2000 UNITS tablet Take 3,000 Units by mouth daily 100 tablet 0     order for DME Equipment being ordered: Shower chair 1 Device 0     ROS:  10 point ROS of systems including Constitutional, Eyes, Respiratory, Cardiovascular, Gastroenterology, Genitourinary, Integumentary, Muscularskeletal, Psychiatric were all negative except for pertinent positives noted in my HPI.    Exam:  BP (!) 164/100  Pulse 72  Temp 98.3  F (36.8  C)  Resp 16  SpO2 98%   Suspect inaccurate BP reading and nsg to recheck  GENERAL APPEARANCE:  Alert, in no distress, oriented, cooperative  EYES:  EOM, conjunctivae, lids, pupils and irises normal  RESP:  respiratory effort and palpation of chest normal, lungs clear to auscultation   CV:  Palpation and auscultation of heart done , regular rate and rhythm, no murmur, rub, or gallop, no edema  ABDOMEN:  normal bowel sounds, soft, nontender, no hepatosplenomegaly or other masses, no guarding or rebound  M/S: Digits and nails normal, WALKER 4/5+ strength w/ generalized weakness, equal bilaterally.   SKIN:  Inspection of skin and subcutaneous tissue baseline, Palpation of skin and subcutaneous tissue baseline. Anterior cervical incision is CDI, JAKE, approximated w/ steri-strips. Skin is overall pale.    NEURO: Cranial nerves 2-12 are normal tested and grossly at patient's baseline, Examination of sensation by touch normal except patient's reported numbness/tingling noted above.  PSYCH: oriented X 3, affect and mood normal    Lab/Diagnostic data:  CBC RESULTS:   Recent Labs   Lab Test  11/20/17   1112  11/17/17   0401   WBC  8.0  10.6   RBC  3.21*  3.35*   HGB  9.7*  10.1*   HCT  30.2*  30.6*   MCV  94  91   MCH  30.2  30.1   MCHC  32.1  33.0   RDW  13.6  14.9   PLT  246  171     Last Basic Metabolic Panel:  Recent Labs   Lab Test  11/27/17   0817   11/26/17   1757  11/26/17   0941   11/25/17   0909   NA  134  132*  132*   < >  132*   POTASSIUM   --    --   4.3   --   4.3   CHLORIDE   --    --   96   --   97   ANNETTA   --    --   8.8   --   8.4*   CO2   --    --   30   --   27   BUN   --    --   18   --   22   CR   --    --   0.35*   --   0.37*   GLC   --    --   86   --   122*    < > = values in this interval not displayed.     Liver Function Studies -   Recent Labs   Lab Test  11/27/17   0817  11/23/17   0835   11/10/17   1723  04/25/17   1206   PROTTOTAL   --    --    --   6.5*  6.9   ALBUMIN  2.5*  2.4*   < >  3.5  3.9   BILITOTAL   --    --    --   0.4  0.2   ALKPHOS   --    --    --   68  63   AST   --    --    --   16  21   ALT   --    --    --   18  25    < > = values in this interval not displayed.     TSH   Date Value Ref Range Status   11/14/2017 2.91 0.40 - 4.00 mU/L Final   04/25/2017 2.40 0.40 - 4.00 mU/L Final     ASSESSMENT/PLAN:    S/P cervical spinal fusion and Neck pain  Acute. Stable. Will adjust medication dosing to provide patient with more controlled pain regimen. Discontinue desir and follow-up as ordered below. Recheck HGB post op drop    Hyponatremia  Patient has had long-standing hyponatremia which is stable, will not pursue endocrinology consult and will continue to monitor pe rpt request. Noted low Na 5-7 yrs ago and stable. Will dc na tabs but keep fluid restriction and montior    Benign essential hypertension  Chronic. Elevated. Will start Norvasc as noted below.  Will plan to re-evaluate once pain is better controlled. - also na tabs might be exacerbating htn    Drug-induced constipation  Acute. Likely secondary to opioid use post-surgically. Will clarify bowel med orders as noted below, and implement a bowel regimen on a scheduled basis. Provider encouraged mobility, use of brace, will give nutrition supplements to promote hydration/nutrition,     Advanced directives, counseling/discussion  After counseling and education on advance  directive options, and while supporting the patient's autonomy, patient selected to become DNR/DNI and POLST was completed.     Orders:  1.  Ok to change MVI to stock supply - 1 tablet po QD.  Dx: supplement  2.  D/c NaCL tabs.  3.  Labs 11/30: Na+ (hyponatremia) & Hgb (anemia).  4.  D/c Endocrinology apt.  5.  D/c current Tylenol order.  6.  Start Tylenol ER - 8 hrs - 650 po TID scheduled  7.  Start Oxycodone 5 mg po TID scheduled and keep PRN.  8.  Give Dulcolax suppository x 1 today - repeat x 1 if no results.  Dx: constipation  9.  Start Miralax 17 gms po QD - keep PRN.  Dx: constipation  10.  Change code status to DNR/DNI per patient's wish.   11.  Start Norvasc 2.5 mg po QHS.  Dx: HTN  12.  D/c Padilla today - monitor urine output - if less than 100 cc in 8 hr or bladder pain/distention, straight cath (send UC x 1).  Dx: urine retention  13.  House nutritional supplement 4 oz po BID.  Dx: malnourishment   14. D/C previous lab orders.    This patient was seen along with a nurse practitioner student, *.  The histories and reviews of systems were obtained by her and confirmed by myself. All objective, assessments and plans were completed by myself.    Shaista Mcrae    Electronically signed by:  Alia Granados RN BSN (NP Student)  CRISTAL Olmos CNP        Sincerely,        CRISTAL Olmos CNP

## 2017-11-28 NOTE — PLAN OF CARE
Problem: Patient Care Overview  Goal: Plan of Care/Patient Progress Review  Speech Language Therapy Discharge Summary    Reason for therapy discharge:    Discharged to transitional care facility.    Progress towards therapy goal(s). See goals on Care Plan in [facility] electronic health record for goal details.  Goals not met.  Barriers to achieving goals:   discharge from facility.    Therapy recommendation(s):    Continued therapy is recommended.  Rationale/Recommendations:  Pt discharged with diet recommendation of regular with thin liquids.  Pt had diet advanced on day of discharge. Rec. Pt f/u with SLP for diet tolerance upon transfer to TCU.

## 2017-11-28 NOTE — PROGRESS NOTES
Patient has clinic visit within 24-48 hours of Discharge so no post DC follow up call is needed                Date: 11/28/2017 Status: Barrett   Time: 12:30 PM Length: 30     Visit Type: NURSING HOME [1529]   ESTEBAN:     Provider: Shaista Mcrae APRN CNP Department: [facility]   Special Needs:   Comments:     Referral Number:   Referral Status:         CSN: [id number]   Notes: Initial hosp f/u     Made On:   11/27/2017 2:08 PM By:   URI SILVERIO [[email or web address]] (ES)

## 2017-11-30 ENCOUNTER — NURSING HOME VISIT (OUTPATIENT)
Dept: GERIATRICS | Facility: CLINIC | Age: 82
End: 2017-11-30
Payer: COMMERCIAL

## 2017-11-30 ENCOUNTER — HOSPITAL LABORATORY (OUTPATIENT)
Dept: NURSING HOME | Facility: OTHER | Age: 82
End: 2017-11-30

## 2017-11-30 VITALS
OXYGEN SATURATION: 98 % | DIASTOLIC BLOOD PRESSURE: 92 MMHG | BODY MASS INDEX: 19.33 KG/M2 | HEART RATE: 82 BPM | RESPIRATION RATE: 20 BRPM | SYSTOLIC BLOOD PRESSURE: 168 MMHG | WEIGHT: 95.7 LBS | TEMPERATURE: 98 F

## 2017-11-30 DIAGNOSIS — E87.1 HYPONATREMIA: ICD-10-CM

## 2017-11-30 DIAGNOSIS — I10 BENIGN ESSENTIAL HYPERTENSION: ICD-10-CM

## 2017-11-30 DIAGNOSIS — M54.2 NECK PAIN: ICD-10-CM

## 2017-11-30 DIAGNOSIS — K59.03 DRUG-INDUCED CONSTIPATION: ICD-10-CM

## 2017-11-30 DIAGNOSIS — D62 ANEMIA DUE TO BLOOD LOSS, ACUTE: ICD-10-CM

## 2017-11-30 DIAGNOSIS — Z98.1 S/P CERVICAL SPINAL FUSION: Primary | ICD-10-CM

## 2017-11-30 LAB
HGB BLD-MCNC: 10.5 G/DL (ref 11.7–15.7)
SODIUM SERPL-SCNC: 133 MMOL/L (ref 133–144)

## 2017-11-30 PROCEDURE — 99309 SBSQ NF CARE MODERATE MDM 30: CPT | Performed by: NURSE PRACTITIONER

## 2017-11-30 RX ORDER — SENNOSIDES 8.6 MG
650 CAPSULE ORAL EVERY 8 HOURS
COMMUNITY
End: 2018-03-22

## 2017-11-30 NOTE — PROGRESS NOTES
[facility]    Chief Complaint   Patient presents with     Nursing Home Acute       HPI:    Yuni Otoole is a 82 year old  (1935), who is being seen today for an episodic care visit at [facility].    HPI information obtained from: facility chart records, facility staff, patient report and [facility] chart review. Today's concern is:  S/P cervical spinal fusion/Neck pain  F/u pain after scheduling oxycodone 5 mg  Only 2 uses of prn flexeril and prn oxycodone 3 on wed - 1 today yet  Pt reports pain is better but still present -   Has been sitting up in chair more but still hard    Drug-induced constipation  Did have results after Tuesday's orders and now trying again today  Denies abd pain    Benign essential hypertension  F/u after starting norvasc for BP's in 200's -   Improved - see below ranges    Hyponatremia  F/u after stopping Na CL tabs - lab drawn today  Pt remains on fluid restruction.       REVIEW OF SYSTEMS:  4 point ROS including Respiratory, CV, GI and , other than that noted in the HPI,  is negative    PMH/PSH reviewed in EPIC today    BP (!) 168/92  Pulse 82  Temp 98  F (36.7  C)  Resp 20  Wt 95 lb 11.2 oz (43.4 kg)  SpO2 98%  BMI 19.33 kg/m2      GENERAL APPEARANCE:  Alert, in no distress  RESP:  respiratory effort and palpation of chest normal, auscultation of lungs dimisnished , no respiratory distress  CV:  Palpation and auscultation of heart done , rate and rhythm reg, no murmur, no peripheral edema  ABDOMEN:  normal bowel sounds, soft, nontender, no hepatosplenomegaly or other masses  M/S:   Gait and station minimally, Digits and nails normal  SKIN:  Inspection and Palpation of skin and subcutaneous tissue intact - neck incision c/d/i - no drainage - no erythemia  NEURO: 2-12 in normal limits and at patient's baseline  PSYCH:  insight and judgement, memory fair , affect and mood normal    Hospital Laboratory on 11/30/2017   Component Date Value Ref Range  Status     Hemoglobin 11/30/2017 10.5* 11.7 - 15.7 g/dL Final     Sodium 11/30/2017 133  133 - 144 mmol/L Final     Hemoglobin   Date Value Ref Range Status   11/30/2017 10.5 (L) 11.7 - 15.7 g/dL Final   11/20/2017 9.7 (L) 11.7 - 15.7 g/dL Final     ASSESSMENT/PLAN:  S/P cervical spinal fusion/Neck pain  Cont poc with medications/therapy    Drug-induced constipation  Improved with poc    Benign essential hypertension  Much improved. Based on JNC-8 goals,  patients age of 82 year old, no presence of diabetes or CKD, and goals of care goal BP is <150/90 mm Hg. Pt's BP' is higher than goal but won't adjust meds today given occasionally lows and likely 2/2 pain - will cont to treat pain - f/u next week .    Hyponatremia  Stable - chronic    Anemia due to blood loss, acute  Improved.       Orders:  Recheck Na on 12/5    Electronically signed by:  CRISTAL Olmos CNP

## 2017-11-30 NOTE — LETTER
11/30/2017        RE: Yuni Otoole  [address]  [facility] [address]        [place] [facility]    Chief Complaint   Patient presents with     Nursing Home Acute       HPI:    Yuni Otoole is a 82 year old  (1935), who is being seen today for an episodic care visit at [facility].    HPI information obtained from: facility chart records, facility staff, patient report and [facility] chart review. Today's concern is:  S/P cervical spinal fusion/Neck pain  F/u pain after scheduling oxycodone 5 mg  Only 2 uses of prn flexeril and prn oxycodone 3 on wed - 1 today yet  Pt reports pain is better but still present -   Has been sitting up in chair more but still hard    Drug-induced constipation  Did have results after Tuesday's orders and now trying again today  Denies abd pain    Benign essential hypertension  F/u after starting norvasc for BP's in 200's -   Improved - see below ranges    Hyponatremia  F/u after stopping Na CL tabs - lab drawn today  Pt remains on fluid restruction.       REVIEW OF SYSTEMS:  4 point ROS including Respiratory, CV, GI and , other than that noted in the HPI,  is negative    PMH/PSH reviewed in EPIC today    BP (!) 168/92  Pulse 82  Temp 98  F (36.7  C)  Resp 20  Wt 95 lb 11.2 oz (43.4 kg)  SpO2 98%  BMI 19.33 kg/m2      GENERAL APPEARANCE:  Alert, in no distress  RESP:  respiratory effort and palpation of chest normal, auscultation of lungs dimisnished , no respiratory distress  CV:  Palpation and auscultation of heart done , rate and rhythm reg, no murmur, no peripheral edema  ABDOMEN:  normal bowel sounds, soft, nontender, no hepatosplenomegaly or other masses  M/S:   Gait and station minimally, Digits and nails normal  SKIN:  Inspection and Palpation of skin and subcutaneous tissue intact - neck incision c/d/i - no drainage - no erythemia  NEURO: 2-12 in normal limits and at patient's baseline  PSYCH:  insight and judgement, memory fair , affect  and [facility] Laboratory on 11/30/2017   Component Date Value Ref Range Status     Hemoglobin 11/30/2017 10.5* 11.7 - 15.7 g/dL Final     Sodium 11/30/2017 133  133 - 144 mmol/L Final     Hemoglobin   Date Value Ref Range Status   11/30/2017 10.5 (L) 11.7 - 15.7 g/dL Final   11/20/2017 9.7 (L) 11.7 - 15.7 g/dL Final     ASSESSMENT/PLAN:  S/P cervical spinal fusion/Neck pain  Cont poc with medications/therapy    Drug-induced constipation  Improved with poc    Benign essential hypertension  Much improved. Based on JNC-8 goals,  patients age of 82 year old, no presence of diabetes or CKD, and goals of care goal BP is <150/90 mm Hg. Pt's BP' is higher than goal but won't adjust meds today given occasionally lows and likely 2/2 pain - will cont to treat pain - f/u next week .    Hyponatremia  Stable - chronic    Anemia due to blood loss, acute  Improved.       Orders:  Recheck Na on 12/5    Electronically signed by:  CRISTAL Olmos CNP      Sincerely,        CRISTAL Olmos CNP

## 2017-12-05 ENCOUNTER — HOSPITAL LABORATORY (OUTPATIENT)
Dept: NURSING HOME | Facility: OTHER | Age: 82
End: 2017-12-05

## 2017-12-05 ENCOUNTER — OFFICE VISIT (OUTPATIENT)
Dept: NEUROSURGERY | Facility: CLINIC | Age: 82
End: 2017-12-05

## 2017-12-05 ENCOUNTER — NURSING HOME VISIT (OUTPATIENT)
Dept: GERIATRICS | Facility: CLINIC | Age: 82
End: 2017-12-05
Payer: COMMERCIAL

## 2017-12-05 VITALS — DIASTOLIC BLOOD PRESSURE: 75 MMHG | HEART RATE: 80 BPM | SYSTOLIC BLOOD PRESSURE: 139 MMHG

## 2017-12-05 VITALS
RESPIRATION RATE: 16 BRPM | SYSTOLIC BLOOD PRESSURE: 136 MMHG | DIASTOLIC BLOOD PRESSURE: 81 MMHG | TEMPERATURE: 98.2 F | HEART RATE: 72 BPM

## 2017-12-05 DIAGNOSIS — Z98.890 POST-OPERATIVE STATE: ICD-10-CM

## 2017-12-05 DIAGNOSIS — Z98.1 S/P CERVICAL SPINAL FUSION: Primary | ICD-10-CM

## 2017-12-05 DIAGNOSIS — K59.03 DRUG-INDUCED CONSTIPATION: ICD-10-CM

## 2017-12-05 DIAGNOSIS — M54.2 NECK PAIN: ICD-10-CM

## 2017-12-05 DIAGNOSIS — M47.12 CERVICAL SPONDYLOSIS WITH MYELOPATHY: Primary | ICD-10-CM

## 2017-12-05 DIAGNOSIS — I10 BENIGN ESSENTIAL HYPERTENSION: ICD-10-CM

## 2017-12-05 DIAGNOSIS — E87.1 HYPONATREMIA: ICD-10-CM

## 2017-12-05 LAB — SODIUM SERPL-SCNC: 135 MMOL/L (ref 133–144)

## 2017-12-05 PROCEDURE — 99309 SBSQ NF CARE MODERATE MDM 30: CPT | Performed by: NURSE PRACTITIONER

## 2017-12-05 NOTE — LETTER
12/5/2017       RE: Yuni Otoole  [address]  [facility] [address]     Dear Colleague,    Thank you for referring your patient, Yuni Otoole, to the [facility] NEUROSURGERY at [facility]. Please see a copy of my visit note below.          Neurosurgery clinic post op wound check  Date of visit: 12/5/2017      Procedure:  11/15/17 René López [place]  Diagnosis:   1. 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 Cervical stenosis with myelopathy  3. Malnutrition  4. Osteoporosis and vitamin D deficiency  5. SIADH-induced hyponatremia   Procedure: three intraoperative position changes:  Part 1:  1. Stealth-guided posterior segmental instrumentation C2-T3  2. Cervical laminectomy C1-C3  3. Facet osteotomies Cervical 2-3, Cervical 4-5, Cervical 6-7,  4. Use of intraoperative neuromonitoring  5. Use of intraoperative fluoroscopy  6. Use of intraoperative neuronavigation      Part 2:   1. Right Approach Cervical 2-3, Cervical 4-5 and Cervical 6-7 Anterior Cervical Discectomy And Fusion  2. [place] tongs placement for cervical traction for deformity reduction  3. Use of intraoperative microscope  4. Use of intraoperative neuromonitoring  5. Use of intraoperative fluoroscopy      Part 3:   1. Cervical 2-Thoracic 3 Posterior Fusion With Autograft And Allograft  2. Reduction of cervical spine with osteotomy closure and kyphoscoliosis correction  3. Use of intraoperative neuromonitoring  4. Use of intraoperative fluoroscopy    Implants: Synthes synapse screws with 3.5mm to 5mm transitional rods posteriorly; zero P lordotic anterior implants   Findings: Significant stenosis at C1-C2, well decompressed. Good reduction seen on final XR     Indications for Surgery:  Yuni Otoole is a 82 year old female presenting to the Emergency room with numbness in her hands bilaterally and severe bilateral upper extremity and proximal lower extremity weakness,  referable to severe C1-C2 cervical stenosis with progressive kyphotic deformity and kyphoscoliosis with chin on chest deformity. Also notable is severe malnutrition from poor oral intake without given of 3 and severe vitamin D deficiency with osteoporosis. The patient had previously seen Dr. Yuan in clinic who recommended considering surgical intervention, however the patient was lost to follow-up and presented in extremis. My partner on call at her admission Dr. Merritt asked me to take over surgical care and I met with the patient and her family in detail to explain operative risks, benefits, and alternatives.     Due to her 63 degrees rigid kyphosis from C2-C7 and severe cord compression with 3mm canal diameter at C1-C2, I recommended a C2-T3 posterior-anterior-posterior 540-degrees fusion; Part 1: C1-C3 posterior decompression and C2-T3 instrumentation with multilevel osteotomies; Part 2: C4-5, C6-7, possible C2-3 anterior diskectomy and interbody placement possible additional levels, Part 3: C2-T3 posterior fusion with deformity correction.  Surgery may need to be staged due to patient's age and complexity.      I explained that the patient is highest-risk category for perioperative complications due to magnitude of surgery needed, malnutrition, age, suspected osteoporosis/known vitamin D deficiency.      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 I relayed to the patient and family my concern for high risk for perioperative morbidity and mortality.  I told her she absolutely will need a nasojejunal feeding tube for perioperative nutrition, and potentially a PEG tube.  She has high risk of ventilator dependency and potential need for trach.  She has very high risk of instrumentation complications from poor bone density.  She has very high risk of infection and wound non-healing due to poor nutritional status.  We have been managing her  SIADH-induced hyponatremia with the assistance of Endocrinology,and ruled out hypothyroidism and adrenal insufficiency. We have been treating her malnutrition with goal protein intake of 1.5 grams protein per kg body weight per day with Arginaid and Ensure Plus as well as vitamin A, C, E, and zinc supplementation ordered for wound healing supplementation. Her 25-OH vitamin D level is 17; we started her on ergocalciferol 50,000IU every 3 days x 30 days, and cholecalciferol 5000 IU daily x 6 months; needs to have repeat 25-OH vitamin D level in 4 weeks.      I discussed the above co-morbidities with patient and her family, and I also discussed surgical risks in great detail including high risks of possible paralysis and ventilator dependence, possible trach/PEG, possible failure to improve, or fracture of instrumentation or adjacent segment with need for further surgery, possible infection and failure of wound healing.     I spoke about the need for neck immobilization with cervical spine precautions for general endotracheal intubation for anesthesia, and also for the need to keep the blood pressure elevated to ensure continued spinal cord perfusion during induction of anesthesia and during surgery itself until pressure is relieved on the spinal cord.  I discussed surgical risk including bleeding, infection, nerve or spinal cord damage, failure to heal/failure to form fusion/instrumentation failure, CSF leak, weakness/paralysis, numbness, worsening pain or failure to improve including neuropathic pain, recurrence of problem, and potential for development of instability or adjacent segment disease, and potential need for further procedures or surgeries. I discussed potential of failure to improve or even worsening despite surgery.  I specifically discussed injury to the carotid artery and internal jugular vein and vertebral artery with potential bleeding or stroke, injury to the superior laryngeal nerve resulting in  hoarseness, retraction injury to the esophagus or penetration injury to the esophagus causing dysphagia with potential need for temporary or permanent feeding tube placement, formation of a hematoma resulting in neurologic deficit, worse C5 palsy resulting in shoulder and bicep plegia, spinal cord injury with resulting pain, numbness, or weakness that may be permanent or even paralysis.    I discussed the risks of general anesthesia including stroke, heart attack, pneumonia, prolonged intubation, blood clot, pulmonary embolism, and urinary tract infection. I discussed risks of positioning including pressure ulcerations, skin tears or abrasions, pressure neuropathies, or even rarely blindness.  The patient and her family understood and wished to proceed.    HPI:  Inpatient:   Yuni Otoole is a very pleasant 82 year old female patient whose past medical is significant for hypertension and hypercholesteremia. She presented to the [facility] on 11/10/2017 with reports of progressive quadriparesis, radicular pain in the bilateral upper extremities, upper extremity paresthesias and weakness.      The patient reportedly suffered a fall in 2016 during which she sustained a C7 Fracture. The fracture was treated conservatively and healed without surgical intervention. The patient was seen in the Neurosurgery Clinic for follow-up on 9/21/2017 and was noted to have worsening cervical kyphosis as well as a fracture of the dens. Surgical intervention was recommended at that time. However, the patient chose not to pursue surgery at that time. As the patient's symptoms progressed to being unable to feed or care for herself as well as an inability to walk, she came to the Emergency Department of consultation. She was found to have severe cervical stenosis with progressive quadriparesis. She was deemed a high-risk surgical candidate given her poor nutritional status, age and osteoporosis. However, given  the severity of her cervical spine disease and her rapid decline, surgical intervention was not delayed There were no intra-operative complications. The Estimated Blood Loss during the procedure was ~ 750 mL. Post-operatively, the patient was transferred to the Neuroscience ICU for routine post-operative care. She was extubated on post-operative day #1. Post-operative cervical radiographs which were obtained on 11/20/17 demonstrated expected post-operative changes and intact anterior and posterior instrumentation of the cervical spine. Throughout the patient's hospitalization, her upper extremity strength gradually began to improve. She continues to experience notable weakness in the deltoid and shoulder bilaterally. However, upon obtaining shoulder imaging she was found to have subluxation of the right shoulder humeral head in relation to the glenoid as well as a severe rotator cuff tear from many years ago. No orthopedic intervention was performed. The patient's hospitalization was notable for the following issues:  1) Malnutrition:   The patient was found to be in a malnourished state pre-operatively. On post-operative day #1, a post-pyloric feeding tube was inserted at the bedside and the patient was initiated on continuous tube feedings. She was followed by Speech Therapy and was progressed well over a period of several days from a Dysphagia Diet to a Regular Diet with Thin Liquids. She was initiated on Calorie Counts to ensure adequate nutritional intake and was found to be taking sufficient oral intake. Therefore, the nasogastric tube was removed on 11/25/2017. She was also initiated on nutritional supplements to improve her overall nutritional status. Given the complexity of her surgery, ongoing speech therapy was recommended for the patient to ensure safe swallowing. She is on Vitamin D supplementation to facilitate healing of her fusion.   2) Mild Chronic Euvolemic Hyponatremia:   Endocrinology for  consulted for assistance in the management of the patient's mild chronic hyponatremia. 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 The etiology of the SIADH was thought to be related to pain as well as the extensive surgical intervention. She did not have notable improvement in regard to her sodium level after fluid restriction. She has been started on Salt Tablets 1G BID which she should stay on until seen for follow-up with Endocrinology upon discharge from Transitional Care Unit. While at the Transitional Care Unit, please obtain weekly Sodium Levels and Urine Osmolality Levels.    3) Pain Management:  On the day of discharge, the patient's post-operative pain was well-controlled with oral analgesics. She was tolerating Oxycodone 5 mg Q 3 HR PRN, Tylenol 650 mg Q 4 HR PRN and Flexeril 2.5 mg TID PRN. She primarily complains of pain in the right shoulder blade and posterior midline neck. However, this regimen of pain medications has been adequately controlling her pain.   4) Rehabilitation Needs:  Given the patient's progressive weakness pre-operatively and the extent of her surgical procedure, the patient was felt to benefit from ongoing Physical and Occupational Therapy in the setting of a Transitional Care Unit.   5) DVT Prophylaxis:   The patient is on Heparin 5,000 SQ Q 8 HR for DVT prophylaxis only. As she increases her mobility, this medication may be discontinued.   6) Hypertension:   The patient's blood pressure was well-controlled during her hospitalization without the addition of all of her home anti-hypertensive medications. Her home anti-hypertensive medications may be re-introduced if needed  Outpatient:   She is now 2 weeks post op.     Since discharge she has done well in the care center. Her family reports they are treating her well and caring for her excellently. She is eating well, and pain close attention to her nutritional status. She is wearing her brace every time  she is upright for any reason. Neurologically she is undergoing therapies, and had nice return of sensation in her hands early on.  She presents for routine wound check and suture/staple removal.    Patient Supplied Answers To the [facility] Pain Questionnaire  [facility] Pain -  Patient Entered Questionnaire/Answers 12/5/2017   What number best describes your pain right now:  0 = No pain  to  10 = Worst pain imaginable 4   How would you describe the pain? numbness   Which of the following worsen your pain? -   Which of the following improve or reduce your pain?  medication   What number best describes your average pain for the past week:  0 = No pain  to  10 = Worst pain imaginable 6   What number best describes your LOWEST pain in past 24 hours:  0 = No pain  to  10 = Worst pain imaginable 4   What number best describes your WORST pain in past 24 hours:  0 = No pain  to  10 = Worst pain imaginable 8   When is your pain worst? Night   What non-medicine treatments have you already had for your pain? none   Have you tried treating your pain with medication?  -   Are you currently taking medications for your pain? Yes            Current Outpatient Prescriptions:      acetaminophen (TYLENOL ARTHRITIS PAIN) 650 MG CR tablet, Take 650 mg by mouth every 8 hours, Disp: , Rfl:      Multiple Vitamins-Minerals (MULTIVITAMIN ADULT PO), Take 1 tablet by mouth daily, Disp: , Rfl:      OXYCODONE HCL PO, Take 5 mg by mouth 3 times daily, Disp: , Rfl:      AmLODIPine Besylate (NORVASC PO), Take 2.5 mg by mouth At Bedtime, Disp: , Rfl:      Nutritional Supplements (NUTRITIONAL SUPPLEMENT PO), Take 4 oz by mouth 2 times daily, Disp: , Rfl:      oxyCODONE IR (ROXICODONE) 5 MG tablet, Take 1 tablet (5 mg) by mouth every 3 hours as needed for moderate to severe pain, Disp: 90 tablet, Rfl: 0     Heparin Sodium, Porcine, (HEPARIN SODIUM PF) 5000 UNIT/0.5ML injection, Inject 0.5 mLs (5,000 Units) Subcutaneous every 8 hours, Disp: , Rfl:      pravastatin  (PRAVACHOL) 10 MG tablet, 1 tablet (10 mg) by Oral or Feeding Tube route At Bedtime, Disp: 30 tablet, Rfl:      metoprolol (LOPRESSOR) 25 MG tablet, Take 0.5 tablets (12.5 mg) by mouth 2 times daily, Disp: 60 tablet, Rfl:      polyethylene glycol (MIRALAX/GLYCOLAX) Packet, 17 g by Oral or Feeding Tube route 3 times daily as needed for constipation (also QD scheduled) , Disp: 7 packet, Rfl:      senna-docusate (SENOKOT-S;PERICOLACE) 8.6-50 MG per tablet, 2 tablets by Oral or Feeding Tube route 2 times daily, Disp: 100 tablet, Rfl:      calcium carbonate (OS-ANNETTA 500 MG [place]) 1250 MG tablet, Take 1 tablet (1,250 mg) by mouth 2 times daily (with meals), Disp: 90 tablet, Rfl:      cyclobenzaprine (FLEXERIL) 5 MG tablet, Take 0.5 tablets (2.5 mg) by mouth 3 times daily as needed for muscle spasms, Disp: 60 tablet, Rfl: 0     vitamin D (ERGOCALCIFEROL) 44736 UNIT capsule, Take 1 capsule (50,000 Units) by mouth every 7 days, Disp: 10 capsule, Rfl: 0     Cholecalciferol (VITAMIN D) 2000 UNITS tablet, Take 3,000 Units by mouth daily, Disp: 100 tablet, Rfl: 0     order for DME, Equipment being ordered: Shower chair, Disp: 1 Device, Rfl: 0  Allergies   Allergen Reactions     Atorvastatin Calcium      Was on Lipitor and has muscle problem     PMH, SOC HIST, FAM HIST, PROBLEM LIST:  All reviewed in EPIC.    OBJECTIVE:  /75  Pulse 80    Imaging:  None new.    EXAM:  Well developed very thin but well nourished female found on transport [facility] No apparent distress. She is accompanied by her  and daughter.  A&O X3.  Mood and affect WNL. Language and fund of knowledge intact.     She has a nicely healing incision anteriorly without sutures. I gently removed tissue glue. Posteriorly the incision looks quite good as well. There were multiple interrupteds.  Because of the nutritional status and concern about healing. The wound looked remarkably good even so,  I prepped the wound with chloroprep and cleanly removed every  other nylon suture without difficulty. Not wearing her collar and brace today, because she presents on a gurney.    Exam at discharge:      Delt Bi Tri WE WF    R 1 4- 4+ 4+ 4 4   L 2 4- 4+ 4+ 4 4      IP Quad Ham DF PF EHL   R 3 4+ 4+ 4+ 4+ 4+   L 3 4+ 4+ 4+ 4+ 4+       Exam today:  About the same.    Assessment/Plan:  1. Cervical spondylosis with myelopathy      Yuni Otoole is doing well after her very large surgical cervical fusion. She has experienced early recovery of neurologic function. She has taken very much to heart Dr. López admonitions to eat well and this is apparent in her remarkably good wound healing. I am being cautious about removing sutures. Perhaps more than I need to be. But I'm really pleased so far. I will see her again in a week and take out the remaining half of the posterior sutures. Meantime I've instructed the care center to wash the wounds daily with gentle soap and water.  Next visit in 1 week with no imaging. The following visit will be in 6 weeks postop with cervical spine films as well as cervical thoracic long films.    She will follow up with Dr. López at the three-month visit with long cassette x-rays. She will follow-up with  in endocrine clinic at around 4 weeks postop for evaluation of hyponatremia. Vitamin D levels will be followed at the 6 week visit.    We appreciate the opportunity to be of service in the care of this pleasant patient.  Please do call if there is anything more we can do    Lana Hauser PA-C  [facility]  Department of Neurosurgery  Phone: [phone]  Fax: [phone]    This note was generated using voice recognition software. While edited for content some inaccurate phrasing may be found.            Again, thank you for allowing me to participate in the care of your patient.      Sincerely,    Lana Hauser PA-C

## 2017-12-05 NOTE — LETTER
12/5/2017        RE: Yuni Otoole  [address]  [facility] [address]        [facility]    Chief Complaint   Patient presents with     Nursing Home Acute       HPI:    Yuni Otoole is a 82 year old  (1935), who is being seen today for an episodic care visit at [facility].    HPI information obtained from: facility chart records, facility staff, patient report and [facility] chart review. Today's concern is:  S/P cervical spinal fusion/Neck pain  Still having sig pain despite me adding scheduled oxycodone 5 tid - using prn 5 mg about QID  Still being bed ridden most of the day and very limited time in chair and therapy.  On heparin    Drug-induced constipation  Stable now with medications -     Hyponatremia  Stable with DC of NA cl tabs and continued fluid restriction    Benign essential hypertension  Started norvasc on 11/28 - f/u today and bp's remain stable/controlled.       REVIEW OF SYSTEMS:  4 point ROS including Respiratory, CV, GI and , other than that noted in the HPI,  is negative    PMH/PSH reviewed in EPIC today    /81  Pulse 72  Temp 98.2  F (36.8  C)  Resp 16  GENERAL APPEARANCE:  Alert, in no distress  Neck with incision c/d/i - stiff and guarded  Resp - non labored, clear lungs t/o  CV - HRRRR no edema  ABD - soft, non tender - BS +    Hospital Laboratory on 12/05/2017   Component Date Value Ref Range Status     Sodium 12/05/2017 135  133 - 144 mmol/L Final         ASSESSMENT/PLAN:  S/P cervical spinal fusion/Neck pain  Will increase scheduled oxy given acute pain not controlled. - f/u with neuro surg later today    Drug-induced constipation  Stable but given increase oxy will increase senna    Hyponatremia  Stable - cont fluid restriction    Benign essential hypertension  Stable - if continues to be below 130 consistently will DC new norvasc.       1.  Increase Oxycodone to 7.5 mg po QID scheduled and change PRN to 5 mg po TID PRN.  Dx: pain  2.   Increase Senna S to 3 tablets po BID.  Dx: constipation  3.  Na+ recheck in 1 week.      Electronically signed by:  CRISTAL Olmos CNP      Sincerely,        CRISTAL Olmos CNP

## 2017-12-05 NOTE — PROGRESS NOTES
[facility]    Chief Complaint   Patient presents with     Nursing Home Acute       HPI:    Yuni Otoole is a 82 year old  (1935), who is being seen today for an episodic care visit at [facility].    HPI information obtained from: facility chart records, facility staff, patient report and [facility] chart review. Today's concern is:  S/P cervical spinal fusion/Neck pain  Still having sig pain despite me adding scheduled oxycodone 5 tid - using prn 5 mg about QID  Still being bed ridden most of the day and very limited time in chair and therapy.  On heparin    Drug-induced constipation  Stable now with medications -     Hyponatremia  Stable with DC of NA cl tabs and continued fluid restriction    Benign essential hypertension  Started norvasc on 11/28 - f/u today and bp's remain stable/controlled.       REVIEW OF SYSTEMS:  4 point ROS including Respiratory, CV, GI and , other than that noted in the HPI,  is negative    PMH/PSH reviewed in EPIC today    /81  Pulse 72  Temp 98.2  F (36.8  C)  Resp 16  GENERAL APPEARANCE:  Alert, in no distress  Neck with incision c/d/i - stiff and guarded  Resp - non labored, clear lungs t/o  CV - HRRRR no edema  ABD - soft, non tender - BS +    Hospital Laboratory on 12/05/2017   Component Date Value Ref Range Status     Sodium 12/05/2017 135  133 - 144 mmol/L Final         ASSESSMENT/PLAN:  S/P cervical spinal fusion/Neck pain  Will increase scheduled oxy given acute pain not controlled. - f/u with neuro surg later today    Drug-induced constipation  Stable but given increase oxy will increase senna    Hyponatremia  Stable - cont fluid restriction    Benign essential hypertension  Stable - if continues to be below 130 consistently will DC new norvasc.       1.  Increase Oxycodone to 7.5 mg po QID scheduled and change PRN to 5 mg po TID PRN.  Dx: pain  2.  Increase Senna S to 3 tablets po BID.  Dx: constipation  3.  Na+ recheck in 1  week.      Electronically signed by:  CRISTAL Olmos CNP

## 2017-12-05 NOTE — MR AVS SNAPSHOT
After Visit Summary   12/5/2017    Yuni Otoole    MRN: [id number]           Patient Information     Date Of Birth          1935        Visit Information        Provider Department      12/5/2017 2:30 PM Lana Hauser PA-C [facility] Neurosurgery        Today's Diagnoses     Cervical spondylosis with myelopathy    -  1       Follow-ups after your visit        Your next 10 appointments already scheduled     Dec 14, 2017  2:00 PM CST   (Arrive by 1:45 PM)   Return Visit with MARIA LUISA Alcaraz [facility] Neurosurgery ([facility] [place])    [address] [place] [id number] [phone]              Who to contact     Please call your clinic at [phone] to:    Ask questions about your health    Make or cancel appointments    Discuss your medicines    Learn about your test results    Speak to your doctor   If you have compliments or concerns about an experience at your clinic, or if you wish to file a complaint, please contact [facility] Patient Relations at [phone] or email us at [email or web address]         Additional Information About Your Visit        MyChart Information     [employer] gives you secure access to your electronic health record. If you see a primary care provider, you can also send messages to your care team and make appointments. If you have questions, please call your primary care clinic.  If you do not have a primary care provider, please call [phone] and they will assist you.      [employer] is an electronic gateway that provides easy, online access to your medical records. With [employer], you can request a clinic appointment, read your test results, renew a prescription or communicate with your care team.     To access your existing account, please contact your [facility] or call [phone] for assistance.        Care EveryWhere ID     This is your Care  EveryWhere ID. This could be used by other organizations to access your [place] medical records  [id number]        Your Vitals Were     Pulse                   80            Blood Pressure from Last 3 Encounters:   12/05/17 139/75   12/05/17 136/81   11/30/17 (!) 168/92    Weight from Last 3 Encounters:   11/30/17 43.4 kg (95 lb 11.2 oz)   11/27/17 42 kg (92 lb 9.5 oz)   11/10/17 39.5 kg (87 lb)              Today, you had the following     No orders found for display       Primary Care Provider Office Phone # Fax #    Jonathan Vincent [phone]       [address] [facility] [address]        Equal Access to Services     CARLOS MCKNIGHT : Rajeev connoro Sodana, waaxda luqadaha, qaybta kaalmada adeegyada, elias bruno . So [facility] [phone].    ATENCIÓN: Si habla español, tiene a pierson disposición servicios gratuitos de asistencia lingüística. Llame al [phone].    We comply with applicable federal civil rights laws and [place] laws. We do not discriminate on the basis of race, color, national origin, age, disability, sex, sexual orientation, or gender identity.            Thank you!     Thank you for choosing [facility]  for your care. Our goal is always to provide you with excellent care. Hearing back from our patients is one way we can continue to improve our services. Please take a few minutes to complete the written survey that you may receive in the mail after your visit with us. Thank you!             Your Updated Medication List - Protect others around you: Learn how to safely use, store and throw away your medicines at www.disposemymeds.org.          This list is accurate as of: 12/5/17  4:24 PM.  Always use your most recent med list.                   Brand Name Dispense Instructions for use Diagnosis    calcium carbonate 1250 MG tablet    OS-ANNETTA 500 mg [place]. [place]    90 tablet    Take 1 tablet (1,250 mg) by mouth 2 times daily (with  meals)    Cervical stenosis of spine, S/P cervical spinal fusion       cyclobenzaprine 5 MG tablet    FLEXERIL    60 tablet    Take 0.5 tablets (2.5 mg) by mouth 3 times daily as needed for muscle spasms    Cervical stenosis of spine, S/P cervical spinal fusion       heparin sodium PF 5000 UNIT/0.5ML injection      Inject 0.5 mLs (5,000 Units) Subcutaneous every 8 hours    Cervical stenosis of spine, S/P cervical spinal fusion       metoprolol 25 MG tablet    LOPRESSOR    60 tablet    Take 0.5 tablets (12.5 mg) by mouth 2 times daily    Cervical stenosis of spine, S/P cervical spinal fusion       MULTIVITAMIN ADULT PO      Take 1 tablet by mouth daily        NORVASC PO      Take 2.5 mg by mouth At Bedtime        NUTRITIONAL SUPPLEMENT PO      Take 4 oz by mouth 2 times daily        order for DME     1 Device    Equipment being ordered: Shower chair    Nondisplaced fracture of seventh cervical vertebra, unspecified fracture morphology, initial encounter       * OXYCODONE HCL PO      Take 7.5 mg by mouth 4 times daily        * OXYCODONE HCL PO      Take 5 mg by mouth 3 times daily as needed        polyethylene glycol Packet    MIRALAX/GLYCOLAX    7 packet    17 g by Oral or Feeding Tube route 3 times daily as needed for constipation (also QD scheduled)    Cervical stenosis of spine, S/P cervical spinal fusion       pravastatin 10 MG tablet    PRAVACHOL    30 tablet    1 tablet (10 mg) by Oral or Feeding Tube route At Bedtime    Cervical stenosis of spine, S/P cervical spinal fusion       senna-docusate 8.6-50 MG per tablet    SENOKOT-S;PERICOLACE    100 tablet    3 tablets by Oral or Feeding Tube route 2 times daily    Cervical stenosis of spine, S/P cervical spinal fusion       TYLENOL ARTHRITIS PAIN 650 MG CR tablet   Generic drug:  acetaminophen      Take 650 mg by mouth every 8 hours        vitamin D 2000 UNITS tablet     100 tablet    Take 3,000 Units by mouth daily        vitamin D 14567 UNIT capsule     ERGOCALCIFEROL    10 capsule    Take 1 capsule (50,000 Units) by mouth every 7 days        * Notice:  This list has 2 medication(s) that are the same as other medications prescribed for you. Read the directions carefully, and ask your doctor or other care provider to review them with you.

## 2017-12-05 NOTE — PROGRESS NOTES
Neurosurgery clinic post op wound check  Date of visit: 12/5/2017      Procedure:  11/15/17 René López [place]  Diagnosis:   1. 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 Cervical stenosis with myelopathy  3. Malnutrition  4. Osteoporosis and vitamin D deficiency  5. SIADH-induced hyponatremia   Procedure: three intraoperative position changes:  Part 1:  1. Stealth-guided posterior segmental instrumentation C2-T3  2. Cervical laminectomy C1-C3  3. Facet osteotomies Cervical 2-3, Cervical 4-5, Cervical 6-7,  4. Use of intraoperative neuromonitoring  5. Use of intraoperative fluoroscopy  6. Use of intraoperative neuronavigation      Part 2:   1. Right Approach Cervical 2-3, Cervical 4-5 and Cervical 6-7 Anterior Cervical Discectomy And Fusion  2. [place] tongs placement for cervical traction for deformity reduction  3. Use of intraoperative microscope  4. Use of intraoperative neuromonitoring  5. Use of intraoperative fluoroscopy      Part 3:   1. Cervical 2-Thoracic 3 Posterior Fusion With Autograft And Allograft  2. Reduction of cervical spine with osteotomy closure and kyphoscoliosis correction  3. Use of intraoperative neuromonitoring  4. Use of intraoperative fluoroscopy    Implants: Synthes synapse screws with 3.5mm to 5mm transitional rods posteriorly; zero P lordotic anterior implants   Findings: Significant stenosis at C1-C2, well decompressed. Good reduction seen on final XR     Indications for Surgery:  Yuni Otoole is a 82 year old female presenting to the Emergency room with numbness in her hands bilaterally and severe bilateral upper extremity and proximal lower extremity weakness, referable to severe C1-C2 cervical stenosis with progressive kyphotic deformity and kyphoscoliosis with chin on chest deformity. Also notable is severe malnutrition from poor oral intake without given of 3 and severe vitamin D deficiency with osteoporosis. The patient had  previously seen Dr. Yuan in clinic who recommended considering surgical intervention, however the patient was lost to follow-up and presented in extremis. My partner on call at her admission Dr. Merritt asked me to take over surgical care and I met with the patient and her family in detail to explain operative risks, benefits, and alternatives.     Due to her 63 degrees rigid kyphosis from C2-C7 and severe cord compression with 3mm canal diameter at C1-C2, I recommended a C2-T3 posterior-anterior-posterior 540-degrees fusion; Part 1: C1-C3 posterior decompression and C2-T3 instrumentation with multilevel osteotomies; Part 2: C4-5, C6-7, possible C2-3 anterior diskectomy and interbody placement possible additional levels, Part 3: C2-T3 posterior fusion with deformity correction.  Surgery may need to be staged due to patient's age and complexity.      I explained that the patient is highest-risk category for perioperative complications due to magnitude of surgery needed, malnutrition, age, suspected osteoporosis/known vitamin D deficiency.      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 I relayed to the patient and family my concern for high risk for perioperative morbidity and mortality.  I told her she absolutely will need a nasojejunal feeding tube for perioperative nutrition, and potentially a PEG tube.  She has high risk of ventilator dependency and potential need for trach.  She has very high risk of instrumentation complications from poor bone density.  She has very high risk of infection and wound non-healing due to poor nutritional status.  We have been managing her SIADH-induced hyponatremia with the assistance of Endocrinology,and ruled out hypothyroidism and adrenal insufficiency. We have been treating her malnutrition with goal protein intake of 1.5 grams protein per kg body weight per day with Arginaid and Ensure Plus as well as  vitamin A, C, E, and zinc supplementation ordered for wound healing supplementation. Her 25-OH vitamin D level is 17; we started her on ergocalciferol 50,000IU every 3 days x 30 days, and cholecalciferol 5000 IU daily x 6 months; needs to have repeat 25-OH vitamin D level in 4 weeks.      I discussed the above co-morbidities with patient and her family, and I also discussed surgical risks in great detail including high risks of possible paralysis and ventilator dependence, possible trach/PEG, possible failure to improve, or fracture of instrumentation or adjacent segment with need for further surgery, possible infection and failure of wound healing.     I spoke about the need for neck immobilization with cervical spine precautions for general endotracheal intubation for anesthesia, and also for the need to keep the blood pressure elevated to ensure continued spinal cord perfusion during induction of anesthesia and during surgery itself until pressure is relieved on the spinal cord.  I discussed surgical risk including bleeding, infection, nerve or spinal cord damage, failure to heal/failure to form fusion/instrumentation failure, CSF leak, weakness/paralysis, numbness, worsening pain or failure to improve including neuropathic pain, recurrence of problem, and potential for development of instability or adjacent segment disease, and potential need for further procedures or surgeries. I discussed potential of failure to improve or even worsening despite surgery.  I specifically discussed injury to the carotid artery and internal jugular vein and vertebral artery with potential bleeding or stroke, injury to the superior laryngeal nerve resulting in hoarseness, retraction injury to the esophagus or penetration injury to the esophagus causing dysphagia with potential need for temporary or permanent feeding tube placement, formation of a hematoma resulting in neurologic deficit, worse C5 palsy resulting in shoulder and  bicep plegia, spinal cord injury with resulting pain, numbness, or weakness that may be permanent or even paralysis.    I discussed the risks of general anesthesia including stroke, heart attack, pneumonia, prolonged intubation, blood clot, pulmonary embolism, and urinary tract infection. I discussed risks of positioning including pressure ulcerations, skin tears or abrasions, pressure neuropathies, or even rarely blindness.  The patient and her family understood and wished to proceed.    HPI:  Inpatient:   Yuni Otoole is a very pleasant 82 year old female patient whose past medical is significant for hypertension and hypercholesteremia. She presented to the [facility] [place] on 11/10/2017 with reports of progressive quadriparesis, radicular pain in the bilateral upper extremities, upper extremity paresthesias and weakness.      The patient reportedly suffered a fall in 2016 during which she sustained a C7 Fracture. The fracture was treated conservatively and healed without surgical intervention. The patient was seen in the Neurosurgery Clinic for follow-up on 9/21/2017 and was noted to have worsening cervical kyphosis as well as a fracture of the dens. Surgical intervention was recommended at that time. However, the patient chose not to pursue surgery at that time. As the patient's symptoms progressed to being unable to feed or care for herself as well as an inability to walk, she came to the Emergency Department of consultation. She was found to have severe cervical stenosis with progressive quadriparesis. She was deemed a high-risk surgical candidate given her poor nutritional status, age and osteoporosis. However, given the severity of her cervical spine disease and her rapid decline, surgical intervention was not delayed There were no intra-operative complications. The Estimated Blood Loss during the procedure was ~ 750 mL. Post-operatively, the patient was transferred to the  Neuroscience ICU for routine post-operative care. She was extubated on post-operative day #1. Post-operative cervical radiographs which were obtained on 11/20/17 demonstrated expected post-operative changes and intact anterior and posterior instrumentation of the cervical spine. Throughout the patient's hospitalization, her upper extremity strength gradually began to improve. She continues to experience notable weakness in the deltoid and shoulder bilaterally. However, upon obtaining shoulder imaging she was found to have subluxation of the right shoulder humeral head in relation to the glenoid as well as a severe rotator cuff tear from many years ago. No orthopedic intervention was performed. The patient's hospitalization was notable for the following issues:  1) Malnutrition:   The patient was found to be in a malnourished state pre-operatively. On post-operative day #1, a post-pyloric feeding tube was inserted at the bedside and the patient was initiated on continuous tube feedings. She was followed by Speech Therapy and was progressed well over a period of several days from a Dysphagia Diet to a Regular Diet with Thin Liquids. She was initiated on Calorie Counts to ensure adequate nutritional intake and was found to be taking sufficient oral intake. Therefore, the nasogastric tube was removed on 11/25/2017. She was also initiated on nutritional supplements to improve her overall nutritional status. Given the complexity of her surgery, ongoing speech therapy was recommended for the patient to ensure safe swallowing. She is on Vitamin D supplementation to facilitate healing of her fusion.   2) Mild Chronic Euvolemic Hyponatremia:   Endocrinology for consulted for assistance in the management of the patient's mild chronic hyponatremia. 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 The etiology of the SIADH was thought to be related to pain as well as the extensive surgical intervention. She  did not have notable improvement in regard to her sodium level after fluid restriction. She has been started on Salt Tablets 1G BID which she should stay on until seen for follow-up with Endocrinology upon discharge from Transitional Care Unit. While at the Transitional Care Unit, please obtain weekly Sodium Levels and Urine Osmolality Levels.    3) Pain Management:  On the day of discharge, the patient's post-operative pain was well-controlled with oral analgesics. She was tolerating Oxycodone 5 mg Q 3 HR PRN, Tylenol 650 mg Q 4 HR PRN and Flexeril 2.5 mg TID PRN. She primarily complains of pain in the right shoulder blade and posterior midline neck. However, this regimen of pain medications has been adequately controlling her pain.   4) Rehabilitation Needs:  Given the patient's progressive weakness pre-operatively and the extent of her surgical procedure, the patient was felt to benefit from ongoing Physical and Occupational Therapy in the setting of a Transitional Care Unit.   5) DVT Prophylaxis:   The patient is on Heparin 5,000 SQ Q 8 HR for DVT prophylaxis only. As she increases her mobility, this medication may be discontinued.   6) Hypertension:   The patient's blood pressure was well-controlled during her hospitalization without the addition of all of her home anti-hypertensive medications. Her home anti-hypertensive medications may be re-introduced if needed  Outpatient:   She is now 2 weeks post op.     Since discharge she has done well in the care center. Her family reports they are treating her well and caring for her excellently. She is eating well, and pain close attention to her nutritional status. She is wearing her brace every time she is upright for any reason. Neurologically she is undergoing therapies, and had nice return of sensation in her hands early on.  She presents for routine wound check and suture/staple removal.    Patient Supplied Answers To the [facility] Pain Questionnaire  [facility] Pain -   Patient Entered Questionnaire/Answers 12/5/2017   What number best describes your pain right now:  0 = No pain  to  10 = Worst pain imaginable 4   How would you describe the pain? numbness   Which of the following worsen your pain? -   Which of the following improve or reduce your pain?  medication   What number best describes your average pain for the past week:  0 = No pain  to  10 = Worst pain imaginable 6   What number best describes your LOWEST pain in past 24 hours:  0 = No pain  to  10 = Worst pain imaginable 4   What number best describes your WORST pain in past 24 hours:  0 = No pain  to  10 = Worst pain imaginable 8   When is your pain worst? Night   What non-medicine treatments have you already had for your pain? none   Have you tried treating your pain with medication?  -   Are you currently taking medications for your pain? Yes            Current Outpatient Prescriptions:      acetaminophen (TYLENOL ARTHRITIS PAIN) 650 MG CR tablet, Take 650 mg by mouth every 8 hours, Disp: , Rfl:      Multiple Vitamins-Minerals (MULTIVITAMIN ADULT PO), Take 1 tablet by mouth daily, Disp: , Rfl:      OXYCODONE HCL PO, Take 5 mg by mouth 3 times daily, Disp: , Rfl:      AmLODIPine Besylate (NORVASC PO), Take 2.5 mg by mouth At Bedtime, Disp: , Rfl:      Nutritional Supplements (NUTRITIONAL SUPPLEMENT PO), Take 4 oz by mouth 2 times daily, Disp: , Rfl:      oxyCODONE IR (ROXICODONE) 5 MG tablet, Take 1 tablet (5 mg) by mouth every 3 hours as needed for moderate to severe pain, Disp: 90 tablet, Rfl: 0     Heparin Sodium, Porcine, (HEPARIN SODIUM PF) 5000 UNIT/0.5ML injection, Inject 0.5 mLs (5,000 Units) Subcutaneous every 8 hours, Disp: , Rfl:      pravastatin (PRAVACHOL) 10 MG tablet, 1 tablet (10 mg) by Oral or Feeding Tube route At Bedtime, Disp: 30 tablet, Rfl:      metoprolol (LOPRESSOR) 25 MG tablet, Take 0.5 tablets (12.5 mg) by mouth 2 times daily, Disp: 60 tablet, Rfl:      polyethylene glycol (MIRALAX/GLYCOLAX)  Packet, 17 g by Oral or Feeding Tube route 3 times daily as needed for constipation (also QD scheduled) , Disp: 7 packet, Rfl:      senna-docusate (SENOKOT-S;PERICOLACE) 8.6-50 MG per tablet, 2 tablets by Oral or Feeding Tube route 2 times daily, Disp: 100 tablet, Rfl:      calcium carbonate (OS-ANNETTA 500 MG [place]. [place]) 1250 MG tablet, Take 1 tablet (1,250 mg) by mouth 2 times daily (with meals), Disp: 90 tablet, Rfl:      cyclobenzaprine (FLEXERIL) 5 MG tablet, Take 0.5 tablets (2.5 mg) by mouth 3 times daily as needed for muscle spasms, Disp: 60 tablet, Rfl: 0     vitamin D (ERGOCALCIFEROL) 19377 UNIT capsule, Take 1 capsule (50,000 Units) by mouth every 7 days, Disp: 10 capsule, Rfl: 0     Cholecalciferol (VITAMIN D) 2000 UNITS tablet, Take 3,000 Units by mouth daily, Disp: 100 tablet, Rfl: 0     order for DME, Equipment being ordered: Shower chair, Disp: 1 Device, Rfl: 0  Allergies   Allergen Reactions     Atorvastatin Calcium      Was on Lipitor and has muscle problem     PMH, SOC HIST, FAM HIST, PROBLEM LIST:  All reviewed in EPIC.    OBJECTIVE:  /75  Pulse 80    Imaging:  None new.    EXAM:  Well developed very thin but well nourished female found on transport gurney No apparent distress. She is accompanied by her  and daughter.  A&O X3.  Mood and affect WNL. Language and fund of knowledge intact.     She has a nicely healing incision anteriorly without sutures. I gently removed tissue glue. Posteriorly the incision looks quite good as well. There were multiple interrupteds.  Because of the nutritional status and concern about healing. The wound looked remarkably good even so,  I prepped the wound with chloroprep and cleanly removed every other nylon suture without difficulty. Not wearing her collar and brace today, because she presents on a gurney.    Exam at discharge:      Delt Bi Tri WE WF    R 1 4- 4+ 4+ 4 4   L 2 4- 4+ 4+ 4 4      IP Quad Ham DF PF EHL   R 3 4+ 4+ 4+ 4+ 4+   L 3 4+ 4+ 4+  4+ 4+       Exam today:  About the same.    Assessment/Plan:  1. Cervical spondylosis with myelopathy      Yuni Otoole is doing well after her very large surgical cervical fusion. She has experienced early recovery of neurologic function. She has taken very much to heart Dr. López admonitions to eat well and this is apparent in her remarkably good wound healing. I am being cautious about removing sutures. Perhaps more than I need to be. But I'm really pleased so far. I will see her again in a week and take out the remaining half of the posterior sutures. Meantime I've instructed the care center to wash the wounds daily with gentle soap and water.  Next visit in 1 week with no imaging. The following visit will be in 6 weeks postop with cervical spine films as well as cervical thoracic long films.    She will follow up with Dr. López at the three-month visit with long cassette x-rays. She will follow-up with  in endocrine clinic at around 4 weeks postop for evaluation of hyponatremia. Vitamin D levels will be followed at the 6 week visit.    We appreciate the opportunity to be of service in the care of this pleasant patient.  Please do call if there is anything more we can do    Lana Hauser PA-C  [facility]  Department of Neurosurgery  Phone: [phone]  Fax: [phone]    This note was generated using voice recognition software. While edited for content some inaccurate phrasing may be found.

## 2017-12-12 ENCOUNTER — HOSPITAL LABORATORY (OUTPATIENT)
Dept: NURSING HOME | Facility: OTHER | Age: 82
End: 2017-12-12

## 2017-12-12 VITALS
SYSTOLIC BLOOD PRESSURE: 141 MMHG | WEIGHT: 93.3 LBS | HEART RATE: 71 BPM | RESPIRATION RATE: 20 BRPM | DIASTOLIC BLOOD PRESSURE: 83 MMHG | BODY MASS INDEX: 18.84 KG/M2 | OXYGEN SATURATION: 98 % | TEMPERATURE: 97.8 F

## 2017-12-12 LAB — SODIUM SERPL-SCNC: 132 MMOL/L (ref 133–144)

## 2017-12-12 NOTE — PROGRESS NOTES
"[place] [facility]    Chief Complaint   Patient presents with     Nursing Home Acute       HPI:    Yuni Otoole is a 82 year old  (1935), who is being seen today for an episodic care visit at [facility].    HPI information obtained from: facility chart records, facility staff, patient report and [facility] chart review. Today's concern is:  S/P cervical spinal fusion/Orthopedic aftercare  F/u on pain after increase in scheduled oxycodone from 5 tid to 7.5 mg po qid on 12/5 - (was taking 10 mg prn in addition to the 5 tid)  Now taking prn 5 mg 1-2 x/day.   Pt still c/o pain and \"pulling\" in neck - noted seeing surg tomorrow and sutures still in - possible that is the sensation she is feeling   Still very slow [facility] - remains on heparin for DVT prevention -     Benign essential hypertension  F/u on after staring norvasc on TCU admission 2/2 elevated bp's -   F/u today showing still high in 150 - 160's    Hyponatremia  F/u after liberalizing her fluid restriction from 1 L to 1.5 L -   Na slight drift down to 132 from 135 yesterday but no symptoms and noted chronic hypo na in 120's's for years.       REVIEW OF SYSTEMS:  4 point ROS including Respiratory, CV, GI and , other than that noted in the HPI,  is negative    PMH/PSH reviewed in EPIC today    /83  Pulse 71  Temp 97.8  F (36.6  C)  Resp 20  Wt 93 lb 4.8 oz (42.3 kg)  SpO2 98%  BMI 18.84 kg/m2  GENERAL APPEARANCE:  Alert, in no distress  Neck incision c/d/i - healed    ASSESSMENT/PLAN:  S/P cervical spinal fusion/Orthopedic aftercare  Cont heparin for 1 more week - then risk of dvt lessen - even depsite minimal mobility  Will add scheduled flexeril to see if that helps -   Goal to start weaning next week -   See if ortho f/u tomorrow and suture remvoal will help    Benign essential hypertension  Too high - will increase norvasc    Hyponatremia  Chronic /stable - recheck next " tuesday       1.  Start Flexeril 2.5 mg po TID.  Keep 2.5 mg po TID PRN.  2.  Increase Norvasc to 5 mg po QHS.  Dx: HTN  Goal to reduce meds next week and dc heparin      Electronically signed by:  CRISTAL Olmos CNP

## 2017-12-13 ENCOUNTER — NURSING HOME VISIT (OUTPATIENT)
Dept: GERIATRICS | Facility: CLINIC | Age: 82
End: 2017-12-13
Payer: COMMERCIAL

## 2017-12-13 DIAGNOSIS — Z47.89 ORTHOPEDIC AFTERCARE: ICD-10-CM

## 2017-12-13 DIAGNOSIS — Z98.1 S/P CERVICAL SPINAL FUSION: Primary | ICD-10-CM

## 2017-12-13 DIAGNOSIS — E87.1 HYPONATREMIA: ICD-10-CM

## 2017-12-13 DIAGNOSIS — I10 BENIGN ESSENTIAL HYPERTENSION: ICD-10-CM

## 2017-12-13 PROCEDURE — 99310 SBSQ NF CARE HIGH MDM 45: CPT | Performed by: NURSE PRACTITIONER

## 2017-12-13 NOTE — LETTER
"    12/13/2017        RE: Yuni Otoole  [address]  [facility] [address]        [place] [facility]    Chief Complaint   Patient presents with     Nursing Home Acute       HPI:    Yuni Otoole is a 82 year old  (1935), who is being seen today for an episodic care visit at [facility].    HPI information obtained from: facility chart records, facility staff, patient report and [facility] chart review. Today's concern is:  S/P cervical spinal fusion/Orthopedic aftercare  F/u on pain after increase in scheduled oxycodone from 5 tid to 7.5 mg po qid on 12/5 - (was taking 10 mg prn in addition to the 5 tid)  Now taking prn 5 mg 1-2 x/day.   Pt still c/o pain and \"pulling\" in neck - noted seeing surg tomorrow and sutures still in - possible that is the sensation she is feeling   [facility] - remains on heparin for DVT prevention -     Benign essential hypertension  F/u on after staring norvasc on TCU admission 2/2 elevated bp's -   F/u today showing still high in 150 - 160's    Hyponatremia  F/u after liberalizing her fluid restriction from 1 L to 1.5 L -   Na slight drift down to 132 from 135 yesterday but no symptoms and noted chronic hypo na in 120's's for years.       REVIEW OF SYSTEMS:  4 point ROS including Respiratory, CV, GI and , other than that noted in the HPI,  is negative    PMH/PSH reviewed in EPIC today    /83  Pulse 71  Temp 97.8  F (36.6  C)  Resp 20  Wt 93 lb 4.8 oz (42.3 kg)  SpO2 98%  BMI 18.84 kg/m2  GENERAL APPEARANCE:  Alert, in no distress  Neck incision c/d/i - healed    ASSESSMENT/PLAN:  S/P cervical spinal fusion/Orthopedic aftercare  Cont heparin for 1 more week - then risk of dvt lessen - even depsite minimal mobility  Will add scheduled flexeril to see if that helps -   Goal to start weaning next week -   See if ortho f/u tomorrow and suture remvoal will help    Benign essential " hypertension  Too high - will increase norvasc    Hyponatremia  Chronic /stable - recheck next tuesday       1.  Start Flexeril 2.5 mg po TID.  Keep 2.5 mg po TID PRN.  2.  Increase Norvasc to 5 mg po QHS.  Dx: HTN  Goal to reduce meds next week and dc heparin      Electronically signed by:  CRISTAL Olmos CNP      Sincerely,        CRISTAL Olmos CNP

## 2017-12-14 ENCOUNTER — OFFICE VISIT (OUTPATIENT)
Dept: NEUROSURGERY | Facility: CLINIC | Age: 82
End: 2017-12-14
Payer: COMMERCIAL

## 2017-12-14 VITALS — DIASTOLIC BLOOD PRESSURE: 88 MMHG | SYSTOLIC BLOOD PRESSURE: 154 MMHG | HEART RATE: 79 BPM | HEIGHT: 59 IN

## 2017-12-14 DIAGNOSIS — M47.12 CERVICAL SPONDYLOSIS WITH MYELOPATHY: Primary | ICD-10-CM

## 2017-12-14 DIAGNOSIS — Z98.890 POST-OPERATIVE STATE: ICD-10-CM

## 2017-12-14 DIAGNOSIS — M43.8X2 SWAN NECK DEFORMITY OF CERVICAL SPINE: ICD-10-CM

## 2017-12-14 DIAGNOSIS — M50.30 DEGENERATIVE DISC DISEASE, CERVICAL: ICD-10-CM

## 2017-12-14 DIAGNOSIS — M54.12 CERVICAL RADICULOPATHY: ICD-10-CM

## 2017-12-14 ASSESSMENT — PAIN SCALES - GENERAL: PAINLEVEL: NO PAIN (0)

## 2017-12-14 NOTE — PROGRESS NOTES
Neurosurgery clinic post op wound check  Date of visit: 12/14/2017      Procedure:  11/15/17 René López [place]  Diagnosis:   1. 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 Cervical stenosis with myelopathy  3. Malnutrition  4. Osteoporosis and vitamin D deficiency  5. SIADH-induced hyponatremia   Procedure: three intraoperative position changes:  Part 1:  1. Stealth-guided posterior segmental instrumentation C2-T3  2. Cervical laminectomy C1-C3  3. Facet osteotomies Cervical 2-3, Cervical 4-5, Cervical 6-7,  4. Use of intraoperative neuromonitoring  5. Use of intraoperative fluoroscopy  6. Use of intraoperative neuronavigation      Part 2:   1. Right Approach Cervical 2-3, Cervical 4-5 and Cervical 6-7 Anterior Cervical Discectomy And Fusion  2. [place] tongs placement for cervical traction for deformity reduction  3. Use of intraoperative microscope  4. Use of intraoperative neuromonitoring  5. Use of intraoperative fluoroscopy      Part 3:   1. Cervical 2-Thoracic 3 Posterior Fusion With Autograft And Allograft  2. Reduction of cervical spine with osteotomy closure and kyphoscoliosis correction  3. Use of intraoperative neuromonitoring  4. Use of intraoperative fluoroscopy    Implants: Synthes synapse screws with 3.5mm to 5mm transitional rods posteriorly; zero P lordotic anterior implants   Findings: Significant stenosis at C1-C2, well decompressed. Good reduction seen on final XR     Indications for Surgery:  Yuni Otoole is a 82 year old female presenting to the Emergency room with numbness in her hands bilaterally and severe bilateral upper extremity and proximal lower extremity weakness, referable to severe C1-C2 cervical stenosis with progressive kyphotic deformity and kyphoscoliosis with chin on chest deformity. Also notable is severe malnutrition from poor oral intake without given of 3 and severe vitamin D deficiency with osteoporosis. The patient had previously  seen Dr. Yuan in clinic who recommended considering surgical intervention, however the patient was lost to follow-up and presented in extremis. My partner on call at her admission Dr. Merritt asked me to take over surgical care and I met with the patient and her family in detail to explain operative risks, benefits, and alternatives.     Due to her 63 degrees rigid kyphosis from C2-C7 and severe cord compression with 3mm canal diameter at C1-C2, I recommended a C2-T3 posterior-anterior-posterior 540-degrees fusion; Part 1: C1-C3 posterior decompression and C2-T3 instrumentation with multilevel osteotomies; Part 2: C4-5, C6-7, possible C2-3 anterior diskectomy and interbody placement possible additional levels, Part 3: C2-T3 posterior fusion with deformity correction.  Surgery may need to be staged due to patient's age and complexity.      I explained that the patient is highest-risk category for perioperative complications due to magnitude of surgery needed, malnutrition, age, suspected osteoporosis/known vitamin D deficiency.      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 I relayed to the patient and family my concern for high risk for perioperative morbidity and mortality.  I told her she absolutely will need a nasojejunal feeding tube for perioperative nutrition, and potentially a PEG tube.  She has high risk of ventilator dependency and potential need for trach.  She has very high risk of instrumentation complications from poor bone density.  She has very high risk of infection and wound non-healing due to poor nutritional status.  We have been managing her SIADH-induced hyponatremia with the assistance of Endocrinology,and ruled out hypothyroidism and adrenal insufficiency. We have been treating her malnutrition with goal protein intake of 1.5 grams protein per kg body weight per day with Arginaid and Ensure Plus as well as vitamin A, C, E,  and zinc supplementation ordered for wound healing supplementation. Her 25-OH vitamin D level is 17; we started her on ergocalciferol 50,000IU every 3 days x 30 days, and cholecalciferol 5000 IU daily x 6 months; needs to have repeat 25-OH vitamin D level in 4 weeks.      I discussed the above co-morbidities with patient and her family, and I also discussed surgical risks in great detail including high risks of possible paralysis and ventilator dependence, possible trach/PEG, possible failure to improve, or fracture of instrumentation or adjacent segment with need for further surgery, possible infection and failure of wound healing.     I spoke about the need for neck immobilization with cervical spine precautions for general endotracheal intubation for anesthesia, and also for the need to keep the blood pressure elevated to ensure continued spinal cord perfusion during induction of anesthesia and during surgery itself until pressure is relieved on the spinal cord.  I discussed surgical risk including bleeding, infection, nerve or spinal cord damage, failure to heal/failure to form fusion/instrumentation failure, CSF leak, weakness/paralysis, numbness, worsening pain or failure to improve including neuropathic pain, recurrence of problem, and potential for development of instability or adjacent segment disease, and potential need for further procedures or surgeries. I discussed potential of failure to improve or even worsening despite surgery.  I specifically discussed injury to the carotid artery and internal jugular vein and vertebral artery with potential bleeding or stroke, injury to the superior laryngeal nerve resulting in hoarseness, retraction injury to the esophagus or penetration injury to the esophagus causing dysphagia with potential need for temporary or permanent feeding tube placement, formation of a hematoma resulting in neurologic deficit, worse C5 palsy resulting in shoulder and bicep plegia,  spinal cord injury with resulting pain, numbness, or weakness that may be permanent or even paralysis.    I discussed the risks of general anesthesia including stroke, heart attack, pneumonia, prolonged intubation, blood clot, pulmonary embolism, and urinary tract infection. I discussed risks of positioning including pressure ulcerations, skin tears or abrasions, pressure neuropathies, or even rarely blindness.  The patient and her family understood and wished to proceed.    HPI:  Inpatient:   Yuni Otoole is a very pleasant 82 year old female patient whose past medical is significant for hypertension and hypercholesteremia. She presented to the [facility] on 11/10/2017 with reports of progressive quadriparesis, radicular pain in the bilateral upper extremities, upper extremity paresthesias and weakness.      The patient reportedly suffered a fall in 2016 during which she sustained a C7 Fracture. The fracture was treated conservatively and healed without surgical intervention. The patient was seen in the Neurosurgery Clinic for follow-up on 9/21/2017 and was noted to have worsening cervical kyphosis as well as a fracture of the dens. Surgical intervention was recommended at that time. However, the patient chose not to pursue surgery at that time. As the patient's symptoms progressed to being unable to feed or care for herself as well as an inability to walk, she came to the Emergency Department of consultation. She was found to have severe cervical stenosis with progressive quadriparesis. She was deemed a high-risk surgical candidate given her poor nutritional status, age and osteoporosis. However, given the severity of her cervical spine disease and her rapid decline, surgical intervention was not delayed There were no intra-operative complications. The Estimated Blood Loss during the procedure was ~ 750 mL. Post-operatively, the patient was transferred to the Neuroscience ICU for  routine post-operative care. She was extubated on post-operative day #1. Post-operative cervical radiographs which were obtained on 11/20/17 demonstrated expected post-operative changes and intact anterior and posterior instrumentation of the cervical spine. Throughout the patient's hospitalization, her upper extremity strength gradually began to improve. She continues to experience notable weakness in the deltoid and shoulder bilaterally. However, upon obtaining shoulder imaging she was found to have subluxation of the right shoulder humeral head in relation to the glenoid as well as a severe rotator cuff tear from many years ago. No orthopedic intervention was performed. The patient's hospitalization was notable for the following issues:  1) Malnutrition:   The patient was found to be in a malnourished state pre-operatively. On post-operative day #1, a post-pyloric feeding tube was inserted at the bedside and the patient was initiated on continuous tube feedings. She was followed by Speech Therapy and was progressed well over a period of several days from a Dysphagia Diet to a Regular Diet with Thin Liquids. She was initiated on Calorie Counts to ensure adequate nutritional intake and was found to be taking sufficient oral intake. Therefore, the nasogastric tube was removed on 11/25/2017. She was also initiated on nutritional supplements to improve her overall nutritional status. Given the complexity of her surgery, ongoing speech therapy was recommended for the patient to ensure safe swallowing. She is on Vitamin D supplementation to facilitate healing of her fusion.   2) Mild Chronic Euvolemic Hyponatremia:   Endocrinology for consulted for assistance in the management of the patient's mild chronic hyponatremia. 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 The etiology of the SIADH was thought to be related to pain as well as the extensive surgical intervention. She did not have notable  improvement in regard to her sodium level after fluid restriction. She has been started on Salt Tablets 1G BID which she should stay on until seen for follow-up with Endocrinology upon discharge from Transitional Care Unit. While at the Transitional Care Unit, please obtain weekly Sodium Levels and Urine Osmolality Levels.    3) Pain Management:  On the day of discharge, the patient's post-operative pain was well-controlled with oral analgesics. She was tolerating Oxycodone 5 mg Q 3 HR PRN, Tylenol 650 mg Q 4 HR PRN and Flexeril 2.5 mg TID PRN. She primarily complains of pain in the right shoulder blade and posterior midline neck. However, this regimen of pain medications has been adequately controlling her pain.   4) Rehabilitation Needs:  Given the patient's progressive weakness pre-operatively and the extent of her surgical procedure, the patient was felt to benefit from ongoing Physical and Occupational Therapy in the setting of a Transitional Care Unit.   5) DVT Prophylaxis:   The patient is on Heparin 5,000 SQ Q 8 HR for DVT prophylaxis only. As she increases her mobility, this medication may be discontinued.   6) Hypertension:   The patient's blood pressure was well-controlled during her hospitalization without the addition of all of her home anti-hypertensive medications. Her home anti-hypertensive medications may be re-introduced if needed  Outpatient:   She is now 3 weeks post op.     Since discharge she has done well in the care center. Her family reports they are treating her well and caring for her excellently. She is eating well, and pain close attention to her nutritional status. She is wearing her brace every time she is upright for any reason. Neurologically she is undergoing therapies, and had nice return of sensation in her hands early on. Last week I removed every other posterior cervical suture. The wound looked very good. She presents for round 2 of routine wound check and suture/staple removal  today.    Patient Supplied Answers To the [facility] Pain Questionnaire  [facility] Pain -  Patient Entered Questionnaire/Answers 12/5/2017   What number best describes your pain right now:  0 = No pain  to  10 = Worst pain imaginable 4   How would you describe the pain? numbness   Which of the following worsen your pain? -   Which of the following improve or reduce your pain?  medication   What number best describes your average pain for the past week:  0 = No pain  to  10 = Worst pain imaginable 6   What number best describes your LOWEST pain in past 24 hours:  0 = No pain  to  10 = Worst pain imaginable 4   What number best describes your WORST pain in past 24 hours:  0 = No pain  to  10 = Worst pain imaginable 8   When is your pain worst? Night   What non-medicine treatments have you already had for your pain? none   Have you tried treating your pain with medication?  -   Are you currently taking medications for your pain? Yes            Current Outpatient Prescriptions:      Cyclobenzaprine HCl (FLEXERIL PO), Take 2.5 mg by mouth 3 times daily, Disp: , Rfl:      Hypromellose (NATURAL BALANCE TEARS OP), Apply 2 drops to eye 4 times daily as needed, Disp: , Rfl:      OXYCODONE HCL PO, Take 5 mg by mouth 3 times daily as needed, Disp: , Rfl:      acetaminophen (TYLENOL ARTHRITIS PAIN) 650 MG CR tablet, Take 650 mg by mouth every 8 hours, Disp: , Rfl:      Multiple Vitamins-Minerals (MULTIVITAMIN ADULT PO), Take 1 tablet by mouth daily, Disp: , Rfl:      OXYCODONE HCL PO, Take 7.5 mg by mouth 4 times daily , Disp: , Rfl:      AmLODIPine Besylate (NORVASC PO), Take 5 mg by mouth At Bedtime , Disp: , Rfl:      Nutritional Supplements (NUTRITIONAL SUPPLEMENT PO), Take 4 oz by mouth 2 times daily, Disp: , Rfl:      Heparin Sodium, Porcine, (HEPARIN SODIUM PF) 5000 UNIT/0.5ML injection, Inject 0.5 mLs (5,000 Units) Subcutaneous every 8 hours, Disp: , Rfl:      pravastatin (PRAVACHOL) 10 MG tablet, 1 tablet (10 mg) by Oral or Feeding  "Tube route At Bedtime, Disp: 30 tablet, Rfl:      metoprolol (LOPRESSOR) 25 MG tablet, Take 0.5 tablets (12.5 mg) by mouth 2 times daily, Disp: 60 tablet, Rfl:      polyethylene glycol (MIRALAX/GLYCOLAX) Packet, 17 g by Oral or Feeding Tube route 3 times daily as needed for constipation (also QD scheduled) , Disp: 7 packet, Rfl:      senna-docusate (SENOKOT-S;PERICOLACE) 8.6-50 MG per tablet, 3 tablets by Oral or Feeding Tube route 2 times daily , Disp: 100 tablet, Rfl:      calcium carbonate (OS-ANNETTA 500 MG [place]. [place]) 1250 MG tablet, Take 1 tablet (1,250 mg) by mouth 2 times daily (with meals), Disp: 90 tablet, Rfl:      cyclobenzaprine (FLEXERIL) 5 MG tablet, Take 0.5 tablets (2.5 mg) by mouth 3 times daily as needed for muscle spasms, Disp: 60 tablet, Rfl: 0     vitamin D (ERGOCALCIFEROL) 12263 UNIT capsule, Take 1 capsule (50,000 Units) by mouth every 7 days, Disp: 10 capsule, Rfl: 0     Cholecalciferol (VITAMIN D) 2000 UNITS tablet, Take 3,000 Units by mouth daily, Disp: 100 tablet, Rfl: 0     order for DME, Equipment being ordered: Shower chair, Disp: 1 Device, Rfl: 0  Allergies   Allergen Reactions     Atorvastatin Calcium      Was on Lipitor and has muscle problem     PMH, SOC HIST, FAM HIST, PROBLEM LIST:  All reviewed in EPIC.    OBJECTIVE:  /88  Pulse 79  Ht 1.499 m (4' 11\")    Imaging:  None new.    EXAM:  Well developed very thin but well nourished female found on transport [facility] No apparent distress. She is accompanied by her  and daughter.  A&O X3.  Mood and affect WNL. Language and fund of knowledge intact.     She has a nicely healing incision anteriorly.  Posteriorly the incision looks quite good as well. There were multiple interrupteds.  Because of the nutritional status and concern about healing. The wound looked remarkably good. Last time I removed every other suture. Today I prepped the wound with chloroprep and cleanly removed the remaining nylon sutures without difficulty. I " redressed the wound with bacitracin and a light dressing. This may be removed tomorrow. Wearing her collar and brace today, even though she presents on a gurney.    Exam at discharge:      Delt Bi Tri WE WF    R 1 4- 4+ 4+ 4 4   L 2 4- 4+ 4+ 4 4      IP Quad Ham DF PF EHL   R 3 4+ 4+ 4+ 4+ 4+   L 3 4+ 4+ 4+ 4+ 4+       Exam today:  About the same.    Assessment/Plan:  1. Cervical spondylosis with myelopathy    2. Post-operative state    3. [place] neck deformity of cervical spine    4. Degenerative disc disease, cervical    5. Cervical spondylosis without myelopathy    6. Cervical radiculopathy      Yuni Otoole is doing well after her very large surgical cervical fusion. She has experienced early recovery of neurologic function. She has taken very much to heart Dr. López admonitions to eat well and this is apparent in her remarkably good wound healing. It looks far better than I had expected at this stage. All sutures were successfully removed. I put a light dressing on it which can come off tomorrow.    Her wounds should be washed daily. Her hair should be washed regularly. There is no reconstruction to that. The brace may get wet. The pads of the brace may be changed regularly for hygiene, and should be changed at least every other to every third day for hygiene purposes.    Next visit in about 3 weeks with AP lateral cervical and odontoid sitting in the brace, AP lateral thoracic sitting in the brace, and scoliosis films taken on the fourth floor (not the EOS machine since she cannot stand).    She will follow up with Dr. López at the three-month visit with similar. She will follow-up with  in endocrine clinic at around 4 weeks postop for evaluation of hyponatremia. We are trying to get this scheduled, it will probably not happen for about 3 more weeks. Her sodium levels are being followed now by her current care center team. Vitamin D levels will be followed at the 6 week visit, I ordered that  lab.    We appreciate the opportunity to be of service in the care of this pleasant patient.  Please do call if there is anything more we can do    Lana Hauser PA-C  [facility]  Department of Neurosurgery  Phone: [phone]  Fax: [phone]    This note was generated using voice recognition software. While edited for content some inaccurate phrasing may be found.

## 2017-12-14 NOTE — LETTER
12/14/2017       RE: Yuni Otoole  [address]  [facility] [address]     Dear Colleague,    Thank you for referring your patient, Yuni Otoole, to the [facility] NEUROSURGERY at [facility]. Please see a copy of my visit note below.      Neurosurgery clinic post op wound check  Date of visit: 12/14/2017      Procedure:  11/15/17 René López Hubbard  Diagnosis:   1. 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 Cervical stenosis with myelopathy  3. Malnutrition  4. Osteoporosis and vitamin D deficiency  5. SIADH-induced hyponatremia   Procedure: three intraoperative position changes:  Part 1:  1. Stealth-guided posterior segmental instrumentation C2-T3  2. Cervical laminectomy C1-C3  3. Facet osteotomies Cervical 2-3, Cervical 4-5, Cervical 6-7,  4. Use of intraoperative neuromonitoring  5. Use of intraoperative fluoroscopy  6. Use of intraoperative neuronavigation      Part 2:   1. Right Approach Cervical 2-3, Cervical 4-5 and Cervical 6-7 Anterior Cervical Discectomy And Fusion  2. [place] tongs placement for cervical traction for deformity reduction  3. Use of intraoperative microscope  4. Use of intraoperative neuromonitoring  5. Use of intraoperative fluoroscopy      Part 3:   1. Cervical 2-Thoracic 3 Posterior Fusion With Autograft And Allograft  2. Reduction of cervical spine with osteotomy closure and kyphoscoliosis correction  3. Use of intraoperative neuromonitoring  4. Use of intraoperative fluoroscopy    Implants: Synthes synapse screws with 3.5mm to 5mm transitional rods posteriorly; zero P lordotic anterior implants   Findings: Significant stenosis at C1-C2, well decompressed. Good reduction seen on final XR     Indications for Surgery:  Yuni Otoole is a 82 year old female presenting to the Emergency room with numbness in her hands bilaterally and severe bilateral upper extremity and proximal lower extremity weakness,  referable to severe C1-C2 cervical stenosis with progressive kyphotic deformity and kyphoscoliosis with chin on chest deformity. Also notable is severe malnutrition from poor oral intake without given of 3 and severe vitamin D deficiency with osteoporosis. The patient had previously seen Dr. Yuan in clinic who recommended considering surgical intervention, however the patient was lost to follow-up and presented in extremis. My partner on call at her admission Dr. Merritt asked me to take over surgical care and I met with the patient and her family in detail to explain operative risks, benefits, and alternatives.     Due to her 63 degrees rigid kyphosis from C2-C7 and severe cord compression with 3mm canal diameter at C1-C2, I recommended a C2-T3 posterior-anterior-posterior 540-degrees fusion; Part 1: C1-C3 posterior decompression and C2-T3 instrumentation with multilevel osteotomies; Part 2: C4-5, C6-7, possible C2-3 anterior diskectomy and interbody placement possible additional levels, Part 3: C2-T3 posterior fusion with deformity correction.  Surgery may need to be staged due to patient's age and complexity.      I explained that the patient is highest-risk category for perioperative complications due to magnitude of surgery needed, malnutrition, age, suspected osteoporosis/known vitamin D deficiency.      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 I relayed to the patient and family my concern for high risk for perioperative morbidity and mortality.  I told her she absolutely will need a nasojejunal feeding tube for perioperative nutrition, and potentially a PEG tube.  She has high risk of ventilator dependency and potential need for trach.  She has very high risk of instrumentation complications from poor bone density.  She has very high risk of infection and wound non-healing due to poor nutritional status.  We have been managing her  SIADH-induced hyponatremia with the assistance of Endocrinology,and ruled out hypothyroidism and adrenal insufficiency. We have been treating her malnutrition with goal protein intake of 1.5 grams protein per kg body weight per day with Arginaid and Ensure Plus as well as vitamin A, C, E, and zinc supplementation ordered for wound healing supplementation. Her 25-OH vitamin D level is 17; we started her on ergocalciferol 50,000IU every 3 days x 30 days, and cholecalciferol 5000 IU daily x 6 months; needs to have repeat 25-OH vitamin D level in 4 weeks.      I discussed the above co-morbidities with patient and her family, and I also discussed surgical risks in great detail including high risks of possible paralysis and ventilator dependence, possible trach/PEG, possible failure to improve, or fracture of instrumentation or adjacent segment with need for further surgery, possible infection and failure of wound healing.     I spoke about the need for neck immobilization with cervical spine precautions for general endotracheal intubation for anesthesia, and also for the need to keep the blood pressure elevated to ensure continued spinal cord perfusion during induction of anesthesia and during surgery itself until pressure is relieved on the spinal cord.  I discussed surgical risk including bleeding, infection, nerve or spinal cord damage, failure to heal/failure to form fusion/instrumentation failure, CSF leak, weakness/paralysis, numbness, worsening pain or failure to improve including neuropathic pain, recurrence of problem, and potential for development of instability or adjacent segment disease, and potential need for further procedures or surgeries. I discussed potential of failure to improve or even worsening despite surgery.  I specifically discussed injury to the carotid artery and internal jugular vein and vertebral artery with potential bleeding or stroke, injury to the superior laryngeal nerve resulting in  hoarseness, retraction injury to the esophagus or penetration injury to the esophagus causing dysphagia with potential need for temporary or permanent feeding tube placement, formation of a hematoma resulting in neurologic deficit, worse C5 palsy resulting in shoulder and bicep plegia, spinal cord injury with resulting pain, numbness, or weakness that may be permanent or even paralysis.    I discussed the risks of general anesthesia including stroke, heart attack, pneumonia, prolonged intubation, blood clot, pulmonary embolism, and urinary tract infection. I discussed risks of positioning including pressure ulcerations, skin tears or abrasions, pressure neuropathies, or even rarely blindness.  The patient and her family understood and wished to proceed.    HPI:  Inpatient:   Yuni Otoole is a very pleasant 82 year old female patient whose past medical is significant for hypertension and hypercholesteremia. She presented to the [facility] on 11/10/2017 with reports of progressive quadriparesis, radicular pain in the bilateral upper extremities, upper extremity paresthesias and weakness.      The patient reportedly suffered a fall in 2016 during which she sustained a C7 Fracture. The fracture was treated conservatively and healed without surgical intervention. The patient was seen in the Neurosurgery Clinic for follow-up on 9/21/2017 and was noted to have worsening cervical kyphosis as well as a fracture of the dens. Surgical intervention was recommended at that time. However, the patient chose not to pursue surgery at that time. As the patient's symptoms progressed to being unable to feed or care for herself as well as an inability to walk, she came to the Emergency Department of consultation. She was found to have severe cervical stenosis with progressive quadriparesis. She was deemed a high-risk surgical candidate given her poor nutritional status, age and osteoporosis. However, given  the severity of her cervical spine disease and her rapid decline, surgical intervention was not delayed There were no intra-operative complications. The Estimated Blood Loss during the procedure was ~ 750 mL. Post-operatively, the patient was transferred to the Neuroscience ICU for routine post-operative care. She was extubated on post-operative day #1. Post-operative cervical radiographs which were obtained on 11/20/17 demonstrated expected post-operative changes and intact anterior and posterior instrumentation of the cervical spine. Throughout the patient's hospitalization, her upper extremity strength gradually began to improve. She continues to experience notable weakness in the deltoid and shoulder bilaterally. However, upon obtaining shoulder imaging she was found to have subluxation of the right shoulder humeral head in relation to the glenoid as well as a severe rotator cuff tear from many years ago. No orthopedic intervention was performed. The patient's hospitalization was notable for the following issues:  1) Malnutrition:   The patient was found to be in a malnourished state pre-operatively. On post-operative day #1, a post-pyloric feeding tube was inserted at the bedside and the patient was initiated on continuous tube feedings. She was followed by Speech Therapy and was progressed well over a period of several days from a Dysphagia Diet to a Regular Diet with Thin Liquids. She was initiated on Calorie Counts to ensure adequate nutritional intake and was found to be taking sufficient oral intake. Therefore, the nasogastric tube was removed on 11/25/2017. She was also initiated on nutritional supplements to improve her overall nutritional status. Given the complexity of her surgery, ongoing speech therapy was recommended for the patient to ensure safe swallowing. She is on Vitamin D supplementation to facilitate healing of her fusion.   2) Mild Chronic Euvolemic Hyponatremia:   Endocrinology for  consulted for assistance in the management of the patient's mild chronic hyponatremia. 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 The etiology of the SIADH was thought to be related to pain as well as the extensive surgical intervention. She did not have notable improvement in regard to her sodium level after fluid restriction. She has been started on Salt Tablets 1G BID which she should stay on until seen for follow-up with Endocrinology upon discharge from Transitional Care Unit. While at the Transitional Care Unit, please obtain weekly Sodium Levels and Urine Osmolality Levels.    3) Pain Management:  On the day of discharge, the patient's post-operative pain was well-controlled with oral analgesics. She was tolerating Oxycodone 5 mg Q 3 HR PRN, Tylenol 650 mg Q 4 HR PRN and Flexeril 2.5 mg TID PRN. She primarily complains of pain in the right shoulder blade and posterior midline neck. However, this regimen of pain medications has been adequately controlling her pain.   4) Rehabilitation Needs:  Given the patient's progressive weakness pre-operatively and the extent of her surgical procedure, the patient was felt to benefit from ongoing Physical and Occupational Therapy in the setting of a Transitional Care Unit.   5) DVT Prophylaxis:   The patient is on Heparin 5,000 SQ Q 8 HR for DVT prophylaxis only. As she increases her mobility, this medication may be discontinued.   6) Hypertension:   The patient's blood pressure was well-controlled during her hospitalization without the addition of all of her home anti-hypertensive medications. Her home anti-hypertensive medications may be re-introduced if needed  Outpatient:   She is now 3 weeks post op.     Since discharge she has done well in the care center. Her family reports they are treating her well and caring for her excellently. She is eating well, and pain close attention to her nutritional status. She is wearing her brace every time  she is upright for any reason. Neurologically she is undergoing therapies, and had nice return of sensation in her hands early on. Last week I removed every other posterior cervical suture. The wound looked very good. She presents for round 2 of routine wound check and suture/staple removal today.    Patient Supplied Answers To the [facility] Pain Questionnaire  [facility] Pain -  Patient Entered Questionnaire/Answers 12/5/2017   What number best describes your pain right now:  0 = No pain  to  10 = Worst pain imaginable 4   How would you describe the pain? numbness   Which of the following worsen your pain? -   Which of the following improve or reduce your pain?  medication   What number best describes your average pain for the past week:  0 = No pain  to  10 = Worst pain imaginable 6   What number best describes your LOWEST pain in past 24 hours:  0 = No pain  to  10 = Worst pain imaginable 4   What number best describes your WORST pain in past 24 hours:  0 = No pain  to  10 = Worst pain imaginable 8   When is your pain worst? Night   What non-medicine treatments have you already had for your pain? none   Have you tried treating your pain with medication?  -   Are you currently taking medications for your pain? Yes            Current Outpatient Prescriptions:      Cyclobenzaprine HCl (FLEXERIL PO), Take 2.5 mg by mouth 3 times daily, Disp: , Rfl:      Hypromellose (NATURAL BALANCE TEARS OP), Apply 2 drops to eye 4 times daily as needed, Disp: , Rfl:      OXYCODONE HCL PO, Take 5 mg by mouth 3 times daily as needed, Disp: , Rfl:      acetaminophen (TYLENOL ARTHRITIS PAIN) 650 MG CR tablet, Take 650 mg by mouth every 8 hours, Disp: , Rfl:      Multiple Vitamins-Minerals (MULTIVITAMIN ADULT PO), Take 1 tablet by mouth daily, Disp: , Rfl:      OXYCODONE HCL PO, Take 7.5 mg by mouth 4 times daily , Disp: , Rfl:      AmLODIPine Besylate (NORVASC PO), Take 5 mg by mouth At Bedtime , Disp: , Rfl:      Nutritional Supplements (NUTRITIONAL  "SUPPLEMENT PO), Take 4 oz by mouth 2 times daily, Disp: , Rfl:      Heparin Sodium, Porcine, (HEPARIN SODIUM PF) 5000 UNIT/0.5ML injection, Inject 0.5 mLs (5,000 Units) Subcutaneous every 8 hours, Disp: , Rfl:      pravastatin (PRAVACHOL) 10 MG tablet, 1 tablet (10 mg) by Oral or Feeding Tube route At Bedtime, Disp: 30 tablet, Rfl:      metoprolol (LOPRESSOR) 25 MG tablet, Take 0.5 tablets (12.5 mg) by mouth 2 times daily, Disp: 60 tablet, Rfl:      polyethylene glycol (MIRALAX/GLYCOLAX) Packet, 17 g by Oral or Feeding Tube route 3 times daily as needed for constipation (also QD scheduled) , Disp: 7 packet, Rfl:      senna-docusate (SENOKOT-S;PERICOLACE) 8.6-50 MG per tablet, 3 tablets by Oral or Feeding Tube route 2 times daily , Disp: 100 tablet, Rfl:      calcium carbonate (OS-ANNETTA 500 MG [place]) 1250 MG tablet, Take 1 tablet (1,250 mg) by mouth 2 times daily (with meals), Disp: 90 tablet, Rfl:      cyclobenzaprine (FLEXERIL) 5 MG tablet, Take 0.5 tablets (2.5 mg) by mouth 3 times daily as needed for muscle spasms, Disp: 60 tablet, Rfl: 0     vitamin D (ERGOCALCIFEROL) 26251 UNIT capsule, Take 1 capsule (50,000 Units) by mouth every 7 days, Disp: 10 capsule, Rfl: 0     Cholecalciferol (VITAMIN D) 2000 UNITS tablet, Take 3,000 Units by mouth daily, Disp: 100 tablet, Rfl: 0     order for DME, Equipment being ordered: Shower chair, Disp: 1 Device, Rfl: 0  Allergies   Allergen Reactions     Atorvastatin Calcium      Was on Lipitor and has muscle problem     PMH, SOC HIST, FAM HIST, PROBLEM LIST:  All reviewed in EPIC.    OBJECTIVE:  /88  Pulse 79  Ht 1.499 m (4' 11\")    Imaging:  None new.    EXAM:  Well developed very thin but well nourished female found on transport gurney No apparent distress. She is accompanied by her  and daughter.  A&O X3.  Mood and affect WNL. Language and fund of knowledge intact.     She has a nicely healing incision anteriorly.  Posteriorly the incision looks quite good as " well. There were multiple interrupteds.  Because of the nutritional status and concern about healing. The wound looked remarkably good. Last time I removed every other suture. Today I prepped the wound with chloroprep and cleanly removed the remaining nylon sutures without difficulty. I redressed the wound with bacitracin and a light dressing. This may be removed tomorrow. Wearing her collar and brace today, even though she presents on a gurney.    Exam at discharge:      Delt Bi Tri WE WF    R 1 4- 4+ 4+ 4 4   L 2 4- 4+ 4+ 4 4      IP Quad Ham DF PF EHL   R 3 4+ 4+ 4+ 4+ 4+   L 3 4+ 4+ 4+ 4+ 4+       Exam today:  About the same.    Assessment/Plan:  1. Cervical spondylosis with myelopathy    2. Post-operative state    3. Lane neck deformity of cervical spine    4. Degenerative disc disease, cervical    5. Cervical spondylosis without myelopathy    6. Cervical radiculopathy      Yuni Otoole is doing well after her very large surgical cervical fusion. She has experienced early recovery of neurologic function. She has taken very much to heart Dr. López admonitions to eat well and this is apparent in her remarkably good wound healing. It looks far better than I had expected at this stage. All sutures were successfully removed. I put a light dressing on it which can come off tomorrow.    Her wounds should be washed daily. Her hair should be washed regularly. There is no reconstruction to that. The brace may get wet. The pads of the brace may be changed regularly for hygiene, and should be changed at least every other to every third day for hygiene purposes.    Next visit in about 3 weeks with AP lateral cervical and odontoid sitting in the brace, AP lateral thoracic sitting in the brace, and scoliosis films taken on the fourth floor (not the EOS machine since she cannot stand).    She will follow up with Dr. López at the three-month visit with similar. She will follow-up with  in endocrine clinic at  around 4 weeks postop for evaluation of hyponatremia. We are trying to get this scheduled, it will probably not happen for about 3 more weeks. Her sodium levels are being followed now by her current care center team. Vitamin D levels will be followed at the 6 week visit, I ordered that lab.    We appreciate the opportunity to be of service in the care of this pleasant patient.  Please do call if there is anything more we can do    Lana Hauser PA-C  [facility]  Department of Neurosurgery  Phone: [phone]  Fax: [phone]    This note was generated using voice recognition software. While edited for content some inaccurate phrasing may be found.            Again, thank you for allowing me to participate in the care of your patient.      Sincerely,    Lana Hauser PA-C

## 2017-12-14 NOTE — MR AVS SNAPSHOT
After Visit Summary   12/14/2017    Yuni Otoole    MRN: [id number]           Patient Information     Date Of Birth          1935        Visit Information        Provider Department      12/14/2017 2:00 PM Lana Hauser PA-C [facility] Neurosurgery        Today's Diagnoses     Cervical spondylosis with myelopathy    -  1    Post-operative state        [place] neck deformity of cervical spine        Degenerative disc disease, cervical        Cervical radiculopathy           Follow-ups after your visit        Your next 10 appointments already scheduled     Jan 04, 2018 11:50 AM CST   (Arrive by 11:35 AM)   XR CERVICAL SPINE 2/3 VIEWS with [email or web address]   [facility] Orthopaedics XRay ([facility])    [address] [place] [id number] [phone]           Please bring a list of your current medicines to your exam. (Include vitamins, minerals and over-thecounter medicines.) Leave your valuables at home.  Tell your doctor if there is a chance you may be pregnant.  You do not need to do anything special for this exam.            Jan 04, 2018 12:15 PM CST   XR SPINE COMPLETE 2 VIEWS with [email or web address]   [facility] Orthopaedics XRay ([facility])    [address] [place] [id number] [phone]           Please bring a list of your current medicines to your exam. (Include vitamins, minerals and over-thecounter medicines.) Leave your valuables at home.  Tell your doctor if there is a chance you may be pregnant.  You do not need to do anything special for this exam.            Jan 04, 2018 12:40 PM CST   XR THORACIC SPINE 2 VIEWS with [email or web address]   [facility] Orthopaedics XRay ([facility])    [address] [place] [id number] [phone]           Please bring a list of your current medicines to your exam. (Include vitamins, minerals and over-thecounter medicines.)  Leave your valuables at home.  Tell your doctor if there is a chance you may be pregnant.  You do not need to do anything special for this exam.            Jan 04, 2018  1:30 PM CST   (Arrive by 1:15 PM)   Return Visit with Lana Hauser PA-C   [facility] Neurosurgery ([facility])    [address] [place] [address]   [phone]            Feb 13, 2018 12:30 PM CST   LAB with  LAB   [facility] Lab ([facility])    [address] [place] [id number] [phone]           Please do not eat 10-12 hours before your appointment if you are coming in fasting for labs on lipids, cholesterol, or glucose (sugar). This does not apply to pregnant women. Water, hot tea and black coffee (with nothing added) are okay. Do not drink other fluids, diet soda or chew gum.            Feb 13, 2018  1:00 PM CST   (Arrive by 12:45 PM)   RETURN ENDOCRINE with Gunjan Ann MD   [facility] Endocrinology ([facility])    [address] [place] [address]   [phone]            Feb 22, 2018  1:00 PM CST   (Arrive by 12:45 PM)   Return Visit with Enma López MD   [facility] Neurosurgery ([facility])    [address] [place] [address]   [phone]              Future tests that were ordered for you today     Open Future Orders        Priority Expected Expires Ordered    X-ray Spine complete (Cervicothoracolumbar AP and lateral - standing views preferred) [DSC1690] Routine 12/14/2017 12/14/2018 12/14/2017    25 Hydroxyvitamin D2 and D3 Routine  12/15/2018 12/14/2017    X-ray Cervical spine 2-3 views (AP, lateral, odontoid) [[id number]] Routine 12/14/2017 12/14/2018 12/14/2017    X-ray Thoracic spine 2 views (AP and lateral - standing views preferred) [[id number]] Routine 12/14/2017 12/14/2018 12/14/2017            Who to contact      "Please call your clinic at [phone] to:    Ask questions about your health    Make or cancel appointments    Discuss your medicines    Learn about your test results    Speak to your doctor   If you have compliments or concerns about an experience at your clinic, or if you wish to file a complaint, please contact [facility] Patient Relations at [phone] or email us at [email or web address]         Additional Information About Your Visit        NextIOhart Information     [employer] gives you secure access to your electronic health record. If you see a primary care provider, you can also send messages to your care team and make appointments. If you have questions, please call your primary care clinic.  If you do not have a primary care provider, please call [phone] and they will assist you.      Plantiga is an electronic gateway that provides easy, online access to your medical records. With Plant[employer], you can request a clinic appointment, read your test results, renew a prescription or communicate with your care team.     To access your existing account, please contact your [facility] or call [phone] for assistance.        Care EveryWhere ID     This is your Care EveryWhere ID. This could be used by other organizations to access your [facility] medical records  [id number]        Your Vitals Were     Pulse Height                79 1.499 m (4' 11\")           Blood Pressure from Last 3 Encounters:   12/14/17 154/88   12/12/17 141/83   12/05/17 139/75    Weight from Last 3 Encounters:   12/12/17 42.3 kg (93 lb 4.8 oz)   11/30/17 43.4 kg (95 lb 11.2 oz)   11/27/17 42 kg (92 lb 9.5 oz)               Primary Care Provider Office Phone # Fax #    Jonathan Vincent [phone]       [address] [facility] [address]        Equal Access to Services     CARLOS MCKNIGHT AH: alma Ward qaybta " elias bankssaundra bruno ah. [facility] [phone].    ATENCIÓN: Si suad corea, tiene a pierson disposición servicios gratuitos de asistencia lingüística. Lewis al [phone].    We comply with applicable federal civil rights laws and [place] laws. We do not discriminate on the basis of race, color, national origin, age, disability, sex, sexual orientation, or gender identity.            Thank you!     Thank you for choosing [facility]UR[facility]  for your care. Our goal is always to provide you with excellent care. Hearing back from our patients is one way we can continue to improve our services. Please take a few minutes to complete the written survey that you may receive in the mail after your visit with us. Thank you!             Your Updated Medication List - Protect others around you: Learn how to safely use, store and throw away your medicines at www.disposemymeds.org.          This list is accurate as of: 12/14/17  3:31 PM.  Always use your most recent med list.                   Brand Name Dispense Instructions for use Diagnosis    calcium carbonate 1250 MG tablet    OS-ANNETTA 500 mg [place]. Ca    90 tablet    Take 1 tablet (1,250 mg) by mouth 2 times daily (with meals)    Cervical stenosis of spine, S/P cervical spinal fusion       * FLEXERIL PO      Take 2.5 mg by mouth 3 times daily        * cyclobenzaprine 5 MG tablet    FLEXERIL    60 tablet    Take 0.5 tablets (2.5 mg) by mouth 3 times daily as needed for muscle spasms    Cervical stenosis of spine, S/P cervical spinal fusion       heparin sodium PF 5000 UNIT/0.5ML injection      Inject 0.5 mLs (5,000 Units) Subcutaneous every 8 hours    Cervical stenosis of spine, S/P cervical spinal fusion       metoprolol 25 MG tablet    LOPRESSOR    60 tablet    Take 0.5 tablets (12.5 mg) by mouth 2 times daily    Cervical stenosis of spine, S/P cervical spinal fusion       MULTIVITAMIN ADULT PO      Take 1 tablet by mouth daily         NATURAL BALANCE TEARS OP      Apply 2 drops to eye 4 times daily as needed        NORVASC PO      Take 5 mg by mouth At Bedtime        NUTRITIONAL SUPPLEMENT PO      Take 4 oz by mouth 2 times daily        order for DME     1 Device    Equipment being ordered: Shower chair    Nondisplaced fracture of seventh cervical vertebra, unspecified fracture morphology, initial encounter       * OXYCODONE HCL PO      Take 7.5 mg by mouth 4 times daily        * OXYCODONE HCL PO      Take 5 mg by mouth 3 times daily as needed        polyethylene glycol Packet    MIRALAX/GLYCOLAX    7 packet    17 g by Oral or Feeding Tube route 3 times daily as needed for constipation (also QD scheduled)    Cervical stenosis of spine, S/P cervical spinal fusion       pravastatin 10 MG tablet    PRAVACHOL    30 tablet    1 tablet (10 mg) by Oral or Feeding Tube route At Bedtime    Cervical stenosis of spine, S/P cervical spinal fusion       senna-docusate 8.6-50 MG per tablet    SENOKOT-S;PERICOLACE    100 tablet    3 tablets by Oral or Feeding Tube route 2 times daily    Cervical stenosis of spine, S/P cervical spinal fusion       TYLENOL ARTHRITIS PAIN 650 MG CR tablet   Generic drug:  acetaminophen      Take 650 mg by mouth every 8 hours        vitamin D 2000 UNITS tablet     100 tablet    Take 3,000 Units by mouth daily        vitamin D 76522 UNIT capsule    ERGOCALCIFEROL    10 capsule    Take 1 capsule (50,000 Units) by mouth every 7 days        * Notice:  This list has 4 medication(s) that are the same as other medications prescribed for you. Read the directions carefully, and ask your doctor or other care provider to review them with you.

## 2017-12-18 ENCOUNTER — NURSING HOME VISIT (OUTPATIENT)
Dept: GERIATRICS | Facility: CLINIC | Age: 82
End: 2017-12-18
Payer: COMMERCIAL

## 2017-12-18 VITALS
TEMPERATURE: 98.5 F | OXYGEN SATURATION: 98 % | BODY MASS INDEX: 17.25 KG/M2 | WEIGHT: 85.4 LBS | SYSTOLIC BLOOD PRESSURE: 166 MMHG | RESPIRATION RATE: 20 BRPM | DIASTOLIC BLOOD PRESSURE: 90 MMHG | HEART RATE: 83 BPM

## 2017-12-18 DIAGNOSIS — Z98.1 S/P CERVICAL SPINAL FUSION: ICD-10-CM

## 2017-12-18 DIAGNOSIS — I10 BENIGN ESSENTIAL HYPERTENSION: ICD-10-CM

## 2017-12-18 DIAGNOSIS — E87.1 HYPONATREMIA: Primary | ICD-10-CM

## 2017-12-18 DIAGNOSIS — Z47.89 ORTHOPEDIC AFTERCARE: ICD-10-CM

## 2017-12-18 PROCEDURE — 99309 SBSQ NF CARE MODERATE MDM 30: CPT | Performed by: NURSE PRACTITIONER

## 2017-12-18 NOTE — PROGRESS NOTES
[facility]    Chief Complaint   Patient presents with     Nursing Home Acute       HPI:    Yuni Otoole is a 82 year old  (1935), who is being seen today for an episodic care visit at [facility].    HPI information obtained from: facility chart records, facility staff, patient report and [facility] chart review. Today's concern is:  Hyponatremia  F/u on fluid restriction - c/o dry mouth (liekly 2/2 narcs and muscle relaxants - see below) - states she is adhering to FR -   Na recheck tomorrow    S/P cervical spinal fusion/Orthopedic aftercare  Did have f/u with ortho - sutures out - still having pain/pull  Discussed reduction in medications and pt states she is not ready yet and not wanting it.     Benign essential hypertension  F/u on bps' after starting then increasing norvasc - see below       REVIEW OF SYSTEMS:  4 point ROS including Respiratory, CV, GI and , other than that noted in the HPI,  is negative    PMH/PSH reviewed in EPIC today    /90  Pulse 83  Temp 98.5  F (36.9  C)  Resp 20  Wt 85 lb 6.4 oz (38.7 kg)  SpO2 98%  BMI 17.25 kg/m2     GENERAL APPEARANCE:  Alert, in no distress  Neck still very stiff out of brace - in fixed extension - incision healed.     ASSESSMENT/PLAN:  Hyponatremia  Cont poc with recheck tomorrow -  Add tx for dry mouth      S/P cervical spinal fusion/Orthopedic aftercare  Cont poc with current meds but long discussion with pt that we will need to do dose reduction in a couple weeks.     Benign essential hypertension  Stable given occ lower readings - no further adjustment up.        1.  Biotene spray - 2 sprays po/sl Q1H PRN.  Dx: dry mouth (ok to leave at bedside & self administer after nsg instructions/approval)        Electronically signed by:  CRISTAL Olmos CNP

## 2017-12-18 NOTE — LETTER
12/18/2017        RE: Yuni Otoole  [address]  [facility] [address]        [facility]    Chief Complaint   Patient presents with     Nursing Home Acute       HPI:    Yuni Otoole is a 82 year old  (1935), who is being seen today for an episodic care visit at [facility].    HPI information obtained from: facility chart records, facility staff, patient report and [facility] chart review. Today's concern is:  Hyponatremia  F/u on fluid restriction - c/o dry mouth (liekly 2/2 narcs and muscle relaxants - see below) - states she is adhering to FR -   Na recheck tomorrow    S/P cervical spinal fusion/Orthopedic aftercare  Did have f/u with ortho - sutures out - still having pain/pull  Discussed reduction in medications and pt states she is not ready yet and not wanting it.     Benign essential hypertension  F/u on bps' after starting then increasing norvasc - see below       REVIEW OF SYSTEMS:  4 point ROS including Respiratory, CV, GI and , other than that noted in the HPI,  is negative    PMH/PSH reviewed in EPIC today    /90  Pulse 83  Temp 98.5  F (36.9  C)  Resp 20  Wt 85 lb 6.4 oz (38.7 kg)  SpO2 98%  BMI 17.25 kg/m2     GENERAL APPEARANCE:  Alert, in no distress  Neck still very stiff out of brace - in fixed extension - incision healed.     ASSESSMENT/PLAN:  Hyponatremia  Cont poc with recheck tomorrow -  Add tx for dry mouth      S/P cervical spinal fusion/Orthopedic aftercare  Cont poc with current meds but long discussion with pt that we will need to do dose reduction in a couple weeks.     Benign essential hypertension  Stable given occ lower readings - no further adjustment up.        1.  Biotene spray - 2 sprays po/sl Q1H PRN.  Dx: dry mouth (ok to leave at bedside & self administer after nsg instructions/approval)        Electronically signed by:  CRISTAL Olmos CNP      Sincerely,        CRISTAL Olmos CNP

## 2017-12-19 ENCOUNTER — HOSPITAL LABORATORY (OUTPATIENT)
Dept: NURSING HOME | Facility: OTHER | Age: 82
End: 2017-12-19

## 2017-12-19 LAB — SODIUM SERPL-SCNC: 132 MMOL/L (ref 133–144)

## 2017-12-20 VITALS
TEMPERATURE: 97.8 F | DIASTOLIC BLOOD PRESSURE: 76 MMHG | HEART RATE: 88 BPM | BODY MASS INDEX: 17.25 KG/M2 | WEIGHT: 85.4 LBS | RESPIRATION RATE: 20 BRPM | SYSTOLIC BLOOD PRESSURE: 148 MMHG | OXYGEN SATURATION: 98 %

## 2017-12-20 NOTE — PROGRESS NOTES
[facility]  PRIMARY CARE PROVIDER AND CLINIC:  Jonathan Vincent [address]  / [facility] [address]  Chief Complaint   Patient presents with     Hospital F/U     Clinic Care Coordination - Initial       HPI:    Yuni Otoole is a 82 year old  (1935),admitted to the [facility]   from [facility].  Hospital stay 11/10/17 through 11/27/17.  Admitted to this facility for  rehab, medical management and nursing care.  HPI information obtained from: patient report and [facility] chart review.      Interval History:  - Pt with significant PMH Cervical stenosis with myelopathy, SIADH-induced hyponatremia admitted to the above hospital for scheduled extensive cervical spine surgery  - Hospitalization was c/w elevated BP, deferred to PCP to manage.     At TCU:  - BP: started on Norvasc 2.5 mg then increased to 5 mg   - Hyponatremia: salt tab stopped, fluid restriction continued.   - Pain: opioid adjusted, flexeril added  - Dry mouth: added biotene    Current issues are:      - Started on OT/PT, reports making a progress  - Reports pain with swallowing at the back of his throat, 2/10, better with pain meds.   - reports sleep and BM are fine.   - denies HA, chest pain or dizziness.   - reports occasional left hand numbness and swelling     ----------------------------------------------------------------    CODE STATUS/ADVANCE DIRECTIVES DISCUSSION:   DNR / DNI    ALLERGIES:Atorvastatin calcium  PAST MEDICAL HISTORY:  has a past medical history of Allergic rhinitis, cause unspecified; Pure hypercholesterolemia; and Unspecified essential hypertension. She also has no past medical history of Diabetes (H).  PAST SURGICAL HISTORY:  has a past surgical history that includes REMOVAL OF TONSILS,14+ Y/O; colonoscopy (05/30/07); carotid endarterectomy (11/07/08); INJ EPIDURAL LUMBAR/SACRAL W/WO CONTRAST (2009); DRAIN/INJECT LARGE JOINT/BURSA  (2009); DRAIN/INJECT LARGE JOINT/BURSA (2010); INJ EPIDURAL CERVICAL/THORACIC W/WO CONTRAST (2010); INJ TRANSFORAMIN EPIDURAL, LUMB/SACR SINGLE (2010); Optical Tracking System Fusion Posterior Cervical Three + Levels (N/A, 11/15/2017); Laminectomy cerivcal posterior three+ levels (N/A, 11/15/2017); Optical Tracking System Fusion Cervical Anterior Three + Levels (N/A, 11/15/2017); and Fusion cervical posterior three+ levels (N/A, 11/15/2017).  FAMILY HISTORY: family history includes CANCER in her mother; CEREBROVASCULAR DISEASE in her paternal grandmother; Cardiovascular in her father; Circulatory in her paternal grandmother; DIABETES in her maternal aunt; EYE* in her mother; GASTROINTESTINAL DISEASE in her mother; Gynecology in her sister; Hypertension in her father; Lipids in her brother; Musculoskeletal Disorder in her daughter; OSTEOPOROSIS in her mother; Obesity in her maternal grandmother and paternal grandmother.  SOCIAL HISTORY:  reports that she quit smoking about 21 years ago. She has a 5.00 pack-year smoking history. She has never used smokeless tobacco. She reports that she does not drink alcohol or use illicit drugs.    Post Discharge Medication Reconciliation Status: discharge medications reconciled and changed, per note/orders (see AVS).  Current Outpatient Prescriptions   Medication Sig Dispense Refill     artificial saliva (BIOTENE MT) AERS spray Take 2 sprays by mouth every hour as needed for dry mouth       Cyclobenzaprine HCl (FLEXERIL PO) Take 2.5 mg by mouth 3 times daily       Hypromellose (NATURAL BALANCE TEARS OP) Apply 2 drops to eye 4 times daily as needed       OXYCODONE HCL PO Take 5 mg by mouth 3 times daily as needed       acetaminophen (TYLENOL ARTHRITIS PAIN) 650 MG CR tablet Take 650 mg by mouth every 8 hours       Multiple Vitamins-Minerals (MULTIVITAMIN ADULT PO) Take 1 tablet by mouth daily       OXYCODONE HCL PO Take 7.5 mg by mouth 4 times daily        AmLODIPine Besylate  (NORVASC PO) Take 5 mg by mouth At Bedtime        Nutritional Supplements (NUTRITIONAL SUPPLEMENT PO) Take 4 oz by mouth 2 times daily       Heparin Sodium, Porcine, (HEPARIN SODIUM PF) 5000 UNIT/0.5ML injection Inject 0.5 mLs (5,000 Units) Subcutaneous every 8 hours       pravastatin (PRAVACHOL) 10 MG tablet 1 tablet (10 mg) by Oral or Feeding Tube route At Bedtime 30 tablet      metoprolol (LOPRESSOR) 25 MG tablet Take 0.5 tablets (12.5 mg) by mouth 2 times daily 60 tablet      polyethylene glycol (MIRALAX/GLYCOLAX) Packet 17 g by Oral or Feeding Tube route 3 times daily as needed for constipation (also QD scheduled)  7 packet      senna-docusate (SENOKOT-S;PERICOLACE) 8.6-50 MG per tablet 3 tablets by Oral or Feeding Tube route 2 times daily  100 tablet      calcium carbonate (OS-ANNETTA 500 MG [place]. CA) 1250 MG tablet Take 1 tablet (1,250 mg) by mouth 2 times daily (with meals) 90 tablet      cyclobenzaprine (FLEXERIL) 5 MG tablet Take 0.5 tablets (2.5 mg) by mouth 3 times daily as needed for muscle spasms 60 tablet 0     vitamin D (ERGOCALCIFEROL) 11463 UNIT capsule Take 1 capsule (50,000 Units) by mouth every 7 days 10 capsule 0     Cholecalciferol (VITAMIN D) 2000 UNITS tablet Take 3,000 Units by mouth daily 100 tablet 0     order for DME Equipment being ordered: Shower chair 1 Device 0       ROS:  10 point ROS of systems including Constitutional, Eyes, Respiratory, Cardiovascular, Gastroenterology, Genitourinary, Integumentary, Muscularskeletal, Psychiatric were all negative except for pertinent positives noted in my HPI.    Exam:  /76  Pulse 88  Temp 97.8  F (36.6  C)  Resp 20  Wt 85 lb 6.4 oz (38.7 kg)  SpO2 98%  BMI 17.25 kg/m2  GENERAL APPEARANCE:  in no distress, cooperative  ENT:  Mouth and posterior oropharynx normal, moist mucous membranes, [place]  EYES:  EOM, conjunctivae, lids, pupils and irises normal  NECK:  neck collar in place  RESP:  respiratory effort and palpation of chest normal, lungs  clear to auscultation , no respiratory distress  CV:  Palpation and auscultation of heart done , regular rate and rhythm, no murmur, rub, or gallop, S1S2 bounding and rapid, but regular.   ABDOMEN:  normal bowel sounds, soft, nontender, no hepatosplenomegaly or other masses  M/S:   Gait and station abnormal uses walker and WC.   SKIN:  Inspection of skin and subcutaneous tissue baseline, Palpation of skin and subcutaneous tissue baseline  NEURO:   generalized muscles atrophy, hand  4/5 b/l.   PSYCH:  affect and mood normal    Lab/Diagnostic data:  CBC RESULTS:   Recent Labs   Lab Test  11/30/17   0610  11/20/17   1112  11/17/17   0401   WBC   --   8.0  10.6   RBC   --   3.21*  3.35*   HGB  10.5*  9.7*  10.1*   HCT   --   30.2*  30.6*   MCV   --   94  91   MCH   --   30.2  30.1   MCHC   --   32.1  33.0   RDW   --   13.6  14.9   PLT   --   246  171       Last Basic Metabolic Panel:  Recent Labs   Lab Test  12/19/17   0620  12/12/17   0607   11/28/17   0610   11/26/17   0941   NA  132*  132*   < >  134   < >  132*   POTASSIUM   --    --    --   3.9   --   4.3   CHLORIDE   --    --    --   98   --   96   ANNETTA   --    --    --   8.9   --   8.8   CO2   --    --    --   29   --   30   BUN   --    --    --   14   --   18   CR   --    --    --   0.51*   --   0.35*   GLC   --    --    --   85   --   86    < > = values in this interval not displayed.       Liver Function Studies -   Recent Labs   Lab Test  11/27/17   0817  11/23/17   0835   11/10/17   1723  04/25/17   1206   PROTTOTAL   --    --    --   6.5*  6.9   ALBUMIN  2.5*  2.4*   < >  3.5  3.9   BILITOTAL   --    --    --   0.4  0.2   ALKPHOS   --    --    --   68  63   AST   --    --    --   16  21   ALT   --    --    --   18  25    < > = values in this interval not displayed.       TSH   Date Value Ref Range Status   11/14/2017 2.91 0.40 - 4.00 mU/L Final   04/25/2017 2.40 0.40 - 4.00 mU/L Final     ASSESSMENT/PLAN:  # Orthopedic aftercare  # S/P cervical spinal  fusion:  # Neck pain  - C1-C3 Lami; C2-C3 Posterior Fusion; C2/3, C3/4, C6/7 Anterior Decompression and Fusion; C2/3, C4/5, and C6/7 Facet Osteotomies; C2-T3 Instrumentation   - analgesia optimal  - continue to follow on the surgeon's recommendations.   - continue rehab.     Benign essential hypertension  BP Readings from Last 3 Encounters:   12/20/17 148/76   12/18/17 166/90   12/14/17 154/88   - on metoprolol 12.5 mg bid, Norvasc 5 mg added at this facility  - in general acceptable range given recovering from extensive surgery.   - Keep SBP> 130 mmHg and DBP > 65 mmHg (levels below these increase mortality as shown by standard studies and observations).     Chronic hyponatremia  - Mild Chronic Euvolemic  - in general stable, continue to monitor periodically.     Anemia due to blood loss, acute  - HH trending up.   - clinically stable.     HTN, essential:  BP Readings from Last 3 Encounters:   12/20/17 148/76   12/18/17 166/90   12/14/17 154/88   - on amlodipine 5 mg, and metoprolol 12.5 mg bid.   - in general w/i acceptable range given limited life expectancy.   - Keep SBP> 130 mmHg and DBP > 65 mmHg (levels below these increase mortality as shown by standard studies and observations).     Drug-induced constipation  - improving, continue meds.     Vitamin D deficiency  - level 17, on supplement Vit D3 3000 IU, and Vit D2 50,000 IU Q wk.     Orders:  - See above, otherwise, continue the rest of the current POC.     Electronically signed by:  Ceasar Aburto MD

## 2017-12-21 ENCOUNTER — NURSING HOME VISIT (OUTPATIENT)
Dept: GERIATRICS | Facility: CLINIC | Age: 82
End: 2017-12-21
Payer: COMMERCIAL

## 2017-12-21 DIAGNOSIS — Z98.1 S/P CERVICAL SPINAL FUSION: ICD-10-CM

## 2017-12-21 DIAGNOSIS — E87.1 CHRONIC HYPONATREMIA: ICD-10-CM

## 2017-12-21 DIAGNOSIS — D62 ANEMIA DUE TO BLOOD LOSS, ACUTE: ICD-10-CM

## 2017-12-21 DIAGNOSIS — M54.2 NECK PAIN: ICD-10-CM

## 2017-12-21 DIAGNOSIS — I10 BENIGN ESSENTIAL HYPERTENSION: ICD-10-CM

## 2017-12-21 DIAGNOSIS — E55.9 VITAMIN D DEFICIENCY: ICD-10-CM

## 2017-12-21 DIAGNOSIS — K59.03 DRUG-INDUCED CONSTIPATION: ICD-10-CM

## 2017-12-21 DIAGNOSIS — Z47.89 ORTHOPEDIC AFTERCARE: Primary | ICD-10-CM

## 2017-12-21 PROCEDURE — 99305 1ST NF CARE MODERATE MDM 35: CPT | Performed by: FAMILY MEDICINE

## 2018-01-02 ENCOUNTER — NURSING HOME VISIT (OUTPATIENT)
Dept: GERIATRICS | Facility: CLINIC | Age: 83
End: 2018-01-02
Payer: COMMERCIAL

## 2018-01-02 ENCOUNTER — HOSPITAL LABORATORY (OUTPATIENT)
Dept: NURSING HOME | Facility: OTHER | Age: 83
End: 2018-01-02

## 2018-01-02 VITALS
BODY MASS INDEX: 17.43 KG/M2 | DIASTOLIC BLOOD PRESSURE: 86 MMHG | WEIGHT: 86.3 LBS | OXYGEN SATURATION: 98 % | HEART RATE: 81 BPM | RESPIRATION RATE: 16 BRPM | TEMPERATURE: 97.6 F | SYSTOLIC BLOOD PRESSURE: 156 MMHG

## 2018-01-02 DIAGNOSIS — Z98.1 S/P CERVICAL SPINAL FUSION: Primary | ICD-10-CM

## 2018-01-02 DIAGNOSIS — E87.1 CHRONIC HYPONATREMIA: ICD-10-CM

## 2018-01-02 DIAGNOSIS — Z47.89 ORTHOPEDIC AFTERCARE: ICD-10-CM

## 2018-01-02 DIAGNOSIS — M54.2 NECK PAIN: ICD-10-CM

## 2018-01-02 LAB — SODIUM SERPL-SCNC: 133 MMOL/L (ref 133–144)

## 2018-01-02 PROCEDURE — 99309 SBSQ NF CARE MODERATE MDM 30: CPT | Performed by: NURSE PRACTITIONER

## 2018-01-02 NOTE — LETTER
1/2/2018        RE: Yuni Otoole  [address]  [facility] [address]        [facility]    Chief Complaint   Patient presents with     Nursing Home Acute       HPI:    Yuni Otoole is a 82 year old  (1935), who is being seen today for an episodic care visit at [facility].    HPI information obtained from: facility chart records, facility staff, patient report and [facility] chart review. Today's concern is:  S/P cervical spinal fusion/Neck pain/Orthopedic aftercare  Had f/u with neuro surg 12/14 - pt still c/o pain but now agreeable to do GDR on narc after encouragement    Chronic hyponatremia  dicussion with pt and then family with hx of - why and how best treated and why endo not needed -   - given hx dating back to 2008 of hyponatremia and subsequent stability no need to see specialisist   Fluid restriction slowly being lifted with serial Na's being checked and stable   F/u today - pt still c/o dry mouth - noting narcs icnrease that - was tx with biotene helps but not fully relieved.       REVIEW OF SYSTEMS:  4 point ROS including Respiratory, CV, GI and , other than that noted in the HPI,  is negative    PMH/PSH reviewed in EPIC today    /86  Pulse 81  Temp 97.6  F (36.4  C)  Resp 16  Wt 86 lb 4.8 oz (39.1 kg)  SpO2 98%  BMI 17.43 kg/m2  GENERAL APPEARANCE:  Alert, in no distress  Neck incision c/di - brace on -  Up more and moving more now    ASSESSMENT/PLAN:  S/P cervical spinal fusion/Neck pain/Orthopedic aftercare  Will DC heparin as much more mobile now and risk of DVT low  Will decrease oxy - using now about 30 - 40 mg/day given scheduled and prns  Will decrease to 25 mg/day - keep flexeril and use the prn for additional pain control if needed    Chronic hyponatremia  Stable - reviewed na's with family of low's in 128's 2015, 2008 - discussed the why and how to treat  Will liberalize FR further and cont to montior  Family will DC f/u.       1.  D/c  Heparin.  2.  Change fluid restriction to 2L/24 hrs.  Dx: decrease Na+  3.  D/c current Oxycodone PRN and scheduled orders.  4.  Start Oxycodone 5 mg po 5 x QD scheduled.  Dx: pain  5.  Na+ recheck in 2 weeks.  Dx: hyponatremia     Total time spent with patient visit at the [facility] was 38 min including patient visit, review of past records and f2f discussion with dgt and other family about hypona. Greater than 50% of total time spent with counseling and coordinating care due to discussion of hypo na and pain meds    Electronically signed by:  CRISTAL Olmos CNP      Sincerely,        CRISTAL Olmos CNP

## 2018-01-02 NOTE — PROGRESS NOTES
[facility]    Chief Complaint   Patient presents with     Nursing Home Acute       HPI:    Yuni Otoole is a 82 year old  (1935), who is being seen today for an episodic care visit at [facility].    HPI information obtained from: facility chart records, facility staff, patient report and [facility] chart review. Today's concern is:  S/P cervical spinal fusion/Neck pain/Orthopedic aftercare  Had f/u with neuro surg 12/14 - pt still c/o pain but now agreeable to do GDR on narc after encouragement    Chronic hyponatremia  dicussion with pt and then family with hx of - why and how best treated and why endo not needed -   - given hx dating back to 2008 of hyponatremia and subsequent stability no need to see specialisist   Fluid restriction slowly being lifted with serial Na's being checked and stable   F/u today - pt still c/o dry mouth - noting narcs icnrease that - was tx with biotene helps but not fully relieved.       REVIEW OF SYSTEMS:  4 point ROS including Respiratory, CV, GI and , other than that noted in the HPI,  is negative    PMH/PSH reviewed in EPIC today    /86  Pulse 81  Temp 97.6  F (36.4  C)  Resp 16  Wt 86 lb 4.8 oz (39.1 kg)  SpO2 98%  BMI 17.43 kg/m2  GENERAL APPEARANCE:  Alert, in no distress  Neck incision c/di - brace on -  Up more and moving more now    ASSESSMENT/PLAN:  S/P cervical spinal fusion/Neck pain/Orthopedic aftercare  Will DC heparin as much more mobile now and risk of DVT low  Will decrease oxy - using now about 30 - 40 mg/day given scheduled and prns  Will decrease to 25 mg/day - keep flexeril and use the prn for additional pain control if needed    Chronic hyponatremia  Stable - reviewed na's with family of low's in 128's 2015, 2008 - discussed the why and how to treat  Will liberalize FR further and cont to montior  Family will DC f/u.       1.  D/c Heparin.  2.  Change fluid restriction to 2L/24 hrs.  Dx: decrease Na+  3.  D/c current  Oxycodone PRN and scheduled orders.  4.  Start Oxycodone 5 mg po 5 x QD scheduled.  Dx: pain  5.  Na+ recheck in 2 weeks.  Dx: hyponatremia     Total time spent with patient visit at the [facility] nursing facility was 38 min including patient visit, review of past records and f2f discussion with dgt and other family about hypona. Greater than 50% of total time spent with counseling and coordinating care due to discussion of hypo na and pain meds    Electronically signed by:  CRISTAL Olmos CNP

## 2018-01-04 ENCOUNTER — RADIANT APPOINTMENT (OUTPATIENT)
Dept: GENERAL RADIOLOGY | Facility: CLINIC | Age: 83
End: 2018-01-04
Attending: PHYSICIAN ASSISTANT
Payer: COMMERCIAL

## 2018-01-04 ENCOUNTER — OFFICE VISIT (OUTPATIENT)
Dept: NEUROSURGERY | Facility: CLINIC | Age: 83
End: 2018-01-04
Payer: COMMERCIAL

## 2018-01-04 ENCOUNTER — MEDICAL CORRESPONDENCE (OUTPATIENT)
Dept: HEALTH INFORMATION MANAGEMENT | Facility: CLINIC | Age: 83
End: 2018-01-04

## 2018-01-04 VITALS
HEART RATE: 75 BPM | BODY MASS INDEX: 18.55 KG/M2 | SYSTOLIC BLOOD PRESSURE: 118 MMHG | TEMPERATURE: 98.1 F | OXYGEN SATURATION: 93 % | RESPIRATION RATE: 24 BRPM | DIASTOLIC BLOOD PRESSURE: 68 MMHG | HEIGHT: 59 IN | WEIGHT: 92 LBS

## 2018-01-04 DIAGNOSIS — M47.12 CERVICAL SPONDYLOSIS WITH MYELOPATHY: ICD-10-CM

## 2018-01-04 DIAGNOSIS — M50.30 DEGENERATIVE DISC DISEASE, CERVICAL: ICD-10-CM

## 2018-01-04 DIAGNOSIS — Z98.890 POST-OPERATIVE STATE: ICD-10-CM

## 2018-01-04 DIAGNOSIS — S12.9XXD CERVICAL COMPRESSION FRACTURE, WITH ROUTINE HEALING, SUBSEQUENT ENCOUNTER: ICD-10-CM

## 2018-01-04 DIAGNOSIS — M43.8X2 SWAN NECK DEFORMITY OF CERVICAL SPINE: ICD-10-CM

## 2018-01-04 DIAGNOSIS — M54.12 CERVICAL RADICULOPATHY: ICD-10-CM

## 2018-01-04 DIAGNOSIS — M47.12 CERVICAL SPONDYLOSIS WITH MYELOPATHY: Primary | ICD-10-CM

## 2018-01-04 ASSESSMENT — PAIN SCALES - GENERAL: PAINLEVEL: MILD PAIN (2)

## 2018-01-04 NOTE — MR AVS SNAPSHOT
After Visit Summary   1/4/2018    Yuni Otoole    MRN: [id number]           Patient Information     Date Of Birth          1935        Visit Information        Provider Department      1/4/2018 1:30 PM Lana Hauser PA-C [facility] Neurosurgery        Today's Diagnoses     Cervical spondylosis with myelopathy    -  1    Post-operative state        [place] neck deformity of cervical spine        Cervical compression fracture, with routine healing, subsequent encounter           Follow-ups after your visit        Your next 10 appointments already scheduled     Feb 13, 2018  1:00 PM CST   (Arrive by 12:45 PM)   RETURN ENDOCRINE with Gunjan Ann MD   [facility] Endocrinology ([facility])    [address] [place] [address] [id number] [phone]            Feb 22, 2018 12:00 PM CST   XR THORACIC SPINE 2 VIEWS with [email or web address]   [facility] Center Xray ([facility])    [address] [place] [address] [id number] [phone]           Please bring a list of your current medicines to your exam. (Include vitamins, minerals and over-thecounter medicines.) Leave your valuables at home.  Tell your doctor if there is a chance you may be pregnant.  You do not need to do anything special for this exam.            Feb 22, 2018 12:20 PM CST   (Arrive by 12:05 PM)   XR CERVICAL SPINE 2/3 VIEWS with UCXR1   [facility] Imaging Center Xray ([facility])    [address] [place] [address] [id number] [phone]           Please bring a list of your current medicines to your exam. (Include vitamins, minerals and over-thecounter medicines.) Leave your valuables at home.  Tell your doctor if there is a chance you may be pregnant.  You do not need to do anything special for this exam.            Feb 22, 2018  1:00 PM CST   (Arrive by 12:45 PM)   Return Visit with Enma López MD     [facility] Neurosurgery ([facility] [place])    [address] [facility]  3rd Floor  [facility] [id number] [phone]              Future tests that were ordered for you today     Open Future Orders        Priority Expected Expires Ordered    X-ray Thoracic spine 2 views (AP and lateral - standing views preferred) [[id number]] Routine 1/4/2018 1/4/2019 1/4/2018    X-ray Cervical spine 2-3 views (AP, lateral, odontoid) [[id number]] Routine 1/4/2018 1/4/2019 1/4/2018            Who to contact     Please call your clinic at [phone] to:    Ask questions about your health    Make or cancel appointments    Discuss your medicines    Learn about your test results    Speak to your doctor   If you have compliments or concerns about an experience at your clinic, or if you wish to file a complaint, please contact [facility] Patient Relations at [phone] or email us at [email or web address]         Additional Information About Your Visit        Naymithar[employer] Information     [employer] gives you secure access to your electronic health record. If you see a primary care provider, you can also send messages to your care team and make appointments. If you have questions, please call your primary care clinic.  If you do not have a primary care provider, please call [phone] and they will assist you.      Intr[employer] is an electronic gateway that provides easy, online access to your medical records. With [employer], you can request a clinic appointment, read your test results, renew a prescription or communicate with your care team.     To access your existing account, please contact your [facility] or call [phone] for assistance.        Care EveryWhere ID     This is your Care EveryWhere ID. This could be used by other organizations to access your [place] medical records  [id number]        Your Vitals Were     Pulse Temperature Respirations Height Pulse  "Oximetry Breastfeeding?    75 98.1  F (36.7  C) 24 1.499 m (4' 11\") 93% No    BMI (Body Mass Index)                   18.58 kg/m2            Blood Pressure from Last 3 Encounters:   01/04/18 118/68   01/02/18 156/86   12/20/17 148/76    Weight from Last 3 Encounters:   01/04/18 41.7 kg (92 lb)   01/02/18 39.1 kg (86 lb 4.8 oz)   12/20/17 38.7 kg (85 lb 6.4 oz)               Primary Care Provider Office Phone # Fax #    Jonathan Vincent [phone]       [address] [facility] [address]        Equal Access to Services     VALARIE MCKNIGHT : Rajeev Maldonado, waaxda luqadaha, qaybta kaalmada shena, elias bruno . So [facility] [phone].    ATENCIÓN: Si habla español, tiene a pierson disposición servicios gratuitos de asistencia lingüística. LlACMC Healthcare System [phone].    We comply with applicable federal civil rights laws and [place] laws. We do not discriminate on the basis of race, color, national origin, age, disability, sex, sexual orientation, or gender identity.            Thank you!     Thank you for choosing [facility]  for your care. Our goal is always to provide you with excellent care. Hearing back from our patients is one way we can continue to improve our services. Please take a few minutes to complete the written survey that you may receive in the mail after your visit with us. Thank you!             Your Updated Medication List - Protect others around you: Learn how to safely use, store and throw away your medicines at www.disposemymeds.org.          This list is accurate as of: 1/4/18  1:34 PM.  Always use your most recent med list.                   Brand Name Dispense Instructions for use Diagnosis    artificial saliva Aers spray      Take 2 sprays by mouth every hour as needed for dry mouth        calcium carbonate 1250 MG tablet    OS-ANNETTA 500 mg [place] [place]    90 tablet    Take 1 tablet (1,250 mg) by mouth 2 times daily (with meals) "    Cervical stenosis of spine, S/P cervical spinal fusion       * FLEXERIL PO      Take 2.5 mg by mouth 3 times daily        * cyclobenzaprine 5 MG tablet    FLEXERIL    60 tablet    Take 0.5 tablets (2.5 mg) by mouth 3 times daily as needed for muscle spasms    Cervical stenosis of spine, S/P cervical spinal fusion       metoprolol 25 MG tablet    LOPRESSOR    60 tablet    Take 0.5 tablets (12.5 mg) by mouth 2 times daily    Cervical stenosis of spine, S/P cervical spinal fusion       MULTIVITAMIN ADULT PO      Take 1 tablet by mouth daily        NATURAL BALANCE TEARS OP      Apply 2 drops to eye 4 times daily as needed        NORVASC PO      Take 5 mg by mouth At Bedtime        NUTRITIONAL SUPPLEMENT PO      Take 4 oz by mouth 2 times daily        order for DME     1 Device    Equipment being ordered: Shower chair    Nondisplaced fracture of seventh cervical vertebra, unspecified fracture morphology, initial encounter       OXYCODONE HCL PO      Take 5 mg by mouth 5 times daily        polyethylene glycol Packet    MIRALAX/GLYCOLAX    7 packet    17 g by Oral or Feeding Tube route 3 times daily as needed for constipation (also QD scheduled)    Cervical stenosis of spine, S/P cervical spinal fusion       pravastatin 10 MG tablet    PRAVACHOL    30 tablet    1 tablet (10 mg) by Oral or Feeding Tube route At Bedtime    Cervical stenosis of spine, S/P cervical spinal fusion       senna-docusate 8.6-50 MG per tablet    SENOKOT-S;PERICOLACE    100 tablet    3 tablets by Oral or Feeding Tube route 2 times daily    Cervical stenosis of spine, S/P cervical spinal fusion       TYLENOL ARTHRITIS PAIN 650 MG CR tablet   Generic drug:  acetaminophen      Take 650 mg by mouth every 8 hours        vitamin D 2000 UNITS tablet     100 tablet    Take 3,000 Units by mouth daily        vitamin D 13248 UNIT capsule    ERGOCALCIFEROL    10 capsule    Take 50,000 Units by mouth every 7 days        * Notice:  This list has 2 medication(s)  that are the same as other medications prescribed for you. Read the directions carefully, and ask your doctor or other care provider to review them with you.

## 2018-01-04 NOTE — LETTER
1/4/2018       RE: Yuni Otoole  [address]  [facility] [id number]     Dear Colleague,    Thank you for referring your patient, Yuni Otoole, to the [facility] NEUROSURGERY at [facility]. Please see a copy of my visit note below.      Neurosurgery clinic post op  Date of visit: 01/04/2018      Procedure:  11/15/17 René López Hubbard  Diagnosis:   1. 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 Cervical stenosis with myelopathy  3. Malnutrition  4. Osteoporosis and vitamin D deficiency  5. SIADH-induced hyponatremia   Procedure: three intraoperative position changes:  Part 1:  1. Stealth-guided posterior segmental instrumentation C2-T3  2. Cervical laminectomy C1-C3  3. Facet osteotomies Cervical 2-3, Cervical 4-5, Cervical 6-7,  4. Use of intraoperative neuromonitoring  5. Use of intraoperative fluoroscopy  6. Use of intraoperative neuronavigation      Part 2:   1. Right Approach Cervical 2-3, Cervical 4-5 and Cervical 6-7 Anterior Cervical Discectomy And Fusion  2. [place] tongs placement for cervical traction for deformity reduction  3. Use of intraoperative microscope  4. Use of intraoperative neuromonitoring  5. Use of intraoperative fluoroscopy      Part 3:   1. Cervical 2-Thoracic 3 Posterior Fusion With Autograft And Allograft  2. Reduction of cervical spine with osteotomy closure and kyphoscoliosis correction  3. Use of intraoperative neuromonitoring  4. Use of intraoperative fluoroscopy    Implants: Synthes synapse screws with 3.5mm to 5mm transitional rods posteriorly; zero P lordotic anterior implants   Findings: Significant stenosis at C1-C2, well decompressed. Good reduction seen on final XR     Indications for Surgery:  Yuni Otoole is a 82 year old female presenting to the Emergency room with numbness in her hands bilaterally and severe bilateral upper extremity and proximal lower extremity weakness, referable to severe  C1-C2 cervical stenosis with progressive kyphotic deformity and kyphoscoliosis with chin on chest deformity. Also notable is severe malnutrition from poor oral intake without given of 3 and severe vitamin D deficiency with osteoporosis. The patient had previously seen Dr. Yuan in clinic who recommended considering surgical intervention, however the patient was lost to follow-up and presented in extremis. My partner on call at her admission Dr. Merritt asked me to take over surgical care and I met with the patient and her family in detail to explain operative risks, benefits, and alternatives.....  The patient and her family understood and wished to proceed.  HPI:  Inpatient:   Yuni Otoole is a very pleasant 82 year old female patient whose past medical is significant for hypertension and hypercholesteremia. She presented to the [facility] on 11/10/2017 with reports of progressive quadriparesis, radicular pain in the bilateral upper extremities, upper extremity paresthesias and weakness.   The patient reportedly suffered a fall in 2016 during which she sustained a C7 Fracture. The fracture was treated conservatively and healed without surgical intervention. The patient was seen in the Neurosurgery Clinic for follow-up on 9/21/2017 and was noted to have worsening cervical kyphosis as well as a fracture of the dens. Surgical intervention was recommended at that time. However, the patient chose not to pursue surgery at that time. As the patient's symptoms progressed to being unable to feed or care for herself as well as an inability to walk, she came to the Emergency Department of consultation. She was found to have severe cervical stenosis with progressive quadriparesis. She was deemed a high-risk surgical candidate given her poor nutritional status, age and osteoporosis. However, given the severity of her cervical spine disease and her rapid decline, surgical intervention was not delayed  There were no intra-operative complications. The Estimated Blood Loss during the procedure was ~ 750 mL. Post-operatively, the patient was transferred to the Neuroscience ICU for routine post-operative care. She was extubated on post-operative day #1. Post-operative cervical radiographs which were obtained on 11/20/17 demonstrated expected post-operative changes and intact anterior and posterior instrumentation of the cervical spine. Throughout the patient's hospitalization, her upper extremity strength gradually began to improve. She continues to experience notable weakness in the deltoid and shoulder bilaterally. However, upon obtaining shoulder imaging she was found to have subluxation of the right shoulder humeral head in relation to the glenoid as well as a severe rotator cuff tear from many years ago. No orthopedic intervention was performed. The patient's hospitalization was notable for the following issues:  1) Malnutrition:   The patient was found to be in a malnourished state pre-operatively. On post-operative day #1, a post-pyloric feeding tube was inserted at the bedside and the patient was initiated on continuous tube feedings. She was followed by Speech Therapy and was progressed well over a period of several days from a Dysphagia Diet to a Regular Diet with Thin Liquids. She was initiated on Calorie Counts to ensure adequate nutritional intake and was found to be taking sufficient oral intake. Therefore, the nasogastric tube was removed on 11/25/2017. She was also initiated on nutritional supplements to improve her overall nutritional status. Given the complexity of her surgery, ongoing speech therapy was recommended for the patient to ensure safe swallowing. She is on Vitamin D supplementation to facilitate healing of her fusion.   2) Mild Chronic Euvolemic Hyponatremia:   Endocrinology for consulted for assistance in the management of the patient's mild chronic hyponatremia. The etiology of the  hyponatremia was thought to be related to SIADH. The etiology of the SIADH was thought to be related to pain as well as the extensive surgical intervention. She did not have notable improvement in regard to her sodium level after fluid restriction. She has been started on Salt Tablets 1G BID which she should stay on until seen for follow-up with Endocrinology upon discharge from Transitional Care Unit. While at the Transitional Care Unit, please obtain weekly Sodium Levels and Urine Osmolality Levels.    3) Pain Management:  On the day of discharge, the patient's post-operative pain was well-controlled with oral analgesics. She was tolerating Oxycodone 5 mg Q 3 HR PRN, Tylenol 650 mg Q 4 HR PRN and Flexeril 2.5 mg TID PRN. She primarily complains of pain in the right shoulder blade and posterior midline neck. However, this regimen of pain medications has been adequately controlling her pain.   4) Rehabilitation Needs:  Given the patient's progressive weakness pre-operatively and the extent of her surgical procedure, the patient was felt to benefit from ongoing Physical and Occupational Therapy in the setting of a Transitional Care Unit.   5) DVT Prophylaxis:   The patient is on Heparin 5,000 SQ Q 8 HR for DVT prophylaxis only. As she increases her mobility, this medication may be discontinued.   Outpatient:   She is now 6 weeks post op.     Since discharge she has done well in the care center. Her family reports they are treating her well and caring for her excellently. She is eating well, and pain being close attention to her nutritional status. She is wearing her brace every time she is upright for any reason. Neurologically she is undergoing therapies, and had nice return of sensation in her hands early on. She is sitting up regularly, is able to we'll herself around in the wheelchair in her room, and is even standing between the parallel bars and taking a few steps with a good deal of assistance. She has had  cervical thoracic and long cassette films taken today. She had a vitamin D level drawn today. She has follow-up with endocrine for her sodium issues with Dr. Ann in a couple of weeks, they're currently following it in the nursing home.    Patient Supplied Answers To the [facility] Pain Questionnaire  [facility] Pain -  Patient Entered Questionnaire/Answers 1/4/2018   What number best describes your pain right now:  0 = No pain  to  10 = Worst pain imaginable 2   How would you describe the pain? dull, aching   Which of the following worsen your pain? nothing worsens the pain   Which of the following improve or reduce your pain?  lying down, medication   What number best describes your average pain for the past week:  0 = No pain  to  10 = Worst pain imaginable 4   What number best describes your LOWEST pain in past 24 hours:  0 = No pain  to  10 = Worst pain imaginable 2   What number best describes your WORST pain in past 24 hours:  0 = No pain  to  10 = Worst pain imaginable 4   When is your pain worst? Constant   What non-medicine treatments have you already had for your pain? physical therapy, surgery   Have you tried treating your pain with medication?  Yes   Are you currently taking medications for your pain? Yes            Current Outpatient Prescriptions:      artificial saliva (BIOTENE MT) AERS spray, Take 2 sprays by mouth every hour as needed for dry mouth, Disp: , Rfl:      Cyclobenzaprine HCl (FLEXERIL PO), Take 2.5 mg by mouth 3 times daily , Disp: , Rfl:      Hypromellose (NATURAL BALANCE TEARS OP), Apply 2 drops to eye 4 times daily as needed, Disp: , Rfl:      OXYCODONE HCL PO, Take 5 mg by mouth 5 times daily , Disp: , Rfl:      acetaminophen (TYLENOL ARTHRITIS PAIN) 650 MG CR tablet, Take 650 mg by mouth every 8 hours, Disp: , Rfl:      Multiple Vitamins-Minerals (MULTIVITAMIN ADULT PO), Take 1 tablet by mouth daily, Disp: , Rfl:      AmLODIPine Besylate (NORVASC PO), Take 5 mg by mouth At Bedtime , Disp: , Rfl:  "     Nutritional Supplements (NUTRITIONAL SUPPLEMENT PO), Take 4 oz by mouth 2 times daily, Disp: , Rfl:      pravastatin (PRAVACHOL) 10 MG tablet, 1 tablet (10 mg) by Oral or Feeding Tube route At Bedtime, Disp: 30 tablet, Rfl:      metoprolol (LOPRESSOR) 25 MG tablet, Take 0.5 tablets (12.5 mg) by mouth 2 times daily, Disp: 60 tablet, Rfl:      polyethylene glycol (MIRALAX/GLYCOLAX) Packet, 17 g by Oral or Feeding Tube route 3 times daily as needed for constipation (also QD scheduled) , Disp: 7 packet, Rfl:      senna-docusate (SENOKOT-S;PERICOLACE) 8.6-50 MG per tablet, 3 tablets by Oral or Feeding Tube route 2 times daily , Disp: 100 tablet, Rfl:      calcium carbonate (OS-ANNETTA 500 MG [place]) 1250 MG tablet, Take 1 tablet (1,250 mg) by mouth 2 times daily (with meals), Disp: 90 tablet, Rfl:      cyclobenzaprine (FLEXERIL) 5 MG tablet, Take 0.5 tablets (2.5 mg) by mouth 3 times daily as needed for muscle spasms, Disp: 60 tablet, Rfl: 0     vitamin D (ERGOCALCIFEROL) 41456 UNIT capsule, Take 50,000 Units by mouth every 7 days , Disp: 10 capsule, Rfl: 0     Cholecalciferol (VITAMIN D) 2000 UNITS tablet, Take 3,000 Units by mouth daily, Disp: 100 tablet, Rfl: 0     order for DME, Equipment being ordered: Shower chair, Disp: 1 Device, Rfl: 0  Allergies   Allergen Reactions     Atorvastatin Calcium      Was on Lipitor and has muscle problem     PMH, SOC HIST, FAM HIST, PROBLEM LIST:  All reviewed in EPIC.    OBJECTIVE:  /68  Pulse 75  Temp 98.1  F (36.7  C)  Resp 24  Ht 1.499 m (4' 11\")  Wt 41.7 kg (92 lb)  SpO2 93%  Breastfeeding? No  BMI 18.58 kg/m2    Imaging:  IMAGING:  These are the pertinent radiologist's findings from:  Cervical and thoracic as well as long cassette Xrays taken today.  Impression:  1. Very mild convexed curvature of the thoracolumbar spine.   2. No significant global sagittal or coronal imbalance.  3. Postsurgical changes extend attending from C2 through T3.   4. Marked " degenerative changes of the lumbar spine.     If surgery is planned, the Baker's angle and other measurements will be  deferred to orthopedician.  JUTTA ELLERMANN, MD     Neurosurgery PA impression: Overall alignment of the spine in 2 planes is satisfactory and unchanged from post op. Hardware is in good position without evidence failure.  Engraftment is not yet seen.  Please see Epic for the bulk of the report.  I personally reviewed the images with the patient  None new.    EXAM:  Well developed very thin but well nourished female found on transport gurney No apparent distress. She is accompanied by her  and daughter.  A&O X3.  Mood and affect WNL. Language and fund of knowledge intact.     She has a nicely healed incision anteriorly.   Wearing her collar and brace today, even though she presents on a gurney.  Exam at discharge:      Delt Bi Tri WE WF    R 1 4- 4+ 4+ 4 4   L 2 4- 4+ 4+ 4 4      IP Quad Ham DF PF EHL   R 3 4+ 4+ 4+ 4+ 4+   L 3 4+ 4+ 4+ 4+ 4+       Exam today:  About the same, perhaps slight improvement in right psoas and deltoids.    Assessment/Plan:  1. Cervical spondylosis with myelopathy    2. Post-operative state    3. [place] neck deformity of cervical spine    4. Cervical compression fracture, with routine healing, subsequent encounter      Yuni Otoole is doing well after her very large surgical cervical fusion. She has experienced early recovery of neurologic function, she is even beginning to walk a few steps with assist.     She should continue to wear her brace, continue physical therapy, and we will see her at 12 weeks postop with new AP, lateral cervical, odontoid, and thoracic spine films. She already has the appointment with her surgeon, Dr. López, set up.  I drew a vitamin D level today, results will be ready in about 3 days. I will call her next week. Our 25 OH Total goal will be 50 or greater and we would like to hold her at that level for minimally 6 months    Her wounds  should be washed daily. Her hair should be washed regularly. There are no exceptions to that. The brace may get wet. The pads of the brace may be changed regularly for hygiene, and should be changed at least every other to every third day for hygiene purposes.    She will follow-up with  in endocrine clinic for evaluation of hyponatremia. This has been scheduled for later this month. Her sodium levels are being followed now by her current care center team.     We appreciate the opportunity to be of service in the care of this pleasant patient.  Please do call if there is anything more we can do    Lana Hauser PA-C  [facility]  Department of Neurosurgery  Phone: [phone]  Fax: [phone]

## 2018-01-04 NOTE — NURSING NOTE
Chief Complaint   Patient presents with     RECHECK     [facility]- NECK PAIN, 1 WEEK F/U     Eloy Vasquez, CMA

## 2018-01-04 NOTE — PROGRESS NOTES
Neurosurgery clinic post op  Date of visit: 01/04/2018      Procedure:  11/15/17 René López Hubbard  Diagnosis:   1. 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 Cervical stenosis with myelopathy  3. Malnutrition  4. Osteoporosis and vitamin D deficiency  5. SIADH-induced hyponatremia   Procedure: three intraoperative position changes:  Part 1:  1. Stealth-guided posterior segmental instrumentation C2-T3  2. Cervical laminectomy C1-C3  3. Facet osteotomies Cervical 2-3, Cervical 4-5, Cervical 6-7,  4. Use of intraoperative neuromonitoring  5. Use of intraoperative fluoroscopy  6. Use of intraoperative neuronavigation      Part 2:   1. Right Approach Cervical 2-3, Cervical 4-5 and Cervical 6-7 Anterior Cervical Discectomy And Fusion  2. [place] tongs placement for cervical traction for deformity reduction  3. Use of intraoperative microscope  4. Use of intraoperative neuromonitoring  5. Use of intraoperative fluoroscopy      Part 3:   1. Cervical 2-Thoracic 3 Posterior Fusion With Autograft And Allograft  2. Reduction of cervical spine with osteotomy closure and kyphoscoliosis correction  3. Use of intraoperative neuromonitoring  4. Use of intraoperative fluoroscopy    Implants: Synthes synapse screws with 3.5mm to 5mm transitional rods posteriorly; zero P lordotic anterior implants   Findings: Significant stenosis at C1-C2, well decompressed. Good reduction seen on final XR     Indications for Surgery:  Yuni Otoole is a 82 year old female presenting to the Emergency room with numbness in her hands bilaterally and severe bilateral upper extremity and proximal lower extremity weakness, referable to severe C1-C2 cervical stenosis with progressive kyphotic deformity and kyphoscoliosis with chin on chest deformity. Also notable is severe malnutrition from poor oral intake without given of 3 and severe vitamin D deficiency with osteoporosis. The patient had previously seen   Kwabena in clinic who recommended considering surgical intervention, however the patient was lost to follow-up and presented in extremis. My partner on call at her admission Dr. Merritt asked me to take over surgical care and I met with the patient and her family in detail to explain operative risks, benefits, and alternatives.....  The patient and her family understood and wished to proceed.  HPI:  Inpatient:   Yuni Otoole is a very pleasant 82 year old female patient whose past medical is significant for hypertension and hypercholesteremia. She presented to the [facility] on 11/10/2017 with reports of progressive quadriparesis, radicular pain in the bilateral upper extremities, upper extremity paresthesias and weakness.   The patient reportedly suffered a fall in 2016 during which she sustained a C7 Fracture. The fracture was treated conservatively and healed without surgical intervention. The patient was seen in the Neurosurgery Clinic for follow-up on 9/21/2017 and was noted to have worsening cervical kyphosis as well as a fracture of the dens. Surgical intervention was recommended at that time. However, the patient chose not to pursue surgery at that time. As the patient's symptoms progressed to being unable to feed or care for herself as well as an inability to walk, she came to the Emergency Department of consultation. She was found to have severe cervical stenosis with progressive quadriparesis. She was deemed a high-risk surgical candidate given her poor nutritional status, age and osteoporosis. However, given the severity of her cervical spine disease and her rapid decline, surgical intervention was not delayed There were no intra-operative complications. The Estimated Blood Loss during the procedure was ~ 750 mL. Post-operatively, the patient was transferred to the Neuroscience ICU for routine post-operative care. She was extubated on post-operative day #1. Post-operative cervical  radiographs which were obtained on 11/20/17 demonstrated expected post-operative changes and intact anterior and posterior instrumentation of the cervical spine. Throughout the patient's hospitalization, her upper extremity strength gradually began to improve. She continues to experience notable weakness in the deltoid and shoulder bilaterally. However, upon obtaining shoulder imaging she was found to have subluxation of the right shoulder humeral head in relation to the glenoid as well as a severe rotator cuff tear from many years ago. No orthopedic intervention was performed. The patient's hospitalization was notable for the following issues:  1) Malnutrition:   The patient was found to be in a malnourished state pre-operatively. On post-operative day #1, a post-pyloric feeding tube was inserted at the bedside and the patient was initiated on continuous tube feedings. She was followed by Speech Therapy and was progressed well over a period of several days from a Dysphagia Diet to a Regular Diet with Thin Liquids. She was initiated on Calorie Counts to ensure adequate nutritional intake and was found to be taking sufficient oral intake. Therefore, the nasogastric tube was removed on 11/25/2017. She was also initiated on nutritional supplements to improve her overall nutritional status. Given the complexity of her surgery, ongoing speech therapy was recommended for the patient to ensure safe swallowing. She is on Vitamin D supplementation to facilitate healing of her fusion.   2) Mild Chronic Euvolemic Hyponatremia:   Endocrinology for consulted for assistance in the management of the patient's mild chronic hyponatremia. 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 The etiology of the SIADH was thought to be related to pain as well as the extensive surgical intervention. She did not have notable improvement in regard to her sodium level after fluid restriction. She has been started on Salt  Tablets 1G BID which she should stay on until seen for follow-up with Endocrinology upon discharge from Transitional Care Unit. While at the Transitional Care Unit, please obtain weekly Sodium Levels and Urine Osmolality Levels.    3) Pain Management:  On the day of discharge, the patient's post-operative pain was well-controlled with oral analgesics. She was tolerating Oxycodone 5 mg Q 3 HR PRN, Tylenol 650 mg Q 4 HR PRN and Flexeril 2.5 mg TID PRN. She primarily complains of pain in the right shoulder blade and posterior midline neck. However, this regimen of pain medications has been adequately controlling her pain.   4) Rehabilitation Needs:  Given the patient's progressive weakness pre-operatively and the extent of her surgical procedure, the patient was felt to benefit from ongoing Physical and Occupational Therapy in the setting of a Transitional Care Unit.   5) DVT Prophylaxis:   The patient is on Heparin 5,000 SQ Q 8 HR for DVT prophylaxis only. As she increases her mobility, this medication may be discontinued.   Outpatient:   She is now 6 weeks post op.     Since discharge she has done well in the care center. Her family reports they are treating her well and caring for her excellently. She is eating well, and pain being close attention to her nutritional status. She is wearing her brace every time she is upright for any reason. Neurologically she is undergoing therapies, and had nice return of sensation in her hands early on. She is sitting up regularly, is able to we'll herself around in the wheelchair in her room, and is even standing between the parallel bars and taking a few steps with a good deal of assistance. She has had cervical thoracic and long cassette films taken today. She had a vitamin D level drawn today. She has follow-up with endocrine for her sodium issues with Dr. Ann in a couple of weeks, they're currently following it in the nursing home.    Patient Supplied Answers To the  Pain  Questionnaire  [facility] Pain -  Patient Entered Questionnaire/Answers 1/4/2018   What number best describes your pain right now:  0 = No pain  to  10 = Worst pain imaginable 2   How would you describe the pain? dull, aching   Which of the following worsen your pain? nothing worsens the pain   Which of the following improve or reduce your pain?  lying down, medication   What number best describes your average pain for the past week:  0 = No pain  to  10 = Worst pain imaginable 4   What number best describes your LOWEST pain in past 24 hours:  0 = No pain  to  10 = Worst pain imaginable 2   What number best describes your WORST pain in past 24 hours:  0 = No pain  to  10 = Worst pain imaginable 4   When is your pain worst? Constant   What non-medicine treatments have you already had for your pain? physical therapy, surgery   Have you tried treating your pain with medication?  Yes   Are you currently taking medications for your pain? Yes            Current Outpatient Prescriptions:      artificial saliva ([place]) AERS spray, Take 2 sprays by mouth every hour as needed for dry mouth, Disp: , Rfl:      Cyclobenzaprine HCl (FLEXERIL PO), Take 2.5 mg by mouth 3 times daily , Disp: , Rfl:      Hypromellose (NATURAL BALANCE TEARS OP), Apply 2 drops to eye 4 times daily as needed, Disp: , Rfl:      OXYCODONE HCL PO, Take 5 mg by mouth 5 times daily , Disp: , Rfl:      acetaminophen (TYLENOL ARTHRITIS PAIN) 650 MG CR tablet, Take 650 mg by mouth every 8 hours, Disp: , Rfl:      Multiple Vitamins-Minerals (MULTIVITAMIN ADULT PO), Take 1 tablet by mouth daily, Disp: , Rfl:      AmLODIPine Besylate (NORVASC PO), Take 5 mg by mouth At Bedtime , Disp: , Rfl:      Nutritional Supplements (NUTRITIONAL SUPPLEMENT PO), Take 4 oz by mouth 2 times daily, Disp: , Rfl:      pravastatin (PRAVACHOL) 10 MG tablet, 1 tablet (10 mg) by Oral or Feeding Tube route At Bedtime, Disp: 30 tablet, Rfl:      metoprolol (LOPRESSOR) 25 MG tablet, Take 0.5  "tablets (12.5 mg) by mouth 2 times daily, Disp: 60 tablet, Rfl:      polyethylene glycol (MIRALAX/GLYCOLAX) Packet, 17 g by Oral or Feeding Tube route 3 times daily as needed for constipation (also QD scheduled) , Disp: 7 packet, Rfl:      senna-docusate (SENOKOT-S;PERICOLACE) 8.6-50 MG per tablet, 3 tablets by Oral or Feeding Tube route 2 times daily , Disp: 100 tablet, Rfl:      calcium carbonate (OS-ANNETTA 500 MG [place]. [place]) 1250 MG tablet, Take 1 tablet (1,250 mg) by mouth 2 times daily (with meals), Disp: 90 tablet, Rfl:      cyclobenzaprine (FLEXERIL) 5 MG tablet, Take 0.5 tablets (2.5 mg) by mouth 3 times daily as needed for muscle spasms, Disp: 60 tablet, Rfl: 0     vitamin D (ERGOCALCIFEROL) 68513 UNIT capsule, Take 50,000 Units by mouth every 7 days , Disp: 10 capsule, Rfl: 0     Cholecalciferol (VITAMIN D) 2000 UNITS tablet, Take 3,000 Units by mouth daily, Disp: 100 tablet, Rfl: 0     order for DME, Equipment being ordered: Shower chair, Disp: 1 Device, Rfl: 0  Allergies   Allergen Reactions     Atorvastatin Calcium      Was on Lipitor and has muscle problem     PMH, SOC HIST, FAM HIST, PROBLEM LIST:  All reviewed in EPIC.    OBJECTIVE:  /68  Pulse 75  Temp 98.1  F (36.7  C)  Resp 24  Ht 1.499 m (4' 11\")  Wt 41.7 kg (92 lb)  SpO2 93%  Breastfeeding? No  BMI 18.58 kg/m2    Imaging:  IMAGING:  These are the pertinent radiologist's findings from:  Cervical and thoracic as well as long cassette Xrays taken today.  Impression:  1. Very mild convexed curvature of the thoracolumbar spine.   2. No significant global sagittal or coronal imbalance.  3. Postsurgical changes extend attending from C2 through T3.   4. Marked degenerative changes of the lumbar spine.     If surgery is planned, the Baker's angle and other measurements will be  deferred to orthopedician.  JUTTA ELLERMANN, MD     Neurosurgery PA impression: Overall alignment of the spine in 2 planes is satisfactory and unchanged from post op. " Hardware is in good position without evidence failure.  Engraftment is not yet seen.  Please see Epic for the bulk of the report.  I personally reviewed the images with the patient  None new.    EXAM:  Well developed very thin but well nourished female found on transport gurney No apparent distress. She is accompanied by her  and daughter.  A&O X3.  Mood and affect WNL. Language and fund of knowledge intact.     She has a nicely healed incision anteriorly.   Wearing her collar and brace today, even though she presents on a gurney.  Exam at discharge:      Delt Bi Tri WE WF    R 1 4- 4+ 4+ 4 4   L 2 4- 4+ 4+ 4 4      IP Quad Ham DF PF EHL   R 3 4+ 4+ 4+ 4+ 4+   L 3 4+ 4+ 4+ 4+ 4+       Exam today:  About the same, perhaps slight improvement in right psoas and deltoids.    Assessment/Plan:  1. Cervical spondylosis with myelopathy    2. Post-operative state    3. [place] neck deformity of cervical spine    4. Cervical compression fracture, with routine healing, subsequent encounter      Yuni Otoole is doing well after her very large surgical cervical fusion. She has experienced early recovery of neurologic function, she is even beginning to walk a few steps with assist.     She should continue to wear her brace, continue physical therapy, and we will see her at 12 weeks postop with new AP, lateral cervical, odontoid, and thoracic spine films. She already has the appointment with her surgeon, Dr. López, set up.  I drew a vitamin D level today, results will be ready in about 3 days. I will call her next week. Our 25 OH Total goal will be 50 or greater and we would like to hold her at that level for minimally 6 months    Her wounds should be washed daily. Her hair should be washed regularly. There are no exceptions to that. The brace may get wet. The pads of the brace may be changed regularly for hygiene, and should be changed at least every other to every third day for hygiene purposes.    She will follow-up  with  in endocrine clinic for evaluation of hyponatremia. This has been scheduled for later this month. Her sodium levels are being followed now by her current care center team.     We appreciate the opportunity to be of service in the care of this pleasant patient.  Please do call if there is anything more we can do    Lana Hauser PA-C  [facility]  Department of Neurosurgery  Phone: [phone]  Fax: [phone]    This note was generated using voice recognition software. While edited for content some inaccurate phrasing may be found.

## 2018-01-08 ENCOUNTER — TELEPHONE (OUTPATIENT)
Dept: NEUROSURGERY | Facility: CLINIC | Age: 83
End: 2018-01-08

## 2018-01-08 LAB
DEPRECATED CALCIDIOL+CALCIFEROL SERPL-MC: 80 UG/L (ref 20–75)
VITAMIN D2 SERPL-MCNC: 36 UG/L
VITAMIN D3 SERPL-MCNC: 44 UG/L

## 2018-01-08 NOTE — TELEPHONE ENCOUNTER
Called 1/8/18  She broke a snap on the brace and needs a new part. I gave them the brace shop number at  [facility] on [address]      Lana Hauser PA-C  Department of Neurosurgery        ----- Message from Allyson Aceves RN sent at 1/8/2018  9:50 AM CST -----  Regarding: brace problem  Contact Julissa at [facility], ph [phone]

## 2018-01-10 DIAGNOSIS — E55.9 VITAMIN D DEFICIENCY: Primary | ICD-10-CM

## 2018-01-10 RX ORDER — CHOLECALCIFEROL (VITAMIN D3) 50 MCG
TABLET ORAL
Qty: 100 TABLET | Refills: 0 | Status: SHIPPED | OUTPATIENT
Start: 2018-01-10 | End: 2018-05-21 | Stop reason: DRUGHIGH

## 2018-01-10 NOTE — PROGRESS NOTES
1/10/18  Based on her latest labs:  I called the care center with the orders to DC the ergocalciferol 50,000IU weekly and decrease the cholecalciferol  from 3,000 to 2,000IU daily.  Lana Hauser PA-C  Department of Neurosurgery

## 2018-01-11 ENCOUNTER — NURSE TRIAGE (OUTPATIENT)
Dept: NURSING | Facility: CLINIC | Age: 83
End: 2018-01-11

## 2018-01-11 ENCOUNTER — TELEPHONE (OUTPATIENT)
Dept: NEUROSURGERY | Facility: CLINIC | Age: 83
End: 2018-01-11

## 2018-01-11 NOTE — TELEPHONE ENCOUNTER
1/11/18  Second phone call about the same issue. Nurse I spoke to today said there was no record of the first phone call or the information given the first time around. I gave them the same information again, the number for the [employer] brace shop on [address]  I refer them to that main number so that [facility] can direct them how to get the brace fixed.I don't think they will have to come down here to get it done but I will leave that to our orthotics Department to work that out.that number is...  [phone]  Analisa GLASS          ----- Message from Travon Ruiz RN sent at 1/11/2018 10:14 AM CST -----  Regarding: Fix   Lana, had a voice mail message on my phone this morning from a Isabel Gilliland asking you to call her in regards to Ms Otoole' .    Supposedly broken, needs to be repaired..  Broken snap or something.  [facility] claim they can't find any record of what she has (which is odd, because I found it in less then a minute.) From the note dated 11/17/17 she has a [place] .  Isabel told me the  is insistent you be called because you told them, not to let anyone touch this brace but you.   I know........just the messenger.  Guess if Amilcar can't help her, she will have to come down to the [place] office.       Can you call Isabel at [phone] and see if you can straighten this out.      Thanks, Jack

## 2018-01-16 ENCOUNTER — HOSPITAL LABORATORY (OUTPATIENT)
Dept: NURSING HOME | Facility: OTHER | Age: 83
End: 2018-01-16

## 2018-01-16 ENCOUNTER — ALLIED HEALTH/NURSE VISIT (OUTPATIENT)
Dept: AUDIOLOGY | Facility: CLINIC | Age: 83
End: 2018-01-16
Payer: COMMERCIAL

## 2018-01-16 DIAGNOSIS — H90.3 SENSORINEURAL HEARING LOSS, BILATERAL: Primary | ICD-10-CM

## 2018-01-16 LAB — SODIUM SERPL-SCNC: 135 MMOL/L (ref 133–144)

## 2018-01-16 PROCEDURE — 92592 HC HEARING AID CHECK, MONAURAL: CPT | Mod: RT | Performed by: AUDIOLOGIST

## 2018-01-16 NOTE — MR AVS SNAPSHOT
After Visit Summary   1/16/2018    Yuni Otoole    MRN: [id number]           Patient Information     Date Of Birth          1935        Visit Information        Provider Department      1/16/2018 1:00 PM Monika Reed, [facility]        Today's Diagnoses     Sensorineural hearing loss, bilateral    -  1       Follow-ups after your visit        Your next 10 appointments already scheduled     Feb 13, 2018  1:00 PM CST   (Arrive by 12:45 PM)   RETURN ENDOCRINE with Gunjan Ann MD   [facility] Endocrinology ([facility])    [address] [place] [address] [id number] [phone]            Feb 22, 2018 12:00 PM CST   XR THORACIC SPINE 2 VIEWS with UCXR1   [facility] Xray ([facility])    [address] [place] [address] [id number] [phone]           Please bring a list of your current medicines to your exam. (Include vitamins, minerals and over-thecounter medicines.) Leave your valuables at home.  Tell your doctor if there is a chance you may be pregnant.  You do not need to do anything special for this exam.            Feb 22, 2018 12:20 PM CST   (Arrive by 12:05 PM)   XR CERVICAL SPINE 2/3 VIEWS with UCXR1   [facility] Xray ([facility])    [address] [place] [address] [id number] [phone]           Please bring a list of your current medicines to your exam. (Include vitamins, minerals and over-thecounter medicines.) Leave your valuables at home.  Tell your doctor if there is a chance you may be pregnant.  You do not need to do anything special for this exam.            Feb 22, 2018  1:00 PM CST   (Arrive by 12:45 PM)   Return Visit with Enma López MD   [facility] Neurosurgery ([facility])    [address] [place] [address] [id number] [phone]               Who to contact     If you have questions or need follow up information about today's clinic visit or your schedule please contact [facility] directly at [phone].  Normal or non-critical lab and imaging results will be communicated to you by MyChart, letter or phone within 4 business days after the clinic has received the results. If you do not hear from us within 7 days, please contact the clinic through [employer] or phone. If you have a critical or abnormal lab result, we will notify you by phone as soon as possible.  Submit refill requests through [employer] or call your pharmacy and they will forward the refill request to us. Please allow 3 business days for your refill to be completed.          Additional Information About Your Visit        Moodsnap Information     [employer] gives you secure access to your electronic health record. If you see a primary care provider, you can also send messages to your care team and make appointments. If you have questions, please call your primary care clinic.  If you do not have a primary care provider, please call [phone] and they will assist you.        Care EveryWhere ID     This is your Care EveryWhere ID. This could be used by other organizations to access your [facility] [place] medical records  [id number]         Blood Pressure from Last 3 Encounters:   01/04/18 118/68   01/02/18 156/86   12/20/17 148/76    Weight from Last 3 Encounters:   01/04/18 92 lb (41.7 kg)   01/02/18 86 lb 4.8 oz (39.1 kg)   12/20/17 85 lb 6.4 oz (38.7 kg)              We Performed the Following     HEARING AID CHECK, [place]        Primary Care Provider Office Phone # Fax #    Jonathan Vincent [phone]       [address] [facility] [address]        Equal Access to Services     CARLOS MCKNIGHT : Rajeev Maldonado, alma ballard, elias alarcon. [facility] [phone].    ATENCIÓN: Harrison borjas  español, tiene a pierson disposición servicios gratuitos de asistencia lingüística. Lewis martinez [phone].    We comply with applicable federal civil rights laws and [place] laws. We do not discriminate on the basis of race, color, national origin, age, disability, sex, sexual orientation, or gender identity.            Thank you!     Thank you for choosing [facility]  for your care. Our goal is always to provide you with excellent care. Hearing back from our patients is one way we can continue to improve our services. Please take a few minutes to complete the written survey that you may receive in the mail after your visit with us. Thank you!             Your Updated Medication List - Protect others around you: Learn how to safely use, store and throw away your medicines at www.disposemymeds.org.          This list is accurate as of: 1/16/18  1:12 PM.  Always use your most recent med list.                   Brand Name Dispense Instructions for use Diagnosis    artificial saliva Aers spray      Take 2 sprays by mouth every hour as needed for dry mouth        calcium carbonate 1250 MG tablet    OS-ANNETTA 500 mg [place]. [place]    90 tablet    Take 1 tablet (1,250 mg) by mouth 2 times daily (with meals)    Cervical stenosis of spine, S/P cervical spinal fusion       * FLEXERIL PO      Take 2.5 mg by mouth 3 times daily        * cyclobenzaprine 5 MG tablet    FLEXERIL    60 tablet    Take 0.5 tablets (2.5 mg) by mouth 3 times daily as needed for muscle spasms    Cervical stenosis of spine, S/P cervical spinal fusion       metoprolol tartrate 25 MG tablet    LOPRESSOR    60 tablet    Take 0.5 tablets (12.5 mg) by mouth 2 times daily    Cervical stenosis of spine, S/P cervical spinal fusion       MULTIVITAMIN ADULT PO      Take 1 tablet by mouth daily        NATURAL BALANCE TEARS OP      Apply 2 drops to eye 4 times daily as needed        NORVASC PO      Take 5 mg by mouth At Bedtime        NUTRITIONAL SUPPLEMENT PO       Take 4 oz by mouth 2 times daily        order for DME     1 Device    Equipment being ordered: Shower chair    Nondisplaced fracture of seventh cervical vertebra, unspecified fracture morphology, initial encounter       OXYCODONE HCL PO      Take 5 mg by mouth 5 times daily        polyethylene glycol Packet    MIRALAX/GLYCOLAX    7 packet    17 g by Oral or Feeding Tube route 3 times daily as needed for constipation (also QD scheduled)    Cervical stenosis of spine, S/P cervical spinal fusion       pravastatin 10 MG tablet    PRAVACHOL    30 tablet    1 tablet (10 mg) by Oral or Feeding Tube route At Bedtime    Cervical stenosis of spine, S/P cervical spinal fusion       senna-docusate 8.6-50 MG per tablet    SENOKOT-S;PERICOLACE    100 tablet    3 tablets by Oral or Feeding Tube route 2 times daily    Cervical stenosis of spine, S/P cervical spinal fusion       TYLENOL ARTHRITIS PAIN 650 MG CR tablet   Generic drug:  acetaminophen      Take 650 mg by mouth every 8 hours        vitamin D 2000 UNITS tablet     100 tablet    Take 2000 units by mouth daily    Vitamin D deficiency       * Notice:  This list has 2 medication(s) that are the same as other medications prescribed for you. Read the directions carefully, and ask your doctor or other care provider to review them with you.

## 2018-01-16 NOTE — PROGRESS NOTES
Hearing Aid Check     SUBJECTIVE:  Yuni Otoole is a 82 year old year old female, her hearing aid waas seen today for a hearing aid check at [facility]. Mr. Otoole dropped it off.  He reports it is not working.        OBJECTIVE:  Visual inspection revealed nearly occluding wax in the .  Removed and vacuumed replaced wax filter. Cleaned gasper covers and vacuumed.  Biologic listening check revealed appropriate function after maintenance.     Data logging: Did not connect to programming computer     PLAN: Return to clinic as needed for hearing aid maintenance and programming.    Riri Sharp.  [place] Licensed Audiologist #[id number]

## 2018-01-22 ENCOUNTER — NURSING HOME VISIT (OUTPATIENT)
Dept: GERIATRICS | Facility: CLINIC | Age: 83
End: 2018-01-22
Payer: COMMERCIAL

## 2018-01-22 VITALS
SYSTOLIC BLOOD PRESSURE: 144 MMHG | WEIGHT: 86.3 LBS | BODY MASS INDEX: 17.43 KG/M2 | RESPIRATION RATE: 16 BRPM | DIASTOLIC BLOOD PRESSURE: 90 MMHG | HEART RATE: 86 BPM | OXYGEN SATURATION: 97 % | TEMPERATURE: 97.8 F

## 2018-01-22 DIAGNOSIS — Z47.89 ORTHOPEDIC AFTERCARE: ICD-10-CM

## 2018-01-22 DIAGNOSIS — Z98.1 S/P CERVICAL SPINAL FUSION: Primary | ICD-10-CM

## 2018-01-22 DIAGNOSIS — I10 BENIGN ESSENTIAL HYPERTENSION: ICD-10-CM

## 2018-01-22 DIAGNOSIS — E87.1 CHRONIC HYPONATREMIA: ICD-10-CM

## 2018-01-22 DIAGNOSIS — M54.2 NECK PAIN: ICD-10-CM

## 2018-01-22 DIAGNOSIS — D62 ANEMIA DUE TO BLOOD LOSS, ACUTE: ICD-10-CM

## 2018-01-22 PROCEDURE — 99309 SBSQ NF CARE MODERATE MDM 30: CPT | Performed by: NURSE PRACTITIONER

## 2018-01-22 NOTE — PROGRESS NOTES
[place] [facility]    Chief Complaint   Patient presents with     shelter Regulatory       HPI:    Yuni Otoole is a 82 year old  (1935), who is being seen today for a federally mandated E/M visit at [facility]  .  HPI information obtained from: facility chart records, facility staff, patient report and [facility] chart review. Today's concerns are:  S/P cervical spinal fusion/Neck pain/Orthopedic aftercare  Saw neuro surg on 1/4 - no new orders -   Pt does note pain is less and is agreeable to try narc dose reduction.   Remains on flexeril    Chronic hyponatremia  Noted neurosurg still encouraging f/u with endo on this.   Na has been stable off Na tabs and with 2 L fluid restriction which pt doesn't mind at all.     Anemia due to blood loss, acute  F/u from hosp - now stable.     Benign essential hypertension  Started norvasc by me at SNF  And on BB -     ALLERGIES: Atorvastatin calcium  PAST MEDICAL HISTORY:  has a past medical history of Allergic rhinitis, cause unspecified; Pure hypercholesterolemia; and Unspecified essential hypertension. She also has no past medical history of Diabetes (H).  PAST SURGICAL HISTORY:  has a past surgical history that includes REMOVAL OF TONSILS,14+ Y/O; colonoscopy (05/30/07); carotid endarterectomy (11/07/08); INJ EPIDURAL LUMBAR/SACRAL W/WO CONTRAST (2009); DRAIN/INJECT LARGE JOINT/BURSA (2009); DRAIN/INJECT LARGE JOINT/BURSA (2010); INJ EPIDURAL CERVICAL/THORACIC W/WO CONTRAST (2010); INJ TRANSFORAMIN EPIDURAL, LUMB/SACR SINGLE (2010); Optical Tracking System Fusion Posterior Cervical Three + Levels (N/A, 11/15/2017); Laminectomy cerivcal posterior three+ levels (N/A, 11/15/2017); Optical Tracking System Fusion Cervical Anterior Three + Levels (N/A, 11/15/2017); and Fusion cervical posterior three+ levels (N/A, 11/15/2017).  FAMILY HISTORY: family history includes CANCER in her mother; CEREBROVASCULAR DISEASE in her paternal  grandmother; Cardiovascular in her father; Circulatory in her paternal grandmother; DIABETES in her maternal aunt; EYE* in her mother; GASTROINTESTINAL DISEASE in her mother; Gynecology in her sister; Hypertension in her father; Lipids in her brother; Musculoskeletal Disorder in her daughter; OSTEOPOROSIS in her mother; Obesity in her maternal grandmother and paternal grandmother.  SOCIAL HISTORY:  reports that she quit smoking about 22 years ago. She has a 5.00 pack-year smoking history. She has never used smokeless tobacco. She reports that she does not drink alcohol or use illicit drugs.    MEDICATIONS:  Current Outpatient Prescriptions   Medication Sig Dispense Refill     Cholecalciferol (VITAMIN D) 2000 UNITS tablet Take 2000 units by mouth daily 100 tablet 0     artificial saliva (BIOTENE MT) AERS spray Take 2 sprays by mouth every hour as needed for dry mouth       Cyclobenzaprine HCl (FLEXERIL PO) Take 2.5 mg by mouth 3 times daily        Hypromellose (NATURAL BALANCE TEARS OP) Apply 2 drops to eye 4 times daily as needed       OXYCODONE HCL PO Take 2.5 mg by mouth 3 times daily as needed (also TID scheduled)        acetaminophen (TYLENOL ARTHRITIS PAIN) 650 MG CR tablet Take 650 mg by mouth every 8 hours       Multiple Vitamins-Minerals (MULTIVITAMIN ADULT PO) Take 1 tablet by mouth daily       AmLODIPine Besylate (NORVASC PO) Take 5 mg by mouth At Bedtime        Nutritional Supplements (NUTRITIONAL SUPPLEMENT PO) Take 4 oz by mouth 2 times daily       pravastatin (PRAVACHOL) 10 MG tablet 1 tablet (10 mg) by Oral or Feeding Tube route At Bedtime 30 tablet      metoprolol (LOPRESSOR) 25 MG tablet Take 0.5 tablets (12.5 mg) by mouth 2 times daily 60 tablet      polyethylene glycol (MIRALAX/GLYCOLAX) Packet 17 g by Oral or Feeding Tube route 3 times daily as needed for constipation (also QD scheduled)  7 packet      senna-docusate (SENOKOT-S;PERICOLACE) 8.6-50 MG per tablet 3 tablets by Oral or Feeding Tube  route 2 times daily  100 tablet      calcium carbonate (OS-ANNETTA 500 MG [place]. [place]) 1250 MG tablet Take 1 tablet (1,250 mg) by mouth 2 times daily (with meals) 90 tablet      cyclobenzaprine (FLEXERIL) 5 MG tablet Take 0.5 tablets (2.5 mg) by mouth 3 times daily as needed for muscle spasms 60 tablet 0     order for DME Equipment being ordered: Shower chair 1 Device 0     Medications reviewed:  Medications reconciled to facility chart and changes were made to reflect current medications as identified as above med list. Below are the changes that were made:   Medications stopped since last EPIC medication reconciliation:   There are no discontinued medications.    Medications started since last [facility] medication reconciliation:  No orders of the defined types were placed in this encounter.      Case Management:  I have reviewed the care plan and MDS and do agree with the plan. Patient's desire to return to the community is present, but is not able due to care needs .  Information reviewed:  Medications, vital signs, orders, and nursing notes.    ROS:  10 point ROS of systems including Constitutional, Eyes, Respiratory, Cardiovascular, Gastroenterology, Genitourinary, Integumentary, Muscularskeletal, Psychiatric were all negative except for pertinent positives noted in my HPI.    Exam:  Vitals: /90  Pulse 86  Temp 97.8  F (36.6  C)  Resp 16  Wt 86 lb 4.8 oz (39.1 kg)  SpO2 97%  BMI 17.43 kg/m2  BMI= Body mass index is 17.43 kg/(m^2).  GENERAL APPEARANCE:  Alert, in no distress, neck remains in brace  RESP:  respiratory effort and palpation of chest normal, auscultation of lungs clear , no respiratory distress  CV:  Palpation and auscultation of heart done , rate and rhythm reg, no murmur, no peripheral edema  ABDOMEN:  normal bowel sounds, soft, nontender, no hepatosplenomegaly or other masses  M/S:   Gait and station w/c and [facility] lift still, Digits and nails normal  SKIN:  Inspection and Palpation of skin and  subcutaneous tissue intact  NEURO: 2-12 in normal limits and at patient's baseline  PSYCH:  insight and judgement, memory good , affect and mood normal      Lab/Diagnostic data:     CBC RESULTS:   Recent Labs   Lab Test  11/30/17   0610  11/20/17   1112  11/17/17   0401   WBC   --   8.0  10.6   RBC   --   3.21*  3.35*   HGB  10.5*  9.7*  10.1*   HCT   --   30.2*  30.6*   MCV   --   94  91   MCH   --   30.2  30.1   MCHC   --   32.1  33.0   RDW   --   13.6  14.9   PLT   --   246  171       Last Basic Metabolic Panel:  Recent Labs   Lab Test  01/16/18   0607  01/02/18   0610   11/28/17   0610   11/26/17   0941   NA  135  133   < >  134   < >  132*   POTASSIUM   --    --    --   3.9   --   4.3   CHLORIDE   --    --    --   98   --   96   ANNETTA   --    --    --   8.9   --   8.8   CO2   --    --    --   29   --   30   BUN   --    --    --   14   --   18   CR   --    --    --   0.51*   --   0.35*   GLC   --    --    --   85   --   86    < > = values in this interval not displayed.       Liver Function Studies -   Recent Labs   Lab Test  11/27/17   0817  11/23/17   0835   11/10/17   1723  04/25/17   1206   PROTTOTAL   --    --    --   6.5*  6.9   ALBUMIN  2.5*  2.4*   < >  3.5  3.9   BILITOTAL   --    --    --   0.4  0.2   ALKPHOS   --    --    --   68  63   AST   --    --    --   16  21   ALT   --    --    --   18  25    < > = values in this interval not displayed.       TSH   Date Value Ref Range Status   11/14/2017 2.91 0.40 - 4.00 mU/L Final   04/25/2017 2.40 0.40 - 4.00 mU/L Final   ]    ASSESSMENT/PLAN  S/P cervical spinal fusion/Neck pain/Orthopedic aftercare  Will try reduction on oxycodone - pt agrees.   If successful will try further in 1-2 weeks     Chronic hyponatremia  Recheck in 2 weeks. -stable - endo f/u prn    Anemia due to blood loss, acute  stabl3    Benign essential hypertension  Stable.       Orders:  1.  Decrease Oxycodone to 2.5 mg po TID and 2.5 mg po TID PRN.  Dx: pain - script written  2. Na f/u in 2  weeks.       Electronically signed by:  CRISTAL Olmos CNP

## 2018-01-22 NOTE — LETTER
1/22/2018        RE: Yuni Otoole  [address]  [facility] [address]          [place] [facility]    Chief Complaint   Patient presents with     penitentiary Regulatory       HPI:    Yuni Otoole is a 82 year old  (1935), who is being seen today for a federally mandated E/M visit at [facility]  .  HPI information obtained from: facility chart records, facility staff, patient report and [facility] chart review. Today's concerns are:  S/P cervical spinal fusion/Neck pain/Orthopedic aftercare  Saw neuro surg on 1/4 - no new orders -   Pt does note pain is less and is agreeable to try narc dose reduction.   Remains on flexeril    Chronic hyponatremia  Noted neurosurg still encouraging f/u with endo on this.   Na has been stable off Na tabs and with 2 L fluid restriction which pt doesn't mind at all.     Anemia due to blood loss, acute  F/u from hosp - now stable.     Benign essential hypertension  Started norvasc by me at [facility]  And on BB -     ALLERGIES: Atorvastatin calcium  PAST MEDICAL HISTORY:  has a past medical history of Allergic rhinitis, cause unspecified; Pure hypercholesterolemia; and Unspecified essential hypertension. She also has no past medical history of Diabetes (H).  PAST SURGICAL HISTORY:  has a past surgical history that includes REMOVAL OF TONSILS,14+ Y/O; colonoscopy (05/30/07); carotid endarterectomy (11/07/08); INJ EPIDURAL LUMBAR/SACRAL W/WO CONTRAST (2009); DRAIN/INJECT LARGE JOINT/BURSA (2009); DRAIN/INJECT LARGE JOINT/BURSA (2010); INJ EPIDURAL CERVICAL/THORACIC W/WO CONTRAST (2010); INJ TRANSFORAMIN EPIDURAL, LUMB/SACR SINGLE (2010); Optical Tracking System Fusion Posterior Cervical Three + Levels (N/A, 11/15/2017); Laminectomy cerivcal posterior three+ levels (N/A, 11/15/2017); Optical Tracking System Fusion Cervical Anterior Three + Levels (N/A, 11/15/2017); and Fusion cervical posterior three+ levels (N/A, 11/15/2017).  FAMILY HISTORY:  family history includes CANCER in her mother; CEREBROVASCULAR DISEASE in her paternal grandmother; Cardiovascular in her father; Circulatory in her paternal grandmother; DIABETES in her maternal aunt; EYE* in her mother; GASTROINTESTINAL DISEASE in her mother; Gynecology in her sister; Hypertension in her father; Lipids in her brother; Musculoskeletal Disorder in her daughter; OSTEOPOROSIS in her mother; Obesity in her maternal grandmother and paternal grandmother.  SOCIAL HISTORY:  reports that she quit smoking about 22 years ago. She has a 5.00 pack-year smoking history. She has never used smokeless tobacco. She reports that she does not drink alcohol or use illicit drugs.    MEDICATIONS:  Current Outpatient Prescriptions   Medication Sig Dispense Refill     Cholecalciferol (VITAMIN D) 2000 UNITS tablet Take 2000 units by mouth daily 100 tablet 0     artificial saliva (BIOTENE MT) AERS spray Take 2 sprays by mouth every hour as needed for dry mouth       Cyclobenzaprine HCl (FLEXERIL PO) Take 2.5 mg by mouth 3 times daily        Hypromellose (NATURAL BALANCE TEARS OP) Apply 2 drops to eye 4 times daily as needed       OXYCODONE HCL PO Take 2.5 mg by mouth 3 times daily as needed (also TID scheduled)        acetaminophen (TYLENOL ARTHRITIS PAIN) 650 MG CR tablet Take 650 mg by mouth every 8 hours       Multiple Vitamins-Minerals (MULTIVITAMIN ADULT PO) Take 1 tablet by mouth daily       AmLODIPine Besylate (NORVASC PO) Take 5 mg by mouth At Bedtime        Nutritional Supplements (NUTRITIONAL SUPPLEMENT PO) Take 4 oz by mouth 2 times daily       pravastatin (PRAVACHOL) 10 MG tablet 1 tablet (10 mg) by Oral or Feeding Tube route At Bedtime 30 tablet      metoprolol (LOPRESSOR) 25 MG tablet Take 0.5 tablets (12.5 mg) by mouth 2 times daily 60 tablet      polyethylene glycol (MIRALAX/GLYCOLAX) Packet 17 g by Oral or Feeding Tube route 3 times daily as needed for constipation (also QD scheduled)  7 packet       senna-docusate (SENOKOT-S;PERICOLACE) 8.6-50 MG per tablet 3 tablets by Oral or Feeding Tube route 2 times daily  100 tablet      calcium carbonate (OS-ANNETTA 500 MG [place]. [place]) 1250 MG tablet Take 1 tablet (1,250 mg) by mouth 2 times daily (with meals) 90 tablet      cyclobenzaprine (FLEXERIL) 5 MG tablet Take 0.5 tablets (2.5 mg) by mouth 3 times daily as needed for muscle spasms 60 tablet 0     order for DME Equipment being ordered: Shower chair 1 Device 0     Medications reviewed:  Medications reconciled to facility chart and changes were made to reflect current medications as identified as above med list. Below are the changes that were made:   Medications stopped since last EPIC medication reconciliation:   There are no discontinued medications.    Medications started since last [facility] medication reconciliation:  No orders of the defined types were placed in this encounter.      Case Management:  I have reviewed the care plan and MDS and do agree with the plan. Patient's desire to return to the community is present, but is not able due to care needs .  Information reviewed:  Medications, vital signs, orders, and nursing notes.    ROS:  10 point ROS of systems including Constitutional, Eyes, Respiratory, Cardiovascular, Gastroenterology, Genitourinary, Integumentary, Muscularskeletal, Psychiatric were all negative except for pertinent positives noted in my HPI.    Exam:  Vitals: /90  Pulse 86  Temp 97.8  F (36.6  C)  Resp 16  Wt 86 lb 4.8 oz (39.1 kg)  SpO2 97%  BMI 17.43 kg/m2  BMI= Body mass index is 17.43 kg/(m^2).  GENERAL APPEARANCE:  Alert, in no distress, neck remains in brace  RESP:  respiratory effort and palpation of chest normal, auscultation of lungs clear , no respiratory distress  CV:  Palpation and auscultation of heart done , rate and rhythm reg, no murmur, no peripheral edema  ABDOMEN:  normal bowel sounds, soft, nontender, no hepatosplenomegaly or other masses  M/S:   Gait and station w/c  and mech lift still, Digits and nails normal  SKIN:  Inspection and Palpation of skin and subcutaneous tissue intact  NEURO: 2-12 in normal limits and at patient's baseline  PSYCH:  insight and judgement, memory good , affect and mood normal      Lab/Diagnostic data:     CBC RESULTS:   Recent Labs   Lab Test  11/30/17   0610  11/20/17   1112  11/17/17   0401   WBC   --   8.0  10.6   RBC   --   3.21*  3.35*   HGB  10.5*  9.7*  10.1*   HCT   --   30.2*  30.6*   MCV   --   94  91   MCH   --   30.2  30.1   MCHC   --   32.1  33.0   RDW   --   13.6  14.9   PLT   --   246  171       Last Basic Metabolic Panel:  Recent Labs   Lab Test  01/16/18   0607  01/02/18   0610   11/28/17   0610   11/26/17   0941   NA  135  133   < >  134   < >  132*   POTASSIUM   --    --    --   3.9   --   4.3   CHLORIDE   --    --    --   98   --   96   ANNETTA   --    --    --   8.9   --   8.8   CO2   --    --    --   29   --   30   BUN   --    --    --   14   --   18   CR   --    --    --   0.51*   --   0.35*   GLC   --    --    --   85   --   86    < > = values in this interval not displayed.       Liver Function Studies -   Recent Labs   Lab Test  11/27/17   0817  11/23/17   0835   11/10/17   1723  04/25/17   1206   PROTTOTAL   --    --    --   6.5*  6.9   ALBUMIN  2.5*  2.4*   < >  3.5  3.9   BILITOTAL   --    --    --   0.4  0.2   ALKPHOS   --    --    --   68  63   AST   --    --    --   16  21   ALT   --    --    --   18  25    < > = values in this interval not displayed.       TSH   Date Value Ref Range Status   11/14/2017 2.91 0.40 - 4.00 mU/L Final   04/25/2017 2.40 0.40 - 4.00 mU/L Final   ]    ASSESSMENT/PLAN  S/P cervical spinal fusion/Neck pain/Orthopedic aftercare  Will try reduction on oxycodone - pt agrees.   If successful will try further in 1-2 weeks     Chronic hyponatremia  Recheck in 2 weeks. -stable - endo f/u prn    Anemia due to blood loss, acute  stabl3    Benign essential hypertension  Stable.       Orders:  1.  Decrease  Oxycodone to 2.5 mg po TID and 2.5 mg po TID PRN.  Dx: pain - script written  2. Na f/u in 2 weeks.       Electronically signed by:  CRISTAL Olmos CNP        Sincerely,        CRISTAL Olmos CNP

## 2018-01-25 ENCOUNTER — NURSING HOME VISIT (OUTPATIENT)
Dept: GERIATRICS | Facility: CLINIC | Age: 83
End: 2018-01-25
Payer: COMMERCIAL

## 2018-01-25 VITALS
OXYGEN SATURATION: 97 % | RESPIRATION RATE: 20 BRPM | DIASTOLIC BLOOD PRESSURE: 90 MMHG | BODY MASS INDEX: 17.4 KG/M2 | HEIGHT: 59 IN | WEIGHT: 86.3 LBS | HEART RATE: 101 BPM | SYSTOLIC BLOOD PRESSURE: 157 MMHG | TEMPERATURE: 97.9 F

## 2018-01-25 DIAGNOSIS — M54.2 NECK PAIN: ICD-10-CM

## 2018-01-25 DIAGNOSIS — Z98.1 S/P CERVICAL SPINAL FUSION: Primary | ICD-10-CM

## 2018-01-25 DIAGNOSIS — Z47.89 ORTHOPEDIC AFTERCARE: ICD-10-CM

## 2018-01-25 PROCEDURE — 99309 SBSQ NF CARE MODERATE MDM 30: CPT | Performed by: NURSE PRACTITIONER

## 2018-01-25 NOTE — LETTER
"    1/25/2018        RE: Yuni Otoole  [address]  [facility] [address]        [place] [facility]    Chief Complaint   Patient presents with     RECHECK       HPI:    Yuni Otoole is a 82 year old  (1935), who is being seen today for an episodic care visit at [facility].    HPI information obtained from: facility chart records, facility staff and patient report. Today's concern is:  S/P cervical spinal fusion/Neck pain/Orthopedic aftercare  F/u on neck pain after 50% reduction in oxycodone from 5 tid to 2.5 tid on 1/22. Today pt reports no change in pain and PT notes no decline in performance in therapy. Pt notes she is self propelling herself more in W/C and therefore using her arm/trap muscles more and they are sore. But she acknowledges this is a good thing to be able to use her own muslces this way.  Only 1 use of prn oxy this am at 0400  Cont on flexeril    REVIEW OF SYSTEMS:  4 point ROS including Respiratory, CV, GI and , other than that noted in the HPI,  is negative    PMH/PSH reviewed in EPIC today    /90  Pulse 101  Temp 97.9  F (36.6  C)  Resp 20  Ht 4' 11\" (1.499 m)  Wt 86 lb 4.8 oz (39.1 kg)  SpO2 97%  BMI 17.43 kg/m2  GENERAL APPEARANCE:  Alert, in no distress  Noted HR and BP's slightly elevated - chart review shows BP's 140 - 150's and HR 80 - 100's  Cont in C collar    ASSESSMENT/PLAN:  S/P cervical spinal fusion/Neck pain/Orthopedic aftercare  Stable with dose reduction of oxy.   Will add biofreeze to regimen to help with musle pain.  Add order to decrease scheduled oxy to 2.5 mg po bid starting 1/27 - keep prn.   F/u next week with goal to DC more scheduled oxy - then moved on to decreasing flexeril.    Electronically signed by:  CRISTAL Olmos CNP      Sincerely,        CRISTAL Olmos CNP    "

## 2018-01-25 NOTE — PROGRESS NOTES
"[place] [facility]    Chief Complaint   Patient presents with     RECHECK       HPI:    Yuni Otoole is a 82 year old  (1935), who is being seen today for an episodic care visit at [facility].    HPI information obtained from: facility chart records, facility staff and patient report. Today's concern is:  S/P cervical spinal fusion/Neck pain/Orthopedic aftercare  F/u on neck pain after 50% reduction in oxycodone from 5 tid to 2.5 tid on 1/22. Today pt reports no change in pain and PT notes no decline in performance in therapy. Pt notes she is self propelling herself more in W/C and therefore using her arm/trap muscles more and they are sore. But she acknowledges this is a good thing to be able to use her own muslces this way.  Only 1 use of prn oxy this am at 0400  Cont on flexeril    REVIEW OF SYSTEMS:  4 point ROS including Respiratory, CV, GI and , other than that noted in the HPI,  is negative    PMH/PSH reviewed in EPIC today    /90  Pulse 101  Temp 97.9  F (36.6  C)  Resp 20  Ht 4' 11\" (1.499 m)  Wt 86 lb 4.8 oz (39.1 kg)  SpO2 97%  BMI 17.43 kg/m2  GENERAL APPEARANCE:  Alert, in no distress  Noted HR and BP's slightly elevated - chart review shows BP's 140 - 150's and HR 80 - 100's  Cont in C collar    ASSESSMENT/PLAN:  S/P cervical spinal fusion/Neck pain/Orthopedic aftercare  Stable with dose reduction of oxy.   Will add biofreeze to regimen to help with musle pain.  Add order to decrease scheduled oxy to 2.5 mg po bid starting 1/27 - keep prn.   F/u next week with goal to DC more scheduled oxy - then moved on to decreasing flexeril.    Electronically signed by:  CRISTAL Olmos CNP  "

## 2018-01-30 ENCOUNTER — HOSPITAL LABORATORY (OUTPATIENT)
Dept: NURSING HOME | Facility: OTHER | Age: 83
End: 2018-01-30

## 2018-01-30 ENCOUNTER — NURSING HOME VISIT (OUTPATIENT)
Dept: GERIATRICS | Facility: CLINIC | Age: 83
End: 2018-01-30
Payer: COMMERCIAL

## 2018-01-30 VITALS
HEART RATE: 88 BPM | SYSTOLIC BLOOD PRESSURE: 126 MMHG | TEMPERATURE: 97.6 F | OXYGEN SATURATION: 97 % | WEIGHT: 86.3 LBS | RESPIRATION RATE: 20 BRPM | DIASTOLIC BLOOD PRESSURE: 73 MMHG | BODY MASS INDEX: 17.43 KG/M2

## 2018-01-30 DIAGNOSIS — Z98.1 S/P CERVICAL SPINAL FUSION: ICD-10-CM

## 2018-01-30 DIAGNOSIS — K59.03 DRUG-INDUCED CONSTIPATION: ICD-10-CM

## 2018-01-30 DIAGNOSIS — M54.2 NECK PAIN: ICD-10-CM

## 2018-01-30 DIAGNOSIS — E87.1 CHRONIC HYPONATREMIA: ICD-10-CM

## 2018-01-30 DIAGNOSIS — I10 BENIGN ESSENTIAL HYPERTENSION: ICD-10-CM

## 2018-01-30 DIAGNOSIS — Z47.89 ORTHOPEDIC AFTERCARE: ICD-10-CM

## 2018-01-30 DIAGNOSIS — E44.0 MODERATE PROTEIN-CALORIE MALNUTRITION (H): Primary | ICD-10-CM

## 2018-01-30 PROBLEM — E46 PROTEIN-CALORIE MALNUTRITION (H): Status: ACTIVE | Noted: 2018-01-30

## 2018-01-30 LAB — SODIUM SERPL-SCNC: 134 MMOL/L (ref 133–144)

## 2018-01-30 PROCEDURE — 99309 SBSQ NF CARE MODERATE MDM 30: CPT | Performed by: NURSE PRACTITIONER

## 2018-01-30 NOTE — LETTER
"    1/30/2018        RE: Yuni Otoole  [address]  [facility] [address]        [place] [facility]  Chief Complaint   Patient presents with     Nursing Home Acute     HPI:    Yuni Otoole is a 82 year old  (1935), who is being seen today for an episodic care visit at [facility].    HPI information obtained from: facility chart records, facility staff, patient report and [facility] chart review. Today's concern is:    Moderate protein-calorie malnutrition (H)  F/u on #1 hosp DC Dx - doing well - eating more at SNF - progressing in therapy.     S/P cervical spinal fusion/Neck pain/Orthopedic aftercare  F/u with pain. Pain is well-controlled on Flexeril 2.5mg TID, and is using a PRN dose once per day.  Patient also taking scheduled Tylenol TID, and Oxycodone 2.5mg BID, and has Oxycodone PRN (which she has only used one dose of in 14 days). Noted oxy has decreased from 5 bid last week and 5 tid 3 weeks ago    Chronic hyponatremia  Patient has had 10+ year history of hyponatremia. Has adhered to 2L fluid restriction since admission. Recheck Na from today (1/30/18) was 134 and patient is asymptomatic. Previous recommendation from surgeon to seek endocrinology consult discussed with pt and given stability and difficulty getting to [facility] pt may not go. I support that.     Drug-induced constipation  Constipation likely from Oxycodone. Miralax is ordered TID (no doses used), Senna-S scheduled, and daily bran. Last BM was yesterday 1/29/18 and was recorded as \"Large.\"     Benign essential hypertension  Started Norvasc on 1/22. Currently taking Norvasc 5mg QD, and Metoprolol 12.5mg BID.  Chart review shows BPs are better controlled, but not quite within the target range (see below):        REVIEW OF SYSTEMS:  4 point ROS including Respiratory, CV, GI and , other than that noted in the HPI,  is negative    PMH/PSH reviewed in EPIC today    /73  Pulse 88  Temp 97.6  F (36.4  C)  Resp 20  " Wt 86 lb 4.8 oz (39.1 kg)  SpO2 97%  BMI 17.43 kg/m2  EXAM:  Constitutional: healthy, alert, active and no distress   Head: negative, Normocephalic. No masses, lesions, tenderness or abnormalities  Neck: Neck supple. No adenopathy. Thyroid symmetric, normal size.     Hospital Laboratory on 01/30/2018   Component Date Value Ref Range Status     Sodium 01/30/2018 134  133 - 144 mmol/L Final         ASSESSMENT/PLAN:    Moderate protein-calorie malnutrition (H)  Chronic. Stable. Patient is already on dietary supplement. Will continue to clinically monitor.     S/P cervical spinal fusion, Neck pain, and Orthopedic aftercare  Stable. Improving. Continue POC and decrease Oxycodone dosing as noted below.     Chronic hyponatremia  Chronic. Stable. Will continue to clinically monitor, recheck Na in 1 month.     Drug-induced constipation  Acute. Stable. Improved. Will decrease Senna frequency as also discontinuing offending/constipating medications.    Benign essential hypertension  Chronic. Hypertensive. Will increase Norvasc as noted below.     1.  Decrease Senna-S to 1 tablet po BID scheduled.  Dx: constipation  2.  D/c scheduled Oxycodone.- keep prn  3.  Increase Norvasc to 10 mg po QD.  Dx: HTN  4.  Na+ recheck in 1 month.  Dx: hyponatremia     Electronically signed by:  Alia Granados RN BSN CCRN (NP Student).  This patient was seen along with a nurse practitioner student. The histories and ROS were obtained and confirmed by myself. All objective information and Assessment/Plans were completed by myself.    CRISTAL Olmos CNP      Sincerely,        CRISTAL Olmos CNP

## 2018-01-30 NOTE — PROGRESS NOTES
"[facility]  Chief Complaint   Patient presents with     Nursing Home Acute     HPI:    Yuni Otoole is a 82 year old  (1935), who is being seen today for an episodic care visit at [facility].    HPI information obtained from: facility chart records, facility staff, patient report and [facility] chart review. Today's concern is:    Moderate protein-calorie malnutrition (H)  F/u on #1 hosp DC Dx - doing well - eating more at SNF - progressing in therapy.     S/P cervical spinal fusion/Neck pain/Orthopedic aftercare  F/u with pain. Pain is well-controlled on Flexeril 2.5mg TID, and is using a PRN dose once per day.  Patient also taking scheduled Tylenol TID, and Oxycodone 2.5mg BID, and has Oxycodone PRN (which she has only used one dose of in 14 days). Noted oxy has decreased from 5 bid last week and 5 tid 3 weeks ago    Chronic hyponatremia  Patient has had 10+ year history of hyponatremia. Has adhered to 2L fluid restriction since admission. Recheck Na from today (1/30/18) was 134 and patient is asymptomatic. Previous recommendation from surgeon to seek endocrinology consult discussed with pt and given stability and difficulty getting to [facility] pt may not go. I support that.     Drug-induced constipation  Constipation likely from Oxycodone. Miralax is ordered TID (no doses used), Senna-S scheduled, and daily bran. Last BM was yesterday 1/29/18 and was recorded as \"Large.\"     Benign essential hypertension  Started Norvasc on 1/22. Currently taking Norvasc 5mg QD, and Metoprolol 12.5mg BID.  Chart review shows BPs are better controlled, but not quite within the target range (see below):        REVIEW OF SYSTEMS:  4 point ROS including Respiratory, CV, GI and , other than that noted in the HPI,  is negative    PMH/PSH reviewed in EPIC today    /73  Pulse 88  Temp 97.6  F (36.4  C)  Resp 20  Wt 86 lb 4.8 oz (39.1 kg)  SpO2 97%  BMI 17.43 kg/m2  EXAM:  Constitutional: " healthy, alert, active and no distress   Head: negative, Normocephalic. No masses, lesions, tenderness or abnormalities  Neck: Neck supple. No adenopathy. Thyroid symmetric, normal size.     Hospital Laboratory on 01/30/2018   Component Date Value Ref Range Status     Sodium 01/30/2018 134  133 - 144 mmol/L Final         ASSESSMENT/PLAN:    Moderate protein-calorie malnutrition (H)  Chronic. Stable. Patient is already on dietary supplement. Will continue to clinically monitor.     S/P cervical spinal fusion, Neck pain, and Orthopedic aftercare  Stable. Improving. Continue POC and decrease Oxycodone dosing as noted below.     Chronic hyponatremia  Chronic. Stable. Will continue to clinically monitor, recheck Na in 1 month.     Drug-induced constipation  Acute. Stable. Improved. Will decrease Senna frequency as also discontinuing offending/constipating medications.    Benign essential hypertension  Chronic. Hypertensive. Will increase Norvasc as noted below.     1.  Decrease Senna-S to 1 tablet po BID scheduled.  Dx: constipation  2.  D/c scheduled Oxycodone.- keep prn  3.  Increase Norvasc to 10 mg po QD.  Dx: HTN  4.  Na+ recheck in 1 month.  Dx: hyponatremia     Electronically signed by:  Alia Granados RN BSN CCRN (NP Student).  This patient was seen along with a nurse practitioner student. The histories and ROS were obtained and confirmed by myself. All objective information and Assessment/Plans were completed by myself.    Shaista Mcrae, CRISTAL CNP

## 2018-02-06 ENCOUNTER — NURSING HOME VISIT (OUTPATIENT)
Dept: GERIATRICS | Facility: CLINIC | Age: 83
End: 2018-02-06
Payer: COMMERCIAL

## 2018-02-06 VITALS
SYSTOLIC BLOOD PRESSURE: 139 MMHG | BODY MASS INDEX: 17.59 KG/M2 | TEMPERATURE: 98 F | WEIGHT: 87.1 LBS | OXYGEN SATURATION: 97 % | DIASTOLIC BLOOD PRESSURE: 76 MMHG | RESPIRATION RATE: 20 BRPM | HEART RATE: 88 BPM

## 2018-02-06 DIAGNOSIS — Z98.1 S/P CERVICAL SPINAL FUSION: Primary | ICD-10-CM

## 2018-02-06 DIAGNOSIS — I10 BENIGN ESSENTIAL HYPERTENSION: ICD-10-CM

## 2018-02-06 DIAGNOSIS — M54.2 NECK PAIN: ICD-10-CM

## 2018-02-06 DIAGNOSIS — K59.03 DRUG-INDUCED CONSTIPATION: ICD-10-CM

## 2018-02-06 PROCEDURE — 99309 SBSQ NF CARE MODERATE MDM 30: CPT | Performed by: NURSE PRACTITIONER

## 2018-02-06 NOTE — LETTER
2/6/2018        RE: Yuni Otoole  [address]  [facility] [address]        [place] [facility]    Chief Complaint   Patient presents with     Nursing Home Acute       HPI:    Yuni Otoole is a 82 year old  (1935), who is being seen today for an episodic care visit at [facility].    HPI information obtained from: facility chart records, facility staff, patient report and [facility] chart review. Today's concern is:  S/P cervical spinal fusion/Neck pain  F/u on decrease on oxycodone from 5 tid to 2.5 tid  on 1/22 then to 2.5 bid  1/27 then DC scheduled oxy on 1/30  Has been on flexeril 2.5 tid and tylenol  Pt reports stable pain control    Remains on prn oxy - using about 1/day  reamains on prn flexeril - using about 1/day (on night shift)    Benign essential hypertension  F/u on HTN - I started norvasc at [facility] early in admission - f/u t/o stay still showed elevated bp and icnreased to 5 mg   Then still elevated and increased to 10 mg on 1/30  F/u today showing improved control     Drug-induced constipation  Pt stating bowels are too loose.   Noted sig constipation earlier in SNF stay - given narc GDR      PMH/PSH reviewed in EPIC today    REVIEW OF SYSTEMS:  4 point ROS including Respiratory, CV, GI and , other than that noted in the HPI,  is negative    /76  Pulse 88  Temp 98  F (36.7  C)  Resp 20  Wt 87 lb 1.6 oz (39.5 kg)  SpO2 97%  BMI 17.59 kg/m2  GENERAL APPEARANCE:  Alert, in no distress  Up in w/c with C collar -   Advancing in PT  Non labored breathign   No edema    ASSESSMENT/PLAN:  S/P cervical spinal fusion/Neck pain  Cont GDR of neck pain meds - see below    Benign essential hypertension  Stable/improved with last increase in CCB    Drug-induced constipation  Will back down on meds.       1.  Decrease Senna - S to 1 tablet po Q pm & 1 tab po q day prn..  Dx: constipation  2.  Decrease scheduled Flexeril to 2.5 mg po BID & keep PRN.  Dx: muscle  spasm    Electronically signed by:  CRISTAL Olmos CNP      Sincerely,        CRISTAL Olmos CNP

## 2018-02-06 NOTE — PROGRESS NOTES
[facility]    Chief Complaint   Patient presents with     Nursing Home Acute       HPI:    Yuni Otoole is a 82 year old  (1935), who is being seen today for an episodic care visit at [facility].    HPI information obtained from: facility chart records, facility staff, patient report and [facility] chart review. Today's concern is:  S/P cervical spinal fusion/Neck pain  F/u on decrease on oxycodone from 5 tid to 2.5 tid  on 1/22 then to 2.5 bid  1/27 then DC scheduled oxy on 1/30  Has been on flexeril 2.5 tid and tylenol  Pt reports stable pain control    Remains on prn oxy - using about 1/day  reamains on prn flexeril - using about 1/day (on night shift)    Benign essential hypertension  F/u on HTN - I started norvasc at [facility] early in admission - f/u t/o stay still showed elevated bp and icnreased to 5 mg   Then still elevated and increased to 10 mg on 1/30  F/u today showing improved control     Drug-induced constipation  Pt stating bowels are too loose.   Noted sig constipation earlier in SNF stay - given narc GDR      PMH/PSH reviewed in EPIC today    REVIEW OF SYSTEMS:  4 point ROS including Respiratory, CV, GI and , other than that noted in the HPI,  is negative    /76  Pulse 88  Temp 98  F (36.7  C)  Resp 20  Wt 87 lb 1.6 oz (39.5 kg)  SpO2 97%  BMI 17.59 kg/m2  GENERAL APPEARANCE:  Alert, in no distress  Up in w/c with C collar -   Advancing in PT  Non labored breathign   No edema    ASSESSMENT/PLAN:  S/P cervical spinal fusion/Neck pain  Cont GDR of neck pain meds - see below    Benign essential hypertension  Stable/improved with last increase in CCB    Drug-induced constipation  Will back down on meds.       1.  Decrease Senna - S to 1 tablet po Q pm & 1 tab po q day prn..  Dx: constipation  2.  Decrease scheduled Flexeril to 2.5 mg po BID & keep PRN.  Dx: muscle spasm    Electronically signed by:  Shaista Mcrae, CRISTAL ANDRE

## 2018-02-14 VITALS
SYSTOLIC BLOOD PRESSURE: 119 MMHG | RESPIRATION RATE: 18 BRPM | BODY MASS INDEX: 17.59 KG/M2 | OXYGEN SATURATION: 97 % | TEMPERATURE: 97.6 F | WEIGHT: 87.1 LBS | HEART RATE: 88 BPM | DIASTOLIC BLOOD PRESSURE: 72 MMHG

## 2018-02-14 NOTE — PROGRESS NOTES
[facility]    Chief Complaint   Patient presents with     custodial Regulatory       HPI:    Yuni Otoole is a 82 year old  (1935), who is being seen today for a federally mandated E/M visit at [facility]  .  HPI information obtained from: patient report.     Today's concerns are:  - Resident seen and examined.   - Reports sleep, appetite and BM are fine.   - GNP reports successful GFR of pain med, and constipation resolved.  Reports has a follow-up with a neurosurgeon on the 22nd of this month.  Reports soreness on the outer surface of the neck on the right side, secondary to increased right arm use, better with Biofreeze to some extent and with pain meds.  Also reports pain in both hip and pelvis area with walking, soreness like, she is no more mechanical left, use a walker for a few steps.  -Reports headache is better now  ----------------  - Past Medical, social, family histories, medications, and allergies reviewed and updated  - Medications reviewed: in the chart and EHR.   - Case Management:   I have reviewed the care plan and MDS and do agree with the plan. Patient's desire to return to the community is not present.  Information reviewed:  Medications, vital signs, orders, and nursing notes.    MEDICATIONS:  Current Outpatient Prescriptions   Medication Sig Dispense Refill     Menthol, Topical Analgesic, (BIOFREEZE EX) Externally apply topically 2 times daily as needed       Ondansetron HCl (ZOFRAN PO) Take 4 mg by mouth 3 times daily as needed for nausea or vomiting       Cholecalciferol (VITAMIN D) 2000 UNITS tablet Take 2000 units by mouth daily 100 tablet 0     artificial saliva (BIOTENE MT) AERS spray Take 2 sprays by mouth every hour as needed for dry mouth       Cyclobenzaprine HCl (FLEXERIL PO) Take 2.5 mg by mouth 2 times daily        Hypromellose (NATURAL BALANCE TEARS OP) Apply 2 drops to eye 4 times daily as needed       OXYCODONE HCL PO Take  2.5 mg by mouth 3 times daily as needed        acetaminophen (TYLENOL ARTHRITIS PAIN) 650 MG CR tablet Take 650 mg by mouth every 8 hours       Multiple Vitamins-Minerals (MULTIVITAMIN ADULT PO) Take 1 tablet by mouth daily       AmLODIPine Besylate (NORVASC PO) Take 10 mg by mouth At Bedtime        Nutritional Supplements (NUTRITIONAL SUPPLEMENT PO) Take 4 oz by mouth 2 times daily       pravastatin (PRAVACHOL) 10 MG tablet 1 tablet (10 mg) by Oral or Feeding Tube route At Bedtime 30 tablet      metoprolol (LOPRESSOR) 25 MG tablet Take 0.5 tablets (12.5 mg) by mouth 2 times daily 60 tablet      polyethylene glycol (MIRALAX/GLYCOLAX) Packet 17 g by Oral or Feeding Tube route 3 times daily as needed for constipation (also QD scheduled)  7 packet      senna-docusate (SENOKOT-S;PERICOLACE) 8.6-50 MG per tablet 1 tablet by Oral or Feeding Tube route daily Also QD  tablet      calcium carbonate (OS-ANNETTA 500 MG [place]. [place]) 1250 MG tablet Take 1 tablet (1,250 mg) by mouth 2 times daily (with meals) 90 tablet      cyclobenzaprine (FLEXERIL) 5 MG tablet Take 0.5 tablets (2.5 mg) by mouth 3 times daily as needed for muscle spasms 60 tablet 0     order for DME Equipment being ordered: Shower chair 1 Device 0     Medications reviewed:  Medications reconciled to facility chart and changes were made to reflect current medications as identified as above med list. Below are the changes that were made:   Medications stopped since last EPIC medication reconciliation:   There are no discontinued medications.    Medications started since last [facility] medication reconciliation:  No orders of the defined types were placed in this encounter.    Case Management:  I have reviewed the care plan and MDS and do agree with the plan. Patient's desire to return to the community is present, but is not able due to care needs .  Information reviewed:  Medications, vital signs, orders, and nursing notes.    ROS:  4 point ROS including Respiratory,  CV, GI and , other than that noted in the HPI,  is negative      Exam:  Vitals: /72  Pulse 88  Temp 97.6  F (36.4  C)  Resp 18  Wt 87 lb 1.6 oz (39.5 kg)  SpO2 97%  BMI 17.59 kg/m2  BMI= Body mass index is 17.59 kg/(m^2).  GENERAL APPEARANCE:  in no distress, cooperative  ENT:  Mouth moist mucous membranes, [place]  Neck: collar in place  RESP:  respiratory effort and palpation of chest normal, lungs clear to auscultation , no respiratory distress  CV:  Palpation and auscultation of heart done , regular rate and rhythm, systolic over right upper sternal border murmur, rub, or gallop, S1S2 bounding and rapid, but regular.   ABDOMEN:  normal bowel sounds, soft, nontender, no hepatosplenomegaly or other masses  M/S:   Gait and station abnormal uses walker and WC.   SKIN:  Inspection of skin and subcutaneous tissue baseline, Palpation of skin and subcutaneous tissue baseline  NEURO:   generalized muscles atrophy, hand  4/5 b/l.  Dorsiflexion 5/5 bilateral.   PSYCH:  affect and mood normal    Lab/Diagnostic data:  Last Basic Metabolic Panel:  Recent Labs   Lab Test  01/30/18   0618  01/16/18   0607   11/28/17   0610   11/26/17   0941   NA  134  135   < >  134   < >  132*   POTASSIUM   --    --    --   3.9   --   4.3   CHLORIDE   --    --    --   98   --   96   ANNETTA   --    --    --   8.9   --   8.8   CO2   --    --    --   29   --   30   BUN   --    --    --   14   --   18   CR   --    --    --   0.51*   --   0.35*   GLC   --    --    --   85   --   86    < > = values in this interval not displayed.     ]    ASSESSMENT/PLAN  # Orthopedic aftercare  # S/P cervical spinal fusion:  # Neck pain  - C1-C3 Lami; C2-C3 Posterior Fusion; C2/3, C3/4, C6/7 Anterior Decompression and Fusion; C2/3, C4/5, and C6/7 Facet Osteotomies; C2-T3 Instrumentation   - analgesia optimal  - continue to follow on the surgeon's recommendations.   - continue rehab.     Benign essential hypertension:  -   BP Readings from Last 3  Encounters:   02/22/18 143/81   02/21/18 113/71   02/19/18 148/76   - controlled. Continue meds. In this age group keep SBP > 130 mmHg.     Vitamin D deficiency  - level 17, on supplement Vit D3 3000 IU, and Vit D2 50,000 IU Q wk.     Chronic Hyponatremia:  - corrected.     Frailty:  - continues to require assistance from nursing. Up for meals only o/w spends the day resting in bed  - continue rehab.     Orders:  - See above, otherwise, continue the rest of the current POC.     Electronically signed by:  Ceasar Aburto MD

## 2018-02-15 ENCOUNTER — NURSING HOME VISIT (OUTPATIENT)
Dept: GERIATRICS | Facility: CLINIC | Age: 83
End: 2018-02-15
Payer: COMMERCIAL

## 2018-02-15 DIAGNOSIS — E55.9 VITAMIN D DEFICIENCY: ICD-10-CM

## 2018-02-15 DIAGNOSIS — M54.2 NECK PAIN: ICD-10-CM

## 2018-02-15 DIAGNOSIS — R54 FRAIL ELDERLY: ICD-10-CM

## 2018-02-15 DIAGNOSIS — E87.1 HYPONATREMIA: ICD-10-CM

## 2018-02-15 DIAGNOSIS — Z98.1 S/P CERVICAL SPINAL FUSION: Primary | ICD-10-CM

## 2018-02-15 DIAGNOSIS — Z47.89 ORTHOPEDIC AFTERCARE: ICD-10-CM

## 2018-02-15 DIAGNOSIS — I10 BENIGN ESSENTIAL HYPERTENSION: ICD-10-CM

## 2018-02-15 PROCEDURE — 99309 SBSQ NF CARE MODERATE MDM 30: CPT | Performed by: FAMILY MEDICINE

## 2018-02-15 NOTE — LETTER
2/15/2018        RE: Yuni Otoole  [address]  [facility] [address]          [facility]    Chief Complaint   Patient presents with     custodial Regulatory       HPI:    Yuni Otoole is a 82 year old  (1935), who is being seen today for a federally mandated E/M visit [facility]  .  HPI information obtained from: patient report.     Today's concerns are:  - Resident seen and examined.   - Reports sleep, appetite and BM are fine.   - GNP reports successful GFR of pain med, and constipation resolved.  Reports has a follow-up with a neurosurgeon on the 22nd of this month.  Reports soreness on the outer surface of the neck on the right side, secondary to increased right arm use, better with Biofreeze to some extent and with pain meds.  Also reports pain in both hip and pelvis area with walking, soreness like, she is no more mechanical left, use a walker for a few steps.  -Reports headache is better now  ----------------  - Past Medical, social, family histories, medications, and allergies reviewed and updated  - Medications reviewed: in the chart and EHR.   - Case Management:   I have reviewed the care plan and MDS and do agree with the plan. Patient's desire to return to the community is not present.  Information reviewed:  Medications, vital signs, orders, and nursing notes.    MEDICATIONS:  Current Outpatient Prescriptions   Medication Sig Dispense Refill     Menthol, Topical Analgesic, (BIOFREEZE EX) Externally apply topically 2 times daily as needed       Ondansetron HCl (ZOFRAN PO) Take 4 mg by mouth 3 times daily as needed for nausea or vomiting       Cholecalciferol (VITAMIN D) 2000 UNITS tablet Take 2000 units by mouth daily 100 tablet 0     artificial saliva (BIOTENE MT) AERS spray Take 2 sprays by mouth every hour as needed for dry mouth       Cyclobenzaprine HCl (FLEXERIL PO) Take 2.5 mg by mouth 2 times daily        Hypromellose (NATURAL  BALANCE TEARS OP) Apply 2 drops to eye 4 times daily as needed       OXYCODONE HCL PO Take 2.5 mg by mouth 3 times daily as needed        acetaminophen (TYLENOL ARTHRITIS PAIN) 650 MG CR tablet Take 650 mg by mouth every 8 hours       Multiple Vitamins-Minerals (MULTIVITAMIN ADULT PO) Take 1 tablet by mouth daily       AmLODIPine Besylate (NORVASC PO) Take 10 mg by mouth At Bedtime        Nutritional Supplements (NUTRITIONAL SUPPLEMENT PO) Take 4 oz by mouth 2 times daily       pravastatin (PRAVACHOL) 10 MG tablet 1 tablet (10 mg) by Oral or Feeding Tube route At Bedtime 30 tablet      metoprolol (LOPRESSOR) 25 MG tablet Take 0.5 tablets (12.5 mg) by mouth 2 times daily 60 tablet      polyethylene glycol (MIRALAX/GLYCOLAX) Packet 17 g by Oral or Feeding Tube route 3 times daily as needed for constipation (also QD scheduled)  7 packet      senna-docusate (SENOKOT-S;PERICOLACE) 8.6-50 MG per tablet 1 tablet by Oral or Feeding Tube route daily Also QD  tablet      calcium carbonate (OS-ANNETTA 500 MG [place]. [place]) 1250 MG tablet Take 1 tablet (1,250 mg) by mouth 2 times daily (with meals) 90 tablet      cyclobenzaprine (FLEXERIL) 5 MG tablet Take 0.5 tablets (2.5 mg) by mouth 3 times daily as needed for muscle spasms 60 tablet 0     order for DME Equipment being ordered: Shower chair 1 Device 0     Medications reviewed:  Medications reconciled to facility chart and changes were made to reflect current medications as identified as above med list. Below are the changes that were made:   Medications stopped since last EPIC medication reconciliation:   There are no discontinued medications.    Medications started since last [facility] medication reconciliation:  No orders of the defined types were placed in this encounter.    Case Management:  I have reviewed the care plan and MDS and do agree with the plan. Patient's desire to return to the community is present, but is not able due to care needs .  Information reviewed:   Medications, vital signs, orders, and nursing notes.    ROS:  4 point ROS including Respiratory, CV, GI and , other than that noted in the HPI,  is negative      Exam:  Vitals: /72  Pulse 88  Temp 97.6  F (36.4  C)  Resp 18  Wt 87 lb 1.6 oz (39.5 kg)  SpO2 97%  BMI 17.59 kg/m2  BMI= Body mass index is 17.59 kg/(m^2).  GENERAL APPEARANCE:  in no distress, cooperative  ENT:  Mouth moist mucous membranes, [place]  Neck: collar in place  RESP:  respiratory effort and palpation of chest normal, lungs clear to auscultation , no respiratory distress  CV:  Palpation and auscultation of heart done , regular rate and rhythm, systolic over right upper sternal border murmur, rub, or gallop, S1S2 bounding and rapid, but regular.   ABDOMEN:  normal bowel sounds, soft, nontender, no hepatosplenomegaly or other masses  M/S:   Gait and station abnormal uses walker and WC.   SKIN:  Inspection of skin and subcutaneous tissue baseline, Palpation of skin and subcutaneous tissue baseline  NEURO:   generalized muscles atrophy, hand  4/5 b/l.  Dorsiflexion 5/5 bilateral.   PSYCH:  affect and mood normal    Lab/Diagnostic data:  Last Basic Metabolic Panel:  Recent Labs   Lab Test  01/30/18   0618  01/16/18   0607   11/28/17   0610   11/26/17   0941   NA  134  135   < >  134   < >  132*   POTASSIUM   --    --    --   3.9   --   4.3   CHLORIDE   --    --    --   98   --   96   ANNETTA   --    --    --   8.9   --   8.8   CO2   --    --    --   29   --   30   BUN   --    --    --   14   --   18   CR   --    --    --   0.51*   --   0.35*   GLC   --    --    --   85   --   86    < > = values in this interval not displayed.     ]    ASSESSMENT/PLAN  # Orthopedic aftercare  # S/P cervical spinal fusion:  # Neck pain  - C1-C3 Lami; C2-C3 Posterior Fusion; C2/3, C3/4, C6/7 Anterior Decompression and Fusion; C2/3, C4/5, and C6/7 Facet Osteotomies; C2-T3 Instrumentation   - analgesia optimal  - continue to follow on the surgeon's  recommendations.   - continue rehab.     Benign essential hypertension:  -   BP Readings from Last 3 Encounters:   02/22/18 143/81   02/21/18 113/71   02/19/18 148/76   - controlled. Continue meds. In this age group keep SBP > 130 mmHg.     Vitamin D deficiency  - level 17, on supplement Vit D3 3000 IU, and Vit D2 50,000 IU Q wk.     Chronic Hyponatremia:  - corrected.     Frailty:  - continues to require assistance from nursing. Up for meals only o/w spends the day resting in bed  - continue rehab.     Orders:  - See above, otherwise, continue the rest of the current POC.     Electronically signed by:  Ceasar Aburto MD        Sincerely,        Ceasar Aburto MD

## 2018-02-19 ENCOUNTER — NURSING HOME VISIT (OUTPATIENT)
Dept: GERIATRICS | Facility: CLINIC | Age: 83
End: 2018-02-19
Payer: COMMERCIAL

## 2018-02-19 VITALS
TEMPERATURE: 97.2 F | SYSTOLIC BLOOD PRESSURE: 148 MMHG | BODY MASS INDEX: 17.59 KG/M2 | WEIGHT: 87.1 LBS | DIASTOLIC BLOOD PRESSURE: 76 MMHG | RESPIRATION RATE: 18 BRPM | HEART RATE: 84 BPM | OXYGEN SATURATION: 97 %

## 2018-02-19 DIAGNOSIS — Z98.1 S/P CERVICAL SPINAL FUSION: Primary | ICD-10-CM

## 2018-02-19 DIAGNOSIS — G47.01 INSOMNIA DUE TO MEDICAL CONDITION: ICD-10-CM

## 2018-02-19 DIAGNOSIS — G25.81 RESTLESS LEGS SYNDROME (RLS): ICD-10-CM

## 2018-02-19 DIAGNOSIS — M54.2 NECK PAIN: ICD-10-CM

## 2018-02-19 DIAGNOSIS — I10 BENIGN ESSENTIAL HYPERTENSION: ICD-10-CM

## 2018-02-19 DIAGNOSIS — E87.1 CHRONIC HYPONATREMIA: ICD-10-CM

## 2018-02-19 PROCEDURE — 99309 SBSQ NF CARE MODERATE MDM 30: CPT | Performed by: NURSE PRACTITIONER

## 2018-02-19 NOTE — LETTER
2/19/2018        RE: Yuni Otoole  [address]  [facility] [address]        [place] [facility]    Chief Complaint   Patient presents with     Nursing Home Acute       HPI:    Yuni Otoole is a 82 year old  (1935), who is being seen today for an episodic care visit at [facility].    HPI information obtained from: facility chart records, facility staff, patient report and [facility] chart review. Today's concern is:  S/P cervical spinal fusion/Neck pain  F/u on GDRs of oxy and flexeril  Noted use of prn oxy about bid - but pt notes mainly for rls - leg pain not neck pain  Flexeril still 2.5 bid and prn - using about q day prn and notes again for insomnia/leg pain    Insomnia due to medical condition/Restless legs syndrome (RLS)  No hx of RLS  Discussed possible electrolyte imbalances that can cause it.     Chronic hyponatremia  F/u- chonic - has been stable    Benign essential hypertension  Added and icnreased norvasc to 10   F/u today bp still elevated.       REVIEW OF SYSTEMS:  4 point ROS including Respiratory, CV, GI and , other than that noted in the HPI,  is negative    PMH/PSH reviewed in EPIC today      /76  Pulse 84  Temp 97.2  F (36.2  C)  Resp 18  Wt 87 lb 1.6 oz (39.5 kg)  SpO2 97%  BMI 17.59 kg/m2  GENERAL APPEARANCE:  Alert, in no distress  Neck in C collar - riding /use therapy bike well  Non labored breathing.     ASSESSMENT/PLAN:  S/P cervical spinal fusion/Neck pain  Stable -pain is more generalized joint v muscle vs rls - see below    Insomnia due to medical condition/Restless legs syndrome (RLS)  Will add melatonin to help sleep and w/u RLS with lab tests below  Already on scheduled tylnol   Sounds like generalized arthritis and muscle pain from working out - discussed using the topical biofreeze instead of narcs.     Chronic hyponatremia  Will recheck     Benign essential hypertension  Do not want to use ace or diuretics given hyponatremia -   Noted  HR 80 - 90's and could tolerated more BB - acknowledge not great BP support -but would help.       1.  D/c scheduled Flexeril.  2.  D/c Zofran.(GI upset resolved - no longer needed)  3.  Start Melatonin 3 mg po QHS.  Dx: insomnia  4.  Labs 2/20 - BMP, Mag, Hgb, iron, iron studies, ferritin, transferrin.  Dx: RLS  5. Increase lopresssor to 25 mg po q day dx htn  6. Use prn biofreeze before prn oxy or flexeril.     Electronically signed by:  CRISTAL Olmos CNP      Sincerely,        CRISTAL Olmos CNP

## 2018-02-19 NOTE — PROGRESS NOTES
[facility]    Chief Complaint   Patient presents with     Nursing Home Acute       HPI:    Yuni Otoole is a 82 year old  (1935), who is being seen today for an episodic care visit at [facility].    HPI information obtained from: facility chart records, facility staff, patient report and [facility] chart review. Today's concern is:  S/P cervical spinal fusion/Neck pain  F/u on GDRs of oxy and flexeril  Noted use of prn oxy about bid - but pt notes mainly for rls - leg pain not neck pain  Flexeril still 2.5 bid and prn - using about q day prn and notes again for insomnia/leg pain    Insomnia due to medical condition/Restless legs syndrome (RLS)  No hx of RLS  Discussed possible electrolyte imbalances that can cause it.     Chronic hyponatremia  F/u- chonic - has been stable    Benign essential hypertension  Added and icnreased norvasc to 10   F/u today bp still elevated.       REVIEW OF SYSTEMS:  4 point ROS including Respiratory, CV, GI and , other than that noted in the HPI,  is negative    PMH/PSH reviewed in EPIC today      /76  Pulse 84  Temp 97.2  F (36.2  C)  Resp 18  Wt 87 lb 1.6 oz (39.5 kg)  SpO2 97%  BMI 17.59 kg/m2  GENERAL APPEARANCE:  Alert, in no distress  Neck in C collar - riding /use therapy bike well  Non labored breathing.     ASSESSMENT/PLAN:  S/P cervical spinal fusion/Neck pain  Stable -pain is more generalized joint v muscle vs rls - see below    Insomnia due to medical condition/Restless legs syndrome (RLS)  Will add melatonin to help sleep and w/u RLS with lab tests below  Already on scheduled tylnol   Sounds like generalized arthritis and muscle pain from working out - discussed using the topical biofreeze instead of narcs.     Chronic hyponatremia  Will recheck     Benign essential hypertension  Do not want to use ace or diuretics given hyponatremia -   Noted HR 80 - 90's and could tolerated more BB - acknowledge not great BP support -but would  help.       1.  D/c scheduled Flexeril.  2.  D/c Zofran.(GI upset resolved - no longer needed)  3.  Start Melatonin 3 mg po QHS.  Dx: insomnia  4.  Labs 2/20 - BMP, Mag, Hgb, iron, iron studies, ferritin, transferrin.  Dx: RLS  5. Increase lopresssor to 25 mg po q day dx htn  6. Use prn biofreeze before prn oxy or flexeril.     Electronically signed by:  CRISTAL Olmos CNP

## 2018-02-20 ENCOUNTER — HOSPITAL LABORATORY (OUTPATIENT)
Dept: NURSING HOME | Facility: OTHER | Age: 83
End: 2018-02-20

## 2018-02-20 LAB
ANION GAP SERPL CALCULATED.3IONS-SCNC: 7 MMOL/L (ref 3–14)
BUN SERPL-MCNC: 20 MG/DL (ref 7–30)
CALCIUM SERPL-MCNC: 9.5 MG/DL (ref 8.5–10.1)
CHLORIDE SERPL-SCNC: 99 MMOL/L (ref 94–109)
CO2 SERPL-SCNC: 29 MMOL/L (ref 20–32)
CREAT SERPL-MCNC: 0.45 MG/DL (ref 0.52–1.04)
FERRITIN SERPL-MCNC: 32 NG/ML (ref 8–252)
GFR SERPL CREATININE-BSD FRML MDRD: >90 ML/MIN/1.7M2
GLUCOSE SERPL-MCNC: 90 MG/DL (ref 70–99)
HGB BLD-MCNC: 12.2 G/DL (ref 11.7–15.7)
IRON SATN MFR SERPL: 14 % (ref 15–46)
IRON SERPL-MCNC: 55 UG/DL (ref 35–180)
POTASSIUM SERPL-SCNC: 3.8 MMOL/L (ref 3.4–5.3)
SODIUM SERPL-SCNC: 135 MMOL/L (ref 133–144)
TIBC SERPL-MCNC: 396 UG/DL (ref 240–430)
TRANSFERRIN SERPL-MCNC: 329 MG/DL (ref 210–360)

## 2018-02-21 ENCOUNTER — NURSING HOME VISIT (OUTPATIENT)
Dept: GERIATRICS | Facility: CLINIC | Age: 83
End: 2018-02-21
Payer: COMMERCIAL

## 2018-02-21 VITALS
OXYGEN SATURATION: 97 % | DIASTOLIC BLOOD PRESSURE: 71 MMHG | SYSTOLIC BLOOD PRESSURE: 113 MMHG | TEMPERATURE: 97.8 F | HEART RATE: 82 BPM | WEIGHT: 87.1 LBS | RESPIRATION RATE: 18 BRPM | BODY MASS INDEX: 17.59 KG/M2

## 2018-02-21 DIAGNOSIS — G25.81 RESTLESS LEGS SYNDROME (RLS): Primary | ICD-10-CM

## 2018-02-21 PROCEDURE — 99309 SBSQ NF CARE MODERATE MDM 30: CPT | Performed by: NURSE PRACTITIONER

## 2018-02-21 NOTE — PROGRESS NOTES
[facility]    Chief Complaint   Patient presents with     Nursing Home Acute       HPI:    Yuni Otoole is a 82 year old  (1935), who is being seen today for an episodic care visit at [facility].    HPI information obtained from: facility staff, patient report and [facility] chart review. Today's concern is:  Restless legs syndrome (RLS)  Started to c/o RLS this last week - using prn oxy and flexeril that was started for cervical surgery to treat rls.   W/u for rls done this week and f/u with pt on results today.   She notes it is still going on - cramping/jumping legs and only at night.       REVIEW OF SYSTEMS:  4 point ROS including Respiratory, CV, GI and , other than that noted in the HPI,  is negative    PMH/PSH reviewed in EPIC today    /71  Pulse 82  Temp 97.8  F (36.6  C)  Resp 18  Wt 87 lb 1.6 oz (39.5 kg)  SpO2 97%  BMI 17.59 kg/m2  GENERAL APPEARANCE:  Alert, in no distress  C collar on   In therapy doing exercises  No edema marv lower ext  5/5 marv lower ext    Hospital Laboratory on 02/20/2018   Component Date Value Ref Range Status     Sodium 02/20/2018 135  133 - 144 mmol/L Final     Potassium 02/20/2018 3.8  3.4 - 5.3 mmol/L Final     Chloride 02/20/2018 99  94 - 109 mmol/L Final     Carbon Dioxide 02/20/2018 29  20 - 32 mmol/L Final     Anion Gap 02/20/2018 7  3 - 14 mmol/L Final     Glucose 02/20/2018 90  70 - 99 mg/dL Final     Urea Nitrogen 02/20/2018 20  7 - 30 mg/dL Final     Creatinine 02/20/2018 0.45* 0.52 - 1.04 mg/dL Final     GFR Estimate 02/20/2018 >90  >60 mL/min/1.7m2 Final    Non  GFR Calc     GFR Estimate If Black 02/20/2018 >90  >60 mL/min/1.7m2 Final    African American GFR Calc     Calcium 02/20/2018 9.5  8.5 - 10.1 mg/dL Final     Iron 02/20/2018 55  35 - 180 ug/dL Final     Iron Binding Cap 02/20/2018 396  240 - 430 ug/dL Final     Iron Saturation Index 02/20/2018 14* 15 - 46 % Final     Ferritin 02/20/2018 32  1 - 268  ng/mL Final     Hemoglobin 02/20/2018 12.2  11.7 - 15.7 g/dL Final     Transferrin 02/20/2018 329  210 - 360 mg/dL Final       Noted fe index slighlty low - but not enough to cause rls.   ASSESSMENT/PLAN:  Restless legs syndrome (RLS)  Not from metabolic reason - will treat with requip - discussed with pt and she agrees.     1.  Start Requip 0.25 mg po QHS.  Dx: RLS    Electronically signed by:  CRISTAL Olmos CNP

## 2018-02-21 NOTE — LETTER
2/21/2018        RE: Yuni Otoole  [address]  [facility] [address]        [facility]    Chief Complaint   Patient presents with     Nursing Home Acute       HPI:    Yuni Otoole is a 82 year old  (1935), who is being seen today for an episodic care visit at [facility].    HPI information obtained from: facility staff, patient report and [facility] chart review. Today's concern is:  Restless legs syndrome (RLS)  Started to c/o RLS this last week - using prn oxy and flexeril that was started for cervical surgery to treat rls.   W/u for rls done this week and f/u with pt on results today.   She notes it is still going on - cramping/jumping legs and only at night.       REVIEW OF SYSTEMS:  4 point ROS including Respiratory, CV, GI and , other than that noted in the HPI,  is negative    PMH/PSH reviewed in EPIC today    /71  Pulse 82  Temp 97.8  F (36.6  C)  Resp 18  Wt 87 lb 1.6 oz (39.5 kg)  SpO2 97%  BMI 17.59 kg/m2  GENERAL APPEARANCE:  Alert, in no distress  C collar on   In therapy doing exercises  No edema marv lower ext  5/5 marv lower ext    Hospital Laboratory on 02/20/2018   Component Date Value Ref Range Status     Sodium 02/20/2018 135  133 - 144 mmol/L Final     Potassium 02/20/2018 3.8  3.4 - 5.3 mmol/L Final     Chloride 02/20/2018 99  94 - 109 mmol/L Final     Carbon Dioxide 02/20/2018 29  20 - 32 mmol/L Final     Anion Gap 02/20/2018 7  3 - 14 mmol/L Final     Glucose 02/20/2018 90  70 - 99 mg/dL Final     Urea Nitrogen 02/20/2018 20  7 - 30 mg/dL Final     Creatinine 02/20/2018 0.45* 0.52 - 1.04 mg/dL Final     GFR Estimate 02/20/2018 >90  >60 mL/min/1.7m2 Final    Non  GFR Calc     GFR Estimate If Black 02/20/2018 >90  >60 mL/min/1.7m2 Final    African American GFR Calc     Calcium 02/20/2018 9.5  8.5 - 10.1 mg/dL Final     Iron 02/20/2018 55  35 - 180 ug/dL Final     Iron Binding Cap 02/20/2018 396  240 - 430 ug/dL Final     Iron  Saturation Index 02/20/2018 14* 15 - 46 % Final     Ferritin 02/20/2018 32  8 - 252 ng/mL Final     Hemoglobin 02/20/2018 12.2  11.7 - 15.7 g/dL Final     Transferrin 02/20/2018 329  210 - 360 mg/dL Final       Noted fe index slighlty low - but not enough to cause rls.   ASSESSMENT/PLAN:  Restless legs syndrome (RLS)  Not from metabolic reason - will treat with requip - discussed with pt and she agrees.     1.  Start Requip 0.25 mg po QHS.  Dx: RLS    Electronically signed by:  CRISTAL Olmos CNP      Sincerely,        CRISTAL Olmos CNP

## 2018-02-22 ENCOUNTER — OFFICE VISIT (OUTPATIENT)
Dept: NEUROSURGERY | Facility: CLINIC | Age: 83
End: 2018-02-22
Payer: COMMERCIAL

## 2018-02-22 ENCOUNTER — RADIANT APPOINTMENT (OUTPATIENT)
Dept: GENERAL RADIOLOGY | Facility: CLINIC | Age: 83
End: 2018-02-22
Attending: PHYSICIAN ASSISTANT
Payer: COMMERCIAL

## 2018-02-22 VITALS
HEIGHT: 59 IN | SYSTOLIC BLOOD PRESSURE: 143 MMHG | HEART RATE: 73 BPM | OXYGEN SATURATION: 96 % | BODY MASS INDEX: 19.78 KG/M2 | WEIGHT: 98.1 LBS | DIASTOLIC BLOOD PRESSURE: 81 MMHG

## 2018-02-22 DIAGNOSIS — E55.9 VITAMIN D DEFICIENCY: Primary | ICD-10-CM

## 2018-02-22 DIAGNOSIS — M47.12 CERVICAL SPONDYLOSIS WITH MYELOPATHY: ICD-10-CM

## 2018-02-22 DIAGNOSIS — M48.02 SPINAL STENOSIS IN CERVICAL REGION: Primary | ICD-10-CM

## 2018-02-22 DIAGNOSIS — M43.23 FUSION OF SPINE, CERVICOTHORACIC REGION: Primary | ICD-10-CM

## 2018-02-22 DIAGNOSIS — M48.02 SPINAL STENOSIS IN CERVICAL REGION: ICD-10-CM

## 2018-02-22 LAB — MAGNESIUM SERPL-MCNC: 2 MG/DL (ref 1.6–2.3)

## 2018-02-22 NOTE — PROGRESS NOTES
Neurosurgery Clinic Note    Yuni Otoole is a very pleasant 82 year old female here in scheduled postoperative followup from spinal deformity correction surgery that I performed on 11/15/17, for chin on chest deformity with 63 degrees rigid kyphoscoliosis and severe spinal cord compression, treated with 540-degree fusion via posterior-anterior-posterior fusion C2-T3.    Prior to surgery she had quadriparesis, neck pain, weakness/numbness in bilateral upper extremities, inability to feed herself due to inability to grasp utensils, difficulty with urination, loss of appetite, and weight loss. Albumin was 2.5 prior to surgery. Surgery was performed urgently due to neurologic decline. We have been treating her malnutrition with goal protein intake of 1.5 grams protein per kg body weight per day with Arginaid and Ensure Plus as well as vitamin A, C, E, and zinc supplementation ordered for wound healing supplementation. Her 25-OH vitamin D level is 17; we started her on ergocalciferol 50,000IU every 3 days x 30 days, and cholecalciferol 5000 IU daily x 6 months    Operative Report 11/15/18:  Pre-operative diagnosis:   1. 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 Cervical stenosis with myelopathy  3. Malnutrition  4. Osteoporosis and vitamin D deficiency  5. SIADH-induced hyponatremia     Post-operative diagnosis: same   Procedure: three intraoperative position changes:  Part 1:  1. Stealth-guided posterior segmental instrumentation C2-T3  2. Cervical laminectomy C1-C3  3. Facet osteotomies Cervical 2-3, Cervical 4-5, Cervical 6-7,  4. Use of intraoperative neuromonitoring  5. Use of intraoperative fluoroscopy  6. Use of intraoperative neuronavigation      Part 2:   1. Right Approach Cervical 2-3, Cervical 4-5 and Cervical 6-7 Anterior Cervical Discectomy And Fusion  2. [place] tongs placement for cervical traction for deformity reduction  3. Use of intraoperative microscope  4. Use  of intraoperative neuromonitoring  5. Use of intraoperative fluoroscopy      Part 3:   1. Cervical 2-Thoracic 3 Posterior Fusion With Autograft And Allograft  2. Reduction of cervical spine with osteotomy closure and kyphoscoliosis correction  3. Use of intraoperative neuromonitoring  4. Use of intraoperative fluoroscopy  Surgeon: Enma López MD  Co surgeon:  Casey Merritt MD  Assistant(s): Mila Carvajal MD, PGY-6 resident  Anesthesia: GETA  EBL: 725 cc  Fluids:   600 cc PRBC  275 cc Cell saver  1700 cc crystalloid  1250 cc albumin      UOP: 800 cc  Drains: anterior: 7 french flat KELLIE to bulb suction; posterior: 7 french flat KELLIE to bulb suction  Specimens: none  Implants: Synthes synapse screws with 3.5mm to 5mm transitional rods posteriorly; zero P lordotic anterior implants   Findings: Significant stenosis at C1-C2, well decompressed. Good reduction seen on final XR     Indications for Surgery:  Yuni Otoole is a 82 year old female presenting to the Emergency room with numbness in her hands bilaterally and severe bilateral upper extremity and proximal lower extremity weakness, referable to severe C1-C2 cervical stenosis with progressive kyphotic deformity and kyphoscoliosis with chin on chest deformity. Also notable is severe malnutrition from poor oral intake without given of 3 and severe vitamin D deficiency with osteoporosis. The patient had previously seen Dr. Yuan in clinic who recommended considering surgical intervention, however the patient was lost to follow-up and presented in extremis. My partner on call at her admission Dr. Merritt asked me to take over surgical care and I met with the patient and her family in detail to explain operative risks, benefits, and alternatives.     Due to her 63 degrees rigid kyphosis from C2-C7 and severe cord compression with 3mm canal diameter at C1-C2, I recommended a C2-T3 posterior-anterior-posterior 540-degrees fusion; Part 1: C1-C3 posterior decompression and  "C2-T3 instrumentation with multilevel osteotomies; Part 2: C4-5, C6-7, possible C2-3 anterior diskectomy and interbody placement possible additional levels, Part 3: C2-T3 posterior fusion with deformity correction.              Yuni has been doing quite well in rehab  Xrays today show good correction of kyphotic deformity with no instrumentation complications evident. Approximately 60 degrees of kyphosis correction C2-C7 with current cervical lordosis at 0 degrees.      Physical Exam- CURRENT  Constitutional: Oriented to person, place, and time.   Clinical CBVA is 0 degrees    Neurological: CN II-XII intact bilaterally with note made of decreased hearing bilaterally.   Decreased sensation improved, now only on tips of fingers       Displays upgoing toes bilaterally.      resolution of ankle clonus, no inverted radial reflex            STRENGTH LEFT RIGHT   Deltoid 4 2 ** right humeral head superior subluxation   Bicep 5 4   Wrist Extensor 4 4   Tricep 5 3   Finger flexion 4 4   Finger abduction 4 4    4 4          Hip Flexion         5         5   Knee Extension 5 5   Ankle Dorsiflexion 5 5   Extensor Hallucis Longus 5 5   Plantar Flexion 5 5          Physical Exam- PRIOR TO SURGERY  Constitutional: Oriented to person, place, and time.   Appears frail and malnourished. Unable to hold chin off of chest for more than 1 minute due to severe kyphosis.      Impaired short-term memory, names 0 of 3 objects at 5 minutes  Cognition screening: impaired. Names 5 animals in 60 seconds.  Unable to sit or stand unassisted due to weakness and frailty.      Neck: Severely limited cervical mobility at baseline.   Chin-Brow Vertical Angle 45 degrees while seated    Neurological: CN II-XII intact bilaterally with note made of decreased hearing bilaterally.   Decreased sensation bilateral upper extremities throughout, hands worse than upper arms. Describes hands as \"totally numb\"         Displays upgoing toes/positive " Babinski's sign bilaterally.      +inverted radial reflex bilaterally       +ankle clonus L>R      STRENGTH LEFT RIGHT   Deltoid 2 2 ** right humeral head superior subluxation   Bicep 3 4   Wrist Extensor 2 2   Tricep 3 3   Finger flexion 3 3   Finger abduction 2 3    3 4          Hip Flexion         3         3   Knee Extension 4 4   Ankle Dorsiflexion 4 4   Extensor Hallucis Longus 4 4   Plantar Flexion 4 4                ASSESSMENT:  82  Year old female doing well with improved myelopathic symptoms 3 months s/p posterior-anterior-posterior C2-T2 fusion for rigid kyphoscoliosis with spinal cord compression and preoperative quadriparesis    PLAN:  Wean brace to off, decrease usage by 4 hours each day  Continue PT/OT for 3 more months  Return to clinic in 3 months to see Lana Hauser PA-C with EOS xrays, vitamin D level   Increase cholecalciferol to 4000IU daily.      I spent 30 minutes in patient care with greater than 50% spent in counseling and/or coordination of care.

## 2018-02-22 NOTE — NURSING NOTE
Chief Complaint   Patient presents with     RECHECK     [facility] RETURN - F/U     Bridgett crockett    Height  Attempted but not successful.  Pt unable to stand. Weight was taken but it was a challenge.  Bridgett CROCKETT

## 2018-02-22 NOTE — PATIENT INSTRUCTIONS
Physical therapy and occupational therapy referrals.  Follow-up with Dr. López in three months.  Have an x-ray of your spine and vitamin D levels drawn the morning of the appointment with Dr. López.

## 2018-02-22 NOTE — LETTER
2/22/2018       RE: Yuni Otoole  [address]  [facility] [address]     Dear Colleague,    Thank you for referring your patient, Yuni Otoole, to the [facility]. Please see a copy of my visit note below.    Neurosurgery Clinic Note    Yuni Otoole is a very pleasant 82 year old female here in scheduled postoperative followup from spinal deformity correction surgery that I performed on 11/15/17, for chin on chest deformity with 63 degrees rigid kyphoscoliosis and severe spinal cord compression, treated with 540-degree fusion via posterior-anterior-posterior fusion C2-T3.    Prior to surgery she had quadriparesis, neck pain, weakness/numbness in bilateral upper extremities, inability to feed herself due to inability to grasp utensils, difficulty with urination, loss of appetite, and weight loss. Albumin was 2.5 prior to surgery. Surgery was performed urgently due to neurologic decline. We have been treating her malnutrition with goal protein intake of 1.5 grams protein per kg body weight per day with Arginaid and Ensure Plus as well as vitamin A, C, E, and zinc supplementation ordered for wound healing supplementation. Her 25-OH vitamin D level is 17; we started her on ergocalciferol 50,000IU every 3 days x 30 days, and cholecalciferol 5000 IU daily x 6 months    Operative Report 11/15/18:  Pre-operative diagnosis:   1. 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 Cervical stenosis with myelopathy  3. Malnutrition  4. Osteoporosis and vitamin D deficiency  5. SIADH-induced hyponatremia     Post-operative diagnosis: same   Procedure: three intraoperative position changes:  Part 1:  1. Stealth-guided posterior segmental instrumentation C2-T3  2. Cervical laminectomy C1-C3  3. Facet osteotomies Cervical 2-3, Cervical 4-5, Cervical 6-7,  4. Use of intraoperative neuromonitoring  5. Use of intraoperative fluoroscopy  6. Use  of intraoperative neuronavigation      Part 2:   1. Right Approach Cervical 2-3, Cervical 4-5 and Cervical 6-7 Anterior Cervical Discectomy And Fusion  2. [place] tongs placement for cervical traction for deformity reduction  3. Use of intraoperative microscope  4. Use of intraoperative neuromonitoring  5. Use of intraoperative fluoroscopy      Part 3:   1. Cervical 2-Thoracic 3 Posterior Fusion With Autograft And Allograft  2. Reduction of cervical spine with osteotomy closure and kyphoscoliosis correction  3. Use of intraoperative neuromonitoring  4. Use of intraoperative fluoroscopy  Surgeon: Enma López MD  Co surgeon:  Casey Merritt MD  Assistant(s): Mila Carvajal MD, PGY-6 resident  Anesthesia: GETA  EBL: 725 cc  Fluids:   600 cc PRBC  275 cc Cell saver  1700 cc crystalloid  1250 cc albumin      UOP: 800 cc  Drains: anterior: 7 french flat KELLIE to bulb suction; posterior: 7 french flat KELLIE to bulb suction  Specimens: none  Implants: Synthes synapse screws with 3.5mm to 5mm transitional rods posteriorly; zero P lordotic anterior implants   Findings: Significant stenosis at C1-C2, well decompressed. Good reduction seen on final XR     Indications for Surgery:  Yuni Otoole is a 82 year old female presenting to the Emergency room with numbness in her hands bilaterally and severe bilateral upper extremity and proximal lower extremity weakness, referable to severe C1-C2 cervical stenosis with progressive kyphotic deformity and kyphoscoliosis with chin on chest deformity. Also notable is severe malnutrition from poor oral intake without given of 3 and severe vitamin D deficiency with osteoporosis. The patient had previously seen Dr. Yuan in clinic who recommended considering surgical intervention, however the patient was lost to follow-up and presented in extremis. My partner on call at her admission Dr. Merritt asked me to take over surgical care and I met with the patient and her family in detail to explain  operative risks, benefits, and alternatives.     Due to her 63 degrees rigid kyphosis from C2-C7 and severe cord compression with 3mm canal diameter at C1-C2, I recommended a C2-T3 posterior-anterior-posterior 540-degrees fusion; Part 1: C1-C3 posterior decompression and C2-T3 instrumentation with multilevel osteotomies; Part 2: C4-5, C6-7, possible C2-3 anterior diskectomy and interbody placement possible additional levels, Part 3: C2-T3 posterior fusion with deformity correction.              Yuni has been doing quite well in rehab  Xrays today show good correction of kyphotic deformity with no instrumentation complications evident. Approximately 60 degrees of kyphosis correction C2-C7 with current cervical lordosis at 0 degrees.      Physical Exam- CURRENT  Constitutional: Oriented to person, place, and time.   Clinical CBVA is 0 degrees    Neurological: CN II-XII intact bilaterally with note made of decreased hearing bilaterally.   Decreased sensation improved, now only on tips of fingers       Displays upgoing toes bilaterally.      resolution of ankle clonus, no inverted radial reflex            STRENGTH LEFT RIGHT   Deltoid 4 2 ** right humeral head superior subluxation   Bicep 5 4   Wrist Extensor 4 4   Tricep 5 3   Finger flexion 4 4   Finger abduction 4 4    4 4          Hip Flexion         5         5   Knee Extension 5 5   Ankle Dorsiflexion 5 5   Extensor Hallucis Longus 5 5   Plantar Flexion 5 5          Physical Exam- PRIOR TO SURGERY  Constitutional: Oriented to person, place, and time.   Appears frail and malnourished. Unable to hold chin off of chest for more than 1 minute due to severe kyphosis.      Impaired short-term memory, names 0 of 3 objects at 5 minutes  Cognition screening: impaired. Names 5 animals in 60 seconds.  Unable to sit or stand unassisted due to weakness and frailty.      Neck: Severely limited cervical mobility at baseline.   Chin-Brow Vertical Angle 45 degrees while  "seated    Neurological: CN II-XII intact bilaterally with note made of decreased hearing bilaterally.   Decreased sensation bilateral upper extremities throughout, hands worse than upper arms. Describes hands as \"totally numb\"         Displays upgoing toes/positive Babinski's sign bilaterally.      +inverted radial reflex bilaterally       +ankle clonus L>R      STRENGTH LEFT RIGHT   Deltoid 2 2 ** right humeral head superior subluxation   Bicep 3 4   Wrist Extensor 2 2   Tricep 3 3   Finger flexion 3 3   Finger abduction 2 3    3 4          Hip Flexion         3         3   Knee Extension 4 4   Ankle Dorsiflexion 4 4   Extensor Hallucis Longus 4 4   Plantar Flexion 4 4                ASSESSMENT:  82  Year old female doing well with improved myelopathic symptoms 3 months s/p posterior-anterior-posterior C2-T2 fusion for rigid kyphoscoliosis with spinal cord compression and preoperative quadriparesis    PLAN:  Wean brace to off, decrease usage by 4 hours each day  Continue PT/OT for 3 more months  Return to clinic in 3 months to see Lana Hauser PA-C with EOS xrays, vitamin D level   Increase cholecalciferol to 4000IU daily.      I spent 30 minutes in patient care with greater than 50% spent in counseling and/or coordination of care.        Again, thank you for allowing me to participate in the care of your patient.      Sincerely,    Enma López MD      "

## 2018-02-22 NOTE — MR AVS SNAPSHOT
After Visit Summary   2/22/2018    Yuni Otoole    MRN: [id number]           Patient Information     Date Of Birth          1935        Visit Information        Provider Department      2/22/2018 1:00 PM Enma López MD [facility] Neurosurgery        Care Instructions    Physical therapy and occupational therapy referrals.  Follow-up with Dr. López in three months.  Have an x-ray of your spine and vitamin D levels drawn the morning of the appointment with Dr. López.          Follow-ups after your visit        Follow-up notes from your care team     Discussed this visit Return in about 3 months (around 5/22/2018).      Your next 10 appointments already scheduled     May 22, 2018 11:00 AM CDT   XR SPINE COMPLETE 2 VIEWS with XR4   [facility] ([facility])    [address] [place] [id number] [phone]           Please bring a list of your current medicines to your exam. (Include vitamins, minerals and over-thecounter medicines.) Leave your valuables at home.  Tell your doctor if there is a chance you may be pregnant.  You do not need to do anything special for this exam.            May 22, 2018 11:30 AM CDT   LAB with  LAB   [facility] ([facility])    [address] [place] [id number] [phone]           Please do not eat 10-12 hours before your appointment if you are coming in fasting for labs on lipids, cholesterol, or glucose (sugar). This does not apply to pregnant women. Water, hot tea and black coffee (with nothing added) are okay. Do not drink other fluids, diet soda or chew gum.            May 22, 2018 12:00 PM CDT   (Arrive by 11:45 AM)   Return Visit with Lana Hauser PA-C   [facility] Neurosurgery ([facility])    [address] [place] [id number] [phone]              Future tests that  "were ordered for you today     Open Future Orders        Priority Expected Expires Ordered    X-ray Spine complete (Cervicothoracolumbar AP and lateral - standing views preferred) [[id number]] Routine 2/22/2018 2/22/2019 2/22/2018    25 Hydroxyvitamin D2 and D3 Routine 5/24/2018 2/23/2019 2/22/2018    OCCUPATIONAL THERAPY EVALUATION Routine 2/22/2018 2/22/2019 2/22/2018            Who to contact     Please call your clinic at [phone] to:    Ask questions about your health    Make or cancel appointments    Discuss your medicines    Learn about your test results    Speak to your doctor            Additional Information About Your Visit        [employer] Information     [employer] gives you secure access to your electronic health record. If you see a primary care provider, you can also send messages to your care team and make appointments. If you have questions, please call your primary care clinic.  If you do not have a primary care provider, please call [phone] and they will assist you.      [employer] is an electronic gateway that provides easy, online access to your medical records. With [employer], you can request a clinic appointment, read your test results, renew a prescription or communicate with your care team.     To access your existing account, please contact your [facility] or call [phone] for assistance.        Care EveryWhere ID     This is your Care EveryWhere ID. This could be used by other organizations to access your [facility] medical records  [id number]        Your Vitals Were     Pulse Height Pulse Oximetry BMI (Body Mass Index)          73 1.499 m (4' 11\") 96% 19.81 kg/m2         Blood Pressure from Last 3 Encounters:   02/22/18 143/81   02/21/18 113/71   02/19/18 148/76    Weight from Last 3 Encounters:   02/22/18 44.5 kg (98 lb 1.6 oz)   02/21/18 39.5 kg (87 lb 1.6 oz)   02/19/18 39.5 kg (87 lb 1.6 oz)              Today, you had the following     No orders found " for display       Primary Care Provider Office Phone # Fax #    Jonathan Vincent [phone]       [address] [facility] [address]        Equal Access to Services     CARLOS MCKNIGHT : Rajeev jean-baptiste nikolaso Sodana, wadeejayda luqadaha, qaybta kaalmada shena, elias joseph maylinsaundra fox damion ramirez. [facility] [phone].    ATENCIÓN: Si habla español, tiene a pierson disposición servicios gratuitos de asistencia lingüística. Llame al [phone].    We comply with applicable federal civil rights laws and [place] laws. We do not discriminate on the basis of race, color, national origin, age, disability, sex, sexual orientation, or gender identity.            Thank you!     Thank you for choosing [facility]  for your care. Our goal is always to provide you with excellent care. Hearing back from our patients is one way we can continue to improve our services. Please take a few minutes to complete the written survey that you may receive in the mail after your visit with us. Thank you!             Your Updated Medication List - Protect others around you: Learn how to safely use, store and throw away your medicines at www.disposemymeds.org.          This list is accurate as of 2/22/18  2:08 PM.  Always use your most recent med list.                   Brand Name Dispense Instructions for use Diagnosis    artificial saliva Aers spray      Take 2 sprays by mouth every hour as needed for dry mouth        BIOFREEZE EX      Externally apply topically 2 times daily as needed        calcium carbonate 1250 MG tablet    OS-ANNETTA 500 mg Cachil DeHe. Ca    90 tablet    Take 1 tablet (1,250 mg) by mouth 2 times daily (with meals)    Cervical stenosis of spine, S/P cervical spinal fusion       carbamide peroxide 6.5 % otic solution    DEBROX     Place 3 drops into both ears At Bedtime        cyclobenzaprine 5 MG tablet    FLEXERIL    60 tablet    Take 0.5 tablets (2.5 mg) by mouth 3 times daily as needed for  muscle spasms    Cervical stenosis of spine, S/P cervical spinal fusion       MELATONIN PO      Take 3 mg by mouth At Bedtime        metoprolol tartrate 25 MG tablet    LOPRESSOR    60 tablet    Take 25 mg by mouth 2 times daily    Cervical stenosis of spine, S/P cervical spinal fusion       MULTIVITAMIN ADULT PO      Take 1 tablet by mouth daily        NATURAL BALANCE TEARS OP      Apply 2 drops to eye 4 times daily as needed        NORVASC PO      Take 10 mg by mouth At Bedtime        NUTRITIONAL SUPPLEMENT PO      Take 4 oz by mouth 2 times daily        order for DME     1 Device    Equipment being ordered: Shower chair    Nondisplaced fracture of seventh cervical vertebra, unspecified fracture morphology, initial encounter       OXYCODONE HCL PO      Take 2.5 mg by mouth 3 times daily as needed        polyethylene glycol Packet    MIRALAX/GLYCOLAX    7 packet    17 g by Oral or Feeding Tube route 3 times daily as needed for constipation    Cervical stenosis of spine, S/P cervical spinal fusion       pravastatin 10 MG tablet    PRAVACHOL    30 tablet    1 tablet (10 mg) by Oral or Feeding Tube route At Bedtime    Cervical stenosis of spine, S/P cervical spinal fusion       REQUIP PO      Take 0.25 mg by mouth At Bedtime        senna-docusate 8.6-50 MG per tablet    SENOKOT-S;PERICOLACE    100 tablet    1 tablet by Oral or Feeding Tube route daily Also QD PRN    Cervical stenosis of spine, S/P cervical spinal fusion       TYLENOL ARTHRITIS PAIN 650 MG CR tablet   Generic drug:  acetaminophen      Take 650 mg by mouth every 8 hours        vitamin D 2000 UNITS tablet     100 tablet    Take 2000 units by mouth daily    Vitamin D deficiency

## 2018-02-27 ENCOUNTER — HOSPITAL LABORATORY (OUTPATIENT)
Dept: NURSING HOME | Facility: OTHER | Age: 83
End: 2018-02-27

## 2018-02-27 LAB — SODIUM SERPL-SCNC: 135 MMOL/L (ref 133–144)

## 2018-03-22 ENCOUNTER — NURSING HOME VISIT (OUTPATIENT)
Dept: GERIATRICS | Facility: CLINIC | Age: 83
End: 2018-03-22
Payer: COMMERCIAL

## 2018-03-22 VITALS
BODY MASS INDEX: 18.84 KG/M2 | WEIGHT: 93.3 LBS | OXYGEN SATURATION: 96 % | DIASTOLIC BLOOD PRESSURE: 76 MMHG | TEMPERATURE: 98.3 F | SYSTOLIC BLOOD PRESSURE: 145 MMHG | RESPIRATION RATE: 16 BRPM | HEART RATE: 79 BPM

## 2018-03-22 DIAGNOSIS — G47.01 INSOMNIA DUE TO MEDICAL CONDITION: ICD-10-CM

## 2018-03-22 DIAGNOSIS — M54.2 NECK PAIN: ICD-10-CM

## 2018-03-22 DIAGNOSIS — G25.81 RESTLESS LEGS SYNDROME (RLS): ICD-10-CM

## 2018-03-22 DIAGNOSIS — Z98.1 S/P CERVICAL SPINAL FUSION: Primary | ICD-10-CM

## 2018-03-22 PROCEDURE — 99309 SBSQ NF CARE MODERATE MDM 30: CPT | Performed by: NURSE PRACTITIONER

## 2018-03-22 NOTE — Clinical Note
I added the other 2 things the nsg had concerns about (edema and wt gain) as Dx in HPI and a/p and disucssed them

## 2018-03-22 NOTE — LETTER
"    3/22/2018        RE: Yuni Otoole  [address]  [facility] [address]        [place] [facility]  Chief Complaint   Patient presents with     Nursing Home Acute     HPI:    Yuni Otoole is a 82 year old  (1935), who is being seen today for an episodic care visit at [facility].    HPI information obtained from: facility chart records, facility staff, patient report, [facility] chart review and Care Everywhere [facility] chart review. Today's concern is:    S/P cervical spinal fusion and Neck pain  Post-op progress is going well, patient is using PRN Oxycodone for pain almost daily and using PRN flexeril almost daily or every other daily. Patient c/o mild right sided neck pain which is intermittent, mild, and expected but mainly using meds for RLS at night.     Restless legs syndrome (RLS)  Previous visit, Requip was started at low dose. Patient states that her legs are still jumpy and she states this is why she requests the \"muscle relaxer.\"      Insomnia due to medical condition  Patient still having difficulty sleeping - mainly due to RLS but also \"Just can't sleep\". Melatonin low dose has not been helpful.     Edema  nsg concerned about LLL edema  About 1 m slowing increasing - now +1.   Noted pt was nonambulatory at TCU admission and on lovenox. But now up moving. No hx of edema per pt    Wt gain   nsg noted wt gain  Noted edema above and was under wt on tcu admission - low nutritional status and supplements given/being taken well    PMH/PSH reviewed in EPIC today    REVIEW OF SYSTEMS:  4 point ROS including Respiratory, CV, GI and , other than that noted in the HPI,  is negative    /76  Pulse 79  Temp 98.3  F (36.8  C)  Resp 16  Wt 93 lb 4.8 oz (42.3 kg)  SpO2 96%  BMI 18.84 kg/m2     Wt Readings from Last 4 Encounters:   03/22/18 93 lb 4.8 oz (42.3 kg)   02/22/18 98 lb 1.6 oz (44.5 kg)   02/21/18 87 lb 1.6 oz (39.5 kg)   02/19/18 87 lb 1.6 oz (39.5 kg) "       EXAM:  Constitutional: healthy, alert, no distress and cooperative   Cardiovascular: negative, PMI normal. No lifts, heaves, or thrills. RRR. No murmurs, clicks gallops or rub, slightly LLE edema 1-2+ (new).   Respiratory: negative, Percussion normal. Good diaphragmatic excursion. Lungs clear  MS - no bakers cyst on L leg/posterior knee  Psychiatric: mentation appears normal and affect normal/bright  Abdomen: negative, Abdomen soft, non-tender. BS normal. No masses, organomegaly    ASSESSMENT/PLAN:    S/P cervical spinal fusion and Neck pain  Chronic. Stable. Healing well. Continue current POC. Follow-up as needed. Increase tyelnol to BID scheduled from HS to help day time pain.   Noted also has biofreeze    Restless legs syndrome (RLS)  Chronic. Stable. Not improved. Will increase dose of requip. Patient counseled on use of flexeril, and we agreed to discontinue this medication. Follow-up in 1-2 weeks.    Insomnia due to medical condition  Chronic. Stable. Will increase Melatonin to therapeutic dose. Follow-up in 1-2 weeks.    Edema  Likely 2/2 vascular insufficiency   Discussed compression - pt doesn't want - cont to montior - if worsen will rediscuss options.     Wt gain   Stable - good - 2/2 muslce from PT, and increase calories from nutrition and some edema in LLE    1.  D/c Oxycodone.  2.  D/c Flexeril.  3.  Increase Requip to 0.5 mg po QHS.  Dx: RLS  4.  Increase Melatonin to 6 mg po QHS.  Dx: insomnia  5.  Change Tylenol to 1000 mg po BID & change PRN to 1000 mg po BID PRN.  6.  Continue to monitor left leg edema.  Notify me if it gets worse.  7.  Noted weight gain. NNO.      Electronically signed by:  Alia Granados, RN BSN CCRN (NP Student). This patient was seen along with a nurse practitioner student, *.  The histories and reviews of systems were obtained by her and confirmed by myself. All objective, assessments and plans were completed by myself.    Shaista Mcrae, APRN  CNP      Sincerely,        CRISTAL Olmos CNP

## 2018-03-22 NOTE — PROGRESS NOTES
"[place] [facility]  Chief Complaint   Patient presents with     Nursing Home Acute     HPI:    Yuni Otoole is a 82 year old  (1935), who is being seen today for an episodic care visit at [facility].    HPI information obtained from: facility chart records, facility staff, patient report, [facility] chart review and Care Everywhere [facility] chart review. Today's concern is:    S/P cervical spinal fusion and Neck pain  Post-op progress is going well, patient is using PRN Oxycodone for pain almost daily and using PRN flexeril almost daily or every other daily. Patient c/o mild right sided neck pain which is intermittent, mild, and expected but mainly using meds for RLS at night.     Restless legs syndrome (RLS)  Previous visit, Requip was started at low dose. Patient states that her legs are still jumpy and she states this is why she requests the \"muscle relaxer.\"      Insomnia due to medical condition  Patient still having difficulty sleeping - mainly due to RLS but also \"Just can't sleep\". Melatonin low dose has not been helpful.     Edema  nsg concerned about LLL edema  About 1 m slowing increasing - now +1.   Noted pt was nonambulatory at TCU admission and on lovenox. But now up moving. No hx of edema per pt    Wt gain   nsg noted wt gain  Noted edema above and was under wt on tcu admission - low nutritional status and supplements given/being taken well    PMH/PSH reviewed in EPIC today    REVIEW OF SYSTEMS:  4 point ROS including Respiratory, CV, GI and , other than that noted in the HPI,  is negative    /76  Pulse 79  Temp 98.3  F (36.8  C)  Resp 16  Wt 93 lb 4.8 oz (42.3 kg)  SpO2 96%  BMI 18.84 kg/m2     Wt Readings from Last 4 Encounters:   03/22/18 93 lb 4.8 oz (42.3 kg)   02/22/18 98 lb 1.6 oz (44.5 kg)   02/21/18 87 lb 1.6 oz (39.5 kg)   02/19/18 87 lb 1.6 oz (39.5 kg)       EXAM:  Constitutional: healthy, alert, no distress and cooperative   Cardiovascular: negative, PMI " normal. No lifts, heaves, or thrills. RRR. No murmurs, clicks gallops or rub, slightly LLE edema 1-2+ (new).   Respiratory: negative, Percussion normal. Good diaphragmatic excursion. Lungs clear  MS - no bakers cyst on L leg/posterior knee  Psychiatric: mentation appears normal and affect normal/bright  Abdomen: negative, Abdomen soft, non-tender. BS normal. No masses, organomegaly    ASSESSMENT/PLAN:    S/P cervical spinal fusion and Neck pain  Chronic. Stable. Healing well. Continue current POC. Follow-up as needed. Increase tyelnol to BID scheduled from HS to help day time pain.   Noted also has biofreeze    Restless legs syndrome (RLS)  Chronic. Stable. Not improved. Will increase dose of requip. Patient counseled on use of flexeril, and we agreed to discontinue this medication. Follow-up in 1-2 weeks.    Insomnia due to medical condition  Chronic. Stable. Will increase Melatonin to therapeutic dose. Follow-up in 1-2 weeks.    Edema  Likely 2/2 vascular insufficiency   Discussed compression - pt doesn't want - cont to montior - if worsen will rediscuss options.     Wt gain   Stable - good - 2/2 muslce from PT, and increase calories from nutrition and some edema in LLE    1.  D/c Oxycodone.  2.  D/c Flexeril.  3.  Increase Requip to 0.5 mg po QHS.  Dx: RLS  4.  Increase Melatonin to 6 mg po QHS.  Dx: insomnia  5.  Change Tylenol to 1000 mg po BID & change PRN to 1000 mg po BID PRN.  6.  Continue to monitor left leg edema.  Notify me if it gets worse.  7.  Noted weight gain. NNO.      Electronically signed by:  Alia Granados, RN BSN CCRN (NP Student). This patient was seen along with a nurse practitioner student, *.  The histories and reviews of systems were obtained by her and confirmed by myself. All objective, assessments and plans were completed by myself.    Shaista Mcrae, CRISTAL CNP

## 2018-03-29 ENCOUNTER — NURSING HOME VISIT (OUTPATIENT)
Dept: GERIATRICS | Facility: CLINIC | Age: 83
End: 2018-03-29
Payer: COMMERCIAL

## 2018-03-29 VITALS
SYSTOLIC BLOOD PRESSURE: 145 MMHG | DIASTOLIC BLOOD PRESSURE: 76 MMHG | HEART RATE: 79 BPM | BODY MASS INDEX: 18.84 KG/M2 | WEIGHT: 93.3 LBS | OXYGEN SATURATION: 96 % | TEMPERATURE: 98.3 F | RESPIRATION RATE: 16 BRPM

## 2018-03-29 DIAGNOSIS — G47.01 INSOMNIA DUE TO MEDICAL CONDITION: ICD-10-CM

## 2018-03-29 DIAGNOSIS — G25.81 RESTLESS LEGS SYNDROME (RLS): Primary | ICD-10-CM

## 2018-03-29 DIAGNOSIS — M54.2 NECK PAIN: ICD-10-CM

## 2018-03-29 DIAGNOSIS — D50.9 IRON DEFICIENCY ANEMIA, UNSPECIFIED IRON DEFICIENCY ANEMIA TYPE: ICD-10-CM

## 2018-03-29 PROCEDURE — 99309 SBSQ NF CARE MODERATE MDM 30: CPT | Performed by: NURSE PRACTITIONER

## 2018-03-29 RX ORDER — FERROUS SULFATE 325(65) MG
324 TABLET ORAL DAILY
COMMUNITY
End: 2018-05-07

## 2018-03-29 NOTE — LETTER
3/29/2018        RE: Yuni Otoole  [address]  [facility] [address]        [place] [facility]    Chief Complaint   Patient presents with     Nursing Home Acute       HPI:    Yuni Otoole is a 82 year old  (1935), who is being seen today for an episodic care visit at [facility].    HPI information obtained from: facility chart records, patient report and [facility] chart review. Today's concern is:  Restless legs syndrome (RLS)  F/u on RLS after starting Requip (2/21 @0.25 mg) and increasing Requip (3/22 to 0.5)  Pt still having RLS and it is keeping her awake at night- pt describes jumping only when in bed.   Not up during day when in the chair    Iron deficiency anemia, unspecified iron deficiency anemia type  Testing done to r/u cause of RLS - results fairly normal with slight low listed below.     Insomnia due to medical condition   brought in tylenol PM - discussion with pt/dgt in law about med and tx of insomnia    Neck pain  F/u on DC of oxy and flexeril - pt states doing just fine and no change - still very tight muscles - biofreeze bid and bid prn   discussed massage therapist involvement.     PMH/PSH reviewed in [facility] today      REVIEW OF SYSTEMS:  4 point ROS including Respiratory, CV, GI and , other than that noted in the HPI,  is negative    /76  Pulse 79  Temp 98.3  F (36.8  C)  Resp 16  Wt 93 lb 4.8 oz (42.3 kg)  SpO2 96%  BMI 18.84 kg/m2  GENERAL APPEARANCE:  Alert, in no distress  Neck muscles tight - chronic extention to the L.  No edema marv lower legs    Hemoglobin   Date Value Ref Range Status   02/20/2018 12.2 11.7 - 15.7 g/dL Final   11/30/2017 10.5 (L) 11.7 - 15.7 g/dL Final   ]  2/20/18 [facility] epic        ASSESSMENT/PLAN:  Restless legs syndrome (RLS)  Will tx with trail of Fe given slightl low sat level and motnior and tx with increase in requip    Iron deficiency anemia, unspecified iron deficiency anemia type  See above    Insomnia due to  medical condition  tx by treated root cause - RLS and not sedate with benadryl - dsicussed with pt BUT since lack of sleep is so problematic to pt will start/trail prn trazodone while reqip/fe starts working  Noted initial PRN orders for non antipsychotic psychotropic medications   are limited to 14 days. Start new psychotropic medication Trazodone x 14  days for occasional insomnia. Nsg to update [facility] provider of effect in 10-14 days.     Neck pain  Cont poc - encourage massage therapy involement      1.  Start Ferrous Sulfate 324 mg po QD.  Dx: iron def, RLS  2.  Increase Requip to 1 mg po QHS.  Dx: RLS  3.  Start Trazodone 25 mg po QHS PRN x 14 days.  Use before 3 am.  Dx: insomnia  4.  Please do not use Tylenol PM.        Electronically signed by:  CRISTAL Olmos CNP      Sincerely,        CRISTAL Olmos CNP

## 2018-03-29 NOTE — PROGRESS NOTES
[place] [facility]    Chief Complaint   Patient presents with     Nursing Home Acute       HPI:    Yuni Otoole is a 82 year old  (1935), who is being seen today for an episodic care visit at [facility].    HPI information obtained from: facility chart records, patient report and [facility] chart review. Today's concern is:  Restless legs syndrome (RLS)  F/u on RLS after starting Requip (2/21 @0.25 mg) and increasing Requip (3/22 to 0.5)  Pt still having RLS and it is keeping her awake at night- pt describes jumping only when in bed.   Not up during day when in the chair    Iron deficiency anemia, unspecified iron deficiency anemia type  Testing done to r/u cause of RLS - results fairly normal with slight low listed below.     Insomnia due to medical condition   brought in tylenol PM - discussion with pt/dgt in law about med and tx of insomnia    Neck pain  F/u on DC of oxy and flexeril - pt states doing just fine and no change - still very tight muscles - biofreeze bid and bid prn   discussed massage therapist involvement.     PMH/PSH reviewed in [facility] today      REVIEW OF SYSTEMS:  4 point ROS including Respiratory, CV, GI and , other than that noted in the HPI,  is negative    /76  Pulse 79  Temp 98.3  F (36.8  C)  Resp 16  Wt 93 lb 4.8 oz (42.3 kg)  SpO2 96%  BMI 18.84 kg/m2  GENERAL APPEARANCE:  Alert, in no distress  Neck muscles tight - chronic extention to the L.  No edema marv lower legs    Hemoglobin   Date Value Ref Range Status   02/20/2018 12.2 11.7 - 15.7 g/dL Final   11/30/2017 10.5 (L) 11.7 - 15.7 g/dL Final   ]  2/20/18 [facility] epic        ASSESSMENT/PLAN:  Restless legs syndrome (RLS)  Will tx with trail of Fe given slightl low sat level and motnior and tx with increase in requip    Iron deficiency anemia, unspecified iron deficiency anemia type  See above    Insomnia due to medical condition  tx by treated root cause - RLS and not sedate with benadryl -  dsicussed with pt BUT since lack of sleep is so problematic to pt will start/trail prn trazodone while reqip/fe starts working  Noted initial PRN orders for non antipsychotic psychotropic medications   are limited to 14 days. Start new psychotropic medication Trazodone x 14  days for occasional insomnia. Nsg to update [facility] provider of effect in 10-14 days.     Neck pain  Cont poc - encourage massage therapy involement      1.  Start Ferrous Sulfate 324 mg po QD.  Dx: iron def, RLS  2.  Increase Requip to 1 mg po QHS.  Dx: RLS  3.  Start Trazodone 25 mg po QHS PRN x 14 days.  Use before 3 am.  Dx: insomnia  4.  Please do not use Tylenol PM.        Electronically signed by:  CRISTAL Olmos CNP

## 2018-04-10 ENCOUNTER — NURSING HOME VISIT (OUTPATIENT)
Dept: GERIATRICS | Facility: CLINIC | Age: 83
End: 2018-04-10
Payer: COMMERCIAL

## 2018-04-10 VITALS
DIASTOLIC BLOOD PRESSURE: 62 MMHG | SYSTOLIC BLOOD PRESSURE: 109 MMHG | HEART RATE: 71 BPM | RESPIRATION RATE: 16 BRPM | TEMPERATURE: 98.1 F | BODY MASS INDEX: 20.16 KG/M2 | OXYGEN SATURATION: 96 % | WEIGHT: 99.8 LBS

## 2018-04-10 DIAGNOSIS — G25.81 RESTLESS LEGS SYNDROME (RLS): Primary | ICD-10-CM

## 2018-04-10 DIAGNOSIS — G47.01 INSOMNIA DUE TO MEDICAL CONDITION: ICD-10-CM

## 2018-04-10 DIAGNOSIS — R35.1 NOCTURIA: ICD-10-CM

## 2018-04-10 DIAGNOSIS — E87.1 CHRONIC HYPONATREMIA: ICD-10-CM

## 2018-04-10 PROCEDURE — 99309 SBSQ NF CARE MODERATE MDM 30: CPT | Performed by: NURSE PRACTITIONER

## 2018-04-10 NOTE — PROGRESS NOTES
"[place] [facility]    Chief Complaint   Patient presents with     Nursing Home Acute       HPI:    Yuni Otoole is a 82 year old  (1935), who is being seen today for an episodic care visit at [facility].    HPI information obtained from: facility chart records, facility staff, patient report and [facility] chart review. Today's concern is:  Restless legs syndrome (RLS) and Insomnia due to medical condition  F/u on increase in requip and added prn trazodone. Pt has received the prn dose q night and states sleep is slightly better but still bad and she can't go on like this.   Noted some of the insomnia is still RLS but otherwise \"just can't sleep\" noted she has to urinate t/o night about x 3 and feels she isn't empting her bladder.   Noted hx of urine retention at ED admission for spinal cord compression    Nocturia  See above    Chronic hyponatremia  Wants fluid restriction off - is 2 L but she isn't adhering to it and believes even if it is lifted she isn't going to drink more.       REVIEW OF SYSTEMS:  4 point ROS including Respiratory, CV, GI and , other than that noted in the HPI,  is negative    PMH/PSH reviewed in EPIC today    /62  Pulse 71  Temp 98.1  F (36.7  C)  Resp 16  Wt 99 lb 12.8 oz (45.3 kg)  SpO2 96%  BMI 20.16 kg/m2  GENERAL APPEARANCE:  Alert, in no distress    Na+ reviewed in epic    ASSESSMENT/PLAN:  Restless legs syndrome (RLS)  Will incrase requip to treat - f/u in 1 -2 weeks.     Insomnia due to medical condition  Will increase trazodone and add prn as very problematic for pt.   Noted initial PRN orders for non antipsychotic psychotropic medications  are limited to 14 days. Start new psychotropic medication trazodone x 14  days for insomnia.   Dc melatonin as appears un helpful and pt concerned of all the pills she is taking.   Polypharmacy - reviewed current medications and discussed rationale     Nocturia  Test PVRs to r/u retention   Noted increase " fluid intake will = nocturia and nocturia will worsen insomnia- encouraged less fluid at evening starting at 6 pm    Chronic hyponatremia  Lift fluid restrictions but encourage less fluid after 6 pm to help nocturia and f/u NA level       1.  D/c fluid restrictions.  2.  Na+ check in 1 week.  Dx: hyponatremia  3.  D/c Melatonin.  4.  Start Trazodone 50 mg po QHS.  Dx: insomnia  5.  Start Trazodone 50 mg po QHS PRN x 14 days if before 3 am.  Dx: insomnia  6.  PVR check x 1 during the day & 2 night shifts.  After she wakes up to go to the bathroom have her put on her light and check PVR x 2.  Update me.  7.  Increase Requip to 1.5 mg po QHS.  Dx: RLS      Electronically signed by:  CRISTAL Olmos CNP

## 2018-04-10 NOTE — LETTER
"    4/10/2018        RE: Yuni Otoole  [address]  [facility] [address]        [facility]    Chief Complaint   Patient presents with     Nursing Home Acute       HPI:    Yuni Otoole is a 82 year old  (1935), who is being seen today for an episodic care visit at [facility].    HPI information obtained from: facility chart records, facility staff, patient report and [facility] chart review. Today's concern is:  Restless legs syndrome (RLS) and Insomnia due to medical condition  F/u on increase in requip and added prn trazodone. Pt has received the prn dose q night and states sleep is slightly better but still bad and she can't go on like this.   Noted some of the insomnia is still RLS but otherwise \"just can't sleep\" noted she has to urinate t/o night about x 3 and feels she isn't empting her bladder.   Noted hx of urine retention at ED admission for spinal cord compression    Nocturia  See above    Chronic hyponatremia  Wants fluid restriction off - is 2 L but she isn't adhering to it and believes even if it is lifted she isn't going to drink more.       REVIEW OF SYSTEMS:  4 point ROS including Respiratory, CV, GI and , other than that noted in the HPI,  is negative    PMH/PSH reviewed in EPIC today    /62  Pulse 71  Temp 98.1  F (36.7  C)  Resp 16  Wt 99 lb 12.8 oz (45.3 kg)  SpO2 96%  BMI 20.16 kg/m2  GENERAL APPEARANCE:  Alert, in no distress    Na+ reviewed in epic    ASSESSMENT/PLAN:  Restless legs syndrome (RLS)  Will incrase requip to treat - f/u in 1 -2 weeks.     Insomnia due to medical condition  Will increase trazodone and add prn as very problematic for pt.   Noted initial PRN orders for non antipsychotic psychotropic medications  are limited to 14 days. Start new psychotropic medication trazodone x 14  days for insomnia.   Dc melatonin as appears un helpful and pt concerned of all the pills she is taking.   Polypharmacy - reviewed current medications " and discussed rationale     Nocturia  Test PVRs to r/u retention   Noted increase fluid intake will = nocturia and nocturia will worsen insomnia- encouraged less fluid at evening starting at 6 pm    Chronic hyponatremia  Lift fluid restrictions but encourage less fluid after 6 pm to help nocturia and f/u NA level       1.  D/c fluid restrictions.  2.  Na+ check in 1 week.  Dx: hyponatremia  3.  D/c Melatonin.  4.  Start Trazodone 50 mg po QHS.  Dx: insomnia  5.  Start Trazodone 50 mg po QHS PRN x 14 days if before 3 am.  Dx: insomnia  6.  PVR check x 1 during the day & 2 night shifts.  After she wakes up to go to the bathroom have her put on her light and check PVR x 2.  Update me.  7.  Increase Requip to 1.5 mg po QHS.  Dx: RLS      Electronically signed by:  CRISTAL Olmos CNP      Sincerely,        CRISTAL Olmos CNP

## 2018-04-13 ENCOUNTER — NURSING HOME VISIT (OUTPATIENT)
Dept: GERIATRICS | Facility: CLINIC | Age: 83
End: 2018-04-13
Payer: COMMERCIAL

## 2018-04-13 VITALS
WEIGHT: 99.8 LBS | DIASTOLIC BLOOD PRESSURE: 62 MMHG | RESPIRATION RATE: 16 BRPM | TEMPERATURE: 98.1 F | OXYGEN SATURATION: 96 % | BODY MASS INDEX: 20.16 KG/M2 | SYSTOLIC BLOOD PRESSURE: 109 MMHG | HEART RATE: 71 BPM

## 2018-04-13 DIAGNOSIS — E87.1 CHRONIC HYPONATREMIA: ICD-10-CM

## 2018-04-13 DIAGNOSIS — Z98.1 S/P CERVICAL SPINAL FUSION: ICD-10-CM

## 2018-04-13 DIAGNOSIS — G47.01 INSOMNIA DUE TO MEDICAL CONDITION: Primary | ICD-10-CM

## 2018-04-13 DIAGNOSIS — I10 BENIGN ESSENTIAL HYPERTENSION: ICD-10-CM

## 2018-04-13 DIAGNOSIS — M54.2 NECK PAIN: ICD-10-CM

## 2018-04-13 DIAGNOSIS — R35.1 NOCTURIA: ICD-10-CM

## 2018-04-13 DIAGNOSIS — G25.81 RESTLESS LEGS SYNDROME (RLS): ICD-10-CM

## 2018-04-13 DIAGNOSIS — M15.0 PRIMARY OSTEOARTHRITIS INVOLVING MULTIPLE JOINTS: ICD-10-CM

## 2018-04-13 DIAGNOSIS — D50.9 IRON DEFICIENCY ANEMIA, UNSPECIFIED IRON DEFICIENCY ANEMIA TYPE: ICD-10-CM

## 2018-04-13 PROCEDURE — 99309 SBSQ NF CARE MODERATE MDM 30: CPT | Performed by: NURSE PRACTITIONER

## 2018-04-13 NOTE — PROGRESS NOTES
[facility]    Chief Complaint   Patient presents with     assisted Regulatory       HPI:    Yuni Otoole is a 82 year old  (1935), who is being seen today for a federally mandated E/M visit at [facility]  .  HPI information obtained from: facility chart records, facility staff, patient report and [facility] chart review. Today's concerns are:  Insomnia due to medical condition and Nocturia and Restless legs syndrome (RLS)  F/u on last several visit for tx of - started and increased requip and started and increased Trazodone (stopped Melatonin ) and tested PVRs at night (0 to min) to r/o cause.   After last adjustment on 4/10 of increaseTraz from 25 to 50 and requip from 1 to 1.5 pt states she slept much better the last 2 nights.     Iron deficiency anemia, unspecified iron deficiency anemia type  HGB trending up with Fe -   Fe might also help RLS    Neck pain and S/P cervical spinal fusion and Primary osteoarthritis involving multiple joints  Weaned off muscle relaxants and narcotis, Tylenol and Biofreeze, but pt still c/o generalized pain in joints - not just neck but all over. Asking for treatment.     Chronic hyponatremia  F/u Na lab scheduled - pt drinking ad jake    Benign essential hypertension  F/u on tx of Norvasc, lopressor -       ALLERGIES: Atorvastatin calcium  PAST MEDICAL HISTORY:  has a past medical history of Allergic rhinitis, cause unspecified; Head injury; Pure hypercholesterolemia; and Unspecified essential hypertension. She also has no past medical history of Diabetes (H).  PAST SURGICAL HISTORY:  has a past surgical history that includes REMOVAL OF TONSILS,14+ Y/O; colonoscopy (05/30/07); carotid endarterectomy (11/07/08); INJ EPIDURAL LUMBAR/SACRAL W/WO CONTRAST (2009); DRAIN/INJECT LARGE JOINT/BURSA (2009); DRAIN/INJECT LARGE JOINT/BURSA (2010); INJ EPIDURAL CERVICAL/THORACIC W/WO CONTRAST (2010); INJ TRANSFORAMIN EPIDURAL, LUMB/SACR  SINGLE (2010); Optical Tracking System Fusion Posterior Cervical Three + Levels (N/A, 11/15/2017); Laminectomy cerivcal posterior three+ levels (N/A, 11/15/2017); Optical Tracking System Fusion Cervical Anterior Three + Levels (N/A, 11/15/2017); Fusion cervical posterior three+ levels (N/A, 11/15/2017); and Eye surgery.  FAMILY HISTORY: family history includes CANCER in her daughter and mother; CEREBROVASCULAR DISEASE in her paternal grandmother; Cardiovascular in her father; Circulatory in her paternal grandmother; DIABETES in her maternal aunt; EYE* in her mother; GASTROINTESTINAL DISEASE in her mother; Gynecology in her sister; Hypertension in her father; Lipids in her brother; Musculoskeletal Disorder in her daughter; OSTEOPOROSIS in her mother; Obesity in her maternal grandmother and paternal grandmother.  SOCIAL HISTORY:  reports that she quit smoking about 22 years ago. Her smoking use included Cigarettes. She started smoking about 47 years ago. She smoked 0.00 packs per day for 10.00 years. She has never used smokeless tobacco. She reports that she does not drink alcohol or use illicit drugs.    MEDICATIONS:  Current Outpatient Prescriptions   Medication Sig Dispense Refill     Celecoxib (CELEBREX PO) Take 100 mg by mouth daily (with breakfast)       ferrous sulfate (IRON) 325 (65 FE) MG tablet Take 324 mg by mouth daily       TRAZODONE HCL PO Take 50 mg by mouth At Bedtime Also PRN if before 3 am.       Acetaminophen (TYLENOL PO) Take 1,000 mg by mouth At Bedtime Also TID PRN       ROPINIRole HCl (REQUIP PO) Take 1.5 mg by mouth At Bedtime        Menthol, Topical Analgesic, (BIOFREEZE EX) Externally apply topically 2 times daily as needed (also BID)        Cholecalciferol (VITAMIN D) 2000 UNITS tablet Take 2000 units by mouth daily 100 tablet 0     Hypromellose (NATURAL BALANCE TEARS OP) Apply 2 drops to eye 4 times daily as needed       Multiple Vitamins-Minerals (MULTIVITAMIN ADULT PO) Take 1 tablet by  mouth daily       AmLODIPine Besylate (NORVASC PO) Take 10 mg by mouth At Bedtime        Nutritional Supplements (NUTRITIONAL SUPPLEMENT PO) Take 4 oz by mouth 2 times daily       pravastatin (PRAVACHOL) 10 MG tablet 1 tablet (10 mg) by Oral or Feeding Tube route At Bedtime 30 tablet      metoprolol (LOPRESSOR) 25 MG tablet Take 25 mg by mouth 2 times daily 60 tablet      polyethylene glycol (MIRALAX/GLYCOLAX) Packet 17 g by Oral or Feeding Tube route 3 times daily as needed for constipation  7 packet      senna-docusate (SENOKOT-S;PERICOLACE) 8.6-50 MG per tablet 1 tablet by Oral or Feeding Tube route daily Also QD  tablet      calcium carbonate (OS-ANNETTA 500 MG [place]. [place]) 1250 MG tablet Take 1 tablet (1,250 mg) by mouth 2 times daily (with meals) 90 tablet      order for DME Equipment being ordered: Shower chair 1 Device 0     Medications reviewed:  Medications reconciled to facility chart and changes were made to reflect current medications as identified as above med list. Below are the changes that were made:   Medications stopped since last EPIC medication reconciliation:   There are no discontinued medications.    Medications started since last [facility] medication reconciliation:  Orders Placed This Encounter   Medications     Celecoxib (CELEBREX PO)     Sig: Take 100 mg by mouth daily (with breakfast)       Case Management:  I have reviewed the care plan and MDS and do agree with the plan. Patient's desire to return to the community is present, but is not able due to care needs .  Information reviewed:  Medications, vital signs, orders, and nursing notes.    ROS:  4 point ROS including Respiratory, CV, GI and , other than that noted in the HPI,  is negative    Exam:  Vitals: /62  Pulse 71  Temp 98.1  F (36.7  C)  Resp 16  Wt 99 lb 12.8 oz (45.3 kg)  SpO2 96%  BMI 20.16 kg/m2  BMI= Body mass index is 20.16 kg/(m^2).  GENERAL APPEARANCE:  Alert, in no distress, neck in chronic hyper extended  state  RESP:  respiratory effort and palpation of chest normal, auscultation of lungs clear , no respiratory distress  CV:  Palpation and auscultation of heart done , rate and rhythm reg,  No murmur, trace on L , none on R peripheral edema  ABDOMEN:  normal bowel sounds, soft, nontender, no hepatosplenomegaly or other masses  M/S:   Gait and station SBA, w/c for mobility in halls, Digits and nails normal  SKIN:  Inspection and Palpation of skin and subcutaneous tissue intact  NEURO: 2-12 in normal limits and at patient's baseline  PSYCH:  insight and judgement, memory fair , affect and mood normal      Lab/Diagnostic data:     CBC RESULTS:   Recent Labs   Lab Test  02/20/18   0615  11/30/17   0610  11/20/17   1112  11/17/17   0401   WBC   --    --   8.0  10.6   RBC   --    --   3.21*  3.35*   HGB  12.2  10.5*  9.7*  10.1*   HCT   --    --   30.2*  30.6*   MCV   --    --   94  91   MCH   --    --   30.2  30.1   MCHC   --    --   32.1  33.0   RDW   --    --   13.6  14.9   PLT   --    --   246  171       Last Basic Metabolic Panel:  Recent Labs   Lab Test  02/27/18 0610  02/20/18 0615   11/28/17   0610   NA  135  135   < >  134   POTASSIUM   --   3.8   --   3.9   CHLORIDE   --   99   --   98   ANNETTA   --   9.5   --   8.9   CO2   --   29   --   29   BUN   --   20   --   14   CR   --   0.45*   --   0.51*   GLC   --   90   --   85    < > = values in this interval not displayed.       ASSESSMENT/PLAN  Insomnia due to medical condition and Nocturia and Restless legs syndrome (RLS)  Improved with medications -cont poc - increase traz if needed    Iron deficiency anemia, unspecified iron deficiency anemia type  Cont Fe for 30 - 60 more days    Neck pain and S/P cervical spinal fusion and Primary osteoarthritis involving multiple joints  Discussed several options and risk for Florentino 2  Will start and motnior RF and GI    Chronic hyponatremia  Recheck pending     Benign essential hypertension  Noted trend low - f/u if cont to be  low will lower norvasc      Orders:  1.  Start Celebrex 100 mg po Q am with food.  Dx: DJD, osteoarthritis  2.  Change scheduled Tylenol to 1000 mg po QHS and change PRN to 1000 mg po TID PRN.  Dx: osteoarthritis    - f/u next visit sleep and adjust traz if needed  - htn - if low back down on norvasc  GI given Benitez 2  RF given benitez 2    Electronically signed by:  CRISTAL Olmos CNP

## 2018-04-13 NOTE — LETTER
4/13/2018        RE: Yuni Otoole  [address]  [facility] [address]          [facility]    Chief Complaint   Patient presents with     correction Regulatory       HPI:    Yuni Otoole is a 82 year old  (1935), who is being seen today for a federally mandated E/M visit at [facility]  .  HPI information obtained from: facility chart records, facility staff, patient report and [facility] chart review. Today's concerns are:  Insomnia due to medical condition and Nocturia and Restless legs syndrome (RLS)  F/u on last several visit for tx of - started and increased requip and started and increased Trazodone (stopped Melatonin ) and tested PVRs at night (0 to min) to r/o cause.   After last adjustment on 4/10 of increaseTraz from 25 to 50 and requip from 1 to 1.5 pt states she slept much better the last 2 nights.     Iron deficiency anemia, unspecified iron deficiency anemia type  HGB trending up with Fe -   Fe might also help RLS    Neck pain and S/P cervical spinal fusion and Primary osteoarthritis involving multiple joints  Weaned off muscle relaxants and narcotis, Tylenol and Biofreeze, but pt still c/o generalized pain in joints - not just neck but all over. Asking for treatment.     Chronic hyponatremia  F/u Na lab scheduled - pt drinking ad jake    Benign essential hypertension  F/u on tx of Norvasc, lopressor -       ALLERGIES: Atorvastatin calcium  PAST MEDICAL HISTORY:  has a past medical history of Allergic rhinitis, cause unspecified; Head injury; Pure hypercholesterolemia; and Unspecified essential hypertension. She also has no past medical history of Diabetes (H).  PAST SURGICAL HISTORY:  has a past surgical history that includes REMOVAL OF TONSILS,14+ Y/O; colonoscopy (05/30/07); carotid endarterectomy (11/07/08); INJ EPIDURAL LUMBAR/SACRAL W/WO CONTRAST (2009); DRAIN/INJECT LARGE JOINT/BURSA (2009); DRAIN/INJECT LARGE JOINT/BURSA (2010); INJ  EPIDURAL CERVICAL/THORACIC W/WO CONTRAST (2010); INJ TRANSFORAMIN EPIDURAL, LUMB/SACR SINGLE (2010); Optical Tracking System Fusion Posterior Cervical Three + Levels (N/A, 11/15/2017); Laminectomy cerivcal posterior three+ levels (N/A, 11/15/2017); Optical Tracking System Fusion Cervical Anterior Three + Levels (N/A, 11/15/2017); Fusion cervical posterior three+ levels (N/A, 11/15/2017); and Eye surgery.  FAMILY HISTORY: family history includes CANCER in her daughter and mother; CEREBROVASCULAR DISEASE in her paternal grandmother; Cardiovascular in her father; Circulatory in her paternal grandmother; DIABETES in her maternal aunt; EYE* in her mother; GASTROINTESTINAL DISEASE in her mother; Gynecology in her sister; Hypertension in her father; Lipids in her brother; Musculoskeletal Disorder in her daughter; OSTEOPOROSIS in her mother; Obesity in her maternal grandmother and paternal grandmother.  SOCIAL HISTORY:  reports that she quit smoking about 22 years ago. Her smoking use included Cigarettes. She started smoking about 47 years ago. She smoked 0.00 packs per day for 10.00 years. She has never used smokeless tobacco. She reports that she does not drink alcohol or use illicit drugs.    MEDICATIONS:  Current Outpatient Prescriptions   Medication Sig Dispense Refill     Celecoxib (CELEBREX PO) Take 100 mg by mouth daily (with breakfast)       ferrous sulfate (IRON) 325 (65 FE) MG tablet Take 324 mg by mouth daily       TRAZODONE HCL PO Take 50 mg by mouth At Bedtime Also PRN if before 3 am.       Acetaminophen (TYLENOL PO) Take 1,000 mg by mouth At Bedtime Also TID PRN       ROPINIRole HCl (REQUIP PO) Take 1.5 mg by mouth At Bedtime        Menthol, Topical Analgesic, (BIOFREEZE EX) Externally apply topically 2 times daily as needed (also BID)        Cholecalciferol (VITAMIN D) 2000 UNITS tablet Take 2000 units by mouth daily 100 tablet 0     Hypromellose (NATURAL BALANCE TEARS OP) Apply 2 drops to eye 4 times daily  as needed       Multiple Vitamins-Minerals (MULTIVITAMIN ADULT PO) Take 1 tablet by mouth daily       AmLODIPine Besylate (NORVASC PO) Take 10 mg by mouth At Bedtime        Nutritional Supplements (NUTRITIONAL SUPPLEMENT PO) Take 4 oz by mouth 2 times daily       pravastatin (PRAVACHOL) 10 MG tablet 1 tablet (10 mg) by Oral or Feeding Tube route At Bedtime 30 tablet      metoprolol (LOPRESSOR) 25 MG tablet Take 25 mg by mouth 2 times daily 60 tablet      polyethylene glycol (MIRALAX/GLYCOLAX) Packet 17 g by Oral or Feeding Tube route 3 times daily as needed for constipation  7 packet      senna-docusate (SENOKOT-S;PERICOLACE) 8.6-50 MG per tablet 1 tablet by Oral or Feeding Tube route daily Also QD  tablet      calcium carbonate (OS-ANNETTA 500 MG [place]. [place]) 1250 MG tablet Take 1 tablet (1,250 mg) by mouth 2 times daily (with meals) 90 tablet      order for DME Equipment being ordered: Shower chair 1 Device 0     Medications reviewed:  Medications reconciled to facility chart and changes were made to reflect current medications as identified as above med list. Below are the changes that were made:   Medications stopped since last EPIC medication reconciliation:   There are no discontinued medications.    Medications started since last [facility] medication reconciliation:  Orders Placed This Encounter   Medications     Celecoxib (CELEBREX PO)     Sig: Take 100 mg by mouth daily (with breakfast)       Case Management:  I have reviewed the care plan and MDS and do agree with the plan. Patient's desire to return to the community is present, but is not able due to care needs .  Information reviewed:  Medications, vital signs, orders, and nursing notes.    ROS:  4 point ROS including Respiratory, CV, GI and , other than that noted in the HPI,  is negative    Exam:  Vitals: /62  Pulse 71  Temp 98.1  F (36.7  C)  Resp 16  Wt 99 lb 12.8 oz (45.3 kg)  SpO2 96%  BMI 20.16 kg/m2  BMI= Body mass index is 20.16  kg/(m^2).  GENERAL APPEARANCE:  Alert, in no distress, neck in chronic hyper extended state  RESP:  respiratory effort and palpation of chest normal, auscultation of lungs clear , no respiratory distress  CV:  Palpation and auscultation of heart done , rate and rhythm reg,  No murmur, trace on L , none on R peripheral edema  ABDOMEN:  normal bowel sounds, soft, nontender, no hepatosplenomegaly or other masses  M/S:   Gait and station SBA, w/c for mobility in halls, Digits and nails normal  SKIN:  Inspection and Palpation of skin and subcutaneous tissue intact  NEURO: 2-12 in normal limits and at patient's baseline  PSYCH:  insight and judgement, memory fair , affect and mood normal      Lab/Diagnostic data:     CBC RESULTS:   Recent Labs   Lab Test  02/20/18   0615  11/30/17   0610  11/20/17   1112  11/17/17   0401   WBC   --    --   8.0  10.6   RBC   --    --   3.21*  3.35*   HGB  12.2  10.5*  9.7*  10.1*   HCT   --    --   30.2*  30.6*   MCV   --    --   94  91   MCH   --    --   30.2  30.1   MCHC   --    --   32.1  33.0   RDW   --    --   13.6  14.9   PLT   --    --   246  171       Last Basic Metabolic Panel:  Recent Labs   Lab Test  02/27/18 0610 02/20/18 0615   11/28/17   0610   NA  135  135   < >  134   POTASSIUM   --   3.8   --   3.9   CHLORIDE   --   99   --   98   ANNETTA   --   9.5   --   8.9   CO2   --   29   --   29   BUN   --   20   --   14   CR   --   0.45*   --   0.51*   GLC   --   90   --   85    < > = values in this interval not displayed.       ASSESSMENT/PLAN  Insomnia due to medical condition and Nocturia and Restless legs syndrome (RLS)  Improved with medications -cont poc - increase traz if needed    Iron deficiency anemia, unspecified iron deficiency anemia type  Cont Fe for 30 - 60 more days    Neck pain and S/P cervical spinal fusion and Primary osteoarthritis involving multiple joints  Discussed several options and risk for Florentino 2  Will start and motnior RF and GI    Chronic  hyponatremia  Recheck pending     Benign essential hypertension  Noted trend low - f/u if cont to be low will lower norvasc      Orders:  1.  Start Celebrex 100 mg po Q am with food.  Dx: DJD, osteoarthritis  2.  Change scheduled Tylenol to 1000 mg po QHS and change PRN to 1000 mg po TID PRN.  Dx: osteoarthritis    - f/u next visit sleep and adjust traz if needed  - htn - if low back down on norvasc  GI given Benitez 2  RF given benitez 2    Electronically signed by:  CRISTAL Olmos CNP        Sincerely,        CRISTAL Olmos CNP

## 2018-04-17 ENCOUNTER — HOSPITAL LABORATORY (OUTPATIENT)
Dept: NURSING HOME | Facility: OTHER | Age: 83
End: 2018-04-17

## 2018-04-17 LAB — SODIUM SERPL-SCNC: 134 MMOL/L (ref 133–144)

## 2018-05-01 ENCOUNTER — OFFICE VISIT (OUTPATIENT)
Dept: AUDIOLOGY | Facility: CLINIC | Age: 83
End: 2018-05-01
Payer: COMMERCIAL

## 2018-05-01 DIAGNOSIS — H61.21 IMPACTED CERUMEN OF RIGHT EAR: Primary | ICD-10-CM

## 2018-05-01 PROCEDURE — 92567 TYMPANOMETRY: CPT | Performed by: AUDIOLOGIST

## 2018-05-01 PROCEDURE — 99207 ZZC NO CHARGE LOS: CPT | Performed by: AUDIOLOGIST

## 2018-05-01 NOTE — MR AVS SNAPSHOT
After Visit Summary   5/1/2018    Yuni Otoole    MRN: [id number]           Patient Information     Date Of Birth          1935        Visit Information        Provider Department      5/1/2018 2:30 PM Monkia Reed AuD [facility]        Today's Diagnoses     Impacted cerumen of right ear    -  1       Follow-ups after your visit        Your next 10 appointments already scheduled     May 22, 2018 11:00 AM CDT   XR SPINE COMPLETE 2 VIEWS with UCXR4   [facility] ([facility])    [address] [place] [id number] [phone]           Please bring a list of your current medicines to your exam. (Include vitamins, minerals and over-thecounter medicines.) Leave your valuables at home.  Tell your doctor if there is a chance you may be pregnant.  You do not need to do anything special for this exam.            May 22, 2018 11:30 AM CDT   LAB with  LAB   [facility] ([facility])    [address] [place] [address] [id number] [phone]           Please do not eat 10-12 hours before your appointment if you are coming in fasting for labs on lipids, cholesterol, or glucose (sugar). This does not apply to pregnant women. Water, hot tea and black coffee (with nothing added) are okay. Do not drink other fluids, diet soda or chew gum.            May 22, 2018 12:00 PM CDT   (Arrive by 11:45 AM)   Return Visit with Lana Hauser PA-C   [facility] ([facility])    [address] [place] [id number] [phone]            Jun 11, 2018 12:30 PM CDT   New Visit with Juliann Bazan   [facility] ([facility]    [address] [place] [address] [id number] [phone]            Jun 11, 2018  1:00 PM CDT   New Visit with Kale Garg MD   [facility]  ([facility])    [address] [facility] [phone] [phone]              Who to contact     If you have questions or need follow up information about today's clinic visit or your schedule please contact [facility] directly at [phone].  Normal or non-critical lab and imaging results will be communicated to you by MyChart, letter or phone within 4 business days after the clinic has received the results. If you do not hear from us within 7 days, please contact the clinic through MyChart or phone. If you have a critical or abnormal lab result, we will notify you by phone as soon as possible.  Submit refill requests through [employer] or call your pharmacy and they will forward the refill request to us. Please allow 3 business days for your refill to be completed.          Additional Information About Your Visit        MyChart Information     [employer] gives you secure access to your electronic health record. If you see a primary care provider, you can also send messages to your care team and make appointments. If you have questions, please call your primary care clinic.  If you do not have a primary care provider, please call [phone] and they will assist you.        Care EveryWhere ID     This is your Care EveryWhere ID. This could be used by other organizations to access your [facility] medical records  [id number]         Blood Pressure from Last 3 Encounters:   04/13/18 109/62   04/10/18 109/62   03/29/18 145/76    Weight from Last 3 Encounters:   04/13/18 99 lb 12.8 oz (45.3 kg)   04/10/18 99 lb 12.8 oz (45.3 kg)   03/29/18 93 lb 4.8 oz (42.3 kg)              We Performed the Following     TYMPANOMETRY        Primary Care Provider Office Phone # Fax #    CRISTAL Olmos [phone]       [address] [place] [id number]        Equal Access to Services     CARLOS MCKNIGHT AH: alma Ward, kimybta  elias bankssaundra bruno ah. [facility] [phone].    ATENCIÓN: Si suad corea, tiene a pierson disposición servicios gratuitos de asistencia lingüística. Lewis al [phone].    We comply with applicable federal civil rights laws and [place] laws. We do not discriminate on the basis of race, color, national origin, age, disability, sex, sexual orientation, or gender identity.            Thank you!     Thank you for choosing [facility]  for your care. Our goal is always to provide you with excellent care. Hearing back from our patients is one way we can continue to improve our services. Please take a few minutes to complete the written survey that you may receive in the mail after your visit with us. Thank you!             Your Updated Medication List - Protect others around you: Learn how to safely use, store and throw away your medicines at www.disposemymeds.org.          This list is accurate as of 5/1/18  5:03 PM.  Always use your most recent med list.                   Brand Name Dispense Instructions for use Diagnosis    BIOFREEZE EX      Externally apply topically 2 times daily as needed (also BID)        calcium carbonate 500 tablet    OS-ANNETTA 500 mg [place]. Ca    90 tablet    Take 1 tablet (1,250 mg) by mouth 2 times daily (with meals)    Cervical stenosis of spine, S/P cervical spinal fusion       CELEBREX PO      Take 100 mg by mouth daily (with breakfast)        ferrous sulfate 325 (65 Fe) MG tablet    IRON     Take 324 mg by mouth daily        metoprolol tartrate 25 MG tablet    LOPRESSOR    60 tablet    Take 25 mg by mouth 2 times daily    Cervical stenosis of spine, S/P cervical spinal fusion       MULTIVITAMIN ADULT PO      Take 1 tablet by mouth daily        NATURAL BALANCE TEARS OP      Apply 2 drops to eye 4 times daily as needed        NORVASC PO      Take 10 mg by mouth At Bedtime        NUTRITIONAL SUPPLEMENT PO      Take 4 oz by mouth 2 times  daily        polyethylene glycol Packet    MIRALAX/GLYCOLAX    7 packet    17 g by Oral or Feeding Tube route 3 times daily as needed for constipation    Cervical stenosis of spine, S/P cervical spinal fusion       pravastatin 10 MG tablet    PRAVACHOL    30 tablet    1 tablet (10 mg) by Oral or Feeding Tube route At Bedtime    Cervical stenosis of spine, S/P cervical spinal fusion       REQUIP PO      Take 1.5 mg by mouth At Bedtime        senna-docusate 8.6-50 MG per tablet    SENOKOT-S;PERICOLACE    100 tablet    1 tablet by Oral or Feeding Tube route daily Also QD PRN    Cervical stenosis of spine, S/P cervical spinal fusion       TRAZODONE HCL PO      Take 50 mg by mouth At Bedtime Also PRN if before 3 am.        TYLENOL PO      Take 1,000 mg by mouth At Bedtime Also TID PRN        vitamin D 2000 units tablet     100 tablet    Take 2000 units by mouth daily    Vitamin D deficiency

## 2018-05-01 NOTE — PROGRESS NOTES
AUDIOLOGY REPORT    SUBJECTIVE:  Yuni Otoole is a 82 year old female who was seen in the Audiology Clinic at the [facility] for audiologic evaluation, self-referred.  The patient has been seen previously in this clinic on 12/22/2010 for assessment and results indicated moderate sensorineural hearing loss bilaterally. The patient reports decreased hearing, right ear worse than left in the last 3-4 months. The patient denies  bilateral tinnitus, bilateral otalgia, bilateral drainage and bilateral aural fullness.  The patient notes difficulty with communication in a variety of listening situations.  They were accompanied today by their .    OBJECTIVE:  Otoscopic exam indicates Right ear occluded with cerumen, left ear clear     Tympanogram:    RIGHT: flat shape and normal ear canal volume consistent with impacted wax    LEFT:   normal eardrum mobility    ASSESSMENT:   Impacted wax right ear.    Today s results were discussed with the patient in detail.     PLAN:   It is recommended that the patient schedule an ear cleaning with Dr. Garg.  The audiogram can be completed that day.  Please call this clinic with questions regarding these results or recommendations.        Bennett Sharp Licensed Audiologist #[id number]

## 2018-05-07 ENCOUNTER — ALLIED HEALTH/NURSE VISIT (OUTPATIENT)
Dept: FAMILY MEDICINE | Facility: OTHER | Age: 83
End: 2018-05-07
Payer: COMMERCIAL

## 2018-05-07 ENCOUNTER — NURSING HOME VISIT (OUTPATIENT)
Dept: GERIATRICS | Facility: CLINIC | Age: 83
End: 2018-05-07
Payer: COMMERCIAL

## 2018-05-07 VITALS
HEART RATE: 73 BPM | RESPIRATION RATE: 16 BRPM | OXYGEN SATURATION: 96 % | DIASTOLIC BLOOD PRESSURE: 68 MMHG | BODY MASS INDEX: 20.16 KG/M2 | WEIGHT: 99.8 LBS | SYSTOLIC BLOOD PRESSURE: 134 MMHG | TEMPERATURE: 98.1 F

## 2018-05-07 DIAGNOSIS — E44.0 MODERATE PROTEIN-CALORIE MALNUTRITION (H): ICD-10-CM

## 2018-05-07 DIAGNOSIS — M15.0 PRIMARY OSTEOARTHRITIS INVOLVING MULTIPLE JOINTS: ICD-10-CM

## 2018-05-07 DIAGNOSIS — G47.01 INSOMNIA DUE TO MEDICAL CONDITION: Primary | ICD-10-CM

## 2018-05-07 DIAGNOSIS — H61.21 IMPACTED CERUMEN OF RIGHT EAR: Primary | ICD-10-CM

## 2018-05-07 DIAGNOSIS — I10 BENIGN ESSENTIAL HYPERTENSION: ICD-10-CM

## 2018-05-07 DIAGNOSIS — D50.9 IRON DEFICIENCY ANEMIA, UNSPECIFIED IRON DEFICIENCY ANEMIA TYPE: ICD-10-CM

## 2018-05-07 DIAGNOSIS — M79.10 MUSCLE PAIN: ICD-10-CM

## 2018-05-07 PROCEDURE — 69210 REMOVE IMPACTED EAR WAX UNI: CPT | Mod: RT

## 2018-05-07 PROCEDURE — 99309 SBSQ NF CARE MODERATE MDM 30: CPT | Performed by: NURSE PRACTITIONER

## 2018-05-07 NOTE — MR AVS SNAPSHOT
After Visit Summary   5/7/2018    Yuni Otoole    MRN: [id number]           Patient Information     Date Of Birth          1935        Visit Information        Provider Department      5/7/2018 2:00 PM LORRIE CLEMENTS MA [facility]        Today's Diagnoses     Impacted cerumen of right ear    -  1       Follow-ups after your visit        Your next 10 appointments already scheduled     May 22, 2018 11:00 AM CDT   XR SPINE COMPLETE 2 VIEWS with UCXR4   [facility] ([facility])    [address] [place] [phone]           Please bring a list of your current medicines to your exam. (Include vitamins, minerals and over-thecounter medicines.) Leave your valuables at home.  Tell your doctor if there is a chance you may be pregnant.  You do not need to do anything special for this exam.            May 22, 2018 11:30 AM CDT   LAB with  LAB   [facility] ([facility])    [address] [place] [address] [phone]           Please do not eat 10-12 hours before your appointment if you are coming in fasting for labs on lipids, cholesterol, or glucose (sugar). This does not apply to pregnant women. Water, hot tea and black coffee (with nothing added) are okay. Do not drink other fluids, diet soda or chew gum.            May 22, 2018 12:00 PM CDT   (Arrive by 11:45 AM)   Return Visit with Lana Hauser PA-C   [facility] ([facility])    [address] [place] [phone]            Jun 11, 2018 12:30 PM CDT   New Visit with Juliann Bazan   [facility] ([facility])    [address] [place] [phone]            Jun 11, 2018  1:00 PM CDT   New Visit with Kale Garg MD   [facility] ([facility]  [facility]    [address] [facility] [address] [phone]              Who to contact     If you have questions or need follow up information about today's clinic visit or your schedule please contact [facility] directly at [phone].  Normal or non-critical lab and imaging results will be communicated to you by MyChart, letter or phone within 4 business days after the clinic has received the results. If you do not hear from us within 7 days, please contact the clinic through MyChart or phone. If you have a critical or abnormal lab result, we will notify you by phone as soon as possible.  Submit refill requests through [employer] or call your pharmacy and they will forward the refill request to us. Please allow 3 business days for your refill to be completed.          Additional Information About Your Visit        MyChart Information     [employer] gives you secure access to your electronic health record. If you see a primary care provider, you can also send messages to your care team and make appointments. If you have questions, please call your primary care clinic.  If you do not have a primary care provider, please call [phone] and they will assist you.        Care EveryWhere ID     This is your Care EveryWhere ID. This could be used by other organizations to access your [facility] medical records  [id number]         Blood Pressure from Last 3 Encounters:   05/07/18 134/68   04/13/18 109/62   04/10/18 109/62    Weight from Last 3 Encounters:   05/07/18 99 lb 12.8 oz (45.3 kg)   04/13/18 99 lb 12.8 oz (45.3 kg)   04/10/18 99 lb 12.8 oz (45.3 kg)              Today, you had the following     No orders found for display       Primary Care Provider Office Phone # Fax #    CRISTAL Olmos [phone]       [address] [place] [phone]        Equal Access to Services     CARLOS MCKNIGHT AH: Rajeev Maldonado, alma ballard, darren bowman  elias chatterjeesaundra arabellajason cochran'aan ah. So [facility] [phone].    ATENCIÓN: Si habla anmol, tiene a pierson disposición servicios gratuitos de asistencia lingüística. Lewis al [phone].    We comply with applicable federal civil rights laws and [place] laws. We do not discriminate on the basis of race, color, national origin, age, disability, sex, sexual orientation, or gender identity.            Thank you!     Thank you for choosing [facility]  for your care. Our goal is always to provide you with excellent care. Hearing back from our patients is one way we can continue to improve our services. Please take a few minutes to complete the written survey that you may receive in the mail after your visit with us. Thank you!             Your Updated Medication List - Protect others around you: Learn how to safely use, store and throw away your medicines at www.disposemymeds.org.          This list is accurate as of 5/7/18  4:16 PM.  Always use your most recent med list.                   Brand Name Dispense Instructions for use Diagnosis    BIOFREEZE EX      Externally apply topically 2 times daily as needed        calcium carbonate 500 tablet    OS-ANNETTA 500 mg [place]. Ca    90 tablet    Take 1 tablet (1,250 mg) by mouth 2 times daily (with meals)    Cervical stenosis of spine, S/P cervical spinal fusion       metoprolol tartrate 25 MG tablet    LOPRESSOR    60 tablet    Take 25 mg by mouth 2 times daily    Cervical stenosis of spine, S/P cervical spinal fusion       MULTIVITAMIN ADULT PO      Take 1 tablet by mouth daily        NATURAL BALANCE TEARS OP      Apply 2 drops to eye 4 times daily as needed        NORVASC PO      Take 5 mg by mouth At Bedtime        NUTRITIONAL SUPPLEMENT PO      Take 4 oz by mouth daily        polyethylene glycol Packet    MIRALAX/GLYCOLAX    7 packet    17 g by Oral or Feeding Tube route 3 times daily as needed for constipation    Cervical stenosis of spine, S/P  cervical spinal fusion       REQUIP PO      Take 1.5 mg by mouth At Bedtime        senna-docusate 8.6-50 MG per tablet    SENOKOT-S;PERICOLACE    100 tablet    1 tablet by Oral or Feeding Tube route daily Also QD PRN    Cervical stenosis of spine, S/P cervical spinal fusion       TRAZODONE HCL PO      Take 50 mg by mouth At Bedtime Also PRN if before 3 am.        TYLENOL PO      Take 1,000 mg by mouth 3 times daily Also 500 mg BID PRN        vitamin D 2000 units tablet     100 tablet    Take 2000 units by mouth daily    Vitamin D deficiency

## 2018-05-07 NOTE — NURSING NOTE
Patient identified using two patient identifiers.  Ear exam showing wax occlusion completed by provider, JUSTA Garcia and RN, Naa Armendariz RN.  Solution: warm water was placed in the right ear(s) via irrigation tool: elephant ear.  Beata Rey MA     5/7/2018

## 2018-05-07 NOTE — PROGRESS NOTES
[facility]    Chief Complaint   Patient presents with     Nursing Home Acute       HPI:    Yuni Otoole is a 82 year old  (1935), who is being seen today for an episodic care visit at [facility].  HPI information obtained from: facility chart records, facility staff, patient report and [facility] chart review. Pt/nsg /pharm concern about celebrex. - started for primary osteoarthritis and pt states she doesn't believe it is helping - pharm also notes high risk med given age and I agree, but noted it was worth a trial over narcotics. Pt also having generalized pain in legs at night still affecting her sleep - did do trial of Fe given slightly low and could be worsening RLS - also started and increased requip. Pt notes her legs aren't jumpy - just achey now.   F/u other issues bp's much lower than before (started and increased norvasc) and wt increasing (started HNS)  .   1. Insomnia due to medical condition    2. Primary osteoarthritis involving multiple joints    3. Iron deficiency anemia, unspecified iron deficiency anemia type    4. Muscle pain    5. Benign essential hypertension    6. Moderate protein-calorie malnutrition (H)    PMH/PSH reviewed in EPIC today  REVIEW OF SYSTEMS:  4 point ROS including Respiratory, CV, GI and , other than that noted in the HPI,  is negative    /68  Pulse 73  Temp 98.1  F (36.7  C)  Resp 16  Wt 99 lb 12.8 oz (45.3 kg)  SpO2 96%  BMI 20.16 kg/m2  GENERAL APPEARANCE:  Alert, in no distress  Enlarged R knee - no pin point pain at bursa. +3 edema on L leg +2 on R    Hemoglobin   Date Value Ref Range Status   02/20/2018 12.2 11.7 - 15.7 g/dL Final   11/30/2017 10.5 (L) 11.7 - 15.7 g/dL Final       ASSESSMENT/PLAN:  Insomnia due to medical condition/ Primary osteoarthritis involving multiple joints and Iron deficiency anemia, unspecified iron deficiency anemia type andMuscle pain  Will try to treat pain better with going back to Tylenol  And  "DC celebrex as pt states it is \"worse now\". Will also trial off statin as might be causing myalgias. Will DC Fe as if Fe deficiencey was exacerbating RLS - isn't now and fe stores likely full - stop and follow HGB.   Follow hyperlipidemia with recheck off statin as last levels in 2015 were quite low.     Benign essential hypertension  BP goals are  <140/90 mm Hg.This is higher than ACC and AHA recommendations due to goals of care, risk for hypotension, risk of dizziness and falls, risk of tissue/cerebral hypoperfusion and frailty. Noted patients BP is lower than goal and will adjust plan of care by lowering CCB.  Noted lower dose will hopefully decrease edema as well.     Moderate protein-calorie malnutrition (H)  Now gaining wt - can back down on supplements and recheck.        1.  D/c Ferrous Sulfate.  2.  D/c Celebrex.  3.  Change Tylenol to 1000 mg po TID and 500 mg po BID PRN.  Dx: pain  4.  D/c scheduled Bio freeze, due to skin irritation if used on regular basis per patient.  5.  D/c Pravastatin.  6.  Lipid panel in 6 weeks.  Dx: hyperlipidemia  7.  Decrease Norvasc to 5 mg po Q pm.  Dx: HTN  8.  Decrease House nutritional supplement to 4 oz po QD.  Dx: anthony/anthony malnutrition  9.  Check Albumin in 6 weeks.  Dx: anthony/anthony malnutrition  10.  Recheck Hgb in 6 weeks.  Dx: anemia      Electronically signed by:  CRISTAL Olmos CNP  "

## 2018-05-07 NOTE — LETTER
5/7/2018        RE: Yuni Otoole  [address]  [facility] [address]        [facility]    Chief Complaint   Patient presents with     Nursing Home Acute       HPI:    Yuni Otoole is a 82 year old  (1935), who is being seen today for an episodic care visit at [facility].  HPI information obtained from: facility chart records, facility staff, patient report and [facility] chart review. Pt/nsg /pharm concern about celebrex. - started for primary osteoarthritis and pt states she doesn't believe it is helping - pharm also notes high risk med given age and I agree, but noted it was worth a trial over narcotics. Pt also having generalized pain in legs at night still affecting her sleep - did do trial of Fe given slightly low and could be worsening RLS - also started and increased requip. Pt notes her legs aren't jumpy - just achey now.   F/u other issues bp's much lower than before (started and increased norvasc) and wt increasing (started HNS)  .   1. Insomnia due to medical condition    2. Primary osteoarthritis involving multiple joints    3. Iron deficiency anemia, unspecified iron deficiency anemia type    4. Muscle pain    5. Benign essential hypertension    6. Moderate protein-calorie malnutrition (H)    PMH/PSH reviewed in EPIC today  REVIEW OF SYSTEMS:  4 point ROS including Respiratory, CV, GI and , other than that noted in the HPI,  is negative    /68  Pulse 73  Temp 98.1  F (36.7  C)  Resp 16  Wt 99 lb 12.8 oz (45.3 kg)  SpO2 96%  BMI 20.16 kg/m2  GENERAL APPEARANCE:  Alert, in no distress  Enlarged R knee - no pin point pain at bursa. +3 edema on L leg +2 on R    Hemoglobin   Date Value Ref Range Status   02/20/2018 12.2 11.7 - 15.7 g/dL Final   11/30/2017 10.5 (L) 11.7 - 15.7 g/dL Final       ASSESSMENT/PLAN:  Insomnia due to medical condition/ Primary osteoarthritis involving multiple joints and Iron deficiency anemia, unspecified iron deficiency anemia  "type andMuscle pain  Will try to treat pain better with going back to Tylenol  And DC celebrex as pt states it is \"worse now\". Will also trial off statin as might be causing myalgias. Will DC Fe as if Fe deficiencey was exacerbating RLS - isn't now and fe stores likely full - stop and follow HGB.   Follow hyperlipidemia with recheck off statin as last levels in 2015 were quite low.     Benign essential hypertension  BP goals are  <140/90 mm Hg.This is higher than ACC and AHA recommendations due to goals of care, risk for hypotension, risk of dizziness and falls, risk of tissue/cerebral hypoperfusion and frailty. Noted patients BP is lower than goal and will adjust plan of care by lowering CCB.  Noted lower dose will hopefully decrease edema as well.     Moderate protein-calorie malnutrition (H)  Now gaining wt - can back down on supplements and recheck.        1.  D/c Ferrous Sulfate.  2.  D/c Celebrex.  3.  Change Tylenol to 1000 mg po TID and 500 mg po BID PRN.  Dx: pain  4.  D/c scheduled Bio freeze, due to skin irritation if used on regular basis per patient.  5.  D/c Pravastatin.  6.  Lipid panel in 6 weeks.  Dx: hyperlipidemia  7.  Decrease Norvasc to 5 mg po Q pm.  Dx: HTN  8.  Decrease House nutritional supplement to 4 oz po QD.  Dx: anthony/anthony malnutrition  9.  Check Albumin in 6 weeks.  Dx: anthony/anthony malnutrition  10.  Recheck Hgb in 6 weeks.  Dx: anemia      Electronically signed by:  CRISTAL Olmos CNP      Sincerely,        CRISTAL Olmos CNP    "

## 2018-05-18 ENCOUNTER — TELEPHONE (OUTPATIENT)
Dept: GERIATRICS | Facility: CLINIC | Age: 83
End: 2018-05-18

## 2018-05-18 NOTE — TELEPHONE ENCOUNTER
Ultram 50 bid and 50 q day prn ordered today given ongoing c/o generalized pain unrelieved with tylenol or trial of beintez 2  Analgesics.massage.heat/ice all tried/used.     Hx of spine/neck surgery - did wean off muscle relaxor and oxy - has been off ~2 m. And inpain that full time.    Order given to facility and faxed to pharm.

## 2018-05-21 ENCOUNTER — NURSING HOME VISIT (OUTPATIENT)
Dept: GERIATRICS | Facility: CLINIC | Age: 83
End: 2018-05-21
Payer: COMMERCIAL

## 2018-05-21 VITALS
RESPIRATION RATE: 16 BRPM | HEART RATE: 75 BPM | DIASTOLIC BLOOD PRESSURE: 76 MMHG | WEIGHT: 101.5 LBS | OXYGEN SATURATION: 96 % | SYSTOLIC BLOOD PRESSURE: 128 MMHG | TEMPERATURE: 98.1 F | BODY MASS INDEX: 20.5 KG/M2

## 2018-05-21 DIAGNOSIS — M15.0 PRIMARY OSTEOARTHRITIS INVOLVING MULTIPLE JOINTS: Primary | ICD-10-CM

## 2018-05-21 DIAGNOSIS — M54.2 NECK PAIN: ICD-10-CM

## 2018-05-21 DIAGNOSIS — M79.10 MUSCLE PAIN: ICD-10-CM

## 2018-05-21 DIAGNOSIS — Z98.1 S/P CERVICAL SPINAL FUSION: ICD-10-CM

## 2018-05-21 DIAGNOSIS — I10 BENIGN ESSENTIAL HYPERTENSION: ICD-10-CM

## 2018-05-21 DIAGNOSIS — G47.01 INSOMNIA DUE TO MEDICAL CONDITION: ICD-10-CM

## 2018-05-21 PROCEDURE — 99309 SBSQ NF CARE MODERATE MDM 30: CPT | Performed by: NURSE PRACTITIONER

## 2018-05-21 NOTE — LETTER
5/21/2018        RE: Yuni Otoole  [address]  [facility] [address]        [facility]    Chief Complaint   Patient presents with     Nursing Home Acute       HPI:    Yuni Otoole is a 82 year old  (1935), who is being seen today for an episodic care visit at [facility].    HPI information obtained from: facility chart records, facility staff, patient report and [facility] chart review. Today's concern is:  Primary osteoarthritis involving multiple joints and Muscle pain and S/P cervical spinal fusion and Neck pain  Pt asking to see me today  Noted on going chronic c/o neck pain and generalized joint pain that have not responded well to past/current interventions:  Heat/massage/analgesic balm/tylneol (hx of Florentino 2)  Noted weaned off muscle relaxants and oxy ~2.5 m ago but during marbella and while on meds pt's pain reports haven't changed - always present showing the muscle relaxants and oxy didn't really help alleviate pain.     I did start ultram this recent Friday- f/u today and pt states slight improvement but still present    Does have f/u with neuro surg tomorrow.     Insomnia due to medical condition  Notes she is now sleeping well after trazodone and tx of RLS (and nocturia)    Benign essential hypertension  Noted bp's lower than needed - on BB .     PMH/PSH reviewed in EPIC today      REVIEW OF SYSTEMS:  4 point ROS including Respiratory, CV, GI and , other than that noted in the HPI,  is negative    /76  Pulse 75  Temp 98.1  F (36.7  C)  Resp 16  Wt 101 lb 8 oz (46 kg)  SpO2 96%  BMI 20.5 kg/m2  GENERAL APPEARANCE:  Alert, in no distress  Chronic extended neck with limited ROM. +pain with muscle palpation posterior neck.     ASSESSMENT/PLAN:  Primary osteoarthritis involving multiple joints and Muscle pain and S/P cervical spinal fusion and Neck pain  Localized in neck but also generalized pain in other joints -   Will check ESR  And CRP - question if  more systemic pathology - noted RA w/u in past    Insomnia due to medical condition  Resolved -stable.     Benign essential hypertension  BP goals are  <140/90 mm Hg.This is higher than ACC and AHA recommendations due to risk for hypotension, risk of dizziness and falls, risk of tissue/cerebral hypoperfusion and frailty. Noted patients BP is lower than goal and will adjust plan of care by lower BB.          1.  ESR & CR.P 5/22 labs - please connect with Dr. López and offer to draw to draw their ordered/scheduled labs here tomorrow am or if they could add on above blood draws at their lab apts.  Dx: generalized joint pain  2.  Decrease Lopressor to 12.5 mg po BID.  Dx: HTN       Electronically signed by:  CRISTAL Olmos CNP      Sincerely,        CRISTAL Olmos CNP

## 2018-05-21 NOTE — PROGRESS NOTES
[facility]    Chief Complaint   Patient presents with     Nursing Home Acute       HPI:    Yuni Otoole is a 82 year old  (1935), who is being seen today for an episodic care visit at [facility].    HPI information obtained from: facility chart records, facility staff, patient report and [facility] chart review. Today's concern is:  Primary osteoarthritis involving multiple joints and Muscle pain and S/P cervical spinal fusion and Neck pain  Pt asking to see me today  Noted on going chronic c/o neck pain and generalized joint pain that have not responded well to past/current interventions:  Heat/massage/analgesic balm/tylneol (hx of Florentino 2)  Noted weaned off muscle relaxants and oxy ~2.5 m ago but during marbella and while on meds pt's pain reports haven't changed - always present showing the muscle relaxants and oxy didn't really help alleviate pain.     I did start ultram this recent Friday- f/u today and pt states slight improvement but still present    Does have f/u with neuro surg tomorrow.     Insomnia due to medical condition  Notes she is now sleeping well after trazodone and tx of RLS (and nocturia)    Benign essential hypertension  Noted bp's lower than needed - on BB .     PMH/PSH reviewed in EPIC today      REVIEW OF SYSTEMS:  4 point ROS including Respiratory, CV, GI and , other than that noted in the HPI,  is negative    /76  Pulse 75  Temp 98.1  F (36.7  C)  Resp 16  Wt 101 lb 8 oz (46 kg)  SpO2 96%  BMI 20.5 kg/m2  GENERAL APPEARANCE:  Alert, in no distress  Chronic extended neck with limited ROM. +pain with muscle palpation posterior neck.     ASSESSMENT/PLAN:  Primary osteoarthritis involving multiple joints and Muscle pain and S/P cervical spinal fusion and Neck pain  Localized in neck but also generalized pain in other joints -   Will check ESR  And CRP - question if more systemic pathology - noted RA w/u in past    Insomnia due to medical  condition  Resolved -stable.     Benign essential hypertension  BP goals are  <140/90 mm Hg.This is higher than ACC and AHA recommendations due to risk for hypotension, risk of dizziness and falls, risk of tissue/cerebral hypoperfusion and frailty. Noted patients BP is lower than goal and will adjust plan of care by lower BB.          1.  ESR & CR.P 5/22 labs - please connect with Dr. López and offer to draw to draw their ordered/scheduled labs here tomorrow am or if they could add on above blood draws at their lab apts.  Dx: generalized joint pain  2.  Decrease Lopressor to 12.5 mg po BID.  Dx: HTN       Electronically signed by:  CRISTAL Olmos CNP

## 2018-05-22 ENCOUNTER — MEDICAL CORRESPONDENCE (OUTPATIENT)
Dept: HEALTH INFORMATION MANAGEMENT | Facility: CLINIC | Age: 83
End: 2018-05-22

## 2018-05-22 ENCOUNTER — HOSPITAL LABORATORY (OUTPATIENT)
Dept: NURSING HOME | Facility: OTHER | Age: 83
End: 2018-05-22

## 2018-05-22 ENCOUNTER — OFFICE VISIT (OUTPATIENT)
Dept: NEUROSURGERY | Facility: CLINIC | Age: 83
End: 2018-05-22
Payer: COMMERCIAL

## 2018-05-22 ENCOUNTER — RADIANT APPOINTMENT (OUTPATIENT)
Dept: GENERAL RADIOLOGY | Facility: CLINIC | Age: 83
End: 2018-05-22
Attending: NEUROLOGICAL SURGERY
Payer: COMMERCIAL

## 2018-05-22 VITALS — DIASTOLIC BLOOD PRESSURE: 68 MMHG | HEART RATE: 70 BPM | SYSTOLIC BLOOD PRESSURE: 115 MMHG

## 2018-05-22 DIAGNOSIS — M48.02 SPINAL STENOSIS IN CERVICAL REGION: ICD-10-CM

## 2018-05-22 DIAGNOSIS — M43.23 FUSION OF SPINE, CERVICOTHORACIC REGION: ICD-10-CM

## 2018-05-22 DIAGNOSIS — M43.8X2 SWAN NECK DEFORMITY OF CERVICAL SPINE: ICD-10-CM

## 2018-05-22 DIAGNOSIS — E55.9 VITAMIN D DEFICIENCY: ICD-10-CM

## 2018-05-22 DIAGNOSIS — Z98.1 ARTHRODESIS STATUS: ICD-10-CM

## 2018-05-22 DIAGNOSIS — M47.12 CERVICAL SPONDYLOSIS WITH MYELOPATHY: ICD-10-CM

## 2018-05-22 DIAGNOSIS — S12.600D: ICD-10-CM

## 2018-05-22 DIAGNOSIS — E55.9 VITAMIN D DEFICIENCY: Primary | ICD-10-CM

## 2018-05-22 DIAGNOSIS — Z48.89 SURGICAL AFTERCARE, INJURY OR TRAUMA: Primary | ICD-10-CM

## 2018-05-22 LAB
CRP SERPL-MCNC: <2.9 MG/L (ref 0–8)
ERYTHROCYTE [SEDIMENTATION RATE] IN BLOOD BY WESTERGREN METHOD: 30 MM/H (ref 0–30)

## 2018-05-22 ASSESSMENT — PAIN SCALES - GENERAL: PAINLEVEL: MILD PAIN (3)

## 2018-05-22 NOTE — MR AVS SNAPSHOT
After Visit Summary   5/22/2018    Yuni Otoole    MRN: [id number]           Patient Information     Date Of Birth          1935        Visit Information        Provider Department      5/22/2018 12:00 PM Lana Hauser PA-C [facility]        Today's Diagnoses     Vitamin D deficiency    -  1    Fusion of spine, cervicothoracic region        [place] neck deformity of cervical spine        Cervical spondylosis with myelopathy           Follow-ups after your visit        Your next 10 appointments already scheduled     Jun 11, 2018 12:30 PM CDT   [facility]    [address] [place] [id number]   [phone]            Jun 11, 2018  1:00 PM CDT   [facility]arg [facility]    [address] [place] [id number]   [phone]            Nov 29, 2018 11:00 AM CST   XR SPINE COMPLETE 2 VIEWS with UCXR1   [facility] [place] [facility] ([facility])    [address] [place] [id number] [phone]           Please bring a list of your current medicines to your exam. (Include vitamins, minerals and over-thecounter medicines.) Leave your valuables at home.  Tell your doctor if there is a chance you may be pregnant.  You do not need to do anything special for this exam.            Nov 29, 2018 11:30 AM CST   (Arrive by 11:15 AM)   Return Visit with Enma López MD   [facility] ([facility] [place])    [address] [place] [id number] [phone]              Future tests that were ordered for you today     Open Future Orders        Priority Expected Expires Ordered    X-ray Spine complete (Cervicothoracolumbar AP and lateral - standing views preferred) [[id number]] Routine 5/22/2018 5/22/2019 5/22/2018             Who to contact     Please call your clinic at [phone] to:    Ask questions about your health    Make or cancel appointments    Discuss your medicines    Learn about your test results    Speak to your doctor            Additional Information About Your Visit        [employer] Information     [employer] gives you secure access to your electronic health record. If you see a primary care provider, you can also send messages to your care team and make appointments. If you have questions, please call your primary care clinic.  If you do not have a primary care provider, please call [phone] and they will assist you.      [employer] is an electronic gateway that provides easy, online access to your medical records. With [employer], you can request a clinic appointment, read your test results, renew a prescription or communicate with your care team.     To access your existing account, please contact your [facility] or call [phone] for assistance.        Care EveryWhere ID     This is your Care EveryWhere ID. This could be used by other organizations to access your [facility] medical records  [id number]        Your Vitals Were     Pulse                   70            Blood Pressure from Last 3 Encounters:   05/22/18 115/68   05/21/18 128/76   05/07/18 134/68    Weight from Last 3 Encounters:   05/21/18 46 kg (101 lb 8 oz)   05/07/18 45.3 kg (99 lb 12.8 oz)   04/13/18 45.3 kg (99 lb 12.8 oz)               Primary Care Provider Office Phone # Fax #    Shaista Adilia Mcrae, APRN [phone]       [address] [place] [id number]        Equal Access to Services     CARLOS MCKNIGHT : Hadii aad ku hadasho Soomaali, waaxda luqadaha, qaybta kaalmada adeegyada, elias bruno . So [facility] [phone].    ATENCIÓN: Si habla español, tiene a pierson disposición servicios gratuitos de asistencia lingüística. Llame al [phone].    We comply with  applicable federal civil rights laws and [place] laws. We do not discriminate on the basis of race, color, national origin, age, disability, sex, sexual orientation, or gender identity.            Thank you!     Thank you for choosing [facility]  for your care. Our goal is always to provide you with excellent care. Hearing back from our patients is one way we can continue to improve our services. Please take a few minutes to complete the written survey that you may receive in the mail after your visit with us. Thank you!             Your Updated Medication List - Protect others around you: Learn how to safely use, store and throw away your medicines at www.disposemymeds.org.          This list is accurate as of 5/22/18  1:04 PM.  Always use your most recent med list.                   Brand Name Dispense Instructions for use Diagnosis    BIOFREEZE EX      Externally apply topically 2 times daily as needed        calcium carbonate 500 MG tablet    OS-ANNETTA 500 mg [place]    90 tablet    Take 1 tablet (1,250 mg) by mouth 2 times daily (with meals)    Cervical stenosis of spine, S/P cervical spinal fusion       metoprolol tartrate 25 MG tablet    LOPRESSOR    60 tablet    Take 12.5 mg by mouth 2 times daily    Cervical stenosis of spine, S/P cervical spinal fusion       MULTIVITAMIN ADULT PO      Take 1 tablet by mouth daily        NATURAL BALANCE TEARS OP      Apply 2 drops to eye 4 times daily as needed        NORVASC PO      Take 5 mg by mouth At Bedtime        NUTRITIONAL SUPPLEMENT PO      Take 4 oz by mouth daily        polyethylene glycol Packet    MIRALAX/GLYCOLAX    7 packet    17 g by Oral or Feeding Tube route 3 times daily as needed for constipation    Cervical stenosis of spine, S/P cervical spinal fusion       REQUIP PO      Take 1.5 mg by mouth At Bedtime        SALONPAS PAIN RELIEF PATCH EX      Externally apply 1 patch topically 2 times daily        senna-docusate 8.6-50 MG per tablet     SENOKOT-S;PERICOLACE    100 tablet    1 tablet by Oral or Feeding Tube route daily Also QD PRN    Cervical stenosis of spine, S/P cervical spinal fusion       TRAZODONE HCL PO      Take 50 mg by mouth At Bedtime Also PRN if before 3 am.        TYLENOL PO      Take 1,000 mg by mouth 3 times daily Also 500 mg BID PRN        ULTRAM PO      Take 50 mg by mouth 2 times daily And q day prn        VITAMIN D-3 PO      Take 4,000 Units by mouth daily

## 2018-05-22 NOTE — NURSING NOTE
Chief Complaint   Patient presents with     RECHECK     3 month follow up with X-rays and labs prior . Unable to obtai weight . Wheel chair bound     Blood pressure 115/68, pulse 70, not currently breastfeeding.    Jessee Neves LPN

## 2018-05-22 NOTE — PROGRESS NOTES
Neurosurgery clinic post op  Date of visit:    5/22/2018      Procedure:  11/15/17 René López [place]  Diagnosis:   1. 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 Cervical stenosis with myelopathy  3. Malnutrition  4. Osteoporosis and vitamin D deficiency  5. SIADH-induced hyponatremia   Procedure: three intraoperative position changes:  Part 1:  1. Stealth-guided posterior segmental instrumentation C2-T3  2. Cervical laminectomy C1-C3  3. Facet osteotomies Cervical 2-3, Cervical 4-5, Cervical 6-7,  4. Use of intraoperative neuromonitoring  5. Use of intraoperative fluoroscopy  6. Use of intraoperative neuronavigation      Part 2:   1. Right Approach Cervical 2-3, Cervical 4-5 and Cervical 6-7 Anterior Cervical Discectomy And Fusion  2. [place] tongs placement for cervical traction for deformity reduction  3. Use of intraoperative microscope  4. Use of intraoperative neuromonitoring  5. Use of intraoperative fluoroscopy      Part 3:   1. Cervical 2-Thoracic 3 Posterior Fusion With Autograft And Allograft  2. Reduction of cervical spine with osteotomy closure and kyphoscoliosis correction  3. Use of intraoperative neuromonitoring  4. Use of intraoperative fluoroscopy    Implants: Synthes synapse screws with 3.5mm to 5mm transitional rods posteriorly; zero P lordotic anterior implants   Findings: Significant stenosis at C1-C2, well decompressed. Good reduction seen on final XR     Indications for Surgery:  Yuni Otoole is a 82 year old female presenting to the Emergency room with numbness in her hands bilaterally and severe bilateral upper extremity and proximal lower extremity weakness, referable to severe C1-C2 cervical stenosis with progressive kyphotic deformity and kyphoscoliosis with chin on chest deformity. Also notable is severe malnutrition from poor oral intake without given of 3 and severe vitamin D deficiency with osteoporosis. The patient had previously seen   Kwabena in clinic who recommended considering surgical intervention, however the patient was lost to follow-up and presented in extremis. My partner on call at her admission Dr. Merritt asked me to take over surgical care and I met with the patient and her family in detail to explain operative risks, benefits, and alternatives.....  The patient and her family understood and wished to proceed.  HPI:     Yuni Otoole is a very pleasant 82 year old female here in scheduled postoperative followup from spinal deformity correction surgery that Dr. López performed on 11/15/17, for chin on chest deformity with 63 degrees rigid kyphoscoliosis and severe spinal cord compression, treated with 540-degree fusion via posterior-anterior-posterior fusion C2-T3.    Prior to surgery she had quadriparesis, neck pain, weakness/numbness in bilateral upper extremities, inability to feed herself due to inability to grasp utensils, difficulty with urination, loss of appetite, and weight loss. Albumin was 2.5 prior to surgery. Surgery was performed urgently due to neurologic decline. We have been treating her malnutrition with goal protein intake of 1.5 grams protein per kg body weight per day with Arginaid and Ensure Plus as well as vitamin A, C, E, and zinc supplementation ordered for wound healing supplementation. Her 25-OH vitamin D level is 17; we started her on ergocalciferol 50,000IU every 3 days x 30 days, and cholecalciferol 5000 IU daily x 6 months    She is now 6 months post op. And is now on cholecalciferol 4000 units per day.She is walking with assistance of a walker and standby help.  She is able to get into bed by herself, but not out of bed independently.  She is getting closer to discharge from the care center.  She has had plain x-rays and a vitamin D level drawn today. She has a fair amount of shoulder girdle pain, left scapular pain, and some right-sided neck pain. Her primary care nurse practitioner was concerned about  these various joint aches and pains and requested that we add a CRP and ESR to her labs today.    Patient Supplied Answers To the [facility] Pain Questionnaire  [facility] Pain -  Patient Entered Questionnaire/Answers 5/22/2018   What number best describes your pain right now:  0 = No pain  to  10 = Worst pain imaginable 3   How would you describe the pain? sharp   Which of the following worsen your pain? exercise   Which of the following improve or reduce your pain?  medication   What number best describes your average pain for the past week:  0 = No pain  to  10 = Worst pain imaginable 2   What number best describes your LOWEST pain in past 24 hours:  0 = No pain  to  10 = Worst pain imaginable 2   What number best describes your WORST pain in past 24 hours:  0 = No pain  to  10 = Worst pain imaginable 3   When is your pain worst? Constant   What non-medicine treatments have you already had for your pain? -   Have you tried treating your pain with medication?  Yes   Are you currently taking medications for your pain? Yes            Current Outpatient Prescriptions:      Acetaminophen (TYLENOL PO), Take 1,000 mg by mouth 3 times daily Also 500 mg BID PRN, Disp: , Rfl:      AmLODIPine Besylate (NORVASC PO), Take 5 mg by mouth At Bedtime , Disp: , Rfl:      calcium carbonate (OS-ANNETTA 500 MG [place]. [place]) 1250 MG tablet, Take 1 tablet (1,250 mg) by mouth 2 times daily (with meals), Disp: 90 tablet, Rfl:      Cholecalciferol (VITAMIN D-3 PO), Take 4,000 Units by mouth daily, Disp: , Rfl:      Hypromellose (NATURAL BALANCE TEARS OP), Apply 2 drops to eye 4 times daily as needed, Disp: , Rfl:      Liniments (SALONPAS PAIN RELIEF PATCH EX), Externally apply 1 patch topically 2 times daily, Disp: , Rfl:      Menthol, Topical Analgesic, (BIOFREEZE EX), Externally apply topically 2 times daily as needed , Disp: , Rfl:      metoprolol (LOPRESSOR) 25 MG tablet, Take 12.5 mg by mouth 2 times daily , Disp: 60 tablet, Rfl:      Multiple  Vitamins-Minerals (MULTIVITAMIN ADULT PO), Take 1 tablet by mouth daily, Disp: , Rfl:      Nutritional Supplements (NUTRITIONAL SUPPLEMENT PO), Take 4 oz by mouth daily , Disp: , Rfl:      polyethylene glycol (MIRALAX/GLYCOLAX) Packet, 17 g by Oral or Feeding Tube route 3 times daily as needed for constipation , Disp: 7 packet, Rfl:      ROPINIRole HCl (REQUIP PO), Take 1.5 mg by mouth At Bedtime , Disp: , Rfl:      senna-docusate (SENOKOT-S;PERICOLACE) 8.6-50 MG per tablet, 1 tablet by Oral or Feeding Tube route daily Also QD PRN, Disp: 100 tablet, Rfl:      TraMADol HCl (ULTRAM PO), Take 50 mg by mouth 2 times daily And q day prn, Disp: , Rfl:      TRAZODONE HCL PO, Take 50 mg by mouth At Bedtime Also PRN if before 3 am., Disp: , Rfl:   Allergies   Allergen Reactions     Atorvastatin Calcium      Was on Lipitor and has muscle problem     PMH, SOC HIST, FAM HIST, PROBLEM LIST:  All reviewed in EPIC.    OBJECTIVE:  /68  Pulse 70    Imaging:  IMAGING:  These are the pertinent radiologist's findings from:  Full length spine on the EOS  5/22/18  FINDINGS: Full length films demonstrate a line drawn from the middle  of the head falls just to the left of S1 by 1.4 cm. This is on the AP  film.  On the lateral film a line drawn caudally from this external auditory  meatus falls 7.8 cm anterior the posterior superior aspect of S1.     Changes from attempted fusion cervical spine approximately C2-T2 with  screws and rods posteriorly.   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   Marked loss of disc space height on the concave side of that curve.  Leftward slip of L3 on L4 and L4 and L5 similar to previous film.     There degenerative changes seen in the hip joints bilaterally with  marked flattening of the femoral heads and subchondral sclerotic and  cystic changes noted. Dysplastic right acetabulum with  pseudoacetabulum superiorly  into the right. 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     The pelvis is horizontal.     There is a complete rotator cuff tear on the right and probably on the  left with superior migration of the humeral head and undersurface  erosion of the acromium and distal right clavicle.  Neurosurgery PA impression: Overall alignment of the spine in 2 planes is satisfactory and unchanged from post op. Hardware is in good position without evidence failure.    Please see Epic for the bulk of the report.  I personally reviewed the images with the patient    EXAM:  Well developed very thin but well nourished female found seated in wheelchair. She is able to sit and rise with assistance of 1.  No apparent distress. She is accompanied by her  and daughter.  A&O X3.  Mood and affect WNL. Language and fund of knowledge intact.     She has nicely healed incisions.  Exam at discharge:      Delt Bi Tri WE WF    R 1 4- 4+ 4+ 4 4   L 2 4- 4+ 4+ 4 4      IP Quad Ham DF PF EHL   R 3 4+ 4+ 4+ 4+ 4+   L 3 4+ 4+ 4+ 4+ 4+       Exam today:      STRENGTH LEFT RIGHT   Deltoid 4 2 ** right humeral head superior subluxation   Bicep 5 4   Wrist Extensor 4 4   Tricep 5 3   Finger flexion 4 4   Finger abduction 4 4    4 4          Hip Flexion         5         5   Knee Extension 5 5   Ankle Dorsiflexion 5 5   Extensor Hallucis Longus 5 5   Plantar Flexion 5 5      Babinski's upgoing, no clonus    Assessment/Plan:  1. Vitamin D deficiency    2. Fusion of spine, cervicothoracic region    3. [place] neck deformity of cervical spine    4. Cervical spondylosis with myelopathy      ASSESSMENT:  82  Year old female doing well with improved myelopathic symptoms 6 months s/p posterior-anterior-posterior C2-T2 fusion for rigid kyphoscoliosis with spinal cord compression and preoperative quadriparesis.  She has made remarkable neurologic recovery  Her primary care was concerned about her generalized upper body aches  and pains.I wrote her a note explaining that this is not uncommon in someone who has such a dramatically surgically altered cervical spine. This is primarily muscular and can be managed with judicious use of nonsteroidal anti-inflammatories, muscle relaxers, massage, trigger point injections, etc. She had requested that we draw an ESR and CRP which we did do,  But I think this is not too much of a mystery. I also discussed this with the patient and explained the situation. Additionally the patient has some rather severe shoulder issues (see the EOS x-ray report) which may be addressed by orthopedics if she wishes to go down that road.    PLAN:  Continue in the care center until  she meets discharge criteria  Return to clinic in 6 months to see Dr. López with EOS xrays. That's 1 year postop.  Once I have the vitamin D levels I will know what to do with the cholecalciferol. I will call her.    We appreciate the opportunity to be of service in the care of this pleasant patient.  Please do call if there is anything more we can do    Lana Hauser PA-C  [facility]  Department of Neurosurgery  Phone: [phone]  Fax: [phone]    This note was generated using voice recognition software. While edited for content some inaccurate phrasing may be found.

## 2018-05-22 NOTE — LETTER
5/22/2018       RE: Yuni Otoole  [address]  [facility] [address]     Dear Colleague,    Thank you for referring your patient, Yuni Otoole, to the [facility] NEUROSURGERY at [facility]. Please see a copy of my visit note below.      Neurosurgery clinic post op  Date of visit:    5/22/2018      Procedure:  11/15/17 René López [place]  Diagnosis:   1. 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 Cervical stenosis with myelopathy  3. Malnutrition  4. Osteoporosis and vitamin D deficiency  5. SIADH-induced hyponatremia   Procedure: three intraoperative position changes:  Part 1:  1. Stealth-guided posterior segmental instrumentation C2-T3  2. Cervical laminectomy C1-C3  3. Facet osteotomies Cervical 2-3, Cervical 4-5, Cervical 6-7,  4. Use of intraoperative neuromonitoring  5. Use of intraoperative fluoroscopy  6. Use of intraoperative neuronavigation      Part 2:   1. Right Approach Cervical 2-3, Cervical 4-5 and Cervical 6-7 Anterior Cervical Discectomy And Fusion  2. [place] tongs placement for cervical traction for deformity reduction  3. Use of intraoperative microscope  4. Use of intraoperative neuromonitoring  5. Use of intraoperative fluoroscopy      Part 3:   1. Cervical 2-Thoracic 3 Posterior Fusion With Autograft And Allograft  2. Reduction of cervical spine with osteotomy closure and kyphoscoliosis correction  3. Use of intraoperative neuromonitoring  4. Use of intraoperative fluoroscopy    Implants: Synthes synapse screws with 3.5mm to 5mm transitional rods posteriorly; zero P lordotic anterior implants   Findings: Significant stenosis at C1-C2, well decompressed. Good reduction seen on final XR     Indications for Surgery:  Yuni Otoole is a 82 year old female presenting to the Emergency room with numbness in her hands bilaterally and severe bilateral upper extremity and proximal lower extremity weakness, referable to  severe C1-C2 cervical stenosis with progressive kyphotic deformity and kyphoscoliosis with chin on chest deformity. Also notable is severe malnutrition from poor oral intake without given of 3 and severe vitamin D deficiency with osteoporosis. The patient had previously seen Dr. Yuan in clinic who recommended considering surgical intervention, however the patient was lost to follow-up and presented in extremis. My partner on call at her admission Dr. Merritt asked me to take over surgical care and I met with the patient and her family in detail to explain operative risks, benefits, and alternatives.....  The patient and her family understood and wished to proceed.  HPI:     Yuni Otoole is a very pleasant 82 year old female here in scheduled postoperative followup from spinal deformity correction surgery that Dr. López performed on 11/15/17, for chin on chest deformity with 63 degrees rigid kyphoscoliosis and severe spinal cord compression, treated with 540-degree fusion via posterior-anterior-posterior fusion C2-T3.    Prior to surgery she had quadriparesis, neck pain, weakness/numbness in bilateral upper extremities, inability to feed herself due to inability to grasp utensils, difficulty with urination, loss of appetite, and weight loss. Albumin was 2.5 prior to surgery. Surgery was performed urgently due to neurologic decline. We have been treating her malnutrition with goal protein intake of 1.5 grams protein per kg body weight per day with Arginaid and Ensure Plus as well as vitamin A, C, E, and zinc supplementation ordered for wound healing supplementation. Her 25-OH vitamin D level is 17; we started her on ergocalciferol 50,000IU every 3 days x 30 days, and cholecalciferol 5000 IU daily x 6 months    She is now 6 months post op. And is now on cholecalciferol 4000 units per day.She is walking with assistance of a walker and standby help.  She is able to get into bed by herself, but not out of bed  independently.  She is getting closer to discharge from the care center.  She has had plain x-rays and a vitamin D level drawn today. She has a fair amount of shoulder girdle pain, left scapular pain, and some right-sided neck pain. Her primary care nurse practitioner was concerned about these various joint aches and pains and requested that we add a CRP and ESR to her labs today.    Patient Supplied Answers To the [facility] Pain Questionnaire  [facility] Pain -  Patient Entered Questionnaire/Answers 5/22/2018   What number best describes your pain right now:  0 = No pain  to  10 = Worst pain imaginable 3   How would you describe the pain? sharp   Which of the following worsen your pain? exercise   Which of the following improve or reduce your pain?  medication   What number best describes your average pain for the past week:  0 = No pain  to  10 = Worst pain imaginable 2   What number best describes your LOWEST pain in past 24 hours:  0 = No pain  to  10 = Worst pain imaginable 2   What number best describes your WORST pain in past 24 hours:  0 = No pain  to  10 = Worst pain imaginable 3   When is your pain worst? Constant   What non-medicine treatments have you already had for your pain? -   Have you tried treating your pain with medication?  Yes   Are you currently taking medications for your pain? Yes            Current Outpatient Prescriptions:      Acetaminophen (TYLENOL PO), Take 1,000 mg by mouth 3 times daily Also 500 mg BID PRN, Disp: , Rfl:      AmLODIPine Besylate (NORVASC PO), Take 5 mg by mouth At Bedtime , Disp: , Rfl:      calcium carbonate (OS-ANNETTA 500 MG [place]. [place]) 1250 MG tablet, Take 1 tablet (1,250 mg) by mouth 2 times daily (with meals), Disp: 90 tablet, Rfl:      Cholecalciferol (VITAMIN D-3 PO), Take 4,000 Units by mouth daily, Disp: , Rfl:      Hypromellose (NATURAL BALANCE TEARS OP), Apply 2 drops to eye 4 times daily as needed, Disp: , Rfl:      Liniments (SALONPAS PAIN RELIEF PATCH EX), Externally apply 1  patch topically 2 times daily, Disp: , Rfl:      Menthol, Topical Analgesic, (BIOFREEZE EX), Externally apply topically 2 times daily as needed , Disp: , Rfl:      metoprolol (LOPRESSOR) 25 MG tablet, Take 12.5 mg by mouth 2 times daily , Disp: 60 tablet, Rfl:      Multiple Vitamins-Minerals (MULTIVITAMIN ADULT PO), Take 1 tablet by mouth daily, Disp: , Rfl:      Nutritional Supplements (NUTRITIONAL SUPPLEMENT PO), Take 4 oz by mouth daily , Disp: , Rfl:      polyethylene glycol (MIRALAX/GLYCOLAX) Packet, 17 g by Oral or Feeding Tube route 3 times daily as needed for constipation , Disp: 7 packet, Rfl:      ROPINIRole HCl (REQUIP PO), Take 1.5 mg by mouth At Bedtime , Disp: , Rfl:      senna-docusate (SENOKOT-S;PERICOLACE) 8.6-50 MG per tablet, 1 tablet by Oral or Feeding Tube route daily Also QD PRN, Disp: 100 tablet, Rfl:      TraMADol HCl (ULTRAM PO), Take 50 mg by mouth 2 times daily And q day prn, Disp: , Rfl:      TRAZODONE HCL PO, Take 50 mg by mouth At Bedtime Also PRN if before 3 am., Disp: , Rfl:   Allergies   Allergen Reactions     Atorvastatin Calcium      Was on Lipitor and has muscle problem     PMH, SOC HIST, FAM HIST, PROBLEM LIST:  All reviewed in EPIC.    OBJECTIVE:  /68  Pulse 70    Imaging:  IMAGING:  These are the pertinent radiologist's findings from:  Full length spine on the EOS  5/22/18  FINDINGS: Full length films demonstrate a line drawn from the middle  of the head falls just to the left of S1 by 1.4 cm. This is on the AP  film.  On the lateral film a line drawn caudally from this external auditory  meatus falls 7.8 cm anterior the posterior superior aspect of S1.     Changes from attempted fusion cervical spine approximately C2-T2 with  screws and rods posteriorly.   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   Marked loss of disc space height on the concave side of that curve.  Leftward slip of L3  on L4 and L4 and L5 similar to previous film.     There degenerative changes seen in the hip joints bilaterally with  marked flattening of the femoral heads and subchondral sclerotic and  cystic changes noted. Dysplastic right acetabulum with  pseudoacetabulum superiorly into the right. 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     The pelvis is horizontal.     There is a complete rotator cuff tear on the right and probably on the  left with superior migration of the humeral head and undersurface  erosion of the acromium and distal right clavicle.  Neurosurgery PA impression: Overall alignment of the spine in 2 planes is satisfactory and unchanged from post op. Hardware is in good position without evidence failure.    Please see Epic for the bulk of the report.  I personally reviewed the images with the patient    EXAM:  Well developed very thin but well nourished female found seated in wheelchair. She is able to sit and rise with assistance of 1.  No apparent distress. She is accompanied by her  and daughter.  A&O X3.  Mood and affect WNL. Language and fund of knowledge intact.     She has nicely healed incisions.  Exam at discharge:      Delt Bi Tri WE WF    R 1 4- 4+ 4+ 4 4   L 2 4- 4+ 4+ 4 4      IP Quad Ham DF PF EHL   R 3 4+ 4+ 4+ 4+ 4+   L 3 4+ 4+ 4+ 4+ 4+       Exam today:      STRENGTH LEFT RIGHT   Deltoid 4 2 ** right humeral head superior subluxation   Bicep 5 4   Wrist Extensor 4 4   Tricep 5 3   Finger flexion 4 4   Finger abduction 4 4    4 4          Hip Flexion         5         5   Knee Extension 5 5   Ankle Dorsiflexion 5 5   Extensor Hallucis Longus 5 5   Plantar Flexion 5 5      Babinski's upgoing, no clonus    Assessment/Plan:  1. Vitamin D deficiency    2. Fusion of spine, cervicothoracic region    3. [place] neck deformity of cervical spine    4. Cervical spondylosis with myelopathy      ASSESSMENT:  82  Year old female doing well with improved  myelopathic symptoms 6 months s/p posterior-anterior-posterior C2-T2 fusion for rigid kyphoscoliosis with spinal cord compression and preoperative quadriparesis.  She has made remarkable neurologic recovery  Her primary care was concerned about her generalized upper body aches and pains.I wrote her a note explaining that this is not uncommon in someone who has such a dramatically surgically altered cervical spine. This is primarily muscular and can be managed with judicious use of nonsteroidal anti-inflammatories, muscle relaxers, massage, trigger point injections, etc. She had requested that we draw an ESR and CRP which we did do,  But I think this is not too much of a mystery. I also discussed this with the patient and explained the situation. Additionally the patient has some rather severe shoulder issues (see the EOS x-ray report) which may be addressed by orthopedics if she wishes to go down that road.    PLAN:  Continue in the care center until  she meets discharge criteria  Return to clinic in 6 months to see Dr. López with EOS xrays. That's 1 year postop.  Once I have the vitamin D levels I will know what to do with the cholecalciferol. I will call her.    We appreciate the opportunity to be of service in the care of this pleasant patient.  Please do call if there is anything more we can do    Lana Hauser PA-C  [facility]  Department of Neurosurgery  Phone: [phone]  Fax: [phone]    This note was generated using voice recognition software. While edited for content some inaccurate phrasing may be found.

## 2018-05-23 LAB
DEPRECATED CALCIDIOL+CALCIFEROL SERPL-MC: <68 UG/L (ref 20–75)
VITAMIN D2 SERPL-MCNC: <5 UG/L
VITAMIN D3 SERPL-MCNC: 63 UG/L

## 2018-06-06 VITALS
WEIGHT: 98.4 LBS | BODY MASS INDEX: 19.87 KG/M2 | OXYGEN SATURATION: 96 % | TEMPERATURE: 97.8 F | HEART RATE: 72 BPM | RESPIRATION RATE: 18 BRPM | SYSTOLIC BLOOD PRESSURE: 118 MMHG | DIASTOLIC BLOOD PRESSURE: 71 MMHG

## 2018-06-06 NOTE — PROGRESS NOTES
[facility]    Chief Complaint   Patient presents with     custodial Regulatory       [place] Medical Record Number:  [id number]    HPI:    Yuni Otoole is a 82 year old  (1935), who is being seen today for a federally mandated E/M visit at [facility]  .  HPI information obtained from: facility staff, patient report and [facility] chart review.     Today's concerns are:  -  Resident seen and examined.   - reports pain is sharp off an on over right neck, left scapular area. Lidoderm and massage helps to some extend along with meds, aggravated with movements.  On prn ibuprofen, and reports it does help. Reports neck surgery  - completed rehab and now walk with one SBA.   - reports appetite is too good, sleep better with meds, and BM is fine.   - denies CP, SOB.   - RN / GNP has no concern today.   ------------------------------------------  - - Past Medical, social, family histories, medications, and allergies reviewed and updated  - Medications reviewed: in the chart and EHR.   - Case Management:   I have reviewed the care plan and MDS and do agree with the plan. Patient's desire to return to the community is not present.  Information reviewed:  Medications, vital signs, orders, and nursing notes.    :MEDICATIONS:  Current Outpatient Prescriptions   Medication Sig Dispense Refill     Acetaminophen (TYLENOL PO) Take 1,000 mg by mouth 3 times daily as needed Also 08606 mg HS       AmLODIPine Besylate (NORVASC PO) Take 5 mg by mouth At Bedtime        calcium carbonate (OS-ANNETTA 500 MG [place]. [place]) 1250 MG tablet Take 1 tablet (1,250 mg) by mouth 2 times daily (with meals) 90 tablet      Cholecalciferol (VITAMIN D-3 PO) Take 4,000 Units by mouth daily       Hypromellose (NATURAL BALANCE TEARS OP) Apply 2 drops to eye 4 times daily as needed       IBUPROFEN PO Take 400 mg by mouth 2 times daily        Liniments (SALONPAS PAIN RELIEF PATCH EX) Externally apply 1 patch  topically 2 times daily       Menthol, Topical Analgesic, (BIOFREEZE EX) Externally apply topically 2 times daily as needed        metoprolol (LOPRESSOR) 25 MG tablet Take 12.5 mg by mouth 2 times daily  60 tablet      Multiple Vitamins-Minerals (MULTIVITAMIN ADULT PO) Take 1 tablet by mouth daily       Nutritional Supplements (NUTRITIONAL SUPPLEMENT PO) Take 4 oz by mouth daily        polyethylene glycol (MIRALAX/GLYCOLAX) Packet 17 g by Oral or Feeding Tube route 3 times daily as needed for constipation  7 packet      ROPINIRole HCl (REQUIP PO) Take 1.5 mg by mouth At Bedtime        senna-docusate (SENOKOT-S;PERICOLACE) 8.6-50 MG per tablet 1 tablet by Oral or Feeding Tube route daily Also QD  tablet      TraMADol HCl (ULTRAM PO) Take 50 mg by mouth 2 times daily And q day prn       TRAZODONE HCL PO Take 50 mg by mouth At Bedtime Also PRN if before 3 am.     Medications started since last EPIC medication reconciliation:  Orders Placed This Encounter   Medications     IBUPROFEN PO     Sig: Take 400 mg by mouth 3 times daily as needed for moderate pain     ROS:  4 point ROS including Respiratory, CV, GI and , other than that noted in the HPI,  is negative    Exam:  Vitals: /71  Pulse 72  Temp 97.8  F (36.6  C)  Resp 18  Wt 98 lb 6.4 oz (44.6 kg)  SpO2 96%  BMI 19.87 kg/m2  BMI= Body mass index is 19.87 kg/(m^2).  GENERAL APPEARANCE:  in no distress, cooperative  RESP:  respiratory effort and palpation of chest normal, lungs clear to auscultation , no respiratory distress  CV:  Palpation and auscultation of heart done , regular rate and rhythm, systolic over right upper sternal border murmur, rub, or gallop, S1S2 bounding and rapid, but regular.   ABDOMEN:  normal bowel sounds, soft, nontender, no hepatosplenomegaly or other masses  M/S:   Gait and station abnormal uses walker and WC. Tender spot over medial surface of left scapula.   SKIN:  Inspection of skin and subcutaneous tissue baseline,  Palpation of skin and subcutaneous tissue baseline  NEURO:   generalized muscles atrophy, hand  4/5 b/l.  Dorsiflexion 5/5 bilateral.   PSYCH:  affect and mood normal    Lab/Diagnostic data:   CBC RESULTS:   Recent Labs   Lab Test  02/20/18   0615  11/30/17   0610  11/20/17   1112  11/17/17   0401   WBC   --    --   8.0  10.6   RBC   --    --   3.21*  3.35*   HGB  12.2  10.5*  9.7*  10.1*   HCT   --    --   30.2*  30.6*   MCV   --    --   94  91   MCH   --    --   30.2  30.1   MCHC   --    --   32.1  33.0   RDW   --    --   13.6  14.9   PLT   --    --   246  171   Last Basic Metabolic Panel:  Recent Labs   Lab Test  04/17/18   0703  02/27/18   0610  02/20/18   0615   11/28/17   0610   NA  134  135  135   < >  134   POTASSIUM   --    --   3.8   --   3.9   CHLORIDE   --    --   99   --   98   ANNETTA   --    --   9.5   --   8.9   CO2   --    --   29   --   29   BUN   --    --   20   --   14   CR   --    --   0.45*   --   0.51*   GLC   --    --   90   --   85    < > = values in this interval not displayed.     ASSESSMENT/PLAN  # Neck pain  - hx of recent C1-C3 [place]; C2-C3 Posterior Fusion; C2/3, C3/4, C6/7 Anterior Decompression and Fusion; C2/3, C4/5, and C6/7 Facet Osteotomies; C2-T3 Instrumentation   - analgesia suboptimal. Improves with ibuprofen, will schedule for 14 days only per Resident's request. Counseled about AE of ibuprofen including CHF, hepatic and renal injury among others.   - monitor very closely.   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   - Saw NS last month, no change in the current plan.     Benign essential hypertension:  BP Readings from Last 3 Encounters:   06/06/18 118/71   05/22/18 115/68   05/21/18 128/76   - controlled. Continue meds. In this age group keep SBP > 130 mmHg.      Osteoporosis /Vitamin D deficiency  - level 17, on supplement Vit D3 3000 IU. Continue. On Ca supplement from MV.       Frailty:  - continues to require  assistance from nursing. Up for meals only o/w spends the day resting in bed  - continue rehab.      Orders:  1.  Start Ibuprofen 400 mg po BID (am & noon) x 14 days.  Dx: pain  2.  Hold PRN Ibuprofen x 14 days.  3.  Change Tylenol to 1000 mg po QHS and TID PRN.  Dx: pain    Total time spent with patient visit at the skilled nursing facility was 35 min including patient visit and review of past records and discussing with NP. Greater than 50% of total time spent with counseling and coordinating care due to above issues.     Electronically signed by:  Ceasar Aburto MD

## 2018-06-07 ENCOUNTER — NURSING HOME VISIT (OUTPATIENT)
Dept: GERIATRICS | Facility: CLINIC | Age: 83
End: 2018-06-07
Payer: COMMERCIAL

## 2018-06-07 DIAGNOSIS — R54 FRAIL ELDERLY: ICD-10-CM

## 2018-06-07 DIAGNOSIS — I10 BENIGN ESSENTIAL HYPERTENSION: ICD-10-CM

## 2018-06-07 DIAGNOSIS — M54.2 NECK PAIN: ICD-10-CM

## 2018-06-07 DIAGNOSIS — M43.23 FUSION OF SPINE, CERVICOTHORACIC REGION: Primary | ICD-10-CM

## 2018-06-07 DIAGNOSIS — Z98.1 S/P CERVICAL SPINAL FUSION: ICD-10-CM

## 2018-06-07 DIAGNOSIS — E55.9 VITAMIN D DEFICIENCY: ICD-10-CM

## 2018-06-07 PROCEDURE — 99310 SBSQ NF CARE HIGH MDM 45: CPT | Performed by: FAMILY MEDICINE

## 2018-06-07 NOTE — LETTER
6/7/2018        RE: Yuni Otoole  [address]  [facility] [address]          [facility]    Chief Complaint   Patient presents with     detention Regulatory       [facility] Medical Record Number:  [id number]    HPI:    Yuni Otoole is a 82 year old  (1935), who is being seen today for a federally mandated E/M visit at [facility]  .  HPI information obtained from: facility staff, patient report and [facility] chart review.     Today's concerns are:  -  Resident seen and examined.   - reports pain is sharp off an on over right neck, left scapular area. Lidoderm and massage helps to some extend along with meds, aggravated with movements.  On prn ibuprofen, and reports it does help. Reports neck surgery  - completed rehab and now walk with one SBA.   - reports appetite is too good, sleep better with meds, and BM is fine.   - denies CP, SOB.   - RN / GNP has no concern today.   ------------------------------------------  - - Past Medical, social, family histories, medications, and allergies reviewed and updated  - Medications reviewed: in the chart and EHR.   - Case Management:   I have reviewed the care plan and MDS and do agree with the plan. Patient's desire to return to the community is not present.  Information reviewed:  Medications, vital signs, orders, and nursing notes.    :MEDICATIONS:  Current Outpatient Prescriptions   Medication Sig Dispense Refill     Acetaminophen (TYLENOL PO) Take 1,000 mg by mouth 3 times daily as needed Also 66850 mg HS       AmLODIPine Besylate (NORVASC PO) Take 5 mg by mouth At Bedtime        calcium carbonate (OS-ANNETTA 500 MG [place]. [place]) 1250 MG tablet Take 1 tablet (1,250 mg) by mouth 2 times daily (with meals) 90 tablet      Cholecalciferol (VITAMIN D-3 PO) Take 4,000 Units by mouth daily       Hypromellose (NATURAL BALANCE TEARS OP) Apply 2 drops to eye 4 times daily as needed       IBUPROFEN PO Take 400 mg by mouth 2 times  daily        Liniments (SALONPAS PAIN RELIEF PATCH EX) Externally apply 1 patch topically 2 times daily       Menthol, Topical Analgesic, (BIOFREEZE EX) Externally apply topically 2 times daily as needed        metoprolol (LOPRESSOR) 25 MG tablet Take 12.5 mg by mouth 2 times daily  60 tablet      Multiple Vitamins-Minerals (MULTIVITAMIN ADULT PO) Take 1 tablet by mouth daily       Nutritional Supplements (NUTRITIONAL SUPPLEMENT PO) Take 4 oz by mouth daily        polyethylene glycol (MIRALAX/GLYCOLAX) Packet 17 g by Oral or Feeding Tube route 3 times daily as needed for constipation  7 packet      ROPINIRole HCl (REQUIP PO) Take 1.5 mg by mouth At Bedtime        senna-docusate (SENOKOT-S;PERICOLACE) 8.6-50 MG per tablet 1 tablet by Oral or Feeding Tube route daily Also QD  tablet      TraMADol HCl (ULTRAM PO) Take 50 mg by mouth 2 times daily And q day prn       TRAZODONE HCL PO Take 50 mg by mouth At Bedtime Also PRN if before 3 am.     Medications started since last EPIC medication reconciliation:  Orders Placed This Encounter   Medications     IBUPROFEN PO     Sig: Take 400 mg by mouth 3 times daily as needed for moderate pain     ROS:  4 point ROS including Respiratory, CV, GI and , other than that noted in the HPI,  is negative    Exam:  Vitals: /71  Pulse 72  Temp 97.8  F (36.6  C)  Resp 18  Wt 98 lb 6.4 oz (44.6 kg)  SpO2 96%  BMI 19.87 kg/m2  BMI= Body mass index is 19.87 kg/(m^2).  GENERAL APPEARANCE:  in no distress, cooperative  RESP:  respiratory effort and palpation of chest normal, lungs clear to auscultation , no respiratory distress  CV:  Palpation and auscultation of heart done , regular rate and rhythm, systolic over right upper sternal border murmur, rub, or gallop, S1S2 bounding and rapid, but regular.   ABDOMEN:  normal bowel sounds, soft, nontender, no hepatosplenomegaly or other masses  M/S:   Gait and station abnormal uses walker and WC. Tender spot over medial surface  of left scapula.   SKIN:  Inspection of skin and subcutaneous tissue baseline, Palpation of skin and subcutaneous tissue baseline  NEURO:   generalized muscles atrophy, hand  4/5 b/l.  Dorsiflexion 5/5 bilateral.   PSYCH:  affect and mood normal    Lab/Diagnostic data:   CBC RESULTS:   Recent Labs   Lab Test  02/20/18   0615  11/30/17   0610  11/20/17   1112  11/17/17   0401   WBC   --    --   8.0  10.6   RBC   --    --   3.21*  3.35*   HGB  12.2  10.5*  9.7*  10.1*   HCT   --    --   30.2*  30.6*   MCV   --    --   94  91   MCH   --    --   30.2  30.1   MCHC   --    --   32.1  33.0   RDW   --    --   13.6  14.9   PLT   --    --   246  171   Last Basic Metabolic Panel:  Recent Labs   Lab Test  04/17/18   0703  02/27/18   0610  02/20/18   0615   11/28/17   0610   NA  134  135  135   < >  134   POTASSIUM   --    --   3.8   --   3.9   CHLORIDE   --    --   99   --   98   ANNETTA   --    --   9.5   --   8.9   CO2   --    --   29   --   29   BUN   --    --   20   --   14   CR   --    --   0.45*   --   0.51*   GLC   --    --   90   --   85    < > = values in this interval not displayed.     ASSESSMENT/PLAN  # Neck pain  - hx of recent C1-C3 Lami; C2-C3 Posterior Fusion; C2/3, C3/4, C6/7 Anterior Decompression and Fusion; C2/3, C4/5, and C6/7 Facet Osteotomies; C2-T3 Instrumentation   - analgesia suboptimal. Improves with ibuprofen, will schedule for 14 days only per Resident's request. Counseled about AE of ibuprofen including CHF, hepatic and renal injury among others.   - monitor very closely.   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   - Saw NS last month, no change in the current plan.     Benign essential hypertension:  BP Readings from Last 3 Encounters:   06/06/18 118/71   05/22/18 115/68   05/21/18 128/76   - controlled. Continue meds. In this age group keep SBP > 130 mmHg.      Osteoporosis /Vitamin D deficiency  - level 17, on supplement Vit D3 3000 IU.  Continue. On Ca supplement from MV.       Frailty:  - continues to require assistance from nursing. Up for meals only o/w spends the day resting in bed  - continue rehab.      Orders:  1.  Start Ibuprofen 400 mg po BID (am & noon) x 14 days.  Dx: pain  2.  Hold PRN Ibuprofen x 14 days.  3.  Change Tylenol to 1000 mg po QHS and TID PRN.  Dx: pain    Total time spent with patient visit at the skilled nursing facility was 35 min including patient visit and review of past records and discussing with NP. Greater than 50% of total time spent with counseling and coordinating care due to above issues.     Electronically signed by:  Ceasar Aburto MD        Sincerely,        Ceasar Aburto MD

## 2018-06-11 ENCOUNTER — OFFICE VISIT (OUTPATIENT)
Dept: AUDIOLOGY | Facility: CLINIC | Age: 83
End: 2018-06-11
Payer: COMMERCIAL

## 2018-06-11 DIAGNOSIS — H90.3 SENSORINEURAL HEARING LOSS, BILATERAL: Primary | ICD-10-CM

## 2018-06-11 PROCEDURE — 92557 COMPREHENSIVE HEARING TEST: CPT | Performed by: AUDIOLOGIST

## 2018-06-11 PROCEDURE — V5020 CONFORMITY EVALUATION: HCPCS | Performed by: AUDIOLOGIST

## 2018-06-11 PROCEDURE — 92550 TYMPANOMETRY & REFLEX THRESH: CPT | Performed by: AUDIOLOGIST

## 2018-06-11 PROCEDURE — 99207 ZZC NO CHARGE LOS: CPT | Performed by: AUDIOLOGIST

## 2018-06-11 NOTE — MR AVS SNAPSHOT
After Visit Summary   6/11/2018    Yuni Otoole    MRN: [id number]           Patient Information     Date Of Birth          1935        Visit Information        Provider Department      6/11/2018 12:30 PM Monika Reed [facility]        Today's Diagnoses     Sensorineural hearing loss, bilateral    -  1       Follow-ups after your visit        Your next 10 appointments already scheduled     Nov 29, 2018 11:00 AM CST   XR SPINE COMPLETE 2 VIEWS with UCXR1   [facility] ([facility])    [address] [place] [address] [id number] [phone]           Please bring a list of your current medicines to your exam. (Include vitamins, minerals and over-thecounter medicines.) Leave your valuables at home.  Tell your doctor if there is a chance you may be pregnant.  You do not need to do anything special for this exam.            Nov 29, 2018 11:30 AM CST   (Arrive by 11:15 AM)   Return Visit with Enma López MD   [facility] ([facility])    [address] [place] [address] [phone]              Who to contact     If you have questions or need follow up information about today's clinic visit or your schedule please contact [facility] directly at [phone].  Normal or non-critical lab and imaging results will be communicated to you by MyChart, letter or phone within 4 business days after the clinic has received the results. If you do not hear from us within 7 days, please contact the clinic through MyChart or phone. If you have a critical or abnormal lab result, we will notify you by phone as soon as possible.  Submit refill requests through [employer] or call your pharmacy and they will forward the refill request to us. Please allow 3 business days for your refill to be completed.          Additional Information About Your  Visit        [facility] Information     [facility] gives you secure access to your electronic health record. If you see a primary care provider, you can also send messages to your care team and make appointments. If you have questions, please call your primary care clinic.  If you do not have a primary care provider, please call [phone] and they will assist you.        Care EveryWhere ID     This is your Care EveryWhere ID. This could be used by other organizations to access your [place] medical records  [id number]         Blood Pressure from Last 3 Encounters:   06/06/18 118/71   05/22/18 115/68   05/21/18 128/76    Weight from Last 3 Encounters:   06/06/18 98 lb 6.4 oz (44.6 kg)   05/21/18 101 lb 8 oz (46 kg)   05/07/18 99 lb 12.8 oz (45.3 kg)              We Performed the Following     AUDIOGRAM/TYMPANOGRAM - INTERFACE     COMPREHENSIVE HEARING TEST     HEARING AID CONFORMITY EVALUATION     TYMPANOMETRY AND REFLEX THRESHOLD MEASUREMENTS        Primary Care Provider Office Phone # Fax #    Shaista Adilia Mcrae, APRN [phone]       [address] [facility] [address]  [facility] [address]        Equal Access to Services     VALARIE MCKNIGHT AH: Hadii aad ku hadasho Soomaali, waaxda luqadaha, qaybta kaalmada adeegyada, waxay tyeshain hayaan adesaundra fox laearnestn ah. So [facility] [phone].    ATENCIÓN: Si habla español, tiene a pierson disposición servicios gratuitos de asistencia lingüística. Lewis al [phone].    We comply with applicable federal civil rights laws and [place] laws. We do not discriminate on the basis of race, color, national origin, age, disability, sex, sexual orientation, or gender identity.            Thank you!     Thank you for choosing [facility]  for your care. Our goal is always to provide you with excellent care. Hearing back from our patients is one way we can continue to improve our services. Please take a few minutes to complete the written survey that you may receive in the  mail after your visit with us. Thank you!             Your Updated Medication List - Protect others around you: Learn how to safely use, store and throw away your medicines at www.disposemymeds.org.          This list is accurate as of 6/11/18 11:59 PM.  Always use your most recent med list.                   Brand Name Dispense Instructions for use Diagnosis    BIOFREEZE EX      Externally apply topically 2 times daily as needed        calcium carbonate 500 MG tablet    OS-ANNETTA 500 mg [place]. [place]    90 tablet    Take 1 tablet (1,250 mg) by mouth 2 times daily (with meals)    Cervical stenosis of spine, S/P cervical spinal fusion       IBUPROFEN PO      Take 400 mg by mouth 2 times daily        metoprolol tartrate 25 MG tablet    LOPRESSOR    60 tablet    Take 12.5 mg by mouth 2 times daily    Cervical stenosis of spine, S/P cervical spinal fusion       MULTIVITAMIN ADULT PO      Take 1 tablet by mouth daily        NATURAL BALANCE TEARS OP      Apply 2 drops to eye 4 times daily as needed        NORVASC PO      Take 5 mg by mouth At Bedtime        NUTRITIONAL SUPPLEMENT PO      Take 4 oz by mouth daily        polyethylene glycol Packet    MIRALAX/GLYCOLAX    7 packet    17 g by Oral or Feeding Tube route 3 times daily as needed for constipation    Cervical stenosis of spine, S/P cervical spinal fusion       REQUIP PO      Take 1.5 mg by mouth At Bedtime        SALONPAS PAIN RELIEF PATCH EX      Externally apply 1 patch topically 2 times daily        senna-docusate 8.6-50 MG per tablet    SENOKOT-S;PERICOLACE    100 tablet    1 tablet by Oral or Feeding Tube route daily Also QD PRN    Cervical stenosis of spine, S/P cervical spinal fusion       TRAZODONE HCL PO      Take 50 mg by mouth At Bedtime Also PRN if before 3 am.        TYLENOL PO      Take 1,000 mg by mouth 3 times daily as needed Also 91411 mg HS        ULTRAM PO      Take 50 mg by mouth 2 times daily And q day prn        VITAMIN D-3 PO      Take 4,000 Units by  mouth daily

## 2018-06-12 NOTE — PROGRESS NOTES
AUDIOLOGY REPORT    SUBJECTIVE:  Yuni Otoole is a 82 year old female who was seen in the Audiology Clinic at the [facility] for audiologic evaluation, self-referred.  The patient has been seen previously in this clinic on 12/22/2010 for assessment and results indicated moderate sensorineural hearing loss bilaterally The patient reports decreased hearing, right ear worse than left in the last 3-4 months. The patient denies  bilateral tinnitus, bilateral otalgia, bilateral drainage and bilateral aural fullness.  The patient notes difficulty with communication in a variety of listening situations.  They were accompanied today by their .    Since her visit 5/1/2018 where impacted wax was observed in the right ear, the ear has been cleaned.    OBJECTIVE:  Otoscopic exam indicates ears are clear of cerumen bilaterally     Pure Tone Thresholds assessed using conventional audiometry with good  reliability from 250-8000 Hz bilaterally using insert earphones     RIGHT:  moderate sensorineural hearing loss    LEFT:    moderate sensorineural hearing loss    Tympanogram:    RIGHT: normal eardrum mobility    LEFT:   normal eardrum mobility    Reflexes (reported by stimulus ear):  RIGHT: Ipsilateral is absent at frequencies tested  RIGHT: Contralateral is absent at frequencies tested  LEFT:   Ipsilateral is absent at frequencies tested  LEFT:   Contralateral is absent at frequencies tested      Speech Reception Threshold:    RIGHT: 65 dB HL    LEFT:   60 dB HL    Word Recognition Score:     RIGHT: 76% at 95 dB HL using NU-6 recorded word list.    LEFT:   68% at 90 dB HL using NU-6 recorded word list.    BINAURAL:  76% at 95 dB using NU-6 recorded word list.    Hearing aids were adjusted to match, as closely as possible, NAL-NL1 speech targets output was verified using real ear measures and showed a good match to speech targets with a 65 dB input (hearing aids are at max  gain settings).  After adjustments Mrs. Otoole reported the devices sounded balanced and speech was comfortable.      ASSESSMENT:   Binaural sensorineural hearing loss.  Compared to patient's previous audiogram dated 12/2010, hearing has remained stable overall. Today s results were discussed with the patient in detail.     PLAN:   It is recommended that the patient return as needed for hearing evaluation and hearing aid maintenance and programming..  Please call this clinic with questions regarding these results or recommendations.        Riri Sharp.  [place] Licensed Audiologist #[id number]

## 2018-06-15 ENCOUNTER — NURSING HOME VISIT (OUTPATIENT)
Dept: GERIATRICS | Facility: CLINIC | Age: 83
End: 2018-06-15
Payer: COMMERCIAL

## 2018-06-15 VITALS
DIASTOLIC BLOOD PRESSURE: 67 MMHG | SYSTOLIC BLOOD PRESSURE: 125 MMHG | WEIGHT: 98.4 LBS | RESPIRATION RATE: 18 BRPM | OXYGEN SATURATION: 96 % | BODY MASS INDEX: 19.87 KG/M2 | TEMPERATURE: 98.6 F | HEART RATE: 78 BPM

## 2018-06-15 DIAGNOSIS — R60.0 LOCALIZED EDEMA: ICD-10-CM

## 2018-06-15 DIAGNOSIS — G25.81 RESTLESS LEGS SYNDROME (RLS): ICD-10-CM

## 2018-06-15 DIAGNOSIS — G89.29 CHRONIC PAIN OF RIGHT KNEE: ICD-10-CM

## 2018-06-15 DIAGNOSIS — M19.90 GENERALIZED ARTHRITIS: ICD-10-CM

## 2018-06-15 DIAGNOSIS — E44.0 MODERATE PROTEIN-CALORIE MALNUTRITION (H): ICD-10-CM

## 2018-06-15 DIAGNOSIS — M25.561 CHRONIC PAIN OF RIGHT KNEE: ICD-10-CM

## 2018-06-15 DIAGNOSIS — G47.01 INSOMNIA DUE TO MEDICAL CONDITION: ICD-10-CM

## 2018-06-15 DIAGNOSIS — M54.2 NECK PAIN: Primary | ICD-10-CM

## 2018-06-15 PROCEDURE — 99309 SBSQ NF CARE MODERATE MDM 30: CPT | Performed by: NURSE PRACTITIONER

## 2018-06-15 NOTE — PROGRESS NOTES
"[place] [facility]    Chief Complaint   Patient presents with     Nursing Home Acute       HPI:    Yuni Otoole is a 82 year old  (1935), who is being seen today for an episodic care visit at [facility].    HPI information obtained from: facility chart records, facility staff, patient report and [facility] chart review. Today's concern is:  Neck pain and Generalized arthritis and Chronic pain of right knee  Pt c/o on going generalized pain/ chronic neck pain and R knee pain.   Did start trial of PRN nsaid and pt states it is helping but asking for medication timing adjustment.   Noted pain is 2/2 advanced osteoarthi/ muscle demormity/stiffness2/2 chronic neck pain/deconditioning/poor alignment. Is getting topical bio-freeze, scheduled tylenol and ultram, doing exercises and getting occasional massages - using heat/ice prn. R knee also bothering pt (chronic)    Localized edema and Moderate protein-calorie malnutrition (H)  Hx of edema t/o months - f/u today after NSAID x 1 week - as can exacerbate   Pt states it is about the same. She does not want to try compression but did discuss soft/cotton like compression option    F/U on insomnia and RLS and pt states she is doing quite well with sleep not that RLS is resolved and hasn't used PRN Trazodone.     PMH/PSH reviewed in EPIC today    REVIEW OF SYSTEMS:  4 point ROS including Respiratory, CV, GI and , other than that noted in the HPI,  is negative    /67  Pulse 78  Temp 98.6  F (37  C)  Resp 18  Wt 98 lb 6.4 oz (44.6 kg)  SpO2 96%  BMI 19.87 kg/m2     GENERAL APPEARANCE:  Alert, in no distress  resp - non labored, clear  CV HRRR +1 edema on R and +2 on L  MS: + muscle tenderness on marv scap/rhomb and erector spinae. Neck with obvious fixation from past surgery.  R knee enlarged with sig crepitus and \"clunk\" with extension/flexion. Pt states the clunck doesn't hurt but she can hear it more than feel it.     ASSESSMENT/PLAN:  Neck " pain and Generalized arthritis and Chronic pain of right knee  Discussed risks of NSAIDS and although they work - they can cause sig other problems down the road if used long term. AGreed on total of 3 week trail of bid and then trail to decrease.   Noted Florentino 2 was tested ~1 m ago and pt didn't feel it was helping after ~ 1 week.   Cont to work on other pain modalities - exersies/ topicals - tyelnol, massage, heat/ice. Cont ultram. Will check bMP given NSAID - hgb also pending 6/19      Localized edema  Likely PVD but also 2/2 low protein - has been on a protein supplement     Moderate protein-calorie malnutrition (H)  Due for recheck 6/19  Lab Results   Component Value Date    ALBUMIN 2.5 11/27/2017     Insomnia/RLS  Improved. Will DC prn Trazodone.   - might trial GDR of scheduled in 1-2 m pending on level of pain as that was also a factor of insomina.     HTN  On BB And norvasc (noted Norvasc causes edema) - BP's stabel - occ lower than goal - won't lower now given NSAID use as that can increase.     1.  Change current scheduled Tylenol to 1000 mg po Q noon and HS, change PRN Tylenol to BID PRN.  Dx: pain  2.  Change current Ibuprofen to 400 mg po Q am and 5 pm meal scheduled x 2 weeks, then decrease to 400 mg po Q am x 2 weeks, then d/c.  3. Check BMP  Given NSAID with 6/19 labs of HGB and alb, Fe studies  4. DC PRN trazodone 2/2 non use      Electronically signed by:  CRSITAL Olmos CNP

## 2018-06-15 NOTE — LETTER
"    6/15/2018        RE: Yuni Otoole  [address]  [facility] [id number]        [place] [facility]    Chief Complaint   Patient presents with     Nursing Home Acute       HPI:    Yuni Otoole is a 82 year old  (1935), who is being seen today for an episodic care visit at [facility].    HPI information obtained from: facility chart records, facility staff, patient report and [facility] chart review. Today's concern is:  Neck pain and Generalized arthritis and Chronic pain of right knee  Pt c/o on going generalized pain/ chronic neck pain and R knee pain.   Did start trial of PRN nsaid and pt states it is helping but asking for medication timing adjustment.   Noted pain is 2/2 advanced osteoarthi/ muscle demormity/stiffness2/2 chronic neck pain/deconditioning/poor alignment. Is getting topical bio-freeze, scheduled tylenol and ultram, doing exercises and getting occasional massages - using heat/ice prn. R knee also bothering pt (chronic)    Localized edema and Moderate protein-calorie malnutrition (H)  Hx of edema t/o months - f/u today after NSAID x 1 week - as can exacerbate   Pt states it is about the same. She does not want to try compression but did discuss soft/cotton like compression option    F/U on insomnia and RLS and pt states she is doing quite well with sleep not that RLS is resolved and hasn't used PRN Trazodone.     PMH/PSH reviewed in EPIC today    REVIEW OF SYSTEMS:  4 point ROS including Respiratory, CV, GI and , other than that noted in the HPI,  is negative    /67  Pulse 78  Temp 98.6  F (37  C)  Resp 18  Wt 98 lb 6.4 oz (44.6 kg)  SpO2 96%  BMI 19.87 kg/m2     GENERAL APPEARANCE:  Alert, in no distress  resp - non labored, clear  CV HRRR +1 edema on R and +2 on L  MS: + muscle tenderness on marv scap/rhomb and erector spinae. Neck with obvious fixation from past surgery.  R knee enlarged with sig crepitus and \"clunk\" with extension/flexion. Pt states the " clunck doesn't hurt but she can hear it more than feel it.     ASSESSMENT/PLAN:  Neck pain and Generalized arthritis and Chronic pain of right knee  Discussed risks of NSAIDS and although they work - they can cause sig other problems down the road if used long term. AGreed on total of 3 week trail of bid and then trail to decrease.   Noted Florentino 2 was tested ~1 m ago and pt didn't feel it was helping after ~ 1 week.   Cont to work on other pain modalities - exersies/ topicals - tyelnol, massage, heat/ice. Cont ultram. Will check bMP given NSAID - hgb also pending 6/19      Localized edema  Likely PVD but also 2/2 low protein - has been on a protein supplement     Moderate protein-calorie malnutrition (H)  Due for recheck 6/19  Lab Results   Component Value Date    ALBUMIN 2.5 11/27/2017     Insomnia/RLS  Improved. Will DC prn Trazodone.   - might trial GDR of scheduled in 1-2 m pending on level of pain as that was also a factor of insomina.     HTN  On BB And norvasc (noted Norvasc causes edema) - BP's stabel - occ lower than goal - won't lower now given NSAID use as that can increase.     1.  Change current scheduled Tylenol to 1000 mg po Q noon and HS, change PRN Tylenol to BID PRN.  Dx: pain  2.  Change current Ibuprofen to 400 mg po Q am and 5 pm meal scheduled x 2 weeks, then decrease to 400 mg po Q am x 2 weeks, then d/c.  3. Check BMP  Given NSAID with 6/19 labs of HGB and alb, Fe studies  4. DC PRN trazodone 2/2 non use      Electronically signed by:  CRISTAL Olmos CNP        Sincerely,        CRISTAL Olmos CNP

## 2018-06-19 ENCOUNTER — HOSPITAL LABORATORY (OUTPATIENT)
Dept: NURSING HOME | Facility: OTHER | Age: 83
End: 2018-06-19

## 2018-06-19 LAB
ALBUMIN SERPL-MCNC: 3.4 G/DL (ref 3.4–5)
ANION GAP SERPL CALCULATED.3IONS-SCNC: 10 MMOL/L (ref 3–14)
BUN SERPL-MCNC: 15 MG/DL (ref 7–30)
CALCIUM SERPL-MCNC: 9.1 MG/DL (ref 8.5–10.1)
CHLORIDE SERPL-SCNC: 99 MMOL/L (ref 94–109)
CHOLEST SERPL-MCNC: 208 MG/DL
CO2 SERPL-SCNC: 27 MMOL/L (ref 20–32)
CREAT SERPL-MCNC: 0.45 MG/DL (ref 0.52–1.04)
GFR SERPL CREATININE-BSD FRML MDRD: >90 ML/MIN/1.7M2
GLUCOSE SERPL-MCNC: 80 MG/DL (ref 70–99)
HDLC SERPL-MCNC: 73 MG/DL
HGB BLD-MCNC: 11.3 G/DL (ref 11.7–15.7)
LDLC SERPL CALC-MCNC: 124 MG/DL
NONHDLC SERPL-MCNC: 135 MG/DL
POTASSIUM SERPL-SCNC: 4 MMOL/L (ref 3.4–5.3)
SODIUM SERPL-SCNC: 136 MMOL/L (ref 133–144)
TRIGL SERPL-MCNC: 53 MG/DL

## 2018-06-20 ENCOUNTER — NURSING HOME VISIT (OUTPATIENT)
Dept: GERIATRICS | Facility: CLINIC | Age: 83
End: 2018-06-20
Payer: COMMERCIAL

## 2018-06-20 VITALS
DIASTOLIC BLOOD PRESSURE: 67 MMHG | RESPIRATION RATE: 18 BRPM | BODY MASS INDEX: 19.84 KG/M2 | TEMPERATURE: 98.6 F | HEART RATE: 78 BPM | OXYGEN SATURATION: 96 % | SYSTOLIC BLOOD PRESSURE: 125 MMHG | WEIGHT: 98.4 LBS | HEIGHT: 59 IN

## 2018-06-20 DIAGNOSIS — M54.2 NECK PAIN: Primary | ICD-10-CM

## 2018-06-20 DIAGNOSIS — M19.90 GENERALIZED ARTHRITIS: ICD-10-CM

## 2018-06-20 DIAGNOSIS — Z98.1 S/P CERVICAL SPINAL FUSION: ICD-10-CM

## 2018-06-20 DIAGNOSIS — I10 BENIGN ESSENTIAL HYPERTENSION: ICD-10-CM

## 2018-06-20 DIAGNOSIS — E78.2 MIXED HYPERLIPIDEMIA: ICD-10-CM

## 2018-06-20 DIAGNOSIS — R60.0 LOCALIZED EDEMA: ICD-10-CM

## 2018-06-20 DIAGNOSIS — D50.9 IRON DEFICIENCY ANEMIA, UNSPECIFIED IRON DEFICIENCY ANEMIA TYPE: ICD-10-CM

## 2018-06-20 PROCEDURE — 99309 SBSQ NF CARE MODERATE MDM 30: CPT | Performed by: NURSE PRACTITIONER

## 2018-06-20 NOTE — PROGRESS NOTES
"[place] [facility]    Chief Complaint   Patient presents with     Nursing Home Acute       HPI:    Yuni Otoole is a 82 year old  (1935), who is being seen today for an episodic care visit at [facility].    HPI information obtained from: facility chart records, facility staff, patient report and [facility] chart review. Today's concern is:     Neck pain  S/P cervical spinal fusion  Localized edema  Generalized arthritis  Iron deficiency anemia, unspecified iron deficiency anemia type  Mixed hyperlipidemia  Benign essential hypertension pt asking to see me again r/t ongoing neck pain - noted visit with pt on 6/15 for adjustment of bid oral NSAID and pt states she still is having breakthrough pain and needs TID oral nsaid.   Per pt Tylenol not at all helping NSAID works but doesn't last as long as she needs. Biofreese helping.   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 tightness  No changes in ROM      Hospital Laboratory on 06/19/2018   Component Date Value Ref Range Status     Hemoglobin 06/19/2018 11.3* 11.7 - 15.7 g/dL Final     Albumin 06/19/2018 3.4  3.4 - 5.0 g/dL Final     Cholesterol 06/19/2018 208* <200 mg/dL Final    Desirable:       <200 mg/dl     Triglycerides 06/19/2018 53  <150 mg/dL Final     HDL Cholesterol 06/19/2018 73  >49 mg/dL Final     LDL Cholesterol Calculated 06/19/2018 124* <100 mg/dL Final    Comment: Above desirable:  100-129 mg/dl  Borderline High:  130-159 mg/dL  High:             160-189 mg/dL  Very high:       " >189 mg/dl       Non HDL Cholesterol 06/19/2018 135* <130 mg/dL Final    Comment: Above Desirable:  130-159 mg/dl  Borderline high:  160-189 mg/dl  High:             190-219 mg/dl  Very high:       >219 mg/dl       Sodium 06/19/2018 136  133 - 144 mmol/L Final     Potassium 06/19/2018 4.0  3.4 - 5.3 mmol/L Final     Chloride 06/19/2018 99  94 - 109 mmol/L Final     Carbon Dioxide 06/19/2018 27  20 - 32 mmol/L Final     Anion Gap 06/19/2018 10  3 - 14 mmol/L Final     Glucose 06/19/2018 80  70 - 99 mg/dL Final     Urea Nitrogen 06/19/2018 15  7 - 30 mg/dL Final     Creatinine 06/19/2018 0.45* 0.52 - 1.04 mg/dL Final     GFR Estimate 06/19/2018 >90  >60 mL/min/1.7m2 Final    Non  GFR Calc     GFR Estimate If Black 06/19/2018 >90  >60 mL/min/1.7m2 Final    African American GFR Calc     Calcium 06/19/2018 9.1  8.5 - 10.1 mg/dL Final         ASSESSMENT/PLAN:  Neck pain and S/P cervical spinal fusion and Localized edema and Generalized arthritis  Discussed risks of NSAID - GI distress, bleed, Renal Insuf, edema and pt fully aware - able to make her own choices and wants higher dose oral NSAID.   Discussed trial of topical nsaid - she agrees - that might be able to decrease oral .   Cont tyelnol as suspect it is ehlping.   Cont biofreeze as it does help.   Florentino 2 didn't help - suspect pt appreciates increased frequency of dosing.     HTN  Noted BP's lower than needed - but risk for elevation with NSIAD 0- tehrefore not lower norvasc to day and montiro.     Iron deficiency anemia, unspecified iron deficiency anemia type  HGB has dropped being off Fe and appears Fe studies were misses and lipids drawn instead. Will try to add on and if needed restart  Hemoglobin   Date Value Ref Range Status   06/19/2018 11.3 (L) 11.7 - 15.7 g/dL Final   02/20/2018 12.2 11.7 - 15.7 g/dL Final     Mixed hyperlipidemia  Trail off of prevastatin in 5/7 given myalagias and noted pain has not decreased but lipids elevated  Will  restart.        1.  Start Diclofenac Sodium gel 1% - apply to neck BID - 2 gms.  Dx: neck pain  2. HGB recheck 7/24  3. BMP recheck 7/24  4. Add on Fe studies to 6/19 labs    5. Restart Pravastatin 10 mg po q day Dx hyperlipidemia  6. Recheck lipids in 6 m.      F/u next visit if pain improved with nsaid - will trial decrease in ultram    Electronically signed by:  CRISTAL Olmos CNP     Addendum: Fe labs back and low - will restart iron  - Feso4 325 q day     CRISTAL Olmos CNP

## 2018-06-20 NOTE — LETTER
"    6/20/2018        RE: Yuni Otoole  [address]  [facility] [address]        [place] [facility]    Chief Complaint   Patient presents with     Nursing Home Acute       HPI:    Yuni Otoole is a 82 year old  (1935), who is being seen today for an episodic care visit at [facility].    HPI information obtained from: facility chart records, facility staff, patient report and [facility] chart review. Today's concern is:     Neck pain  S/P cervical spinal fusion  Localized edema  Generalized arthritis  Iron deficiency anemia, unspecified iron deficiency anemia type  Mixed hyperlipidemia  Benign essential hypertension pt asking to see me again r/t ongoing neck pain - noted visit with pt on 6/15 for adjustment of bid oral NSAID and pt states she still is having breakthrough pain and needs TID oral nsaid.   Per pt Tylenol not at all helping NSAID works but doesn't last as long as she needs. Biofreese helping.   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 tightness  No changes in ROM      Hospital Laboratory on 06/19/2018   Component Date Value Ref Range Status     Hemoglobin 06/19/2018 11.3* 11.7 - 15.7 g/dL Final     Albumin 06/19/2018 3.4  3.4 - 5.0 g/dL Final     Cholesterol 06/19/2018 208* <200 mg/dL Final    Desirable:       <200 mg/dl     Triglycerides 06/19/2018 53  <150 mg/dL Final     HDL Cholesterol 06/19/2018 73  >49 mg/dL Final     LDL Cholesterol Calculated 06/19/2018 124* <100 mg/dL Final    Comment: Above desirable:  100-129 " mg/dl  Borderline High:  130-159 mg/dL  High:             160-189 mg/dL  Very high:       >189 mg/dl       Non HDL Cholesterol 06/19/2018 135* <130 mg/dL Final    Comment: Above Desirable:  130-159 mg/dl  Borderline high:  160-189 mg/dl  High:             190-219 mg/dl  Very high:       >219 mg/dl       Sodium 06/19/2018 136  133 - 144 mmol/L Final     Potassium 06/19/2018 4.0  3.4 - 5.3 mmol/L Final     Chloride 06/19/2018 99  94 - 109 mmol/L Final     Carbon Dioxide 06/19/2018 27  20 - 32 mmol/L Final     Anion Gap 06/19/2018 10  3 - 14 mmol/L Final     Glucose 06/19/2018 80  70 - 99 mg/dL Final     Urea Nitrogen 06/19/2018 15  7 - 30 mg/dL Final     Creatinine 06/19/2018 0.45* 0.52 - 1.04 mg/dL Final     GFR Estimate 06/19/2018 >90  >60 mL/min/1.7m2 Final    Non  GFR Calc     GFR Estimate If Black 06/19/2018 >90  >60 mL/min/1.7m2 Final    African American GFR Calc     Calcium 06/19/2018 9.1  8.5 - 10.1 mg/dL Final         ASSESSMENT/PLAN:  Neck pain and S/P cervical spinal fusion and Localized edema and Generalized arthritis  Discussed risks of NSAID - GI distress, bleed, Renal Insuf, edema and pt fully aware - able to make her own choices and wants higher dose oral NSAID.   Discussed trial of topical nsaid - she agrees - that might be able to decrease oral .   Cont tyelnol as suspect it is ehlping.   Cont biofreeze as it does help.   Florentino 2 didn't help - suspect pt appreciates increased frequency of dosing.     HTN  Noted BP's lower than needed - but risk for elevation with NSIAD 0- tehrefore not lower norvasc to day and montiro.     Iron deficiency anemia, unspecified iron deficiency anemia type  HGB has dropped being off Fe and appears Fe studies were misses and lipids drawn instead. Will try to add on and if needed restart  Hemoglobin   Date Value Ref Range Status   06/19/2018 11.3 (L) 11.7 - 15.7 g/dL Final   02/20/2018 12.2 11.7 - 15.7 g/dL Final     Mixed hyperlipidemia  Trail off of  prevastatin in 5/7 given myalagias and noted pain has not decreased but lipids elevated  Will restart.        1.  Start Diclofenac Sodium gel 1% - apply to neck BID - 2 gms.  Dx: neck pain  2. HGB recheck 7/24  3. BMP recheck 7/24  4. Add on Fe studies to 6/19 labs    5. Restart Pravastatin 10 mg po q day Dx hyperlipidemia  6. Recheck lipids in 6 m.      F/u next visit if pain improved with nsaid - will trial decrease in ultram    Electronically signed by:  CRISTAL Olmos CNP      Sincerely,        CRISTAL Olmos CNP

## 2018-06-21 LAB
IRON SATN MFR SERPL: 13 % (ref 15–46)
IRON SERPL-MCNC: 57 UG/DL (ref 35–180)
TIBC SERPL-MCNC: 424 UG/DL (ref 240–430)

## 2018-06-21 RX ORDER — FERROUS SULFATE 325(65) MG
325 TABLET ORAL DAILY
COMMUNITY
Start: 2018-06-21 | End: 2018-08-02

## 2018-06-25 ENCOUNTER — TELEPHONE (OUTPATIENT)
Dept: GERIATRICS | Facility: CLINIC | Age: 83
End: 2018-06-25

## 2018-06-25 DIAGNOSIS — E78.2 MIXED HYPERLIPIDEMIA: Primary | ICD-10-CM

## 2018-06-25 RX ORDER — PRAVASTATIN SODIUM 10 MG
10 TABLET ORAL DAILY
Qty: 90 TABLET | Refills: 3 | Status: SHIPPED | OUTPATIENT
Start: 2018-06-25 | End: 2019-05-20

## 2018-06-25 NOTE — TELEPHONE ENCOUNTER
Mixed hyperlipidemia  Sent to [facility] mail order.   - pravastatin (PRAVACHOL) 10 MG tablet; Take 1 tablet (10 mg) by mouth daily

## 2018-07-15 ENCOUNTER — TELEPHONE (OUTPATIENT)
Dept: GERIATRICS | Facility: CLINIC | Age: 83
End: 2018-07-15

## 2018-07-15 NOTE — TELEPHONE ENCOUNTER
[place] [facility] TELEPHONE ENCOUNTER    Chief Complaint   Patient presents with     Refill Request       Yuni Otoole is a 82 year old  (1935),Nurse called today to report: Ativan refill    ASSESSMENT/PLAN  Patient ran out of Ativan. They have 0.5mg tablets in E-Kit - patient takes 0.25mg PO TID. Per nursing they can cut tablets in facility. Requests okay to remove 2 - 0.5mg tablets from E-kit to last patient until Monday    Spoke with [facility] pharmacist. Okay given to remove 2 tabs.    Richa Carvajal, APRN CNP

## 2018-07-24 ENCOUNTER — TELEPHONE (OUTPATIENT)
Dept: GERIATRICS | Facility: CLINIC | Age: 83
End: 2018-07-24

## 2018-07-24 ENCOUNTER — HOSPITAL LABORATORY (OUTPATIENT)
Dept: NURSING HOME | Facility: OTHER | Age: 83
End: 2018-07-24

## 2018-07-24 DIAGNOSIS — M54.2 CERVICALGIA: Primary | ICD-10-CM

## 2018-07-24 LAB
ANION GAP SERPL CALCULATED.3IONS-SCNC: 10 MMOL/L (ref 3–14)
BUN SERPL-MCNC: 15 MG/DL (ref 7–30)
CALCIUM SERPL-MCNC: 9 MG/DL (ref 8.5–10.1)
CHLORIDE SERPL-SCNC: 98 MMOL/L (ref 94–109)
CO2 SERPL-SCNC: 28 MMOL/L (ref 20–32)
CREAT SERPL-MCNC: 0.58 MG/DL (ref 0.52–1.04)
GFR SERPL CREATININE-BSD FRML MDRD: >90 ML/MIN/1.7M2
GLUCOSE SERPL-MCNC: 77 MG/DL (ref 70–99)
HGB BLD-MCNC: 11.4 G/DL (ref 11.7–15.7)
POTASSIUM SERPL-SCNC: 4.2 MMOL/L (ref 3.4–5.3)
SODIUM SERPL-SCNC: 136 MMOL/L (ref 133–144)

## 2018-07-24 RX ORDER — IBUPROFEN 400 MG/1
400 TABLET, FILM COATED ORAL 3 TIMES DAILY
Qty: 270 TABLET | Refills: 3 | Status: SHIPPED | OUTPATIENT
Start: 2018-07-24 | End: 2019-05-21

## 2018-07-24 NOTE — TELEPHONE ENCOUNTER
Cervicalgia  Sent to mail order per pt. She understand risks of nsaid and still wants current poc.   - ibuprofen (ADVIL/MOTRIN) 400 MG tablet; Take 1 tablet (400 mg) by mouth 3 times daily

## 2018-08-02 ENCOUNTER — NURSING HOME VISIT (OUTPATIENT)
Dept: GERIATRICS | Facility: CLINIC | Age: 83
End: 2018-08-02
Payer: COMMERCIAL

## 2018-08-02 VITALS
BODY MASS INDEX: 24.76 KG/M2 | HEART RATE: 71 BPM | HEIGHT: 59 IN | WEIGHT: 122.8 LBS | OXYGEN SATURATION: 96 % | RESPIRATION RATE: 18 BRPM | DIASTOLIC BLOOD PRESSURE: 69 MMHG | SYSTOLIC BLOOD PRESSURE: 132 MMHG | TEMPERATURE: 96 F

## 2018-08-02 DIAGNOSIS — M15.0 PRIMARY OSTEOARTHRITIS INVOLVING MULTIPLE JOINTS: ICD-10-CM

## 2018-08-02 DIAGNOSIS — K59.03 DRUG-INDUCED CONSTIPATION: ICD-10-CM

## 2018-08-02 DIAGNOSIS — M54.2 CERVICALGIA: Primary | ICD-10-CM

## 2018-08-02 DIAGNOSIS — M54.2 NECK PAIN: ICD-10-CM

## 2018-08-02 DIAGNOSIS — G47.01 INSOMNIA DUE TO MEDICAL CONDITION: ICD-10-CM

## 2018-08-02 DIAGNOSIS — D50.9 IRON DEFICIENCY ANEMIA, UNSPECIFIED IRON DEFICIENCY ANEMIA TYPE: ICD-10-CM

## 2018-08-02 PROCEDURE — 99309 SBSQ NF CARE MODERATE MDM 30: CPT | Performed by: NURSE PRACTITIONER

## 2018-08-02 NOTE — PROGRESS NOTES
[place] [facility]  Chief Complaint   Patient presents with     jail Regulatory     [place] Medical Record Number:  [id number]    HPI:    Yuni Otoole is a 82 year old  (1935), who is being seen today for a federally mandated E/M visit at [facility]  .  HPI information obtained from: facility chart records, facility staff, patient report, [facility] chart review and [facility] Every[facility] chart review. Today's concerns are:    Cervicalgia  Neck pain  Primary osteoarthritis involving multiple joints  Patient states that sometimes she has good days with pain and sometimes she has harder days with pain.  She thinks it might be related to the weather.  She is wondering if she is on any medication for arthritis. Her current regimen is:    Tylenol 1000mg BID (and BID PRN, 2 doses given).    Ultram 50mg BID (and every day PRN, wth no PRN doses used).    Ibuprofen 400mg TID (patient understands risk).    Salanpas Patch every day.    Iron deficiency anemia, unspecified iron deficiency anemia type  Drug-induced constipation  Patient c/o constipation. She states she has a good appetite and is sleeping well. She is currently on:    FeSO4 every day.    Senna at bedtime (and every day PRN, 1 dose used in the last 14 days).    Miralax TID PRN (no doses given).    Hemoglobin   Date Value Ref Range Status   07/24/2018 11.4 (L) 11.7 - 15.7 g/dL Final   06/19/2018 11.3 (L) 11.7 - 15.7 g/dL Final       Insomnia due to medical condition  As noted above, patient is sleeping well.  She stated she occasionally has a difficult night's sleep, but otherwise has not complaints.  Current regimen is as follows:     Trazodone 25mg at bedtime.    ALLERGIES: Atorvastatin calcium  PAST MEDICAL HISTORY:  has a past medical history of Allergic rhinitis, cause unspecified; Head injury; Pure hypercholesterolemia; and Unspecified essential hypertension. She also has no past medical history of Diabetes  (H).  PAST SURGICAL HISTORY:  has a past surgical history that includes REMOVAL OF TONSILS,14+ Y/O; colonoscopy (05/30/07); carotid endarterectomy (11/07/08); INJ EPIDURAL LUMBAR/SACRAL W/WO CONTRAST (2009); DRAIN/INJECT LARGE JOINT/BURSA (2009); DRAIN/INJECT LARGE JOINT/BURSA (2010); INJ EPIDURAL CERVICAL/THORACIC W/WO CONTRAST (2010); INJ TRANSFORAMIN EPIDURAL, LUMB/SACR SINGLE (2010); Optical Tracking System Fusion Posterior Cervical Three + Levels (N/A, 11/15/2017); Laminectomy cerivcal posterior three+ levels (N/A, 11/15/2017); Optical Tracking System Fusion Cervical Anterior Three + Levels (N/A, 11/15/2017); Fusion cervical posterior three+ levels (N/A, 11/15/2017); and Eye surgery.  FAMILY HISTORY: family history includes Cancer in her daughter and mother; Cardiovascular in her father; Cerebrovascular Disease in her paternal grandmother; Circulatory in her paternal grandmother; Diabetes in her maternal aunt; EYE* in her mother; GASTROINTESTINAL DISEASE in her mother; Gynecology in her sister; Hypertension in her father; Lipids in her brother; Musculoskeletal Disorder in her daughter; Obesity in her maternal grandmother and paternal grandmother; Osteoperosis in her mother.  SOCIAL HISTORY:  reports that she quit smoking about 22 years ago. Her smoking use included Cigarettes. She started smoking about 48 years ago. She smoked 0.00 packs per day for 10.00 years. She has never used smokeless tobacco. She reports that she does not drink alcohol or use illicit drugs.    MEDICATIONS:  Current Outpatient Prescriptions   Medication Sig Dispense Refill     Acetaminophen (TYLENOL PO) Take 1,000 mg by mouth 2 times daily Also BID PRN       AmLODIPine Besylate (NORVASC PO) Take 5 mg by mouth At Bedtime        calcium carbonate (OS-ANNETTA 500 MG [place]. [place]) 1250 MG tablet Take 1 tablet (1,250 mg) by mouth 2 times daily (with meals) 90 tablet      Cholecalciferol (VITAMIN D-3 PO) Take 4,000 Units by mouth daily       diclofenac  (VOLTAREN) 1 % GEL topical gel Place 2 g onto the skin 2 times daily       Hypromellose (NATURAL BALANCE TEARS OP) Apply 2 drops to eye 4 times daily as needed       ibuprofen (ADVIL/MOTRIN) 400 MG tablet Take 1 tablet (400 mg) by mouth 3 times daily 270 tablet 3     Liniments (SALONPAS PAIN RELIEF PATCH EX) Externally apply 1 patch topically 2 times daily       Menthol, Topical Analgesic, (BIOFREEZE EX) Externally apply topically 2 times daily as needed        metoprolol (LOPRESSOR) 25 MG tablet Take 12.5 mg by mouth 2 times daily  60 tablet      Multiple Vitamins-Minerals (MULTIVITAMIN ADULT PO) Take 1 tablet by mouth daily       Nutritional Supplements (NUTRITIONAL SUPPLEMENT PO) Take 4 oz by mouth daily        polyethylene glycol (MIRALAX/GLYCOLAX) Packet 17 g by Oral or Feeding Tube route 3 times daily as needed for constipation  7 packet      pravastatin (PRAVACHOL) 10 MG tablet Take 1 tablet (10 mg) by mouth daily 90 tablet 3     ROPINIRole HCl (REQUIP PO) Take 1.5 mg by mouth At Bedtime        senna-docusate (SENOKOT-S;PERICOLACE) 8.6-50 MG per tablet 1 tablet by Oral or Feeding Tube route daily Also QD  tablet      TraMADol HCl (ULTRAM PO) Take 50 mg by mouth 2 times daily And q day prn       TRAZODONE HCL PO Take 50 mg by mouth At Bedtime       Medications reviewed:  Medications reconciled to facility chart and changes were made to reflect current medications as identified as above med list. Below are the changes that were made:   Medications stopped since last EPIC medication reconciliation:   There are no discontinued medications.    Medications started since last [facility] medication reconciliation:  No orders of the defined types were placed in this encounter.    Case Management:  I have reviewed the care plan and MDS and do agree with the plan. Patient's desire to return to the community is present, but is not able due to care needs .  Information reviewed:  Medications, vital signs, orders, and  "nursing notes.    ROS:  10 point ROS of systems including Constitutional, Eyes, Respiratory, Cardiovascular, Gastroenterology, Genitourinary, Integumentary, Muscularskeletal, Psychiatric were all negative except for pertinent positives noted in my HPI.    Exam:  Vitals: /69  Pulse 71  Temp 96  F (35.6  C)  Resp 18  Ht 4' 11\" (1.499 m)  Wt 122 lb 12.8 oz (55.7 kg)  SpO2 96%  BMI 24.8 kg/m2  BMI= Body mass index is 24.8 kg/(m^2).  GENERAL APPEARANCE:  Alert, in no distress, appears healthy, oriented, cooperative  EYES: EOM, conjunctivae, lids, pupils and irises normal, PERRL  RESP: Respiratory effort and palpation of chest normal, lungs clear to auscultation , no respiratory distress  CV:  Palpation and auscultation of heart done, regular rate and rhythm, 2/6 systolic murmur. No rub, or gallop, mild edema BLE (declined TEDs), +2 pedal pulses.  ABDOMEN:  normal bowel sounds, soft, nontender, no hepatosplenomegaly or other masses, no guarding or rebound  SKIN:  Inspection of skin and subcutaneous tissue baseline, Palpation of skin and subcutaneous tissue baseline  PSYCH:  oriented X 3, normal insight, judgement and memory, affect and mood normal    Lab/Diagnostic data:   CBC RESULTS:   Recent Labs   Lab Test  07/24/18 0625 06/19/18   0630  11/20/17   1112  11/17/17   0401   WBC   --    --   8.0  10.6   RBC   --    --   3.21*  3.35*   HGB  11.4*  11.3*  9.7*  10.1*   HCT   --    --   30.2*  30.6*   MCV   --    --   94  91   MCH   --    --   30.2  30.1   MCHC   --    --   32.1  33.0   RDW   --    --   13.6  14.9   PLT   --    --   246  171    < > = values in this interval not displayed.     Last Basic Metabolic Panel:  Recent Labs   Lab Test  07/24/18 0625 06/19/18   0630   NA  136  136   POTASSIUM  4.2  4.0   CHLORIDE  98  99   ANNETTA  9.0  9.1   CO2  28  27   BUN  15  15   CR  0.58  0.45*   GLC  77  80     Liver Function Studies -   Recent Labs   Lab Test  06/19/18   0630  11/27/17   0817  11/10/17   " 1723  04/25/17   1206   PROTTOTAL   --    --   6.5*  6.9   ALBUMIN  3.4  2.5*  3.5  3.9   BILITOTAL   --    --   0.4  0.2   ALKPHOS   --    --   68  63   AST   --    --   16  21   ALT   --    --   18  25    < > = values in this interval not displayed.     ASSESSMENT/PLAN  Cervicalgia  Neck pain  Primary osteoarthritis involving multiple joints  Chronic. Stable. Unchanged. Continue current POC. Follow-up as needed.  Recommend weaning from ibuprofen as able to reduce risk of GIB.  Could prescribe scheduled voltaren gel for joints most affected (knees, hands) or weight risk vs benefit of low dose prednisone.  Could also consult rheumatology if patient finds OA severe.     Iron deficiency anemia, unspecified iron deficiency anemia type  Drug-induced constipation  Chronic. Stable. Will dc FeSO4 and recheck Hgb in 1 month, this should assist w/ constipation management. Follow-up as needed.    Insomnia due to medical condition  Chronic. Stable. Improved. Will GDR Trazodone  And monitor for titration needs. Follow-up as needed.    Orders:  1.  DC Ferrous Sulfate.  2.  Recheck Hgb in 1 month (on lab day).  3. Trazodone 25 mg at bedtime. Dx: insomnia  4. Trazodone 25 mg po at bedtime PRN, cn use up until 0200 if insomnia persists. Dx: insomnia 2/2 RLS    Electronically signed by:  Dr. Alia Granados, APRN CNP RUIZ Franklin, APRN CNP

## 2018-08-22 DIAGNOSIS — M54.2 CERVICALGIA: Primary | ICD-10-CM

## 2018-08-22 RX ORDER — TRAMADOL HYDROCHLORIDE 50 MG/1
50 TABLET ORAL 2 TIMES DAILY
Qty: 10 TABLET | Refills: 0
Start: 2018-08-22 | End: 2018-09-25

## 2018-08-30 ENCOUNTER — HOSPITAL LABORATORY (OUTPATIENT)
Dept: NURSING HOME | Facility: OTHER | Age: 83
End: 2018-08-30

## 2018-08-30 LAB — HGB BLD-MCNC: 11.4 G/DL (ref 11.7–15.7)

## 2018-09-19 ENCOUNTER — NURSING HOME VISIT (OUTPATIENT)
Dept: GERIATRICS | Facility: CLINIC | Age: 83
End: 2018-09-19
Payer: COMMERCIAL

## 2018-09-19 VITALS
DIASTOLIC BLOOD PRESSURE: 79 MMHG | RESPIRATION RATE: 14 BRPM | OXYGEN SATURATION: 96 % | WEIGHT: 106.8 LBS | HEART RATE: 68 BPM | HEIGHT: 59 IN | TEMPERATURE: 97.1 F | BODY MASS INDEX: 21.53 KG/M2 | SYSTOLIC BLOOD PRESSURE: 138 MMHG

## 2018-09-19 DIAGNOSIS — M25.532 LEFT WRIST PAIN: ICD-10-CM

## 2018-09-19 DIAGNOSIS — G47.01 INSOMNIA DUE TO MEDICAL CONDITION: Primary | ICD-10-CM

## 2018-09-19 DIAGNOSIS — M54.2 NECK PAIN: ICD-10-CM

## 2018-09-19 DIAGNOSIS — L50.9 URTICARIA: ICD-10-CM

## 2018-09-19 PROCEDURE — 99308 SBSQ NF CARE LOW MDM 20: CPT | Performed by: NURSE PRACTITIONER

## 2018-09-19 NOTE — LETTER
"    9/19/2018        RE: Yuni Otoole  [address]  [facility] [address]        [place] [facility]    Chief Complaint   Patient presents with     penitentiary Acute       [place] Medical Record Number:  [id number]    HPI:    Yuni Otoole is a 83 year old  (1935), who is being seen today for an episodic care visit at [facility].   HPI information obtained from: facility chart records, facility staff, patient report and [facility] chart review. Today's concern is:  Insomnia due to medical condition  Pharm d asking me to eval prn trazodone. Pt had sig insomnia several months ago and pt now saying she is sleeping well but is hesitant to get rid of her PRN    Urticaria  Pt c/o itching t/o. She notes Pharm noted many of her meds may cause itch.  No rash.     Neck pain  F/u with chronic pain - on ultram, NSAID (acknowledge risks) - pt states her pain is controlled/stable.     Left wrist pain  Pt has concern of L wrist bump on anterior,medial wrist - no pain     PMH/PSH reviewed in EPIC today    REVIEW OF SYSTEMS:  4 point ROS including Respiratory, CV, GI and , other than that noted in the HPI,  is negative    /79  Pulse 68  Temp 97.1  F (36.2  C)  Resp 14  Ht 4' 11\" (1.499 m)  Wt 106 lb 12.8 oz (48.4 kg)  SpO2 96%  BMI 21.57 kg/m2  GENERAL APPEARANCE:  Alert, in no distress  L wrist with soft, moveable lump on wrist as described above - no redness/no warmth      ASSESSMENT/PLAN:  Insomnia due to medical condition  Will cont prn trazodone given pt's wish. Continuation of PRN order for non-antipsychotic psychotropic medication,Trazodone, is appropriate due to occasional insomnia and is being reordered today with an end date of 3 m.    Urticaria  Can likely be 2/2 med- discussed possible high offenders:   Will trial holding ultram to see if causal - but if pain is too much - will restart.     Neck pain  Monitor given holding ultram  Noted NSIAD use - pt is very much aware of " possible risks of GI and renal and wants to keep using.     Left wrist pain  Cyst - splint may help it resolve - will try.       1.  Please hold Ultram x 3 days per pt request - nsg to monitor/chart itching on day 2 & 3.  2.  Left wrist carpal tunnel splint.  Dx: wrist pain  3.  Change PRN Trazodone to 25 mg po at bedtime x 3 month end date.  Dx: insomnia      Electronically signed by:  CRISTAL Olmos CNP      Sincerely,        CRISTAL Olmos CNP

## 2018-09-19 NOTE — PROGRESS NOTES
"[place] [facility]    Chief Complaint   Patient presents with     senior living Acute       [place] Medical Record Number:  [id number]    HPI:    Yuni Otoole is a 83 year old  (1935), who is being seen today for an episodic care visit at [facility].   HPI information obtained from: facility chart records, facility staff, patient report and [facility] chart review. Today's concern is:  Insomnia due to medical condition  Pharm d asking me to eval prn trazodone. Pt had sig insomnia several months ago and pt now saying she is sleeping well but is hesitant to get rid of her PRN    Urticaria  Pt c/o itching t/o. She notes Pharm noted many of her meds may cause itch.  No rash.     Neck pain  F/u with chronic pain - on ultram, NSAID (acknowledge risks) - pt states her pain is controlled/stable.     Left wrist pain  Pt has concern of L wrist bump on anterior,medial wrist - no pain     PMH/PSH reviewed in EPIC today    REVIEW OF SYSTEMS:  4 point ROS including Respiratory, CV, GI and , other than that noted in the HPI,  is negative    /79  Pulse 68  Temp 97.1  F (36.2  C)  Resp 14  Ht 4' 11\" (1.499 m)  Wt 106 lb 12.8 oz (48.4 kg)  SpO2 96%  BMI 21.57 kg/m2  GENERAL APPEARANCE:  Alert, in no distress  L wrist with soft, moveable lump on wrist as described above - no redness/no warmth      ASSESSMENT/PLAN:  Insomnia due to medical condition  Will cont prn trazodone given pt's wish. Continuation of PRN order for non-antipsychotic psychotropic medication,Trazodone, is appropriate due to occasional insomnia and is being reordered today with an end date of 3 m.    Urticaria  Can likely be 2/2 med- discussed possible high offenders:   Will trial holding ultram to see if causal - but if pain is too much - will restart.     Neck pain  Monitor given holding ultram  Noted NSIAD use - pt is very much aware of possible risks of GI and renal and wants to keep using.     Left wrist pain  Cyst - splint " may help it resolve - will try.       1.  Please hold Ultram x 3 days per pt request - nsg to monitor/chart itching on day 2 & 3.  2.  Left wrist carpal tunnel splint.  Dx: wrist pain  3.  Change PRN Trazodone to 25 mg po at bedtime x 3 month end date.  Dx: insomnia      Electronically signed by:  CRISTAL Olmos CNP

## 2018-09-24 VITALS
HEART RATE: 87 BPM | HEIGHT: 59 IN | RESPIRATION RATE: 16 BRPM | SYSTOLIC BLOOD PRESSURE: 130 MMHG | BODY MASS INDEX: 21.53 KG/M2 | WEIGHT: 106.8 LBS | DIASTOLIC BLOOD PRESSURE: 67 MMHG | TEMPERATURE: 98.5 F | OXYGEN SATURATION: 96 %

## 2018-09-24 NOTE — PROGRESS NOTES
[facility]  Chief Complaint   Patient presents with     Annual Comprehensive Nursing Home       [place] Medical Record Number:  [id number]    HPI:    Yuni Otoole is a 83 year old  (1935), who is being seen today for an annual comprehensive visit at [facility].  HPI information obtained from: facility chart records, facility staff, patient report and [facility] chart review.  Today's concerns are:  Insomnia due to medical condition/Cervicalgia/Urticaria/Restless legs syndrome (RLS)  F/u on trial off ultram as med may have been causing urticaria. Pt states itching has improved sig but didn't sleep as well last several nights off ultram - that her RLS was worse.   Noted that her neck pain and over all osteoarthritis pain unchanged off ultram.   Still on tylenol and NSAID for pain for above - rediscussed r/b of NSAID>   Also remains on trazodone for sleep    Localized edema  Chronic - l leg    Benign essential hypertension  On norvasc, and BB.     Age-related osteoporosis without current pathological fracture  On CA/vit D/MVI    Mixed hyperlipidemia  On statin    L wrist cyst  Still concerning to pt  Splint ordered still not yet to pt. Asking about seeing a specialists.     ALLERGIES: Atorvastatin calcium  PROBLEM LIST:  Patient Active Problem List   Diagnosis     Anxiety state     Scoliosis     Osteopenia     HYPERLIPIDEMIA LDL GOAL <130     Advanced directives, counseling/discussion     Low back pain without sciatica     C7 cervical fracture (H)     Health Care Home     Benign essential hypertension     Neck pain     Cervical stenosis of spine     Protein-calorie malnutrition (H)     S/P cervical spinal fusion     Restless legs syndrome (RLS)     Insomnia due to medical condition     Fusion of spine, cervicothoracic region     PAST MEDICAL HISTORY:  has a past medical history of Allergic rhinitis, cause unspecified; Head injury; Pure hypercholesterolemia; and Unspecified essential  hypertension. She also has no past medical history of Diabetes (H).  PAST SURGICAL HISTORY:  has a past surgical history that includes REMOVAL OF TONSILS,14+ Y/O; colonoscopy (05/30/07); carotid endarterectomy (11/07/08); INJ EPIDURAL LUMBAR/SACRAL W/WO CONTRAST (2009); DRAIN/INJECT LARGE JOINT/BURSA (2009); DRAIN/INJECT LARGE JOINT/BURSA (2010); INJ EPIDURAL CERVICAL/THORACIC W/WO CONTRAST (2010); INJ TRANSFORAMIN EPIDURAL, LUMB/SACR SINGLE (2010); Optical Tracking System Fusion Posterior Cervical Three + Levels (N/A, 11/15/2017); Laminectomy cerivcal posterior three+ levels (N/A, 11/15/2017); Optical Tracking System Fusion Cervical Anterior Three + Levels (N/A, 11/15/2017); Fusion cervical posterior three+ levels (N/A, 11/15/2017); and Eye surgery.  FAMILY HISTORY: family history includes Cancer in her daughter and mother; Cardiovascular in her father; Cerebrovascular Disease in her paternal grandmother; Circulatory in her paternal grandmother; Diabetes in her maternal aunt; EYE* in her mother; GASTROINTESTINAL DISEASE in her mother; Gynecology in her sister; Hypertension in her father; Lipids in her brother; Musculoskeletal Disorder in her daughter; Obesity in her maternal grandmother and paternal grandmother; Osteoporosis in her mother.  SOCIAL HISTORY:  reports that she quit smoking about 22 years ago. Her smoking use included Cigarettes. She started smoking about 48 years ago. She smoked 0.00 packs per day for 10.00 years. She has never used smokeless tobacco. She reports that she does not drink alcohol or use illicit drugs.  IMMUNIZATIONS:  Most Recent Immunizations   Administered Date(s) Administered     Influenza (High Dose) 3 valent vaccine 10/11/2017     Pneumo Conj 13-V (2010&after) 12/02/2017     Pneumococcal 23 valent 10/24/2011     TD (ADULT, 7+) 02/25/2009     TDAP Vaccine (Boostrix) 12/02/2017     Td (Adult), Adsorbed 02/25/2009     Zoster vaccine, live 02/04/2013     Above immunizations pulled from  [facility]. [facility] and facility records also reconciled. Outstanding information sent to  to update [facility] .  Future immunizations are not needed at this point as all recommended immunizations are up to date.   MEDICATIONS:  Current Outpatient Prescriptions   Medication Sig Dispense Refill     Acetaminophen (TYLENOL PO) Take 1,000 mg by mouth 2 times daily Also BID PRN       amLODIPine (NORVASC) 5 MG tablet Take 1 tablet (5 mg) by mouth At Bedtime 60 tablet 3     calcium carbonate (OS-ANNETTA 500 MG [place]. [place]) 1250 MG tablet Take 1 tablet (1,250 mg) by mouth 2 times daily (with meals) 90 tablet      Cholecalciferol (VITAMIN D-3 PO) Take 4,000 Units by mouth daily       Hypromellose (NATURAL BALANCE TEARS OP) Apply 2 drops to eye 4 times daily as needed       ibuprofen (ADVIL/MOTRIN) 400 MG tablet Take 1 tablet (400 mg) by mouth 3 times daily 270 tablet 3     Liniments (SALONPAS PAIN RELIEF PATCH EX) Externally apply 1 patch topically 2 times daily       Menthol, Topical Analgesic, (BIOFREEZE EX) Externally apply topically 2 times daily as needed        metoprolol (LOPRESSOR) 25 MG tablet Take 12.5 mg by mouth 2 times daily  60 tablet      Multiple Vitamins-Minerals (MULTIVITAMIN ADULT PO) Take 1 tablet by mouth daily       polyethylene glycol (MIRALAX/GLYCOLAX) Packet Take 17 g by mouth daily as needed for constipation  7 packet      polyethylene glycol 0.4%- propylene glycol 0.3% (SYSTANE ULTRA) 0.4-0.3 % SOLN ophthalmic solution Place 1 drop into both eyes 4 times daily       pravastatin (PRAVACHOL) 10 MG tablet Take 1 tablet (10 mg) by mouth daily 90 tablet 3     ROPINIRole HCl (REQUIP PO) Take 2 mg by mouth At Bedtime        senna-docusate (SENOKOT-S;PERICOLACE) 8.6-50 MG per tablet 1 tablet by Oral or Feeding Tube route daily Also QD  tablet      TraMADol HCl (ULTRAM PO) Take 50 mg by mouth At Bedtime       TRAZODONE HCL PO Take 25 mg by mouth At Bedtime Also PRN if before 2 am    "    Medications reviewed:  Medications reconciled to facility chart and changes were made to reflect current medications as identified as above med list. Below are the changes that were made:   Medications stopped since last EPIC medication reconciliation:   There are no discontinued medications.    Medications started since last [facility] medication reconciliation:  No orders of the defined types were placed in this encounter.    Case Management:  I have reviewed the facility/SNF care plan/MDS which was done 8/29/18, including the falls risk, nutrition and pain screening. I also reviewed the current immunizations, and preventive care..Future cancer screening is not clinically indicated secondary to age/goals of care Patient's desire to return to the community is present, but is not able due to care needs . Current Level of Care is appropriate.      Advance Directive Discussion:    I reviewed the current advanced directives as reflected in EPIC, the POLST and the facility chart, and verified the congruency of orders . I contacted the first party -  in room and discussed the plan of Care.  I did review the advance directives with the resident.     Team Discussion:  I communicated with the appropriate disciplines involved with the Plan of Care:   Nursing      Patient Goal:  Patient's goal is pain control and comfort.    Information reviewed:  Medications, vital signs, orders, and nursing notes.    ROS:  10 point ROS of systems including Constitutional, Eyes, Respiratory, Cardiovascular, Gastroenterology, Genitourinary, Integumentary, Muscularskeletal, Psychiatric were all negative except for pertinent positives noted in my HPI.    Exam:  /67  Pulse 87  Temp 98.5  F (36.9  C)  Resp 16  Ht 4' 11\" (1.499 m)  Wt 106 lb 12.8 oz (48.4 kg)  SpO2 96%  BMI 21.57 kg/m2  GENERAL APPEARANCE:  Alert, in no distress  ENT:  Mouth and posterior oropharynx normal, moist mucous membranes, hearing acuity [place] - " baseline  EYES:  EOM, conjunctivae, lids, pupils and irises normal  NECK:  No adenopathy,masses or thyromegaly  RESP:  respiratory effort and palpation of chest normal, no respiratory distress, Lung sounds clear  CV:  Palpation and auscultation of heart done , rate and rhythm reg, 3/6 murmur, no rub or gallop, Edema +2 on L and none on R  ABDOMEN:  normal bowel sounds, soft, nontender, no hepatosplenomegaly or other masses  M/S:   Gait and station SBA transfer, Digits and nails normal, cervical muscles still tight/short on L but increased ROM than 6 m ago. . L wrist with cyst - soft - non painful  SKIN:  Inspection/Palpation of skin and subcutaneous tissue intact  NEURO: 2-12 in normal limits and at patient's baseline  PSYCH:  insight and judgement, memory good , affect and mood normal              Lab/Diagnostic data:   CBC:  Recent Labs   Lab Test  08/30/18   0630  07/24/18   0625   11/20/17   1112  11/17/17   0401   WBC   --    --    --   8.0  10.6   RBC   --    --    --   3.21*  3.35*   HGB  11.4*  11.4*   < >  9.7*  10.1*   HCT   --    --    --   30.2*  30.6*   MCV   --    --    --   94  91   MCH   --    --    --   30.2  30.1   MCHC   --    --    --   32.1  33.0   RDW   --    --    --   13.6  14.9   PLT   --    --    --   246  171    < > = values in this interval not displayed.     Basic Metabolic Panel:  Recent Labs   Lab Test  07/24/18   0625  06/19/18   0630   NA  136  136   POTASSIUM  4.2  4.0   CHLORIDE  98  99   ANNETTA  9.0  9.1   CO2  28  27   BUN  15  15   CR  0.58  0.45*   GLC  77  80     Liver Function Studies:   Recent Labs   Lab Test  06/19/18   0630  11/27/17   0817   11/10/17   1723  04/25/17   1206   PROTTOTAL   --    --    --   6.5*  6.9   ALBUMIN  3.4  2.5*   < >  3.5  3.9   BILITOTAL   --    --    --   0.4  0.2   ALKPHOS   --    --    --   68  63   AST   --    --    --   16  21   ALT   --    --    --   18  25    < > = values in this interval not displayed.     TSH   Date Value Ref Range Status    11/14/2017 2.91 0.40 - 4.00 mU/L Final   04/25/2017 2.40 0.40 - 4.00 mU/L Final   ]  No results found for: A1C    Lab Results   Component Value Date    CHOL 208 06/19/2018     Lab Results   Component Value Date    HDL 73 06/19/2018     Lab Results   Component Value Date     06/19/2018     Lab Results   Component Value Date    TRIG 53 06/19/2018     Lab Results   Component Value Date    CHOLHDLRATIO 2.0 05/11/2015       ASSESSMENT/PLAN  Insomnia due to medical condition/Restless legs syndrome (RLS)/Cervicalgia/Urticaria/  Discussed options - will retest for Fe deficiency - now off Fe x ~5 m - may be root cause of RLS -  Will increase requip to treat symtpoms until underlying cause known more.   Only schedule ultram HS   Keep tylenol and NSAID (pt efrain understands risks of med) for generalzied pain    Localized edema  2/2 norvasc and NSAID - vascular insufficiency on L leg - stable  Doesn't want compression     Benign essential hypertension  BP goals are  <140/90 mm Hg.This is higher than ACC and AHA recommendations due to risk for hypotension, risk of dizziness and falls, risk of tissue/cerebral hypoperfusion and frailty. Patient is stable with current plan of care and routine assessment.    Age-related osteoporosis without current pathological fracture  Cont ca    Mixed hyperlipidemia  Cont statin. - noted may be causing RLS - could trial off also if fe isn't cause.     L wrist cyst  Referral to hand care specialists      Orders:  1.  Iron Sat Index 10/2.  Dx: iron deficiency , RLS  2.  Increase Requip to 2 mg po at bedtime.  Dx: RLS  3.  Discontinue current Ultram.  4.  Start Ultram 50 mg po at bedtime.  Dx: pain  5.  Please call [facility] hand specialist and see if the doctor that did pts r carpal tunnel is still working (pt believes it is Dr. Hyde) - if so schedule apt left wrist pain - if not ask pt where she would like to be seen for wrist pain - [facility] or [facility] and make apt.    Electronically  signed by:  CRISTAL Olmos CNP

## 2018-09-25 ENCOUNTER — NURSING HOME VISIT (OUTPATIENT)
Dept: GERIATRICS | Facility: CLINIC | Age: 83
End: 2018-09-25
Payer: COMMERCIAL

## 2018-09-25 DIAGNOSIS — L50.9 URTICARIA: ICD-10-CM

## 2018-09-25 DIAGNOSIS — G47.01 INSOMNIA DUE TO MEDICAL CONDITION: Primary | ICD-10-CM

## 2018-09-25 DIAGNOSIS — R60.0 LOCALIZED EDEMA: ICD-10-CM

## 2018-09-25 DIAGNOSIS — G25.81 RESTLESS LEGS SYNDROME (RLS): ICD-10-CM

## 2018-09-25 DIAGNOSIS — M81.0 AGE-RELATED OSTEOPOROSIS WITHOUT CURRENT PATHOLOGICAL FRACTURE: ICD-10-CM

## 2018-09-25 DIAGNOSIS — M54.2 CERVICALGIA: ICD-10-CM

## 2018-09-25 DIAGNOSIS — E78.2 MIXED HYPERLIPIDEMIA: ICD-10-CM

## 2018-09-25 DIAGNOSIS — I10 BENIGN ESSENTIAL HYPERTENSION: ICD-10-CM

## 2018-09-25 PROCEDURE — 99318 ZZC ANNUAL NURSING FAC ASSESSMNT, STABLE: CPT | Performed by: NURSE PRACTITIONER

## 2018-09-25 RX ORDER — AMLODIPINE BESYLATE 5 MG/1
5 TABLET ORAL AT BEDTIME
Qty: 60 TABLET | Refills: 3 | Status: SHIPPED | OUTPATIENT
Start: 2018-09-25 | End: 2019-01-26

## 2018-09-25 NOTE — LETTER
9/25/2018        RE: Yuni Otoole  [address]  [facility] [address]        [facility]  Chief Complaint   Patient presents with     Annual Comprehensive Nursing Home       [place] Medical Record Number:  [id number]    HPI:    Yuni Otoole is a 83 year old  (1935), who is being seen today for an annual comprehensive visit at [facility].  HPI information obtained from: facility chart records, facility staff, patient report and [facility] chart review.  Today's concerns are:  Insomnia due to medical condition/Cervicalgia/Urticaria/Restless legs syndrome (RLS)  F/u on trial off ultram as med may have been causing urticaria. Pt states itching has improved sig but didn't sleep as well last several nights off ultram - that her RLS was worse.   Noted that her neck pain and over all osteoarthritis pain unchanged off ultram.   Still on tylenol and NSAID for pain for above - rediscussed r/b of NSAID>   Also remains on trazodone for sleep    Localized edema  Chronic - l leg    Benign essential hypertension  On norvasc, and BB.     Age-related osteoporosis without current pathological fracture  On CA/vit D/MVI    Mixed hyperlipidemia  On statin    L wrist cyst  Still concerning to pt  Splint ordered still not yet to pt. Asking about seeing a specialists.     ALLERGIES: Atorvastatin calcium  PROBLEM LIST:  Patient Active Problem List   Diagnosis     Anxiety state     Scoliosis     Osteopenia     HYPERLIPIDEMIA LDL GOAL <130     Advanced directives, counseling/discussion     Low back pain without sciatica     C7 cervical fracture (H)     Health Care Home     Benign essential hypertension     Neck pain     Cervical stenosis of spine     Protein-calorie malnutrition (H)     S/P cervical spinal fusion     Restless legs syndrome (RLS)     Insomnia due to medical condition     Fusion of spine, cervicothoracic region     PAST MEDICAL HISTORY:  has a past medical history of Allergic rhinitis,  cause unspecified; Head injury; Pure hypercholesterolemia; and Unspecified essential hypertension. She also has no past medical history of Diabetes (H).  PAST SURGICAL HISTORY:  has a past surgical history that includes REMOVAL OF TONSILS,14+ Y/O; colonoscopy (05/30/07); carotid endarterectomy (11/07/08); INJ EPIDURAL LUMBAR/SACRAL W/WO CONTRAST (2009); DRAIN/INJECT LARGE JOINT/BURSA (2009); DRAIN/INJECT LARGE JOINT/BURSA (2010); INJ EPIDURAL CERVICAL/THORACIC W/WO CONTRAST (2010); INJ TRANSFORAMIN EPIDURAL, LUMB/SACR SINGLE (2010); Optical Tracking System Fusion Posterior Cervical Three + Levels (N/A, 11/15/2017); Laminectomy cerivcal posterior three+ levels (N/A, 11/15/2017); Optical Tracking System Fusion Cervical Anterior Three + Levels (N/A, 11/15/2017); Fusion cervical posterior three+ levels (N/A, 11/15/2017); and Eye surgery.  FAMILY HISTORY: family history includes Cancer in her daughter and mother; Cardiovascular in her father; Cerebrovascular Disease in her paternal grandmother; Circulatory in her paternal grandmother; Diabetes in her maternal aunt; EYE* in her mother; GASTROINTESTINAL DISEASE in her mother; Gynecology in her sister; Hypertension in her father; Lipids in her brother; Musculoskeletal Disorder in her daughter; Obesity in her maternal grandmother and paternal grandmother; Osteoporosis in her mother.  SOCIAL HISTORY:  reports that she quit smoking about 22 years ago. Her smoking use included Cigarettes. She started smoking about 48 years ago. She smoked 0.00 packs per day for 10.00 years. She has never used smokeless tobacco. She reports that she does not drink alcohol or use illicit drugs.  IMMUNIZATIONS:  Most Recent Immunizations   Administered Date(s) Administered     Influenza (High Dose) 3 valent vaccine 10/11/2017     Pneumo Conj 13-V (2010&after) 12/02/2017     Pneumococcal 23 valent 10/24/2011     TD (ADULT, 7+) 02/25/2009     TDAP Vaccine (Boostrix) 12/02/2017     Td (Adult),  Adsorbed 02/25/2009     Zoster vaccine, live 02/04/2013     Above immunizations pulled from [facility]. [facility]IC and facility records also reconciled. Outstanding information sent to  to update [facility] .  Future immunizations are not needed at this point as all recommended immunizations are up to date.   MEDICATIONS:  Current Outpatient Prescriptions   Medication Sig Dispense Refill     Acetaminophen (TYLENOL PO) Take 1,000 mg by mouth 2 times daily Also BID PRN       amLODIPine (NORVASC) 5 MG tablet Take 1 tablet (5 mg) by mouth At Bedtime 60 tablet 3     calcium carbonate (OS-ANNETTA 500 MG [place]. [place]) 1250 MG tablet Take 1 tablet (1,250 mg) by mouth 2 times daily (with meals) 90 tablet      Cholecalciferol (VITAMIN D-3 PO) Take 4,000 Units by mouth daily       Hypromellose (NATURAL BALANCE TEARS OP) Apply 2 drops to eye 4 times daily as needed       ibuprofen (ADVIL/MOTRIN) 400 MG tablet Take 1 tablet (400 mg) by mouth 3 times daily 270 tablet 3     Liniments (SALONPAS PAIN RELIEF PATCH EX) Externally apply 1 patch topically 2 times daily       Menthol, Topical Analgesic, (BIOFREEZE EX) Externally apply topically 2 times daily as needed        metoprolol (LOPRESSOR) 25 MG tablet Take 12.5 mg by mouth 2 times daily  60 tablet      Multiple Vitamins-Minerals (MULTIVITAMIN ADULT PO) Take 1 tablet by mouth daily       polyethylene glycol (MIRALAX/GLYCOLAX) Packet Take 17 g by mouth daily as needed for constipation  7 packet      polyethylene glycol 0.4%- propylene glycol 0.3% (SYSTANE ULTRA) 0.4-0.3 % SOLN ophthalmic solution Place 1 drop into both eyes 4 times daily       pravastatin (PRAVACHOL) 10 MG tablet Take 1 tablet (10 mg) by mouth daily 90 tablet 3     ROPINIRole HCl (REQUIP PO) Take 2 mg by mouth At Bedtime        senna-docusate (SENOKOT-S;PERICOLACE) 8.6-50 MG per tablet 1 tablet by Oral or Feeding Tube route daily Also QD  tablet      TraMADol HCl (ULTRAM PO) Take 50 mg by mouth At  "Bedtime       TRAZODONE HCL PO Take 25 mg by mouth At Bedtime Also PRN if before 2 am       Medications reviewed:  Medications reconciled to facility chart and changes were made to reflect current medications as identified as above med list. Below are the changes that were made:   Medications stopped since last EPIC medication reconciliation:   There are no discontinued medications.    Medications started since last [facility] medication reconciliation:  No orders of the defined types were placed in this encounter.    Case Management:  I have reviewed the facility/SNF care plan/MDS which was done 8/29/18, including the falls risk, nutrition and pain screening. I also reviewed the current immunizations, and preventive care..Future cancer screening is not clinically indicated secondary to age/goals of care Patient's desire to return to the community is present, but is not able due to care needs . Current Level of Care is appropriate.      Advance Directive Discussion:    I reviewed the current advanced directives as reflected in EPIC, the POLST and the facility chart, and verified the congruency of orders . I contacted the first party -  in room and discussed the plan of Care.  I did review the advance directives with the resident.     Team Discussion:  I communicated with the appropriate disciplines involved with the Plan of Care:   Nursing      Patient Goal:  Patient's goal is pain control and comfort.    Information reviewed:  Medications, vital signs, orders, and nursing notes.    ROS:  10 point ROS of systems including Constitutional, Eyes, Respiratory, Cardiovascular, Gastroenterology, Genitourinary, Integumentary, Muscularskeletal, Psychiatric were all negative except for pertinent positives noted in my HPI.    Exam:  /67  Pulse 87  Temp 98.5  F (36.9  C)  Resp 16  Ht 4' 11\" (1.499 m)  Wt 106 lb 12.8 oz (48.4 kg)  SpO2 96%  BMI 21.57 kg/m2  GENERAL APPEARANCE:  Alert, in no distress  ENT:  Mouth " and posterior oropharynx normal, moist mucous membranes, hearing acuity [place] - baseline  EYES:  EOM, conjunctivae, lids, pupils and irises normal  NECK:  No adenopathy,masses or thyromegaly  RESP:  respiratory effort and palpation of chest normal, no respiratory distress, Lung sounds clear  CV:  Palpation and auscultation of heart done , rate and rhythm reg, 3/6 murmur, no rub or gallop, Edema +2 on L and none on R  ABDOMEN:  normal bowel sounds, soft, nontender, no hepatosplenomegaly or other masses  M/S:   Gait and station SBA transfer, Digits and nails normal, cervical muscles still tight/short on L but increased ROM than 6 m ago. . L wrist with cyst - soft - non painful  SKIN:  Inspection/Palpation of skin and subcutaneous tissue intact  NEURO: 2-12 in normal limits and at patient's baseline  PSYCH:  insight and judgement, memory good , affect and mood normal              Lab/Diagnostic data:   CBC:  Recent Labs   Lab Test  08/30/18   0630  07/24/18   0625   11/20/17   1112  11/17/17   0401   WBC   --    --    --   8.0  10.6   RBC   --    --    --   3.21*  3.35*   HGB  11.4*  11.4*   < >  9.7*  10.1*   HCT   --    --    --   30.2*  30.6*   MCV   --    --    --   94  91   MCH   --    --    --   30.2  30.1   MCHC   --    --    --   32.1  33.0   RDW   --    --    --   13.6  14.9   PLT   --    --    --   246  171    < > = values in this interval not displayed.     Basic Metabolic Panel:  Recent Labs   Lab Test  07/24/18   0625  06/19/18   0630   NA  136  136   POTASSIUM  4.2  4.0   CHLORIDE  98  99   ANNETTA  9.0  9.1   CO2  28  27   BUN  15  15   CR  0.58  0.45*   GLC  77  80     Liver Function Studies:   Recent Labs   Lab Test  06/19/18   0630  11/27/17   0817   11/10/17   1723  04/25/17   1206   PROTTOTAL   --    --    --   6.5*  6.9   ALBUMIN  3.4  2.5*   < >  3.5  3.9   BILITOTAL   --    --    --   0.4  0.2   ALKPHOS   --    --    --   68  63   AST   --    --    --   16  21   ALT   --    --    --   18  25    < > =  values in this interval not displayed.     TSH   Date Value Ref Range Status   11/14/2017 2.91 0.40 - 4.00 mU/L Final   04/25/2017 2.40 0.40 - 4.00 mU/L Final   ]  No results found for: A1C    Lab Results   Component Value Date    CHOL 208 06/19/2018     Lab Results   Component Value Date    HDL 73 06/19/2018     Lab Results   Component Value Date     06/19/2018     Lab Results   Component Value Date    TRIG 53 06/19/2018     Lab Results   Component Value Date    CHOLHDLRATIO 2.0 05/11/2015       ASSESSMENT/PLAN  Insomnia due to medical condition/Restless legs syndrome (RLS)/Cervicalgia/Urticaria/  Discussed options - will retest for Fe deficiency - now off Fe x ~5 m - may be root cause of RLS -  Will increase requip to treat symtpoms until underlying cause known more.   Only schedule ultram HS   Keep tylenol and NSAID (pt nelsony understands risks of med) for generalzied pain    Localized edema  2/2 norvasc and NSAID - vascular insufficiency on L leg - stable  Doesn't want compression     Benign essential hypertension  BP goals are  <140/90 mm Hg.This is higher than ACC and AHA recommendations due to risk for hypotension, risk of dizziness and falls, risk of tissue/cerebral hypoperfusion and frailty. Patient is stable with current plan of care and routine assessment.    Age-related osteoporosis without current pathological fracture  Cont ca    Mixed hyperlipidemia  Cont statin. - noted may be causing RLS - could trial off also if fe isn't cause.     L wrist cyst  Referral to hand care specialists      Orders:  1.  Iron Sat Index 10/2.  Dx: iron deficiency , RLS  2.  Increase Requip to 2 mg po at bedtime.  Dx: RLS  3.  Discontinue current Ultram.  4.  Start Ultram 50 mg po at bedtime.  Dx: pain  5.  [facility] and see if the doctor that did pts r carpal tunnel is still working (pt believes it is Dr. Hyde) - if so schedule apt left wrist pain - if not ask pt where she would like  to be seen for wrist pain - [facility] or [facility] and make apt.    Electronically signed by:  CRISTAL Olmos CNP          Sincerely,        CRISTAL Olmos CNP

## 2018-10-02 ENCOUNTER — HOSPITAL LABORATORY (OUTPATIENT)
Dept: NURSING HOME | Facility: OTHER | Age: 83
End: 2018-10-02

## 2018-10-02 ENCOUNTER — OFFICE VISIT (OUTPATIENT)
Dept: FAMILY MEDICINE | Facility: OTHER | Age: 83
End: 2018-10-02
Payer: COMMERCIAL

## 2018-10-02 VITALS
RESPIRATION RATE: 20 BRPM | HEART RATE: 72 BPM | SYSTOLIC BLOOD PRESSURE: 150 MMHG | DIASTOLIC BLOOD PRESSURE: 80 MMHG | TEMPERATURE: 97.4 F

## 2018-10-02 DIAGNOSIS — M67.432 GANGLION CYST OF WRIST, LEFT: Primary | ICD-10-CM

## 2018-10-02 LAB
IRON SATN MFR SERPL: 25 % (ref 15–46)
IRON SERPL-MCNC: 82 UG/DL (ref 35–180)
TIBC SERPL-MCNC: 329 UG/DL (ref 240–430)

## 2018-10-02 PROCEDURE — 99213 OFFICE O/P EST LOW 20 MIN: CPT | Performed by: PHYSICIAN ASSISTANT

## 2018-10-02 ASSESSMENT — PAIN SCALES - GENERAL: PAINLEVEL: MILD PAIN (2)

## 2018-10-02 NOTE — NURSING NOTE
Health Maintenance Due   Topic Date Due     URINE DRUG SCREEN Q1 YR  08/08/1950     PHQ-2 Q1 YR  08/02/2018     INFLUENZA VACCINE (1) 09/01/2018     Steph DE ANDA LPN

## 2018-10-02 NOTE — PATIENT INSTRUCTIONS
1. Suspect the recent increase in activity resulted in excess tension along the synovial sheath (lining) of the flexor tendons causing a ballooning or outpouching of the sheath.   - Current treatment recommendation: wrap snuggly with ace bandage to compress the outpouching fluid back into the sheath and facilitate healing.    Patient will need assistance redoing the wraps throughout the day.

## 2018-10-02 NOTE — MR AVS SNAPSHOT
After Visit Summary   10/2/2018    Yuni Otoole    MRN: [id number]           Patient Information     Date Of Birth          1935        Visit Information        Provider Department      10/2/2018 3:00 PM Myke Shankar PA-C [facility] Instructions    1. Suspect the recent increase in activity resulted in excess tension along the synovial sheath (lining) of the flexor tendons causing a ballooning or outpouching of the sheath.   - Current treatment recommendation: wrap snuggly with ace bandage to compress the outpouching fluid back into the sheath and facilitate healing.    Patient will need assistance redoing the wraps throughout the day.           Follow-ups after your visit        Your next 10 appointments already scheduled     Nov 29, 2018 11:00 AM CST   XR SPINE COMPLETE 2 VIEWS with UCXR1   [facility] ([facility])    [address] [place] [id number] [phone]           How do I prepare for my exam? (Food and drink instructions) No Food and Drink Restrictions.  How do I prepare for my exam? (Other instructions) You do not need to do anything special for this exam.  What should I wear: Wear comfortable clothes.  How long does the exam take: Most scans take less than 5 minutes.  What should I bring: Bring a list of your medicines, including vitamins, minerals and over-the-counter drugs. It is safest to leave personal items at home.  Do I need a :  No  is needed.  What do I need to tell my doctor: Tell your doctor if there s any chance you are pregnant.  What should I do after the exam: No restrictions, You may resume normal activities.  What is this test: An image of a specific body part shown in shades of black and white.  Who should I call with questions: If you have any questions, please call the Imaging Department where you will have your exam. Directions, parking instructions,  and other information is available on our website, [email or web address].            Nov 29, 2018 11:30 AM CST   (Arrive by 11:15 AM)   Return Visit with Enma López MD   [facility] ([facility])    [address] [place] [id number] [phone]              Who to contact     If you have questions or need follow up information about today's clinic visit or your schedule please contact [facility] directly at [phone].  Normal or non-critical lab and imaging results will be communicated to you by Referlyhart, letter or phone within 4 business days after the clinic has received the results. If you do not hear from us within 7 days, please contact the clinic through [facility] or phone. If you have a critical or abnormal lab result, we will notify you by phone as soon as possible.  Submit refill requests through Yext or call your pharmacy and they will forward the refill request to us. Please allow 3 business days for your refill to be completed.          Additional Information About Your Visit        MyChart Information     Yext gives you secure access to your electronic health record. If you see a primary care provider, you can also send messages to your care team and make appointments. If you have questions, please call your primary care clinic.  If you do not have a primary care provider, please call [phone] and they will assist you.        Care EveryWhere ID     This is your Care EveryWhere ID. This could be used by other organizations to access your [facility] medical records  [id number]        Your Vitals Were     Pulse Temperature Respirations             72 97.4  F (36.3  C) (Oral) 20          Blood Pressure from Last 3 Encounters:   10/02/18 150/80   09/24/18 130/67   09/19/18 138/79    Weight from Last 3 Encounters:   09/24/18 106 lb 12.8 oz (48.4 kg)   09/19/18 106 lb 12.8 oz (48.4 kg)   08/02/18 122 lb 12.8 oz  (55.7 kg)              Today, you had the following     No orders found for display         Today's Medication Changes          These changes are accurate as of 10/2/18  3:48 PM.  If you have any questions, ask your nurse or doctor.               These medicines have changed or have updated prescriptions.        Dose/Directions    ibuprofen 400 MG tablet   Commonly known as:  ADVIL/MOTRIN   This may have changed:  how much to take   Used for:  Cervicalgia        Dose:  400 mg   Take 1 tablet (400 mg) by mouth 3 times daily   Quantity:  270 tablet   Refills:  3                Primary Care Provider Office Phone # Fax #    Shaista Mcrae, APRN [phone]       [address] [facility] [address]  [facility] [address]        Equal Access to Services     CARLOS MCKNIGHT : Rajeev Maldonado, alma ballard, qatigre kaalmatasha chatterjee, elias bruno . So [facility] [phone].    ATENCIÓN: Si habla español, tiene a pierson disposición servicios gratuitos de asistencia lingüística. Llame al [phone].    We comply with applicable federal civil rights laws and [place] laws. We do not discriminate on the basis of race, color, national origin, age, disability, sex, sexual orientation, or gender identity.            Thank you!     Thank you for choosing [facility]  for your care. Our goal is always to provide you with excellent care. Hearing back from our patients is one way we can continue to improve our services. Please take a few minutes to complete the written survey that you may receive in the mail after your visit with us. Thank you!             Your Updated Medication List - Protect others around you: Learn how to safely use, store and throw away your medicines at www.disposemymeds.org.          This list is accurate as of 10/2/18  3:48 PM.  Always use your most recent med list.                   Brand Name Dispense Instructions for use Diagnosis    amLODIPine 5 MG  tablet    NORVASC    60 tablet    Take 1 tablet (5 mg) by mouth At Bedtime    Benign essential hypertension       BIOFREEZE EX      Externally apply topically 2 times daily as needed        calcium carbonate 500 mg (elemental) 500 MG tablet    OS-ANNETTA    90 tablet    Take 1 tablet (1,250 mg) by mouth 2 times daily (with meals)    Cervical stenosis of spine, S/P cervical spinal fusion       ibuprofen 400 MG tablet    ADVIL/MOTRIN    270 tablet    Take 1 tablet (400 mg) by mouth 3 times daily    Cervicalgia       metoprolol tartrate 25 MG tablet    LOPRESSOR    60 tablet    Take 12.5 mg by mouth 2 times daily    Cervical stenosis of spine, S/P cervical spinal fusion       MULTIVITAMIN ADULT PO      Take 1 tablet by mouth daily        NATURAL BALANCE TEARS OP      Apply 2 drops to eye 4 times daily as needed        polyethylene glycol 0.4%- propylene glycol 0.3% 0.4-0.3 % Soln ophthalmic solution    SYSTANE ULTRA     Place 1 drop into both eyes 4 times daily        polyethylene glycol Packet    MIRALAX/GLYCOLAX    7 packet    Take 17 g by mouth daily as needed for constipation    Cervical stenosis of spine, S/P cervical spinal fusion       pravastatin 10 MG tablet    PRAVACHOL    90 tablet    Take 1 tablet (10 mg) by mouth daily    Mixed hyperlipidemia       REQUIP PO      Take 2 mg by mouth At Bedtime        SALONPAS PAIN RELIEF PATCH EX      Externally apply 1 patch topically 2 times daily        senna-docusate 8.6-50 MG per tablet    SENOKOT-S;PERICOLACE    100 tablet    1 tablet by Oral or Feeding Tube route daily Also QD PRN    Cervical stenosis of spine, S/P cervical spinal fusion       TRAZODONE HCL PO      Take 25 mg by mouth At Bedtime Also PRN if before 2 am        TYLENOL PO      Take 1,000 mg by mouth 2 times daily Also BID PRN        ULTRAM PO      Take 50 mg by mouth At Bedtime        VITAMIN D-3 PO      Take 4,000 Units by mouth daily

## 2018-10-02 NOTE — PROGRESS NOTES
"  SUBJECTIVE:   Yuni Otoole is a 83 year old female who presents to clinic today for the following health issues:      Concern - Lump on left wrist  Onset: 3 weeks    Description:   lump    Intensity: moderate, severe    Progression of Symptoms:  worsening    Accompanying Signs & Symptoms:  Numbness in fingers, : thumb locks up\"    Previous history of similar problem:   no    Precipitating factors:   Worsened by: use of the hand    Alleviating factors:  Improved by: nothing    Therapies Tried and outcome: nothing    Patient is an 83 year old female who presents with her  for evaluation of abnormal growths over the flexor surface of her left forearm just proximal from the wrist. She said that she started a new exercise program this past month which involved working the  strength in her left hand. She noticed the first lump 1-2 weeks ago and since then two additional lumps have developed. They are minimally discomforting, but she is concerned about what they are. Consulted with the internal medicine doctor over whether this is a nodule growth on the tendon or a cyst. Upon inspection the growths were found to be fluctuant, presumed to be ganglion cyst due to increased activity. Discussed with patient the benign nature of ganglion cysts and encouraged monitoring for continued growth. If the growths cause increased discomfort, impairment in movement or grow larger than surgical correction should be considered.     Problem list and histories reviewed & adjusted, as indicated.  Additional history: as documented    Patient Active Problem List   Diagnosis     Anxiety state     Scoliosis     Osteopenia     HYPERLIPIDEMIA LDL GOAL <130     Advanced directives, counseling/discussion     Low back pain without sciatica     C7 cervical fracture (H)     Health Care Home     Benign essential hypertension     Neck pain     Cervical stenosis of spine     Protein-calorie malnutrition (H)     S/P cervical spinal fusion     " Restless legs syndrome (RLS)     Insomnia due to medical condition     Fusion of spine, cervicothoracic region     Past Surgical History:   Procedure Laterality Date     CAROTID ENDARTERECTOMY  11/07/08     COLONOSCOPY  05/30/07    Diverticulosis-return in 5 yrs     EYE SURGERY       FUSION CERVICAL POSTERIOR THREE+ LEVELS N/A 11/15/2017    Procedure: FUSION CERVICAL POSTERIOR THREE+ LEVELS;;  Surgeon: Enma López MD;  Location: [facility] OR     HC DRAIN/INJ MAJOR JOINT/BURSA W/O US  2009    Right SI Joint     HC DRAIN/INJ MAJOR JOINT/BURSA W/O US  2010    Right interarticular hip injection     HC INJ EPIDURAL CERVICAL/THORACIC W/WO CONTRAST  2010    C6-7     HC INJ EPIDURAL LUMBAR/SACRAL W/WO CONTRAST  2009    L5-S1     HC INJ TRANSFORAMIN EPIDURAL, LUMB/SACR SINGLE  2010     HC REMOVAL OF TONSILS,14+ Y/O      Tonsils 14+y.o.     LAMINECTOMY CERIVCAL POSTERIOR THREE+ LEVELS N/A 11/15/2017    Procedure: LAMINECTOMY CERVICAL POSTERIOR THREE+ LEVELS;;  Surgeon: Enma López MD;  Location: [facility] OR     OPTICAL TRACKING SYSTEM FUSION CERVICAL ANTERIOR THREE + LEVELS N/A 11/15/2017    Procedure: OPTICAL TRACKING SYSTEM FUSION CERVICAL ANTERIOR THREE + LEVELS;;  Surgeon: Enma López MD;  Location: [facility] OR     OPTICAL TRACKING SYSTEM FUSION POSTERIOR CERVICAL THREE + LEVELS N/A 11/15/2017    Procedure: OPTICAL TRACKING SYSTEM FUSION POSTERIOR CERVICAL THREE + LEVELS;  Cervical 1-3 Posterior Decompression With Laminectomies, O-Arm/ Stealth Assisted Cervical 2-Thoracic 3 Posterior Instrumented Fusion With Osteotomies Cervical 2-3, Cervical C4-5; Cervical 6-7 ; Right Approach Cervical 2-3, Cervical 4-5 and Cervical 6-7 Anterior Cervical Discectomy And Fusion, [facility]       Social History   Substance Use Topics     Smoking status: Former Smoker     Packs/day: 0.00     Years: 10.00     Types: Cigarettes     Start date: 5/10/1970     Quit date: 1/1/1996     Smokeless tobacco:  Never Used     Alcohol use No     Family History   Problem Relation Age of Onset     Cancer Mother      colon cancer  at age 95 or 96   surgery done     GASTROINTESTINAL DISEASE Mother      stomach problems     Osteoporosis Mother      EYE* Mother      cataract and macular degen.     Cardiovascular Father       at age 60  MI     Hypertension Father      ?     Circulatory Paternal Grandmother      legs     Cerebrovascular Disease Paternal Grandmother      Obesity Paternal Grandmother      Gynecology Sister      hysterectomy     Lipids Brother      was on meds.  ? still     Obesity Maternal Grandmother      Musculoskeletal Disorder Daughter      cancerous tumor left upper arm/shoulder- was told it was a breast type cancer     Diabetes Maternal Aunt      Cancer Daughter          Current Outpatient Prescriptions   Medication Sig Dispense Refill     Acetaminophen (TYLENOL PO) Take 1,000 mg by mouth 2 times daily Also BID PRN       amLODIPine (NORVASC) 5 MG tablet Take 1 tablet (5 mg) by mouth At Bedtime 60 tablet 3     calcium carbonate (OS-ANNETTA 500 MG [place]. [place]) 1250 MG tablet Take 1 tablet (1,250 mg) by mouth 2 times daily (with meals) 90 tablet      Cholecalciferol (VITAMIN D-3 PO) Take 4,000 Units by mouth daily       Hypromellose (NATURAL BALANCE TEARS OP) Apply 2 drops to eye 4 times daily as needed       ibuprofen (ADVIL/MOTRIN) 400 MG tablet Take 1 tablet (400 mg) by mouth 3 times daily 270 tablet 3     Liniments (SALONPAS PAIN RELIEF PATCH EX) Externally apply 1 patch topically 2 times daily       Menthol, Topical Analgesic, (BIOFREEZE EX) Externally apply topically 2 times daily as needed        metoprolol (LOPRESSOR) 25 MG tablet Take 12.5 mg by mouth 2 times daily  60 tablet      Multiple Vitamins-Minerals (MULTIVITAMIN ADULT PO) Take 1 tablet by mouth daily       polyethylene glycol (MIRALAX/GLYCOLAX) Packet Take 17 g by mouth daily as needed for constipation  7 packet      polyethylene glycol 0.4%-  propylene glycol 0.3% (SYSTANE ULTRA) 0.4-0.3 % SOLN ophthalmic solution Place 1 drop into both eyes 4 times daily       pravastatin (PRAVACHOL) 10 MG tablet Take 1 tablet (10 mg) by mouth daily 90 tablet 3     ROPINIRole HCl (REQUIP PO) Take 2 mg by mouth At Bedtime        senna-docusate (SENOKOT-S;PERICOLACE) 8.6-50 MG per tablet 1 tablet by Oral or Feeding Tube route daily Also QD  tablet      TraMADol HCl (ULTRAM PO) Take 50 mg by mouth At Bedtime       TRAZODONE HCL PO Take 25 mg by mouth At Bedtime Also PRN if before 2 am       Allergies   Allergen Reactions     Atorvastatin Calcium      Was on Lipitor and has muscle problem       Reviewed and updated as needed this visit by clinical staff  Tobacco  Allergies  Meds  Med Hx  Surg Hx  Fam Hx  Soc Hx      Reviewed and updated as needed this visit by Provider         ROS:  Constitutional, HEENT, cardiovascular, pulmonary, gi and gu systems are negative, except as otherwise noted.    OBJECTIVE:     /80 (BP Location: Left arm, Patient Position: Chair, Cuff Size: Adult Regular)  Pulse 72  Temp 97.4  F (36.3  C) (Oral)  Resp 20  There is no height or weight on file to calculate BMI.  GENERAL: healthy, alert and no distress  RESP: lungs clear to auscultation - no rales, rhonchi or wheezes  CV: regular rate and rhythm, normal S1 S2, no S3 or S4, no murmur, click or rub, no peripheral edema and peripheral pulses strong  MS: 3-4 cm long fluctuant growth located on the flexor surface of the left forearm flexor digitorum, minimally tender to palpation  NEURO: Normal strength and tone, mentation intact and speech normal  PSYCH: mentation appears normal, affect normal/bright    Diagnostic Test Results:  none     ASSESSMENT/PLAN:     1. Ganglion cyst of wrist, left  Consultation with internal medicine recommendation to use ace bandage to apply mild pressure to the area in the attempt to reduce the cyst and promote healing. Encouraged the patient to  monitor the cyst for increased size, symptoms or impairment. If these occur contact clinic for surgical consult.       Follow up with clinic as needed or sooner if conditions change, worsen or fail to improve as expected.      Myke Shankar PA-C  [facility]

## 2018-10-03 VITALS
RESPIRATION RATE: 16 BRPM | WEIGHT: 123.4 LBS | OXYGEN SATURATION: 96 % | TEMPERATURE: 98.2 F | BODY MASS INDEX: 24.88 KG/M2 | SYSTOLIC BLOOD PRESSURE: 142 MMHG | DIASTOLIC BLOOD PRESSURE: 73 MMHG | HEIGHT: 59 IN | HEART RATE: 71 BPM

## 2018-10-03 NOTE — PROGRESS NOTES
[facility]    Chief Complaint   Patient presents with     snf Regulatory       [facility] Medical Record Number:  [id number]    HPI:    Yuni Otoole is a 82 year old  (1935), who is being seen today for a federally mandated E/M visit at [facility]  .  HPI information obtained from: facility staff, patient report and [facility] chart review.     Today's concerns are:  -  Resident seen and examined.   - reports ongoing small mass over  The back surface of left wrist. At times hurts when puts pressure on it, and does interfere with some movements, but overall is not that bad. This appeared after she had an exercise for the hand.   - reports appetite is too good, sleep better with meds, and BM is fine.   - denies CP, SOB.   - RN / GNP has no concern today.   ------------------------------------------  - - Past Medical, social, family histories, medications, and allergies reviewed and updated  - Medications reviewed: in the chart and EHR.   - Case Management:   I have reviewed the care plan and MDS and do agree with the plan. Patient's desire to return to the community is not present.  Information reviewed:  Medications, vital signs, orders, and nursing notes.    :MEDICATIONS:  Current Outpatient Prescriptions   Medication Sig Dispense Refill     Acetaminophen (TYLENOL PO) Take 1,000 mg by mouth 2 times daily Also BID PRN       amLODIPine (NORVASC) 5 MG tablet Take 1 tablet (5 mg) by mouth At Bedtime 60 tablet 3     calcium carbonate (OS-ANNETTA 500 MG [place]. [place]) 1250 MG tablet Take 1 tablet (1,250 mg) by mouth 2 times daily (with meals) 90 tablet      Cholecalciferol (VITAMIN D-3 PO) Take 4,000 Units by mouth daily       Hypromellose (NATURAL BALANCE TEARS OP) Apply 2 drops to eye 4 times daily as needed       ibuprofen (ADVIL/MOTRIN) 400 MG tablet Take 1 tablet (400 mg) by mouth 3 times daily 270 tablet 3     Liniments (SALONPAS PAIN RELIEF PATCH EX) Externally  "apply 1 patch topically 2 times daily       Menthol, Topical Analgesic, (BIOFREEZE EX) Externally apply topically 2 times daily as needed        metoprolol (LOPRESSOR) 25 MG tablet Take 12.5 mg by mouth 2 times daily  60 tablet      Multiple Vitamins-Minerals (MULTIVITAMIN ADULT PO) Take 1 tablet by mouth daily       polyethylene glycol (MIRALAX/GLYCOLAX) Packet Take 17 g by mouth daily as needed for constipation  7 packet      polyethylene glycol 0.4%- propylene glycol 0.3% (SYSTANE ULTRA) 0.4-0.3 % SOLN ophthalmic solution Place 1 drop into both eyes 4 times daily       pravastatin (PRAVACHOL) 10 MG tablet Take 1 tablet (10 mg) by mouth daily 90 tablet 3     ROPINIRole HCl (REQUIP PO) Take 2 mg by mouth At Bedtime        senna-docusate (SENOKOT-S;PERICOLACE) 8.6-50 MG per tablet 1 tablet by Oral or Feeding Tube route daily Also QD  tablet      TraMADol HCl (ULTRAM PO) Take 50 mg by mouth At Bedtime       TRAZODONE HCL PO Take 25 mg by mouth At Bedtime Also PRN if before 2 am     Medications started since last [facility] medication reconciliation:  Orders Placed This Encounter   Medications     IBUPROFEN PO     Sig: Take 400 mg by mouth 3 times daily as needed for moderate pain     ROS:  4 point ROS including Respiratory, CV, GI and , other than that noted in the HPI,  is negative    Exam:  Vitals: /73  Pulse 71  Temp 98.2  F (36.8  C)  Resp 16  Ht 4' 11\" (1.499 m)  Wt 123 lb 6.4 oz (56 kg)  SpO2 96%  BMI 24.92 kg/m2  BMI= Body mass index is 24.92 kg/(m^2).  GENERAL APPEARANCE:  in no distress, cooperative  RESP:  respiratory effort and palpation of chest normal, lungs clear to auscultation , no respiratory distress  CV:  Palpation and auscultation of heart done , regular rate and rhythm, systolic over right upper sternal border murmur, rub, or gallop, S1S2 bounding and rapid, but regular.   ABDOMEN:  normal bowel sounds, soft, nontender, no hepatosplenomegaly or other masses  M/S:   Gait and " station abnormal uses walker and WC. Small mass over the dorsal surface of left wrist, firm with ill defined boundary, rubbery, gets tight and fixed with wrist flexion, no fluctuation.   SKIN:  Inspection of skin and subcutaneous tissue baseline, Palpation of skin and subcutaneous tissue baseline  NEURO:   generalized muscles atrophy, hand  4/5 b/l.  Dorsiflexion 5/5 bilateral.   PSYCH:  affect and mood normal    Lab/Diagnostic data:   CBC RESULTS:   Recent Labs   Lab Test  02/20/18   0615  11/30/17   0610  11/20/17   1112  11/17/17   0401   WBC   --    --   8.0  10.6   RBC   --    --   3.21*  3.35*   HGB  12.2  10.5*  9.7*  10.1*   HCT   --    --   30.2*  30.6*   MCV   --    --   94  91   MCH   --    --   30.2  30.1   MCHC   --    --   32.1  33.0   RDW   --    --   13.6  14.9   PLT   --    --   246  171   Last Basic Metabolic Panel:  Recent Labs   Lab Test  04/17/18   0703  02/27/18   0610  02/20/18   0615   11/28/17   0610   NA  134  135  135   < >  134   POTASSIUM   --    --   3.8   --   3.9   CHLORIDE   --    --   99   --   98   ANNETTA   --    --   9.5   --   8.9   CO2   --    --   29   --   29   BUN   --    --   20   --   14   CR   --    --   0.45*   --   0.51*   GLC   --    --   90   --   85    < > = values in this interval not displayed.     ASSESSMENT/PLAN  # hx of recent C1-C3 Lami; C2-C3 Posterior Fusion; C2/3, C3/4, C6/7 Anterior Decompression and Fusion; C2/3, C4/5, and C6/7 Facet Osteotomies; C2-T3 Instrumentation   - analgesia optimal.     Benign essential hypertension:  - controlled. Continue meds. In this age group keep SBP > 130 mmHg.      Osteoporosis /Vitamin D deficiency  - on supplement.     Left synovial/ ganglion cyst:  - counseled about the it's nature. May go spontaneously. If surgery done, there is a 50% chance to recur.       Frailty:  - continues to require assistance from nursing. Up for meals only o/w spends the day resting in bed  - continue rehab.      Orders:  - See above,  otherwise, continue the rest of the current POC.     Total time spent with patient visit at the skilled nursing facility was 35 min including patient visit and review of past records and discussing with NP. Greater than 50% of total time spent with counseling and coordinating care due to above issues.     Electronically signed by:  Ceasar Aburto MD

## 2018-10-04 ENCOUNTER — NURSING HOME VISIT (OUTPATIENT)
Dept: GERIATRICS | Facility: CLINIC | Age: 83
End: 2018-10-04
Payer: COMMERCIAL

## 2018-10-04 DIAGNOSIS — R54 FRAIL ELDERLY: ICD-10-CM

## 2018-10-04 DIAGNOSIS — E55.9 VITAMIN D DEFICIENCY: ICD-10-CM

## 2018-10-04 DIAGNOSIS — M54.2 NECK PAIN: Primary | ICD-10-CM

## 2018-10-04 DIAGNOSIS — I10 BENIGN ESSENTIAL HYPERTENSION: ICD-10-CM

## 2018-10-04 DIAGNOSIS — M15.0 PRIMARY OSTEOARTHRITIS INVOLVING MULTIPLE JOINTS: ICD-10-CM

## 2018-10-04 DIAGNOSIS — M81.0 AGE-RELATED OSTEOPOROSIS WITHOUT CURRENT PATHOLOGICAL FRACTURE: ICD-10-CM

## 2018-10-04 DIAGNOSIS — M71.332 SYNOVIAL CYST OF WRIST, LEFT: ICD-10-CM

## 2018-10-04 PROCEDURE — 99310 SBSQ NF CARE HIGH MDM 45: CPT | Performed by: FAMILY MEDICINE

## 2018-10-04 NOTE — LETTER
10/4/2018        RE: Yuni Otoole  [address]  [facility] [address]          [place] [facility]    Chief Complaint   Patient presents with     long-term Regulatory       [place] Medical Record Number:  [id number]    HPI:    Yuni Otoole is a 82 year old  (1935), who is being seen today for a federally mandated E/M visit at [facility]  .  HPI information obtained from: facility staff, patient report and [facility] chart review.     Today's concerns are:  -  Resident seen and examined.   - reports ongoing small mass over  The back surface of left wrist. At times hurts when puts pressure on it, and does interfere with some movements, but overall is not that bad. This appeared after she had an exercise for the hand.   - reports appetite is too good, sleep better with meds, and BM is fine.   - denies CP, SOB.   - RN / GNP has no concern today.   ------------------------------------------  - - Past Medical, social, family histories, medications, and allergies reviewed and updated  - Medications reviewed: in the chart and EHR.   - Case Management:   I have reviewed the care plan and MDS and do agree with the plan. Patient's desire to return to the community is not present.  Information reviewed:  Medications, vital signs, orders, and nursing notes.    :MEDICATIONS:  Current Outpatient Prescriptions   Medication Sig Dispense Refill     Acetaminophen (TYLENOL PO) Take 1,000 mg by mouth 2 times daily Also BID PRN       amLODIPine (NORVASC) 5 MG tablet Take 1 tablet (5 mg) by mouth At Bedtime 60 tablet 3     calcium carbonate (OS-ANNETTA 500 MG [place]. [place]) 1250 MG tablet Take 1 tablet (1,250 mg) by mouth 2 times daily (with meals) 90 tablet      Cholecalciferol (VITAMIN D-3 PO) Take 4,000 Units by mouth daily       Hypromellose (NATURAL BALANCE TEARS OP) Apply 2 drops to eye 4 times daily as needed       ibuprofen (ADVIL/MOTRIN) 400 MG tablet Take 1 tablet (400 mg) by mouth 3  "times daily 270 tablet 3     Liniments (SALONPAS PAIN RELIEF PATCH EX) Externally apply 1 patch topically 2 times daily       Menthol, Topical Analgesic, (BIOFREEZE EX) Externally apply topically 2 times daily as needed        metoprolol (LOPRESSOR) 25 MG tablet Take 12.5 mg by mouth 2 times daily  60 tablet      Multiple Vitamins-Minerals (MULTIVITAMIN ADULT PO) Take 1 tablet by mouth daily       polyethylene glycol (MIRALAX/GLYCOLAX) Packet Take 17 g by mouth daily as needed for constipation  7 packet      polyethylene glycol 0.4%- propylene glycol 0.3% (SYSTANE ULTRA) 0.4-0.3 % SOLN ophthalmic solution Place 1 drop into both eyes 4 times daily       pravastatin (PRAVACHOL) 10 MG tablet Take 1 tablet (10 mg) by mouth daily 90 tablet 3     ROPINIRole HCl (REQUIP PO) Take 2 mg by mouth At Bedtime        senna-docusate (SENOKOT-S;PERICOLACE) 8.6-50 MG per tablet 1 tablet by Oral or Feeding Tube route daily Also QD  tablet      TraMADol HCl (ULTRAM PO) Take 50 mg by mouth At Bedtime       TRAZODONE HCL PO Take 25 mg by mouth At Bedtime Also PRN if before 2 am     Medications started since last EPIC medication reconciliation:  Orders Placed This Encounter   Medications     IBUPROFEN PO     Sig: Take 400 mg by mouth 3 times daily as needed for moderate pain     ROS:  4 point ROS including Respiratory, CV, GI and , other than that noted in the HPI,  is negative    Exam:  Vitals: /73  Pulse 71  Temp 98.2  F (36.8  C)  Resp 16  Ht 4' 11\" (1.499 m)  Wt 123 lb 6.4 oz (56 kg)  SpO2 96%  BMI 24.92 kg/m2  BMI= Body mass index is 24.92 kg/(m^2).  GENERAL APPEARANCE:  in no distress, cooperative  RESP:  respiratory effort and palpation of chest normal, lungs clear to auscultation , no respiratory distress  CV:  Palpation and auscultation of heart done , regular rate and rhythm, systolic over right upper sternal border murmur, rub, or gallop, S1S2 bounding and rapid, but regular.   ABDOMEN:  normal bowel " sounds, soft, nontender, no hepatosplenomegaly or other masses  M/S:   Gait and station abnormal uses walker and WC. Small mass over the dorsal surface of left wrist, firm with ill defined boundary, rubbery, gets tight and fixed with wrist flexion, no fluctuation.   SKIN:  Inspection of skin and subcutaneous tissue baseline, Palpation of skin and subcutaneous tissue baseline  NEURO:   generalized muscles atrophy, hand  4/5 b/l.  Dorsiflexion 5/5 bilateral.   PSYCH:  affect and mood normal    Lab/Diagnostic data:   CBC RESULTS:   Recent Labs   Lab Test  02/20/18   0615  11/30/17   0610  11/20/17   1112  11/17/17   0401   WBC   --    --   8.0  10.6   RBC   --    --   3.21*  3.35*   HGB  12.2  10.5*  9.7*  10.1*   HCT   --    --   30.2*  30.6*   MCV   --    --   94  91   MCH   --    --   30.2  30.1   MCHC   --    --   32.1  33.0   RDW   --    --   13.6  14.9   PLT   --    --   246  171   Last Basic Metabolic Panel:  Recent Labs   Lab Test  04/17/18   0703  02/27/18   0610  02/20/18   0615   11/28/17   0610   NA  134  135  135   < >  134   POTASSIUM   --    --   3.8   --   3.9   CHLORIDE   --    --   99   --   98   ANNETTA   --    --   9.5   --   8.9   CO2   --    --   29   --   29   BUN   --    --   20   --   14   CR   --    --   0.45*   --   0.51*   GLC   --    --   90   --   85    < > = values in this interval not displayed.     ASSESSMENT/PLAN  # hx of recent C1-C3 [place]ami; C2-C3 Posterior Fusion; C2/3, C3/4, C6/7 Anterior Decompression and Fusion; C2/3, C4/5, and C6/7 Facet Osteotomies; C2-T3 Instrumentation   - analgesia optimal.     Benign essential hypertension:  - controlled. Continue meds. In this age group keep SBP > 130 mmHg.      Osteoporosis /Vitamin D deficiency  - on supplement.     Left synovial/ ganglion cyst:  - counseled about the it's nature. May go spontaneously. If surgery done, there is a 50% chance to recur.       Frailty:  - continues to require assistance from nursing. Up for meals only o/w  spends the day resting in bed  - continue rehab.      Orders:  - See above, otherwise, continue the rest of the current POC.     Total time spent with patient visit at the skilled nursing facility was 35 min including patient visit and review of past records and discussing with NP. Greater than 50% of total time spent with counseling and coordinating care due to above issues.     Electronically signed by:  Ceasar Aburto MD      Sincerely,        Ceasar Aburto MD

## 2018-10-18 ENCOUNTER — NURSING HOME VISIT (OUTPATIENT)
Dept: GERIATRICS | Facility: CLINIC | Age: 83
End: 2018-10-18
Payer: COMMERCIAL

## 2018-10-18 VITALS
OXYGEN SATURATION: 96 % | SYSTOLIC BLOOD PRESSURE: 139 MMHG | BODY MASS INDEX: 25.54 KG/M2 | WEIGHT: 126.7 LBS | HEIGHT: 59 IN | TEMPERATURE: 97.5 F | HEART RATE: 71 BPM | RESPIRATION RATE: 16 BRPM | DIASTOLIC BLOOD PRESSURE: 80 MMHG

## 2018-10-18 DIAGNOSIS — M79.641 PAIN OF RIGHT HAND: ICD-10-CM

## 2018-10-18 DIAGNOSIS — G25.81 RESTLESS LEGS SYNDROME (RLS): Primary | ICD-10-CM

## 2018-10-18 DIAGNOSIS — M15.0 PRIMARY OSTEOARTHRITIS INVOLVING MULTIPLE JOINTS: ICD-10-CM

## 2018-10-18 DIAGNOSIS — G47.01 INSOMNIA DUE TO MEDICAL CONDITION: ICD-10-CM

## 2018-10-18 PROCEDURE — 99309 SBSQ NF CARE MODERATE MDM 30: CPT | Performed by: NURSE PRACTITIONER

## 2018-10-18 NOTE — PROGRESS NOTES
"[place] [facility]  Chief Complaint   Patient presents with     prison Acute     [place] Medical Record Number:  [id number]  Place of Service where encounter took place:  [facility] ([facility]) [[id number]]    HPI:    Yuni Otoole is a 83 year old  (1935), who is being seen today for an episodic care visit.  HPI information obtained from: facility chart records, facility staff, patient report, [facility] chart review and Care Everywhere Psychiatric chart review. Today's concern is:    Restless legs syndrome (RLS)  Insomnia due to medical condition  Nursing contacted provider stating that patient wants her Trazodone dosing increased. Patient states that she wants a full tablet (50mg) instead of what is ordered (25mg and 25mg PRN supplemental). When questioned about sleeping she states that she needs to take the trazodone because her legs bother her so much. She states that her legs jump and she can't fall asleep. She is on Requip 2mg at bedtime among many other adjuncts. Noting she also has BLE edema.     Pain of right hand  Primary osteoarthritis involving multiple joints  Patient also believes that there is something wrong with her right wrist and has been wrapping it with an ACE wrap because an outside provider told er to do that with her left hand/wrist. She states this right hand discomfort is positional upon flexion, is intermittent, and has been present for a week. She is worried that something new is wrong.    PMH/PSH reviewed in [facility] today.    REVIEW OF SYSTEMS:  Limited secondary to cognitive impairment but today pt reports the above and 10 point ROS of systems including Constitutional, Eyes, Respiratory, Cardiovascular, Gastroenterology, Genitourinary, Integumentary, Musculoskeletal, Psychiatric were all negative except for pertinent positives noted in my HPI.    /80  Pulse 71  Temp 97.5  F (36.4  C)  Resp 16  Ht 4' 11\" (1.499 m)  Wt 126 lb 11.2 oz (57.5 kg)  SpO2 " 96%  BMI 25.59 kg/m2  GENERAL APPEARANCE: Alert, in no distress, cooperative.  ENT:  Mouth and posterior oropharynx normal, moist mucous membranes, hearing acuity [place].  EYES: EOM, conjunctivae, lids, pupils and irises normal, PERRL2.   RESP: Respiratory effort and palpation of chest normal, no respiratory distress, Lung sounds clear  CV:  Palpation and auscultation of heart done, rate and rhythm regular, 2/6 soft systolic murmur, no rub or gallop, Edema 2+ BLE.   ABDOMEN: Normal bowel sounds, soft, nontender, no hepatosplenomegaly or other masses.   SKIN: Inspection/Palpation of skin and subcutaneous tissue baseline w/ edema as noted above. No wrist/hand abnormalities identified, however patient has tenderness when palpated over the pollicis muscle group.   NEURO: 2-12 in normal limits and at patient's baseline, sensation intact PPS.  PSYCH: Insight and judgement, memory intact w/ forgetfulness, affect and mood normal.    ASSESSMENT/PLAN:  Restless legs syndrome (RLS)  Insomnia due to medical condition  Chronic. Stable. We will treat restless legs by slightly increasing Requip dose and keep Trazodone the same. Will institute Tubigrips to help w/ LE edema (which s likely secondary to Norvasc and scheduled Ibuprofen dosing). Follow-up routinely or as needed.    Pain of right hand  Primary osteoarthritis involving multiple joints  Acute. Stable. Likely muscular inflammation/strain, no evidence of fx. Could be exacerbated by her ACE wrapping (which she is fixated on). Advised patient on massage, use of Biofreeze, and other nonpharmacological interventions. Follow-up routinely or as needed.    Orders:  1.  Tubigrips, knee high, on in AM, off in PM.  Dx: LE edema  2.  Increase Requip to 2.5 mg po at bedtime.  Dx: RLS    Electronically signed by:  Dr. Alia Granados, APRN CNP DNP

## 2018-10-18 NOTE — LETTER
10/18/2018        RE: Yuni Otoole  [address]  [facility] [address]        [place] [facility]  Chief Complaint   Patient presents with     long-term Acute     [place] Medical Record Number:  [id number]  Place of Service where encounter took place:  [facility] [place] ([facility]) [[id number]]    HPI:    Yuni Otoole is a 83 year old  (1935), who is being seen today for an episodic care visit.  HPI information obtained from: facility chart records, facility staff, patient report, [facility] chart review and Care Everywhere [facility] chart review. Today's concern is:    Restless legs syndrome (RLS)  Insomnia due to medical condition  Nursing contacted provider stating that patient wants her Trazodone dosing increased. Patient states that she wants a full tablet (50mg) instead of what is ordered (25mg and 25mg PRN supplemental). When questioned about sleeping she states that she needs to take the trazodone because her legs bother her so much. She states that her legs jump and she can't fall asleep. She is on Requip 2mg at bedtime among many other adjuncts. Noting she also has BLE edema.     Pain of right hand  Primary osteoarthritis involving multiple joints  Patient also believes that there is something wrong with her right wrist and has been wrapping it with an ACE wrap because an outside provider told er to do that with her left hand/wrist. She states this right hand discomfort is positional upon flexion, is intermittent, and has been present for a week. She is worried that something new is wrong.    PMH/PSH reviewed in [facility] today.    REVIEW OF SYSTEMS:  Limited secondary to cognitive impairment but today pt reports the above and 10 point ROS of systems including Constitutional, Eyes, Respiratory, Cardiovascular, Gastroenterology, Genitourinary, Integumentary, Musculoskeletal, Psychiatric were all negative except for pertinent positives noted in my HPI.    /80  Pulse 71  Temp  "97.5  F (36.4  C)  Resp 16  Ht 4' 11\" (1.499 m)  Wt 126 lb 11.2 oz (57.5 kg)  SpO2 96%  BMI 25.59 kg/m2  GENERAL APPEARANCE: Alert, in no distress, cooperative.  ENT:  Mouth and posterior oropharynx normal, moist mucous membranes, hearing acuity [place].  EYES: EOM, conjunctivae, lids, pupils and irises normal, PERRL2.   RESP: Respiratory effort and palpation of chest normal, no respiratory distress, Lung sounds clear  CV:  Palpation and auscultation of heart done, rate and rhythm regular, 2/6 soft systolic murmur, no rub or gallop, Edema 2+ BLE.   ABDOMEN: Normal bowel sounds, soft, nontender, no hepatosplenomegaly or other masses.   SKIN: Inspection/Palpation of skin and subcutaneous tissue baseline w/ edema as noted above. No wrist/hand abnormalities identified, however patient has tenderness when palpated over the pollicis muscle group.   NEURO: 2-12 in normal limits and at patient's baseline, sensation intact PPS.  PSYCH: Insight and judgement, memory intact w/ forgetfulness, affect and mood normal.    ASSESSMENT/PLAN:  Restless legs syndrome (RLS)  Insomnia due to medical condition  Chronic. Stable. We will treat restless legs by slightly increasing Requip dose and keep Trazodone the same. Will institute Tubigrips to help w/ LE edema (which s likely secondary to Norvasc and scheduled Ibuprofen dosing). Follow-up routinely or as needed.    Pain of right hand  Primary osteoarthritis involving multiple joints  Acute. Stable. Likely muscular inflammation/strain, no evidence of fx. Could be exacerbated by her ACE wrapping (which she is fixated on). Advised patient on massage, use of Biofreeze, and other nonpharmacological interventions. Follow-up routinely or as needed.    Orders:  1.  Tubigrips, knee high, on in AM, off in PM.  Dx: LE edema  2.  Increase Requip to 2.5 mg po at bedtime.  Dx: RLS    Electronically signed by:  Dr. Alia Granados, APRN CNP DNP        Sincerely,        Alia Granados, NP    "

## 2018-11-05 ENCOUNTER — TELEPHONE (OUTPATIENT)
Dept: FAMILY MEDICINE | Facility: OTHER | Age: 83
End: 2018-11-05

## 2018-11-05 NOTE — TELEPHONE ENCOUNTER
Mateus is requesting another bandage just like Yuni got the last time from [facility] for her.  The pharmacy does not have them and they told him to come here to get one again.      He is coming in at 2 pm for a nurse appointment.    Jeni DE ANDA

## 2018-11-06 ENCOUNTER — NURSING HOME VISIT (OUTPATIENT)
Dept: GERIATRICS | Facility: CLINIC | Age: 83
End: 2018-11-06
Payer: COMMERCIAL

## 2018-11-06 VITALS
HEART RATE: 81 BPM | HEIGHT: 59 IN | TEMPERATURE: 98.5 F | SYSTOLIC BLOOD PRESSURE: 148 MMHG | BODY MASS INDEX: 25.8 KG/M2 | OXYGEN SATURATION: 96 % | RESPIRATION RATE: 16 BRPM | DIASTOLIC BLOOD PRESSURE: 87 MMHG | WEIGHT: 128 LBS

## 2018-11-06 DIAGNOSIS — G25.81 RESTLESS LEGS SYNDROME (RLS): Primary | ICD-10-CM

## 2018-11-06 DIAGNOSIS — Z53.9 ERRONEOUS ENCOUNTER--DISREGARD: ICD-10-CM

## 2018-11-06 DIAGNOSIS — I10 BENIGN ESSENTIAL HYPERTENSION: ICD-10-CM

## 2018-11-06 DIAGNOSIS — G47.01 INSOMNIA DUE TO MEDICAL CONDITION: ICD-10-CM

## 2018-11-06 DIAGNOSIS — M15.0 PRIMARY OSTEOARTHRITIS INVOLVING MULTIPLE JOINTS: ICD-10-CM

## 2018-11-06 DIAGNOSIS — M54.2 CERVICALGIA: ICD-10-CM

## 2018-11-16 DIAGNOSIS — M48.02 SPINAL STENOSIS IN CERVICAL REGION: Primary | ICD-10-CM

## 2018-11-27 ENCOUNTER — NURSING HOME VISIT (OUTPATIENT)
Dept: GERIATRICS | Facility: CLINIC | Age: 83
End: 2018-11-27
Payer: COMMERCIAL

## 2018-11-27 VITALS
DIASTOLIC BLOOD PRESSURE: 74 MMHG | WEIGHT: 129.4 LBS | HEART RATE: 69 BPM | SYSTOLIC BLOOD PRESSURE: 135 MMHG | HEIGHT: 59 IN | RESPIRATION RATE: 18 BRPM | TEMPERATURE: 97.9 F | OXYGEN SATURATION: 96 % | BODY MASS INDEX: 26.08 KG/M2

## 2018-11-27 DIAGNOSIS — M25.532 LEFT WRIST PAIN: ICD-10-CM

## 2018-11-27 DIAGNOSIS — G25.81 RESTLESS LEGS SYNDROME (RLS): ICD-10-CM

## 2018-11-27 DIAGNOSIS — Z98.1 S/P CERVICAL SPINAL FUSION: ICD-10-CM

## 2018-11-27 DIAGNOSIS — R54 FRAIL ELDERLY: ICD-10-CM

## 2018-11-27 DIAGNOSIS — M15.0 PRIMARY OSTEOARTHRITIS INVOLVING MULTIPLE JOINTS: Primary | ICD-10-CM

## 2018-11-27 DIAGNOSIS — M19.90 GENERALIZED ARTHRITIS: ICD-10-CM

## 2018-11-27 DIAGNOSIS — M79.641 PAIN OF RIGHT HAND: ICD-10-CM

## 2018-11-27 DIAGNOSIS — M54.2 NECK PAIN: ICD-10-CM

## 2018-11-27 DIAGNOSIS — G47.01 INSOMNIA DUE TO MEDICAL CONDITION: ICD-10-CM

## 2018-11-27 PROCEDURE — 99309 SBSQ NF CARE MODERATE MDM 30: CPT | Performed by: NURSE PRACTITIONER

## 2018-11-27 NOTE — PROGRESS NOTES
"[place] [facility]  Chief Complaint   Patient presents with     FPC Acute     [place] Medical Record Number:  [id number]  Place of Service where encounter took place:  [facility] ([facility]) [[id number]]    HPI:    Yuni Otoole is a 83 year old  (1935), who is being seen today for an episodic care visit.  HPI information obtained from: facility chart records, facility staff, patient report, [place] Epic chart review and Care Everywhere Epic chart review. Today's concern is:    Primary osteoarthritis involving multiple joints  Restless legs syndrome (RLS)  Neck pain  Pain of right hand  Left wrist pain  Generalized arthritis  S/P cervical spinal fusion  Insomnia due to medical condition  Frail elderly  Patient had a care conference yesterday, and provider was notified by nursing and house QIL that patient wanted to do a medication review w/ provider.  Today, provider sat down with patient, face-to-face, with a copy of her current medication list, appropriate lighting, at 1300. We talked through each medication, dose, timing, and indication. Patient discussed concerns regarding finances around each medication, as she believes that she is paying more than she was at home.  I reminded patient that she was taking less medications than she was at home.  She stated \"why do I take so many things for pain,\" to which I asked, \"do you have pain?\"  She answered \"yes.\" We talked about medication reduction, as she was concerned regarding the fact that she is taking so many things.  Noting patient has continually requested certain regimens (ibuprofen) or provider-shopped to get the regimen she desires (ace wrapping of wrist). I offered to discontinue her Ibuprofen, and discussed high risk of this drug after the age of 67.  I offered to discontinue her MVI, as her diet is stable. I made several suggestions along these lines.  Patient stated, she would talk to her  and her surgeon.  It " "was then that she disclosed that she is taking Benadryl QAM for itching, and she bought this OTC and has been taking it without nursing knowledge. Provider educated on judith at this point.    Nursing states they found her eye vitamins in her room yesterday and are requesting an order.     PMH/PSH reviewed in [facility] today.    REVIEW OF SYSTEMS:  Limited secondary to cognitive impairment but today pt reports the above and 4 point ROS including Respiratory, CV, GI and , other than that noted in the HPI,  is negative    /74  Pulse 69  Temp 97.9  F (36.6  C)  Resp 18  Ht 4' 11\" (1.499 m)  Wt 129 lb 6.4 oz (58.7 kg)  SpO2 96%  BMI 26.14 kg/m2  GENERAL APPEARANCE: Alert, in no distress, cooperative.   ENT: Mouth and posterior oropharynx normal, moist mucous membranes, hearing acuity [place].  EYES: EOM, conjunctivae, lids, pupils and irises normal, PERRL2.   SKIN: Inspection/Palpation of skin and subcutaneous tissue baseline w/ fragility.  NEURO: 2-12 at patient's baseline, sensation baseline PPS.  PSYCH: Insight and judgement, memory slightly impaired, affect and mood normal.    ASSESSMENT/PLAN:  Primary osteoarthritis involving multiple joints  Restless legs syndrome (RLS)  Neck pain  Pain of right hand  Left wrist pain  Generalized arthritis  S/P cervical spinal fusion  Insomnia due to medical condition  Frail elderly  Chronic. Stable. Obvious polypharmacy and patient confusion regarding regimen and paying status. Provider consulted business office to ask them to visit patient and explain medication payments. Nursing notified that OTC medications were found in room. Will discontinue tubigrips per patient preference.  Will order vitamins as noted below. Patient will keep medication list w/ notes, and will \"think about\" medication reductions.  Follow-up routinely or as needed.    Orders:  1. Vision Select Vitamin, 1 tab BID. Dx: Supplement.  2. Discontinue Tubigrips.   3. Benadryl 25mg PO QAM. Dx: Itching. " (Patient bought own OTC bottle and has been taking and hoarding in room for months).    Electronically signed by:  CRISTAL Vidales CNP DNP

## 2018-11-27 NOTE — LETTER
"    11/27/2018        RE: Yuni Otoole  [address]  [facility] [address]        [place] [facility]  Chief Complaint   Patient presents with     halfway Acute     [place] Medical Record Number:  [id number]  Place of Service where encounter took place:  [facility] [place] ([facility]) [[id number]]    HPI:    Yuni Otoole is a 83 year old  (1935), who is being seen today for an episodic care visit.  HPI information obtained from: facility chart records, facility staff, patient report, [facility] chart review and Care Everywhere [facility] chart review. Today's concern is:    Primary osteoarthritis involving multiple joints  Restless legs syndrome (RLS)  Neck pain  Pain of right hand  Left wrist pain  Generalized arthritis  S/P cervical spinal fusion  Insomnia due to medical condition  Frail elderly  Patient had a care conference yesterday, and provider was notified by nursing and house QIL that patient wanted to do a medication review w/ provider.  Today, provider sat down with patient, face-to-face, with a copy of her current medication list, appropriate lighting, at 1300. We talked through each medication, dose, timing, and indication. Patient discussed concerns regarding finances around each medication, as she believes that she is paying more than she was at home.  I reminded patient that she was taking less medications than she was at home.  She stated \"why do I take so many things for pain,\" to which I asked, \"do you have pain?\"  She answered \"yes.\" We talked about medication reduction, as she was concerned regarding the fact that she is taking so many things.  Noting patient has continually requested certain regimens (ibuprofen) or provider-shopped to get the regimen she desires (ace wrapping of wrist). I offered to discontinue her Ibuprofen, and discussed high risk of this drug after the age of 65.  I offered to discontinue her MVI, as her diet is stable. I made several suggestions " "along these lines.  Patient stated, she would talk to her  and her surgeon.  It was then that she disclosed that she is taking Benadryl QAM for itching, and she bought this OTC and has been taking it without nursing knowledge. Provider educated on safey at this point.    Nursing states they found her eye vitamins in her room yesterday and are requesting an order.     PMH/PSH reviewed in [employer] today.    REVIEW OF SYSTEMS:  Limited secondary to cognitive impairment but today pt reports the above and 4 point ROS including Respiratory, CV, GI and , other than that noted in the HPI,  is negative    /74  Pulse 69  Temp 97.9  F (36.6  C)  Resp 18  Ht 4' 11\" (1.499 m)  Wt 129 lb 6.4 oz (58.7 kg)  SpO2 96%  BMI 26.14 kg/m2  GENERAL APPEARANCE: Alert, in no distress, cooperative.   ENT: Mouth and posterior oropharynx normal, moist mucous membranes, hearing acuity [place].  EYES: EOM, conjunctivae, lids, pupils and irises normal, PERRL2.   SKIN: Inspection/Palpation of skin and subcutaneous tissue baseline w/ fragility.  NEURO: 2-12 at patient's baseline, sensation baseline PPS.  PSYCH: Insight and judgement, memory slightly impaired, affect and mood normal.    ASSESSMENT/PLAN:  Primary osteoarthritis involving multiple joints  Restless legs syndrome (RLS)  Neck pain  Pain of right hand  Left wrist pain  Generalized arthritis  S/P cervical spinal fusion  Insomnia due to medical condition  Frail elderly  Chronic. Stable. Obvious polypharmacy and patient confusion regarding regimen and paying status. Provider consulted business office to ask them to visit patient and explain medication payments. Nursing notified that OTC medications were found in room. Will discontinue tubigrips per patient preference.  Will order vitamins as noted below. Patient will keep medication list w/ notes, and will \"think about\" medication reductions.  Follow-up routinely or as needed.    Orders:  1. Vision Select Vitamin, 1 tab BID. " Dx: Supplement.  2. Discontinue Tubigrips.   3. Benadryl 25mg PO QAM. Dx: Itching. (Patient bought own OTC bottle and has been taking and hoarding in room for months).    Electronically signed by:  Dr. Alia Granados, APRN CNP DNP        Sincerely,        Alia Granados, NP

## 2018-11-29 ENCOUNTER — RADIANT APPOINTMENT (OUTPATIENT)
Dept: GENERAL RADIOLOGY | Facility: CLINIC | Age: 83
End: 2018-11-29
Attending: NEUROLOGICAL SURGERY
Payer: COMMERCIAL

## 2018-11-29 ENCOUNTER — OFFICE VISIT (OUTPATIENT)
Dept: NEUROSURGERY | Facility: CLINIC | Age: 83
End: 2018-11-29
Payer: COMMERCIAL

## 2018-11-29 VITALS
DIASTOLIC BLOOD PRESSURE: 80 MMHG | SYSTOLIC BLOOD PRESSURE: 176 MMHG | BODY MASS INDEX: 25.47 KG/M2 | WEIGHT: 126.1 LBS | HEART RATE: 115 BPM | OXYGEN SATURATION: 96 %

## 2018-11-29 DIAGNOSIS — M79.605 LEG PAIN, POSTERIOR, LEFT: Primary | ICD-10-CM

## 2018-11-29 DIAGNOSIS — M43.23 FUSION OF SPINE, CERVICOTHORACIC REGION: ICD-10-CM

## 2018-11-29 DIAGNOSIS — M79.605 LEFT LEG PAIN: Primary | ICD-10-CM

## 2018-11-29 DIAGNOSIS — M48.02 SPINAL STENOSIS IN CERVICAL REGION: ICD-10-CM

## 2018-11-29 ASSESSMENT — PAIN SCALES - GENERAL: PAINLEVEL: MODERATE PAIN (4)

## 2018-11-29 NOTE — Clinical Note
We did this case together in November 2017- the 65 degree rigid kyphoscoliosis lady with quadriparesis now is walking into my clinic and giving me a hug! See, complex spine surgery isn't so bad! Thanks for your help with that case.

## 2018-11-29 NOTE — LETTER
"11/29/2018       RE: Yuni Otoole  [address]  [facility] [address]     Dear Colleague,    Thank you for referring your patient, Yuni Otoole, to the [facility] at [facility]. Please see a copy of my visit note below.    Neurosurgery Clinic Note     Yuni Ootole is a very pleasant 82 year old female here in scheduled 1-year postoperative followup from spinal deformity correction surgery that I performed on 11/15/17, for chin on chest deformity with 63 degrees rigid kyphoscoliosis and severe spinal cord compression, treated with 540-degree fusion via posterior-anterior-posterior fusion C2-T3.     Prior to surgery she had quadriparesis, neck pain, weakness/numbness in bilateral upper extremities, inability to feed herself due to inability to grasp utensils, difficulty with urination, loss of appetite, and weight loss. Albumin was 2.5 prior to surgery. Surgery was performed urgently due to neurologic decline. We have been treating her malnutrition with goal protein intake of 1.5 grams protein per kg body weight per day with Arginaid and Ensure Plus as well as vitamin A, C, E, and zinc supplementation ordered for wound healing supplementation. Her 25-OH vitamin D level is 17; we started her on ergocalciferol 50,000IU every 3 days x 30 days, and cholecalciferol 5000 IU daily x 6 months    Yuni is doing very well. She has regained nearly full strength in her upper and lower extremities. Her hands are able to do fine motor tasks now and she has no problem with urination.  She has regained the weight that she lost prior to surgery. She and her family are expressing tearful gratitude today and stated that we \"gave her her life back.\"  She has no new complaints relative to her neck.    She has some subcutaneous swelling in her right posterior arm that I encouraged her to have evaluated by PCP  She has some posterior left leg pain radiating to posterior thigh with " standing and walking       Operative Report 11/15/17:  Pre-operative diagnosis:   1. 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 Cervical stenosis with myelopathy  3. Malnutrition  4. Osteoporosis and vitamin D deficiency  5. SIADH-induced hyponatremia      Post-operative diagnosis: same   Procedure: three intraoperative position changes:  Part 1:  1. Stealth-guided posterior segmental instrumentation C2-T3  2. Cervical laminectomy C1-C3  3. Facet osteotomies Cervical 2-3, Cervical 4-5, Cervical 6-7,  4. Use of intraoperative neuromonitoring  5. Use of intraoperative fluoroscopy  6. Use of intraoperative neuronavigation      Part 2:   1. Right Approach Cervical 2-3, Cervical 4-5 and Cervical 6-7 Anterior Cervical Discectomy And Fusion  2. [place] tongs placement for cervical traction for deformity reduction  3. Use of intraoperative microscope  4. Use of intraoperative neuromonitoring  5. Use of intraoperative fluoroscopy      Part 3:   1. Cervical 2-Thoracic 3 Posterior Fusion With Autograft And Allograft  2. Reduction of cervical spine with osteotomy closure and kyphoscoliosis correction  3. Use of intraoperative neuromonitoring  4. Use of intraoperative fluoroscopy  Surgeon: Enma López MD  Co surgeon:  Casey Merritt MD  Assistant(s): Mila Carvajal MD, PGY-6 resident  Anesthesia: GETA  EBL: 725 cc  Fluids:   600 cc PRBC  275 cc Cell saver  1700 cc crystalloid  1250 cc albumin      UOP: 800 cc  Drains: anterior: 7 Sinhala flat KELLIE to bulb suction; posterior: 7 Sinhala flat KELLIE to bulb suction  Specimens: none  Implants: Synthes synapse screws with 3.5mm to 5mm transitional rods posteriorly; zero P lordotic anterior implants   Findings: Significant stenosis at C1-C2, well decompressed. Good reduction seen on final XR      Indications for Surgery:  Yuni Otoole is a 82 year old female presenting to the Emergency room with numbness in her hands bilaterally and severe bilateral  upper extremity and proximal lower extremity weakness, referable to severe C1-C2 cervical stenosis with progressive kyphotic deformity and kyphoscoliosis with chin on chest deformity. Also notable is severe malnutrition from poor oral intake without given of 3 and severe vitamin D deficiency with osteoporosis. The patient had previously seen Dr. Yuan in clinic who recommended considering surgical intervention, however the patient was lost to follow-up and presented in extremis. My partner on call at her admission Dr. Merritt asked me to take over surgical care and I met with the patient and her family in detail to explain operative risks, benefits, and alternatives.      Due to her 63 degrees rigid kyphosis from C2-C7 and severe cord compression with 3mm canal diameter at C1-C2, I recommended a C2-T3 posterior-anterior-posterior 540-degrees fusion; Part 1: C1-C3 posterior decompression and C2-T3 instrumentation with multilevel osteotomies; Part 2: C4-5, C6-7, C2-3 anterior diskectomy and interbody placement possible additional levels, Part 3: C2-T3 posterior fusion with deformity correction.                  Physical Exam- CURRENT  Constitutional: Oriented to person, place, and time.   Clinical CBVA is 0 degrees     Neurological: CN II-XII intact bilaterally with note made of decreased hearing bilaterally.   Decreased sensation improved, now only on tips of fingers       Displays upgoing toes bilaterally.      resolution of ankle clonus, no inverted radial reflex            STRENGTH LEFT RIGHT   Deltoid 4 2 ** right humeral head superior subluxation   Bicep 5 4   Wrist Extensor 4 4   Tricep 5 3   Finger flexion 4 4   Finger abduction 4+ 4+    4+ 4+           Hip Flexion         5         5   Knee Extension 5 5   Ankle Dorsiflexion 5 5   Extensor Hallucis Longus 5 5   Plantar Flexion 5 5          Physical Exam- PRIOR TO SURGERY  Constitutional: Oriented to person, place, and time.   Appears frail and malnourished.  "Unable to hold chin off of chest for more than 1 minute due to severe kyphosis.      Impaired short-term memory, names 0 of 3 objects at 5 minutes  Cognition screening: impaired. Names 5 animals in 60 seconds.  Unable to sit or stand unassisted due to weakness and frailty.      Neck: Severely limited cervical mobility at baseline.   Chin-Brow Vertical Angle 45 degrees while seated    Neurological: CN II-XII intact bilaterally with note made of decreased hearing bilaterally.   Decreased sensation bilateral upper extremities throughout, hands worse than upper arms. Describes hands as \"totally numb\"          Displays upgoing toes/positive Babinski's sign bilaterally.      +inverted radial reflex bilaterally       +ankle clonus L>R      STRENGTH LEFT RIGHT   Deltoid 2 2 ** right humeral head superior subluxation   Bicep 3 4   Wrist Extensor 2 2   Tricep 3 3   Finger flexion 3 3   Finger abduction 2 3    3 4           Hip Flexion         3         3   Knee Extension 4 4   Ankle Dorsiflexion 4 4   Extensor Hallucis Longus 4 4   Plantar Flexion 4 4                   Yuni has been doing quite well in rehab  Xrays today show good correction of kyphotic deformity with no instrumentation complications evident. Approximately 60 degrees of kyphosis correction C2-C7 with current cervical lordosis at 0 degrees.           ASSESSMENT:  82  Year old female doing well with significantly improved myelopathic symptoms 12 months s/p posterior-anterior-posterior C2-T2 fusion for rigid kyphoscoliosis with spinal cord compression and preoperative quadriparesis     PLAN:  -we will obtain MRI lumbar spine without contrast to evaluate suspected left S1 radiculopathy; patient has significant lumbar scoliosis; patient will have MRI sent for my review and we will prescribe treatment based on imaging.    -for cervicothoracic fusion, followup in one year with standing EOS xrays    Enma López MD    [facility] [place] Department of Neurosurgery  Office: [phone]    11/29/2018  12:43 PM

## 2018-11-29 NOTE — PROGRESS NOTES
"Neurosurgery Clinic Note     Yuni Otoole is a very pleasant 82 year old female here in scheduled 1-year postoperative followup from spinal deformity correction surgery that I performed on 11/15/17, for chin on chest deformity with 63 degrees rigid kyphoscoliosis and severe spinal cord compression, treated with 540-degree fusion via posterior-anterior-posterior fusion C2-T3.     Prior to surgery she had quadriparesis, neck pain, weakness/numbness in bilateral upper extremities, inability to feed herself due to inability to grasp utensils, difficulty with urination, loss of appetite, and weight loss. Albumin was 2.5 prior to surgery. Surgery was performed urgently due to neurologic decline. We have been treating her malnutrition with goal protein intake of 1.5 grams protein per kg body weight per day with Arginaid and Ensure Plus as well as vitamin A, C, E, and zinc supplementation ordered for wound healing supplementation. Her 25-OH vitamin D level is 17; we started her on ergocalciferol 50,000IU every 3 days x 30 days, and cholecalciferol 5000 IU daily x 6 months    Yuni is doing very well. She has regained nearly full strength in her upper and lower extremities. Her hands are able to do fine motor tasks now and she has no problem with urination.  She has regained the weight that she lost prior to surgery. She and her family are expressing tearful gratitude today and stated that we \"gave her her life back.\"  She has no new complaints relative to her neck.    She has some subcutaneous swelling in her right posterior arm that I encouraged her to have evaluated by PCP  She has some posterior left leg pain radiating to posterior thigh with standing and walking       Operative Report 11/15/17:  Pre-operative diagnosis:   1. 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 Cervical stenosis with myelopathy  3. Malnutrition  4. Osteoporosis and vitamin D deficiency  5. SIADH-induced " hyponatremia      Post-operative diagnosis: same   Procedure: three intraoperative position changes:  Part 1:  1. Stealth-guided posterior segmental instrumentation C2-T3  2. Cervical laminectomy C1-C3  3. Facet osteotomies Cervical 2-3, Cervical 4-5, Cervical 6-7,  4. Use of intraoperative neuromonitoring  5. Use of intraoperative fluoroscopy  6. Use of intraoperative neuronavigation      Part 2:   1. Right Approach Cervical 2-3, Cervical 4-5 and Cervical 6-7 Anterior Cervical Discectomy And Fusion  2. [place] tongs placement for cervical traction for deformity reduction  3. Use of intraoperative microscope  4. Use of intraoperative neuromonitoring  5. Use of intraoperative fluoroscopy      Part 3:   1. Cervical 2-Thoracic 3 Posterior Fusion With Autograft And Allograft  2. Reduction of cervical spine with osteotomy closure and kyphoscoliosis correction  3. Use of intraoperative neuromonitoring  4. Use of intraoperative fluoroscopy  Surgeon: Enma López MD  Co surgeon:  Casey Merritt MD  Assistant(s): Mila Carvajal MD, PGY-6 resident  Anesthesia: GETA  EBL: 725 cc  Fluids:   600 cc PRBC  275 cc Cell saver  1700 cc crystalloid  1250 cc albumin      UOP: 800 cc  Drains: anterior: 7 [place]kinan flat KELLIE to bulb suction; posterior: 7 [place]kin[place] flat KELLIE to bulb suction  Specimens: none  Implants: Synthes synapse screws with 3.5mm to 5mm transitional rods posteriorly; zero P lordotic anterior implants   Findings: Significant stenosis at C1-C2, well decompressed. Good reduction seen on final XR      Indications for Surgery:  Yuni Otoole is a 82 year old female presenting to the Emergency room with numbness in her hands bilaterally and severe bilateral upper extremity and proximal lower extremity weakness, referable to severe C1-C2 cervical stenosis with progressive kyphotic deformity and kyphoscoliosis with chin on chest deformity. Also notable is severe malnutrition from poor oral intake without given of 3 and severe  vitamin D deficiency with osteoporosis. The patient had previously seen Dr. Yuan in clinic who recommended considering surgical intervention, however the patient was lost to follow-up and presented in extremis. My partner on call at her admission Dr. Merritt asked me to take over surgical care and I met with the patient and her family in detail to explain operative risks, benefits, and alternatives.      Due to her 63 degrees rigid kyphosis from C2-C7 and severe cord compression with 3mm canal diameter at C1-C2, I recommended a C2-T3 posterior-anterior-posterior 540-degrees fusion; Part 1: C1-C3 posterior decompression and C2-T3 instrumentation with multilevel osteotomies; Part 2: C4-5, C6-7, C2-3 anterior diskectomy and interbody placement possible additional levels, Part 3: C2-T3 posterior fusion with deformity correction.                  Physical Exam- CURRENT  Constitutional: Oriented to person, place, and time.   Clinical CBVA is 0 degrees     Neurological: CN II-XII intact bilaterally with note made of decreased hearing bilaterally.   Decreased sensation improved, now only on tips of fingers       Displays upgoing toes bilaterally.      resolution of ankle clonus, no inverted radial reflex            STRENGTH LEFT RIGHT   Deltoid 4 2 ** right humeral head superior subluxation   Bicep 5 4   Wrist Extensor 4 4   Tricep 5 3   Finger flexion 4 4   Finger abduction 4+ 4+    4+ 4+           Hip Flexion         5         5   Knee Extension 5 5   Ankle Dorsiflexion 5 5   Extensor Hallucis Longus 5 5   Plantar Flexion 5 5          Physical Exam- PRIOR TO SURGERY  Constitutional: Oriented to person, place, and time.   Appears frail and malnourished. Unable to hold chin off of chest for more than 1 minute due to severe kyphosis.      Impaired short-term memory, names 0 of 3 objects at 5 minutes  Cognition screening: impaired. Names 5 animals in 60 seconds.  Unable to sit or stand unassisted due to weakness and  "frailty.      Neck: Severely limited cervical mobility at baseline.   Chin-Brow Vertical Angle 45 degrees while seated    Neurological: CN II-XII intact bilaterally with note made of decreased hearing bilaterally.   Decreased sensation bilateral upper extremities throughout, hands worse than upper arms. Describes hands as \"totally numb\"          Displays upgoing toes/positive Babinski's sign bilaterally.      +inverted radial reflex bilaterally       +ankle clonus L>R      STRENGTH LEFT RIGHT   Deltoid 2 2 ** right humeral head superior subluxation   Bicep 3 4   Wrist Extensor 2 2   Tricep 3 3   Finger flexion 3 3   Finger abduction 2 3    3 4           Hip Flexion         3         3   Knee Extension 4 4   Ankle Dorsiflexion 4 4   Extensor Hallucis Longus 4 4   Plantar Flexion 4 4                   Yuni has been doing quite well in rehab  Xrays today show good correction of kyphotic deformity with no instrumentation complications evident. Approximately 60 degrees of kyphosis correction C2-C7 with current cervical lordosis at 0 degrees.           ASSESSMENT:  82  Year old female doing well with significantly improved myelopathic symptoms 12 months s/p posterior-anterior-posterior C2-T2 fusion for rigid kyphoscoliosis with spinal cord compression and preoperative quadriparesis     PLAN:  -we will obtain MRI lumbar spine without contrast to evaluate suspected left S1 radiculopathy; patient has significant lumbar scoliosis; patient will have MRI sent for my review and we will prescribe treatment based on imaging.    -for cervicothoracic fusion, followup in one year with standing EOS xrays    Enma López MD    [facility] Department of Neurosurgery  Office: [phone]    11/29/2018  12:43 PM          "

## 2018-11-29 NOTE — MR AVS SNAPSHOT
After Visit Summary   11/29/2018    Yuni Otoole    MRN: [id number]           Patient Information     Date Of Birth          1935        Visit Information        Provider Department      11/29/2018 11:30 AM Enma López MD [facility] Neurosurgery        Today's Diagnoses     Leg pain, posterior, left    -  1    Fusion of spine, cervicothoracic region           Follow-ups after your visit        Future tests that were ordered for you today     Open Future Orders        Priority Expected Expires Ordered    MRI Lumbar spine without gadolinium [[id number]] Routine  11/29/2019 11/29/2018            Who to contact     Please call your clinic at [phone] to:    Ask questions about your health    Make or cancel appointments    Discuss your medicines    Learn about your test results    Speak to your doctor            Additional Information About Your Visit        MyOther[employer]hart Information     [employer] gives you secure access to your electronic health record. If you see a primary care provider, you can also send messages to your care team and make appointments. If you have questions, please call your primary care clinic.  If you do not have a primary care provider, please call [phone] and they will assist you.      [employer] is an electronic gateway that provides easy, online access to your medical records. With [employer], you can request a clinic appointment, read your test results, renew a prescription or communicate with your care team.     To access your existing account, please contact your [facility] or call [phone] for assistance.        Care EveryWhere ID     This is your Care EveryWhere ID. This could be used by other organizations to access your [place] medical records  [id number]        Your Vitals Were     Pulse Pulse Oximetry Breastfeeding? BMI (Body Mass Index)          115 96% No 25.47 kg/m2         Blood Pressure from Last 3 Encounters:   11/29/18  176/80   11/27/18 135/74   11/06/18 148/87    Weight from Last 3 Encounters:   11/29/18 57.2 kg (126 lb 1.6 oz)   11/27/18 58.7 kg (129 lb 6.4 oz)   11/06/18 58.1 kg (128 lb)              Today, you had the following     No orders found for display       Primary Care Provider Office Phone # Fax Hugo Mcrae, APRN [phone]       [address] [place] [address]  [facility] [address]        Equal Access to Services     [facility]: Hadii mata ku hadasho Soomaali, waaxda luqadaha, qaybta kaalmada adematt, elias bruno . So [facility] [phone].    ATENCIÓN: Si habla español, tiene a pierson disposición servicios gratuitos de asistencia lingüística. Ll[facility] [phone].    We comply with applicable federal civil rights laws and [place] laws. We do not discriminate on the basis of race, color, national origin, age, disability, sex, sexual orientation, or gender identity.            Thank you!     Thank you for choosing [facility]  for your care. Our goal is always to provide you with excellent care. Hearing back from our patients is one way we can continue to improve our services. Please take a few minutes to complete the written survey that you may receive in the mail after your visit with us. Thank you!             Your Updated Medication List - Protect others around you: Learn how to safely use, store and throw away your medicines at www.disposemymeds.org.          This list is accurate as of 11/29/18 12:44 PM.  Always use your most recent med list.                   Brand Name Dispense Instructions for use Diagnosis    amLODIPine 5 MG tablet    NORVASC    60 tablet    Take 1 tablet (5 mg) by mouth At Bedtime    Benign essential hypertension       BIOFREEZE EX      Externally apply topically 2 times daily as needed        calcium carbonate 500 MG tablet    OS-ANNETTA    90 tablet    Take 1 tablet (1,250 mg) by mouth 2 times daily (with meals)    Cervical stenosis of  spine, S/P cervical spinal fusion       ibuprofen 400 MG tablet    ADVIL/MOTRIN    270 tablet    Take 1 tablet (400 mg) by mouth 3 times daily    Cervicalgia       metoprolol tartrate 25 MG tablet    LOPRESSOR    60 tablet    Take 12.5 mg by mouth 2 times daily    Cervical stenosis of spine, S/P cervical spinal fusion       MULTIVITAMIN ADULT PO      Take 1 tablet by mouth daily        NATURAL BALANCE TEARS OP      Apply 2 drops to eye 4 times daily as needed        polyethylene glycol 0.4%- propylene glycol 0.3% 0.4-0.3 % Soln ophthalmic solution    SYSTANE ULTRA     Place 1 drop into both eyes 4 times daily        polyethylene glycol packet    MIRALAX/GLYCOLAX    7 packet    Take 17 g by mouth daily as needed for constipation    Cervical stenosis of spine, S/P cervical spinal fusion       pravastatin 10 MG tablet    PRAVACHOL    90 tablet    Take 1 tablet (10 mg) by mouth daily    Mixed hyperlipidemia       REQUIP PO      Take 2.5 mg by mouth At Bedtime        SALONPAS PAIN RELIEF PATCH EX      Externally apply 1-2 patches topically 2 times daily        senna-docusate 8.6-50 MG tablet    SENOKOT-S/PERICOLACE    100 tablet    1 tablet by Oral or Feeding Tube route daily Also QD PRN    Cervical stenosis of spine, S/P cervical spinal fusion       TRAZODONE HCL PO      Take 25 mg by mouth At Bedtime Also PRN if before 2 am        TYLENOL PO      Take 1,000 mg by mouth 2 times daily Also BID PRN        ULTRAM PO      Take 50 mg by mouth At Bedtime        VITAMIN D-3 PO      Take 4,000 Units by mouth daily

## 2018-12-11 ENCOUNTER — HOSPITAL ENCOUNTER (OUTPATIENT)
Dept: MRI IMAGING | Facility: CLINIC | Age: 83
Discharge: HOME | End: 2018-12-11
Attending: NEUROLOGICAL SURGERY | Admitting: NEUROLOGICAL SURGERY
Payer: MEDICARE

## 2018-12-11 DIAGNOSIS — M79.605 LEFT LEG PAIN: ICD-10-CM

## 2018-12-11 PROCEDURE — 72148 MRI LUMBAR SPINE W/O DYE: CPT

## 2018-12-20 DIAGNOSIS — M54.16 LUMBAR RADICULOPATHY, CHRONIC: Primary | ICD-10-CM

## 2019-01-02 VITALS
OXYGEN SATURATION: 96 % | DIASTOLIC BLOOD PRESSURE: 70 MMHG | HEART RATE: 70 BPM | RESPIRATION RATE: 18 BRPM | TEMPERATURE: 98.2 F | SYSTOLIC BLOOD PRESSURE: 113 MMHG | WEIGHT: 129.4 LBS | HEIGHT: 59 IN | BODY MASS INDEX: 26.08 KG/M2

## 2019-01-02 RX ORDER — DIPHENHYDRAMINE HCL 25 MG
25 TABLET ORAL AT BEDTIME
COMMUNITY
End: 2019-05-21

## 2019-01-02 ASSESSMENT — MIFFLIN-ST. JEOR: SCORE: 957.58

## 2019-01-02 NOTE — PROGRESS NOTES
[place] [facility]  Chief Complaint   Patient presents with     detention Regulatory   [place] Medical Record Number:  [id number]    HPI:    Yuni Otoole is a 82 year old  (1935), who is being seen today for a federally mandated E/M visit at [facility]  .  HPI information obtained from: facility staff, patient report and [facility] chart review.     Today's concerns are:  -  Resident seen and examined.   - reports soreness in the hip/knees areas which are chronic, saw orthopedist, suggested physical therapy, reports pain is manageable with meds. Reports had knee injection in the past with a good result.  Reports transfer with one assistance.   - reports mass in her right arm which causing diminished right shoulder joint movement  - reports appetite is too good, sleep better with meds, and BM is fine.   - denies CP, SOB.   - RN / GNP has no concern today.   ------------------------------------------  - - Past Medical, social, family histories, medications, and allergies reviewed and updated  - Medications reviewed: in the chart and EHR.   - Case Management:   I have reviewed the care plan and MDS and do agree with the plan. Patient's desire to return to the community is not present.  Information reviewed:  Medications, vital signs, orders, and nursing notes.    :MEDICATIONS:  Current Outpatient Medications   Medication Sig Dispense Refill     Acetaminophen (TYLENOL PO) Take 1,000 mg by mouth 2 times daily Also BID PRN       amLODIPine (NORVASC) 5 MG tablet Take 1 tablet (5 mg) by mouth At Bedtime 60 tablet 3     calcium carbonate (OS-ANNETTA 500 MG [place]. [place]) 1250 MG tablet Take 1 tablet (1,250 mg) by mouth 2 times daily (with meals) 90 tablet      Cholecalciferol (VITAMIN D-3 PO) Take 4,000 Units by mouth daily       diphenhydrAMINE (BENADRYL) 25 MG tablet Take 25 mg by mouth daily       Hypromellose (NATURAL BALANCE TEARS OP) Apply 2 drops to eye 4 times daily as needed    "    ibuprofen (ADVIL/MOTRIN) 400 MG tablet Take 1 tablet (400 mg) by mouth 3 times daily 270 tablet 3     Liniments (SALONPAS PAIN RELIEF PATCH EX) Externally apply 1-2 patches topically 2 times daily        Menthol, Topical Analgesic, (BIOFREEZE EX) Externally apply topically 2 times daily as needed        metoprolol (LOPRESSOR) 25 MG tablet Take 12.5 mg by mouth 2 times daily  60 tablet      Multiple Vitamins-Minerals (MULTIVITAMIN ADULT PO) Take 1 tablet by mouth daily       polyethylene glycol (MIRALAX/GLYCOLAX) Packet Take 17 g by mouth daily as needed for constipation  7 packet      polyethylene glycol 0.4%- propylene glycol 0.3% (SYSTANE ULTRA) 0.4-0.3 % SOLN ophthalmic solution Place 1 drop into both eyes 4 times daily       pravastatin (PRAVACHOL) 10 MG tablet Take 1 tablet (10 mg) by mouth daily 90 tablet 3     ROPINIRole HCl (REQUIP PO) Take 2.5 mg by mouth At Bedtime        senna-docusate (SENOKOT-S;PERICOLACE) 8.6-50 MG per tablet 1 tablet by Oral or Feeding Tube route daily Also QD  tablet      TraMADol HCl (ULTRAM PO) Take 50 mg by mouth At Bedtime       TRAZODONE HCL PO Take 50 mg by mouth At Bedtime        ROS: 4 point ROS including Respiratory, CV, GI and , other than that noted in the HPI,  is negative    Exam:  Vitals: /70   Pulse 70   Temp 98.2  F (36.8  C)   Resp 18   Ht 1.499 m (4' 11\")   Wt 58.7 kg (129 lb 6.4 oz)   SpO2 96%   BMI 26.14 kg/m    BMI= Body mass index is 26.14 kg/m .  GENERAL APPEARANCE:  in no distress, cooperative  RESP:  respiratory effort and palpation of chest normal, lungs clear to auscultation , no respiratory distress  CV:  Palpation and auscultation of heart done , regular rate and rhythm, systolic over right upper sternal border murmur, rub, or gallop, S1S2 bounding and rapid, but regular.   ABDOMEN:  normal bowel sounds, soft, nontender, no hepatosplenomegaly or other masses  M/S:   Gait and station abnormal uses walker and WC. Cracks sound with " knee joints movements.   SKIN: soft mass over right lateral surface of deltoid, ill defined boundaries, no tenderness  NEURO:   generalized muscles atrophy, hand  4/5 b/l.  Dorsiflexion 5/5 bilateral.   PSYCH:  affect and mood normal    Lab/Diagnostic data: Reviewed in the chart and EHR.      ASSESSMENT/PLAN  # hx of recent C1-C3 Lami; C2-C3 Posterior Fusion; C2/3, C3/4, C6/7 Anterior Decompression and Fusion; C2/3, C4/5, and C6/7 Facet Osteotomies; C2-T3 Instrumentation   - analgesia optimal.     B/L Knee Joint OA:  - may benefit from CSI. Discussed with Resident and NP- will follow.    Benign essential hypertension:  - controlled. Continue meds. In this age group keep SBP > 130 mmHg.      Osteoporosis /Vitamin D deficiency:    - on Vit D 4000 IU daily.  Vit D level < 68 (May 2018)  - consider checking level and adjust dose accordingly.     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     Frailty:  - continues to require assistance from nursing. Up for meals only o/w spends the day resting in bed  - continue rehab.      Orders:  - See above, otherwise, continue the rest of the current POC.     Total time spent with patient visit at the skilled nursing facility was 35 min including patient visit and review of past records and discussing with NP. Greater than 50% of total time spent with counseling and coordinating care due to above medical conditions and counseling.     Electronically signed by:  Ceasar Aburto MD

## 2019-01-03 ENCOUNTER — NURSING HOME VISIT (OUTPATIENT)
Dept: GERIATRICS | Facility: CLINIC | Age: 84
End: 2019-01-03
Payer: COMMERCIAL

## 2019-01-03 DIAGNOSIS — G25.81 RESTLESS LEGS SYNDROME (RLS): ICD-10-CM

## 2019-01-03 DIAGNOSIS — M15.0 PRIMARY OSTEOARTHRITIS INVOLVING MULTIPLE JOINTS: ICD-10-CM

## 2019-01-03 DIAGNOSIS — Z98.1 S/P CERVICAL SPINAL FUSION: Primary | ICD-10-CM

## 2019-01-03 DIAGNOSIS — R54 FRAIL ELDERLY: ICD-10-CM

## 2019-01-03 DIAGNOSIS — I10 BENIGN ESSENTIAL HYPERTENSION: ICD-10-CM

## 2019-01-03 DIAGNOSIS — E55.9 VITAMIN D DEFICIENCY: ICD-10-CM

## 2019-01-03 PROCEDURE — 99310 SBSQ NF CARE HIGH MDM 45: CPT | Performed by: FAMILY MEDICINE

## 2019-01-03 NOTE — LETTER
1/3/2019        RE: Yuni Otoole  [address]  [facility] [address]        [place] [facility]  Chief Complaint   Patient presents with     FPC Regulatory   [place] Medical Record Number:  [id number]    HPI:    Yuni Otoole is a 82 year old  (1935), who is being seen today for a federally mandated E/M visit at [facility]  .  HPI information obtained from: facility staff, patient report and [facility] chart review.     Today's concerns are:  -  Resident seen and examined.   - reports soreness in the hip/knees areas which are chronic, saw orthopedist, suggested physical therapy, reports pain is manageable with meds. Reports had knee injection in the past with a good result.  Reports transfer with one assistance.   - reports mass in her right arm which causing diminished right shoulder joint movement  - reports appetite is too good, sleep better with meds, and BM is fine.   - denies CP, SOB.   - RN / GNP has no concern today.   ------------------------------------------  - - Past Medical, social, family histories, medications, and allergies reviewed and updated  - Medications reviewed: in the chart and EHR.   - Case Management:   I have reviewed the care plan and MDS and do agree with the plan. Patient's desire to return to the community is not present.  Information reviewed:  Medications, vital signs, orders, and nursing notes.    :MEDICATIONS:  Current Outpatient Medications   Medication Sig Dispense Refill     Acetaminophen (TYLENOL PO) Take 1,000 mg by mouth 2 times daily Also BID PRN       amLODIPine (NORVASC) 5 MG tablet Take 1 tablet (5 mg) by mouth At Bedtime 60 tablet 3     calcium carbonate (OS-ANNETTA 500 MG [place]. [place]) 1250 MG tablet Take 1 tablet (1,250 mg) by mouth 2 times daily (with meals) 90 tablet      Cholecalciferol (VITAMIN D-3 PO) Take 4,000 Units by mouth daily       diphenhydrAMINE (BENADRYL) 25 MG tablet Take 25 mg by mouth daily        "Hypromellose (NATURAL BALANCE TEARS OP) Apply 2 drops to eye 4 times daily as needed       ibuprofen (ADVIL/MOTRIN) 400 MG tablet Take 1 tablet (400 mg) by mouth 3 times daily 270 tablet 3     Liniments (SALONPAS PAIN RELIEF PATCH EX) Externally apply 1-2 patches topically 2 times daily        Menthol, Topical Analgesic, (BIOFREEZE EX) Externally apply topically 2 times daily as needed        metoprolol (LOPRESSOR) 25 MG tablet Take 12.5 mg by mouth 2 times daily  60 tablet      Multiple Vitamins-Minerals (MULTIVITAMIN ADULT PO) Take 1 tablet by mouth daily       polyethylene glycol (MIRALAX/GLYCOLAX) Packet Take 17 g by mouth daily as needed for constipation  7 packet      polyethylene glycol 0.4%- propylene glycol 0.3% (SYSTANE ULTRA) 0.4-0.3 % SOLN ophthalmic solution Place 1 drop into both eyes 4 times daily       pravastatin (PRAVACHOL) 10 MG tablet Take 1 tablet (10 mg) by mouth daily 90 tablet 3     ROPINIRole HCl (REQUIP PO) Take 2.5 mg by mouth At Bedtime        senna-docusate (SENOKOT-S;PERICOLACE) 8.6-50 MG per tablet 1 tablet by Oral or Feeding Tube route daily Also QD  tablet      TraMADol HCl (ULTRAM PO) Take 50 mg by mouth At Bedtime       TRAZODONE HCL PO Take 50 mg by mouth At Bedtime        ROS: 4 point ROS including Respiratory, CV, GI and , other than that noted in the HPI,  is negative    Exam:  Vitals: /70   Pulse 70   Temp 98.2  F (36.8  C)   Resp 18   Ht 1.499 m (4' 11\")   Wt 58.7 kg (129 lb 6.4 oz)   SpO2 96%   BMI 26.14 kg/m     BMI= Body mass index is 26.14 kg/m .  GENERAL APPEARANCE:  in no distress, cooperative  RESP:  respiratory effort and palpation of chest normal, lungs clear to auscultation , no respiratory distress  CV:  Palpation and auscultation of heart done , regular rate and rhythm, systolic over right upper sternal border murmur, rub, or gallop, S1S2 bounding and rapid, but regular.   ABDOMEN:  normal bowel sounds, soft, nontender, no hepatosplenomegaly " or other masses  M/S:   Gait and station abnormal uses walker and WC. Cracks sound with knee joints movements.   SKIN: soft mass over right lateral surface of deltoid, ill defined boundaries, no tenderness  NEURO:   generalized muscles atrophy, hand  4/5 b/l.  Dorsiflexion 5/5 bilateral.   PSYCH:  affect and mood normal    Lab/Diagnostic data: Reviewed in the chart and EHR.      ASSESSMENT/PLAN  # hx of recent C1-C3 Lami; C2-C3 Posterior Fusion; C2/3, C3/4, C6/7 Anterior Decompression and Fusion; C2/3, C4/5, and C6/7 Facet Osteotomies; C2-T3 Instrumentation   - analgesia optimal.     B/L Knee Joint OA:  - may benefit from CSI. Discussed with Resident and NP- will follow.    Benign essential hypertension:  - controlled. Continue meds. In this age group keep SBP > 130 mmHg.      Osteoporosis /Vitamin D deficiency:    - on Vit D 4000 IU daily.  Vit D level < 68 (May 2018)  - consider checking level and adjust dose accordingly.     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     Frailty:  - continues to require assistance from nursing. Up for meals only o/w spends the day resting in bed  - continue rehab.      Orders:  - See above, otherwise, continue the rest of the current POC.     Total time spent with patient visit at the skilled nursing facility was 35 min including patient visit and review of past records and discussing with NP. Greater than 50% of total time spent with counseling and coordinating care due to above medical conditions and counseling.     Electronically signed by:  Ceasar Aburto MD      Sincerely,        Ceasar Aburto MD

## 2019-01-16 ENCOUNTER — NURSING HOME VISIT (OUTPATIENT)
Dept: GERIATRICS | Facility: CLINIC | Age: 84
End: 2019-01-16
Payer: COMMERCIAL

## 2019-01-16 VITALS
BODY MASS INDEX: 26.08 KG/M2 | OXYGEN SATURATION: 96 % | SYSTOLIC BLOOD PRESSURE: 123 MMHG | RESPIRATION RATE: 15 BRPM | WEIGHT: 129.4 LBS | HEIGHT: 59 IN | DIASTOLIC BLOOD PRESSURE: 69 MMHG | TEMPERATURE: 96.9 F | HEART RATE: 71 BPM

## 2019-01-16 DIAGNOSIS — M15.0 PRIMARY OSTEOARTHRITIS INVOLVING MULTIPLE JOINTS: ICD-10-CM

## 2019-01-16 DIAGNOSIS — F51.01 PRIMARY INSOMNIA: Primary | ICD-10-CM

## 2019-01-16 PROCEDURE — 99308 SBSQ NF CARE LOW MDM 20: CPT | Mod: 25 | Performed by: NURSE PRACTITIONER

## 2019-01-16 PROCEDURE — 20610 DRAIN/INJ JOINT/BURSA W/O US: CPT | Mod: 50 | Performed by: NURSE PRACTITIONER

## 2019-01-16 RX ORDER — METHYLPREDNISOLONE ACETATE 40 MG/ML
40 INJECTION, SUSPENSION INTRA-ARTICULAR; INTRALESIONAL; INTRAMUSCULAR; SOFT TISSUE ONCE
Status: COMPLETED | OUTPATIENT
Start: 2019-01-16 | End: 2019-01-16

## 2019-01-16 RX ORDER — LIDOCAINE HYDROCHLORIDE 10 MG/ML
4 INJECTION, SOLUTION INFILTRATION; PERINEURAL ONCE
Status: COMPLETED | OUTPATIENT
Start: 2019-01-16 | End: 2019-01-16

## 2019-01-16 RX ADMIN — METHYLPREDNISOLONE ACETATE 40 MG: 40 INJECTION, SUSPENSION INTRA-ARTICULAR; INTRALESIONAL; INTRAMUSCULAR; SOFT TISSUE at 15:07

## 2019-01-16 RX ADMIN — LIDOCAINE HYDROCHLORIDE 4 ML: 10 INJECTION, SOLUTION INFILTRATION; PERINEURAL at 15:05

## 2019-01-16 RX ADMIN — METHYLPREDNISOLONE ACETATE 40 MG: 40 INJECTION, SUSPENSION INTRA-ARTICULAR; INTRALESIONAL; INTRAMUSCULAR; SOFT TISSUE at 15:06

## 2019-01-16 ASSESSMENT — MIFFLIN-ST. JEOR: SCORE: 957.58

## 2019-01-16 NOTE — PROGRESS NOTES
"[place] [facility]    Chief Complaint   Patient presents with     skilled nursing Acute       [place] Medical Record Number:  [id number]  Place of Service where encounter took place:  [facility] ([facility]) [[id number]]    HPI:    Yuni Otoole is a 83 year old  (1935), who is being seen today for an episodic care visit.  HPI information obtained from: facility chart records, facility staff, patient report and [facility] chart review. Today's concern is:  Primary insomnia  Pt requesting to have prn trazadone back - having a hard time sleeping off of it.     Primary osteoarthritis involving multiple joints  C/o marv knees and hips hurting. Discussion about cortisone inje on previous visit. She is now requesting to have done. Discussed doing hips v knees. She elects to have knees done first.   Hx of cortisone inj in past - but not knees.   Current c/o pain in joints with ambulating and movement.     PMH/PSH reviewed in EPIC today    REVIEW OF SYSTEMS:  4 point ROS including Respiratory, CV, GI and , other than that noted in the HPI,  is negative    /69   Pulse 71   Temp 96.9  F (36.1  C)   Resp 15   Ht 1.499 m (4' 11\")   Wt 58.7 kg (129 lb 6.4 oz)   SpO2 96%   BMI 26.14 kg/m    GENERAL APPEARANCE:  Alert, in no distress  marv knees enlarged. R with crepitus with movement    ASSESSMENT/PLAN:  Primary insomnia  Continuation of PRN order for non-antipsychotic psychotropic medication  Trazadone, is appropriate due to occasional insomnia and is being reordered today with an end date of 3 m.  Nsg to update [facility] provider of frequency of use 7 days prior to end date.     Primary osteoarthritis involving multiple joints  After risks, benefits and complications of steroid injection were discussed with the patient she elected to proceed.  Using sterile technique, the area was first prepped with betadyne and an alcohol swab.  A 22 gauge needle was used to inject 40 mg Kenalog and 4 cc of 1% " lidocaine into the knee on the right and left.  Yuni  tolerated the procedure well without complications.    Prior to injection, verified patient identity using patient's name and date of birth.  Due to injection administration, patient instructed to  report any adverse reaction to staff immediately.    Joint injection was performed.      Drug Amount Wasted:  None.  Vial/Syringe: Single dose vial  Expiration Date:  7/19      - Large Joint/Bursa injection and/or drainage (Shoulder, Knee)     Orders:  1.  Trazodone 25 mg po at bedtime PRN x 3 months.  Please update NP 1 week prior to med discontinue date of how many doses used in 2 weeks prior.  Dx: insomnia  2.  FYI - bilateral knees injection today w/cortisone.          Electronically signed by:  CRISTAL Olmos CNP

## 2019-01-16 NOTE — LETTER
"    1/16/2019        RE: Yuni Otoole  [address]  [facility] [address]        [place] [facility]    Chief Complaint   Patient presents with     MCC Acute       [place] Medical Record Number:  [id number]  Place of Service where encounter took place:  [facility] [place] ([facility]) [[id number]]    HPI:    Yuni Otoole is a 83 year old  (1935), who is being seen today for an episodic care visit.  HPI information obtained from: facility chart records, facility staff, patient report and [facility] chart review. Today's concern is:  Primary insomnia  Pt requesting to have prn trazadone back - having a hard time sleeping off of it.     Primary osteoarthritis involving multiple joints  C/o marv knees and hips hurting. Discussion about cortisone inje on previous visit. She is now requesting to have done. Discussed doing hips v knees. She elects to have knees done first.   Hx of cortisone inj in past - but not knees.   Current c/o pain in joints with ambulating and movement.     PMH/PSH reviewed in EPIC today    REVIEW OF SYSTEMS:  4 point ROS including Respiratory, CV, GI and , other than that noted in the HPI,  is negative    /69   Pulse 71   Temp 96.9  F (36.1  C)   Resp 15   Ht 1.499 m (4' 11\")   Wt 58.7 kg (129 lb 6.4 oz)   SpO2 96%   BMI 26.14 kg/m     GENERAL APPEARANCE:  Alert, in no distress  marv knees enlarged. R with crepitus with movement    ASSESSMENT/PLAN:  Primary insomnia  Continuation of PRN order for non-antipsychotic psychotropic medication  Trazadone, is appropriate due to occasional insomnia and is being reordered today with an end date of 3 m.  Nsg to update [facility] provider of frequency of use 7 days prior to end date.     Primary osteoarthritis involving multiple joints  After risks, benefits and complications of steroid injection were discussed with the patient she elected to proceed.  Using sterile technique, the area was first prepped with betadyne " and an alcohol swab.  A 22 gauge needle was used to inject 40 mg Kenalog and 4 cc of 1% lidocaine into the knee on the right and left.  Yuni  tolerated the procedure well without complications.    Prior to injection, verified patient identity using patient's name and date of birth.  Due to injection administration, patient instructed to  report any adverse reaction to staff immediately.    Joint injection was performed.      Drug Amount Wasted:  None.  Vial/Syringe: Single dose vial  Expiration Date:  7/19      - Large Joint/Bursa injection and/or drainage (Shoulder, Knee)     Orders:  1.  Trazodone 25 mg po at bedtime PRN x 3 months.  Please update NP 1 week prior to med discontinue date of how many doses used in 2 weeks prior.  Dx: insomnia  2.  FYI - bilateral knees injection today w/cortisone.          Electronically signed by:  CRISTAL Olmos CNP      Sincerely,        CRISTAL Olmos CNP

## 2019-01-26 DIAGNOSIS — I10 BENIGN ESSENTIAL HYPERTENSION: ICD-10-CM

## 2019-01-26 RX ORDER — AMLODIPINE BESYLATE 5 MG/1
TABLET ORAL
Qty: 90 TABLET | Refills: 11 | Status: SHIPPED | OUTPATIENT
Start: 2019-01-26 | End: 2019-03-08

## 2019-02-12 DIAGNOSIS — S12.601S CLOSED NONDISPLACED FRACTURE OF SEVENTH CERVICAL VERTEBRA, UNSPECIFIED FRACTURE MORPHOLOGY, SEQUELA: Primary | ICD-10-CM

## 2019-02-12 RX ORDER — TRAMADOL HYDROCHLORIDE 50 MG/1
TABLET ORAL
Qty: 30 TABLET | Refills: 5 | Status: SHIPPED | OUTPATIENT
Start: 2019-02-12 | End: 2019-03-31

## 2019-02-21 ENCOUNTER — NURSING HOME VISIT (OUTPATIENT)
Dept: GERIATRICS | Facility: CLINIC | Age: 84
End: 2019-02-21
Payer: COMMERCIAL

## 2019-02-21 VITALS
TEMPERATURE: 98.1 F | BODY MASS INDEX: 26.23 KG/M2 | HEART RATE: 70 BPM | SYSTOLIC BLOOD PRESSURE: 132 MMHG | RESPIRATION RATE: 18 BRPM | HEIGHT: 59 IN | WEIGHT: 130.1 LBS | OXYGEN SATURATION: 96 % | DIASTOLIC BLOOD PRESSURE: 70 MMHG

## 2019-02-21 DIAGNOSIS — M70.62 TROCHANTERIC BURSITIS OF LEFT HIP: ICD-10-CM

## 2019-02-21 DIAGNOSIS — M17.0 PRIMARY OSTEOARTHRITIS OF BOTH KNEES: Primary | ICD-10-CM

## 2019-02-21 DIAGNOSIS — G47.01 INSOMNIA DUE TO MEDICAL CONDITION: ICD-10-CM

## 2019-02-21 DIAGNOSIS — M71.9 BURSITIS OF MULTIPLE SITES: ICD-10-CM

## 2019-02-21 PROCEDURE — 20610 DRAIN/INJ JOINT/BURSA W/O US: CPT | Mod: LT | Performed by: NURSE PRACTITIONER

## 2019-02-21 PROCEDURE — 99309 SBSQ NF CARE MODERATE MDM 30: CPT | Mod: 25 | Performed by: NURSE PRACTITIONER

## 2019-02-21 RX ADMIN — METHYLPREDNISOLONE ACETATE 40 MG: 40 INJECTION, SUSPENSION INTRA-ARTICULAR; INTRALESIONAL; INTRAMUSCULAR; SOFT TISSUE at 15:10

## 2019-02-21 ASSESSMENT — MIFFLIN-ST. JEOR: SCORE: 960.76

## 2019-02-21 NOTE — LETTER
"    2/21/2019        RE: Yuni Otoole  [address]  [facility] [address]        [place] [facility]  Chief Complaint   Patient presents with     care home Acute     [place] Medical Record Number:  [id number]  Place of Service where encounter took place:  [facility] ([facility]) [[id number]]    HPI:    Yuni Otoole  is a 83 year old (1935), who is being seen today for an episodic care visit.  HPI information obtained from: facility chart records and [facility] chart review. Today's concern is:  Primary osteoarthritis of both knees/Bursitis of multiple sites  Pt asking to be seen again given pain in marv knees  L improved s/p injection, R still hurting.     Trochanteric bursitis of left hip  L hip hurting and requesting an injection    Insomnia due to medical condition  Noted GDR assessment for Trazodone due. Pt states she is sleeping well and has no interest in doing any GDR.     Past Medical and Surgical History reviewed in Epic today.    REVIEW OF SYSTEMS:  4 point ROS including Respiratory, CV, GI and , other than that noted in the HPI,  is negative    Physical Exam:  /70   Pulse 70   Temp 98.1  F (36.7  C)   Resp 18   Ht 1.499 m (4' 11\")   Wt 59 kg (130 lb 1.6 oz)   SpO2 96%   BMI 26.28 kg/m     GENERAL APPEARANCE:  Alert, in no distress  RESP:  respiratory effort and palpation of chest normal,  no respiratory distress  CV:  +2 marv lower ext L>R peripheral edema  ABDOMEN:  normal bowel sounds, soft, nontender, no hepatosplenomegaly or other masses  M/S:   Gait and station SBA with walker - using w/c for distance - hip hips and knees crack with movement, Digits and nails normal. Pinpoint pain with L trochanteric bursa.   SKIN:  Inspection and Palpation of skin and subcutaneous tissue intact  NEURO: 2-12 in normal limits and at patient's baseline  PSYCH:  insight and judgement, memory good , affect and mood normal    Labs:   Recent labs in [facility] reviewed by me " today.     ASSESSMENT/PLAN:  Primary osteoarthritis of both knees  Will start Voltaren gel to R knee - can then advance to knee support/splint if desired.   Discussed sinvisc inj if wanted.   Pt doesn't want ortho eval at this time.     Trochanteric bursitis of left hip  After risks, benefits and complications of steroid injection were discussed with the patient she elected to proceed.  Using sterile technique, the area was first prepped with betadyne and an alcohol swab.  A 22 gauge needle was used to inject 40 mg DepoMedrol and 4 cc of 1% lidocaine into the greater trochanteric bursa on the left.  Yuni  tolerated the procedure well without complications.  - Large Joint/Bursa injection and/or drainage  Lidocaine - 7729-6356-43  -[id number]   Exp June 2020    Bursitis of multiple sites   improved L knee, will now do L trochanter.   R knee more DJD    Insomnia due to medical condition  Pt is stable at current dose and not having any ill effects - no GDR is needed/helpful.       Orders:  1.  Voltaren gel 1% 4 gms topical BID to right knee.  Dx: DJD   Advance to knee splint if still painful.   2.  FYI left hip bursitis injected with Cortisone today.  3.  Keep Trazodone @ current dose.    Electronically signed by:  CRISTAL Olmos CNP             Sincerely,        CRISTAL Olmos CNP

## 2019-02-21 NOTE — PROGRESS NOTES
"[place] [facility]  Chief Complaint   Patient presents with     correction Acute     [place] Medical Record Number:  [id number]  Place of Service where encounter took place:  [facility] ([facility] [[id number]]    HPI:    Yuni Otoole  is a 83 year old (1935), who is being seen today for an episodic care visit.  HPI information obtained from: facility chart records and [facility] chart review. Today's concern is:  Primary osteoarthritis of both knees/Bursitis of multiple sites  Pt asking to be seen again given pain in marv knees  L improved s/p injection, R still hurting.     Trochanteric bursitis of left hip  L hip hurting and requesting an injection    Insomnia due to medical condition  Noted GDR assessment for Trazodone due. Pt states she is sleeping well and has no interest in doing any GDR.     Past Medical and Surgical History reviewed in Epic today.    REVIEW OF SYSTEMS:  4 point ROS including Respiratory, CV, GI and , other than that noted in the HPI,  is negative    Physical Exam:  /70   Pulse 70   Temp 98.1  F (36.7  C)   Resp 18   Ht 1.499 m (4' 11\")   Wt 59 kg (130 lb 1.6 oz)   SpO2 96%   BMI 26.28 kg/m    GENERAL APPEARANCE:  Alert, in no distress  RESP:  respiratory effort and palpation of chest normal,  no respiratory distress  CV:  +2 marv lower ext L>R peripheral edema  ABDOMEN:  normal bowel sounds, soft, nontender, no hepatosplenomegaly or other masses  M/S:   Gait and station SBA with walker - using w/c for distance - hip hips and knees crack with movement, Digits and nails normal. Pinpoint pain with L trochanteric bursa.   SKIN:  Inspection and Palpation of skin and subcutaneous tissue intact  NEURO: 2-12 in normal limits and at patient's baseline  PSYCH:  insight and judgement, memory good , affect and mood normal    Labs:   Recent labs in [facility] reviewed by me today.     ASSESSMENT/PLAN:  Primary osteoarthritis of both knees  Will start Voltaren gel " to R knee - can then advance to knee support/splint if desired.   Discussed sinvisc inj if wanted.   Pt doesn't want ortho eval at this time.     Trochanteric bursitis of left hip  After risks, benefits and complications of steroid injection were discussed with the patient she elected to proceed.  Using sterile technique, the area was first prepped with betadyne and an alcohol swab.  A 22 gauge needle was used to inject 40 mg DepoMedrol and 4 cc of 1% lidocaine into the greater trochanteric bursa on the left.  Yuni  tolerated the procedure well without complications.  - Large Joint/Bursa injection and/or drainage  Lidocaine - 3641-8770-12  -[id number]   Exp June 2020    Bursitis of multiple sites   improved L knee, will now do L trochanter.   R knee more DJD    Insomnia due to medical condition  Pt is stable at current dose and not having any ill effects - no GDR is needed/helpful.       Orders:  1.  Voltaren gel 1% 4 gms topical BID to right knee.  Dx: DJD   Advance to knee splint if still painful.   2.  FYI left hip bursitis injected with Cortisone today.  3.  Keep Trazodone @ current dose.    Electronically signed by:  CRISTAL Olmos CNP

## 2019-02-22 RX ORDER — METHYLPREDNISOLONE ACETATE 40 MG/ML
40 INJECTION, SUSPENSION INTRA-ARTICULAR; INTRALESIONAL; INTRAMUSCULAR; SOFT TISSUE ONCE
Status: COMPLETED | OUTPATIENT
Start: 2019-02-22 | End: 2019-02-21

## 2019-02-22 RX ORDER — LIDOCAINE HYDROCHLORIDE 10 MG/ML
4 INJECTION, SOLUTION INFILTRATION; PERINEURAL ONCE
Status: DISCONTINUED | OUTPATIENT
Start: 2019-02-21 | End: 2019-05-02

## 2019-02-25 ENCOUNTER — TELEPHONE (OUTPATIENT)
Dept: GERIATRICS | Facility: CLINIC | Age: 84
End: 2019-02-25

## 2019-02-25 NOTE — TELEPHONE ENCOUNTER
Prior Authorization Retail Medication Request    Medication/Dose: Diclofenac Sodium 1% topical gel/4 gm to right knee BID.  ICD code (if different than what is on RX):  M19.91  Previously Tried and Failed:  Bengay, aspercreme  Rationale:  This helps the patients knee pain, where others tried did not.     Insurance Name:  unknown  Insurance ID:  unknown      Pharmacy Information (if different than what is on RX)  Name:  [facility]  Phone:  [phone]

## 2019-03-02 NOTE — TELEPHONE ENCOUNTER
Central Prior Authorization Team   Phone: [phone]      PA Initiation    Medication: Diclofenac Sod 1% topical gel-Initiated  Insurance Company: [employer] Part D - Phone [phone] Fax [phone]  Pharmacy Filling the Rx:    Filling Pharmacy Phone:    Filling Pharmacy Fax:    Start Date: 3/2/2019

## 2019-03-02 NOTE — TELEPHONE ENCOUNTER
Prior Authorization Approval    Authorization Effective Date: 3/2/2019  Authorization Expiration Date: 12/31/2019  Medication: Diclofenac Sod 1% topical gel-APPROVED  Approved Dose/Quantity:   Reference #:     Insurance Company: [employer] Part D - Phone [phone] Fax [phone]  Expected CoPay:       CoPay Card Available:      Foundation Assistance Needed:    Which Pharmacy is filling the prescription (Not needed for infusion/clinic administered):    Pharmacy Notified: No  Patient Notified: No

## 2019-03-07 NOTE — PROGRESS NOTES
[place] [facility]  Chief Complaint   Patient presents with     [facility] Regulatory     [place] Medical Record Number:  [id number]  Place of Service where encounter took place:  [facility] [place] [facility] ([facility]) [[id number]]    HPI:    Yuni Otoole  is 83 year old (1935), who is being seen today for a federally mandated E/M visit.  HPI information obtained from: facility chart records, facility staff, patient report and [facility] chart review. Today's concerns are:  Trochanteric bursitis of left hip  S/p cortisone inj with some improvement per pt, but noted generalized pain t/o body is affecting her more today.     Primary osteoarthritis of both knees/Generalized osteoarthritis  Chronic - pt reports she is having too much pain t/o the day. Asking in what we can do to improve it. Already on Tylenol and TID NSAID    Primary insomnia  On Trazodone and pt finding it helpful.     Essential hypertension  On low dose diuretic and norvasc.        ALLERGIES:Atorvastatin calcium  PAST MEDICAL HISTORY:   has a past medical history of Allergic rhinitis, cause unspecified, Head injury, Pure hypercholesterolemia, and Unspecified essential hypertension. She also has no past medical history of Diabetes (H).  PAST SURGICAL HISTORY:   has a past surgical history that includes REMOVAL OF TONSILS,14+ Y/O; colonoscopy (05/30/07); carotid endarterectomy (11/07/08); INJ EPIDURAL LUMBAR/SACRAL W/WO CONTRAST (2009); DRAIN/INJECT LARGE JOINT/BURSA (2009); DRAIN/INJECT LARGE JOINT/BURSA (2010); INJ EPIDURAL CERVICAL/THORACIC W/WO CONTRAST (2010); INJ TRANSFORAMIN EPIDURAL, LUMB/SACR SINGLE (2010); Optical Tracking System Fusion Posterior Cervical Three + Levels (N/A, 11/15/2017); Laminectomy cerivcal posterior three+ levels (N/A, 11/15/2017); Optical Tracking System Fusion Cervical Anterior Three + Levels (N/A, 11/15/2017); Fusion cervical posterior three+ levels (N/A, 11/15/2017); and Eye surgery.  FAMILY HISTORY:  family history includes Cancer in her daughter and mother; Cardiovascular in her father; Cerebrovascular Disease in her paternal grandmother; Circulatory in her paternal grandmother; Diabetes in her maternal aunt; EYE* in her mother; Gastrointestinal Disease in her mother; Gynecology in her sister; Hypertension in her father; Lipids in her brother; Musculoskeletal Disorder in her daughter; Obesity in her maternal grandmother and paternal grandmother; Osteoporosis in her mother.  SOCIAL HISTORY:  reports that she quit smoking about 23 years ago. Her smoking use included cigarettes. She started smoking about 48 years ago. She smoked 0.00 packs per day for 10.00 years. she has never used smokeless tobacco. She reports that she does not drink alcohol or use drugs.    MEDICATIONS:  Current Outpatient Medications   Medication Sig Dispense Refill     Acetaminophen (TYLENOL PO) Take 1,000 mg by mouth 2 times daily Also BID PRN       amLODIPine (NORVASC) 2.5 MG tablet Take 2.5 mg by mouth daily       Cholecalciferol (VITAMIN D-3 PO) Take 4,000 Units by mouth daily       DHA-EPA-Vitamin E (OMEGA-3 COMPLEX PO) Take 1 tablet by mouth 2 times daily       diclofenac (VOLTAREN) 1 % topical gel Place 4 g onto the skin 2 times daily       diphenhydrAMINE (BENADRYL) 25 MG tablet Take 25 mg by mouth daily       glucosamine 500 MG CAPS capsule Take 500 mg by mouth 2 times daily       Hypromellose (NATURAL BALANCE TEARS OP) Apply 2 drops to eye 4 times daily as needed       ibuprofen (ADVIL/MOTRIN) 400 MG tablet Take 1 tablet (400 mg) by mouth 3 times daily 270 tablet 3     Liniments (SALONPAS PAIN RELIEF PATCH EX) Externally apply 1-2 patches topically 2 times daily        Menthol, Topical Analgesic, (BIOFREEZE EX) Externally apply topically 2 times daily as needed        metoprolol (LOPRESSOR) 25 MG tablet Take 12.5 mg by mouth 2 times daily  60 tablet      Multiple Vitamins-Minerals (MULTIVITAMIN ADULT PO) Take 1 tablet by mouth  "daily       polyethylene glycol 0.4%- propylene glycol 0.3% (SYSTANE ULTRA) 0.4-0.3 % SOLN ophthalmic solution Place 1 drop into both eyes 4 times daily       pravastatin (PRAVACHOL) 10 MG tablet Take 1 tablet (10 mg) by mouth daily 90 tablet 3     ROPINIRole HCl (REQUIP PO) Take 2.5 mg by mouth At Bedtime        senna-docusate (SENOKOT-S;PERICOLACE) 8.6-50 MG per tablet 1 tablet by Oral or Feeding Tube route daily  100 tablet      traMADol (ULTRAM) 50 MG tablet Take 25 mg by mouth every morning       traMADol (ULTRAM) 50 MG tablet TAKE 1 TAB BY MOUTH AT BEDTIME 30 tablet 5     TRAZODONE HCL PO Take 25 mg by mouth nightly as needed (also 50 mg scheduled)        UNABLE TO FIND Take 1 tablet by mouth 2 times daily MEDICATION NAME: Vitamin Focus Select       Case Management:  I have reviewed the care plan and MDS and do agree with the plan. Patient's desire to return to the community is present, but is not able due to care needs .  Information reviewed:  Medications, vital signs, orders, and nursing notes.    ROS:  4 point ROS including Respiratory, CV, GI and , other than that noted in the HPI,  is negative    Vitals:  /68   Pulse 67   Temp 97.5  F (36.4  C)   Resp 16   Ht 1.499 m (4' 11\")   Wt 60 kg (132 lb 3.2 oz)   SpO2 96%   BMI 26.70 kg/m    Body mass index is 26.7 kg/m .  Exam:  GENERAL APPEARANCE:  Alert, in no distress  RESP:  respiratory effort and palpation of chest normal, auscultation of lungs clear , no respiratory distress  CV:  Palpation and auscultation of heart done , rate and rhythm reg,  Trace marv lower  peripheral edema  ABDOMEN:  normal bowel sounds, soft, nontender, no hepatosplenomegaly or other masses  M/S:   Gait and station w/c. Enlarged, cretptus marv knees, Digits and nails intact  SKIN:  Inspection and Palpation of skin and subcutaneous tissue intact  NEURO: 2-12 in normal limits and at patient's baseline  PSYCH:  insight and judgement, memory good , affect and mood " normal    Lab/Diagnostic data:     Most Recent 3 CBC's:  Recent Labs   Lab Test 08/30/18  0630 07/24/18  0625 06/19/18  0630  11/20/17  1112 11/17/17  0401 11/16/17  0423   WBC  --   --   --   --  8.0 10.6 16.1*   HGB 11.4* 11.4* 11.3*   < > 9.7* 10.1* 11.6*   MCV  --   --   --   --  94 91 90   PLT  --   --   --   --  246 171 182    < > = values in this interval not displayed.     Most Recent 3 BMP's:  Recent Labs   Lab Test 07/24/18  0625 06/19/18  0630 04/17/18  0703  02/20/18  0615    136 134   < > 135   POTASSIUM 4.2 4.0  --   --  3.8   CHLORIDE 98 99  --   --  99   CO2 28 27  --   --  29   BUN 15 15  --   --  20   CR 0.58 0.45*  --   --  0.45*   ANIONGAP 10 10  --   --  7   ANNETTA 9.0 9.1  --   --  9.5   GLC 77 80  --   --  90    < > = values in this interval not displayed.     Most Recent TSH and T4:  Recent Labs   Lab Test 11/14/17  0752   TSH 2.91       ASSESSMENT/PLAN  Trochanteric bursitis of left hip/Primary osteoarthritis of both knees/Generalized osteoarthritis  Cont tylenol, NSAID and HX ultram - will add scheduled am dose.   check HGB given on NSAID and risk of GI BLeed and hx of anemia    Primary insomnia  Cont Trazodone scheudled and PRN - Continuation of PRN order for non-antipsychotic psychotropic medication,  Trazodone, is appropriate due to occasional insomnia and is being reordered today with an end date of 4.  Nsg to update [facility] provider of frequency of use 7 days prior to end date.     Essential hypertension/CKD  On diuretic and Norvasc - BP goals are ~130 -165/60 -90 mmHg.This is higher than ACC and AHA recommendations due to risk for hypotension, risk of dizziness and falls and risk of tissue/cerebral hypoperfusion. Noted patients BP is lower than goal and will adjust plan of care by decreasing Norvasc.  Also recheck BMP as on diuretic     Orders written by provider at facility, transcribed by  Coco Davis:  1.  BMP 3/12.  Dx: CKD  2.  Hgb 3/12.  Dx: anemia  3.  Start  Ultram 25 mg po Q am.  Continue 50 mg po at bedtime.  Dx: osteoarthritis  4.  Decrease Norvasc to 2.5 mg po Q pm.  Dx: HTN  5.  Extend PRN Trazodone x 4 more months.  Dx: insomnia     Electronically signed by:  CRISTAL Olmos CNP

## 2019-03-08 ENCOUNTER — NURSING HOME VISIT (OUTPATIENT)
Dept: GERIATRICS | Facility: CLINIC | Age: 84
End: 2019-03-08
Payer: COMMERCIAL

## 2019-03-08 VITALS
RESPIRATION RATE: 16 BRPM | SYSTOLIC BLOOD PRESSURE: 126 MMHG | DIASTOLIC BLOOD PRESSURE: 68 MMHG | WEIGHT: 132.2 LBS | HEIGHT: 59 IN | TEMPERATURE: 97.5 F | HEART RATE: 67 BPM | BODY MASS INDEX: 26.65 KG/M2 | OXYGEN SATURATION: 96 %

## 2019-03-08 DIAGNOSIS — M17.0 PRIMARY OSTEOARTHRITIS OF BOTH KNEES: ICD-10-CM

## 2019-03-08 DIAGNOSIS — F51.01 PRIMARY INSOMNIA: ICD-10-CM

## 2019-03-08 DIAGNOSIS — I10 ESSENTIAL HYPERTENSION: ICD-10-CM

## 2019-03-08 DIAGNOSIS — M15.9 GENERALIZED OSTEOARTHRITIS: ICD-10-CM

## 2019-03-08 DIAGNOSIS — M70.62 TROCHANTERIC BURSITIS OF LEFT HIP: Primary | ICD-10-CM

## 2019-03-08 PROCEDURE — 99309 SBSQ NF CARE MODERATE MDM 30: CPT | Performed by: NURSE PRACTITIONER

## 2019-03-08 RX ORDER — SODIUM PHOSPHATE,MONO-DIBASIC 19G-7G/118
500 ENEMA (ML) RECTAL 2 TIMES DAILY
COMMUNITY
End: 2019-05-21

## 2019-03-08 RX ORDER — TRAMADOL HYDROCHLORIDE 50 MG/1
25 TABLET ORAL EVERY MORNING
COMMUNITY
End: 2019-03-31

## 2019-03-08 RX ORDER — AMLODIPINE BESYLATE 2.5 MG/1
5 TABLET ORAL DAILY
COMMUNITY
End: 2019-05-21

## 2019-03-08 ASSESSMENT — MIFFLIN-ST. JEOR: SCORE: 970.29

## 2019-03-08 NOTE — LETTER
3/8/2019        RE: Yuni Otoole  [address]  [facility] [address]        [place] [facility]  Chief Complaint   Patient presents with     senior living Regulatory     [place] Medical Record Number:  [id number]  Place of Service where encounter took place:  [facility] [place] ([facility]) [[id number]]    HPI:    Yuni Otoole  is 83 year old (1935), who is being seen today for a federally mandated E/M visit.  HPI information obtained from: facility chart records, facility staff, patient report and [facility] chart review. Today's concerns are:  Trochanteric bursitis of left hip  S/p cortisone inj with some improvement per pt, but noted generalized pain t/o body is affecting her more today.     Primary osteoarthritis of both knees/Generalized osteoarthritis  Chronic - pt reports she is having too much pain t/o the day. Asking in what we can do to improve it. Already on Tylenol and TID NSAID    Primary insomnia  On Trazodone and pt finding it helpful.     Essential hypertension  On low dose diuretic and norvasc.        ALLERGIES:Atorvastatin calcium  PAST MEDICAL HISTORY:   has a past medical history of Allergic rhinitis, cause unspecified, Head injury, Pure hypercholesterolemia, and Unspecified essential hypertension. She also has no past medical history of Diabetes (H).  PAST SURGICAL HISTORY:   has a past surgical history that includes REMOVAL OF TONSILS,14+ Y/O; colonoscopy (05/30/07); carotid endarterectomy (11/07/08); INJ EPIDURAL LUMBAR/SACRAL W/WO CONTRAST (2009); DRAIN/INJECT LARGE JOINT/BURSA (2009); DRAIN/INJECT LARGE JOINT/BURSA (2010); INJ EPIDURAL CERVICAL/THORACIC W/WO CONTRAST (2010); INJ TRANSFORAMIN EPIDURAL, LUMB/SACR SINGLE (2010); Optical Tracking System Fusion Posterior Cervical Three + Levels (N/A, 11/15/2017); Laminectomy cerivcal posterior three+ levels (N/A, 11/15/2017); Optical Tracking System Fusion Cervical Anterior Three + Levels (N/A, 11/15/2017); Fusion  cervical posterior three+ levels (N/A, 11/15/2017); and Eye surgery.  FAMILY HISTORY: family history includes Cancer in her daughter and mother; Cardiovascular in her father; Cerebrovascular Disease in her paternal grandmother; Circulatory in her paternal grandmother; Diabetes in her maternal aunt; EYE* in her mother; Gastrointestinal Disease in her mother; Gynecology in her sister; Hypertension in her father; Lipids in her brother; Musculoskeletal Disorder in her daughter; Obesity in her maternal grandmother and paternal grandmother; Osteoporosis in her mother.  SOCIAL HISTORY:  reports that she quit smoking about 23 years ago. Her smoking use included cigarettes. She started smoking about 48 years ago. She smoked 0.00 packs per day for 10.00 years. she has never used smokeless tobacco. She reports that she does not drink alcohol or use drugs.    MEDICATIONS:  Current Outpatient Medications   Medication Sig Dispense Refill     Acetaminophen (TYLENOL PO) Take 1,000 mg by mouth 2 times daily Also BID PRN       amLODIPine (NORVASC) 2.5 MG tablet Take 2.5 mg by mouth daily       Cholecalciferol (VITAMIN D-3 PO) Take 4,000 Units by mouth daily       DHA-EPA-Vitamin E (OMEGA-3 COMPLEX PO) Take 1 tablet by mouth 2 times daily       diclofenac (VOLTAREN) 1 % topical gel Place 4 g onto the skin 2 times daily       diphenhydrAMINE (BENADRYL) 25 MG tablet Take 25 mg by mouth daily       glucosamine 500 MG CAPS capsule Take 500 mg by mouth 2 times daily       Hypromellose (NATURAL BALANCE TEARS OP) Apply 2 drops to eye 4 times daily as needed       ibuprofen (ADVIL/MOTRIN) 400 MG tablet Take 1 tablet (400 mg) by mouth 3 times daily 270 tablet 3     Liniments (SALONPAS PAIN RELIEF PATCH EX) Externally apply 1-2 patches topically 2 times daily        Menthol, Topical Analgesic, (BIOFREEZE EX) Externally apply topically 2 times daily as needed        metoprolol (LOPRESSOR) 25 MG tablet Take 12.5 mg by mouth 2 times daily  60  "tablet      Multiple Vitamins-Minerals (MULTIVITAMIN ADULT PO) Take 1 tablet by mouth daily       polyethylene glycol 0.4%- propylene glycol 0.3% (SYSTANE ULTRA) 0.4-0.3 % SOLN ophthalmic solution Place 1 drop into both eyes 4 times daily       pravastatin (PRAVACHOL) 10 MG tablet Take 1 tablet (10 mg) by mouth daily 90 tablet 3     ROPINIRole HCl (REQUIP PO) Take 2.5 mg by mouth At Bedtime        senna-docusate (SENOKOT-S;PERICOLACE) 8.6-50 MG per tablet 1 tablet by Oral or Feeding Tube route daily  100 tablet      traMADol (ULTRAM) 50 MG tablet Take 25 mg by mouth every morning       traMADol (ULTRAM) 50 MG tablet TAKE 1 TAB BY MOUTH AT BEDTIME 30 tablet 5     TRAZODONE HCL PO Take 25 mg by mouth nightly as needed (also 50 mg scheduled)        UNABLE TO FIND Take 1 tablet by mouth 2 times daily MEDICATION NAME: Vitamin Focus Select       Case Management:  I have reviewed the care plan and MDS and do agree with the plan. Patient's desire to return to the community is present, but is not able due to care needs .  Information reviewed:  Medications, vital signs, orders, and nursing notes.    ROS:  4 point ROS including Respiratory, CV, GI and , other than that noted in the HPI,  is negative    Vitals:  /68   Pulse 67   Temp 97.5  F (36.4  C)   Resp 16   Ht 1.499 m (4' 11\")   Wt 60 kg (132 lb 3.2 oz)   SpO2 96%   BMI 26.70 kg/m     Body mass index is 26.7 kg/m .  Exam:  GENERAL APPEARANCE:  Alert, in no distress  RESP:  respiratory effort and palpation of chest normal, auscultation of lungs clear , no respiratory distress  CV:  Palpation and auscultation of heart done , rate and rhythm reg,  Trace marv lower  peripheral edema  ABDOMEN:  normal bowel sounds, soft, nontender, no hepatosplenomegaly or other masses  M/S:   Gait and station w/c. Enlarged, cretptus marv knees, Digits and nails intact  SKIN:  Inspection and Palpation of skin and subcutaneous tissue intact  NEURO: 2-12 in normal limits and at " patient's baseline  PSYCH:  insight and judgement, memory good , affect and mood normal    Lab/Diagnostic data:     Most Recent 3 CBC's:  Recent Labs   Lab Test 08/30/18  0630 07/24/18  0625 06/19/18  0630  11/20/17  1112 11/17/17  0401 11/16/17  0423   WBC  --   --   --   --  8.0 10.6 16.1*   HGB 11.4* 11.4* 11.3*   < > 9.7* 10.1* 11.6*   MCV  --   --   --   --  94 91 90   PLT  --   --   --   --  246 171 182    < > = values in this interval not displayed.     Most Recent 3 BMP's:  Recent Labs   Lab Test 07/24/18  0625 06/19/18  0630 04/17/18  0703  02/20/18  0615    136 134   < > 135   POTASSIUM 4.2 4.0  --   --  3.8   CHLORIDE 98 99  --   --  99   CO2 28 27  --   --  29   BUN 15 15  --   --  20   CR 0.58 0.45*  --   --  0.45*   ANIONGAP 10 10  --   --  7   ANNETTA 9.0 9.1  --   --  9.5   GLC 77 80  --   --  90    < > = values in this interval not displayed.     Most Recent TSH and T4:  Recent Labs   Lab Test 11/14/17  0752   TSH 2.91       ASSESSMENT/PLAN  Trochanteric bursitis of left hip/Primary osteoarthritis of both knees/Generalized osteoarthritis  Cont tylenol, NSAID and HX ultram - will add scheduled am dose.   check HGB given on NSAID and risk of GI BLeed and hx of anemia    Primary insomnia  Cont Trazodone scheudled and PRN - Continuation of PRN order for non-antipsychotic psychotropic medication,  Trazodone, is appropriate due to occasional insomnia and is being reordered today with an end date of 4.  Nsg to update [facility] provider of frequency of use 7 days prior to end date.     Essential hypertension/CKD  On diuretic and Norvasc - BP goals are ~130 -165/60 -90 mmHg.This is higher than ACC and AHA recommendations due to risk for hypotension, risk of dizziness and falls and risk of tissue/cerebral hypoperfusion. Noted patients BP is lower than goal and will adjust plan of care by decreasing Norvasc.  Also recheck BMP as on diuretic     Orders written by provider at facility, transcribed by team  coordinator Coco Davis:  1.  BMP 3/12.  Dx: CKD  2.  Hgb 3/12.  Dx: anemia  3.  Start Ultram 25 mg po Q am.  Continue 50 mg po at bedtime.  Dx: osteoarthritis  4.  Decrease Norvasc to 2.5 mg po Q pm.  Dx: HTN  5.  Extend PRN Trazodone x 4 more months.  Dx: insomnia     Electronically signed by:  CRISTAL Olmos CNP            Sincerely,        CRISTAL Olmos CNP

## 2019-03-12 ENCOUNTER — HOSPITAL LABORATORY (OUTPATIENT)
Dept: NURSING HOME | Facility: OTHER | Age: 84
End: 2019-03-12

## 2019-03-12 LAB
ANION GAP SERPL CALCULATED.3IONS-SCNC: 6 MMOL/L (ref 3–14)
BUN SERPL-MCNC: 17 MG/DL (ref 7–30)
CALCIUM SERPL-MCNC: 9.1 MG/DL (ref 8.5–10.1)
CHLORIDE SERPL-SCNC: 101 MMOL/L (ref 94–109)
CO2 SERPL-SCNC: 29 MMOL/L (ref 20–32)
CREAT SERPL-MCNC: 0.54 MG/DL (ref 0.52–1.04)
GFR SERPL CREATININE-BSD FRML MDRD: 88 ML/MIN/{1.73_M2}
GLUCOSE SERPL-MCNC: 77 MG/DL (ref 70–99)
HGB BLD-MCNC: 11.2 G/DL (ref 11.7–15.7)
POTASSIUM SERPL-SCNC: 4 MMOL/L (ref 3.4–5.3)
SODIUM SERPL-SCNC: 136 MMOL/L (ref 133–144)

## 2019-03-31 DIAGNOSIS — S12.601S CLOSED NONDISPLACED FRACTURE OF SEVENTH CERVICAL VERTEBRA, UNSPECIFIED FRACTURE MORPHOLOGY, SEQUELA: ICD-10-CM

## 2019-03-31 RX ORDER — TRAMADOL HYDROCHLORIDE 50 MG/1
TABLET ORAL
Qty: 30 TABLET | Refills: 5 | Status: SHIPPED | OUTPATIENT
Start: 2019-03-31 | End: 2019-05-21

## 2019-04-01 NOTE — TELEPHONE ENCOUNTER
1. Closed nondisplaced fracture of seventh cervical vertebra, unspecified fracture morphology, sequela  E sent to   - traMADol (ULTRAM) 50 MG tablet; TAKE 1 TAB BY MOUTH AT BEDTIME  Dispense: 30 tablet; Refill: 5

## 2019-04-12 ENCOUNTER — NURSING HOME VISIT (OUTPATIENT)
Dept: GERIATRICS | Facility: CLINIC | Age: 84
End: 2019-04-12
Payer: COMMERCIAL

## 2019-04-12 VITALS
WEIGHT: 131.4 LBS | OXYGEN SATURATION: 96 % | SYSTOLIC BLOOD PRESSURE: 129 MMHG | BODY MASS INDEX: 26.49 KG/M2 | DIASTOLIC BLOOD PRESSURE: 71 MMHG | TEMPERATURE: 96.9 F | HEART RATE: 67 BPM | HEIGHT: 59 IN | RESPIRATION RATE: 16 BRPM

## 2019-04-12 DIAGNOSIS — G25.81 RESTLESS LEGS SYNDROME (RLS): Primary | ICD-10-CM

## 2019-04-12 DIAGNOSIS — F51.01 PRIMARY INSOMNIA: ICD-10-CM

## 2019-04-12 DIAGNOSIS — L50.9 URTICARIA: ICD-10-CM

## 2019-04-12 PROCEDURE — 99308 SBSQ NF CARE LOW MDM 20: CPT | Performed by: NURSE PRACTITIONER

## 2019-04-12 ASSESSMENT — MIFFLIN-ST. JEOR: SCORE: 966.66

## 2019-04-12 NOTE — PROGRESS NOTES
"[place] [facility]  [place] Medical Record Number:  [id number]  Place of Service where encounter took place:  [facility] ([facility]) [[id number]]  Chief Complaint   Patient presents with     Nursing Home Acute       HPI:    Yuni Otoole  is a 83 year old (1935), who is being seen today for an episodic care visit.  HPI information obtained from: facility chart records, facility staff, patient report and [facility] chart review.   Polypharm med review done and facility nurse/pt revived meds    Past Medical and Surgical History reviewed in [facility] today.    REVIEW OF SYSTEMS:  4 point ROS including Respiratory, CV, GI and , other than that noted in the HPI,  is negative    Objective:  /71   Pulse 67   Temp 96.9  F (36.1  C)   Resp 16   Ht 1.499 m (4' 11\")   Wt 59.6 kg (131 lb 6.4 oz)   SpO2 96%   BMI 26.54 kg/m    Exam:  GENERAL APPEARANCE: Alert, in no distress, cooperative.  ENT: Mouth and posterior oropharynx normal, moist mucous membranes, hearing acuity at baseline [place]   RESP: non labored breathing  SKIN: Inspection/Palpation of skin and subcutaneous tissue baseline w/ fragility.  NEURO: 2-12 at patient's baseline  PSYCH: Insight and judgement, memory baseline, affect and mood normal.    ASSESSMENT/PLAN:  Restless legs syndrome (RLS)  Pt doesn't belive requip is helping - will dc    Primary insomnia/Urticaria  Benadryl for uricaria - understand risks - still wants to keep but move to pm.   Will do.     Polypharm   Review below meds per pt's wish.       Orders written by provider at facility and transcribed by : Coco Davis MA  1.  Discontinue Multivitamin.  2.  Discontinue Natural tears.  3.  Discontinue Salon pas.  4.  Change Benadryl to 25 mg po at bedtime.  Dx: urticaria   5.  Discontinue Requip.    Electronically signed by:  CRISTAL Olmos CNP     4/13: pt wanted back requip at 2 mg - on call called and ordered.   Updated in " epic

## 2019-04-14 RX ORDER — ROPINIROLE 2 MG/1
2 TABLET, FILM COATED ORAL AT BEDTIME
COMMUNITY
Start: 2019-04-13 | End: 2019-05-21

## 2019-05-02 ENCOUNTER — NURSING HOME VISIT (OUTPATIENT)
Dept: GERIATRICS | Facility: CLINIC | Age: 84
End: 2019-05-02
Payer: COMMERCIAL

## 2019-05-02 VITALS
WEIGHT: 141.5 LBS | TEMPERATURE: 97.6 F | BODY MASS INDEX: 28.52 KG/M2 | RESPIRATION RATE: 16 BRPM | SYSTOLIC BLOOD PRESSURE: 139 MMHG | DIASTOLIC BLOOD PRESSURE: 74 MMHG | OXYGEN SATURATION: 96 % | HEART RATE: 86 BPM | HEIGHT: 59 IN

## 2019-05-02 DIAGNOSIS — M81.0 OSTEOPOROSIS, UNSPECIFIED OSTEOPOROSIS TYPE, UNSPECIFIED PATHOLOGICAL FRACTURE PRESENCE: ICD-10-CM

## 2019-05-02 DIAGNOSIS — E55.9 VITAMIN D DEFICIENCY: ICD-10-CM

## 2019-05-02 DIAGNOSIS — I10 BENIGN ESSENTIAL HYPERTENSION: ICD-10-CM

## 2019-05-02 DIAGNOSIS — Z98.1 S/P CERVICAL SPINAL FUSION: ICD-10-CM

## 2019-05-02 DIAGNOSIS — M17.0 PRIMARY OSTEOARTHRITIS OF BOTH KNEES: ICD-10-CM

## 2019-05-02 DIAGNOSIS — M43.23 FUSION OF SPINE, CERVICOTHORACIC REGION: Primary | ICD-10-CM

## 2019-05-02 DIAGNOSIS — R54 FRAIL ELDERLY: ICD-10-CM

## 2019-05-02 PROCEDURE — 99309 SBSQ NF CARE MODERATE MDM 30: CPT | Performed by: FAMILY MEDICINE

## 2019-05-02 ASSESSMENT — MIFFLIN-ST. JEOR: SCORE: 1012.47

## 2019-05-02 NOTE — LETTER
5/2/2019        RE: Yuni Otoole  [address]  [facility] [address]        [facility]  Chief Complaint   Patient presents with     care home Regulatory   [place] Medical Record Number:  [id number]    HPI:    Yuni Otoole is a 82 year old  (1935), who is being seen today for a federally mandated E/M visit at [facility]  .  HPI information obtained from: facility staff, patient report and [facility] chart review.     Today's concerns are:  -  Resident seen and examined in her room in her  presence who is visiting today.   - reports completed the physical therapy, reports generalized soreness, aggravated with movements, better with rest. Reports walked this am with nausea, better now.   -Reports appetite/sleep/bowel movements are fine  -Reports that the small ganglion over left wrist is back but no pain, asked the writer what could be done.   - denies CP, SOB, wheezing, peripheral edema.  - GNP Resident frequently asks for medications changes.   - Rn has no concern.   ------------------------------------------  - - Past Medical, social, family histories, medications, and allergies reviewed and updated  - Medications reviewed: in the chart and EHR.   - Case Management:   I have reviewed the care plan and MDS and do agree with the plan. Patient's desire to return to the community is not present.  Information reviewed:  Medications, vital signs, orders, and nursing notes.    :MEDICATIONS:  Current Outpatient Medications   Medication Sig Dispense Refill     Acetaminophen (TYLENOL PO) Take 1,000 mg by mouth 2 times daily Also BID PRN       amLODIPine (NORVASC) 2.5 MG tablet Take 2.5 mg by mouth daily       Cholecalciferol (VITAMIN D-3 PO) Take 4,000 Units by mouth daily       DHA-EPA-Vitamin E (OMEGA-3 COMPLEX PO) Take 1 tablet by mouth 2 times daily       diclofenac (VOLTAREN) 1 % topical gel Place 4 g onto the skin 2 times daily       diphenhydrAMINE  "(BENADRYL) 25 MG tablet Take 25 mg by mouth At Bedtime        glucosamine 500 MG CAPS capsule Take 500 mg by mouth 2 times daily       ibuprofen (ADVIL/MOTRIN) 400 MG tablet Take 1 tablet (400 mg) by mouth 3 times daily 270 tablet 3     Menthol, Topical Analgesic, (BIOFREEZE EX) Externally apply topically 2 times daily as needed        metoprolol (LOPRESSOR) 25 MG tablet Take 12.5 mg by mouth 2 times daily  60 tablet      polyethylene glycol 0.4%- propylene glycol 0.3% (SYSTANE ULTRA) 0.4-0.3 % SOLN ophthalmic solution Place 1 drop into both eyes 4 times daily       pravastatin (PRAVACHOL) 10 MG tablet Take 1 tablet (10 mg) by mouth daily 90 tablet 3     rOPINIRole (REQUIP) 2 MG tablet Take 2 mg by mouth At Bedtime Also hs prn       senna-docusate (SENOKOT-S;PERICOLACE) 8.6-50 MG per tablet 1 tablet by Oral or Feeding Tube route daily  100 tablet      traMADol (ULTRAM) 50 MG tablet TAKE 1 TAB BY MOUTH AT BEDTIME 30 tablet 5     TRAZODONE HCL PO Take 25 mg by mouth nightly as needed (also 50 mg scheduled)        UNABLE TO FIND Take 1 tablet by mouth 2 times daily MEDICATION NAME: Vitamin Focus Select       ROS: 4 point ROS including Respiratory, CV, GI and , other than that noted in the HPI,  is negative    Exam:  Vitals: /74   Pulse 86   Temp 97.6  F (36.4  C)   Resp 16   Ht 1.499 m (4' 11\")   Wt 64.2 kg (141 lb 8 oz)   SpO2 96%   BMI 28.58 kg/m     BMI= Body mass index is 28.58 kg/m .  GENERAL APPEARANCE:  in no distress, cooperative  RESP:  lungs clear to auscultation , no wheezing  CV:  S1S2 audible , regular rate and rhythm, systolic over right upper sternal border murmur, rub, or gallop.   ABDOMEN:  normal bowel sounds, soft, nontender, no palpable masses  M/S:   Cracks sound with knee joints movements.   SKIN: soft mass over right lateral surface of deltoid, ill defined boundaries, no tenderness. Small ganglion like over distal ventral surface of left forearm, hard in consistency, mobile, no " erythema.  Patient is oriented  NEURO:   generalized muscles atrophy, hand  4/5 b/l.  Dorsiflexion 5/5 bilateral.   PSYCH:  affect and mood normal    Lab/Diagnostic data: Reviewed in the chart and EHR.      ASSESSMENT/PLAN  # hx of recent C1-C3 Lami; C2-C3 Posterior Fusion; C2/3, C3/4, C6/7 Anterior Decompression and Fusion; C2/3, C4/5, and C6/7 Facet Osteotomies; C2-T3 Instrumentation   B/L Knee Joint OA:  - analgesia optimal.     Benign essential hypertension:  controlled. Continue meds. In this age group keep SBP > 130 mmHg.      Osteoporosis /Vitamin D deficiency:   On supplement.     Right Deltoid Muscles mass: likley lipoma, recommend conservative measures.     Left Wrist synovial sheath ganglio: conservative measures.     Frailty:  - continues to require assistance from nursing. Up for meals only o/w spends the day resting in bed     Orders:  - See above, otherwise, continue the rest of the current POC.     Electronically signed by:  Ceasar Aburto MD      Sincerely,        Ceasar Aburto MD     negative

## 2019-05-20 DIAGNOSIS — M48.02 CERVICAL STENOSIS OF SPINE: ICD-10-CM

## 2019-05-20 DIAGNOSIS — F51.01 PRIMARY INSOMNIA: ICD-10-CM

## 2019-05-20 DIAGNOSIS — L50.9 URTICARIA: ICD-10-CM

## 2019-05-20 DIAGNOSIS — M54.2 CERVICALGIA: ICD-10-CM

## 2019-05-20 DIAGNOSIS — Z98.1 S/P CERVICAL SPINAL FUSION: ICD-10-CM

## 2019-05-20 DIAGNOSIS — E78.2 MIXED HYPERLIPIDEMIA: ICD-10-CM

## 2019-05-20 DIAGNOSIS — I10 ESSENTIAL HYPERTENSION: Primary | ICD-10-CM

## 2019-05-20 DIAGNOSIS — S12.601S CLOSED NONDISPLACED FRACTURE OF SEVENTH CERVICAL VERTEBRA, UNSPECIFIED FRACTURE MORPHOLOGY, SEQUELA: ICD-10-CM

## 2019-05-20 DIAGNOSIS — K59.01 SLOW TRANSIT CONSTIPATION: ICD-10-CM

## 2019-05-20 DIAGNOSIS — G25.81 RESTLESS LEGS SYNDROME (RLS): ICD-10-CM

## 2019-05-21 RX ORDER — TRAZODONE HYDROCHLORIDE 50 MG/1
TABLET, FILM COATED ORAL
Qty: 45 TABLET | Refills: 3 | Status: SHIPPED | OUTPATIENT
Start: 2019-05-21 | End: 2019-06-25

## 2019-05-21 RX ORDER — DIPHENHYDRAMINE HCL 25 MG
25 TABLET ORAL DAILY
Qty: 30 TABLET | Refills: 3 | Status: SHIPPED | OUTPATIENT
Start: 2019-05-21 | End: 2020-04-17

## 2019-05-21 RX ORDER — METOPROLOL TARTRATE 25 MG/1
12.5 TABLET, FILM COATED ORAL 2 TIMES DAILY
Qty: 60 TABLET | Refills: 3 | Status: SHIPPED | OUTPATIENT
Start: 2019-05-21 | End: 2019-12-16

## 2019-05-21 RX ORDER — IBUPROFEN 400 MG/1
400 TABLET, FILM COATED ORAL 3 TIMES DAILY
Qty: 270 TABLET | Refills: 3 | Status: SHIPPED | OUTPATIENT
Start: 2019-05-21 | End: 2019-06-25

## 2019-05-21 RX ORDER — ROPINIROLE 2 MG/1
TABLET, FILM COATED ORAL
Qty: 100 TABLET | Refills: 3 | Status: SHIPPED | OUTPATIENT
Start: 2019-05-21 | End: 2019-07-03

## 2019-05-21 RX ORDER — TRAMADOL HYDROCHLORIDE 50 MG/1
TABLET ORAL
Qty: 30 TABLET | Refills: 5 | Status: SHIPPED | OUTPATIENT
Start: 2019-05-21 | End: 2019-09-30

## 2019-05-21 RX ORDER — PRAVASTATIN SODIUM 10 MG
TABLET ORAL
Qty: 90 TABLET | Refills: 3 | Status: SHIPPED | OUTPATIENT
Start: 2019-05-21 | End: 2020-04-23

## 2019-05-21 RX ORDER — SODIUM PHOSPHATE,MONO-DIBASIC 19G-7G/118
500 ENEMA (ML) RECTAL 2 TIMES DAILY
Qty: 60 CAPSULE | Refills: 3 | Status: SHIPPED | OUTPATIENT
Start: 2019-05-21 | End: 2020-02-12

## 2019-05-21 RX ORDER — AMOXICILLIN 250 MG
1 CAPSULE ORAL DAILY
Qty: 100 TABLET | Refills: 3 | Status: SHIPPED | OUTPATIENT
Start: 2019-05-21 | End: 2020-07-06

## 2019-05-21 RX ORDER — AMLODIPINE BESYLATE 5 MG/1
5 TABLET ORAL DAILY
Qty: 30 TABLET | Refills: 3 | Status: SHIPPED | OUTPATIENT
Start: 2019-05-21 | End: 2019-07-03

## 2019-05-21 RX ORDER — ACETAMINOPHEN 500 MG
TABLET ORAL
Qty: 1 BOTTLE | Refills: 3 | Status: SHIPPED | OUTPATIENT
Start: 2019-05-21 | End: 2019-06-26 | Stop reason: DRUGHIGH

## 2019-06-25 ENCOUNTER — NURSING HOME VISIT (OUTPATIENT)
Dept: GERIATRICS | Facility: CLINIC | Age: 84
End: 2019-06-25
Payer: COMMERCIAL

## 2019-06-25 VITALS
DIASTOLIC BLOOD PRESSURE: 69 MMHG | OXYGEN SATURATION: 96 % | RESPIRATION RATE: 18 BRPM | WEIGHT: 142.4 LBS | HEART RATE: 76 BPM | SYSTOLIC BLOOD PRESSURE: 144 MMHG | TEMPERATURE: 98.1 F | BODY MASS INDEX: 28.76 KG/M2

## 2019-06-25 DIAGNOSIS — F51.01 PRIMARY INSOMNIA: ICD-10-CM

## 2019-06-25 DIAGNOSIS — M54.2 CERVICALGIA: ICD-10-CM

## 2019-06-25 PROCEDURE — 99308 SBSQ NF CARE LOW MDM 20: CPT | Performed by: NURSE PRACTITIONER

## 2019-06-25 RX ORDER — MULTIVITAMIN,THERAPEUTIC
1 TABLET ORAL DAILY
COMMUNITY
End: 2020-04-17

## 2019-06-25 RX ORDER — ACETAMINOPHEN 500 MG
1000 TABLET ORAL 4 TIMES DAILY
COMMUNITY
End: 2020-03-02

## 2019-06-25 NOTE — PROGRESS NOTES
[facility]  [place] Medical Record Number:  [id number]  Place of Service where encounter took place:  [facility] ([facility]) [[id number]]  Chief Complaint   Patient presents with     Nursing Home Acute     HPI:    Yuni Otoole  is a 83 year old (1935), who is being seen today for an episodic care visit.  HPI information obtained from: facility chart records, facility staff, patient report and [facility] chart review.   Today's concern is:  Cervicalgia  Pt came to me asking to be seen for pain medication changes.   She currently wants Tylenol 1000 TID now from BID    And less NSAIDs.   Discussed this is a really good thing to try (had been on this in the past and pt often changes back/forth ) given risks of NSAID. -    Primary insomnia  Facility staff prompting me to eval Trazodone for GDR possibility  Spoke with pt and she is agreeable to trial.     Past Medical and Surgical History reviewed in Epic today.    MEDICATIONS: Reviewed.      REVIEW OF SYSTEMS:  4 point ROS including Respiratory, CV, GI and , other than that noted in the HPI,  is negative    Objective:  /69   Pulse 76   Temp 98.1  F (36.7  C)   Resp 18   Wt 64.6 kg (142 lb 6.4 oz)   SpO2 96%   BMI 28.76 kg/m    Exam:  GENERAL APPEARANCE: Alert, in no distress, cooperative.  ENT: Mouth and posterior oropharynx normal, moist mucous membranes, hearing acuity at baseline [place]   RESP: non labored breathing  SKIN: Inspection/Palpation of skin and subcutaneous tissue baseline w/ fragility.  NEURO: 2-12 at patient's baseline, sensation baseline PPS.  PSYCH: Insight and judgement, memory baseline, affect and mood normal.    Labs:   No new labs.     ASSESSMENT/PLAN:  Cervicalgia  Will change OTC pain meds per pt's wish to below.   Discussed risks still of BID NSAID but glad it is no longer TID. She still wishes to keep bid at this time.     Primary insomnia  Will try GDR in scheduled Trazodone - keep prn given hx  of insomnia being resolved with trazodone.     Orders written by provider at facility and transcribed by : Ankush Tillman  1. change ES tylenol to 1000 mg PO TID scheduled and 1000 mg PO Q day PRN Dx: Mild pain/fever  2. Change ibuprofen to 200 mg PO QPM and HS (DC Am dose)  3. Decrease Trazodone to 25 mg PO at bedtime and continue 25 mg PO at bedtime PRN new stop dates 8/22 Dx: Insomnia  4. Please make a copy of this and give to patient and print a list of current medications to give to patient    Electronically signed by:  CRISTAL Olmos CNP

## 2019-06-25 NOTE — LETTER
6/25/2019        RE: Yuni Otoole  [address]  [facility] [address]        [place] [place] [facility]  [place] Medical Record Number:  [id number]  Place of Service where encounter took place:  [facility] ([facility] [[id number]]  Chief Complaint   Patient presents with     Nursing Home Acute     HPI:    Yuni Otoole  is a 83 year old (1935), who is being seen today for an episodic care visit.  HPI information obtained from: facility chart records, facility staff, patient report and [facility] chart review.   Today's concern is:  Cervicalgia  Pt came to me asking to be seen for pain medication changes.   She currently wants Tylenol 1000 TID now from BID    And less NSAIDs.   Discussed this is a really good thing to try (had been on this in the past and pt often changes back/forth ) given risks of NSAID. -    Primary insomnia  Facility staff prompting me to eval Trazodone for GDR possibility  Spoke with pt and she is agreeable to trial.     Past Medical and Surgical History reviewed in Epic today.    MEDICATIONS: Reviewed.      REVIEW OF SYSTEMS:  4 point ROS including Respiratory, CV, GI and , other than that noted in the HPI,  is negative    Objective:  /69   Pulse 76   Temp 98.1  F (36.7  C)   Resp 18   Wt 64.6 kg (142 lb 6.4 oz)   SpO2 96%   BMI 28.76 kg/m     Exam:  GENERAL APPEARANCE: Alert, in no distress, cooperative.  ENT: Mouth and posterior oropharynx normal, moist mucous membranes, hearing acuity at baseline [place]   RESP: non labored breathing  SKIN: Inspection/Palpation of skin and subcutaneous tissue baseline w/ fragility.  NEURO: 2-12 at patient's baseline, sensation baseline PPS.  PSYCH: Insight and judgement, memory baseline, affect and mood normal.    Labs:   No new labs.     ASSESSMENT/PLAN:  Cervicalgia  Will change OTC pain meds per pt's wish to below.   Discussed risks still of BID NSAID but glad it is no longer TID. She still wishes to keep bid at  this time.     Primary insomnia  Will try GDR in scheduled Trazodone - keep prn given hx of insomnia being resolved with trazodone.     Orders written by provider at facility and transcribed by : Ankush Tillman  1. change ES tylenol to 1000 mg PO TID scheduled and 1000 mg PO Q day PRN Dx: Mild pain/fever  2. Change ibuprofen to 200 mg PO QPM and HS (DC Am dose)  3. Decrease Trazodone to 25 mg PO at bedtime and continue 25 mg PO at bedtime PRN new stop dates 8/22 Dx: Insomnia  4. Please make a copy of this and give to patient and print a list of current medications to give to patient    Electronically signed by:  CRISTAL Olmos CNP               Sincerely,        CRISTAL Olmos CNP

## 2019-06-26 PROBLEM — E46 PROTEIN-CALORIE MALNUTRITION (H): Status: RESOLVED | Noted: 2018-01-30 | Resolved: 2019-06-26

## 2019-06-26 RX ORDER — TRAZODONE HYDROCHLORIDE 50 MG/1
TABLET, FILM COATED ORAL
Qty: 45 TABLET | Refills: 3
Start: 2019-06-26 | End: 2020-01-03

## 2019-06-26 RX ORDER — IBUPROFEN 200 MG
200 TABLET ORAL 2 TIMES DAILY
Start: 2019-06-26 | End: 2019-07-30 | Stop reason: DRUGHIGH

## 2019-07-01 NOTE — PROGRESS NOTES
[facility]  Chief Complaint   Patient presents with     half-way Regulatory     [place] Medical Record Number:  [id number]  Place of Service where encounter took place:  [facility] ([facility]) [[id number]]    HPI:    Yuni Otoole  is 83 year old (1935), who is being seen today for a federally mandated E/M visit.  HPI information obtained from: facility chart records, facility staff, patient report and [facility] chart review.   Yuni is a resident of the LT wing of [place] [facility] since 2017 s/p cervical spinal fusion.   Recent adjustment of pain medication 6/25 with decrease in NSAID and increase in tyelnol. F/u today pt notes some improvement, but about the same.   TOday she is wanting her requip adjusted.     ALLERGIES:Atorvastatin calcium  PAST MEDICAL HISTORY:   has a past medical history of Allergic rhinitis, cause unspecified, Head injury, Pure hypercholesterolemia, and Unspecified essential hypertension. She also has no past medical history of Diabetes (H).  PAST SURGICAL HISTORY:   has a past surgical history that includes REMOVAL OF TONSILS,14+ Y/O; colonoscopy (05/30/07); carotid endarterectomy (11/07/08); INJ EPIDURAL LUMBAR/SACRAL W/WO CONTRAST (2009); DRAIN/INJECT LARGE JOINT/BURSA (2009); DRAIN/INJECT LARGE JOINT/BURSA (2010); INJ EPIDURAL CERVICAL/THORACIC W/WO CONTRAST (2010); INJ TRANSFORAMIN EPIDURAL, LUMB/SACR SINGLE (2010); Optical Tracking System Fusion Posterior Cervical Three + Levels (N/A, 11/15/2017); Laminectomy cerivcal posterior three+ levels (N/A, 11/15/2017); Optical Tracking System Fusion Cervical Anterior Three + Levels (N/A, 11/15/2017); Fusion cervical posterior three+ levels (N/A, 11/15/2017); and Eye surgery.  FAMILY HISTORY: family history includes Cancer in her daughter and mother; Cardiovascular in her father; Cerebrovascular Disease in her paternal grandmother; Circulatory in her paternal grandmother; Diabetes in her maternal aunt; EYE* in  her mother; Gastrointestinal Disease in her mother; Gynecology in her sister; Hypertension in her father; Lipids in her brother; Musculoskeletal Disorder in her daughter; Obesity in her maternal grandmother and paternal grandmother; Osteoporosis in her mother.  SOCIAL HISTORY:  reports that she quit smoking about 23 years ago. Her smoking use included cigarettes. She started smoking about 49 years ago. She smoked 0.00 packs per day for 10.00 years. She has never used smokeless tobacco. She reports that she does not drink alcohol or use drugs.    MEDICATIONS:  Current Outpatient Medications   Medication Sig Dispense Refill     acetaminophen (TYLENOL) 500 MG tablet Take 1,000 mg by mouth 3 times daily And give 1000 mg PO PRN       amLODIPine (NORVASC) 5 MG tablet Take 0.5 tablets (2.5 mg) by mouth daily 30 tablet 3     chlorthalidone (HYGROTON) 25 MG tablet Take 1 tablet (25 mg) by mouth daily 30 tablet 3     Cholecalciferol (VITAMIN D-3 PO) Take 4,000 Units by mouth daily       DHA-EPA-Vitamin E (OMEGA-3 COMPLEX PO) Take 1 tablet by mouth 2 times daily       diclofenac (VOLTAREN) 1 % topical gel Place 4 g onto the skin 2 times daily 1 Tube 3     diphenhydrAMINE (BENADRYL) 25 MG tablet Take 1 tablet (25 mg) by mouth daily 30 tablet 3     glucosamine 500 MG CAPS capsule Take 1 capsule (500 mg) by mouth 2 times daily 60 capsule 3     ibuprofen (ADVIL/MOTRIN) 200 MG tablet Take 1 tablet (200 mg) by mouth 2 times daily       Menthol, Topical Analgesic, (BIOFREEZE) 4 % GEL Externally apply topically 2 times daily as needed 1 Tube 3     metoprolol tartrate (LOPRESSOR) 25 MG tablet Take 0.5 tablets (12.5 mg) by mouth 2 times daily 60 tablet 3     multivitamin, therapeutic (THERA-VIT) TABS tablet Take 1 tablet by mouth daily       polyethylene glycol-propylene glycol (SYSTANE ULTRA) 0.4-0.3 % SOLN ophthalmic solution Place 1 drop into both eyes 4 times daily 1 Bottle 3     pravastatin (PRAVACHOL) 10 MG tablet TAKE 1 TABLET BY  "MOUTH  DAILY 90 tablet 3     rOPINIRole (REQUIP) 2 MG tablet Take 2 tablets (4 mg) by mouth At Bedtime 100 tablet 3     senna-docusate (SENOKOT-S/PERICOLACE) 8.6-50 MG tablet 1 tablet by Oral or Feeding Tube route daily 100 tablet 3     traMADol (ULTRAM) 50 MG tablet TAKE 1 TAB BY MOUTH AT BEDTIME 30 tablet 5     traZODone (DESYREL) 50 MG tablet 25 mg po q HS and 25 mg po q HS PRN 45 tablet 3     UNABLE TO FIND Take 1 tablet by mouth 2 times daily MEDICATION NAME: Vitamin Focus Select       Case Management:  I have reviewed the care plan and MDS and do agree with the plan. Patient's desire to return to the community is present, but is not able due to care needs . Information reviewed:  Medications, vital signs, orders, and nursing notes.    ROS:  4 point ROS including Respiratory, CV, GI and , other than that noted in the HPI,  is negative    Vitals:  /83   Pulse 71   Temp 98.7  F (37.1  C)   Resp 15   Ht 1.499 m (4' 11\")   Wt 64.9 kg (143 lb 1.6 oz)   SpO2 96%   BMI 28.90 kg/m    Body mass index is 28.9 kg/m .  Exam:  GENERAL APPEARANCE:  Alert, in no distress  RESP:  respiratory effort and palpation of chest normal, auscultation of lungs clear , no respiratory distress  CV:  Palpation and auscultation of heart done , rate and rhythm reg, 3/6murmur, +2 on Llower ext, +1 on R lower ext peripheral edema  ABDOMEN:  normal bowel sounds, soft, nontender, no hepatosplenomegaly or other masses  M/S:   Gait and station SBA 2 WW - but uses w/c for transport/mobility, Digits and nails baseline arthritic   SKIN:  Inspection and Palpation of skin and subcutaneous tissue intact  NEURO: 2-12 in normal limits and at patient's baseline  PSYCH:  insight and judgement, memory intact , affect and mood Pleasant     Recent Labs   Lab Test 03/12/19  0640 08/30/18  0630 07/24/18  0625  11/20/17  1112 11/17/17  0401 11/16/17  0423   WBC  --   --   --   --  8.0 10.6 16.1*   HGB 11.2* 11.4* 11.4*   < > 9.7* 10.1* 11.6*   MCV  " --   --   --   --  94 91 90   PLT  --   --   --   --  246 171 182    < > = values in this interval not displayed.     Most Recent 3 BMP's:  Recent Labs   Lab Test 03/12/19  0640 07/24/18  0625 06/19/18  0630    136 136   POTASSIUM 4.0 4.2 4.0   CHLORIDE 101 98 99   CO2 29 28 27   BUN 17 15 15   CR 0.54 0.58 0.45*   ANIONGAP 6 10 10   ANNETTA 9.1 9.0 9.1   GLC 77 77 80     Most Recent TSH and T4:  Recent Labs   Lab Test 11/14/17  0752   TSH 2.91       ASSESSMENT/PLAN  Restless legs syndrome (RLS)  Will change requip to pt's preference to 4 HS and discontinue prn    - rOPINIRole (REQUIP) 2 MG tablet; Take 2 tablets (4 mg) by mouth At Bedtime    Essential hypertension/Localized edema  Noted slight bump in in BP's  - but range is acceptable  Will treat edema with diuretic and will also help bp   Noted CCB does have s/e of edema - will back down by 50% given addition of diuretic  - amLODIPine (NORVASC) 5 MG tablet; Take 0.5 tablets (2.5 mg) by mouth daily  - chlorthalidone (HYGROTON) 25 MG tablet; Take 1 tablet (25 mg) by mouth daily    Cervicalgia  Cont current POC of medications.     Orders written by provider at facility and transcribed by : Coco Davis MA  1.  Start chlorthalidone 25 mg po every day.  Dx: HTN, edema  2.  Decrease Norvasc to 2.5 mg at bedtime.  Dx: HTN  3.  BMP 7/18.  Dx: HTN, edema  4.  Increase scheduled Requip to 4 mg po at bedtime.  Dx: RLS  5.  Discontinue PRN Requip.      Electronically signed by:  CRISTAL Olmos CNP

## 2019-07-03 ENCOUNTER — NURSING HOME VISIT (OUTPATIENT)
Dept: GERIATRICS | Facility: CLINIC | Age: 84
End: 2019-07-03
Payer: COMMERCIAL

## 2019-07-03 VITALS
SYSTOLIC BLOOD PRESSURE: 153 MMHG | HEIGHT: 59 IN | TEMPERATURE: 98.7 F | WEIGHT: 143.1 LBS | DIASTOLIC BLOOD PRESSURE: 83 MMHG | HEART RATE: 71 BPM | RESPIRATION RATE: 15 BRPM | OXYGEN SATURATION: 96 % | BODY MASS INDEX: 28.85 KG/M2

## 2019-07-03 DIAGNOSIS — R60.0 LOCALIZED EDEMA: ICD-10-CM

## 2019-07-03 DIAGNOSIS — G25.81 RESTLESS LEGS SYNDROME (RLS): Primary | ICD-10-CM

## 2019-07-03 DIAGNOSIS — I10 ESSENTIAL HYPERTENSION: ICD-10-CM

## 2019-07-03 DIAGNOSIS — M54.2 CERVICALGIA: ICD-10-CM

## 2019-07-03 PROCEDURE — 99309 SBSQ NF CARE MODERATE MDM 30: CPT | Performed by: NURSE PRACTITIONER

## 2019-07-03 RX ORDER — CHLORTHALIDONE 25 MG/1
25 TABLET ORAL DAILY
Qty: 30 TABLET | Refills: 3 | Status: SHIPPED | OUTPATIENT
Start: 2019-07-03 | End: 2019-09-12

## 2019-07-03 RX ORDER — ROPINIROLE 2 MG/1
4 TABLET, FILM COATED ORAL AT BEDTIME
Qty: 100 TABLET | Refills: 3 | Status: SHIPPED | OUTPATIENT
Start: 2019-07-03 | End: 2020-01-20

## 2019-07-03 RX ORDER — CHLORTHALIDONE 25 MG/1
25 TABLET ORAL DAILY
COMMUNITY
End: 2019-07-03

## 2019-07-03 RX ORDER — AMLODIPINE BESYLATE 5 MG/1
2.5 TABLET ORAL DAILY
Qty: 30 TABLET | Refills: 3 | Status: SHIPPED | OUTPATIENT
Start: 2019-07-03 | End: 2020-04-02

## 2019-07-03 ASSESSMENT — MIFFLIN-ST. JEOR: SCORE: 1019.73

## 2019-07-03 NOTE — LETTER
7/3/2019        RE: Yuni Otoole  [address]  [facility] [address]        [facility]  Chief Complaint   Patient presents with     care home Regulatory     [place] Medical Record Number:  [id number]  Place of Service where encounter took place:  [facility] ([facility]) [[id number]]    HPI:    Yuni Otoole  is 83 year old (1935), who is being seen today for a federally mandated E/M visit.  HPI information obtained from: facility chart records, facility staff, patient report and [facility] chart review.   Yuni is a resident of the LT wing of [place] since 2017 s/p cervical spinal fusion.   Recent adjustment of pain medication 6/25 with decrease in NSAID and increase in tyelnol. F/u today pt notes some improvement, but about the same.   TOday she is wanting her requip adjusted.     ALLERGIES:Atorvastatin calcium  PAST MEDICAL HISTORY:   has a past medical history of Allergic rhinitis, cause unspecified, Head injury, Pure hypercholesterolemia, and Unspecified essential hypertension. She also has no past medical history of Diabetes (H).  PAST SURGICAL HISTORY:   has a past surgical history that includes REMOVAL OF TONSILS,14+ Y/O; colonoscopy (05/30/07); carotid endarterectomy (11/07/08); INJ EPIDURAL LUMBAR/SACRAL W/WO CONTRAST (2009); DRAIN/INJECT LARGE JOINT/BURSA (2009); DRAIN/INJECT LARGE JOINT/BURSA (2010); INJ EPIDURAL CERVICAL/THORACIC W/WO CONTRAST (2010); INJ TRANSFORAMIN EPIDURAL, LUMB/SACR SINGLE (2010); Optical Tracking System Fusion Posterior Cervical Three + Levels (N/A, 11/15/2017); Laminectomy cerivcal posterior three+ levels (N/A, 11/15/2017); Optical Tracking System Fusion Cervical Anterior Three + Levels (N/A, 11/15/2017); Fusion cervical posterior three+ levels (N/A, 11/15/2017); and Eye surgery.  FAMILY HISTORY: family history includes Cancer in her daughter and mother; Cardiovascular in her father; Cerebrovascular Disease in her paternal  grandmother; Circulatory in her paternal grandmother; Diabetes in her maternal aunt; EYE* in her mother; Gastrointestinal Disease in her mother; Gynecology in her sister; Hypertension in her father; Lipids in her brother; Musculoskeletal Disorder in her daughter; Obesity in her maternal grandmother and paternal grandmother; Osteoporosis in her mother.  SOCIAL HISTORY:  reports that she quit smoking about 23 years ago. Her smoking use included cigarettes. She started smoking about 49 years ago. She smoked 0.00 packs per day for 10.00 years. She has never used smokeless tobacco. She reports that she does not drink alcohol or use drugs.    MEDICATIONS:  Current Outpatient Medications   Medication Sig Dispense Refill     acetaminophen (TYLENOL) 500 MG tablet Take 1,000 mg by mouth 3 times daily And give 1000 mg PO PRN       amLODIPine (NORVASC) 5 MG tablet Take 0.5 tablets (2.5 mg) by mouth daily 30 tablet 3     chlorthalidone (HYGROTON) 25 MG tablet Take 1 tablet (25 mg) by mouth daily 30 tablet 3     Cholecalciferol (VITAMIN D-3 PO) Take 4,000 Units by mouth daily       DHA-EPA-Vitamin E (OMEGA-3 COMPLEX PO) Take 1 tablet by mouth 2 times daily       diclofenac (VOLTAREN) 1 % topical gel Place 4 g onto the skin 2 times daily 1 Tube 3     diphenhydrAMINE (BENADRYL) 25 MG tablet Take 1 tablet (25 mg) by mouth daily 30 tablet 3     glucosamine 500 MG CAPS capsule Take 1 capsule (500 mg) by mouth 2 times daily 60 capsule 3     ibuprofen (ADVIL/MOTRIN) 200 MG tablet Take 1 tablet (200 mg) by mouth 2 times daily       Menthol, Topical Analgesic, (BIOFREEZE) 4 % GEL Externally apply topically 2 times daily as needed 1 Tube 3     metoprolol tartrate (LOPRESSOR) 25 MG tablet Take 0.5 tablets (12.5 mg) by mouth 2 times daily 60 tablet 3     multivitamin, therapeutic (THERA-VIT) TABS tablet Take 1 tablet by mouth daily       polyethylene glycol-propylene glycol (SYSTANE ULTRA) 0.4-0.3 % SOLN ophthalmic solution Place 1 drop into  "both eyes 4 times daily 1 Bottle 3     pravastatin (PRAVACHOL) 10 MG tablet TAKE 1 TABLET BY MOUTH  DAILY 90 tablet 3     rOPINIRole (REQUIP) 2 MG tablet Take 2 tablets (4 mg) by mouth At Bedtime 100 tablet 3     senna-docusate (SENOKOT-S/PERICOLACE) 8.6-50 MG tablet 1 tablet by Oral or Feeding Tube route daily 100 tablet 3     traMADol (ULTRAM) 50 MG tablet TAKE 1 TAB BY MOUTH AT BEDTIME 30 tablet 5     traZODone (DESYREL) 50 MG tablet 25 mg po q HS and 25 mg po q HS PRN 45 tablet 3     UNABLE TO FIND Take 1 tablet by mouth 2 times daily MEDICATION NAME: Vitamin Focus Select       Case Management:  I have reviewed the care plan and MDS and do agree with the plan. Patient's desire to return to the community is present, but is not able due to care needs . Information reviewed:  Medications, vital signs, orders, and nursing notes.    ROS:  4 point ROS including Respiratory, CV, GI and , other than that noted in the HPI,  is negative    Vitals:  /83   Pulse 71   Temp 98.7  F (37.1  C)   Resp 15   Ht 1.499 m (4' 11\")   Wt 64.9 kg (143 lb 1.6 oz)   SpO2 96%   BMI 28.90 kg/m     Body mass index is 28.9 kg/m .  Exam:  GENERAL APPEARANCE:  Alert, in no distress  RESP:  respiratory effort and palpation of chest normal, auscultation of lungs clear , no respiratory distress  CV:  Palpation and auscultation of heart done , rate and rhythm reg, 3/6murmur, +2 on Llower ext, +1 on R lower ext peripheral edema  ABDOMEN:  normal bowel sounds, soft, nontender, no hepatosplenomegaly or other masses  M/S:   Gait and station SBA 2 WW - but uses w/c for transport/mobility, Digits and nails baseline arthritic   SKIN:  Inspection and Palpation of skin and subcutaneous tissue intact  NEURO: 2-12 in normal limits and at patient's baseline  PSYCH:  insight and judgement, memory intact , affect and mood Pleasant     Recent Labs   Lab Test 03/12/19  0640 08/30/18  0630 07/24/18  0625  11/20/17  1112 11/17/17  0401 11/16/17  0423 "   WBC  --   --   --   --  8.0 10.6 16.1*   HGB 11.2* 11.4* 11.4*   < > 9.7* 10.1* 11.6*   MCV  --   --   --   --  94 91 90   PLT  --   --   --   --  246 171 182    < > = values in this interval not displayed.     Most Recent 3 BMP's:  Recent Labs   Lab Test 03/12/19  0640 07/24/18  0625 06/19/18  0630    136 136   POTASSIUM 4.0 4.2 4.0   CHLORIDE 101 98 99   CO2 29 28 27   BUN 17 15 15   CR 0.54 0.58 0.45*   ANIONGAP 6 10 10   ANNETTA 9.1 9.0 9.1   GLC 77 77 80     Most Recent TSH and T4:  Recent Labs   Lab Test 11/14/17  0752   TSH 2.91       ASSESSMENT/PLAN  Restless legs syndrome (RLS)  Will change requip to pt's preference to 4 HS and discontinue prn    - rOPINIRole (REQUIP) 2 MG tablet; Take 2 tablets (4 mg) by mouth At Bedtime    Essential hypertension/Localized edema  Noted slight bump in in BP's  - but range is acceptable  Will treat edema with diuretic and will also help bp   Noted CCB does have s/e of edema - will back down by 50% given addition of diuretic  - amLODIPine (NORVASC) 5 MG tablet; Take 0.5 tablets (2.5 mg) by mouth daily  - chlorthalidone (HYGROTON) 25 MG tablet; Take 1 tablet (25 mg) by mouth daily    Cervicalgia  Cont current POC of medications.     Orders written by provider at facility and transcribed by : Coco Davis MA  1.  Start chlorthalidone 25 mg po every day.  Dx: HTN, edema  2.  Decrease Norvasc to 2.5 mg at bedtime.  Dx: HTN  3.  BMP 7/18.  Dx: HTN, edema  4.  Increase scheduled Requip to 4 mg po at bedtime.  Dx: RLS  5.  Discontinue PRN Requip.      Electronically signed by:  CRISTAL Olmos CNP            Sincerely,        CRISTAL Olmos CNP

## 2019-07-16 VITALS
DIASTOLIC BLOOD PRESSURE: 66 MMHG | TEMPERATURE: 97.6 F | SYSTOLIC BLOOD PRESSURE: 131 MMHG | RESPIRATION RATE: 18 BRPM | HEART RATE: 67 BPM

## 2019-07-16 NOTE — PROGRESS NOTES
[facility]  [place] Medical Record Number:  [id number]  Place of Service where encounter took place:  [facility] ([facility] [[id number]]  Chief Complaint   Patient presents with     Nursing Home Acute     HPI:    Yuni Otoole  is a 83 year old (1935), who is being seen today for an episodic care visit.  HPI information obtained from: facility chart records, facility staff, patient report and [facility] chart review.     Yuni is a resident of the LT wing of [place] since 2017 s/p cervical spinal fusion.   Recent adjustment of pain medication 6/25 with decrease in NSAID and increase in tyelnol.   7/3 decrease in Norvasc from 5 to 2.5 hx  and start chlorthalidone 25 daily due to edema and increase in requip for RLS  F/u today with BMP  Today pt also noting increase in R hip/low back S/I joint pain.   Noted hx of L Trochanteric bursa pain s/p cortisone inj.     Past Medical and Surgical History reviewed in Epic today.    MEDICATIONS: Reviewed.    REVIEW OF SYSTEMS:  4 point ROS including Respiratory, CV, GI and , other than that noted in the HPI,  is negative    Objective:  /66   Pulse 67   Temp 97.6  F (36.4  C)   Resp 18   Exam:  GENERAL APPEARANCE: Alert, in no distress, cooperative.  ENT: Mouth and posterior oropharynx normal, moist mucous membranes, hearing acuity at baseline [place]   RESP: non labored breathing  CV: trace edema - improved from last week.   SKIN: Inspection/Palpation of skin and subcutaneous tissue baseline w/ fragility.  M/S: chronic spine deformity. Very tight and painful R glut muscles with palpation/massage.   NEURO: 2-12 at patient's baseline  PSYCH: Insight and judgement, memory baseline , affect and mood cam. Pleasant.     Recent Labs   Lab Test 07/18/19  0617 03/12/19  0640 07/24/18  0625   * 136 136   POTASSIUM 3.7 4.0 4.2   CHLORIDE 95 101 98   CO2 29 29 28   BUN 17 17 15   CR 0.61 0.54 0.58   ANIONGAP 7 6 10   ANNETTA 9.1 9.1 9.0   GLC  85 77 77       ASSESSMENT/PLAN:  Essential hypertension/Localized edema  Improved with current BP regimen after 7/3 changes  Will cont and follow bmp as needed and bp's   Possible could trail discontinue of ccb if bp's mainly under 140    Restless legs syndrome (RLS)  Stable with dose increase    Hip pain, right  Tight muscles - will trail warm bath  Try topical NSAID - difficult to stretch for pt given full body djd.       Orders written by provider at facility and transcribed by : Faby Rose CMA  1.  Please offer pt warm pool bath this week and assess/chart if it helps her right hip /right lower back/glut pain.  2.  Add Voltaren gel to Right hip/sacroliliac joint on right side bid x 7 days-Continue Voltaren gel to right knee.    Electronically signed by:  CRISTAL Olmos CNP

## 2019-07-18 ENCOUNTER — HOSPITAL LABORATORY (OUTPATIENT)
Dept: NURSING HOME | Facility: OTHER | Age: 84
End: 2019-07-18

## 2019-07-18 ENCOUNTER — NURSING HOME VISIT (OUTPATIENT)
Dept: GERIATRICS | Facility: CLINIC | Age: 84
End: 2019-07-18
Payer: COMMERCIAL

## 2019-07-18 DIAGNOSIS — G25.81 RESTLESS LEGS SYNDROME (RLS): ICD-10-CM

## 2019-07-18 DIAGNOSIS — R60.0 LOCALIZED EDEMA: ICD-10-CM

## 2019-07-18 DIAGNOSIS — M25.551 HIP PAIN, RIGHT: ICD-10-CM

## 2019-07-18 DIAGNOSIS — I10 ESSENTIAL HYPERTENSION: Primary | ICD-10-CM

## 2019-07-18 LAB
ANION GAP SERPL CALCULATED.3IONS-SCNC: 7 MMOL/L (ref 3–14)
BUN SERPL-MCNC: 17 MG/DL (ref 7–30)
CALCIUM SERPL-MCNC: 9.1 MG/DL (ref 8.5–10.1)
CHLORIDE SERPL-SCNC: 95 MMOL/L (ref 94–109)
CO2 SERPL-SCNC: 29 MMOL/L (ref 20–32)
CREAT SERPL-MCNC: 0.61 MG/DL (ref 0.52–1.04)
GFR SERPL CREATININE-BSD FRML MDRD: 84 ML/MIN/{1.73_M2}
GLUCOSE SERPL-MCNC: 85 MG/DL (ref 70–99)
POTASSIUM SERPL-SCNC: 3.7 MMOL/L (ref 3.4–5.3)
SODIUM SERPL-SCNC: 131 MMOL/L (ref 133–144)

## 2019-07-18 PROCEDURE — 99308 SBSQ NF CARE LOW MDM 20: CPT | Performed by: NURSE PRACTITIONER

## 2019-07-18 NOTE — LETTER
7/18/2019        RE: Yuni Otoole  [address]  [facility] [address]        [facility]  [place] Medical Record Number:  [id number]  Place of Service where encounter took place:  [facility] ([facility]) [[id number]]  Chief Complaint   Patient presents with     Nursing Home Acute     HPI:    Yuni Otoole  is a 83 year old (1935), who is being seen today for an episodic care visit.  HPI information obtained from: facility chart records, facility staff, patient report and [facility] chart review.     Yuni is a resident of the LT wing of [place] since 2017 s/p cervical spinal fusion.   Recent adjustment of pain medication 6/25 with decrease in NSAID and increase in tyelnol.   7/3 decrease in Norvasc from 5 to 2.5 hx  and start chlorthalidone 25 daily due to edema and increase in requip for RLS  F/u today with BMP  Today pt also noting increase in R hip/low back S/I joint pain.   Noted hx of L Trochanteric bursa pain s/p cortisone inj.     Past Medical and Surgical History reviewed in Epic today.    MEDICATIONS: Reviewed.    REVIEW OF SYSTEMS:  4 point ROS including Respiratory, CV, GI and , other than that noted in the HPI,  is negative    Objective:  /66   Pulse 67   Temp 97.6  F (36.4  C)   Resp 18   Exam:  GENERAL APPEARANCE: Alert, in no distress, cooperative.  ENT: Mouth and posterior oropharynx normal, moist mucous membranes, hearing acuity at baseline [place]   RESP: non labored breathing  CV: trace edema - improved from last week.   SKIN: Inspection/Palpation of skin and subcutaneous tissue baseline w/ fragility.  M/S: chronic spine deformity. Very tight and painful R glut muscles with palpation/massage.   NEURO: 2-12 at patient's baseline  PSYCH: Insight and judgement, memory baseline , affect and mood cam. Pleasant.     Recent Labs   Lab Test 07/18/19  0617 03/12/19  0640 07/24/18  0625   * 136 136   POTASSIUM 3.7 4.0 4.2   CHLORIDE 95 101 98   CO2  29 29 28   BUN 17 17 15   CR 0.61 0.54 0.58   ANIONGAP 7 6 10   ANNETTA 9.1 9.1 9.0   GLC 85 77 77       ASSESSMENT/PLAN:  Essential hypertension/Localized edema  Improved with current BP regimen after 7/3 changes  Will cont and follow bmp as needed and bp's   Possible could trail discontinue of ccb if bp's mainly under 140    Restless legs syndrome (RLS)  Stable with dose increase    Hip pain, right  Tight muscles - will trail warm bath  Try topical NSAID - difficult to stretch for pt given full body djd.       Orders written by provider at facility and transcribed by : aFby Rose CMA  1.  Please offer pt warm pool bath this week and assess/chart if it helps her right hip /right lower back/glut pain.  2.  Add Voltaren gel to Right hip/sacroliliac joint on right side bid x 7 days-Continue Voltaren gel to right knee.    Electronically signed by:  CRISTAL Olmos CNP             Sincerely,        CRISTAL Olmos CNP

## 2019-07-23 ENCOUNTER — OFFICE VISIT (OUTPATIENT)
Dept: AUDIOLOGY | Facility: CLINIC | Age: 84
End: 2019-07-23
Payer: COMMERCIAL

## 2019-07-23 DIAGNOSIS — H90.3 SENSORINEURAL HEARING LOSS, BILATERAL: Primary | ICD-10-CM

## 2019-07-23 PROCEDURE — 99207 ZZC NO CHARGE LOS: CPT | Performed by: AUDIOLOGIST

## 2019-07-23 PROCEDURE — 92593 HC HEARING AID CHECK, BINAURAL: CPT | Performed by: AUDIOLOGIST

## 2019-07-23 NOTE — PROGRESS NOTES
Hearing Aid Check     SUBJECTIVE:  Yuni Otoole is a 83 year old year old female, seen today for a hearing aid check at [facility]. Patient reports not hearing as well with her hearing aids especially in group situations. Previous evaluation has shown moderate sensorineural hearing loss bilaterally. She currently uses binaural Alee Wi Series i110 STORMY hearing aids with custom receivers.        OBJECTIVE:  Otoscopic exam indicates cerumen in the right ear and clear left ear, removed cerumen without incident using a lighted curette.      Hearing aid maintenance was completed to include, removing wax filter, vacuuming  and replacing wax filter. Removing, vacuuming gasper filter and hearing aid mics.  Real ear measures were obtained to verify output of hearing aids compared to prescriptive speech targets based on June 2018 hearing levels.  There was a good match to target with minimal programming adjustments, it should be noted that hearing aids are currently at max output and cannot be turned up louder.     PLAN:   Recommended Mrs. Otoole try these settings and return as needed if hearing is improved after the adjustments and maintenance. Otherwise is she continues to struggle even with the hearing aids recommended audiology evaluation and replacement receivers (with next power level up).    Riri Sharp.  [place] Licensed Audiologist #[id number]

## 2019-07-30 ENCOUNTER — NURSING HOME VISIT (OUTPATIENT)
Dept: GERIATRICS | Facility: CLINIC | Age: 84
End: 2019-07-30
Payer: COMMERCIAL

## 2019-07-30 VITALS
TEMPERATURE: 98.7 F | SYSTOLIC BLOOD PRESSURE: 109 MMHG | RESPIRATION RATE: 18 BRPM | DIASTOLIC BLOOD PRESSURE: 59 MMHG | HEART RATE: 74 BPM

## 2019-07-30 DIAGNOSIS — M15.9 GENERALIZED OSTEOARTHRITIS: ICD-10-CM

## 2019-07-30 DIAGNOSIS — M54.2 CERVICALGIA: Primary | ICD-10-CM

## 2019-07-30 DIAGNOSIS — M48.02 CERVICAL STENOSIS OF SPINE: ICD-10-CM

## 2019-07-30 DIAGNOSIS — Z98.1 S/P CERVICAL SPINAL FUSION: ICD-10-CM

## 2019-07-30 PROCEDURE — 99308 SBSQ NF CARE LOW MDM 20: CPT | Performed by: NURSE PRACTITIONER

## 2019-07-30 RX ORDER — IBUPROFEN 200 MG
400 TABLET ORAL 3 TIMES DAILY
COMMUNITY
End: 2020-03-02

## 2019-07-30 NOTE — LETTER
7/30/2019        RE: Yuni Otoole  [address]  [facility] [address]        [facility]  [place] Medical Record Number:  [id number]  Place of Service where encounter took place:  [facility] [place] ([facility]) [[id number]]  Chief Complaint   Patient presents with     Nursing Home Acute     HPI:    Yuni Otoole  is a 83 year old (1935), who is being seen today for an episodic care visit.  HPI information obtained from: facility chart records, facility staff, patient report and [facility] chart review. Today's concern is:     Cervicalgia  S/P cervical spinal fusion  Cervical stenosis of spine  Generalized osteoarthritis     Pt is requesting to see me and discuss pain - noted she states she is having more pain than she can tolerate - all t/o body - often marv hips - L >R  Discussed recent past medication changes/hx of cortisone inj.   She is requesting NSAID to be 400 qid      Recent adjustment of pain medication 6/25 with decrease in NSAID and increase in tyelnol.   7/3 decrease in Norvasc from 5 to 2.5 hx  and start chlorthalidone 25 daily due to edema and increase in requip for RLS  7/18: add voltaren gel to R hip/sacroliliac joint - encourage warm whirl pool baths.       Past Medical and Surgical History reviewed in Epic today.    MEDICATIONS: Reviewed.      REVIEW OF SYSTEMS:  4 point ROS including Respiratory, CV, GI and , other than that noted in the HPI,  is negative    Objective:  /59   Pulse 74   Temp 98.7  F (37.1  C)   Resp 18   Exam:  GENERAL APPEARANCE: Alert, in no distress, cooperative.  ENT: Mouth and posterior oropharynx normal, moist mucous membranes, hearing acuity at baseline [place]   RESP: non labored breathing  CV: no edema - trace on R  SKIN: Inspection/Palpation of skin and subcutaneous tissue baseline w/ fragility.  NEURO: 2-12 at patient's baseline  PSYCH: Insight and judgement, memory baseline  intact, affect and mood calm, pleasant.  .    Recent Labs   Lab Test 07/18/19  0617 03/12/19  0640 07/24/18  0625   * 136 136   POTASSIUM 3.7 4.0 4.2   CHLORIDE 95 101 98   CO2 29 29 28   BUN 17 17 15   CR 0.61 0.54 0.58   ANIONGAP 7 6 10   ANNETTA 9.1 9.1 9.0   GLC 85 77 77     Most Recent 3 Hemoglobins:  Recent Labs   Lab Test 03/12/19  0640 08/30/18  0630 07/24/18  0625   HGB 11.2* 11.4* 11.4*       ASSESSMENT/PLAN:     Cervicalgia  S/P cervical spinal fusion  Cervical stenosis of spine  Generalized osteoarthritis     Discussed risks of NSAIDS, but she is VERY clear she wants increase - and she has 400 mg tabs she wants to use up.   Agreed to tid vs QID  - still isg increase - discusses risks of bleed, renal and increase in bps  Will monitor risks with labs    Orders written by provider at facility and transcribed by : Faby Rose CMA  1.  Increase Ibuprofen to 400 mg po tid.  DX:  Generalized pain.  2.  BMP, HGB in 1 month.  DX:  Anemia    Electronically signed by:  CRISTAL Olmos CNP             Sincerely,        CRISTAL Olmos CNP

## 2019-07-30 NOTE — PROGRESS NOTES
[facility]  [place] Medical Record Number:  [id number]  Place of Service where encounter took place:  [facility] ([facility]) [[id number]]  Chief Complaint   Patient presents with     Nursing Home Acute     HPI:    Yuni Otoole  is a 83 year old (1935), who is being seen today for an episodic care visit.  HPI information obtained from: facility chart records, facility staff, patient report and [facility] chart review. Today's concern is:     Cervicalgia  S/P cervical spinal fusion  Cervical stenosis of spine  Generalized osteoarthritis     Pt is requesting to see me and discuss pain - noted she states she is having more pain than she can tolerate - all t/o body - often marv hips - L >R  Discussed recent past medication changes/hx of cortisone inj.   She is requesting NSAID to be 400 qid      Recent adjustment of pain medication 6/25 with decrease in NSAID and increase in tyelnol.   7/3 decrease in Norvasc from 5 to 2.5 hx  and start chlorthalidone 25 daily due to edema and increase in requip for RLS  7/18: add voltaren gel to R hip/sacroliliac joint - encourage warm whirl pool baths.       Past Medical and Surgical History reviewed in Epic today.    MEDICATIONS: Reviewed.      REVIEW OF SYSTEMS:  4 point ROS including Respiratory, CV, GI and , other than that noted in the HPI,  is negative    Objective:  /59   Pulse 74   Temp 98.7  F (37.1  C)   Resp 18   Exam:  GENERAL APPEARANCE: Alert, in no distress, cooperative.  ENT: Mouth and posterior oropharynx normal, moist mucous membranes, hearing acuity at baseline [place]   RESP: non labored breathing  CV: no edema - trace on R  SKIN: Inspection/Palpation of skin and subcutaneous tissue baseline w/ fragility.  NEURO: 2-12 at patient's baseline  PSYCH: Insight and judgement, memory baseline intact, affect and mood calm, pleasant. .    Recent Labs   Lab Test 07/18/19  0617 03/12/19  0640 07/24/18  0625   * 136 136    POTASSIUM 3.7 4.0 4.2   CHLORIDE 95 101 98   CO2 29 29 28   BUN 17 17 15   CR 0.61 0.54 0.58   ANIONGAP 7 6 10   ANNETTA 9.1 9.1 9.0   GLC 85 77 77     Most Recent 3 Hemoglobins:  Recent Labs   Lab Test 03/12/19  0640 08/30/18  0630 07/24/18  0625   HGB 11.2* 11.4* 11.4*       ASSESSMENT/PLAN:     Cervicalgia  S/P cervical spinal fusion  Cervical stenosis of spine  Generalized osteoarthritis     Discussed risks of NSAIDS, but she is VERY clear she wants increase - and she has 400 mg tabs she wants to use up.   Agreed to tid vs QID  - still isg increase - discusses risks of bleed, renal and increase in bps  Will monitor risks with labs    Orders written by provider at facility and transcribed by : Faby Rose CMA  1.  Increase Ibuprofen to 400 mg po tid.  DX:  Generalized pain.  2.  BMP, HGB in 1 month.  DX:  Anemia    Electronically signed by:  CRISTAL Olmos CNP

## 2019-08-01 DIAGNOSIS — Z98.1 S/P CERVICAL SPINAL FUSION: ICD-10-CM

## 2019-08-29 ENCOUNTER — HOSPITAL LABORATORY (OUTPATIENT)
Dept: NURSING HOME | Facility: OTHER | Age: 84
End: 2019-08-29

## 2019-08-29 LAB
ANION GAP SERPL CALCULATED.3IONS-SCNC: 9 MMOL/L (ref 3–14)
BUN SERPL-MCNC: 15 MG/DL (ref 7–30)
CALCIUM SERPL-MCNC: 9.4 MG/DL (ref 8.5–10.1)
CHLORIDE SERPL-SCNC: 96 MMOL/L (ref 94–109)
CO2 SERPL-SCNC: 28 MMOL/L (ref 20–32)
CREAT SERPL-MCNC: 0.64 MG/DL (ref 0.52–1.04)
GFR SERPL CREATININE-BSD FRML MDRD: 83 ML/MIN/{1.73_M2}
GLUCOSE SERPL-MCNC: 82 MG/DL (ref 70–99)
HGB BLD-MCNC: 11 G/DL (ref 11.7–15.7)
POTASSIUM SERPL-SCNC: 3.9 MMOL/L (ref 3.4–5.3)
SODIUM SERPL-SCNC: 133 MMOL/L (ref 133–144)

## 2019-09-04 VITALS
DIASTOLIC BLOOD PRESSURE: 69 MMHG | OXYGEN SATURATION: 97 % | RESPIRATION RATE: 16 BRPM | HEART RATE: 69 BPM | TEMPERATURE: 98.2 F | SYSTOLIC BLOOD PRESSURE: 153 MMHG

## 2019-09-04 RX ORDER — METHYLPREDNISOLONE 4 MG
500 TABLET, DOSE PACK ORAL 2 TIMES DAILY
COMMUNITY
End: 2019-11-08

## 2019-09-04 NOTE — PROGRESS NOTES
[facility]  Chief Complaint   Patient presents with     long term Regulatory   [place] Medical Record Number:  [id number]    HPI:    Yuni Otoole is a 82 year old  (1935), who is being seen today for a federally mandated E/M visit at [facility]  .  HPI information obtained from: facility staff, patient report and [facility] chart review.     Today's concerns are:  -  Resident seen and examined  today.   - DNP reports chronic DJD/cervicalgia and bilateral knee and left hip joints pain. Hx of spinal fusion, referred made to pain clinical.   - Pt reports feeling fine now, has ongoing pain in multiple joints, achig, off and on, some how better with meds.   -Reports appetite/sleep/bowel movements are fine  - Denies CP, SOB, wheezing, peripheral edema.  ------------------------------------------  - - Past Medical, social, family histories, medications, and allergies reviewed and updated  - Medications reviewed: in the chart and EHR.   - Case Management:   I have reviewed the care plan and MDS and do agree with the plan. Patient's desire to return to the community is not present.  Information reviewed:  Medications, vital signs, orders, and nursing notes.    :MEDICATIONS:  Current Outpatient Medications   Medication Sig Dispense Refill     acetaminophen (TYLENOL) 500 MG tablet Take 1,000 mg by mouth 3 times daily And give 1000 mg PO PRN       amLODIPine (NORVASC) 5 MG tablet Take 0.5 tablets (2.5 mg) by mouth daily 30 tablet 3     Cholecalciferol (VITAMIN D-3 PO) Take 4,000 Units by mouth daily       DHA-EPA-Vitamin E (OMEGA-3 COMPLEX PO) Take 1 tablet by mouth 2 times daily       diclofenac (VOLTAREN) 1 % topical gel APPLY 4 GRAMS TOPICALLY TWO TIMES DAILY 400 g 1     diphenhydrAMINE (BENADRYL) 25 MG tablet Take 1 tablet (25 mg) by mouth daily 30 tablet 3     glucosamine 500 MG CAPS capsule Take 1 capsule (500 mg) by mouth 2 times daily 60 capsule 3     Glucosamine  Sulfate 500 MG TABS Take 500 mg by mouth 2 times daily       ibuprofen (ADVIL/MOTRIN) 200 MG tablet Take 400 mg by mouth 3 times daily       Menthol, Topical Analgesic, (BIOFREEZE) 4 % GEL Externally apply topically 2 times daily as needed 1 Tube 3     metoprolol tartrate (LOPRESSOR) 25 MG tablet Take 0.5 tablets (12.5 mg) by mouth 2 times daily 60 tablet 3     multivitamin, therapeutic (THERA-VIT) TABS tablet Take 1 tablet by mouth daily       polyethylene glycol-propylene glycol (SYSTANE ULTRA) 0.4-0.3 % SOLN ophthalmic solution Place 1 drop into both eyes 4 times daily 1 Bottle 3     pravastatin (PRAVACHOL) 10 MG tablet TAKE 1 TABLET BY MOUTH  DAILY 90 tablet 3     rOPINIRole (REQUIP) 2 MG tablet Take 2 tablets (4 mg) by mouth At Bedtime 100 tablet 3     senna-docusate (SENOKOT-S/PERICOLACE) 8.6-50 MG tablet 1 tablet by Oral or Feeding Tube route daily 100 tablet 3     traMADol (ULTRAM) 50 MG tablet TAKE 1 TAB BY MOUTH AT BEDTIME 30 tablet 5     traZODone (DESYREL) 50 MG tablet 25 mg po q HS and 25 mg po q HS PRN 45 tablet 3     UNABLE TO FIND Take 1 tablet by mouth 2 times daily MEDICATION NAME: Vitamin Focus Select       chlorthalidone (HYGROTON) 25 MG tablet TAKE 1 TABLET BY MOUTH  DAILY 90 tablet 3     ROS: 4 point ROS including Respiratory, CV, GI and , other than that noted in the HPI,  is negative    Exam:  Vitals: BP (!) 153/69   Pulse 69   Temp 98.2  F (36.8  C)   Resp 16   SpO2 97%   BMI= There is no height or weight on file to calculate BMI.  GENERAL APPEARANCE:  in no distress, cooperative  RESP:  lungs clear to auscultation , no wheezing  CV:  S1S2 audible , regular rate and rhythm, systolic over right upper sternal border murmur, rub, or gallop.   ABDOMEN:  normal bowel sounds, soft, nontender, no palpable masses  M/S:   Cracks sound with knee joints movements.   SKIN: soft mass over right lateral surface of deltoid, ill defined boundaries, no tenderness. Small ganglion like over distal  ventral surface of left forearm, hard in consistency, mobile, no erythema.  Patient is oriented  NEURO:   generalized muscles atrophy, hand  4/5 b/l.  Dorsiflexion 5/5 bilateral.   PSYCH:  affect and mood normal    Lab/Diagnostic data: Reviewed in the chart and EHR.      ASSESSMENT/PLAN  # Hx of C1-C3 Lami; C2-C3 Posterior Fusion; C2/3, C3/4, C6/7 Anterior Decompression and Fusion; C2/3, C4/5, and C6/7 Facet Osteotomies; C2-T3 Instrumentation   B/L Knee Joint OA:  - chronic, at times analgesia suboptimal, referred to pain clinic, follow on the recommendations.     Benign essential hypertension:  .controlled. Continue meds. In this age group keep SBP > 130 mmHg.      Osteoporosis /Vitamin D deficiency:   On supplement.     Frailty:  - continues to require assistance from nursing. Up for meals only o/w spends the day resting in bed     Orders:  - See above, otherwise, continue the rest of the current POC.     Electronically signed by:  Ceasar Aburto MD

## 2019-09-05 ENCOUNTER — NURSING HOME VISIT (OUTPATIENT)
Dept: GERIATRICS | Facility: CLINIC | Age: 84
End: 2019-09-05
Payer: COMMERCIAL

## 2019-09-05 DIAGNOSIS — Z98.1 S/P CERVICAL SPINAL FUSION: ICD-10-CM

## 2019-09-05 DIAGNOSIS — I10 ESSENTIAL HYPERTENSION: ICD-10-CM

## 2019-09-05 DIAGNOSIS — M43.23 FUSION OF SPINE, CERVICOTHORACIC REGION: Primary | ICD-10-CM

## 2019-09-05 DIAGNOSIS — E55.9 VITAMIN D DEFICIENCY: ICD-10-CM

## 2019-09-05 DIAGNOSIS — M81.0 OSTEOPOROSIS, UNSPECIFIED OSTEOPOROSIS TYPE, UNSPECIFIED PATHOLOGICAL FRACTURE PRESENCE: ICD-10-CM

## 2019-09-05 DIAGNOSIS — R54 FRAIL ELDERLY: ICD-10-CM

## 2019-09-05 DIAGNOSIS — M15.9 GENERALIZED OSTEOARTHRITIS: ICD-10-CM

## 2019-09-05 PROCEDURE — 99309 SBSQ NF CARE MODERATE MDM 30: CPT | Performed by: FAMILY MEDICINE

## 2019-09-05 NOTE — LETTER
9/5/2019        RE: Yuni Otoole  [address]  [facility] [address]        [facility]  Chief Complaint   Patient presents with     custodial Regulatory   [place] Medical Record Number:  [id number]    HPI:    Yuni Otoole is a 82 year old  (1935), who is being seen today for a federally mandated E/M visit at [facility]  .  HPI information obtained from: facility staff, patient report and [facility] chart review.     Today's concerns are:  -  Resident seen and examined  today.   - DNP reports chronic DJD/cervicalgia and bilateral knee and left hip joints pain. Hx of spinal fusion, referred made to pain clinical.   - Pt reports feeling fine now, has ongoing pain in multiple joints, achig, off and on, some how better with meds.   -Reports appetite/sleep/bowel movements are fine  - Denies CP, SOB, wheezing, peripheral edema.  ------------------------------------------  - - Past Medical, social, family histories, medications, and allergies reviewed and updated  - Medications reviewed: in the chart and EHR.   - Case Management:   I have reviewed the care plan and MDS and do agree with the plan. Patient's desire to return to the community is not present.  Information reviewed:  Medications, vital signs, orders, and nursing notes.    :MEDICATIONS:  Current Outpatient Medications   Medication Sig Dispense Refill     acetaminophen (TYLENOL) 500 MG tablet Take 1,000 mg by mouth 3 times daily And give 1000 mg PO PRN       amLODIPine (NORVASC) 5 MG tablet Take 0.5 tablets (2.5 mg) by mouth daily 30 tablet 3     Cholecalciferol (VITAMIN D-3 PO) Take 4,000 Units by mouth daily       DHA-EPA-Vitamin E (OMEGA-3 COMPLEX PO) Take 1 tablet by mouth 2 times daily       diclofenac (VOLTAREN) 1 % topical gel APPLY 4 GRAMS TOPICALLY TWO TIMES DAILY 400 g 1     diphenhydrAMINE (BENADRYL) 25 MG tablet Take 1 tablet (25 mg) by mouth daily 30 tablet 3     glucosamine 500 MG CAPS  capsule Take 1 capsule (500 mg) by mouth 2 times daily 60 capsule 3     Glucosamine Sulfate 500 MG TABS Take 500 mg by mouth 2 times daily       ibuprofen (ADVIL/MOTRIN) 200 MG tablet Take 400 mg by mouth 3 times daily       Menthol, Topical Analgesic, (BIOFREEZE) 4 % GEL Externally apply topically 2 times daily as needed 1 Tube 3     metoprolol tartrate (LOPRESSOR) 25 MG tablet Take 0.5 tablets (12.5 mg) by mouth 2 times daily 60 tablet 3     multivitamin, therapeutic (THERA-VIT) TABS tablet Take 1 tablet by mouth daily       polyethylene glycol-propylene glycol (SYSTANE ULTRA) 0.4-0.3 % SOLN ophthalmic solution Place 1 drop into both eyes 4 times daily 1 Bottle 3     pravastatin (PRAVACHOL) 10 MG tablet TAKE 1 TABLET BY MOUTH  DAILY 90 tablet 3     rOPINIRole (REQUIP) 2 MG tablet Take 2 tablets (4 mg) by mouth At Bedtime 100 tablet 3     senna-docusate (SENOKOT-S/PERICOLACE) 8.6-50 MG tablet 1 tablet by Oral or Feeding Tube route daily 100 tablet 3     traMADol (ULTRAM) 50 MG tablet TAKE 1 TAB BY MOUTH AT BEDTIME 30 tablet 5     traZODone (DESYREL) 50 MG tablet 25 mg po q HS and 25 mg po q HS PRN 45 tablet 3     UNABLE TO FIND Take 1 tablet by mouth 2 times daily MEDICATION NAME: Vitamin Focus Select       chlorthalidone (HYGROTON) 25 MG tablet TAKE 1 TABLET BY MOUTH  DAILY 90 tablet 3     ROS: 4 point ROS including Respiratory, CV, GI and , other than that noted in the HPI,  is negative    Exam:  Vitals: BP (!) 153/69   Pulse 69   Temp 98.2  F (36.8  C)   Resp 16   SpO2 97%   BMI= There is no height or weight on file to calculate BMI.  GENERAL APPEARANCE:  in no distress, cooperative  RESP:  lungs clear to auscultation , no wheezing  CV:  S1S2 audible , regular rate and rhythm, systolic over right upper sternal border murmur, rub, or gallop.   ABDOMEN:  normal bowel sounds, soft, nontender, no palpable masses  M/S:   Cracks sound with knee joints movements.   SKIN: soft mass over right lateral surface of  deltoid, ill defined boundaries, no tenderness. Small ganglion like over distal ventral surface of left forearm, hard in consistency, mobile, no erythema.  Patient is oriented  NEURO:   generalized muscles atrophy, hand  4/5 b/l.  Dorsiflexion 5/5 bilateral.   PSYCH:  affect and mood normal    Lab/Diagnostic data: Reviewed in the chart and EHR.      ASSESSMENT/PLAN  # Hx of C1-C3 Lami; C2-C3 Posterior Fusion; C2/3, C3/4, C6/7 Anterior Decompression and Fusion; C2/3, C4/5, and C6/7 Facet Osteotomies; C2-T3 Instrumentation   B/L Knee Joint OA:  - chronic, at times analgesia suboptimal, referred to pain clinic, follow on the recommendations.     Benign essential hypertension:  .controlled. Continue meds. In this age group keep SBP > 130 mmHg.      Osteoporosis /Vitamin D deficiency:   On supplement.     Frailty:  - continues to require assistance from nursing. Up for meals only o/w spends the day resting in bed     Orders:  - See above, otherwise, continue the rest of the current POC.     Electronically signed by:  Ceasar Aburto MD      Sincerely,        Ceasar Aburto MD

## 2019-09-12 DIAGNOSIS — I10 ESSENTIAL HYPERTENSION: ICD-10-CM

## 2019-09-12 RX ORDER — CHLORTHALIDONE 25 MG/1
TABLET ORAL
Qty: 90 TABLET | Refills: 3 | Status: SHIPPED | OUTPATIENT
Start: 2019-09-12 | End: 2020-04-06

## 2019-09-27 ENCOUNTER — HEALTH MAINTENANCE LETTER (OUTPATIENT)
Age: 84
End: 2019-09-27

## 2019-09-30 DIAGNOSIS — S12.601S CLOSED NONDISPLACED FRACTURE OF SEVENTH CERVICAL VERTEBRA, UNSPECIFIED FRACTURE MORPHOLOGY, SEQUELA: ICD-10-CM

## 2019-09-30 RX ORDER — TRAMADOL HYDROCHLORIDE 50 MG/1
TABLET ORAL
Qty: 30 TABLET | Refills: 1 | Status: SHIPPED | OUTPATIENT
Start: 2019-09-30 | End: 2019-11-27

## 2019-10-03 ENCOUNTER — OFFICE VISIT (OUTPATIENT)
Dept: AUDIOLOGY | Facility: CLINIC | Age: 84
End: 2019-10-03
Payer: COMMERCIAL

## 2019-10-03 DIAGNOSIS — H90.3 SENSORINEURAL HEARING LOSS, BILATERAL: Primary | ICD-10-CM

## 2019-10-03 PROCEDURE — V5275 EAR IMPRESSION: HCPCS | Mod: LT | Performed by: AUDIOLOGIST

## 2019-10-03 PROCEDURE — 99207 ZZC NO CHARGE LOS: CPT | Performed by: AUDIOLOGIST

## 2019-10-03 PROCEDURE — 92557 COMPREHENSIVE HEARING TEST: CPT | Performed by: AUDIOLOGIST

## 2019-10-03 PROCEDURE — 92550 TYMPANOMETRY & REFLEX THRESH: CPT | Performed by: AUDIOLOGIST

## 2019-10-04 NOTE — PROGRESS NOTES
AUDIOLOGY REPORT    SUBJECTIVE:  Yuni Otoole is a 84 year old female who was seen in the Audiology Clinic at the [facility] for audiologic evaluation, self-referred.  The patient has been seen previously in this clinic on June 2018 for assessment and results indicated moderate to severe sensorineural hearing loss bilaterally.  Patient uses binaural e[employer]ukT[employer] Wi Series i110 STORMY hearing aids.  She was recently seen for programming to NAL-NL1 prescriptive targets based on June 11, 2018 audio and reports continued difficulty hearing conversation even after programming changes.  Therefore suspect a decrease in hearing and recommended evaluation.  They were accompanied today by their .    OBJECTIVE:  Otoscopic exam indicates ears are clear of cerumen bilaterally     Pure Tone Thresholds assessed using conventional audiometry with good  reliability from 250-8000 Hz bilaterally using insert earphones     RIGHT:  moderate sloping to severe sensorineural hearing loss    LEFT:    moderate sloping to severe sensorineural hearing loss    Tympanogram:    RIGHT: low normal to reduced tympanic membrane mobility    LEFT:   normal eardrum mobility    Reflexes (reported by stimulus ear):  RIGHT: Ipsilateral is present at normal levels  RIGHT: Contralateral is absent at frequencies tested  LEFT:   Ipsilateral is absent at frequencies tested  LEFT:   Contralateral is absent at frequencies tested      Speech Reception Threshold:    RIGHT: 65 dB HL    LEFT:   65 dB HL    Word Recognition Score:     RIGHT: 44% at 95 dB HL using NU-6 recorded word list.    LEFT:   48% at 95 dB HL using NU-6 recorded word list.    BINAURAL: 68% at 90 dB HL using NU-6 recorded word list.    Binaural earmold impressions were taken without incident after otoscopic examination showed clear canals and visible landmarks bilaterally.  More powerful receivers will be requested to accommodate mild decrease in hearing since  2018.      ASSESSMENT:     ICD-10-CM    1. Sensorineural hearing loss, bilateral H90.3 COMPREHENSIVE HEARING TEST     TYMPANOMETRY AND REFLEX THRESHOLD MEASUREMENTS     EAR IMPRESSION, EACH       Compared to patient's previous audiogram dated June 11, 2018, hearing has decreased minimally for tones, but speech recognition is worse (right was 76% left was 68% and binaural was 76%). Today s results were discussed with the patient.     PLAN:    It is recommended that the patient schedule follow up in two weeks to fit replacement receivers/earmolds and reprogram hearing aids.  Please call this clinic with questions regarding these results or recommendations.        Riri Sharp.  [place] Licensed Audiologist #[id number]

## 2019-10-10 ENCOUNTER — HOSPITAL LABORATORY (OUTPATIENT)
Dept: NURSING HOME | Facility: OTHER | Age: 84
End: 2019-10-10

## 2019-10-10 ENCOUNTER — DOCUMENTATION ONLY (OUTPATIENT)
Dept: GERIATRICS | Facility: CLINIC | Age: 84
End: 2019-10-10

## 2019-10-10 LAB
ANION GAP SERPL CALCULATED.3IONS-SCNC: 6 MMOL/L (ref 3–14)
BASOPHILS # BLD AUTO: 0.1 10E9/L (ref 0–0.2)
BASOPHILS NFR BLD AUTO: 0.8 %
BUN SERPL-MCNC: 13 MG/DL (ref 7–30)
CALCIUM SERPL-MCNC: 9.2 MG/DL (ref 8.5–10.1)
CHLORIDE SERPL-SCNC: 97 MMOL/L (ref 94–109)
CO2 SERPL-SCNC: 30 MMOL/L (ref 20–32)
CREAT SERPL-MCNC: 0.6 MG/DL (ref 0.52–1.04)
DIFFERENTIAL METHOD BLD: ABNORMAL
EOSINOPHIL NFR BLD AUTO: 4.7 %
ERYTHROCYTE [DISTWIDTH] IN BLOOD BY AUTOMATED COUNT: 13.2 % (ref 10–15)
GFR SERPL CREATININE-BSD FRML MDRD: 85 ML/MIN/{1.73_M2}
GLUCOSE SERPL-MCNC: 87 MG/DL (ref 70–99)
HCT VFR BLD AUTO: 33.1 % (ref 35–47)
HGB BLD-MCNC: 10.8 G/DL (ref 11.7–15.7)
IMM GRANULOCYTES # BLD: 0 10E9/L (ref 0–0.4)
IMM GRANULOCYTES NFR BLD: 0.5 %
LYMPHOCYTES # BLD AUTO: 1.4 10E9/L (ref 0.8–5.3)
LYMPHOCYTES NFR BLD AUTO: 22.6 %
MCH RBC QN AUTO: 30.8 PG (ref 26.5–33)
MCHC RBC AUTO-ENTMCNC: 32.6 G/DL (ref 31.5–36.5)
MCV RBC AUTO: 94 FL (ref 78–100)
MONOCYTES # BLD AUTO: 0.7 10E9/L (ref 0–1.3)
MONOCYTES NFR BLD AUTO: 11.1 %
NEUTROPHILS # BLD AUTO: 3.6 10E9/L (ref 1.6–8.3)
NEUTROPHILS NFR BLD AUTO: 60.3 %
NRBC # BLD AUTO: 0 10*3/UL
NRBC BLD AUTO-RTO: 0 /100
PLATELET # BLD AUTO: 330 10E9/L (ref 150–450)
POTASSIUM SERPL-SCNC: 3.5 MMOL/L (ref 3.4–5.3)
RBC # BLD AUTO: 3.51 10E12/L (ref 3.8–5.2)
SODIUM SERPL-SCNC: 133 MMOL/L (ref 133–144)
WBC # BLD AUTO: 6 10E9/L (ref 4–11)

## 2019-10-10 NOTE — PROGRESS NOTES
"Pt had an unresponsive spell which happened while getting her hair done.   See notes below - labs done - normal  Today I visited with pt and  -0 they both note she is back to baseline   Discuss differentials - seizure - vasal vagal - \"fainting\"  She believes the later - will cont to montior - no new orders.     [facility] charting:  10/09/2019 10:51 She was in the [employer] under the dryer and beautician Tiffanie had alerted me that she was not responding at 1037. Temp was 99 behind her ear, 02 sats were 90%, unable to get BP while sitting in the beauty shop. She wouldn't open her eyes, arms were flaccid, legs were flaccid, no response to light sternal rub. She was assisted to her room in her wheelchair with foot pedals. Paty split leg sling applied under her and as staff was getting her hooked up to lift her, she opened her eyes, stated she was tired, but was able to move her arms and legs, time was 1043. Saba LLOYD LPN came to the room and took over the rest of the assessment.     11:00 Res. enc. to rest in bed until lunch- vitals done and WNL- res. alert and answering appropriately. Res. denied not feeling well other than being a little more \"tired\" today.     11:30 NP updated on unresponsive episode this AM and orders rec'd for AM labs tomorrow. Spouse updated.     15:34 Res. up for lunch and no further unresponsive episodes this shift- appetites good and spouse here. Transferred and ambulated per usual without difficulties, offers no complaints.  "

## 2019-10-21 ENCOUNTER — TELEPHONE (OUTPATIENT)
Dept: AUDIOLOGY | Facility: CLINIC | Age: 84
End: 2019-10-21

## 2019-10-21 NOTE — TELEPHONE ENCOUNTER
Left a message with Mr. Otoole, the earmolds we need for her hearing aids are in shipping, I likely will not have them in time for the scheduled appointment 10/22.  Asked them to reschedule to 10/24 or 10/25. Left the number for scheduling and my number if they have questions.    Riri Sharp.  [place] Licensed Audiologist #[id number]

## 2019-10-25 ENCOUNTER — OFFICE VISIT (OUTPATIENT)
Dept: AUDIOLOGY | Facility: CLINIC | Age: 84
End: 2019-10-25
Payer: COMMERCIAL

## 2019-10-25 DIAGNOSIS — H90.3 SENSORINEURAL HEARING LOSS, BILATERAL: Primary | ICD-10-CM

## 2019-10-25 PROCEDURE — 99207 ZZC NO CHARGE LOS: CPT | Performed by: AUDIOLOGIST

## 2019-10-25 PROCEDURE — V5014 HEARING AID REPAIR/MODIFYING: HCPCS | Mod: LT | Performed by: AUDIOLOGIST

## 2019-10-25 PROCEDURE — V5264 EAR MOLD/INSERT: HCPCS | Mod: LT | Performed by: AUDIOLOGIST

## 2019-10-25 PROCEDURE — 92593 HC HEARING AID CHECK, BINAURAL: CPT | Performed by: AUDIOLOGIST

## 2019-10-25 NOTE — PROGRESS NOTES
Hearing Aid Check     SUBJECTIVE:  Yuni Otoole is a 84 year old year old female, seen today at [facility] to fit replacement custom receivers and to reprogram hearing aids with a more powerful  to current hearing levels obtained October 4, 2019.  Results showed moderately severe sensorineural hearing loss bilaterally. She is currently in binaural Alee Wi Series 110 STORMY devices she was in a 40 gain  and a 60 gain was ordered.        OBJECTIVE:  Otoscopic exam indicates ears are clear of cerumen bilaterally.  Hearing aids were reprogrammed in software to update the gain of the receivers from 40 to 60, then real ear measures were obtained and programming adjustments were made to match NAL-NL1 prescriptive targets there was an excellent match to target with a 65 dB input from 500 Hz to 4000 Hz right and left devices.     PLAN:   Recommended return to clinic if she is having any problems with fit or sound quality with the new earmolds and hearing aid programming, otherwise recommended follow up as needed or in the spring.    Riri Sharp.  [place] Licensed Audiologist #[id number]    Charges:    .005  Out of Warranty  replacement  Qty 2 $200           Earmolds     Qty 2  $160  [id number]         Binaural Hearing Aid Check  Qty 1   $81

## 2019-11-05 VITALS
HEART RATE: 75 BPM | TEMPERATURE: 98 F | RESPIRATION RATE: 16 BRPM | WEIGHT: 144 LBS | BODY MASS INDEX: 29.08 KG/M2 | SYSTOLIC BLOOD PRESSURE: 127 MMHG | DIASTOLIC BLOOD PRESSURE: 67 MMHG | OXYGEN SATURATION: 94 %

## 2019-11-05 NOTE — PROGRESS NOTES
[facility]  Chief Complaint   Patient presents with     Annual Comprehensive Nursing Home     [place] Medical Record Number:  [id number]  Place of Service where encounter took place:  [facility] ([facility]) [[id number]]    HPI:    Yuni Otoole  is a 84 year old  (1935), who is being seen today for an annual comprehensive visit. HPI information obtained from: facility chart records, facility staff, patient report, [facility] chart review and family/first contact  report.     Yuni is a resident of the LT wing of [facility] since 2017 s/p cervical spinal fusion.   Medical issues have been:   Pain control for generalized and local pain in marv hips, knees, shoulders and neck. In the past treated with localized cortisone injections, frequent adjustment of pain medication per pt's request of Tylenol and NSAIDs and HS ultram. Now planning to see a pain clinic 11/14.     HTN/Edema:  7/3 decrease in Norvasc from 5 to 2.5 hs  and start chlorthalidone 25 daily due to edema and increase in requip for RLS    Today she has no new complaints but is excited to see what options are available at the pain clinic.  She does want to increase Tylenol at this time from TID to QID.      ALLERGIES: Atorvastatin calcium  PAST MEDICAL HISTORY:  has a past medical history of Allergic rhinitis, cause unspecified, Head injury, Pure hypercholesterolemia, and Unspecified essential hypertension. She also has no past medical history of Diabetes (H).  PAST SURGICAL HISTORY:  has a past surgical history that includes REMOVAL OF TONSILS,14+ Y/O; colonoscopy (05/30/07); carotid endarterectomy (11/07/08); INJ EPIDURAL LUMBAR/SACRAL W/WO CONTRAST (2009); DRAIN/INJECT LARGE JOINT/BURSA (2009); DRAIN/INJECT LARGE JOINT/BURSA (2010); INJ EPIDURAL CERVICAL/THORACIC W/WO CONTRAST (2010); INJ TRANSFORAMIN EPIDURAL, LUMB/SACR SINGLE (2010); Optical Tracking System Fusion Posterior Cervical Three + Levels (N/A,  11/15/2017); Laminectomy cerivcal posterior three+ levels (N/A, 11/15/2017); Optical Tracking System Fusion Cervical Anterior Three + Levels (N/A, 11/15/2017); Fusion cervical posterior three+ levels (N/A, 11/15/2017); and Eye surgery.  IMMUNIZATIONS:  Immunization History   Administered Date(s) Administered     Influenza (High Dose) 3 valent vaccine 09/28/2011, 10/15/2012, 10/22/2013, 10/20/2015, 10/11/2017, 10/15/2018     Pneumo Conj 13-V (2010&after) 12/02/2017     Pneumococcal 23 valent 10/24/2011     TD (ADULT, 7+) 02/25/2009     TDAP Vaccine (Boostrix) 12/02/2017     Td (Adult), Adsorbed 02/25/2009     Zoster vaccine, live 02/04/2013     Above immunizations pulled from [facility]. [facility] and facility records also reconciled. Outstanding information sent to  to update [facility] .  Future immunizations are not needed at this point as all recommended immunizations are up to date.     Current Outpatient Medications   Medication Sig Dispense Refill     acetaminophen (TYLENOL) 500 MG tablet Take 1,000 mg by mouth 3 times daily And give 1000 mg PO PRN       amLODIPine (NORVASC) 5 MG tablet Take 0.5 tablets (2.5 mg) by mouth daily 30 tablet 3     chlorthalidone (HYGROTON) 25 MG tablet TAKE 1 TABLET BY MOUTH  DAILY 90 tablet 3     Cholecalciferol (VITAMIN D-3 PO) Take 4,000 Units by mouth daily       DHA-EPA-Vitamin E (OMEGA-3 COMPLEX PO) Take 1 tablet by mouth 2 times daily       diclofenac (VOLTAREN) 1 % topical gel APPLY 4 GRAMS TOPICALLY TWO TIMES DAILY 400 g 1     diphenhydrAMINE (BENADRYL) 25 MG tablet Take 1 tablet (25 mg) by mouth daily 30 tablet 3     glucosamine 500 MG CAPS capsule Take 1 capsule (500 mg) by mouth 2 times daily 60 capsule 3     ibuprofen (ADVIL/MOTRIN) 200 MG tablet Take 400 mg by mouth 3 times daily       Menthol, Topical Analgesic, (BIOFREEZE) 4 % GEL Externally apply topically 2 times daily as needed 1 Tube 3     metoprolol tartrate (LOPRESSOR) 25 MG tablet Take 0.5  tablets (12.5 mg) by mouth 2 times daily 60 tablet 3     multivitamin, therapeutic (THERA-VIT) TABS tablet Take 1 tablet by mouth daily       polyethylene glycol-propylene glycol (SYSTANE ULTRA) 0.4-0.3 % SOLN ophthalmic solution Place 1 drop into both eyes 4 times daily 1 Bottle 3     pravastatin (PRAVACHOL) 10 MG tablet TAKE 1 TABLET BY MOUTH  DAILY 90 tablet 3     rOPINIRole (REQUIP) 2 MG tablet Take 2 tablets (4 mg) by mouth At Bedtime 100 tablet 3     senna-docusate (SENOKOT-S/PERICOLACE) 8.6-50 MG tablet 1 tablet by Oral or Feeding Tube route daily 100 tablet 3     traMADol (ULTRAM) 50 MG tablet 1 TABLET BY MOUTH AT BEDTIME 30 tablet 1     traZODone (DESYREL) 50 MG tablet 25 mg po q HS and 25 mg po q HS PRN 45 tablet 3     UNABLE TO FIND Take 1 tablet by mouth 2 times daily MEDICATION NAME: Vitamin Focus Select       Case Management:  I have reviewed the facility/SNF care plan/MDS, including the falls risk, nutrition and pain screening. I also reviewed the current immunizations, and preventive care. .Future cancer screening is not clinically indicated secondary to age/goals of care Patient's desire to return to the community is present, but is not able due to care needs . Current Level of Care is appropriate.    Advance Directive Discussion:    I reviewed the current advanced directives as reflected in EPIC, the POLST and the facility chart, and verified the congruency of orders.     Team Discussion:  I communicated with the appropriate disciplines involved with the Plan of Care:   Nursing    Patient's goal is have less pain.  Information reviewed:  Medications, vital signs, orders, and nursing notes.    ROS:  4 point ROS including Respiratory, CV, GI and , other than that noted in the HPI,  is negative    Vitals:  /67   Pulse 75   Temp 98  F (36.7  C)   Resp 16   Wt 65.3 kg (144 lb)   SpO2 94%   BMI 29.08 kg/m   Body mass index is 29.08 kg/m .  Exam:   GENERAL APPEARANCE:  Alert, in no  distress  ENT:  Mouth and posterior oropharynx normal, moist mucous membranes, hearing acuity functional   EYES:  EOM, conjunctivae, lids, pupils and irises normal  NECK:  No adenopathy,masses or thyromegaly  RESP:  respiratory effort and palpation of chest normal, no respiratory distress, Lung sounds clear  CV:  Palpation and auscultation of heart done , rate and rhythm reg, no  murmur, no rub or gallop, Edema no  ABDOMEN:  normal bowel sounds, soft, nontender, no hepatosplenomegaly or other masses  M/S:   Gait and station kyphotic marv crepitus in marv knees, Digits and nails baseline arthritic.   SKIN:  Inspection/Palpation of skin and subcutaneous tissue intact  NEURO: 2-12 in normal limits and at patient's baseline  PSYCH:  insight and judgement, memory intact , affect and mood/ calm    Lab/Diagnostic data:   CBC:  Recent Labs   Lab Test 10/10/19  0651 08/29/19  0630  11/20/17  1112   WBC 6.0  --   --  8.0   RBC 3.51*  --   --  3.21*   HGB 10.8* 11.0*   < > 9.7*   HCT 33.1*  --   --  30.2*   MCV 94  --   --  94   MCH 30.8  --   --  30.2   MCHC 32.6  --   --  32.1   RDW 13.2  --   --  13.6     --   --  246    < > = values in this interval not displayed.     Basic Metabolic Panel:  Recent Labs   Lab Test 10/10/19  0651 08/29/19  0630    133   POTASSIUM 3.5 3.9   CHLORIDE 97 96   ANNETTA 9.2 9.4   CO2 30 28   BUN 13 15   CR 0.60 0.64   GLC 87 82     Liver Function Studies:   Recent Labs   Lab Test 06/19/18  0630 11/27/17  0817  11/10/17  1723 04/25/17  1206   PROTTOTAL  --   --   --  6.5* 6.9   ALBUMIN 3.4 2.5*   < > 3.5 3.9   BILITOTAL  --   --   --  0.4 0.2   ALKPHOS  --   --   --  68 63   AST  --   --   --  16 21   ALT  --   --   --  18 25    < > = values in this interval not displayed.     TSH   Date Value Ref Range Status   11/14/2017 2.91 0.40 - 4.00 mU/L Final   04/25/2017 2.40 0.40 - 4.00 mU/L Final     ASSESSMENT/PLAN  Generalized osteoarthritis and S/P cervical spinal fusion and Cervicalgia  Will  do minor adjustment to Tylenol per pt's request today. I am not confident this will help global problem.   Trying to stear clear of increasing controlled medications, but may have to increase ultram.   I'm not confident JULIETTE will help given the M/S nature of pain and not nerve.   Hopeful for pain clinic recommendations.     Essential hypertension  BP goals are ~130 -165/60 -90 mmHg.This is higher than ACC and AHA recommendations due to risk for hypotension, risk of dizziness and falls, risk of tissue/cerebral hypoperfusion and frailty. Patient is stable with current plan of care and routine assessment.    Frail elderly  cotn 24 hr care    Orders written by provider at facility  Increase tylenol to 1000 qid from TID per pt request.   Electronically signed by:  CRISTAL Olmos CNP

## 2019-11-07 ENCOUNTER — NURSING HOME VISIT (OUTPATIENT)
Dept: GERIATRICS | Facility: CLINIC | Age: 84
End: 2019-11-07
Payer: COMMERCIAL

## 2019-11-07 DIAGNOSIS — M15.9 GENERALIZED OSTEOARTHRITIS: Primary | ICD-10-CM

## 2019-11-07 DIAGNOSIS — I10 ESSENTIAL HYPERTENSION: ICD-10-CM

## 2019-11-07 DIAGNOSIS — R54 FRAIL ELDERLY: ICD-10-CM

## 2019-11-07 DIAGNOSIS — Z98.1 S/P CERVICAL SPINAL FUSION: ICD-10-CM

## 2019-11-07 DIAGNOSIS — M54.2 CERVICALGIA: ICD-10-CM

## 2019-11-07 PROCEDURE — 99318 ZZC ANNUAL NURSING FAC ASSESSMNT, STABLE: CPT | Performed by: NURSE PRACTITIONER

## 2019-11-07 NOTE — LETTER
11/7/2019        RE: Yuni Otoole  [address]  [facility] [address]        [facility]  Chief Complaint   Patient presents with     Annual Comprehensive Nursing Home     [place] Medical Record Number:  [id number]  Place of Service where encounter took place:  [facility] ([facility]) [[id number]]    HPI:    Yuni Otoole  is a 84 year old  (1935), who is being seen today for an annual comprehensive visit. HPI information obtained from: facility chart records, facility staff, patient report, [facility] chart review and family/first contact  report.     Yuni is a resident of the LT wing of [facility] since 2017 s/p cervical spinal fusion.   Medical issues have been:   Pain control for generalized and local pain in marv hips, knees, shoulders and neck. In the past treated with localized cortisone injections, frequent adjustment of pain medication per pt's request of Tylenol and NSAIDs and HS ultram. Now planning to see a pain clinic 11/14.     HTN/Edema:  7/3 decrease in Norvasc from 5 to 2.5 hs  and start chlorthalidone 25 daily due to edema and increase in requip for RLS    Today she has no new complaints but is excited to see what options are available at the pain clinic.  She does want to increase Tylenol at this time from TID to QID.      ALLERGIES: Atorvastatin calcium  PAST MEDICAL HISTORY:  has a past medical history of Allergic rhinitis, cause unspecified, Head injury, Pure hypercholesterolemia, and Unspecified essential hypertension. She also has no past medical history of Diabetes (H).  PAST SURGICAL HISTORY:  has a past surgical history that includes REMOVAL OF TONSILS,14+ Y/O; colonoscopy (05/30/07); carotid endarterectomy (11/07/08); INJ EPIDURAL LUMBAR/SACRAL W/WO CONTRAST (2009); DRAIN/INJECT LARGE JOINT/BURSA (2009); DRAIN/INJECT LARGE JOINT/BURSA (2010); INJ EPIDURAL CERVICAL/THORACIC W/WO CONTRAST (2010); INJ TRANSFORAMIN EPIDURAL, LUMB/SACR SINGLE  (2010); Optical Tracking System Fusion Posterior Cervical Three + Levels (N/A, 11/15/2017); Laminectomy cerivcal posterior three+ levels (N/A, 11/15/2017); Optical Tracking System Fusion Cervical Anterior Three + Levels (N/A, 11/15/2017); Fusion cervical posterior three+ levels (N/A, 11/15/2017); and Eye surgery.  IMMUNIZATIONS:  Immunization History   Administered Date(s) Administered     Influenza (High Dose) 3 valent vaccine 09/28/2011, 10/15/2012, 10/22/2013, 10/20/2015, 10/11/2017, 10/15/2018     Pneumo Conj 13-V (2010&after) 12/02/2017     Pneumococcal 23 valent 10/24/2011     TD (ADULT, 7+) 02/25/2009     TDAP Vaccine (Boostrix) 12/02/2017     Td (Adult), Adsorbed 02/25/2009     Zoster vaccine, live 02/04/2013     Above immunizations pulled from [facility]. [facility] and facility records also reconciled. Outstanding information sent to  to update [facility] .  Future immunizations are not needed at this point as all recommended immunizations are up to date.     Current Outpatient Medications   Medication Sig Dispense Refill     acetaminophen (TYLENOL) 500 MG tablet Take 1,000 mg by mouth 3 times daily And give 1000 mg PO PRN       amLODIPine (NORVASC) 5 MG tablet Take 0.5 tablets (2.5 mg) by mouth daily 30 tablet 3     chlorthalidone (HYGROTON) 25 MG tablet TAKE 1 TABLET BY MOUTH  DAILY 90 tablet 3     Cholecalciferol (VITAMIN D-3 PO) Take 4,000 Units by mouth daily       DHA-EPA-Vitamin E (OMEGA-3 COMPLEX PO) Take 1 tablet by mouth 2 times daily       diclofenac (VOLTAREN) 1 % topical gel APPLY 4 GRAMS TOPICALLY TWO TIMES DAILY 400 g 1     diphenhydrAMINE (BENADRYL) 25 MG tablet Take 1 tablet (25 mg) by mouth daily 30 tablet 3     glucosamine 500 MG CAPS capsule Take 1 capsule (500 mg) by mouth 2 times daily 60 capsule 3     ibuprofen (ADVIL/MOTRIN) 200 MG tablet Take 400 mg by mouth 3 times daily       Menthol, Topical Analgesic, (BIOFREEZE) 4 % GEL Externally apply topically 2 times daily  as needed 1 Tube 3     metoprolol tartrate (LOPRESSOR) 25 MG tablet Take 0.5 tablets (12.5 mg) by mouth 2 times daily 60 tablet 3     multivitamin, therapeutic (THERA-VIT) TABS tablet Take 1 tablet by mouth daily       polyethylene glycol-propylene glycol (SYSTANE ULTRA) 0.4-0.3 % SOLN ophthalmic solution Place 1 drop into both eyes 4 times daily 1 Bottle 3     pravastatin (PRAVACHOL) 10 MG tablet TAKE 1 TABLET BY MOUTH  DAILY 90 tablet 3     rOPINIRole (REQUIP) 2 MG tablet Take 2 tablets (4 mg) by mouth At Bedtime 100 tablet 3     senna-docusate (SENOKOT-S/PERICOLACE) 8.6-50 MG tablet 1 tablet by Oral or Feeding Tube route daily 100 tablet 3     traMADol (ULTRAM) 50 MG tablet 1 TABLET BY MOUTH AT BEDTIME 30 tablet 1     traZODone (DESYREL) 50 MG tablet 25 mg po q HS and 25 mg po q HS PRN 45 tablet 3     UNABLE TO FIND Take 1 tablet by mouth 2 times daily MEDICATION NAME: Vitamin Focus Select       Case Management:  I have reviewed the facility/SNF care plan/MDS, including the falls risk, nutrition and pain screening. I also reviewed the current immunizations, and preventive care. .Future cancer screening is not clinically indicated secondary to age/goals of care Patient's desire to return to the community is present, but is not able due to care needs . Current Level of Care is appropriate.    Advance Directive Discussion:    I reviewed the current advanced directives as reflected in EPIC, the POLST and the facility chart, and verified the congruency of orders.     Team Discussion:  I communicated with the appropriate disciplines involved with the Plan of Care:   Nursing    Patient's goal is have less pain.  Information reviewed:  Medications, vital signs, orders, and nursing notes.    ROS:  4 point ROS including Respiratory, CV, GI and , other than that noted in the HPI,  is negative    Vitals:  /67   Pulse 75   Temp 98  F (36.7  C)   Resp 16   Wt 65.3 kg (144 lb)   SpO2 94%   BMI 29.08 kg/m    Body  mass index is 29.08 kg/m .  Exam:   GENERAL APPEARANCE:  Alert, in no distress  ENT:  Mouth and posterior oropharynx normal, moist mucous membranes, hearing acuity functional   EYES:  EOM, conjunctivae, lids, pupils and irises normal  NECK:  No adenopathy,masses or thyromegaly  RESP:  respiratory effort and palpation of chest normal, no respiratory distress, Lung sounds clear  CV:  Palpation and auscultation of heart done , rate and rhythm reg, no  murmur, no rub or gallop, Edema no  ABDOMEN:  normal bowel sounds, soft, nontender, no hepatosplenomegaly or other masses  M/S:   Gait and station kyphotic marv crepitus in marv knees, Digits and nails baseline arthritic.   SKIN:  Inspection/Palpation of skin and subcutaneous tissue intact  NEURO: 2-12 in normal limits and at patient's baseline  PSYCH:  insight and judgement, memory intact , affect and mood/ calm    Lab/Diagnostic data:   CBC:  Recent Labs   Lab Test 10/10/19  0651 08/29/19  0630  11/20/17  1112   WBC 6.0  --   --  8.0   RBC 3.51*  --   --  3.21*   HGB 10.8* 11.0*   < > 9.7*   HCT 33.1*  --   --  30.2*   MCV 94  --   --  94   MCH 30.8  --   --  30.2   MCHC 32.6  --   --  32.1   RDW 13.2  --   --  13.6     --   --  246    < > = values in this interval not displayed.     Basic Metabolic Panel:  Recent Labs   Lab Test 10/10/19  0651 08/29/19  0630    133   POTASSIUM 3.5 3.9   CHLORIDE 97 96   ANNETTA 9.2 9.4   CO2 30 28   BUN 13 15   CR 0.60 0.64   GLC 87 82     Liver Function Studies:   Recent Labs   Lab Test 06/19/18  0630 11/27/17  0817  11/10/17  1723 04/25/17  1206   PROTTOTAL  --   --   --  6.5* 6.9   ALBUMIN 3.4 2.5*   < > 3.5 3.9   BILITOTAL  --   --   --  0.4 0.2   ALKPHOS  --   --   --  68 63   AST  --   --   --  16 21   ALT  --   --   --  18 25    < > = values in this interval not displayed.     TSH   Date Value Ref Range Status   11/14/2017 2.91 0.40 - 4.00 mU/L Final   04/25/2017 2.40 0.40 - 4.00 mU/L Final      ASSESSMENT/PLAN  Generalized osteoarthritis and S/P cervical spinal fusion and Cervicalgia  Will do minor adjustment to Tylenol per pt's request today. I am not confident this will help global problem.   Trying to stear clear of increasing controlled medications, but may have to increase ultram.   I'm not confident JULIETTE will help given the M/S nature of pain and not nerve.   Hopeful for pain clinic recommendations.     Essential hypertension  BP goals are ~130 -165/60 -90 mmHg.This is higher than ACC and AHA recommendations due to risk for hypotension, risk of dizziness and falls, risk of tissue/cerebral hypoperfusion and frailty. Patient is stable with current plan of care and routine assessment.    Frail elderly  cotn 24 hr care    Orders written by provider at facility  Increase tylenol to 1000 qid from TID per pt request.   Electronically signed by:  CRISTAL Olmos CNP           Sincerely,        CRISTAL Olmos CNP

## 2019-11-27 DIAGNOSIS — S12.601S CLOSED NONDISPLACED FRACTURE OF SEVENTH CERVICAL VERTEBRA, UNSPECIFIED FRACTURE MORPHOLOGY, SEQUELA: ICD-10-CM

## 2019-11-29 RX ORDER — TRAMADOL HYDROCHLORIDE 50 MG/1
TABLET ORAL
Qty: 30 TABLET | Refills: 1 | Status: SHIPPED | OUTPATIENT
Start: 2019-11-29 | End: 2019-12-26

## 2019-12-04 ENCOUNTER — TELEPHONE (OUTPATIENT)
Dept: FAMILY MEDICINE | Facility: OTHER | Age: 84
End: 2019-12-04

## 2019-12-04 NOTE — TELEPHONE ENCOUNTER
Reason for Call:  Same Day Appointment, Requested Provider:  Igor Yan DO    PCP: Shaista Mcrae    Reason for visit: establish care, hip pain    Duration of symptoms: ongoing    Have you been treated for this in the past? No    Additional comments: Spouse Ed stops into clinic, states Yuni is currently in the [facility] home, and was told [facility] could not see her until she has a primary provider and is referred. She is in quite a bit of hip pain, and is asking if Dr Yan can see her sometime soon, so this process can proceed.     Can we leave a detailed message on this number? YES    Phone number patient can be reached at: Home number on file [phone] (home), spouse Ed, leave detailed message, as he is at the [place] home most of the time.    Best Time: as soon as possible    Call taken on 12/4/2019 at 1:56 PM by Aditi Almanzar

## 2019-12-05 ENCOUNTER — TRANSCRIBE ORDERS (OUTPATIENT)
Dept: OTHER | Age: 84
End: 2019-12-05

## 2019-12-05 DIAGNOSIS — M16.0 BILATERAL HIP JOINT ARTHRITIS: ICD-10-CM

## 2019-12-05 DIAGNOSIS — M48.061 SPINAL STENOSIS OF LUMBAR REGION, UNSPECIFIED WHETHER NEUROGENIC CLAUDICATION PRESENT: Primary | ICD-10-CM

## 2019-12-05 DIAGNOSIS — S73.014S: ICD-10-CM

## 2019-12-06 ENCOUNTER — PATIENT OUTREACH (OUTPATIENT)
Dept: NEUROSURGERY | Facility: CLINIC | Age: 84
End: 2019-12-06

## 2019-12-06 DIAGNOSIS — M54.16 LUMBAR RADICULOPATHY, CHRONIC: Primary | ICD-10-CM

## 2019-12-06 NOTE — PROGRESS NOTES
Beatris, who is the pt's [facility] coordinator.  Asked if neurosurgeon could see pt on 12/12.   Currently, the clinic is overbooked.  Writer willing to send order for lumbar MRI which pt can have done close to home.  Neurosurgeon can review.  Message left.    [phone]

## 2019-12-06 NOTE — TELEPHONE ENCOUNTER
Patients  stopped in to clinic to confirm appointments and request an increase in pain medication for Yuni.    I had spoken with Shar CHAMBERLAIN with Dr López office at [facility], an MRI was ordered and scheduled for the patient, Shar is going to call the  to speak with him about a follow up appointment with Dr Jeri López.  Patient is scheduled for MRI on Wednesday, December 11 at 9:15am at [facility].  Patient is scheduled with Dr Britney Flores at [facility] for a consultation regarding hip pain and possible replacement.  This appointment is on: Thursday, December 12 at 11:30,  is arranging transportation with [facility].     also concerned about pain management, verbal orders given by Paulina Dewey to staff at [facility], to increase her Tramadol 50mg up to 3 times daily as needed for pain.     is given written appointments, phone numbers as well as notified that she will be receiving more pain medication if she needs it. He will wait for a call on Monday from Shar CHAMBERLAIN with Dr Jeri López.    Alesia Stearns XRO/

## 2019-12-09 ENCOUNTER — TELEPHONE (OUTPATIENT)
Dept: ORTHOPEDICS | Facility: CLINIC | Age: 84
End: 2019-12-09

## 2019-12-09 NOTE — TELEPHONE ENCOUNTER
Needs to rescheduled to see a hip Surgeon either Dr. Singleton or Dr. Myles.  Can also see  at [facility]

## 2019-12-10 ENCOUNTER — TELEPHONE (OUTPATIENT)
Dept: AUDIOLOGY | Facility: CLINIC | Age: 84
End: 2019-12-10

## 2019-12-10 PROBLEM — H90.3 SENSORINEURAL HEARING LOSS, BILATERAL: Status: ACTIVE | Noted: 2019-12-10

## 2019-12-10 NOTE — PROGRESS NOTES
Patient stopped at [facility] to  replacement wax filters, 6 packages $5 each, $30 total.  Patient aware of the charges will pay at arrival.    Bennett Sharp Licensed Audiologist #[id number]

## 2019-12-10 NOTE — TELEPHONE ENCOUNTER
Spoke with patient's , they are picking up replacement wax guards tomorrow. Advised they will owe $30.  Riri Sharp.  [place] Licensed Audiologist #[id number]

## 2019-12-10 NOTE — TELEPHONE ENCOUNTER
Reason for Call:  Other call back    Detailed comments: Pt needs wax guards. Please call her or her  to let them know.     Phone Number Patient can be reached at: Home number on file [phone] (home)    Best Time: NA    Can we leave a detailed message on this number? YES    Call taken on 12/10/2019 at 9:23 AM by Jacque Wagner

## 2019-12-11 ENCOUNTER — HOSPITAL ENCOUNTER (OUTPATIENT)
Dept: MRI IMAGING | Facility: CLINIC | Age: 84
Discharge: HOME | End: 2019-12-11
Attending: NEUROLOGICAL SURGERY | Admitting: NEUROLOGICAL SURGERY
Payer: MEDICARE

## 2019-12-11 ENCOUNTER — APPOINTMENT (OUTPATIENT)
Dept: AUDIOLOGY | Facility: CLINIC | Age: 84
End: 2019-12-11

## 2019-12-11 DIAGNOSIS — H90.3 SENSORINEURAL HEARING LOSS, BILATERAL: ICD-10-CM

## 2019-12-11 DIAGNOSIS — M54.16 LUMBAR RADICULOPATHY, CHRONIC: ICD-10-CM

## 2019-12-11 PROCEDURE — 72148 MRI LUMBAR SPINE W/O DYE: CPT

## 2019-12-12 ENCOUNTER — OFFICE VISIT (OUTPATIENT)
Dept: ORTHOPEDICS | Facility: CLINIC | Age: 84
End: 2019-12-12
Attending: ORTHOPAEDIC SURGERY
Payer: COMMERCIAL

## 2019-12-12 DIAGNOSIS — M15.9 GENERALIZED OSTEOARTHRITIS: Primary | ICD-10-CM

## 2019-12-12 NOTE — LETTER
12/12/2019       RE: Yuni Otoole  [address]  [facility] [address]     Dear Colleague,    Thank you for referring your patient, Yuni Otoole, to the [facility] at [facility] acute hospital. Please see a copy of my visit note below.    [facility]  Orthopaedic Surgery, Joint Replacement Consultation  by Cory Singleton M.D.    Yuni Otoole MRN# [id number]   Age: 84 year old YOB: 1935     Requesting physician: Igor Lares MD Ortho Surg [facility]       Background history:  DX:  1. Spinal stenosis due to spondylosis  2. 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  3. Aortic stenosis  4. Carotid artery stenosis.      TREATMENTS:  1. 11/15/2017, Cervical 1-3 Posterior Decompression With Laminectomies, O-Arm/ Stealth Assisted Cervical 2-Thoracic 3 Posterior Instrumented Fusion With Osteotomies Cervical 2-3, Cervical C4-5; Cervical 6-7 ; Right Approach Cervical 2-3, Cervical 4-5 and Cervical 6-7 Anterior Cervical Discectomy And Fusion, [place] Cervical Traction; Cervical 2-Thoracic 3 Posterior Fusion With Autograft And Allograft (GISSEL López) [facility]         Assessment and Plan:   Assessment:  Severe end stage degenerative arthritis of bilateral shoulders, hips, knees, with spinal stenosis due to severe spondylopathy.     Patient's goal of pain relief unlikely to be met or achieved without performing some joint replacements with most valuable probably being bilateral hip replacements. Discussed risks involved with bilateral hip replacements, including known orthopedic risks as well as other perioperative risks including death, stroke, or myocardial infarction. Offered to have patient meet with anesthetic team to further discuss the non-orthopaedic risks.    Use of analgesic medication unlikely to be fully satisfactory in terms of achieving adequate pain relief  while avoiding side  effects of somnolence, etc.     Plan:  -I did lengthy detailed discussion with the patient regarding the risk benefits and alternatives to surgery.  I do believe that bilateral hip arthroplasty would be the only means of achieving adequate pain relief however should continue to have pain from her knees and her shoulder severe arthritides.  I believe that the majority of her symptoms are related to her hips less likely related to her lumbar spine.    - Patient will go home to think about surgical options  -Pt will see local pain clinic to discuss medication for pain management   -advised to see [facility] cardiologist and [facility] anesthesia to discuss surgical risks in more detail as she desires.             History of Present Illness:   Yuni Otoole is a 84 year old female referred by Dr. Lawrence Alvarez, [facility], for hip evaluation. The patient reports that she has been having chronic pain in her hips, knees, and low back for years. The hip and knee pain are about equally severe, right > left. This has been going on for several years and she had been told previously that she would need THAs. She put these off as the pain was not keeping her from ambulating. Two years ago she began falling more and developed cervical myelopathy which required urgent decompression and stabilization with Dr. López at . Her pain has been progressively worsening with time, and she was seen recently on 12/2/2019 by an orthopedist (Dr. Alvarez) at which time she was noted to have a right hip dislocation with proximal migration that is apparently chronic over the past couple of years. She also has significant arthritis of both the right hip and left hip with femoral head collapse in her bilateral hips. She was referred here for surgical consult given the severity of her bilateral hip arthritis.     Upon evaluation the patient denies pain radiating down to her feet. She has not had any issues with bowel or bladder function. No skin rashes  or GI issues. The patient notes she has been living in a nursing home since 2017 since her surgery. Per family, the patient has not been able to walk independently for multiple years. About 3.5 years ago they say she could walk with a cane, but now mostly uses a wheelchair. She has been taking tylenol and Ultram for the pain. Patient also notes right shoulder pain. She does not think she has a significant history of steroid use.       MR Lumbar Spine without Contrast (12/11/2019):  Advanced degenerative scoliosis most advanced at L3-L4 and  L4-L5 where there are moderate to severe central canal stenoses. There  is a central disc osteophyte complex at L4-L5 contributing to the  stenosis. There are also multilevel moderate to severe foraminal  Stenoses.      Per visit with Dr. Alvarez, 12/2/2019 at [facility]:  Yuni is a 84 Y female with history of chronic hip and knee pain. This has been going on for several years and she had been told previously that she would need THAs. She put these off as the pain was not keeping her from ambulating. 2 years ago she began falling more and developed cervical myelopathy which required urgent decompression and stabilization with Dr. López at .     At present the pain is located in her bilateral posterior buttocks and travels down to the sides of her bilateral knees.   Right is more bothersome than the left.     She is mostly wheelchair bound and ambulates with 2 max assist 150 feet BID at her SNF.   She takes tylenol arthritis 4x per day but this does not significantly improve the pain.   Recently was seen by Dr. Soler in pain medicine/rehab and he made multiple recommendations for adjustments regarding her meds.   He began with obtaining XR of bilateral hips, knees and recommended ortho appointment to evaluate for bilateral hip and knee arthritis.   Right hip has becoming more prominent in right buttock over past 2 years.   She is comfortable at rest in wheelchair leaning to  left.     She states she has not been seeing a primary care physician on a regular basis since admission to her SNF 2 years ago. She states she had been making an ongoing recovery after her cervical spine surgery in 2017 but was unable to get back home and her PCP retired.    She presents with her  who assists with medical history and course of events.   Unable to ambulate in clinic today.     She has a right hip dislocation with proximal migration that is apparently chronic over the past couple of years. She has significant arthritis of both the right hip and left hip with femoral head collapse in her bilateral hips.  Her spine shows significant coronal translation on her AP pelvis and this is consistent with her history of bilateral buttock pain that radiates to her knees. However the severity of her bilateral hip arthritis confounds her knee exam as well as her history of cervical myelopathy. She likely has spinal stenosis but there is insufficient imaging to confirm this.     We had a long discussion with the patient and her  any regarding the possible treatment options. We discussed the nonoperative modalities including doing nothing, corticosteroid injections (which would be unlikely to help her bilateral hip arthritis due to the severity) and referral to [facility] for spine and total joint care. Apparently at her cervical myelopathy fusion the underwent a 14-hour surgery with significant morbidity. We discussed that the potential of surgery for her back or for her bilateral hips was not a guarantee, and that should a provider offered surgery or surgeries for these areas they would also likely come with potential for significant risk.           Physical Exam:     EXAMINATION pertinent findings:   VITAL SIGNS: not currently breastfeeding.  There is no height or weight on file to calculate BMI.  RESP: non labored breathing   NEURO: grossly normal   VASCULAR: satisfactory perfusion of  all extremities   MUSCULOSKELETAL: Patient unable to independently stand, requires two-person assist.   She has severe crepitance of the glenohumeral shoulder joints bilaterally.   Severe crepitance in both hips. 0-90 degree motion in both hips.   Knees go from 10-90 degrees bilaterally with stable ligaments and marked crepitance.  Further exam deferred, given wheelchair-bound status.             Data:   All laboratory data reviewed  All imaging studies reviewed by me                         Attending MD (Dr. Cory Singleton) Attestation :  This patient was seen and evaluated by me including a history, exam, and interpretation of all imaging and/or lab data.  Either a training physician (resident/fellow), who also saw the patient, or scribe has documented the clinic visit in the attached note.    Cory Singleton MD  [facility] Family Professor  Oncology and Adult Reconstructive Surgery  Dept Orthopaedic Surgery, [facility]  [phone] office, [phone] pager  [email or web address]      DATA for DOCUMENTATION:         Past Medical History:     Patient Active Problem List   Diagnosis     Anxiety state     Scoliosis     Osteopenia     HYPERLIPIDEMIA LDL GOAL <130     Advanced directives, counseling/discussion     Low back pain without sciatica     C7 cervical fracture (H)     Health Care Home     Benign essential hypertension     Neck pain     Cervical stenosis of spine     S/P cervical spinal fusion     Restless legs syndrome (RLS)     Insomnia due to medical condition     Fusion of spine, cervicothoracic region     Generalized osteoarthritis     Sensorineural hearing loss, bilateral     Past Medical History:   Diagnosis Date     Allergic rhinitis, cause unspecified      Head injury      Pure hypercholesterolemia      Unspecified essential hypertension        Also see scanned health assessment forms.       Past Surgical History:     Past Surgical History:   Procedure Laterality Date     CAROTID ENDARTERECTOMY   11/07/08     COLONOSCOPY  05/30/07    Diverticulosis-return in 5 yrs     EYE SURGERY       FUSION CERVICAL POSTERIOR THREE+ LEVELS N/A 11/15/2017    Procedure: FUSION CERVICAL POSTERIOR THREE+ LEVELS;;  Surgeon: Enma López MD;  Location: [facility] OR     HC DRAIN/INJ MAJOR JOINT/BURSA W/O US  2009    Right SI Joint     HC DRAIN/INJ MAJOR JOINT/BURSA W/O US  2010    Right interarticular hip injection     HC INJ EPIDURAL CERVICAL/THORACIC W/WO CONTRAST  2010    C6-7     HC INJ EPIDURAL LUMBAR/SACRAL W/WO CONTRAST  2009    L5-S1     HC INJ TRANSFORAMIN EPIDURAL, LUMB/SACR SINGLE  2010     HC REMOVAL OF TONSILS,14+ Y/O      Tonsils 14+y.o.     LAMINECTOMY CERIVCAL POSTERIOR THREE+ LEVELS N/A 11/15/2017    Procedure: LAMINECTOMY CERVICAL POSTERIOR THREE+ LEVELS;;  Surgeon: Enma López MD;  Location: [facility] OR     OPTICAL TRACKING SYSTEM FUSION CERVICAL ANTERIOR THREE + LEVELS N/A 11/15/2017    Procedure: OPTICAL TRACKING SYSTEM FUSION CERVICAL ANTERIOR THREE + LEVELS;;  Surgeon: Enma López MD;  Location: [facility] OR     OPTICAL TRACKING SYSTEM FUSION POSTERIOR CERVICAL THREE + LEVELS N/A 11/15/2017    Procedure: OPTICAL TRACKING SYSTEM FUSION POSTERIOR CERVICAL THREE + LEVELS;  Cervical 1-3 Posterior Decompression With Laminectomies, O-Arm/ Stealth Assisted Cervical 2-Thoracic 3 Posterior Instrumented Fusion With Osteotomies Cervical 2-3, Cervical C4-5; Cervical 6-7 ; Right Approach Cervical 2-3, Cervical 4-5 and Cervical 6-7 Anterior Cervical Discectomy And Fusion, [place] Cervical Tracti            Social History:     Social History     Socioeconomic History     Marital status:      Spouse name: Mateus     Number of children: 4     Years of education: 12     Highest education level: Not on file   Occupational History     Occupation:  (retired)     Comment: [employer].     Employer: RETIRED   Social Needs     Financial resource strain: Not on file     Food insecurity:      Worry: Not on file     Inability: Not on file     Transportation needs:     Medical: Not on file     Non-medical: Not on file   Tobacco Use     Smoking status: Former Smoker     Packs/day: 0.00     Years: 10.00     Pack years: 0.00     Types: Cigarettes     Start date: 5/10/1970     Last attempt to quit: 1996     Years since quittin.9     Smokeless tobacco: Never Used   Substance and Sexual Activity     Alcohol use: No     Alcohol/week: 0.0 standard drinks     Drug use: No     Sexual activity: Not Currently     Partners: Male     Birth control/protection: None   Lifestyle     Physical activity:     Days per week: Not on file     Minutes per session: Not on file     Stress: Not on file   Relationships     Social connections:     Talks on phone: Not on file     Gets together: Not on file     Attends Confucianism service: Not on file     Active member of club or organization: Not on file     Attends meetings of clubs or organizations: Not on file     Relationship status: Not on file     Intimate partner violence:     Fear of current or ex partner: Not on file     Emotionally abused: Not on file     Physically abused: Not on file     Forced sexual activity: Not on file   Other Topics Concern      Service No     Blood Transfusions No     Caffeine Concern No     Comment: 4-6 cups / day     Occupational Exposure No     Comment:  (retired now)     Hobby Hazards No     Comment: gardening     Sleep Concern No     Stress Concern No     Weight Concern Yes     Comment: w'd like to be 10# less     Special Diet No     Back Care Yes     Comment: wears a magnet belt at home     Exercise Yes     Comment: gardening and flower beds-walking in the school     Bike Helmet No     Seat Belt Yes     Self-Exams Yes     Parent/sibling w/ CABG, MI or angioplasty before 65F 55M? Yes   Social History Narrative     Not on file            Family History:       Family History   Problem Relation Age of Onset      Cancer Mother         colon cancer  at age 95 or 96   surgery done     Gastrointestinal Disease Mother         stomach problems     Osteoporosis Mother      EYE* Mother         cataract and macular degen.     Cardiovascular Father          at age 60  MI     Hypertension Father         ?     Circulatory Paternal Grandmother         legs     Cerebrovascular Disease Paternal Grandmother      Obesity Paternal Grandmother      Gynecology Sister         hysterectomy     Lipids Brother         was on meds.  ? still     Obesity Maternal Grandmother      Musculoskeletal Disorder Daughter         cancerous tumor left upper arm/shoulder- was told it was a breast type cancer     Diabetes Maternal Aunt      Cancer Daughter             Medications:     Current Outpatient Medications   Medication Sig     acetaminophen (TYLENOL) 500 MG tablet Take 1,000 mg by mouth 4 times daily     amLODIPine (NORVASC) 5 MG tablet Take 0.5 tablets (2.5 mg) by mouth daily     chlorthalidone (HYGROTON) 25 MG tablet TAKE 1 TABLET BY MOUTH  DAILY     Cholecalciferol (VITAMIN D-3 PO) Take 4,000 Units by mouth daily     DHA-EPA-Vitamin E (OMEGA-3 COMPLEX PO) Take 1 tablet by mouth 2 times daily     diclofenac (VOLTAREN) 1 % topical gel APPLY 4 GRAMS TOPICALLY TWO TIMES DAILY     diphenhydrAMINE (BENADRYL) 25 MG tablet Take 1 tablet (25 mg) by mouth daily     glucosamine 500 MG CAPS capsule Take 1 capsule (500 mg) by mouth 2 times daily     ibuprofen (ADVIL/MOTRIN) 200 MG tablet Take 400 mg by mouth 3 times daily     Menthol, Topical Analgesic, (BIOFREEZE) 4 % GEL Externally apply topically 2 times daily as needed     metoprolol tartrate (LOPRESSOR) 25 MG tablet Take 0.5 tablets (12.5 mg) by mouth 2 times daily     multivitamin, therapeutic (THERA-VIT) TABS tablet Take 1 tablet by mouth daily     polyethylene glycol-propylene glycol (SYSTANE ULTRA) 0.4-0.3 % SOLN ophthalmic solution Place 1 drop into both eyes 4 times daily     pravastatin  (PRAVACHOL) 10 MG tablet TAKE 1 TABLET BY MOUTH  DAILY     rOPINIRole (REQUIP) 2 MG tablet Take 2 tablets (4 mg) by mouth At Bedtime     senna-docusate (SENOKOT-S/PERICOLACE) 8.6-50 MG tablet 1 tablet by Oral or Feeding Tube route daily     traMADol (ULTRAM) 50 MG tablet 1 TABLET BY MOUTH AT BEDTIME     traZODone (DESYREL) 50 MG tablet 25 mg po q HS and 25 mg po q HS PRN     UNABLE TO FIND Take 1 tablet by mouth 2 times daily MEDICATION NAME: Vitamin Focus Select     No current facility-administered medications for this visit.               Review of Systems:   A comprehensive 10 point review of systems (constitutional, ENT, cardiac, peripheral vascular, lymphatic, respiratory, GI, , Musculoskeletal, skin, Neurological) was performed and found to be negative except as described in this note.     See intake form completed by patient     Scribe Disclosure:  I, Shar Lipscomb, am serving as a scribe to document services personally performed by Cory Singleton MD at this visit, based upon the provider's statements to me. All documentation has been reviewed by the aforementioned provider prior to being entered into the official medical record.    Again, thank you for allowing me to participate in the care of your patient.      Sincerely,    Cory Singleton MD

## 2019-12-12 NOTE — PROGRESS NOTES
[facility]  Orthopaedic Surgery, Joint Replacement Consultation  by Cory Singleton M.D.    Yuni Otoole MRN# [id number]   Age: 84 year old YOB: 1935     Requesting physician: Igor Lares MD Ortho Surg [facility]       Background history:  DX:  1. Spinal stenosis due to spondylosis  2. 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  3. Aortic stenosis  4. Carotid artery stenosis.      TREATMENTS:  1. 11/15/2017, Cervical 1-3 Posterior Decompression With Laminectomies, O-Arm/ Stealth Assisted Cervical 2-Thoracic 3 Posterior Instrumented Fusion With Osteotomies Cervical 2-3, Cervical C4-5; Cervical 6-7 ; Right Approach Cervical 2-3, Cervical 4-5 and Cervical 6-7 Anterior Cervical Discectomy And Fusion, [place] Cervical Traction; Cervical 2-Thoracic 3 Posterior Fusion With Autograft And Allograft (GISSEL López) [facility]         Assessment and Plan:   Assessment:  Severe end stage degenerative arthritis of bilateral shoulders, hips, knees, with spinal stenosis due to severe spondylopathy.     Patient's goal of pain relief unlikely to be met or achieved without performing some joint replacements with most valuable probably being bilateral hip replacements. Discussed risks involved with bilateral hip replacements, including known orthopedic risks as well as other perioperative risks including death, stroke, or myocardial infarction. Offered to have patient meet with anesthetic team to further discuss the non-orthopaedic risks.    Use of analgesic medication unlikely to be fully satisfactory in terms of achieving adequate pain relief  while avoiding side effects of somnolence, etc.     Plan:  -I did lengthy detailed discussion with the patient regarding the risk benefits and alternatives to surgery.  I do believe that bilateral hip arthroplasty would be the only means of achieving adequate pain relief however should continue to have  pain from her knees and her shoulder severe arthritides.  I believe that the majority of her symptoms are related to her hips less likely related to her lumbar spine.    - Patient will go home to think about surgical options  -Pt will see local pain clinic to discuss medication for pain management   -advised to see [facility] cardiologist and [facility] anesthesia to discuss surgical risks in more detail as she desires.             History of Present Illness:   Yuni Otoole is a 84 year old female referred by Dr. Lawrence Alvarez, [facility], for hip evaluation. The patient reports that she has been having chronic pain in her hips, knees, and low back for years. The hip and knee pain are about equally severe, right > left. This has been going on for several years and she had been told previously that she would need THAs. She put these off as the pain was not keeping her from ambulating. Two years ago she began falling more and developed cervical myelopathy which required urgent decompression and stabilization with Dr. López at . Her pain has been progressively worsening with time, and she was seen recently on 12/2/2019 by an orthopedist (Dr. Alvarez) at which time she was noted to have a right hip dislocation with proximal migration that is apparently chronic over the past couple of years. She also has significant arthritis of both the right hip and left hip with femoral head collapse in her bilateral hips. She was referred here for surgical consult given the severity of her bilateral hip arthritis.     Upon evaluation the patient denies pain radiating down to her feet. She has not had any issues with bowel or bladder function. No skin rashes or GI issues. The patient notes she has been living in a nursing home since 2017 since her surgery. Per family, the patient has not been able to walk independently for multiple years. About 3.5 years ago they say she could walk with a cane, but now mostly uses a wheelchair. She has  been taking tylenol and Ultram for the pain. Patient also notes right shoulder pain. She does not think she has a significant history of steroid use.       MR Lumbar Spine without Contrast (12/11/2019):  Advanced degenerative scoliosis most advanced at L3-L4 and  L4-L5 where there are moderate to severe central canal stenoses. There  is a central disc osteophyte complex at L4-L5 contributing to the  stenosis. There are also multilevel moderate to severe foraminal  Stenoses.      Per visit with Dr. Alvarez, 12/2/2019 at [facility]:  Yuni is a 84 Y female with history of chronic hip and knee pain. This has been going on for several years and she had been told previously that she would need THAs. She put these off as the pain was not keeping her from ambulating. 2 years ago she began falling more and developed cervical myelopathy which required urgent decompression and stabilization with Dr. López at .     At present the pain is located in her bilateral posterior buttocks and travels down to the sides of her bilateral knees.   Right is more bothersome than the left.     She is mostly wheelchair bound and ambulates with 2 max assist 150 feet BID at her SNF.   She takes tylenol arthritis 4x per day but this does not significantly improve the pain.   Recently was seen by Dr. Soler in pain medicine/rehab and he made multiple recommendations for adjustments regarding her meds.   He began with obtaining XR of bilateral hips, knees and recommended ortho appointment to evaluate for bilateral hip and knee arthritis.   Right hip has becoming more prominent in right buttock over past 2 years.   She is comfortable at rest in wheelchair leaning to left.     She states she has not been seeing a primary care physician on a regular basis since admission to her SNF 2 years ago. She states she had been making an ongoing recovery after her cervical spine surgery in 2017 but was unable to get back home and her PCP retired.    She  presents with her  who assists with medical history and course of events.   Unable to ambulate in clinic today.     She has a right hip dislocation with proximal migration that is apparently chronic over the past couple of years. She has significant arthritis of both the right hip and left hip with femoral head collapse in her bilateral hips.  Her spine shows significant coronal translation on her AP pelvis and this is consistent with her history of bilateral buttock pain that radiates to her knees. However the severity of her bilateral hip arthritis confounds her knee exam as well as her history of cervical myelopathy. She likely has spinal stenosis but there is insufficient imaging to confirm this.     We had a long discussion with the patient and her  any regarding the possible treatment options. We discussed the nonoperative modalities including doing nothing, corticosteroid injections (which would be unlikely to help her bilateral hip arthritis due to the severity) and referral to [facility] for spine and total joint care. Apparently at her cervical myelopathy fusion the underwent a 14-hour surgery with significant morbidity. We discussed that the potential of surgery for her back or for her bilateral hips was not a guarantee, and that should a provider offered surgery or surgeries for these areas they would also likely come with potential for significant risk.           Physical Exam:     EXAMINATION pertinent findings:   VITAL SIGNS: not currently breastfeeding.  There is no height or weight on file to calculate BMI.  RESP: non labored breathing   NEURO: grossly normal   VASCULAR: satisfactory perfusion of all extremities   MUSCULOSKELETAL: Patient unable to independently stand, requires two-person assist.   She has severe crepitance of the glenohumeral shoulder joints bilaterally.   Severe crepitance in both hips. 0-90 degree motion in both hips.   Knees go from 10-90 degrees  bilaterally with stable ligaments and marked crepitance.  Further exam deferred, given wheelchair-bound status.             Data:   All laboratory data reviewed  All imaging studies reviewed by me                         Attending MD (Dr. Cory Singleton) Attestation :  This patient was seen and evaluated by me including a history, exam, and interpretation of all imaging and/or lab data.  Either a training physician (resident/fellow), who also saw the patient, or scribe has documented the clinic visit in the attached note.    Cory Singleton MD  [facility] Family Professor  Oncology and Adult Reconstructive Surgery  Dept Orthopaedic Surgery, [facility]  [phone] office, [phone] pager  [email or web address]      DATA for DOCUMENTATION:         Past Medical History:     Patient Active Problem List   Diagnosis     Anxiety state     Scoliosis     Osteopenia     HYPERLIPIDEMIA LDL GOAL <130     Advanced directives, counseling/discussion     Low back pain without sciatica     C7 cervical fracture (H)     Health Care Home     Benign essential hypertension     Neck pain     Cervical stenosis of spine     S/P cervical spinal fusion     Restless legs syndrome (RLS)     Insomnia due to medical condition     Fusion of spine, cervicothoracic region     Generalized osteoarthritis     Sensorineural hearing loss, bilateral     Past Medical History:   Diagnosis Date     Allergic rhinitis, cause unspecified      Head injury      Pure hypercholesterolemia      Unspecified essential hypertension        Also see scanned health assessment forms.       Past Surgical History:     Past Surgical History:   Procedure Laterality Date     CAROTID ENDARTERECTOMY  11/07/08     COLONOSCOPY  05/30/07    Diverticulosis-return in 5 yrs     EYE SURGERY       FUSION CERVICAL POSTERIOR THREE+ LEVELS N/A 11/15/2017    Procedure: FUSION CERVICAL POSTERIOR THREE+ LEVELS;;  Surgeon: Enma López MD;  Location: [facility] OR      DRAIN/INJ MAJOR  JOINT/BURSA W/O US  2009    Right SI Joint     HC DRAIN/INJ MAJOR JOINT/BURSA W/O US  2010    Right interarticular hip injection     HC INJ EPIDURAL CERVICAL/THORACIC W/WO CONTRAST  2010    C6-7     HC INJ EPIDURAL LUMBAR/SACRAL W/WO CONTRAST  2009    L5-S1     HC INJ TRANSFORAMIN EPIDURAL, LUMB/SACR SINGLE  2010     HC REMOVAL OF TONSILS,14+ Y/O      Tonsils 14+y.o.     LAMINECTOMY CERIVCAL POSTERIOR THREE+ LEVELS N/A 11/15/2017    Procedure: LAMINECTOMY CERVICAL POSTERIOR THREE+ LEVELS;;  Surgeon: Enma López MD;  Location: [facility] OR     OPTICAL TRACKING SYSTEM FUSION CERVICAL ANTERIOR THREE + LEVELS N/A 11/15/2017    Procedure: OPTICAL TRACKING SYSTEM FUSION CERVICAL ANTERIOR THREE + LEVELS;;  Surgeon: Enma López MD;  Location: [facility] OR     OPTICAL TRACKING SYSTEM FUSION POSTERIOR CERVICAL THREE + LEVELS N/A 11/15/2017    Procedure: OPTICAL TRACKING SYSTEM FUSION POSTERIOR CERVICAL THREE + LEVELS;  Cervical 1-3 Posterior Decompression With Laminectomies, O-Arm/ Stealth Assisted Cervical 2-Thoracic 3 Posterior Instrumented Fusion With Osteotomies Cervical 2-3, Cervical C4-5; Cervical 6-7 ; Right Approach Cervical 2-3, Cervical 4-5 and Cervical 6-7 Anterior Cervical Discectomy And Fusion, [place] Cervical Tracti            Social History:     Social History     Socioeconomic History     Marital status:      Spouse name: Mateus     Number of children: 4     Years of education: 12     Highest education level: Not on file   Occupational History     Occupation:  (retired)     Comment: [employer].     Employer: RETIRED   Social Needs     Financial resource strain: Not on file     Food insecurity:     Worry: Not on file     Inability: Not on file     Transportation needs:     Medical: Not on file     Non-medical: Not on file   Tobacco Use     Smoking status: Former Smoker     Packs/day: 0.00     Years: 10.00     Pack years: 0.00     Types: Cigarettes     Start date:  5/10/1970     Last attempt to quit: 1996     Years since quittin.9     Smokeless tobacco: Never Used   Substance and Sexual Activity     Alcohol use: No     Alcohol/week: 0.0 standard drinks     Drug use: No     Sexual activity: Not Currently     Partners: Male     Birth control/protection: None   Lifestyle     Physical activity:     Days per week: Not on file     Minutes per session: Not on file     Stress: Not on file   Relationships     Social connections:     Talks on phone: Not on file     Gets together: Not on file     Attends Restorationism service: Not on file     Active member of club or organization: Not on file     Attends meetings of clubs or organizations: Not on file     Relationship status: Not on file     Intimate partner violence:     Fear of current or ex partner: Not on file     Emotionally abused: Not on file     Physically abused: Not on file     Forced sexual activity: Not on file   Other Topics Concern      Service No     Blood Transfusions No     Caffeine Concern No     Comment: 4-6 cups / day     Occupational Exposure No     Comment:  (retired now)     Hobby Hazards No     Comment: gardening     Sleep Concern No     Stress Concern No     Weight Concern Yes     Comment: w'd like to be 10# less     Special Diet No     Back Care Yes     Comment: wears a magnet belt at home     Exercise Yes     Comment: gardening and flower beds-walking in the school     Bike Helmet No     Seat Belt Yes     Self-Exams Yes     Parent/sibling w/ CABG, MI or angioplasty before 65F 55M? Yes   Social History Narrative     Not on file            Family History:       Family History   Problem Relation Age of Onset     Cancer Mother         colon cancer  at age 95 or 96   surgery done     Gastrointestinal Disease Mother         stomach problems     Osteoporosis Mother      EYE* Mother         cataract and macular degen.     Cardiovascular Father          at age 60  MI     Hypertension  Father         ?     Circulatory Paternal Grandmother         legs     Cerebrovascular Disease Paternal Grandmother      Obesity Paternal Grandmother      Gynecology Sister         hysterectomy     Lipids Brother         was on meds.  ? still     Obesity Maternal Grandmother      Musculoskeletal Disorder Daughter         cancerous tumor left upper arm/shoulder- was told it was a breast type cancer     Diabetes Maternal Aunt      Cancer Daughter             Medications:     Current Outpatient Medications   Medication Sig     acetaminophen (TYLENOL) 500 MG tablet Take 1,000 mg by mouth 4 times daily     amLODIPine (NORVASC) 5 MG tablet Take 0.5 tablets (2.5 mg) by mouth daily     chlorthalidone (HYGROTON) 25 MG tablet TAKE 1 TABLET BY MOUTH  DAILY     Cholecalciferol (VITAMIN D-3 PO) Take 4,000 Units by mouth daily     DHA-EPA-Vitamin E (OMEGA-3 COMPLEX PO) Take 1 tablet by mouth 2 times daily     diclofenac (VOLTAREN) 1 % topical gel APPLY 4 GRAMS TOPICALLY TWO TIMES DAILY     diphenhydrAMINE (BENADRYL) 25 MG tablet Take 1 tablet (25 mg) by mouth daily     glucosamine 500 MG CAPS capsule Take 1 capsule (500 mg) by mouth 2 times daily     ibuprofen (ADVIL/MOTRIN) 200 MG tablet Take 400 mg by mouth 3 times daily     Menthol, Topical Analgesic, (BIOFREEZE) 4 % GEL Externally apply topically 2 times daily as needed     metoprolol tartrate (LOPRESSOR) 25 MG tablet Take 0.5 tablets (12.5 mg) by mouth 2 times daily     multivitamin, therapeutic (THERA-VIT) TABS tablet Take 1 tablet by mouth daily     polyethylene glycol-propylene glycol (SYSTANE ULTRA) 0.4-0.3 % SOLN ophthalmic solution Place 1 drop into both eyes 4 times daily     pravastatin (PRAVACHOL) 10 MG tablet TAKE 1 TABLET BY MOUTH  DAILY     rOPINIRole (REQUIP) 2 MG tablet Take 2 tablets (4 mg) by mouth At Bedtime     senna-docusate (SENOKOT-S/PERICOLACE) 8.6-50 MG tablet 1 tablet by Oral or Feeding Tube route daily     traMADol (ULTRAM) 50 MG tablet 1 TABLET BY  MOUTH AT BEDTIME     traZODone (DESYREL) 50 MG tablet 25 mg po q HS and 25 mg po q HS PRN     UNABLE TO FIND Take 1 tablet by mouth 2 times daily MEDICATION NAME: Vitamin Focus Select     No current facility-administered medications for this visit.               Review of Systems:   A comprehensive 10 point review of systems (constitutional, ENT, cardiac, peripheral vascular, lymphatic, respiratory, GI, , Musculoskeletal, skin, Neurological) was performed and found to be negative except as described in this note.     See intake form completed by patient     Scribe Disclosure:  I, Shar Lipscomb, am serving as a scribe to document services personally performed by Cory Singleton MD at this visit, based upon the provider's statements to me. All documentation has been reviewed by the aforementioned provider prior to being entered into the official medical record.

## 2019-12-12 NOTE — NURSING NOTE
Chief Complaint   Patient presents with     Consult     pt states she is here today talk about a potential hip replacement       84 year old  1935    There were no vitals taken for this visit.    Date of injury:  1. 1/1/19 pt stated her hips started to get really bad.     Type of injury:  1. Pt fell 5/2107 fell flat on her back and had multiple cervical and back surgery.     Date/Surgery/Surgeon/Hospital:  1. None related to hips        Pain Assessment  Patient Currently in Pain: Yes  0-10 Pain Scale: 4  Primary Pain Location: Hip        [facility] [place]  [facility] - [place] - [address] [facility] [id number] - [place], [place] - [address]   [facility] - [place], [place] - [address] [facility] [place], [place] - [address]     Allergies   Allergen Reactions     Atorvastatin Calcium      Was on Lipitor and has muscle problem       Current Outpatient Medications   Medication     acetaminophen (TYLENOL) 500 MG tablet     amLODIPine (NORVASC) 5 MG tablet     chlorthalidone (HYGROTON) 25 MG tablet     Cholecalciferol (VITAMIN D-3 PO)     DHA-EPA-Vitamin E (OMEGA-3 COMPLEX PO)     diclofenac (VOLTAREN) 1 % topical gel     diphenhydrAMINE (BENADRYL) 25 MG tablet     glucosamine 500 MG CAPS capsule     ibuprofen (ADVIL/MOTRIN) 200 MG tablet     Menthol, Topical Analgesic, (BIOFREEZE) 4 % GEL     metoprolol tartrate (LOPRESSOR) 25 MG tablet     multivitamin, therapeutic (THERA-VIT) TABS tablet     polyethylene glycol-propylene glycol (SYSTANE ULTRA) 0.4-0.3 % SOLN ophthalmic solution     pravastatin (PRAVACHOL) 10 MG tablet     rOPINIRole (REQUIP) 2 MG tablet     traMADol (ULTRAM) 50 MG tablet     traZODone (DESYREL) 50 MG tablet     UNABLE TO FIND     senna-docusate (SENOKOT-S/PERICOLACE) 8.6-50 MG tablet     No current facility-administered medications for this visit.        Questionnaires:    HOOS Hip Dysfunction & Osteoarthritis Outcome Questionnaire    No flowsheet  data found.     KOOS Knee Survey Assessment    No flowsheet data found.     Promis 10 Assessment    PROMIS 10 9/21/2017   In general, would you say your health is: Fair   In general, would you say your quality of life is: Fair   In general, how would you rate your physical health? Fair   In general, how would you rate your mental health, including your mood and your ability to think? Fair   In general, how would you rate your satisfaction with your social activities and relationships? Fair   In general, please rate how well you carry out your usual social activities and roles Fair   To what extent are you able to carry out your everyday physical activities such as walking, climbing stairs, carrying groceries, or moving a chair? A little   How often have you been bothered by emotional problems such as feeling anxious, depressed or irritable? Sometimes   How would you rate your fatigue on average? Moderate   How would you rate your pain on average?   0 = No Pain  to  10 = Worst Imaginable Pain 8   Global Physical Health Score : Raw Score 9 (SUM : G03 - G06 - G07 - G08)   Global Mental Health Score : Raw Score 9 (SUM : G02 - G04 - G05 - G10)   Total (Physical + Mental Health Score) 18   Some recent data might be hidden        Ortho [place] Knee Questionnaire    No flowsheet data found.       Bridgett Calderon ATC

## 2019-12-16 DIAGNOSIS — Z98.1 S/P CERVICAL SPINAL FUSION: ICD-10-CM

## 2019-12-16 DIAGNOSIS — M48.02 CERVICAL STENOSIS OF SPINE: ICD-10-CM

## 2019-12-16 RX ORDER — METOPROLOL TARTRATE 25 MG/1
TABLET, FILM COATED ORAL
Qty: 60 TABLET | Refills: 3 | Status: SHIPPED | OUTPATIENT
Start: 2019-12-16 | End: 2020-04-17

## 2019-12-26 DIAGNOSIS — S12.601S CLOSED NONDISPLACED FRACTURE OF SEVENTH CERVICAL VERTEBRA, UNSPECIFIED FRACTURE MORPHOLOGY, SEQUELA: ICD-10-CM

## 2019-12-26 RX ORDER — TRAMADOL HYDROCHLORIDE 50 MG/1
TABLET ORAL
Qty: 30 TABLET | Refills: 0 | Status: SHIPPED | OUTPATIENT
Start: 2019-12-26 | End: 2020-01-14

## 2019-12-31 NOTE — PROGRESS NOTES
[place] [facility]  Chief Complaint   Patient presents with     residential Regulatory   [place] Medical Record Number:  [id number]    HPI:    Yuni Otoole is a 82 year old  (1935), who is being seen today for a federally mandated E/M visit at [facility]  .  HPI information obtained from: facility staff, patient report and [facility] chart review.     Today's concerns are:  -  Resident seen and examined  today.   - DNP reports ongoing Resident's chronic pain concern. Rt reports was seen by Orthopedic for left hip evaluation and recommended against surgical repair.   - Pt reports feeling at baseline (ongoing pain in multiple joints, aching, off and on, some response to pain meds).  Pt asked the Writer about his opinion on hip replacement.   - Reports appetite/sleep/bowel movements are fine  - Denies CP, SOB, wheezing, peripheral edema.  ------------------------------------------  - - Past Medical, social, family histories, medications, and allergies reviewed and updated  - Medications reviewed: in the chart and EHR.   - Case Management:   I have reviewed the care plan and MDS and do agree with the plan. Patient's desire to return to the community is not present.  Information reviewed:  Medications, vital signs, orders, and nursing notes.    :MEDICATIONS:  Current Outpatient Medications   Medication Sig Dispense Refill     Acetaminophen (TYLENOL ARTHRITIS PAIN PO) Take 1,300 mg by mouth every morning       Acetaminophen (TYLENOL ARTHRITIS PAIN PO) Take 650-1,300 mg by mouth 3 times daily as needed       amLODIPine (NORVASC) 5 MG tablet Take 0.5 tablets (2.5 mg) by mouth daily 30 tablet 3     chlorthalidone (HYGROTON) 25 MG tablet TAKE 1 TABLET BY MOUTH  DAILY 90 tablet 3     Cholecalciferol (VITAMIN D-3 PO) Take 4,000 Units by mouth daily       DHA-EPA-Vitamin E (OMEGA-3 COMPLEX PO) Take 1 tablet by mouth 2 times daily       diclofenac (VOLTAREN) 1 % topical gel APPLY 4  GRAMS TOPICALLY TWO TIMES DAILY 400 g 1     diphenhydrAMINE (BENADRYL) 25 MG tablet Take 1 tablet (25 mg) by mouth daily 30 tablet 3     glucosamine 500 MG CAPS capsule Take 1 capsule (500 mg) by mouth 2 times daily 60 capsule 3     Menthol, Topical Analgesic, (BIOFREEZE) 4 % GEL Externally apply topically 2 times daily as needed 1 Tube 3     metoprolol tartrate (LOPRESSOR) 25 MG tablet TAKE ONE-HALF TABLET BY  MOUTH TWO TIMES DAILY 60 tablet 3     multivitamin, therapeutic (THERA-VIT) TABS tablet Take 1 tablet by mouth daily       polyethylene glycol-propylene glycol (SYSTANE ULTRA) 0.4-0.3 % SOLN ophthalmic solution Place 1 drop into both eyes 4 times daily 1 Bottle 3     pravastatin (PRAVACHOL) 10 MG tablet TAKE 1 TABLET BY MOUTH  DAILY 90 tablet 3     rOPINIRole (REQUIP) 2 MG tablet Take 2 tablets (4 mg) by mouth At Bedtime 100 tablet 3     senna-docusate (SENOKOT-S/PERICOLACE) 8.6-50 MG tablet 1 tablet by Oral or Feeding Tube route daily 100 tablet 3     traMADol (ULTRAM) 50 MG tablet 1 TABLET BY MOUTH EVERY NIGHT AT BEDTIME 30 tablet 0     UNABLE TO FIND Take 1 tablet by mouth 2 times daily MEDICATION NAME: Vitamin Focus Select       acetaminophen (TYLENOL) 500 MG tablet Take 1,000 mg by mouth 4 times daily       ibuprofen (ADVIL/MOTRIN) 200 MG tablet Take 400 mg by mouth 3 times daily       traZODone (DESYREL) 50 MG tablet 1/2 TABLET *(25MG) BY MOUTH AT BEDTIME AS NEEDED X 3 MONTHS DX INSOMNIA 15 tablet 1     ROS: 4 point ROS including Respiratory, CV, GI and , other than that noted in the HPI,  is negative    Exam:  Vitals: /80   Pulse 85   Temp 97.5  F (36.4  C)   Resp 16   SpO2 99%   BMI= There is no height or weight on file to calculate BMI.  GENERAL APPEARANCE:  in no distress, cooperative  RESP:  lungs clear to auscultation , no wheezing  CV:  S1S2 audible , regular rate and rhythm, systolic over right upper sternal border murmur, rub, or gallop.   ABDOMEN:  normal bowel sounds, soft,  nontender, no palpable masses  M/S:   Cracks sound with knee joints movements.   SKIN:no rash noted  NEURO:   generalized muscles atrophy, hand  4/5 b/l.  Dorsiflexion 5/5 bilateral.   PSYCH:  affect and mood normal    Lab/Diagnostic data: Reviewed in the chart and EHR.      ASSESSMENT/PLAN  # Hx of C1-C3 Lami; C2-C3 Posterior Fusion; C2/3, C3/4, C6/7 Anterior Decompression and Fusion; C2/3, C4/5, and C6/7 Facet Osteotomies; C2-T3 Instrumentation   B/L Knee Joint OA:  Severe end stage DJD b/l shoulders, hips, knees with spinal stenosis due to severe spondylopathy  - chronic, at times analgesia suboptimal. Was seen by Orthopedist, b/l hip arthoplasty is an option, but other joints pain will be continued, will need a clearance from cardiology and anesthesia.   - Pt at baseline uses a walker in the [address], able to reach dinning area with assistance. Rarely leave the facility. We spoke about the possible risk of anesthesia  (per 's reports had a complicated neck surgery with prolonged recovery from anesthesia) and surgery given cardiac status and life expectancy, likely prolonged rehab, and pain from other joints. We recommend non surgical approach with analgesia and OT/PT as needed.     Aortic Stenosis:  B/L Carotid Stenosis with hx of s/p Right CEA  Benign essential hypertension:    Hyperlipidemia  - at baseline, no concern. Will need cardiology work up prior to any surgery. At least AS is moderate level.  If advance might need valve replacement prior to surgery.   - BP controlled.      Osteoporosis /Vitamin D deficiency:   On supplement.     Frailty:  continues to require assistance from nursing. Up for meals only o/w spends the day resting in bed     Orders:  - See above, otherwise, continue the rest of the current POC.     Total time 37 minutes, time consisted of the following, examination of patient, reviewing the record and completing the documentation. Counseling time 20 minutes, time spent in  counseling with the patient consisted of, discussed above A/P regarding pain, hip replacement, risk and benefit, etc... Patient will continue to think about it. DNP updated.     Electronically signed by:  Ceasar Aburto MD

## 2020-01-01 VITALS
TEMPERATURE: 97.5 F | OXYGEN SATURATION: 99 % | DIASTOLIC BLOOD PRESSURE: 80 MMHG | SYSTOLIC BLOOD PRESSURE: 137 MMHG | RESPIRATION RATE: 16 BRPM | HEART RATE: 85 BPM

## 2020-01-02 ENCOUNTER — NURSING HOME VISIT (OUTPATIENT)
Dept: GERIATRICS | Facility: CLINIC | Age: 85
End: 2020-01-02
Payer: COMMERCIAL

## 2020-01-02 DIAGNOSIS — R54 FRAIL ELDERLY: ICD-10-CM

## 2020-01-02 DIAGNOSIS — E78.2 MIXED HYPERLIPIDEMIA: ICD-10-CM

## 2020-01-02 DIAGNOSIS — M15.0 PRIMARY OSTEOARTHRITIS INVOLVING MULTIPLE JOINTS: Primary | ICD-10-CM

## 2020-01-02 DIAGNOSIS — Z98.1 S/P CERVICAL SPINAL FUSION: ICD-10-CM

## 2020-01-02 DIAGNOSIS — I35.0 AORTIC VALVE STENOSIS, ETIOLOGY OF CARDIAC VALVE DISEASE UNSPECIFIED: ICD-10-CM

## 2020-01-02 DIAGNOSIS — M43.23 FUSION OF SPINE, CERVICOTHORACIC REGION: ICD-10-CM

## 2020-01-02 DIAGNOSIS — M81.0 OSTEOPOROSIS, UNSPECIFIED OSTEOPOROSIS TYPE, UNSPECIFIED PATHOLOGICAL FRACTURE PRESENCE: ICD-10-CM

## 2020-01-02 DIAGNOSIS — I10 BENIGN ESSENTIAL HYPERTENSION: ICD-10-CM

## 2020-01-02 PROCEDURE — 99310 SBSQ NF CARE HIGH MDM 45: CPT | Performed by: FAMILY MEDICINE

## 2020-01-02 NOTE — LETTER
1/2/2020        RE: Yuni Otoole  [address]  [facility] [address]        [place] [facility]  Chief Complaint   Patient presents with     [facility] Regulatory   [place] Medical Record Number:  [id number]    HPI:    Yuni Otoole is a 82 year old  (1935), who is being seen today for a federally mandated E/M visit at [facility]  .  HPI information obtained from: facility staff, patient report and [facility] chart review.     Today's concerns are:  -  Resident seen and examined  today.   - DNP reports ongoing Resident's chronic pain concern. Rt reports was seen by Orthopedic for left hip evaluation and recommended against surgical repair.   - Pt reports feeling at baseline (ongoing pain in multiple joints, aching, off and on, some response to pain meds).  Pt asked the Writer about his opinion on hip replacement.   - Reports appetite/sleep/bowel movements are fine  - Denies CP, SOB, wheezing, peripheral edema.  ------------------------------------------  - - Past Medical, social, family histories, medications, and allergies reviewed and updated  - Medications reviewed: in the chart and EHR.   - Case Management:   I have reviewed the care plan and MDS and do agree with the plan. Patient's desire to return to the community is not present.  Information reviewed:  Medications, vital signs, orders, and nursing notes.    :MEDICATIONS:  Current Outpatient Medications   Medication Sig Dispense Refill     Acetaminophen (TYLENOL ARTHRITIS PAIN PO) Take 1,300 mg by mouth every morning       Acetaminophen (TYLENOL ARTHRITIS PAIN PO) Take 650-1,300 mg by mouth 3 times daily as needed       amLODIPine (NORVASC) 5 MG tablet Take 0.5 tablets (2.5 mg) by mouth daily 30 tablet 3     chlorthalidone (HYGROTON) 25 MG tablet TAKE 1 TABLET BY MOUTH  DAILY 90 tablet 3     Cholecalciferol (VITAMIN D-3 PO) Take 4,000 Units by mouth daily       DHA-EPA-Vitamin E (OMEGA-3 COMPLEX PO) Take  1 tablet by mouth 2 times daily       diclofenac (VOLTAREN) 1 % topical gel APPLY 4 GRAMS TOPICALLY TWO TIMES DAILY 400 g 1     diphenhydrAMINE (BENADRYL) 25 MG tablet Take 1 tablet (25 mg) by mouth daily 30 tablet 3     glucosamine 500 MG CAPS capsule Take 1 capsule (500 mg) by mouth 2 times daily 60 capsule 3     Menthol, Topical Analgesic, (BIOFREEZE) 4 % GEL Externally apply topically 2 times daily as needed 1 Tube 3     metoprolol tartrate (LOPRESSOR) 25 MG tablet TAKE ONE-HALF TABLET BY  MOUTH TWO TIMES DAILY 60 tablet 3     multivitamin, therapeutic (THERA-VIT) TABS tablet Take 1 tablet by mouth daily       polyethylene glycol-propylene glycol (SYSTANE ULTRA) 0.4-0.3 % SOLN ophthalmic solution Place 1 drop into both eyes 4 times daily 1 Bottle 3     pravastatin (PRAVACHOL) 10 MG tablet TAKE 1 TABLET BY MOUTH  DAILY 90 tablet 3     rOPINIRole (REQUIP) 2 MG tablet Take 2 tablets (4 mg) by mouth At Bedtime 100 tablet 3     senna-docusate (SENOKOT-S/PERICOLACE) 8.6-50 MG tablet 1 tablet by Oral or Feeding Tube route daily 100 tablet 3     traMADol (ULTRAM) 50 MG tablet 1 TABLET BY MOUTH EVERY NIGHT AT BEDTIME 30 tablet 0     UNABLE TO FIND Take 1 tablet by mouth 2 times daily MEDICATION NAME: Vitamin Focus Select       acetaminophen (TYLENOL) 500 MG tablet Take 1,000 mg by mouth 4 times daily       ibuprofen (ADVIL/MOTRIN) 200 MG tablet Take 400 mg by mouth 3 times daily       traZODone (DESYREL) 50 MG tablet 1/2 TABLET *(25MG) BY MOUTH AT BEDTIME AS NEEDED X 3 MONTHS DX INSOMNIA 15 tablet 1     ROS: 4 point ROS including Respiratory, CV, GI and , other than that noted in the HPI,  is negative    Exam:  Vitals: /80   Pulse 85   Temp 97.5  F (36.4  C)   Resp 16   SpO2 99%   BMI= There is no height or weight on file to calculate BMI.  GENERAL APPEARANCE:  in no distress, cooperative  RESP:  lungs clear to auscultation , no wheezing  CV:  S1S2 audible , regular rate and rhythm, systolic over right upper  sternal border murmur, rub, or gallop.   ABDOMEN:  normal bowel sounds, soft, nontender, no palpable masses  M/S:   Cracks sound with knee joints movements.   SKIN:no rash noted  NEURO:   generalized muscles atrophy, hand  4/5 b/l.  Dorsiflexion 5/5 bilateral.   PSYCH:  affect and mood normal    Lab/Diagnostic data: Reviewed in the chart and EHR.      ASSESSMENT/PLAN  # Hx of C1-C3 Lami; C2-C3 Posterior Fusion; C2/3, C3/4, C6/7 Anterior Decompression and Fusion; C2/3, C4/5, and C6/7 Facet Osteotomies; C2-T3 Instrumentation   B/L Knee Joint OA:  Severe end stage DJD b/l shoulders, hips, knees with spinal stenosis due to severe spondylopathy  - chronic, at times analgesia suboptimal. Was seen by Orthopedist, b/l hip arthoplasty is an option, but other joints pain will be continued, will need a clearance from cardiology and anesthesia.   - Pt at baseline uses a walker in the [address] way, able to reach dinning area with assistance. Rarely leave the facility. We spoke about the possible risk of anesthesia  (per 's reports had a complicated neck surgery with prolonged recovery from anesthesia) and surgery given cardiac status and life expectancy, likely prolonged rehab, and pain from other joints. We recommend non surgical approach with analgesia and OT/PT as needed.     Aortic Stenosis:  B/L Carotid Stenosis with hx of s/p Right CEA  Benign essential hypertension:    Hyperlipidemia  - at baseline, no concern. Will need cardiology work up prior to any surgery. At least AS is moderate level.  If advance might need valve replacement prior to surgery.   - BP controlled.      Osteoporosis /Vitamin D deficiency:   On supplement.     Frailty:  continues to require assistance from nursing. Up for meals only o/w spends the day resting in bed     Orders:  - See above, otherwise, continue the rest of the current POC.     Total time 37 minutes, time consisted of the following, examination of patient, reviewing the record and  completing the documentation. Counseling time 20 minutes, time spent in counseling with the patient consisted of, discussed above A/P regarding pain, hip replacement, risk and benefit, etc... Patient will continue to think about it. DNP updated.     Electronically signed by:  Ceasar Aburto MD      Sincerely,        Ceasar Aburto MD

## 2020-01-03 DIAGNOSIS — F51.01 PRIMARY INSOMNIA: ICD-10-CM

## 2020-01-03 RX ORDER — TRAZODONE HYDROCHLORIDE 50 MG/1
TABLET, FILM COATED ORAL
Qty: 15 TABLET | Refills: 1 | Status: SHIPPED | OUTPATIENT
Start: 2020-01-03 | End: 2020-01-13

## 2020-01-13 ENCOUNTER — TELEPHONE (OUTPATIENT)
Dept: NEUROSURGERY | Facility: CLINIC | Age: 85
End: 2020-01-13

## 2020-01-13 DIAGNOSIS — F51.01 PRIMARY INSOMNIA: ICD-10-CM

## 2020-01-13 RX ORDER — TRAZODONE HYDROCHLORIDE 50 MG/1
25 TABLET, FILM COATED ORAL AT BEDTIME
Qty: 60 TABLET | Refills: 1 | Status: SHIPPED | OUTPATIENT
Start: 2020-01-13 | End: 2020-03-31

## 2020-01-14 DIAGNOSIS — S12.601S CLOSED NONDISPLACED FRACTURE OF SEVENTH CERVICAL VERTEBRA, UNSPECIFIED FRACTURE MORPHOLOGY, SEQUELA: ICD-10-CM

## 2020-01-14 RX ORDER — TRAMADOL HYDROCHLORIDE 50 MG/1
TABLET ORAL
Qty: 30 TABLET | Refills: 0 | Status: SHIPPED | OUTPATIENT
Start: 2020-01-14 | End: 2020-02-03

## 2020-01-19 DIAGNOSIS — G25.81 RESTLESS LEGS SYNDROME (RLS): ICD-10-CM

## 2020-01-20 ENCOUNTER — TRANSFERRED RECORDS (OUTPATIENT)
Dept: HEALTH INFORMATION MANAGEMENT | Facility: CLINIC | Age: 85
End: 2020-01-20

## 2020-01-20 ENCOUNTER — TELEPHONE (OUTPATIENT)
Dept: GERIATRICS | Facility: CLINIC | Age: 85
End: 2020-01-20

## 2020-01-20 RX ORDER — ROPINIROLE 2 MG/1
TABLET, FILM COATED ORAL
Qty: 180 TABLET | Refills: 11 | Status: SHIPPED | OUTPATIENT
Start: 2020-01-20 | End: 2020-08-27

## 2020-01-20 NOTE — TELEPHONE ENCOUNTER
"Patient turned head \"wrong\", acute neck pain now, lying in bed crying, given APAP and ultram with moderate improvement, has Hx of spine surgery.  -cervical/thoracic XR's ordered  "

## 2020-01-22 ENCOUNTER — NURSING HOME VISIT (OUTPATIENT)
Dept: GERIATRICS | Facility: CLINIC | Age: 85
End: 2020-01-22
Payer: COMMERCIAL

## 2020-01-22 VITALS
HEART RATE: 74 BPM | OXYGEN SATURATION: 96 % | SYSTOLIC BLOOD PRESSURE: 121 MMHG | RESPIRATION RATE: 20 BRPM | BODY MASS INDEX: 29.29 KG/M2 | WEIGHT: 145 LBS | TEMPERATURE: 96.7 F | DIASTOLIC BLOOD PRESSURE: 71 MMHG

## 2020-01-22 DIAGNOSIS — Z98.1 S/P CERVICAL SPINAL FUSION: ICD-10-CM

## 2020-01-22 DIAGNOSIS — T14.8XXA PULLED MUSCLE: Primary | ICD-10-CM

## 2020-01-22 DIAGNOSIS — M15.0 PRIMARY OSTEOARTHRITIS INVOLVING MULTIPLE JOINTS: ICD-10-CM

## 2020-01-22 PROCEDURE — 99309 SBSQ NF CARE MODERATE MDM 30: CPT | Performed by: NURSE PRACTITIONER

## 2020-01-22 RX ORDER — METHOCARBAMOL 500 MG/1
500 TABLET, FILM COATED ORAL 3 TIMES DAILY
COMMUNITY
Start: 2020-01-22 | End: 2020-02-12 | Stop reason: DRUGHIGH

## 2020-01-22 RX ORDER — IBUPROFEN 200 MG
400 TABLET ORAL 2 TIMES DAILY
COMMUNITY
Start: 2020-01-22 | End: 2020-02-12 | Stop reason: DRUGHIGH

## 2020-01-22 NOTE — LETTER
1/22/2020        RE: Yuni Otoole  [address]  [facility] [address]        [place] [facility]  [place] Medical Record Number:  [id number]  Place of Service where encounter took place:  [facility] ([facility]) [[id number]]  Chief Complaint   Patient presents with     Nursing Home Acute     HPI:    Yuni Otoole  is a 84 year old (1935), who is being seen today for an episodic care visit.  HPI information obtained from: facility chart records, facility staff, patient report and [facility] chart review.     Yuni is a resident of the [facility] wing of [place] since 2017 s/p cervical spinal fusion.   Medical issues have been:              Pain control for generalized and local pain in marv hips, knees, shoulders and neck. In the past treated with localized cortisone injections, frequent adjustment of pain medication per pt's request of Tylenol and NSAIDs and HS ultram, pain clinic visit 11/14 which stopped NSAID and changed tylenol to tylenol arthritis, repeat X rays done,  and possible future mrav genicular nerve blocks and marv hip replacements, but noted high risk surgery. She is balancing/thinking about this still. Nsg /pt asking me to see her today r/t a pulled muscle in R side of her neck. X ray done and negative.   Currently ice in place and pt states slightly better, but still hurting quite a bit.     Past Medical and Surgical History reviewed in Epic today.    MEDICATIONS:  Reviewed.      REVIEW OF SYSTEMS:  4 point ROS including Respiratory, CV, GI and , other than that noted in the HPI,  is negative    Objective:  /71   Pulse 74   Temp 96.7  F (35.9  C)   Resp 20   Wt 65.8 kg (145 lb)   SpO2 96%   BMI 29.29 kg/m     Exam:  GENERAL APPEARANCE: Alert, in no distress, cooperative.  ENT: Mouth and posterior oropharynx normal, moist mucous membranes, hearing acuity at baseline [place], Neck with baseline decrease ROM. No swelling, redness. Slight increased tenderness to  R Sternocleidomastoid muscle,   RESP: non labored breathing  CV: no edema  SKIN: Inspection/Palpation of skin and subcutaneous tissue baseline w/ fragility.  NEURO: 2-12 at patient's baseline  PSYCH: Insight and judgement, memory baseline  intact with notable STM loss, affect and mood calm, pleasant. .    Labs:   No recent new labs.   X ray reviewed by me today    ASSESSMENT/PLAN:     Pulled muscle  S/P cervical spinal fusion  Primary osteoarthritis involving multiple joints     Discussed treatment options,   Encouraged ice and then start heat if it feels better, can restart PT, but for now focus on pain control with NSAID short course, optimizing tyelnol and cont PRN ultram, will also use short course for muscle relaxor - not sure if insurance will cover.       Orders written by provider at facility and transcribed by : Faby Rose CMA  1.  Change Tyl arthritis to 1300 mg po tid.  2.  Discontinue Tylenol prn.  3.  Start ibuprofen 400 mg po bid x 5 days.  Dx:  Neck pain.  4.  Start Methocarbamol 500 mg po tid x 5 days.  Dx:  Muscle pain.    Electronically signed by:  CRISTAL Olmos CNP             Sincerely,        CRISTAL Olmos CNP

## 2020-01-22 NOTE — PROGRESS NOTES
[place] [facility]  [place] Medical Record Number:  [id number]  Place of Service where encounter took place:  [facility] ([facility]) [[id number]]  Chief Complaint   Patient presents with     Nursing Home Acute     HPI:    Yuni Otoole  is a 84 year old (1935), who is being seen today for an episodic care visit.  HPI information obtained from: facility chart records, facility staff, patient report and [facility] chart review.     Yuni is a resident of the LT wing of [place] since 2017 s/p cervical spinal fusion.   Medical issues have been:              Pain control for generalized and local pain in marv hips, knees, shoulders and neck. In the past treated with localized cortisone injections, frequent adjustment of pain medication per pt's request of Tylenol and NSAIDs and HS ultram, pain clinic visit 11/14 which stopped NSAID and changed tylenol to tylenol arthritis, repeat X rays done,  and possible future marv genicular nerve blocks and marv hip replacements, but noted high risk surgery. She is balancing/thinking about this still. Nsg /pt asking me to see her today r/t a pulled muscle in R side of her neck. X ray done and negative.   Currently ice in place and pt states slightly better, but still hurting quite a bit.     Past Medical and Surgical History reviewed in Epic today.    MEDICATIONS:  Reviewed.      REVIEW OF SYSTEMS:  4 point ROS including Respiratory, CV, GI and , other than that noted in the HPI,  is negative    Objective:  /71   Pulse 74   Temp 96.7  F (35.9  C)   Resp 20   Wt 65.8 kg (145 lb)   SpO2 96%   BMI 29.29 kg/m    Exam:  GENERAL APPEARANCE: Alert, in no distress, cooperative.  ENT: Mouth and posterior oropharynx normal, moist mucous membranes, hearing acuity at baseline [place], Neck with baseline decrease ROM. No swelling, redness. Slight increased tenderness to R Sternocleidomastoid muscle,   RESP: non labored breathing  CV: no edema  SKIN:  Inspection/Palpation of skin and subcutaneous tissue baseline w/ fragility.  NEURO: 2-12 at patient's baseline  PSYCH: Insight and judgement, memory baseline intact with notable STM loss, affect and mood calm, pleasant. .    Labs:   No recent new labs.   X ray reviewed by me today    ASSESSMENT/PLAN:     Pulled muscle  S/P cervical spinal fusion  Primary osteoarthritis involving multiple joints     Discussed treatment options,   Encouraged ice and then start heat if it feels better, can restart PT, but for now focus on pain control with NSAID short course, optimizing tyelnol and cont PRN ultram, will also use short course for muscle relaxor - not sure if insurance will cover.       Orders written by provider at facility and transcribed by : Faby Rose CMA  1.  Change Tyl arthritis to 1300 mg po tid.  2.  Discontinue Tylenol prn.  3.  Start ibuprofen 400 mg po bid x 5 days.  Dx:  Neck pain.  4.  Start Methocarbamol 500 mg po tid x 5 days.  Dx:  Muscle pain.    Electronically signed by:  CRISTAL Olmos CNP

## 2020-01-24 NOTE — RESULT ENCOUNTER NOTE
Shar-  There is no imaging associated with this report that is viewable. I did not order this xray. For some reason these results are sent to me and the Radiology interpretation is ambiguous and not diagnostic. Can you please ask the [facility] team to track down this imaging?  Thanks  KJ

## 2020-01-28 ENCOUNTER — NURSING HOME VISIT (OUTPATIENT)
Dept: GERIATRICS | Facility: CLINIC | Age: 85
End: 2020-01-28
Payer: COMMERCIAL

## 2020-01-28 VITALS
SYSTOLIC BLOOD PRESSURE: 125 MMHG | BODY MASS INDEX: 29.29 KG/M2 | HEART RATE: 73 BPM | WEIGHT: 145 LBS | TEMPERATURE: 98 F | OXYGEN SATURATION: 94 % | DIASTOLIC BLOOD PRESSURE: 67 MMHG | RESPIRATION RATE: 18 BRPM

## 2020-01-28 DIAGNOSIS — T14.8XXA PULLED MUSCLE: Primary | ICD-10-CM

## 2020-01-28 DIAGNOSIS — Z98.1 S/P CERVICAL SPINAL FUSION: ICD-10-CM

## 2020-01-28 PROCEDURE — 99307 SBSQ NF CARE SF MDM 10: CPT | Performed by: NURSE PRACTITIONER

## 2020-01-28 RX ORDER — METHOCARBAMOL 500 MG/1
500 TABLET, FILM COATED ORAL
COMMUNITY
Start: 2020-01-28 | End: 2020-02-12

## 2020-01-28 RX ORDER — IBUPROFEN 400 MG/1
400 TABLET, FILM COATED ORAL
COMMUNITY
Start: 2020-01-28 | End: 2020-02-12 | Stop reason: DRUGHIGH

## 2020-01-28 NOTE — LETTER
1/28/2020        RE: Yuni Otoole  [address]  [facility] [address]        [place] [facility]  [place] Medical Record Number:  [id number]  Place of Service where encounter took place:  [facility] ([facility]) [[id number]]  Chief Complaint   Patient presents with     Nursing Home Acute     HPI:    Yuni Otoole  is a 84 year old (1935), who is being seen today for an episodic care visit.  HPI information obtained from: facility chart records, facility staff and [facility] chart review.      Yuni is a resident of the LT wing of [place] since 2017 s/p cervical spinal fusion.   Medical issues have been:              Pain control for generalized and local pain in marv hips, knees, shoulders and neck. In the past treated with localized cortisone injections, frequent adjustment of pain medication per pt's request of Tylenol and NSAIDs and HS ultram, pain clinic visit 11/14 which stopped NSAID and changed tylenol to tylenol arthritis, repeat X rays done,   Ortho consult, and possible future marv genicular nerve blocks and marv hip replacements, but noted high risk surgery. She is balancing/thinking about this still.     Nsg /pt asking me to see her today r/t a pulled muscle in R side of her neck on 1/20. X ray done and negative. On 1/22 Tylenol increased, added scheduled ibuprofen and methocarbamol, now off and pain is sig today. requesting to be back on methocarbamol and ibuprofen.     Past Medical and Surgical History reviewed in Epic today.    MEDICATIONS:  Reviewed.      Objective:  /67   Pulse 73   Temp 98  F (36.7  C)   Resp 18   Wt 65.8 kg (145 lb)   SpO2 94%   BMI 29.29 kg/m     Exam:  Alert, no distress, up in chair at baseline.     ASSESSMENT/PLAN:  Pulled muscle and S/P cervical spinal fusion  Will restart methocarbamol and ibuprofen and have longer duration with taper.     Orders written by provider at facility and transcribed by : Faby  COLTON Rose  1.  Restart Methocarbamol 500 mg 1 tab po tid x 5 days then 1 tab po bid x 5 days then 1 tab po every day.  Dx:  Pulled neck muscle.  2.  Restart Ibuprofen 400 mg po bid x 3 days the po q am x 7 days.  Dx:  Neck pain.    Electronically signed by:  CRISTAL Olmos CNP             Sincerely,        CRISTAL Olmos CNP

## 2020-01-28 NOTE — PROGRESS NOTES
[place] [facility]  [place] Medical Record Number:  [id number]  Place of Service where encounter took place:  [facility] ([facility]) [[id number]]  Chief Complaint   Patient presents with     Nursing Home Acute     HPI:    Yuni Otoole  is a 84 year old (1935), who is being seen today for an episodic care visit.  HPI information obtained from: facility chart records, facility staff and [facility] chart review.      Yuni is a resident of the [facility] wing of [place] since 2017 s/p cervical spinal fusion.   Medical issues have been:              Pain control for generalized and local pain in marv hips, knees, shoulders and neck. In the past treated with localized cortisone injections, frequent adjustment of pain medication per pt's request of Tylenol and NSAIDs and HS ultram, pain clinic visit 11/14 which stopped NSAID and changed tylenol to tylenol arthritis, repeat X rays done,  Ortho consult, and possible future marv genicular nerve blocks and marv hip replacements, but noted high risk surgery. She is balancing/thinking about this still.     Nsg /pt asking me to see her today r/t a pulled muscle in R side of her neck on 1/20. X ray done and negative. On 1/22 Tylenol increased, added scheduled ibuprofen and methocarbamol, now off and pain is sig today. requesting to be back on methocarbamol and ibuprofen.     Past Medical and Surgical History reviewed in Epic today.    MEDICATIONS:  Reviewed.      Objective:  /67   Pulse 73   Temp 98  F (36.7  C)   Resp 18   Wt 65.8 kg (145 lb)   SpO2 94%   BMI 29.29 kg/m    Exam:  Alert, no distress, up in chair at baseline.     ASSESSMENT/PLAN:  Pulled muscle and S/P cervical spinal fusion  Will restart methocarbamol and ibuprofen and have longer duration with taper.     Orders written by provider at facility and transcribed by : Faby Rose CMA  1.  Restart Methocarbamol 500 mg 1 tab po tid x 5 days then 1 tab po bid x 5 days  then 1 tab po every day.  Dx:  Pulled neck muscle.  2.  Restart Ibuprofen 400 mg po bid x 3 days the po q am x 7 days.  Dx:  Neck pain.    Electronically signed by:  CRISTAL Olmos CNP

## 2020-01-30 DIAGNOSIS — E55.9 VITAMIN D DEFICIENCY: Primary | ICD-10-CM

## 2020-01-30 RX ORDER — CHOLECALCIFEROL (VITAMIN D3) 50 MCG
1 TABLET ORAL DAILY
Qty: 90 TABLET | Refills: 1 | Status: SHIPPED | OUTPATIENT
Start: 2020-01-30 | End: 2020-04-17

## 2020-01-30 NOTE — TELEPHONE ENCOUNTER
Vitamin D deficiency  Sent to [facility] mail order   - vitamin D3 (CHOLECALCIFEROL) 2000 units (50 mcg) tablet; Take 1 tablet (2,000 Units) by mouth daily

## 2020-02-03 DIAGNOSIS — S12.601S CLOSED NONDISPLACED FRACTURE OF SEVENTH CERVICAL VERTEBRA, UNSPECIFIED FRACTURE MORPHOLOGY, SEQUELA: ICD-10-CM

## 2020-02-03 RX ORDER — TRAMADOL HYDROCHLORIDE 50 MG/1
TABLET ORAL
Qty: 60 TABLET | Refills: 0 | Status: SHIPPED | OUTPATIENT
Start: 2020-02-03 | End: 2020-02-04

## 2020-02-04 DIAGNOSIS — S12.601S CLOSED NONDISPLACED FRACTURE OF SEVENTH CERVICAL VERTEBRA, UNSPECIFIED FRACTURE MORPHOLOGY, SEQUELA: ICD-10-CM

## 2020-02-04 RX ORDER — TRAMADOL HYDROCHLORIDE 50 MG/1
TABLET ORAL
Qty: 30 TABLET | Refills: 0 | Status: SHIPPED | OUTPATIENT
Start: 2020-02-04 | End: 2020-03-31

## 2020-02-10 ENCOUNTER — TELEPHONE (OUTPATIENT)
Dept: NEUROSURGERY | Facility: CLINIC | Age: 85
End: 2020-02-10

## 2020-02-10 NOTE — TELEPHONE ENCOUNTER
"[facility] Call Center    Phone Message    May a detailed message be left on voicemail: yes     Reason for Call: Other: per daughter, on 1/20 pt was being put to bed at the nursing home. staff used incorrect protocol for lifting patient and her head \"snapped back\" causing a large amount of pain. daughter would like to know if dr. melgoza would like any imaging to be done prior to appointment scheduled for later this month. please call Marlene back to discuss at [phone].      Action Taken: Message routed to:  [facility] ([facility]):  neurosurg    Travel Screening: Not Applicable                                                                        "

## 2020-02-12 DIAGNOSIS — M48.02 CERVICAL STENOSIS OF SPINE: ICD-10-CM

## 2020-02-12 DIAGNOSIS — M62.838 MUSCLE SPASM: Primary | ICD-10-CM

## 2020-02-12 RX ORDER — METHOCARBAMOL 500 MG/1
500 TABLET, FILM COATED ORAL EVERY MORNING
Qty: 30 TABLET | Refills: 1 | Status: SHIPPED | OUTPATIENT
Start: 2020-02-12 | End: 2020-03-02

## 2020-02-12 RX ORDER — SODIUM PHOSPHATE,MONO-DIBASIC 19G-7G/118
500 ENEMA (ML) RECTAL 2 TIMES DAILY
Qty: 60 CAPSULE | Refills: 3 | Status: SHIPPED | OUTPATIENT
Start: 2020-02-12 | End: 2020-04-17

## 2020-02-12 NOTE — TELEPHONE ENCOUNTER
I am having difficulties getting her medications to be filled so I am requesting that you send E-scripts for the following meds as she is getting low through [facility]:    Glucosamine (Prior Auth may cover it)  Methocarbomol (Prior Auth may cover it)  Phone number for Prior Auth is [phone]  Thank you so muchJ  Lowell Malik RN      Cervical stenosis of spine and Muscle spasm  - methocarbamol (ROBAXIN) 500 MG tablet; Take 1 tablet (500 mg) by mouth every morning  - glucosamine 500 MG CAPS capsule; Take 1 capsule (500 mg) by mouth 2 times daily

## 2020-02-14 ENCOUNTER — OFFICE VISIT (OUTPATIENT)
Dept: AUDIOLOGY | Facility: CLINIC | Age: 85
End: 2020-02-14
Payer: COMMERCIAL

## 2020-02-14 DIAGNOSIS — H90.3 SENSORINEURAL HEARING LOSS, BILATERAL: Primary | ICD-10-CM

## 2020-02-14 PROCEDURE — V5299 HEARING SERVICE: HCPCS | Performed by: AUDIOLOGIST

## 2020-02-14 PROCEDURE — 99207 ZZC NO CHARGE LOS: CPT | Performed by: AUDIOLOGIST

## 2020-02-14 NOTE — PROGRESS NOTES
Hearing Aid Check     SUBJECTIVE:  Yuni Otoole is a 84 year old year old female, seen today for a hearing aid check at [facility]. Patient reports she is not hearing speech (especially her soft spoken ) as well as she would like.  She also reports that the new earpieces seem to trap moisture (fit tighter than the previous earmolds).     OBJECTIVE:  Otoscopic exam indicates cerumen in the right ear moderate non-occluding deep enough I could not remove it in clinic, left clear no wax.     Hearing aid maintenance was completed to include vacuuming mics an cleaning gasper filter, removing wax filter and vacuuming , replaced gasper filter.      Programming changes were made to copy the current normal program and increase low frequency MPO and increase gain below 2000 Hz.     PLAN:   Recommended replacement of receivers to add pressure vent.  Call Mr. Otoole when replacement receivers arrive to schedule fitting.    Bennett Sharp Licensed Audiologist #[id number]

## 2020-02-18 ENCOUNTER — TELEPHONE (OUTPATIENT)
Dept: FAMILY MEDICINE | Facility: OTHER | Age: 85
End: 2020-02-18

## 2020-02-18 NOTE — TELEPHONE ENCOUNTER
Spoke with Dr. Farrah dela cruz to have Ears cleaned on float scheduled. Called [facility] and appointment made when they can get her ride on 2/24/2020 @ 10:00am      Bridgett Moore MA

## 2020-02-18 NOTE — TELEPHONE ENCOUNTER
Reason for Call: Request for an order or referral:    Order or referral being requested: [facility] is calling asking if Yuni can be seen by a nurse for an ear cleaning. Is she able to do this without seeing PCP?     Date needed: as soon as possible    Has the patient been seen by the PCP for this problem? YES    Additional comments:     Phone number Patient can be reached at:  Other phone number:  Jina [place] [phone]    Best Time:  any    Can we leave a detailed message on this number?  YES    Call taken on 2/18/2020 at 11:18 AM by Aliza Stovall CNA

## 2020-02-21 DIAGNOSIS — M41.9 SCOLIOSIS: Primary | ICD-10-CM

## 2020-02-24 ENCOUNTER — ALLIED HEALTH/NURSE VISIT (OUTPATIENT)
Dept: FAMILY MEDICINE | Facility: OTHER | Age: 85
End: 2020-02-24
Payer: COMMERCIAL

## 2020-02-24 DIAGNOSIS — H61.21 IMPACTED CERUMEN OF RIGHT EAR: Primary | ICD-10-CM

## 2020-02-24 PROCEDURE — 99207 ZZC NO CHARGE NURSE ONLY: CPT

## 2020-02-24 NOTE — PROGRESS NOTES
Patient identified using two patient identifiers.  Ear exam showing wax occlusion completed by RN.  Solution: warm water was placed in the right ear(s) via irrigation tool: elephant ear.  Patient tolerated earwash and cerumen was removed.     Health Maintenance Due   Topic Date Due     URINE DRUG SCREEN  1935     ZOSTER IMMUNIZATION (2 of 3) 04/01/2013     MEDICARE ANNUAL WELLNESS VISIT  10/22/2014     FALL RISK ASSESSMENT  05/22/2019     PHQ-2  01/01/2020       Health Maintenance reviewed at today's visit patient asked to schedule/complete:   Routine Health Visit:  Patient agrees to schedule      Bridgett Moore MA

## 2020-02-27 ENCOUNTER — ANCILLARY PROCEDURE (OUTPATIENT)
Dept: GENERAL RADIOLOGY | Facility: CLINIC | Age: 85
End: 2020-02-27
Attending: NEUROLOGICAL SURGERY
Payer: COMMERCIAL

## 2020-02-27 ENCOUNTER — OFFICE VISIT (OUTPATIENT)
Dept: NEUROSURGERY | Facility: CLINIC | Age: 85
End: 2020-02-27
Payer: COMMERCIAL

## 2020-02-27 VITALS
BODY MASS INDEX: 30.07 KG/M2 | HEART RATE: 96 BPM | SYSTOLIC BLOOD PRESSURE: 147 MMHG | WEIGHT: 148.9 LBS | DIASTOLIC BLOOD PRESSURE: 79 MMHG | OXYGEN SATURATION: 95 %

## 2020-02-27 DIAGNOSIS — M43.23 FUSION OF SPINE, CERVICOTHORACIC REGION: Primary | ICD-10-CM

## 2020-02-27 DIAGNOSIS — M41.9 SCOLIOSIS: ICD-10-CM

## 2020-02-27 ASSESSMENT — PAIN SCALES - GENERAL: PAINLEVEL: NO PAIN (0)

## 2020-02-27 NOTE — PROGRESS NOTES
"    Neurosurgery Clinic Note    Chief Complaint: neck and shoulder pain    History of Present Illness:  It was a pleasure to evaluate Yuni Otoole in clinic today.    Yuni Otoole is a 84 year old female presenting with neck pain that started about 6 weeks ago when Mrs. Otoole was lifted incorrectly at her nursing home and her head \"snapped back\", resulting in a significant amount of pain. Pain was right sided and has improved some since then.    She is now over two years out from her surgery (11/15/2017, operative report below) for chin on chest deformity with 63 degrees rigid kyphoscoliosis and severe spinal cord compression, treated with  posterior-anterior-posterior fusion C2-T3.     Prior to surgery she had quadriparesis, neck pain, weakness/numbness in bilateral upper extremities, inability to feed herself due to inability to grasp utensils, difficulty with urination, loss of appetite, and weight loss. Following surgery, she regained nearly full strength in her upper and lower extremities and resolution of her urinary symptoms.    She has gained some weight since we last saw her which is great because she was severely malnourished prior to presenting for surgery since she had stopped being able to eat.     She has right hip pain and bilateral shoulder pain.       Operative Report 11/15/17:  Pre-operative diagnosis:   1. 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 Cervical stenosis with myelopathy  3. Malnutrition  4. Osteoporosis and vitamin D deficiency  5. SIADH-induced hyponatremia      Post-operative diagnosis: same   Procedure: three intraoperative position changes:  Part 1:  1. Stealth-guided posterior segmental instrumentation C2-T3  2. Cervical laminectomy C1-C3  3. Facet osteotomies Cervical 2-3, Cervical 4-5, Cervical 6-7,  4. Use of intraoperative neuromonitoring  5. Use of intraoperative fluoroscopy  6. Use of intraoperative neuronavigation      Part 2:   1. " Right Approach Cervical 2-3, Cervical 4-5 and Cervical 6-7 Anterior Cervical Discectomy And Fusion  2. Bunn Wells tongs placement for cervical traction for deformity reduction  3. Use of intraoperative microscope  4. Use of intraoperative neuromonitoring  5. Use of intraoperative fluoroscopy      Part 3:   1. Cervical 2-Thoracic 3 Posterior Fusion With Autograft And Allograft  2. Reduction of cervical spine with osteotomy closure and kyphoscoliosis correction  3. Use of intraoperative neuromonitoring  4. Use of intraoperative fluoroscopy  Surgeon: Enma López MD  Co surgeon:  Casey Merritt MD  Assistant(s): Mila Carvajal MD, PGY-6 resident  Anesthesia: GETA  EBL: 725 cc  Fluids:   600 cc PRBC  275 cc Cell saver  1700 cc crystalloid  1250 cc albumin      UOP: 800 cc  Drains: anterior: 7 french flat KELLIE to bulb suction; posterior: 7 french flat KELLIE to bulb suction  Specimens: none  Implants: Synthes synapse screws with 3.5mm to 5mm transitional rods posteriorly; zero P lordotic anterior implants   Findings: Significant stenosis at C1-C2, well decompressed. Good reduction seen on final XR      Indications for Surgery:  Yuni Otoole is a 82 year old female presenting to the Emergency room with numbness in her hands bilaterally and severe bilateral upper extremity and proximal lower extremity weakness, referable to severe C1-C2 cervical stenosis with progressive kyphotic deformity and kyphoscoliosis with chin on chest deformity. Also notable is severe malnutrition from poor oral intake without given of 3 and severe vitamin D deficiency with osteoporosis. The patient had previously seen Dr. Yuan in clinic who recommended considering surgical intervention, however the patient was lost to follow-up and presented in extremis. My partner on call at her admission Dr. Merritt asked me to take over surgical care and I met with the patient and her family in detail to explain operative risks, benefits, and  alternatives.      Review of Systems   Constitutional: Negative for activity change, appetite change, chills, fatigue, fever and unexpected weight change.   HENT: Negative for trouble swallowing.    Eyes: Negative for visual disturbance.   Respiratory: Negative for shortness of breath.    Cardiovascular: Negative for leg swelling.  Gastrointestinal: Negative for abdominal pain, nausea and vomiting.   Endocrine: Negative for cold intolerance.  Genitourinary: Negative for incontinence, frequency and urgency.   Musculoskeletal: Positive for back pain. Negative for gait problem, neck pain and neck stiffness.  Skin: Negative for color change  Allergic/Immunologic: Negative for immunocompromised state.  Neurological: Negative for tremors, speech difficulty, weakness, numbness and headaches.   Hematological: Does not bruise/bleed easily.   Psychiatric/Behavioral: The patient is not nervous/anxious.       Past Medical History:   Diagnosis Date     Allergic rhinitis, cause unspecified      Head injury      Pure hypercholesterolemia      Unspecified essential hypertension        Past Surgical History:   Procedure Laterality Date     CAROTID ENDARTERECTOMY  11/07/08     COLONOSCOPY  05/30/07    Diverticulosis-return in 5 yrs     EYE SURGERY       FUSION CERVICAL POSTERIOR THREE+ LEVELS N/A 11/15/2017    Procedure: FUSION CERVICAL POSTERIOR THREE+ LEVELS;;  Surgeon: Enma López MD;  Location: [facility] OR     HC DRAIN/INJ MAJOR JOINT/BURSA W/O US  2009    Right SI Joint     HC DRAIN/INJ MAJOR JOINT/BURSA W/O US  2010    Right interarticular hip injection     HC INJ EPIDURAL CERVICAL/THORACIC W/WO CONTRAST  2010    C6-7     HC INJ EPIDURAL LUMBAR/SACRAL W/WO CONTRAST  2009    L5-S1     HC INJ TRANSFORAMIN EPIDURAL, LUMB/SACR SINGLE  2010     HC REMOVAL OF TONSILS,14+ Y/O      Tonsils 14+y.o.     LAMINECTOMY CERIVCAL POSTERIOR THREE+ LEVELS N/A 11/15/2017    Procedure: LAMINECTOMY CERVICAL POSTERIOR THREE+ LEVELS;;   Surgeon: Enma López MD;  Location: [facility] OR     OPTICAL TRACKING SYSTEM FUSION CERVICAL ANTERIOR THREE + LEVELS N/A 11/15/2017    Procedure: OPTICAL TRACKING SYSTEM FUSION CERVICAL ANTERIOR THREE + LEVELS;;  Surgeon: Enma López MD;  Location: [facility] OR     OPTICAL TRACKING SYSTEM FUSION POSTERIOR CERVICAL THREE + LEVELS N/A 11/15/2017    Procedure: OPTICAL TRACKING SYSTEM FUSION POSTERIOR CERVICAL THREE + LEVELS;  Cervical 1-3 Posterior Decompression With Laminectomies, O-Arm/ Stealth Assisted Cervical 2-Thoracic 3 Posterior Instrumented Fusion With Osteotomies Cervical 2-3, Cervical C4-5; Cervical 6-7 ; Right Approach Cervical 2-3, Cervical 4-5 and Cervical 6-7 Anterior Cervical Discectomy And Fusion, Bunn Wells Cervical Tracti       Social History     Socioeconomic History     Marital status:      Spouse name: Mateus     Number of children: 4     Years of education: 12     Highest education level: Not on file   Occupational History     Occupation:  (retired)     Comment: [employer].     Employer: RETIRED   Social Needs     Financial resource strain: Not on file     Food insecurity:     Worry: Not on file     Inability: Not on file     Transportation needs:     Medical: Not on file     Non-medical: Not on file   Tobacco Use     Smoking status: Former Smoker     Packs/day: 0.00     Years: 10.00     Pack years: 0.00     Types: Cigarettes     Start date: 5/10/1970     Last attempt to quit: 1996     Years since quittin.1     Smokeless tobacco: Never Used   Substance and Sexual Activity     Alcohol use: No     Alcohol/week: 0.0 standard drinks     Drug use: No     Sexual activity: Not Currently     Partners: Male     Birth control/protection: None   Lifestyle     Physical activity:     Days per week: Not on file     Minutes per session: Not on file     Stress: Not on file   Relationships     Social connections:     Talks on phone: Not on file     Gets together: Not  on file     Attends Hoahaoism service: Not on file     Active member of club or organization: Not on file     Attends meetings of clubs or organizations: Not on file     Relationship status: Not on file     Intimate partner violence:     Fear of current or ex partner: Not on file     Emotionally abused: Not on file     Physically abused: Not on file     Forced sexual activity: Not on file   Other Topics Concern      Service No     Blood Transfusions No     Caffeine Concern No     Comment: 4-6 cups / day     Occupational Exposure No     Comment:  (retired now)     Hobby Hazards No     Comment: gardening     Sleep Concern No     Stress Concern No     Weight Concern Yes     Comment: w'd like to be 10# less     Special Diet No     Back Care Yes     Comment: wears a magnet belt at home     Exercise Yes     Comment: gardening and flower beds-walking in the school     Bike Helmet No     Seat Belt Yes     Self-Exams Yes     Parent/sibling w/ CABG, MI or angioplasty before 65F 55M? Yes   Social History Narrative     Not on file       family history includes Cancer in her daughter and mother; Cardiovascular in her father; Cerebrovascular Disease in her paternal grandmother; Circulatory in her paternal grandmother; Diabetes in her maternal aunt; EYE* in her mother; Gastrointestinal Disease in her mother; Gynecology in her sister; Hypertension in her father; Lipids in her brother; Musculoskeletal Disorder in her daughter; Obesity in her maternal grandmother and paternal grandmother; Osteoporosis in her mother.        IMAGING per my own measurement and interpretation:  Xrays 02/27/20  :no obvious fractures or instrumentation failure    Resulted Imaging/Labs:  Bone Density:  Xr Leg Length Evaluation    Result Date: 5/22/2018  XR SPINE COMPLETE 2 VW, XR LEG LENGTH EVALUATION 5/22/2018 10:52 AM HISTORY:  ; Spinal stenosis in cervical region COMPARISON: Previous spine films 2/22/2018. FINDINGS: Full length  films demonstrate a line drawn from the middle of the head falls just to the left of S1 by 1.4 cm. This is on the AP film. On the lateral film a line drawn caudally from this external auditory meatus falls 7.8 cm anterior the posterior superior aspect of S1. Changes from attempted fusion cervical spine approximately C2-T2 with screws and rods posteriorly. Rotoscoliotic curve convex left in the lumbar spine extending to the lower thoracic spine measuring approximately 32 degrees. Marked loss of disc space height on the concave side of that curve. Leftward slip of L3 on L4 and L4 and L5 similar to previous film. There degenerative changes seen in the hip joints bilaterally with marked flattening of the femoral heads and subchondral sclerotic and cystic changes noted. Dysplastic right acetabulum with pseudoacetabulum superiorly into the right. The findings in the hips is consistent with congenital hip dysplasia. The pelvis is horizontal. There is a complete rotator cuff tear on the right and probably on the left with superior migration of the humeral head and undersurface erosion of the acromium and distal right clavicle.     IMPRESSION: 1. Ages from attempted fusion cervical spine to thoracic spine posteriorly. 2. Coronal and sagittal balance as noted above. 3. Findings consistent with Congenital hip dysplasia with severe degenerative changes in both hip joints. 4. Rotoscoliosis lumbar spine with severe degenerative changes as noted above. 5. Rotator cuff arthropathy on the right and possibly on the left. ELIANE DANIELSON MD    Mri Lumbar Spine Without Gadolinium [[id number]]    Result Date: 12/11/2019  MR LUMBAR SPINE WITHOUT CONTRAST 12/11/2019 10:13 AM HISTORY: S1 radiculopathy. Lumbar radiculopathy, chronic. TECHNIQUE: Multiplanar multisequence images were obtained through the lumbar spine without contrast. COMPARISON: 12/11/2018. FINDINGS: Five lumbar type vertebral bodies are present. There is moderate scoliosis with  leftward lateral curvature. Posterior alignment is normal. There is severe disc space narrowing at L2-L3, L3-L4, and L4-L5 and some moderate disc space narrowing at L1-L2 and L5-S1. The conus medullaris is normal in appearance with its tip at the L1 level. Bone marrow signal intensity is normal. T12-L1: Minimal disc bulge. No stenosis. L1-L2: Broad-based disc bulging and moderate facet hypertrophy is present. There is mild central canal stenosis, severe right-sided foraminal stenosis and moderate left-sided foraminal stenosis. L2-L3: Broad-based posterior osteophyte formation, disc bulging and mild-to-moderate facet and ligamentum flavum hypertrophy is present. There is moderate left-sided foraminal stenosis, mild to moderate right-sided foraminal stenosis and borderline to mild central canal stenosis. L3-L4: Broad-based posterior osteophyte formation, disc bulging and moderate to severe facet hypertrophy is present. There is secondary moderate to severe central canal stenosis, moderate to severe right-sided foraminal stenosis and severe left-sided foraminal stenosis. L4-L5: There is a central osteophyte disc complex, broad-based disc bulging or posterior osteophyte formation and moderate to severe facet hypertrophy. This is causing moderate to severe central canal stenosis, moderate to severe left-sided foraminal stenosis and mild-to-moderate right-sided foraminal stenosis. L5-S1: Moderate to severe facet hypertrophy is present along with broad-based disc bulging. There is moderate to severe left-sided foraminal stenosis, moderate central canal stenosis and mild right-sided foraminal stenosis. Paraspinal soft tissues: Unremarkable as visualized.     IMPRESSION: Advanced degenerative scoliosis most advanced at L3-L4 and L4-L5 where there are moderate to severe central canal stenoses. There is a central disc osteophyte complex at L4-L5 contributing to the stenosis. There are also multilevel moderate to severe  foraminal stenoses. SOTERO RAMIRES MD    Mri Lumbar Spine Without Gadolinium [[id number]]    Result Date: 12/11/2018  MRI LUMBAR SPINE WITHOUT CONTRAST   12/11/2018 3:09 PM HISTORY: Left leg pain COMPARISON: An MRI on 9/10/2014. TECHNIQUE: Multiplanar MR imaging was performed without contrast. FINDINGS: Numbering of the levels is based on what appear to be five lumbar type vertebral bodies. There is a prominent left convexity curvature with a rotatory component of the mid lumbar spine. There has been no change in a degenerative grade 1 spondylolisthesis at L5-S1. Vertebral body alignment is otherwise normal. No fracture is seen. No pars interarticularis defect is demonstrated. No osseous lesion is seen. No abnormal marrow signal intensity is identified. The conus medullaris terminates at the level of the T12-L1 disc. No intrathecal abnormality is seen. The adjacent soft tissues are unremarkable. Findings by specific level: T12-L1: The disc and facet joints are normal. No stenosis is seen. L1-L2: There is moderate disc height loss. There is a prominent diffuse disc bulge with a superimposed small focal central disc extrusion. There are moderate degenerative changes in the facet joints. There is mild central canal stenosis with severe right and mild left neural foraminal stenosis. This level is unchanged. L2-L3: There is marked disc height loss. There is a diffuse disc bulge and marginal osteophyte formation with no focal disc herniation seen. There are moderate degenerative changes in the facet joints and mild prominence of the ligamentum flava. There is mild central canal stenosis with severe right foraminal stenosis and mild left foraminal stenosis. This level is unchanged. L3-L4: There is marked disc height loss. There is a prominent disc bulge with no focal herniation seen. There are prominent degenerative changes in the facet joints. There is severe central canal stenosis and bilateral neural foraminal stenosis. This  level is unchanged. L4-L5: There is complete disc height loss on the left with what may be acquired fusion across the disc space anteriorly and on the left. This is similar to the previous study. Again seen is a diffuse disc bulge with a superimposed moderate-sized broad-based left central disc protrusion. There are advanced degenerative changes of the left facet joint with possible bone bridging. There are mild to moderate degenerative changes of the right facet joint with moderate prominence of the ligamentum flava. Again seen is severe central canal stenosis with moderate left and mild to moderate right neural foraminal stenosis. This level is unchanged. L5-S1: There is mild posterior disc height loss. There is a moderately prominent diffuse disc bulge with no focal herniation seen. There are prominent hypertrophic degenerative changes in the facet joints with marked prominence of the ligamentum flavum. Again seen is moderate to severe central canal stenosis with severe left foraminal stenosis. The right neural foramen is widely patent. This level is unchanged.     IMPRESSION: I see no significant change since the previous MRI. Again seen are diffuse degenerative changes throughout the lumbar spine with a small central disc extrusion at L1-L2 and a moderate-sized left central disc protrusion at L4-L5. There is severe central canal stenosis at both L3-L4 and L4-L5 with severe foraminal stenosis on the right at both L1-L2 and L2-L3, bilaterally at L3-L4, and on the left at L5-S1. Less extensive stenosis is seen elsewhere as described above. PRECIOUS MCALLISTER MD    X-ray Spine Complete (cervicothoracolumbar Ap And Lateral - Standing Views Preferred) [[id number]]    Result Date: 11/29/2018  Exam: XR SPINE COMPLETE SCOLIOSIS 2 VW, 11/29/2018 12:09 PM Indication: sagittal and coronal balance; Spinal stenosis in cervical region Techniques: AP and lateral EOS composite images of full spine images were submitted for  interpretation. Comparison: 5/22/2018, 1/4/2018 Findings: 12 rib bearing vertebral bodies and 5 lumbar type vertebral bodies are identified. Posterior fusion instrumentation extending from C2 through T3, with anterior fusion instrumentation at C2-C3, C4-C5 and C6-C7. No evidence of hardware complication. Multilevel at least moderate disc height loss in the lumbar spine, precise evaluation of disc height loss is not possible due to degree of curvature. Lower lumbar predominant multilevel facet arthropathy. Coronal Deformity: There is a moderate  convexed left curvature of the thoracolumbar/lumbar spine with apex at L2. No substantial global coronal imbalance. Sagittal Vertical Axis (A vertical line drawn from the center of C7 (mady line) to the posterosuperior aspect of the S1 on sagittal plane):  positive Additional Findings: Severe degenerative change/dysplasia of both hips is not substantially changed. No acute osseous abnormality.  There is a nonobstructive bowel gas pattern. Vascular calcifications.     Impression: 1. Fusion instrumentation extending from C2 through T3 without evidence of hardware complication. 2. Moderate convex left curvature of the thoracolumbar/lumbar spine with apex at L2. 3. No substantial global coronal imbalance. 4. Positive sagittal vertical axis. 5. Multilevel disc height loss in the lumbar spine. 6. Chronic severe degenerative change/dysplasia of both hips. MYA DE LEON MD    X-ray Spine Complete (cervicothoracolumbar Ap And Lateral - Standing Views Preferred) [[id number]]    Result Date: 5/22/2018  XR SPINE COMPLETE 2 VW, XR LEG LENGTH EVALUATION 5/22/2018 10:52 AM HISTORY:  ; Spinal stenosis in cervical region COMPARISON: Previous spine films 2/22/2018. FINDINGS: Full length films demonstrate a line drawn from the middle of the head falls just to the left of S1 by 1.4 cm. This is on the AP film. On the lateral film a line drawn caudally from this external auditory meatus falls  7.8 cm anterior the posterior superior aspect of S1. Changes from attempted fusion cervical spine approximately C2-T2 with screws and rods posteriorly. Rotoscoliotic curve convex left in the lumbar spine extending to the lower thoracic spine measuring approximately 32 degrees. Marked loss of disc space height on the concave side of that curve. Leftward slip of L3 on L4 and L4 and L5 similar to previous film. There degenerative changes seen in the hip joints bilaterally with marked flattening of the femoral heads and subchondral sclerotic and cystic changes noted. Dysplastic right acetabulum with pseudoacetabulum superiorly into the right. The findings in the hips is consistent with congenital hip dysplasia. The pelvis is horizontal. There is a complete rotator cuff tear on the right and probably on the left with superior migration of the humeral head and undersurface erosion of the acromium and distal right clavicle.     IMPRESSION: 1. Ages from attempted fusion cervical spine to thoracic spine posteriorly. 2. Coronal and sagittal balance as noted above. 3. Findings consistent with Congenital hip dysplasia with severe degenerative changes in both hip joints. 4. Rotoscoliosis lumbar spine with severe degenerative changes as noted above. 5. Rotator cuff arthropathy on the right and possibly on the left. ELIANE DANIELSON MD      Vitamin D:  Vitamin D Deficiency Screening Results:  Lab Results   Component Value Date    VITDT 17 (L) 11/13/2017     25 OH Vit D2   Date Value Ref Range Status   05/22/2018 <5 ug/L Final   01/04/2018 36 ug/L Final   11/20/2017 <5 ug/L Final     25 OH Vit D3   Date Value Ref Range Status   05/22/2018 63 ug/L Final   01/04/2018 44 ug/L Final   11/20/2017 21 ug/L Final     25 OH Vit D total   Date Value Ref Range Status   05/22/2018 <68 20 - 75 ug/L Final     Comment:     Season, race, dietary intake, and treatment affect the concentration of   25-hydroxy-Vitamin D. Values may decrease during  winter months and increase   during summer months. Values 20-29 ug/L may indicate Vitamin D insufficiency   and values <20 ug/L may indicate Vitamin D deficiency.  This test was developed and its performance characteristics determined by the   [facility],  [facility]. It has   not been cleared or approved by the FDA. The laboratory is regulated under   CLIA as qualified to perform high-complexity testing. This test is used for   clinical purposes. It should not be regarded as investigational or for   research.     01/04/2018 80 (H) 20 - 75 ug/L Final     Comment:     Season, race, dietary intake, and treatment affect the concentration of   25-hydroxy-Vitamin D. Values may decrease during winter months and increase   during summer months. Values 20-29 ug/L may indicate Vitamin D insufficiency   and values <20 ug/L may indicate Vitamin D deficiency.  This test was developed and its performance characteristics determined by the   [facility],  [facility]. It has   not been cleared or approved by the FDA. The laboratory is regulated under   CLIA as qualified to perform high-complexity testing. This test is used for   clinical purposes. It should not be regarded as investigational or for   research.     11/20/2017 <26 20 - 75 ug/L Final     Comment:     Season, race, dietary intake, and treatment affect the concentration of   25-hydroxy-Vitamin D. Values may decrease during winter months and increase   during summer months. Values 20-29 ug/L may indicate Vitamin D insufficiency   and values <20 ug/L may indicate Vitamin D deficiency.  This test was developed and its performance characteristics determined by the   [facility],  [facility]. It has   not been cleared or approved by the FDA. The laboratory is regulated under   CLIA as qualified to perform high-complexity testing. This test  "is used for   clinical purposes. It should not be regarded as investigational or for   research.           Nutritional Status:  Estimated body mass index is 30.07 kg/m  as calculated from the following:    Height as of 7/3/19: 1.499 m (4' 11\").    Weight as of this encounter: 67.5 kg (148 lb 14.4 oz).    Lab Results   Component Value Date    ALBUMIN 3.4 06/19/2018       Diabetes Screening:  No results found for: A1C    Nicotine Usage:  No                Physical Exam   BP (!) 147/79 (BP Location: Left arm, Patient Position: Sitting, Cuff Size: Adult Regular)   Pulse 96   Wt 67.5 kg (148 lb 14.4 oz)   SpO2 95%   BMI 30.07 kg/m    Constitutional: Oriented to person, place, and time. Appears well-developed and well-nourished. Cooperative. No distress.   HENT:   Head: Normocephalic and atraumatic.   Eyes: Conjunctivae are normal.  Neck: Normal range of motion. Neck supple. No spinous process tenderness and no muscular tenderness present. No tracheal deviation present.  Cardiovascular: Normal rate and regular rhythm.    Pulmonary/Chest: Effort normal and breath sounds normal.  Abdominal: Soft. Bowel sounds are normal. Exhibits no distension. There is no tenderness.   Musculoskeletal:   Cervical flexion-extension range of motion: limited due to prior fusion    Neurological: alert and oriented to person, place, and time.   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   Psychiatric: Normal mood and affect. Speech is normal and behavior is normal.        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 We will obtain a CT scan of her cervical spine in the near future to better assess if her neck-related incident has any associated radiographic pathology.   I think her inability to raise her arms above her head is due to arthritic changes of shoulders and not related to the neck itself- she has full strength of C5-innervated bicep muscles, so deltoid weakness is not caused by C5 radiculopathy.    Regarding her right hip pain, Dr. Singleton did not recommend surgery for her. 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    Her daughter will let us know when CT scans are done and I will review and call her daughter back with results.          Enma López MD    [facility] Department of Neurosurgery  Complex Spinal Deformity, Scoliosis, and Minimally Invasive Spine Surgery Specialist  Office: [phone]    2/27/2020      I spent 15 minutes in patient care with greater than 50% spent in counseling and/or coordination of care.  I, John Sultana, am serving as a scribe to document services personally performed by Enma López MD, based upon my observations and the provider's statements to me. All documentation has been reviewed and edited by the aforementioned doctor prior to being entered into the official medical record.

## 2020-02-27 NOTE — LETTER
"2/27/2020       RE: Yuni Otoole  [address] [facility] [address]     Dear Colleague,    Thank you for referring your patient, Yuni Otoole, to the [facility] NEUROSURGERY at [facility]. Please see a copy of my visit note below.        Neurosurgery Clinic Note    Chief Complaint: neck and shoulder pain    History of Present Illness:  It was a pleasure to evaluate Yuni Otoole in clinic today.    Yuni Otoole is a 84 year old female presenting with neck pain that started about 6 weeks ago when Mrs. Otoole was lifted incorrectly at her nursing home and her head \"snapped back\", resulting in a significant amount of pain. Pain was right sided and has improved some since then.    She is now over two years out from her surgery (11/15/2017, operative report below) for chin on chest deformity with 63 degrees rigid kyphoscoliosis and severe spinal cord compression, treated with  posterior-anterior-posterior fusion C2-T3.     Prior to surgery she had quadriparesis, neck pain, weakness/numbness in bilateral upper extremities, inability to feed herself due to inability to grasp utensils, difficulty with urination, loss of appetite, and weight loss. Following surgery, she regained nearly full strength in her upper and lower extremities and resolution of her urinary symptoms.    She has gained some weight since we last saw her which is great because she was severely malnourished prior to presenting for surgery since she had stopped being able to eat.     She has right hip pain and bilateral shoulder pain.       Operative Report 11/15/17:  Pre-operative diagnosis:   1. 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 Cervical stenosis with myelopathy  3. Malnutrition  4. Osteoporosis and vitamin D deficiency  5. SIADH-induced hyponatremia      Post-operative diagnosis: same   Procedure: three intraoperative position changes:  Part 1:  1. " Stealth-guided posterior segmental instrumentation C2-T3  2. Cervical laminectomy C1-C3  3. Facet osteotomies Cervical 2-3, Cervical 4-5, Cervical 6-7,  4. Use of intraoperative neuromonitoring  5. Use of intraoperative fluoroscopy  6. Use of intraoperative neuronavigation      Part 2:   1. Right Approach Cervical 2-3, Cervical 4-5 and Cervical 6-7 Anterior Cervical Discectomy And Fusion  2. [place] tongs placement for cervical traction for deformity reduction  3. Use of intraoperative microscope  4. Use of intraoperative neuromonitoring  5. Use of intraoperative fluoroscopy      Part 3:   1. Cervical 2-Thoracic 3 Posterior Fusion With Autograft And Allograft  2. Reduction of cervical spine with osteotomy closure and kyphoscoliosis correction  3. Use of intraoperative neuromonitoring  4. Use of intraoperative fluoroscopy  Surgeon: Enma López MD  Co surgeon:  Casey Merritt MD  Assistant(s): Mila Carvajal MD, PGY-6 resident  Anesthesia: GETA  EBL: 725 cc  Fluids:   600 cc PRBC  275 cc Cell saver  1700 cc crystalloid  1250 cc albumin      UOP: 800 cc  Drains: anterior: 7 Telugu flat KELLIE to bulb suction; posterior: 7 Telugu flat KELLIE to bulb suction  Specimens: none  Implants: Synthes synapse screws with 3.5mm to 5mm transitional rods posteriorly; zero P lordotic anterior implants   Findings: Significant stenosis at C1-C2, well decompressed. Good reduction seen on final XR      Indications for Surgery:  Yuni Otoole is a 82 year old female presenting to the Emergency room with numbness in her hands bilaterally and severe bilateral upper extremity and proximal lower extremity weakness, referable to severe C1-C2 cervical stenosis with progressive kyphotic deformity and kyphoscoliosis with chin on chest deformity. Also notable is severe malnutrition from poor oral intake without given of 3 and severe vitamin D deficiency with osteoporosis. The patient had previously seen Dr. Yuan in clinic who recommended  considering surgical intervention, however the patient was lost to follow-up and presented in extremis. My partner on call at her admission Dr. Merritt asked me to take over surgical care and I met with the patient and her family in detail to explain operative risks, benefits, and alternatives.      Review of Systems   Constitutional: Negative for activity change, appetite change, chills, fatigue, fever and unexpected weight change.   HENT: Negative for trouble swallowing.    Eyes: Negative for visual disturbance.   Respiratory: Negative for shortness of breath.    Cardiovascular: Negative for leg swelling.  Gastrointestinal: Negative for abdominal pain, nausea and vomiting.   Endocrine: Negative for cold intolerance.  Genitourinary: Negative for incontinence, frequency and urgency.   Musculoskeletal: Positive for back pain. Negative for gait problem, neck pain and neck stiffness.  Skin: Negative for color change  Allergic/Immunologic: Negative for immunocompromised state.  Neurological: Negative for tremors, speech difficulty, weakness, numbness and headaches.   Hematological: Does not bruise/bleed easily.   Psychiatric/Behavioral: The patient is not nervous/anxious.       Past Medical History:   Diagnosis Date     Allergic rhinitis, cause unspecified      Head injury      Pure hypercholesterolemia      Unspecified essential hypertension        Past Surgical History:   Procedure Laterality Date     CAROTID ENDARTERECTOMY  11/07/08     COLONOSCOPY  05/30/07    Diverticulosis-return in 5 yrs     EYE SURGERY       FUSION CERVICAL POSTERIOR THREE+ LEVELS N/A 11/15/2017    Procedure: FUSION CERVICAL POSTERIOR THREE+ LEVELS;;  Surgeon: Enma López MD;  Location: [facility] OR     HC DRAIN/INJ MAJOR JOINT/BURSA W/O US  2009    Right SI Joint     HC DRAIN/INJ MAJOR JOINT/BURSA W/O US  2010    Right interarticular hip injection     HC INJ EPIDURAL CERVICAL/THORACIC W/WO CONTRAST  2010    C6-7     HC INJ EPIDURAL  LUMBAR/SACRAL W/WO CONTRAST      L5-S1     HC INJ TRANSFORAMIN EPIDURAL, LUMB/SACR SINGLE       HC REMOVAL OF TONSILS,14+ Y/O      Tonsils 14+y.o.     LAMINECTOMY CERIVCAL POSTERIOR THREE+ LEVELS N/A 11/15/2017    Procedure: LAMINECTOMY CERVICAL POSTERIOR THREE+ LEVELS;;  Surgeon: Enma López MD;  Location: [facility] OR     OPTICAL TRACKING SYSTEM FUSION CERVICAL ANTERIOR THREE + LEVELS N/A 11/15/2017    Procedure: OPTICAL TRACKING SYSTEM FUSION CERVICAL ANTERIOR THREE + LEVELS;;  Surgeon: Enma López MD;  Location: [facility] OR     OPTICAL TRACKING SYSTEM FUSION POSTERIOR CERVICAL THREE + LEVELS N/A 11/15/2017    Procedure: OPTICAL TRACKING SYSTEM FUSION POSTERIOR CERVICAL THREE + LEVELS;  Cervical 1-3 Posterior Decompression With Laminectomies, O-Arm/ Stealth Assisted Cervical 2-Thoracic 3 Posterior Instrumented Fusion With Osteotomies Cervical 2-3, Cervical C4-5; Cervical 6-7 ; Right Approach Cervical 2-3, Cervical 4-5 and Cervical 6-7 Anterior Cervical Discectomy And Fusion, Oni Bonilla Cervical Tracti       Social History     Socioeconomic History     Marital status:      Spouse name: Mateus     Number of children: 4     Years of education: 12     Highest education level: Not on file   Occupational History     Occupation:  (retired)     Comment: [employer].     Employer: RETIRED   Social Needs     Financial resource strain: Not on file     Food insecurity:     Worry: Not on file     Inability: Not on file     Transportation needs:     Medical: Not on file     Non-medical: Not on file   Tobacco Use     Smoking status: Former Smoker     Packs/day: 0.00     Years: 10.00     Pack years: 0.00     Types: Cigarettes     Start date: 5/10/1970     Last attempt to quit: 1996     Years since quittin.1     Smokeless tobacco: Never Used   Substance and Sexual Activity     Alcohol use: No     Alcohol/week: 0.0 standard drinks     Drug use: No     Sexual activity: Not  Currently     Partners: Male     Birth control/protection: None   Lifestyle     Physical activity:     Days per week: Not on file     Minutes per session: Not on file     Stress: Not on file   Relationships     Social connections:     Talks on phone: Not on file     Gets together: Not on file     Attends [employer] service: Not on file     Active member of club or organization: Not on file     Attends meetings of clubs or organizations: Not on file     Relationship status: Not on file     Intimate partner violence:     Fear of current or ex partner: Not on file     Emotionally abused: Not on file     Physically abused: Not on file     Forced sexual activity: Not on file   Other Topics Concern      Service No     Blood Transfusions No     Caffeine Concern No     Comment: 4-6 cups / day     Occupational Exposure No     Comment:  (retired now)     Hobby Hazards No     Comment: gardening     Sleep Concern No     Stress Concern No     Weight Concern Yes     Comment: w'd like to be 10# less     Special Diet No     Back Care Yes     Comment: wears a magnet belt at home     Exercise Yes     Comment: gardening and flower beds-walking in the school     Bike Helmet No     Seat Belt Yes     Self-Exams Yes     Parent/sibling w/ CABG, MI or angioplasty before 65F 55M? Yes   Social History Narrative     Not on file       family history includes Cancer in her daughter and mother; Cardiovascular in her father; Cerebrovascular Disease in her paternal grandmother; Circulatory in her paternal grandmother; Diabetes in her maternal aunt; EYE* in her mother; Gastrointestinal Disease in her mother; Gynecology in her sister; Hypertension in her father; Lipids in her brother; Musculoskeletal Disorder in her daughter; Obesity in her maternal grandmother and paternal grandmother; Osteoporosis in her mother.        IMAGING per my own measurement and interpretation:  Xrays 02/27/20  :no obvious fractures or instrumentation  failure    Resulted Imaging/Labs:  Bone Density:  Xr Leg Length Evaluation    Result Date: 5/22/2018  XR SPINE COMPLETE 2 VW, XR LEG LENGTH EVALUATION 5/22/2018 10:52 AM HISTORY:  ; Spinal stenosis in cervical region COMPARISON: Previous spine films 2/22/2018. FINDINGS: Full length films demonstrate a line drawn from the middle of the head falls just to the left of S1 by 1.4 cm. This is on the AP film. On the lateral film a line drawn caudally from this external auditory meatus falls 7.8 cm anterior the posterior superior aspect of S1. Changes from attempted fusion cervical spine approximately C2-T2 with screws and rods posteriorly. Rotoscoliotic curve convex left in the lumbar spine extending to the lower thoracic spine measuring approximately 32 degrees. Marked loss of disc space height on the concave side of that curve. Leftward slip of L3 on L4 and L4 and L5 similar to previous film. There degenerative changes seen in the hip joints bilaterally with marked flattening of the femoral heads and subchondral sclerotic and cystic changes noted. Dysplastic right acetabulum with pseudoacetabulum superiorly into the right. The findings in the hips is consistent with congenital hip dysplasia. The pelvis is horizontal. There is a complete rotator cuff tear on the right and probably on the left with superior migration of the humeral head and undersurface erosion of the acromium and distal right clavicle.     IMPRESSION: 1. Ages from attempted fusion cervical spine to thoracic spine posteriorly. 2. Coronal and sagittal balance as noted above. 3. Findings consistent with Congenital hip dysplasia with severe degenerative changes in both hip joints. 4. Rotoscoliosis lumbar spine with severe degenerative changes as noted above. 5. Rotator cuff arthropathy on the right and possibly on the left. ELIANE DANIELSON MD    Mri Lumbar Spine Without Gadolinium [[id number]]    Result Date: 12/11/2019  MR LUMBAR SPINE WITHOUT CONTRAST 12/11/2019  10:13 AM HISTORY: S1 radiculopathy. Lumbar radiculopathy, chronic. TECHNIQUE: Multiplanar multisequence images were obtained through the lumbar spine without contrast. COMPARISON: 12/11/2018. FINDINGS: Five lumbar type vertebral bodies are present. There is moderate scoliosis with leftward lateral curvature. Posterior alignment is normal. There is severe disc space narrowing at L2-L3, L3-L4, and L4-L5 and some moderate disc space narrowing at L1-L2 and L5-S1. The conus medullaris is normal in appearance with its tip at the L1 level. Bone marrow signal intensity is normal. T12-L1: Minimal disc bulge. No stenosis. L1-L2: Broad-based disc bulging and moderate facet hypertrophy is present. There is mild central canal stenosis, severe right-sided foraminal stenosis and moderate left-sided foraminal stenosis. L2-L3: Broad-based posterior osteophyte formation, disc bulging and mild-to-moderate facet and ligamentum flavum hypertrophy is present. There is moderate left-sided foraminal stenosis, mild to moderate right-sided foraminal stenosis and borderline to mild central canal stenosis. L3-L4: Broad-based posterior osteophyte formation, disc bulging and moderate to severe facet hypertrophy is present. There is secondary moderate to severe central canal stenosis, moderate to severe right-sided foraminal stenosis and severe left-sided foraminal stenosis. L4-L5: There is a central osteophyte disc complex, broad-based disc bulging or posterior osteophyte formation and moderate to severe facet hypertrophy. This is causing moderate to severe central canal stenosis, moderate to severe left-sided foraminal stenosis and mild-to-moderate right-sided foraminal stenosis. L5-S1: Moderate to severe facet hypertrophy is present along with broad-based disc bulging. There is moderate to severe left-sided foraminal stenosis, moderate central canal stenosis and mild right-sided foraminal stenosis. Paraspinal soft tissues: Unremarkable as  visualized.     IMPRESSION: Advanced degenerative scoliosis most advanced at L3-L4 and L4-L5 where there are moderate to severe central canal stenoses. There is a central disc osteophyte complex at L4-L5 contributing to the stenosis. There are also multilevel moderate to severe foraminal stenoses. SOTERO RAMIRES MD    Mri Lumbar Spine Without Gadolinium [[id number]]    Result Date: 12/11/2018  MRI LUMBAR SPINE WITHOUT CONTRAST   12/11/2018 3:09 PM HISTORY: Left leg pain COMPARISON: An MRI on 9/10/2014. TECHNIQUE: Multiplanar MR imaging was performed without contrast. FINDINGS: Numbering of the levels is based on what appear to be five lumbar type vertebral bodies. There is a prominent left convexity curvature with a rotatory component of the mid lumbar spine. There has been no change in a degenerative grade 1 spondylolisthesis at L5-S1. Vertebral body alignment is otherwise normal. No fracture is seen. No pars interarticularis defect is demonstrated. No osseous lesion is seen. No abnormal marrow signal intensity is identified. The conus medullaris terminates at the level of the T12-L1 disc. No intrathecal abnormality is seen. The adjacent soft tissues are unremarkable. Findings by specific level: T12-L1: The disc and facet joints are normal. No stenosis is seen. L1-L2: There is moderate disc height loss. There is a prominent diffuse disc bulge with a superimposed small focal central disc extrusion. There are moderate degenerative changes in the facet joints. There is mild central canal stenosis with severe right and mild left neural foraminal stenosis. This level is unchanged. L2-L3: There is marked disc height loss. There is a diffuse disc bulge and marginal osteophyte formation with no focal disc herniation seen. There are moderate degenerative changes in the facet joints and mild prominence of the ligamentum flava. There is mild central canal stenosis with severe right foraminal stenosis and mild left foraminal  stenosis. This level is unchanged. L3-L4: There is marked disc height loss. There is a prominent disc bulge with no focal herniation seen. There are prominent degenerative changes in the facet joints. There is severe central canal stenosis and bilateral neural foraminal stenosis. This level is unchanged. L4-L5: There is complete disc height loss on the left with what may be acquired fusion across the disc space anteriorly and on the left. This is similar to the previous study. Again seen is a diffuse disc bulge with a superimposed moderate-sized broad-based left central disc protrusion. There are advanced degenerative changes of the left facet joint with possible bone bridging. There are mild to moderate degenerative changes of the right facet joint with moderate prominence of the ligamentum flava. Again seen is severe central canal stenosis with moderate left and mild to moderate right neural foraminal stenosis. This level is unchanged. L5-S1: There is mild posterior disc height loss. There is a moderately prominent diffuse disc bulge with no focal herniation seen. There are prominent hypertrophic degenerative changes in the facet joints with marked prominence of the ligamentum flavum. Again seen is moderate to severe central canal stenosis with severe left foraminal stenosis. The right neural foramen is widely patent. This level is unchanged.     IMPRESSION: I see no significant change since the previous MRI. Again seen are diffuse degenerative changes throughout the lumbar spine with a small central disc extrusion at L1-L2 and a moderate-sized left central disc protrusion at L4-L5. There is severe central canal stenosis at both L3-L4 and L4-L5 with severe foraminal stenosis on the right at both L1-L2 and L2-L3, bilaterally at L3-L4, and on the left at L5-S1. Less extensive stenosis is seen elsewhere as described above. PRECIOUS MCALLISTER MD    X-ray Spine Complete (cervicothoracolumbar Ap And Lateral - Standing  Views Preferred) [[id number]]    Result Date: 11/29/2018  Exam: XR SPINE COMPLETE SCOLIOSIS 2 VW, 11/29/2018 12:09 PM Indication: sagittal and coronal balance; Spinal stenosis in cervical region Techniques: AP and lateral EOS composite images of full spine images were submitted for interpretation. Comparison: 5/22/2018, 1/4/2018 Findings: 12 rib bearing vertebral bodies and 5 lumbar type vertebral bodies are identified. Posterior fusion instrumentation extending from C2 through T3, with anterior fusion instrumentation at C2-C3, C4-C5 and C6-C7. No evidence of hardware complication. Multilevel at least moderate disc height loss in the lumbar spine, precise evaluation of disc height loss is not possible due to degree of curvature. Lower lumbar predominant multilevel facet arthropathy. Coronal Deformity: There is a moderate  convexed left curvature of the thoracolumbar/lumbar spine with apex at L2. No substantial global coronal imbalance. Sagittal Vertical Axis (A vertical line drawn from the center of C7 (mady line) to the posterosuperior aspect of the S1 on sagittal plane):  positive Additional Findings: Severe degenerative change/dysplasia of both hips is not substantially changed. No acute osseous abnormality.  There is a nonobstructive bowel gas pattern. Vascular calcifications.     Impression: 1. Fusion instrumentation extending from C2 through T3 without evidence of hardware complication. 2. Moderate convex left curvature of the thoracolumbar/lumbar spine with apex at L2. 3. No substantial global coronal imbalance. 4. Positive sagittal vertical axis. 5. Multilevel disc height loss in the lumbar spine. 6. Chronic severe degenerative change/dysplasia of both hips. MYA DE LEON MD    X-ray Spine Complete (cervicothoracolumbar Ap And Lateral - Standing Views Preferred) [[id number]]    Result Date: 5/22/2018  XR SPINE COMPLETE 2 VW, XR LEG LENGTH EVALUATION 5/22/2018 10:52 AM HISTORY:  ; Spinal stenosis in cervical  region COMPARISON: Previous spine films 2/22/2018. FINDINGS: Full length films demonstrate a line drawn from the middle of the head falls just to the left of S1 by 1.4 cm. This is on the AP film. On the lateral film a line drawn caudally from this external auditory meatus falls 7.8 cm anterior the posterior superior aspect of S1. Changes from attempted fusion cervical spine approximately C2-T2 with screws and rods posteriorly. Rotoscoliotic curve convex left in the lumbar spine extending to the lower thoracic spine measuring approximately 32 degrees. Marked loss of disc space height on the concave side of that curve. Leftward slip of L3 on L4 and L4 and L5 similar to previous film. There degenerative changes seen in the hip joints bilaterally with marked flattening of the femoral heads and subchondral sclerotic and cystic changes noted. Dysplastic right acetabulum with pseudoacetabulum superiorly into the right. The findings in the hips is consistent with congenital hip dysplasia. The pelvis is horizontal. There is a complete rotator cuff tear on the right and probably on the left with superior migration of the humeral head and undersurface erosion of the acromium and distal right clavicle.     IMPRESSION: 1. Ages from attempted fusion cervical spine to thoracic spine posteriorly. 2. Coronal and sagittal balance as noted above. 3. Findings consistent with Congenital hip dysplasia with severe degenerative changes in both hip joints. 4. Rotoscoliosis lumbar spine with severe degenerative changes as noted above. 5. Rotator cuff arthropathy on the right and possibly on the left. ELIANE DANIELSON MD      Vitamin D:  Vitamin D Deficiency Screening Results:  Lab Results   Component Value Date    VITDT 17 (L) 11/13/2017     25 OH Vit D2   Date Value Ref Range Status   05/22/2018 <5 ug/L Final   01/04/2018 36 ug/L Final   11/20/2017 <5 ug/L Final     25 OH Vit D3   Date Value Ref Range Status   05/22/2018 63 ug/L Final    01/04/2018 44 ug/L Final   11/20/2017 21 ug/L Final     25 OH Vit D total   Date Value Ref Range Status   05/22/2018 <68 20 - 75 ug/L Final     Comment:     Season, race, dietary intake, and treatment affect the concentration of   25-hydroxy-Vitamin D. Values may decrease during winter months and increase   during summer months. Values 20-29 ug/L may indicate Vitamin D insufficiency   and values <20 ug/L may indicate Vitamin D deficiency.  This test was developed and its performance characteristics determined by the   [facility],  Special [facility] Laboratory. It has   not been cleared or approved by the FDA. The laboratory is regulated under   CLIA as qualified to perform high-complexity testing. This test is used for   clinical purposes. It should not be regarded as investigational or for   research.     01/04/2018 80 (H) 20 - 75 ug/L Final     Comment:     Season, race, dietary intake, and treatment affect the concentration of   25-hydroxy-Vitamin D. Values may decrease during winter months and increase   during summer months. Values 20-29 ug/L may indicate Vitamin D insufficiency   and values <20 ug/L may indicate Vitamin D deficiency.  This test was developed and its performance characteristics determined by the   [facility],  [facility] Laboratory. It has   not been cleared or approved by the FDA. The laboratory is regulated under   CLIA as qualified to perform high-complexity testing. This test is used for   clinical purposes. It should not be regarded as investigational or for   research.     11/20/2017 <26 20 - 75 ug/L Final     Comment:     Season, race, dietary intake, and treatment affect the concentration of   25-hydroxy-Vitamin D. Values may decrease during winter months and increase   during summer months. Values 20-29 ug/L may indicate Vitamin D insufficiency   and values <20 ug/L may indicate Vitamin D deficiency.  This test was  "developed and its performance characteristics determined by the   [facility],  Special Chemistry Laboratory. It has   not been cleared or approved by the FDA. The laboratory is regulated under   CLIA as qualified to perform high-complexity testing. This test is used for   clinical purposes. It should not be regarded as investigational or for   research.           Nutritional Status:  Estimated body mass index is 30.07 kg/m  as calculated from the following:    Height as of 7/3/19: 1.499 m (4' 11\").    Weight as of this encounter: 67.5 kg (148 lb 14.4 oz).    Lab Results   Component Value Date    ALBUMIN 3.4 06/19/2018       Diabetes Screening:  No results found for: A1C    Nicotine Usage:  No      Physical Exam   BP (!) 147/79 (BP Location: Left arm, Patient Position: Sitting, Cuff Size: Adult Regular)   Pulse 96   Wt 67.5 kg (148 lb 14.4 oz)   SpO2 95%   BMI 30.07 kg/m     Constitutional: Oriented to person, place, and time. Appears well-developed and well-nourished. Cooperative. No distress.   HENT:   Head: Normocephalic and atraumatic.   Eyes: Conjunctivae are normal.  Neck: Normal range of motion. Neck supple. No spinous process tenderness and no muscular tenderness present. No tracheal deviation present.  Cardiovascular: Normal rate and regular rhythm.    Pulmonary/Chest: Effort normal and breath sounds normal.  Abdominal: Soft. Bowel sounds are normal. Exhibits no distension. There is no tenderness.   Musculoskeletal:   Cervical flexion-extension range of motion: limited due to prior fusion    Neurological: alert and oriented to person, place, and time.   No cranial nerve deficit   sensory deficit tips of fingers    STRENGTH LEFT RIGHT   Deltoid limited due to shoulder arthropathy limited limited   Bicep 5 5   Wrist Extensor 5 5   Tricep 5 5   Finger flexion 5 5   Finger abduction 5 5    5 5       Hip Flexion     5     5   Knee Extension 5 5   Ankle Dorsiflexion 5 5 "   Extensor Hallucis Longus 5 5   Plantar Flexion 5 5   Foot eversion 5 5   Foot inversion 5 5       Skin: Skin is warm, dry and intact.   Psychiatric: Normal mood and affect. Speech is normal and behavior is normal.    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 We will obtain a CT scan of her cervical spine in the near future to better assess if her neck-related incident has any associated radiographic pathology.   I think her inability to raise her arms above her head is due to arthritic changes of shoulders and not related to the neck itself- she has full strength of C5-innervated bicep muscles, so deltoid weakness is not caused by C5 radiculopathy.    Regarding her right hip pain, Dr. Singleton did not recommend surgery for her. 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    Her daughter will let us know when CT scans are done and I will review and call her daughter back with results.    Enma López MD    [facility] Department of Neurosurgery  Complex Spinal Deformity, Scoliosis, and Minimally Invasive Spine Surgery Specialist  Office: [phone]    2/27/2020    I spent 15 minutes in patient care with greater than 50% spent in counseling and/or coordination of care.  I, John Sultana, am serving as a scribe to document services personally performed by Enma López MD, based upon my observations and the provider's statements to me. All documentation has been  reviewed and edited by the aforementioned doctor prior to being entered into the official medical record.

## 2020-02-27 NOTE — NURSING NOTE
Chief Complaint   Patient presents with     RECHECK     [facility] RETURN - FOLLOW UP       Juwan Barnes, EMT

## 2020-02-29 NOTE — PROGRESS NOTES
[place] [facility]  Chief Complaint   Patient presents with     long-term Regulatory     [place] Medical Record Number:  [id number]  Place of Service where encounter took place:  [facility] ([facility] [[id number]]    HPI:    Yuni Otoole  is 84 year old (1935), who is being seen today for a federally mandated E/M visit.  HPI information obtained from: facility chart records, facility staff, patient report and [facility] chart review.     Yuni is a resident of the [facility] wing of [place] since 2017 s/p cervical spinal fusion.   Medical issues have been:  Acute/Chronic Pain control for generalized and local pain in marv hips, knees, shoulders and neck. In the past treated with localized cortisone injections, frequent adjustment of pain medication per pt's request of Tylenol and NSAIDs and HS ultram, pain clinic visit 11/14 which stopped NSAID and changed tylenol to tylenol arthritis, repeat X rays done,  Ortho consult, and possible future marv genicular nerve blocks and marv hip replacements, but noted high risk surgery. She is balancing/thinking about this still. Pain treatments used recently:   1/28: restart methocarbamol and burst of scheduled Ibuprofen for acute muscle strain/pain. - now on q am robaxin    F/u today still notes she is in pain but notes she will always have some level of pain. She tears up when talking about how well she recovered and how her Neuro Surg  Said she improved more than expected.  She is in PT again, noted insurance will no longer cover robaxin, but will cover Tizanidine. Discussion today with her about options for this.     ALLERGIES:Atorvastatin calcium  PAST MEDICAL HISTORY:   has a past medical history of Allergic rhinitis, cause unspecified, Head injury, Pure hypercholesterolemia, and Unspecified essential hypertension. She also has no past medical history of Diabetes (H).  PAST SURGICAL HISTORY:   has a past surgical history that includes REMOVAL OF  TONSILS,14+ Y/O; colonoscopy (05/30/07); carotid endarterectomy (11/07/08); INJ EPIDURAL LUMBAR/SACRAL W/WO CONTRAST (2009); DRAIN/INJECT LARGE JOINT/BURSA (2009); DRAIN/INJECT LARGE JOINT/BURSA (2010); INJ EPIDURAL CERVICAL/THORACIC W/WO CONTRAST (2010); INJ TRANSFORAMIN EPIDURAL, LUMB/SACR SINGLE (2010); Optical Tracking System Fusion Posterior Cervical Three + Levels (N/A, 11/15/2017); Laminectomy cerivcal posterior three+ levels (N/A, 11/15/2017); Optical Tracking System Fusion Cervical Anterior Three + Levels (N/A, 11/15/2017); Fusion cervical posterior three+ levels (N/A, 11/15/2017); and Eye surgery.  FAMILY HISTORY: family history includes Cancer in her daughter and mother; Cardiovascular in her father; Cerebrovascular Disease in her paternal grandmother; Circulatory in her paternal grandmother; Diabetes in her maternal aunt; EYE* in her mother; Gastrointestinal Disease in her mother; Gynecology in her sister; Hypertension in her father; Lipids in her brother; Musculoskeletal Disorder in her daughter; Obesity in her maternal grandmother and paternal grandmother; Osteoporosis in her mother.  SOCIAL HISTORY:  reports that she quit smoking about 24 years ago. Her smoking use included cigarettes. She started smoking about 49 years ago. She smoked 0.00 packs per day for 10.00 years. She has never used smokeless tobacco. She reports that she does not drink alcohol or use drugs.    MEDICATIONS:  Current Outpatient Medications   Medication Sig Dispense Refill     Acetaminophen (TYLENOL ARTHRITIS PAIN PO) Take 1,300 mg by mouth 3 times daily        amLODIPine (NORVASC) 5 MG tablet Take 0.5 tablets (2.5 mg) by mouth daily 30 tablet 3     chlorthalidone (HYGROTON) 25 MG tablet TAKE 1 TABLET BY MOUTH  DAILY 90 tablet 3     DHA-EPA-Vitamin E (OMEGA-3 COMPLEX PO) Take 1 tablet by mouth 2 times daily       diclofenac (VOLTAREN) 1 % topical gel APPLY 4 GRAMS TOPICALLY TWO TIMES DAILY 400 g 1     diphenhydrAMINE (BENADRYL)  "25 MG tablet Take 1 tablet (25 mg) by mouth daily 30 tablet 3     glucosamine 500 MG CAPS capsule Take 1 capsule (500 mg) by mouth 2 times daily 60 capsule 3     Menthol, Topical Analgesic, (BIOFREEZE) 4 % GEL Externally apply topically 2 times daily as needed 1 Tube 3     methocarbamol (ROBAXIN) 500 MG tablet Take 1 tablet (500 mg) by mouth every morning 30 tablet 1     metoprolol tartrate (LOPRESSOR) 25 MG tablet TAKE ONE-HALF TABLET BY  MOUTH TWO TIMES DAILY 60 tablet 3     multivitamin, therapeutic (THERA-VIT) TABS tablet Take 1 tablet by mouth daily       polyethylene glycol-propylene glycol (SYSTANE ULTRA) 0.4-0.3 % SOLN ophthalmic solution Place 1 drop into both eyes 4 times daily 1 Bottle 3     pravastatin (PRAVACHOL) 10 MG tablet TAKE 1 TABLET BY MOUTH  DAILY 90 tablet 3     rOPINIRole (REQUIP) 2 MG tablet TAKE 2 TABLETS BY MOUTH AT  BEDTIME 180 tablet 11     senna-docusate (SENOKOT-S/PERICOLACE) 8.6-50 MG tablet 1 tablet by Oral or Feeding Tube route daily 100 tablet 3     traMADol (ULTRAM) 50 MG tablet 50 mg po q HS and 50 mg po BID PRN 30 tablet 0     traZODone (DESYREL) 50 MG tablet Take 0.5 tablets (25 mg) by mouth At Bedtime 60 tablet 1     UNABLE TO FIND Take 1 tablet by mouth 2 times daily MEDICATION NAME: Vitamin Focus Select       vitamin D3 (CHOLECALCIFEROL) 2000 units (50 mcg) tablet Take 1 tablet (2,000 Units) by mouth daily 90 tablet 1       Case Management:  I have reviewed the care plan and MDS and do agree with the plan. Patient's desire to return to the community is present, but is not able due to care needs . Information reviewed:  Medications, vital signs, orders, and nursing notes.    ROS:  4 point ROS including Respiratory, CV, GI and , other than that noted in the HPI,  is negative    Vitals:  /73   Pulse 71   Temp 97.8  F (36.6  C)   Resp 20   Ht 1.499 m (4' 11\")   Wt 64.9 kg (143 lb)   SpO2 93%   BMI 28.88 kg/m    Body mass index is 28.88 kg/m .  Exam:  GENERAL " APPEARANCE:  Alert, in no distress  RESP:  respiratory effort and palpation of chest normal, auscultation of lungs clear , no respiratory distress  CV:  Palpation and auscultation of heart done , rate and rhythm reg, 3/6 murmur, trace pitting peripheral edema marv R>L  ABDOMEN:  normal bowel sounds, soft, nontender, no hepatosplenomegaly or other masses  M/S:   Gait and station w/c level, Digits and nails intact - noted obvious hip dyplasia with sitting in current w/c  SKIN:  Inspection and Palpation of skin and subcutaneous tissue intact  NEURO: 2-12 in normal limits and at patient's baseline  PSYCH:  insight and judgement, memory intact with notable STM loss , affect and mood Pleasant     Recent Labs   Lab Test 10/10/19  0651 08/29/19  0630 03/12/19  0640  11/20/17  1112 11/17/17  0401   WBC 6.0  --   --   --  8.0 10.6   HGB 10.8* 11.0* 11.2*   < > 9.7* 10.1*   MCV 94  --   --   --  94 91     --   --   --  246 171    < > = values in this interval not displayed.     Most Recent 3 BMP's:  Recent Labs   Lab Test 10/10/19  0651 08/29/19  0630 07/18/19  0617    133 131*   POTASSIUM 3.5 3.9 3.7   CHLORIDE 97 96 95   CO2 30 28 29   BUN 13 15 17   CR 0.60 0.64 0.61   ANIONGAP 6 9 7   ANNETTA 9.2 9.4 9.1   GLC 87 82 85     ASSESSMENT/PLAN  Cervical stenosis of spine and Muscle spasm  Discussed end supply of methocarbamol and otpions - she agrees to try Tizanidine when current supply of methocarb is up - orders written    Primary osteoarthritis involving multiple joints  Cont current POC with medications as written. She does not want any future surgeries on hips/joints    Primary insomnia  Stable with current poc- Yuni does not want any changes    Heart murmur  Noted hx of per my documented exams. Discussed what it means and why it is - her questions were answered to the best of my abilities and she doesn't have any further questions on this.     Orders written by provider at facility  Once current supply of  methocarbamol is up - discontinue med and start:  Tizanidine 2 mg po q day for muscle spacticity.     Electronically signed by:  CRISTAL Olmos CNP

## 2020-03-01 VITALS
OXYGEN SATURATION: 93 % | SYSTOLIC BLOOD PRESSURE: 124 MMHG | HEART RATE: 71 BPM | BODY MASS INDEX: 28.83 KG/M2 | RESPIRATION RATE: 20 BRPM | DIASTOLIC BLOOD PRESSURE: 73 MMHG | HEIGHT: 59 IN | TEMPERATURE: 97.8 F | WEIGHT: 143 LBS

## 2020-03-01 ASSESSMENT — MIFFLIN-ST. JEOR: SCORE: 1014.27

## 2020-03-02 ENCOUNTER — NURSING HOME VISIT (OUTPATIENT)
Dept: GERIATRICS | Facility: CLINIC | Age: 85
End: 2020-03-02
Payer: COMMERCIAL

## 2020-03-02 DIAGNOSIS — F51.01 PRIMARY INSOMNIA: ICD-10-CM

## 2020-03-02 DIAGNOSIS — R01.1 HEART MURMUR: ICD-10-CM

## 2020-03-02 DIAGNOSIS — M15.0 PRIMARY OSTEOARTHRITIS INVOLVING MULTIPLE JOINTS: ICD-10-CM

## 2020-03-02 DIAGNOSIS — M62.838 MUSCLE SPASM: ICD-10-CM

## 2020-03-02 DIAGNOSIS — M48.02 CERVICAL STENOSIS OF SPINE: Primary | ICD-10-CM

## 2020-03-02 PROCEDURE — 99309 SBSQ NF CARE MODERATE MDM 30: CPT | Performed by: NURSE PRACTITIONER

## 2020-03-02 RX ORDER — METHOCARBAMOL 500 MG/1
500 TABLET, FILM COATED ORAL EVERY MORNING
Qty: 30 TABLET | Refills: 1
Start: 2020-03-02 | End: 2020-03-31

## 2020-03-02 RX ORDER — TIZANIDINE HYDROCHLORIDE 2 MG/1
2 CAPSULE, GELATIN COATED ORAL
COMMUNITY
Start: 2020-03-13 | End: 2020-04-06

## 2020-03-02 NOTE — LETTER
3/2/2020        RE: Yuni Otoole  [address]  [facility] [address]        [place] [facility]  Chief Complaint   Patient presents with     longterm Regulatory     [place] Medical Record Number:  [id number]  Place of Service where encounter took place:  [facility] ([facility] [[id number]]    HPI:    Yuni Otoole  is 84 year old (1935), who is being seen today for a federally mandated E/M visit.  HPI information obtained from: facility chart records, facility staff, patient report and [facility] chart review.     Yuni is a resident of the LT wing of [place] since 2017 s/p cervical spinal fusion.   Medical issues have been:  Acute/Chronic Pain control for generalized and local pain in marv hips, knees, shoulders and neck. In the past treated with localized cortisone injections, frequent adjustment of pain medication per pt's request of Tylenol and NSAIDs and HS ultram, pain clinic visit 11/14 which stopped NSAID and changed tylenol to tylenol arthritis, repeat X rays done,  Ortho consult, and possible future marv genicular nerve blocks and marv hip replacements, but noted high risk surgery. She is balancing/thinking about this still. Pain treatments used recently:   1/28: restart methocarbamol and burst of scheduled Ibuprofen for acute muscle strain/pain. - now on q am robaxin    F/u today still notes she is in pain but notes she will always have some level of pain. She tears up when talking about how well she recovered and how her Neuro Surg  Said she improved more than expected.  She is in PT again, noted insurance will no longer cover robaxin, but will cover Tizanidine. Discussion today with her about options for this.     ALLERGIES:Atorvastatin calcium  PAST MEDICAL HISTORY:   has a past medical history of Allergic rhinitis, cause unspecified, Head injury, Pure hypercholesterolemia, and Unspecified essential hypertension. She also has no past medical history of  Diabetes (H).  PAST SURGICAL HISTORY:   has a past surgical history that includes REMOVAL OF TONSILS,14+ Y/O; colonoscopy (05/30/07); carotid endarterectomy (11/07/08); INJ EPIDURAL LUMBAR/SACRAL W/WO CONTRAST (2009); DRAIN/INJECT LARGE JOINT/BURSA (2009); DRAIN/INJECT LARGE JOINT/BURSA (2010); INJ EPIDURAL CERVICAL/THORACIC W/WO CONTRAST (2010); INJ TRANSFORAMIN EPIDURAL, LUMB/SACR SINGLE (2010); Optical Tracking System Fusion Posterior Cervical Three + Levels (N/A, 11/15/2017); Laminectomy cerivcal posterior three+ levels (N/A, 11/15/2017); Optical Tracking System Fusion Cervical Anterior Three + Levels (N/A, 11/15/2017); Fusion cervical posterior three+ levels (N/A, 11/15/2017); and Eye surgery.  FAMILY HISTORY: family history includes Cancer in her daughter and mother; Cardiovascular in her father; Cerebrovascular Disease in her paternal grandmother; Circulatory in her paternal grandmother; Diabetes in her maternal aunt; EYE* in her mother; Gastrointestinal Disease in her mother; Gynecology in her sister; Hypertension in her father; Lipids in her brother; Musculoskeletal Disorder in her daughter; Obesity in her maternal grandmother and paternal grandmother; Osteoporosis in her mother.  SOCIAL HISTORY:  reports that she quit smoking about 24 years ago. Her smoking use included cigarettes. She started smoking about 49 years ago. She smoked 0.00 packs per day for 10.00 years. She has never used smokeless tobacco. She reports that she does not drink alcohol or use drugs.    MEDICATIONS:  Current Outpatient Medications   Medication Sig Dispense Refill     Acetaminophen (TYLENOL ARTHRITIS PAIN PO) Take 1,300 mg by mouth 3 times daily        amLODIPine (NORVASC) 5 MG tablet Take 0.5 tablets (2.5 mg) by mouth daily 30 tablet 3     chlorthalidone (HYGROTON) 25 MG tablet TAKE 1 TABLET BY MOUTH  DAILY 90 tablet 3     DHA-EPA-Vitamin E (OMEGA-3 COMPLEX PO) Take 1 tablet by mouth 2 times daily       diclofenac (VOLTAREN) 1 %  "topical gel APPLY 4 GRAMS TOPICALLY TWO TIMES DAILY 400 g 1     diphenhydrAMINE (BENADRYL) 25 MG tablet Take 1 tablet (25 mg) by mouth daily 30 tablet 3     glucosamine 500 MG CAPS capsule Take 1 capsule (500 mg) by mouth 2 times daily 60 capsule 3     Menthol, Topical Analgesic, (BIOFREEZE) 4 % GEL Externally apply topically 2 times daily as needed 1 Tube 3     methocarbamol (ROBAXIN) 500 MG tablet Take 1 tablet (500 mg) by mouth every morning 30 tablet 1     metoprolol tartrate (LOPRESSOR) 25 MG tablet TAKE ONE-HALF TABLET BY  MOUTH TWO TIMES DAILY 60 tablet 3     multivitamin, therapeutic (THERA-VIT) TABS tablet Take 1 tablet by mouth daily       polyethylene glycol-propylene glycol (SYSTANE ULTRA) 0.4-0.3 % SOLN ophthalmic solution Place 1 drop into both eyes 4 times daily 1 Bottle 3     pravastatin (PRAVACHOL) 10 MG tablet TAKE 1 TABLET BY MOUTH  DAILY 90 tablet 3     rOPINIRole (REQUIP) 2 MG tablet TAKE 2 TABLETS BY MOUTH AT  BEDTIME 180 tablet 11     senna-docusate (SENOKOT-S/PERICOLACE) 8.6-50 MG tablet 1 tablet by Oral or Feeding Tube route daily 100 tablet 3     traMADol (ULTRAM) 50 MG tablet 50 mg po q HS and 50 mg po BID PRN 30 tablet 0     traZODone (DESYREL) 50 MG tablet Take 0.5 tablets (25 mg) by mouth At Bedtime 60 tablet 1     UNABLE TO FIND Take 1 tablet by mouth 2 times daily MEDICATION NAME: Vitamin Focus Select       vitamin D3 (CHOLECALCIFEROL) 2000 units (50 mcg) tablet Take 1 tablet (2,000 Units) by mouth daily 90 tablet 1       Case Management:  I have reviewed the care plan and MDS and do agree with the plan. Patient's desire to return to the community is present, but is not able due to care needs . Information reviewed:  Medications, vital signs, orders, and nursing notes.    ROS:  4 point ROS including Respiratory, CV, GI and , other than that noted in the HPI,  is negative    Vitals:  /73   Pulse 71   Temp 97.8  F (36.6  C)   Resp 20   Ht 1.499 m (4' 11\")   Wt 64.9 kg (143 " lb)   SpO2 93%   BMI 28.88 kg/m     Body mass index is 28.88 kg/m .  Exam:  GENERAL APPEARANCE:  Alert, in no distress  RESP:  respiratory effort and palpation of chest normal, auscultation of lungs clear , no respiratory distress  CV:  Palpation and auscultation of heart done , rate and rhythm reg, 3/6 murmur, trace pitting peripheral edema marv R>L  ABDOMEN:  normal bowel sounds, soft, nontender, no hepatosplenomegaly or other masses  M/S:   Gait and station w/c level, Digits and nails intact - noted obvious hip dyplasia with sitting in current w/c  SKIN:  Inspection and Palpation of skin and subcutaneous tissue intact  NEURO: 2-12 in normal limits and at patient's baseline  PSYCH:  insight and judgement, memory intact with notable STM loss , affect and mood Pleasant     Recent Labs   Lab Test 10/10/19  0651 08/29/19  0630 03/12/19  0640  11/20/17  1112 11/17/17  0401   WBC 6.0  --   --   --  8.0 10.6   HGB 10.8* 11.0* 11.2*   < > 9.7* 10.1*   MCV 94  --   --   --  94 91     --   --   --  246 171    < > = values in this interval not displayed.     Most Recent 3 BMP's:  Recent Labs   Lab Test 10/10/19  0651 08/29/19  0630 07/18/19  0617    133 131*   POTASSIUM 3.5 3.9 3.7   CHLORIDE 97 96 95   CO2 30 28 29   BUN 13 15 17   CR 0.60 0.64 0.61   ANIONGAP 6 9 7   ANNETTA 9.2 9.4 9.1   GLC 87 82 85     ASSESSMENT/PLAN  Cervical stenosis of spine and Muscle spasm  Discussed end supply of methocarbamol and otpions - she agrees to try Tizanidine when current supply of methocarb is up - orders written    Primary osteoarthritis involving multiple joints  Cont current POC with medications as written. She does not want any future surgeries on hips/joints    Primary insomnia  Stable with current poc- Yuni does not want any changes    Heart murmur  Noted hx of per my documented exams. Discussed what it means and why it is - her questions were answered to the best of my abilities and she doesn't have any further  questions on this.     Orders written by provider at facility  Once current supply of methocarbamol is up - discontinue med and start:  Tizanidine 2 mg po q day for muscle spacticity.     Electronically signed by:  CRISTAL Olmos CNP            Sincerely,        CRISTAL Olmos CNP

## 2020-03-03 ENCOUNTER — MEDICAL CORRESPONDENCE (OUTPATIENT)
Dept: NEUROSURGERY | Facility: CLINIC | Age: 85
End: 2020-03-03

## 2020-03-09 ENCOUNTER — HOSPITAL ENCOUNTER (OUTPATIENT)
Dept: CT IMAGING | Facility: CLINIC | Age: 85
End: 2020-03-09
Attending: NEUROLOGICAL SURGERY
Payer: MEDICARE

## 2020-03-09 DIAGNOSIS — M43.23 FUSION OF SPINE, CERVICOTHORACIC REGION: ICD-10-CM

## 2020-03-09 PROCEDURE — 72125 CT NECK SPINE W/O DYE: CPT

## 2020-03-09 PROCEDURE — 72128 CT CHEST SPINE W/O DYE: CPT

## 2020-03-12 ENCOUNTER — DOCUMENTATION ONLY (OUTPATIENT)
Dept: NEUROSURGERY | Facility: CLINIC | Age: 85
End: 2020-03-12

## 2020-03-12 ENCOUNTER — TELEPHONE (OUTPATIENT)
Dept: GERIATRICS | Facility: CLINIC | Age: 85
End: 2020-03-12

## 2020-03-12 DIAGNOSIS — M62.838 MUSCLE SPASM: Primary | ICD-10-CM

## 2020-03-12 RX ORDER — TIZANIDINE 2 MG/1
2 TABLET ORAL DAILY
Qty: 90 TABLET | Refills: 0 | Status: SHIPPED | OUTPATIENT
Start: 2020-03-25 | End: 2020-04-06

## 2020-03-12 NOTE — PROGRESS NOTES
CT imaging reviewed obtained after last clinic visit.  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   Radiology also mentions some haloing of C2 and C3 screws but there is solid anterior interbody arthrodesis present at C2-3 now, so this is not clinically significant.      I called patient's daughter Marlene to discuss as asked, at [phone].  Left message.    No scheduled followup needed with us, patient's fusion is solidly healed.      Enma López MD    [facility] Department of Neurosurgery  Office: [phone]    3/12/2020  5:32 PM

## 2020-03-15 ENCOUNTER — HEALTH MAINTENANCE LETTER (OUTPATIENT)
Age: 85
End: 2020-03-15

## 2020-03-31 ENCOUNTER — TELEPHONE (OUTPATIENT)
Dept: GERIATRICS | Facility: CLINIC | Age: 85
End: 2020-03-31

## 2020-03-31 DIAGNOSIS — S12.601S CLOSED NONDISPLACED FRACTURE OF SEVENTH CERVICAL VERTEBRA, UNSPECIFIED FRACTURE MORPHOLOGY, SEQUELA: ICD-10-CM

## 2020-03-31 DIAGNOSIS — F51.01 PRIMARY INSOMNIA: ICD-10-CM

## 2020-03-31 RX ORDER — TRAMADOL HYDROCHLORIDE 50 MG/1
TABLET ORAL
Qty: 60 TABLET | Refills: 0 | Status: SHIPPED | OUTPATIENT
Start: 2020-03-31 | End: 2020-05-20

## 2020-03-31 RX ORDER — TRAZODONE HYDROCHLORIDE 50 MG/1
25 TABLET, FILM COATED ORAL AT BEDTIME
Qty: 60 TABLET | Refills: 1 | Status: SHIPPED | OUTPATIENT
Start: 2020-03-31 | End: 2020-05-20

## 2020-03-31 NOTE — TELEPHONE ENCOUNTER
Closed nondisplaced fracture of seventh cervical vertebra, unspecified fracture morphology, sequela    - traMADol (ULTRAM) 50 MG tablet; 50 mg po q HS and 50 mg po BID PRN    Primary insomnia    - traZODone (DESYREL) 50 MG tablet; Take 0.5 tablets (25 mg) by mouth At Bedtime

## 2020-04-02 ENCOUNTER — TELEPHONE (OUTPATIENT)
Dept: GERIATRICS | Facility: CLINIC | Age: 85
End: 2020-04-02

## 2020-04-06 ENCOUNTER — VIRTUAL VISIT (OUTPATIENT)
Dept: GERIATRICS | Facility: CLINIC | Age: 85
End: 2020-04-06
Payer: COMMERCIAL

## 2020-04-06 VITALS
DIASTOLIC BLOOD PRESSURE: 48 MMHG | OXYGEN SATURATION: 95 % | WEIGHT: 146.3 LBS | RESPIRATION RATE: 16 BRPM | HEART RATE: 66 BPM | HEIGHT: 59 IN | TEMPERATURE: 97.4 F | SYSTOLIC BLOOD PRESSURE: 102 MMHG | BODY MASS INDEX: 29.49 KG/M2

## 2020-04-06 DIAGNOSIS — I95.2 HYPOTENSION DUE TO DRUGS: ICD-10-CM

## 2020-04-06 DIAGNOSIS — M48.02 CERVICAL STENOSIS OF SPINE: ICD-10-CM

## 2020-04-06 DIAGNOSIS — M62.838 MUSCLE SPASM: Primary | ICD-10-CM

## 2020-04-06 PROCEDURE — 99309 SBSQ NF CARE MODERATE MDM 30: CPT | Mod: GT | Performed by: NURSE PRACTITIONER

## 2020-04-06 ASSESSMENT — MIFFLIN-ST. JEOR: SCORE: 1029.24

## 2020-04-06 NOTE — LETTER
"    4/6/2020        RE: Yuni Otoole  [address] [facility] [address]        [facility]   Yuni Otoole is being evaluated via a billable video visit due to the restrictions of the Covid-19 pandemic.   The patient has been notified of following:  \"This video visit will be conducted via a call between you and your provider. We have found that certain health care needs can be provided without the need for an in-person physical exam.  This service lets us provide the care you need with a video conversation. If during the course of the call the provider feels a video visit is not appropriate, you will not be charged for this service.\"   The provider has received verbal consent for a Video Visit from the patient or first contact? Yes  Patient  or facility staff would like the video invitation sent by: N/A   Video Start Time: 2:27  [facility] Medical Record Number:  [id number]  Place of Location at the time of visit: [facility]  Chief Complaint   Patient presents with     RECHECK     HPI:  Yuni Otoole  is a 84 year old (1935), who is being seen today for a visit.  HPI information obtained from: facility chart records, facility staff and [facility] Epic chart review.    Yuni is a resident of the LTC wing of [facility] since 2017 s/p cervical spinal fusion who is currently having c/o feeling sig tired and weak with occasional hypotension on/off over the past week.   Chlorthalidone held for the past 3 days. Noted other medication change is her zanaflex. Robaxin wasn't covered by insurance and changed to zanaflex on 3/25. Noted symptoms timing are correlated to the start of this medication. NSg/NP worked with pt about education on s/e of med last week and pt wanted to continue muscle relaxant for her pain, but now symptoms are worsening.     Hx of Medical issues have been:  Acute/Chronic Pain control for generalized and local pain in marv hips, knees, shoulders and neck. In the past treated " "with localized cortisone injections, frequent adjustment of pain medication per pt's request of Tylenol and NSAIDs and HS ultram, pain clinic visit 11/14 which stopped NSAID and changed tylenol to tylenol arthritis, repeat X rays done,  Ortho consult, and possible future marv genicular nerve blocks and marv hip replacements, but noted high risk surgery. She is balancing/thinking about this still. Pain treatments used recently:   1/28: restart methocarbamol and burst of scheduled Ibuprofen for acute muscle strain/pain.  3/2 - 25: order to discontinue methocarbamol and start tizanidine 2 mg q am when methocarbamol is up (on 3/25 done)    Past Medical and Surgical History reviewed in Epic today.  MEDICATIONS: Reviewed.    REVIEW OF SYSTEMS: 4 point ROS including Respiratory, CV, GI and , other than that noted in the HPI,  is negative  Objective: /48   Pulse 66   Temp 97.4  F (36.3  C)   Resp 16   Ht 1.499 m (4' 11\")   Wt 66.4 kg (146 lb 4.8 oz)   SpO2 95%   BMI 29.55 kg/m    Limited visit exam done given COVID-19 precautions.   GENERAL APPEARANCE: Alert, in no distress, cooperative.  ENT: Mouth and posterior oropharynx normal, moist mucous membranes, hearing acuity at baseline [place]   RESP: non labored breathing  CV: lower extremity edema: trace on R, none on L  SKIN: Inspection of skin intact w/ fragility.  NEURO: 2-12 appear at baseline   PSYCH: Insight and judgement, memory baseline with sig sTM loss and having trouble tracking conversation , affect and mood pleasent.    Labs:   No new labs.     ASSESSMENT/PLAN:     Muscle spasm  Cervical stenosis of spine  Hypotension due to drugs   Localized edema    Zanaflex causing hypotension and weakness. Will discontinue - given stabiltiy of edema (and HTN) - no need for diuretic - will discontinue for now and f/u - retstart if either edema worsens or HTN elevates.   - dsicussion about benadryl  And that may also be cause of symptoms- historically pt has been " resistent to stop med, but agreeable to 1 day trial for this WEd given she has an apt for her eyes    Orders written by provider and given to facility staff  1. dC zanaflex  2. discontinue chlorthalidone   3. Hold benadryl on 4/7 and 4/8 given sleepiness and apt on 4/8.     Electronically signed by:  CRISTAL Olmos CNP     Video-Visit Details  Type of service:  Video Visit  Video End Time (time video stopped): 2:31  Distant Location (provider location):  [facility]             Sincerely,        CRISTAL Olmos CNP

## 2020-04-06 NOTE — PROGRESS NOTES
"[facility]   Yuni Otoole is being evaluated via a billable video visit due to the restrictions of the Covid-19 pandemic.   The patient has been notified of following:  \"This video visit will be conducted via a call between you and your provider. We have found that certain health care needs can be provided without the need for an in-person physical exam.  This service lets us provide the care you need with a video conversation. If during the course of the call the provider feels a video visit is not appropriate, you will not be charged for this service.\"   The provider has received verbal consent for a Video Visit from the patient or first contact? Yes  Patient  or facility staff would like the video invitation sent by: N/A   Video Start Time: 2:27  [facility] Medical Record Number:  [id number]  Place of Location at the time of visit: [facility]  Chief Complaint   Patient presents with     RECHECK     HPI:  Yuni Otoole  is a 84 year old (1935), who is being seen today for a visit.  HPI information obtained from: facility chart records, facility staff and [facility] Epic chart review.    Yuni is a resident of the LT wing of [facility] since 2017 s/p cervical spinal fusion who is currently having c/o feeling sig tired and weak with occasional hypotension on/off over the past week.   Chlorthalidone held for the past 3 days. Noted other medication change is her zanaflex. Robaxin wasn't covered by insurance and changed to zanaflex on 3/25. Noted symptoms timing are correlated to the start of this medication. NSg/NP worked with pt about education on s/e of med last week and pt wanted to continue muscle relaxant for her pain, but now symptoms are worsening.     Hx of Medical issues have been:  Acute/Chronic Pain control for generalized and local pain in marv hips, knees, shoulders and neck. In the past treated with localized cortisone injections, frequent adjustment of pain medication per pt's " "request of Tylenol and NSAIDs and HS ultram, pain clinic visit 11/14 which stopped NSAID and changed tylenol to tylenol arthritis, repeat X rays done,  Ortho consult, and possible future marv genicular nerve blocks and marv hip replacements, but noted high risk surgery. She is balancing/thinking about this still. Pain treatments used recently:   1/28: restart methocarbamol and burst of scheduled Ibuprofen for acute muscle strain/pain.  3/2 - 25: order to discontinue methocarbamol and start tizanidine 2 mg q am when methocarbamol is up (on 3/25 done)    Past Medical and Surgical History reviewed in Epic today.  MEDICATIONS: Reviewed.    REVIEW OF SYSTEMS: 4 point ROS including Respiratory, CV, GI and , other than that noted in the HPI,  is negative  Objective: /48   Pulse 66   Temp 97.4  F (36.3  C)   Resp 16   Ht 1.499 m (4' 11\")   Wt 66.4 kg (146 lb 4.8 oz)   SpO2 95%   BMI 29.55 kg/m    Limited visit exam done given COVID-19 precautions.   GENERAL APPEARANCE: Alert, in no distress, cooperative.  ENT: Mouth and posterior oropharynx normal, moist mucous membranes, hearing acuity at baseline [place]   RESP: non labored breathing  CV: lower extremity edema: trace on R, none on L  SKIN: Inspection of skin intact w/ fragility.  NEURO: 2-12 appear at baseline   PSYCH: Insight and judgement, memory baseline with sig sTM loss and having trouble tracking conversation , affect and mood pleasent.    Labs:   No new labs.     ASSESSMENT/PLAN:     Muscle spasm  Cervical stenosis of spine  Hypotension due to drugs   Localized edema    Zanaflex causing hypotension and weakness. Will discontinue - given stabiltiy of edema (and HTN) - no need for diuretic - will discontinue for now and f/u - retstart if either edema worsens or HTN elevates.   - dsicussion about benadryl  And that may also be cause of symptoms- historically pt has been resistent to stop med, but agreeable to 1 day trial for this WEd given she has an apt for " her eyes    Orders written by provider and given to facility staff  1. dC zanaflex  2. discontinue chlorthalidone   3. Hold benadryl on 4/7 and 4/8 given sleepiness and apt on 4/8.     Electronically signed by:  CRISTAL Olmos CNP     Video-Visit Details  Type of service:  Video Visit  Video End Time (time video stopped): 2:31  Distant Location (provider location):  [facility]

## 2020-04-14 ENCOUNTER — TELEPHONE (OUTPATIENT)
Dept: GERIATRICS | Facility: CLINIC | Age: 85
End: 2020-04-14

## 2020-04-14 DIAGNOSIS — M54.2 CERVICALGIA: ICD-10-CM

## 2020-04-14 DIAGNOSIS — M43.23 FUSION OF SPINE, CERVICOTHORACIC REGION: ICD-10-CM

## 2020-04-14 DIAGNOSIS — R01.1 HEART MURMUR: ICD-10-CM

## 2020-04-14 DIAGNOSIS — G25.81 RESTLESS LEGS SYNDROME (RLS): ICD-10-CM

## 2020-04-14 DIAGNOSIS — E78.2 MIXED HYPERLIPIDEMIA: ICD-10-CM

## 2020-04-14 DIAGNOSIS — R00.1 BRADYCARDIA: ICD-10-CM

## 2020-04-14 DIAGNOSIS — I10 BENIGN ESSENTIAL HYPERTENSION: ICD-10-CM

## 2020-04-14 DIAGNOSIS — I35.0 AORTIC VALVE STENOSIS, ETIOLOGY OF CARDIAC VALVE DISEASE UNSPECIFIED: ICD-10-CM

## 2020-04-14 DIAGNOSIS — Z98.1 S/P CERVICAL SPINAL FUSION: ICD-10-CM

## 2020-04-14 DIAGNOSIS — I95.2 HYPOTENSION DUE TO DRUGS: Primary | ICD-10-CM

## 2020-04-14 NOTE — TELEPHONE ENCOUNTER
"[facility] TELEPHONE ENCOUNTER    Chief Complaint   Patient presents with     Bradycardia       Yuni Otoole is a 84 year old  (1935),Nurse called today to report: Symptomatic bradycardia, HoTN.  BP this AM 94/43 - recheck 106/62  HR 36 - recheck 33 - Metoprolol was held.     Patient reports tired, weak; is not confused  Typically patient BPs 120s/79s, HRs 60-80s.     Tizanidine started and patient went hypotensive. Med discontinue\"d on 4/6/20 but unable to maintain BP since that time.     Only BP med at this time is Metoprolol. History of AORTIC STENOSIS and Carotid stenosis. No History STEMI or NSTEMI, is f/b by [facility], last visit 5/28/2019 with recommendation to f/u in one year.     Meds reviewed and ropinirole, trazodone, benadryl, tramadol identified as possible sedation meds. Unfortunately patient has a lot of pain and \"itching\" and has not tolerated GDR of these meds. Last visit with Ortho was for possible surgery for hip.     RN reporting no edema.     ASSESSMENT/PLAN  Symptomatic bradycardia present with need to discontinue metoprolol and f/u with Cardiology.  Possible pacemaker recommendation but will defer to Cardiology for RvB.      Reduced po intake may be contributory to HoTN.     Possible infection - can draw labs on next available day.     Orders:  1. discontinue Metoprolol  2. Update [facility]iology, Dr Shawn Stearns at [phone] phone [phone] toll-free [phone] fax with symptomatic bradycardia  3. Encourage po fluids.   4. 4/16/20 Labs: BMP, CBC with reflex to diff if leukocytosis present.       Electronically signed by:   CRISTAL Hoang CNP      "

## 2020-04-15 ENCOUNTER — VIRTUAL VISIT (OUTPATIENT)
Dept: GERIATRICS | Facility: CLINIC | Age: 85
End: 2020-04-15
Payer: COMMERCIAL

## 2020-04-15 ENCOUNTER — HOSPITAL LABORATORY (OUTPATIENT)
Dept: NURSING HOME | Facility: OTHER | Age: 85
End: 2020-04-15

## 2020-04-15 VITALS
HEIGHT: 59 IN | OXYGEN SATURATION: 93 % | HEART RATE: 48 BPM | SYSTOLIC BLOOD PRESSURE: 86 MMHG | WEIGHT: 143.7 LBS | TEMPERATURE: 97.6 F | DIASTOLIC BLOOD PRESSURE: 56 MMHG | BODY MASS INDEX: 28.97 KG/M2 | RESPIRATION RATE: 20 BRPM

## 2020-04-15 DIAGNOSIS — R63.8 POOR FLUID INTAKE: ICD-10-CM

## 2020-04-15 DIAGNOSIS — N17.9 ACUTE KIDNEY INJURY (H): ICD-10-CM

## 2020-04-15 DIAGNOSIS — R00.1 BRADYCARDIA: Primary | ICD-10-CM

## 2020-04-15 DIAGNOSIS — E86.1 HYPOTENSION DUE TO HYPOVOLEMIA: ICD-10-CM

## 2020-04-15 DIAGNOSIS — D72.829 LEUKOCYTOSIS, UNSPECIFIED TYPE: ICD-10-CM

## 2020-04-15 DIAGNOSIS — R11.0 NAUSEA: ICD-10-CM

## 2020-04-15 DIAGNOSIS — E87.1 HYPONATREMIA: ICD-10-CM

## 2020-04-15 LAB
ANION GAP SERPL CALCULATED.3IONS-SCNC: 10 MMOL/L (ref 3–14)
BASOPHILS # BLD AUTO: 0 10E9/L (ref 0–0.2)
BASOPHILS NFR BLD AUTO: 0.3 %
BUN SERPL-MCNC: 38 MG/DL (ref 7–30)
CALCIUM SERPL-MCNC: 8.9 MG/DL (ref 8.5–10.1)
CHLORIDE SERPL-SCNC: 100 MMOL/L (ref 94–109)
CO2 SERPL-SCNC: 20 MMOL/L (ref 20–32)
CREAT SERPL-MCNC: 1.58 MG/DL (ref 0.52–1.04)
DIFFERENTIAL METHOD BLD: ABNORMAL
EOSINOPHIL NFR BLD AUTO: 0.1 %
ERYTHROCYTE [DISTWIDTH] IN BLOOD BY AUTOMATED COUNT: 14.4 % (ref 10–15)
GFR SERPL CREATININE-BSD FRML MDRD: 30 ML/MIN/{1.73_M2}
GLUCOSE SERPL-MCNC: 155 MG/DL (ref 70–99)
HCT VFR BLD AUTO: 38 % (ref 35–47)
HGB BLD-MCNC: 12.1 G/DL (ref 11.7–15.7)
IMM GRANULOCYTES # BLD: 0.1 10E9/L (ref 0–0.4)
IMM GRANULOCYTES NFR BLD: 0.7 %
LYMPHOCYTES # BLD AUTO: 2.1 10E9/L (ref 0.8–5.3)
LYMPHOCYTES NFR BLD AUTO: 14.9 %
MCH RBC QN AUTO: 30.7 PG (ref 26.5–33)
MCHC RBC AUTO-ENTMCNC: 31.8 G/DL (ref 31.5–36.5)
MCV RBC AUTO: 96 FL (ref 78–100)
MONOCYTES # BLD AUTO: 1.1 10E9/L (ref 0–1.3)
MONOCYTES NFR BLD AUTO: 7.7 %
NEUTROPHILS # BLD AUTO: 10.8 10E9/L (ref 1.6–8.3)
NEUTROPHILS NFR BLD AUTO: 76.3 %
NRBC # BLD AUTO: 0 10*3/UL
NRBC BLD AUTO-RTO: 0 /100
PLATELET # BLD AUTO: 267 10E9/L (ref 150–450)
POTASSIUM SERPL-SCNC: 4.8 MMOL/L (ref 3.4–5.3)
RBC # BLD AUTO: 3.94 10E12/L (ref 3.8–5.2)
SODIUM SERPL-SCNC: 130 MMOL/L (ref 133–144)
WBC # BLD AUTO: 14.1 10E9/L (ref 4–11)

## 2020-04-15 PROCEDURE — 99309 SBSQ NF CARE MODERATE MDM 30: CPT | Mod: 95 | Performed by: NURSE PRACTITIONER

## 2020-04-15 ASSESSMENT — MIFFLIN-ST. JEOR: SCORE: 1017.45

## 2020-04-15 NOTE — LETTER
"    4/15/2020        RE: Yuni Otoole  [address] [facility] [address]        [facility]   Yuni Otoole is being evaluated via a billable video visit due to the restrictions of the Covid-19 pandemic.   The patient has been notified of following:  \"This video visit will be conducted via a call between you and your provider. We have found that certain health care needs can be provided without the need for an in-person physical exam.  This service lets us provide the care you need with a video conversation. If during the course of the call the provider feels a video visit is not appropriate, you will not be charged for this service.\"   The provider has received verbal consent for a Video Visit from the patient or first contact? Yes  Patient  or facility staff would like the video invitation sent by: N/A   Video Start Time: 1530  [facility] Medical Record Number:  [id number]  Place of Location at the time of visit: [facility]  Chief Complaint   Patient presents with     Video Visit     Nursing Home Acute     HPI:  Yuni Otoole  is a 84 year old (1935), who is being seen today for a visit.  HPI information obtained from: facility chart records, facility staff, patient report and [facility] chart review. Today's concern is:     Bradycardia  Hypotension due to hypovolemia  Acute kidney injury (H)  Hyponatremia  Leukocytosis, unspecified type  Nausea  Poor fluid intake     Patient is an 84 year old woman with PMH including LBP with sciatica, RLS, HTN with recent HoTN, osteoarthritis, HLD who recently has been having trouble with bradycardia into the 33s with HoTN into the 80-90s systolically.   See yesterday's note for details.     Today nursing noting patient was lethargic, not getting OOB, not taking in po well and was nauseated.  BP this AM was 86/56, HR 48, afebrile, 93% on RA  Nursing reporting patient was lethargic but alert, weak, talking.    Labs ordered yesterday and now " returning.  Patient met today vie video visit.  She reports she lethargic and weak.  She denies dysuria, urgency or frequency; denies diarrhea.  She does have significant nausea and has not eaten much nor drank much.   She denies SOB and did not cough during our visit.    She reports no emesis today but nursing reported some yesterday (mild).      Labs have returned with Na 130, LILA present with Creat 0.60 (6 months ago) --> 1.58 today, BUN 38, GFR 30 (prev 85).    Patient has WBC 14.1 with left shift of 10.8.  Urine labs ordered and UC pending.  Nursing noted concentrated urine this AM with straight cath for labs.        Past Medical and Surgical History reviewed in Epic today.  MEDICATIONS:    Current Outpatient Medications   Medication Sig Dispense Refill     Acetaminophen (TYLENOL ARTHRITIS PAIN PO) Take 1,300 mg by mouth 3 times daily        DHA-EPA-Vitamin E (OMEGA-3 COMPLEX PO) Take 1 tablet by mouth 2 times daily       diclofenac (VOLTAREN) 1 % topical gel APPLY 4 GRAMS TOPICALLY TWO TIMES DAILY 400 g 1     diphenhydrAMINE (BENADRYL) 25 MG tablet Take 1 tablet (25 mg) by mouth daily 30 tablet 3     glucosamine 500 MG CAPS capsule Take 1 capsule (500 mg) by mouth 2 times daily 60 capsule 3     Menthol, Topical Analgesic, (BIOFREEZE) 4 % GEL Externally apply topically 2 times daily as needed 1 Tube 3     metoprolol tartrate (LOPRESSOR) 25 MG tablet TAKE ONE-HALF TABLET BY  MOUTH TWO TIMES DAILY 60 tablet 3     multivitamin, therapeutic (THERA-VIT) TABS tablet Take 1 tablet by mouth daily       polyethylene glycol-propylene glycol (SYSTANE ULTRA) 0.4-0.3 % SOLN ophthalmic solution Place 1 drop into both eyes 4 times daily 1 Bottle 3     pravastatin (PRAVACHOL) 10 MG tablet TAKE 1 TABLET BY MOUTH  DAILY 90 tablet 3     rOPINIRole (REQUIP) 2 MG tablet TAKE 2 TABLETS BY MOUTH AT  BEDTIME 180 tablet 11     senna-docusate (SENOKOT-S/PERICOLACE) 8.6-50 MG tablet 1 tablet by Oral or Feeding Tube route daily 100 tablet  "3     traMADol (ULTRAM) 50 MG tablet 50 mg po q HS and 50 mg po BID PRN 60 tablet 0     traZODone (DESYREL) 50 MG tablet Take 0.5 tablets (25 mg) by mouth At Bedtime 60 tablet 1     UNABLE TO FIND Take 1 tablet by mouth 2 times daily MEDICATION NAME: Vitamin Focus Select       vitamin D3 (CHOLECALCIFEROL) 2000 units (50 mcg) tablet Take 1 tablet (2,000 Units) by mouth daily 90 tablet 1   REVIEW OF SYSTEMS: 10 point ROS of systems including Constitutional, Eyes, Respiratory, Cardiovascular, Gastroenterology, Genitourinary, Integumentary, Musculoskeletal, Psychiatric were all negative except for pertinent positives noted in my HPI.  Objective: BP (!) 86/56   Pulse (!) 48   Temp 97.6  F (36.4  C)   Resp 20   Ht 1.499 m (4' 11\")   Wt 65.2 kg (143 lb 11.2 oz)   SpO2 93%   BMI 29.02 kg/m    Limited visit exam done given COVID-19 precautions.   GENERAL APPEARANCE:  Alert, in no distress but appearing very weak.   RESP:  respiratory effort normal, no respiratory distress, on RA  CV:  JUAN peripheral edema  ABDOMEN:  nondistended  M/S:   Gait and station - in bed, malaise present, Digits and nails with arthritic changes, reduced muscle mass  SKIN:  Inspection and Palpation of skin and subcutaneous tissue very pale/grey in appearance  PSYCH:  insight and judgement, memory appearing intact , affect and mood hypoactive, follows commands readily        Labs:   CBC RESULTS:   Recent Labs   Lab Test 04/15/20  1215 10/10/19  0651   WBC 14.1* 6.0   RBC 3.94 3.51*   HGB 12.1 10.8*   HCT 38.0 33.1*   MCV 96 94   MCH 30.7 30.8   MCHC 31.8 32.6   RDW 14.4 13.2    330       Last Basic Metabolic Panel:  Recent Labs   Lab Test 04/15/20  1215 10/10/19  0651   * 133   POTASSIUM 4.8 3.5   CHLORIDE 100 97   ANNETTA 8.9 9.2   CO2 20 30   BUN 38* 13   CR 1.58* 0.60   * 87         ASSESSMENT/PLAN:     Bradycardia  Hypotension due to hypovolemia  Acute kidney injury (H)  Hyponatremia  Leukocytosis, unspecified " type  Nausea  Poor fluid intake     Leukocytosis may be related to dehydration and LILA however as left shift, will elect to treat while awaiting for UC results (unclear if UA was ordered).  As patient is quite nauseated, will start with Rocephin IM daily x 2 and then start po antibiotics.   For LILA will order PIV placement and IVF as patient is nauseated and unable to keep fluids down currently. Nurses have been pushing fluids for 2 days without much success.    Lethargy and HoTN may be related to hyponatremia but I suspect more related to infection and dehydration.      Patient is not on diuretics.     Orders written by provider at facility  1. PIV placement with routine flush orders per facility.  Dx: dehydration, LILA   2. D5NS - 1 liter over 10 hours IV Dx: LILA (OK to sub 0.9% NS if available at facility)  3. Rocephin 1 gm IM with lidocaine daily x 2. Dx: leukocytosis with left shift  4. Zofran 4 mg po q6 hours x 2 days Dx: nausea (in addition to PRN Zofran but please use PRN sparingly while on scheduled)  5. Starting 4/17/20: Cefuroxime 500 mg po BID x 7 days Dx: possible UTI, leukocytosis      Electronically signed by:  CRISTAL Hoang CNP   Video-Visit Details  Type of service:  Video Visit  Video End Time (time video stopped): 5493  Distant Location (provider location):  [facility]             Sincerely,        CRISTAL Hoang CNP

## 2020-04-15 NOTE — PROGRESS NOTES
"[facility]   Yuni Otoole is being evaluated via a billable video visit due to the restrictions of the Covid-19 pandemic.   The patient has been notified of following:  \"This video visit will be conducted via a call between you and your provider. We have found that certain health care needs can be provided without the need for an in-person physical exam.  This service lets us provide the care you need with a video conversation. If during the course of the call the provider feels a video visit is not appropriate, you will not be charged for this service.\"   The provider has received verbal consent for a Video Visit from the patient or first contact? Yes  Patient  or facility staff would like the video invitation sent by: N/A   Video Start Time: 1530  [facility] Medical Record Number:  [id number]  Place of Location at the time of visit: [facility]  Chief Complaint   Patient presents with     Video Visit     Nursing Home Acute     HPI:  Yuni Otoole  is a 84 year old (1935), who is being seen today for a visit.  HPI information obtained from: facility chart records, facility staff, patient report and [facility] chart review. Today's concern is:     Bradycardia  Hypotension due to hypovolemia  Acute kidney injury (H)  Hyponatremia  Leukocytosis, unspecified type  Nausea  Poor fluid intake     Patient is an 84 year old woman with PMH including LBP with sciatica, RLS, HTN with recent HoTN, osteoarthritis, HLD who recently has been having trouble with bradycardia into the 33s with HoTN into the 80-90s systolically.   See yesterday's note for details.     Today nursing noting patient was lethargic, not getting OOB, not taking in po well and was nauseated.  BP this AM was 86/56, HR 48, afebrile, 93% on RA  Nursing reporting patient was lethargic but alert, weak, talking.    Labs ordered yesterday and now returning.  Patient met today vie video visit.  She reports she lethargic and weak.  " She denies dysuria, urgency or frequency; denies diarrhea.  She does have significant nausea and has not eaten much nor drank much.   She denies SOB and did not cough during our visit.    She reports no emesis today but nursing reported some yesterday (mild).      Labs have returned with Na 130, LILA present with Creat 0.60 (6 months ago) --> 1.58 today, BUN 38, GFR 30 (prev 85).    Patient has WBC 14.1 with left shift of 10.8.  Urine labs ordered and UC pending.  Nursing noted concentrated urine this AM with straight cath for labs.        Past Medical and Surgical History reviewed in Epic today.  MEDICATIONS:    Current Outpatient Medications   Medication Sig Dispense Refill     Acetaminophen (TYLENOL ARTHRITIS PAIN PO) Take 1,300 mg by mouth 3 times daily        DHA-EPA-Vitamin E (OMEGA-3 COMPLEX PO) Take 1 tablet by mouth 2 times daily       diclofenac (VOLTAREN) 1 % topical gel APPLY 4 GRAMS TOPICALLY TWO TIMES DAILY 400 g 1     diphenhydrAMINE (BENADRYL) 25 MG tablet Take 1 tablet (25 mg) by mouth daily 30 tablet 3     glucosamine 500 MG CAPS capsule Take 1 capsule (500 mg) by mouth 2 times daily 60 capsule 3     Menthol, Topical Analgesic, (BIOFREEZE) 4 % GEL Externally apply topically 2 times daily as needed 1 Tube 3     metoprolol tartrate (LOPRESSOR) 25 MG tablet TAKE ONE-HALF TABLET BY  MOUTH TWO TIMES DAILY 60 tablet 3     multivitamin, therapeutic (THERA-VIT) TABS tablet Take 1 tablet by mouth daily       polyethylene glycol-propylene glycol (SYSTANE ULTRA) 0.4-0.3 % SOLN ophthalmic solution Place 1 drop into both eyes 4 times daily 1 Bottle 3     pravastatin (PRAVACHOL) 10 MG tablet TAKE 1 TABLET BY MOUTH  DAILY 90 tablet 3     rOPINIRole (REQUIP) 2 MG tablet TAKE 2 TABLETS BY MOUTH AT  BEDTIME 180 tablet 11     senna-docusate (SENOKOT-S/PERICOLACE) 8.6-50 MG tablet 1 tablet by Oral or Feeding Tube route daily 100 tablet 3     traMADol (ULTRAM) 50 MG tablet 50 mg po q HS and 50 mg po BID PRN 60 tablet 0  "    traZODone (DESYREL) 50 MG tablet Take 0.5 tablets (25 mg) by mouth At Bedtime 60 tablet 1     UNABLE TO FIND Take 1 tablet by mouth 2 times daily MEDICATION NAME: Vitamin Focus Select       vitamin D3 (CHOLECALCIFEROL) 2000 units (50 mcg) tablet Take 1 tablet (2,000 Units) by mouth daily 90 tablet 1   REVIEW OF SYSTEMS: 10 point ROS of systems including Constitutional, Eyes, Respiratory, Cardiovascular, Gastroenterology, Genitourinary, Integumentary, Musculoskeletal, Psychiatric were all negative except for pertinent positives noted in my HPI.  Objective: BP (!) 86/56   Pulse (!) 48   Temp 97.6  F (36.4  C)   Resp 20   Ht 1.499 m (4' 11\")   Wt 65.2 kg (143 lb 11.2 oz)   SpO2 93%   BMI 29.02 kg/m    Limited visit exam done given COVID-19 precautions.   GENERAL APPEARANCE:  Alert, in no distress but appearing very weak.   RESP:  respiratory effort normal, no respiratory distress, on RA  CV:  JUAN peripheral edema  ABDOMEN:  nondistended  M/S:   Gait and station - in bed, malaise present, Digits and nails with arthritic changes, reduced muscle mass  SKIN:  Inspection and Palpation of skin and subcutaneous tissue very pale/grey in appearance  PSYCH:  insight and judgement, memory appearing intact , affect and mood hypoactive, follows commands readily        Labs:   CBC RESULTS:   Recent Labs   Lab Test 04/15/20  1215 10/10/19  0651   WBC 14.1* 6.0   RBC 3.94 3.51*   HGB 12.1 10.8*   HCT 38.0 33.1*   MCV 96 94   MCH 30.7 30.8   MCHC 31.8 32.6   RDW 14.4 13.2    330       Last Basic Metabolic Panel:  Recent Labs   Lab Test 04/15/20  1215 10/10/19  0651   * 133   POTASSIUM 4.8 3.5   CHLORIDE 100 97   ANNETTA 8.9 9.2   CO2 20 30   BUN 38* 13   CR 1.58* 0.60   * 87         ASSESSMENT/PLAN:     Bradycardia  Hypotension due to hypovolemia  Acute kidney injury (H)  Hyponatremia  Leukocytosis, unspecified type  Nausea  Poor fluid intake     Leukocytosis may be related to dehydration and LILA however as " left shift, will elect to treat while awaiting for UC results (unclear if UA was ordered).  As patient is quite nauseated, will start with Rocephin IM daily x 2 and then start po antibiotics.   For LILA will order PIV placement and IVF as patient is nauseated and unable to keep fluids down currently. Nurses have been pushing fluids for 2 days without much success.    Lethargy and HoTN may be related to hyponatremia but I suspect more related to infection and dehydration.      Patient is not on diuretics.     Orders written by provider at facility  1. PIV placement with routine flush orders per facility.  Dx: dehydration, LILA   2. D5NS - 1 liter over 10 hours IV Dx: LILA (OK to sub 0.9% NS if available at facility)  3. Rocephin 1 gm IM with lidocaine daily x 2. Dx: leukocytosis with left shift  4. Zofran 4 mg po q6 hours x 2 days Dx: nausea (in addition to PRN Zofran but please use PRN sparingly while on scheduled)  5. Starting 4/17/20: Cefuroxime 500 mg po BID x 7 days Dx: possible UTI, leukocytosis      Electronically signed by:  CRISTAL Hoang CNP   Video-Visit Details  Type of service:  Video Visit  Video End Time (time video stopped): 7063  Distant Location (provider location):  [facility]

## 2020-04-16 LAB
BACTERIA SPEC CULT: NO GROWTH
Lab: NORMAL
SPECIMEN SOURCE: NORMAL

## 2020-04-17 ENCOUNTER — HOSPITAL LABORATORY (OUTPATIENT)
Dept: NURSING HOME | Facility: OTHER | Age: 85
End: 2020-04-17

## 2020-04-17 ENCOUNTER — VIRTUAL VISIT (OUTPATIENT)
Dept: GERIATRICS | Facility: CLINIC | Age: 85
End: 2020-04-17
Payer: COMMERCIAL

## 2020-04-17 VITALS
HEART RATE: 46 BPM | SYSTOLIC BLOOD PRESSURE: 112 MMHG | DIASTOLIC BLOOD PRESSURE: 64 MMHG | TEMPERATURE: 96.7 F | OXYGEN SATURATION: 92 % | WEIGHT: 143.7 LBS | HEIGHT: 59 IN | BODY MASS INDEX: 28.97 KG/M2 | RESPIRATION RATE: 16 BRPM

## 2020-04-17 DIAGNOSIS — R00.1 BRADYCARDIA: Primary | ICD-10-CM

## 2020-04-17 DIAGNOSIS — D72.829 LEUKOCYTOSIS, UNSPECIFIED TYPE: ICD-10-CM

## 2020-04-17 DIAGNOSIS — L50.9 URTICARIA: ICD-10-CM

## 2020-04-17 DIAGNOSIS — M54.50 CHRONIC LOW BACK PAIN WITHOUT SCIATICA, UNSPECIFIED BACK PAIN LATERALITY: ICD-10-CM

## 2020-04-17 DIAGNOSIS — R11.0 NAUSEA: ICD-10-CM

## 2020-04-17 DIAGNOSIS — I95.9 HYPOTENSION, UNSPECIFIED HYPOTENSION TYPE: ICD-10-CM

## 2020-04-17 DIAGNOSIS — N17.9 ACUTE KIDNEY INJURY (H): ICD-10-CM

## 2020-04-17 DIAGNOSIS — E87.1 HYPONATREMIA: ICD-10-CM

## 2020-04-17 DIAGNOSIS — G89.29 CHRONIC LOW BACK PAIN WITHOUT SCIATICA, UNSPECIFIED BACK PAIN LATERALITY: ICD-10-CM

## 2020-04-17 DIAGNOSIS — R63.8 POOR FLUID INTAKE: ICD-10-CM

## 2020-04-17 DIAGNOSIS — M54.2 CERVICALGIA: ICD-10-CM

## 2020-04-17 DIAGNOSIS — Z98.1 S/P CERVICAL SPINAL FUSION: ICD-10-CM

## 2020-04-17 LAB
ANION GAP SERPL CALCULATED.3IONS-SCNC: 11 MMOL/L (ref 3–14)
BASOPHILS # BLD AUTO: 0 10E9/L (ref 0–0.2)
BASOPHILS NFR BLD AUTO: 0.2 %
BUN SERPL-MCNC: 43 MG/DL (ref 7–30)
CALCIUM SERPL-MCNC: 8.5 MG/DL (ref 8.5–10.1)
CHLORIDE SERPL-SCNC: 93 MMOL/L (ref 94–109)
CO2 SERPL-SCNC: 21 MMOL/L (ref 20–32)
CREAT SERPL-MCNC: 1.3 MG/DL (ref 0.52–1.04)
DIFFERENTIAL METHOD BLD: ABNORMAL
EOSINOPHIL NFR BLD AUTO: 0 %
ERYTHROCYTE [DISTWIDTH] IN BLOOD BY AUTOMATED COUNT: 14.1 % (ref 10–15)
GFR SERPL CREATININE-BSD FRML MDRD: 38 ML/MIN/{1.73_M2}
GLUCOSE SERPL-MCNC: 145 MG/DL (ref 70–99)
HCT VFR BLD AUTO: 36.4 % (ref 35–47)
HGB BLD-MCNC: 11.9 G/DL (ref 11.7–15.7)
IMM GRANULOCYTES # BLD: 0.1 10E9/L (ref 0–0.4)
IMM GRANULOCYTES NFR BLD: 0.8 %
LYMPHOCYTES # BLD AUTO: 1.8 10E9/L (ref 0.8–5.3)
LYMPHOCYTES NFR BLD AUTO: 13.7 %
MCH RBC QN AUTO: 31.5 PG (ref 26.5–33)
MCHC RBC AUTO-ENTMCNC: 32.7 G/DL (ref 31.5–36.5)
MCV RBC AUTO: 96 FL (ref 78–100)
MONOCYTES # BLD AUTO: 0.9 10E9/L (ref 0–1.3)
MONOCYTES NFR BLD AUTO: 6.9 %
NEUTROPHILS # BLD AUTO: 10.2 10E9/L (ref 1.6–8.3)
NEUTROPHILS NFR BLD AUTO: 78.4 %
NRBC # BLD AUTO: 0.1 10*3/UL
NRBC BLD AUTO-RTO: 1 /100
PLATELET # BLD AUTO: 253 10E9/L (ref 150–450)
POTASSIUM SERPL-SCNC: 5 MMOL/L (ref 3.4–5.3)
RBC # BLD AUTO: 3.78 10E12/L (ref 3.8–5.2)
SODIUM SERPL-SCNC: 125 MMOL/L (ref 133–144)
WBC # BLD AUTO: 13 10E9/L (ref 4–11)

## 2020-04-17 PROCEDURE — 99309 SBSQ NF CARE MODERATE MDM 30: CPT | Mod: 95 | Performed by: NURSE PRACTITIONER

## 2020-04-17 RX ORDER — DIPHENHYDRAMINE HCL 25 MG
25 TABLET ORAL DAILY PRN
Qty: 30 TABLET | Refills: 3
Start: 2020-04-17 | End: 2020-07-06

## 2020-04-17 ASSESSMENT — MIFFLIN-ST. JEOR: SCORE: 1017.45

## 2020-04-17 NOTE — PROGRESS NOTES
"[facility]   Yuni Otoole is being evaluated via a billable video visit due to the restrictions of the Covid-19 pandemic.   The patient has been notified of following:  \"This video visit will be conducted via a call between you and your provider. We have found that certain health care needs can be provided without the need for an in-person physical exam.  This service lets us provide the care you need with a video conversation. If during the course of the call the provider feels a video visit is not appropriate, you will not be charged for this service.\"   The provider has received verbal consent for a Video Visit from the patient or first contact? Yes  Patient  or facility staff would like the video invitation sent by: N/A   Video Start Time: 1215    [facility] Medical Record Number:  [id number]  Place of Location at the time of visit: [facility]  Chief Complaint   Patient presents with     Video Visit     Nursing Home Acute     HPI:  Yuni Otoole  is a 84 year old (1935), who is being seen today for a visit.  HPI information obtained from: facility chart records, facility staff, patient report and [facility] chart review. Today's concern is:     Bradycardia  Hypotension, unspecified hypotension type  Hyponatremia  Leukocytosis, unspecified type  Nausea  Acute kidney injury (H)  Poor fluid intake  S/P cervical spinal fusion  Cervicalgia  Chronic low back pain without sciatica, unspecified back pain laterality  Urticaria     Patient has been seen recently for ongoing malaise, bradycardia, nausea with anorexia.  She has been treated with scheduled Zofran x 3 days, IVF - now s/p 2 liters, Rocephin x 2 and start of po antibiotics this AM, discontinue of metoprolol,   Labs were drawn showing Na 130, LILA --> IVF  Labs drawn today showing worsening hyponatremia at 125 with Creat improving 1.58 --> 1.30, GFR 35 --> 44  WBC 14.1 --> 13 now  Absol Neut 10.8 --> 10.2 now.     Patient " recently was on Zanaflex which patient did not tolerate well so it so it was DC'd.  She has had a decline since that time.      Patient is met in her LTC SNF room where she is out of bed in a w/c but still appears on the paler/grey side.  She is very [place] but reports being tired and nauseous, poor po intake and anorexia.  She reports the Zofran scheduled has helped a little but not as much as she would have liked.      She denies any SOB (sats 92-94% on RA).  She has been afebrile.  She denies CP.  She endorses nausea with anorexia, was able to eat a small amount for lunch but not a ton.  She reports she is able to void (nursing reported only voiding twice since 2 L IVF).      Past Medical and Surgical History reviewed in Epic today.  MEDICATIONS:    Current Outpatient Medications   Medication Sig Dispense Refill     diphenhydrAMINE (BENADRYL) 25 MG tablet Take 1 tablet (25 mg) by mouth daily as needed for itching or allergies 30 tablet 3     Acetaminophen (TYLENOL ARTHRITIS PAIN PO) Take 1,300 mg by mouth 3 times daily        diclofenac (VOLTAREN) 1 % topical gel APPLY 4 GRAMS TOPICALLY TWO TIMES DAILY 400 g 1     Menthol, Topical Analgesic, (BIOFREEZE) 4 % GEL Externally apply topically 2 times daily as needed 1 Tube 3     polyethylene glycol-propylene glycol (SYSTANE ULTRA) 0.4-0.3 % SOLN ophthalmic solution Place 1 drop into both eyes 4 times daily 1 Bottle 3     pravastatin (PRAVACHOL) 10 MG tablet TAKE 1 TABLET BY MOUTH  DAILY 90 tablet 3     rOPINIRole (REQUIP) 2 MG tablet TAKE 2 TABLETS BY MOUTH AT  BEDTIME 180 tablet 11     senna-docusate (SENOKOT-S/PERICOLACE) 8.6-50 MG tablet 1 tablet by Oral or Feeding Tube route daily 100 tablet 3     traMADol (ULTRAM) 50 MG tablet 50 mg po q HS and 50 mg po BID PRN 60 tablet 0     traZODone (DESYREL) 50 MG tablet Take 0.5 tablets (25 mg) by mouth At Bedtime 60 tablet 1     REVIEW OF SYSTEMS: 10 point ROS of systems including Constitutional, Eyes, Respiratory,  "Cardiovascular, Gastroenterology, Genitourinary, Integumentary, Musculoskeletal, Psychiatric were all negative except for pertinent positives noted in my HPI.  Objective: /64   Pulse (!) 46   Temp 96.7  F (35.9  C)   Resp 16   Ht 1.499 m (4' 11\")   Wt 65.2 kg (143 lb 11.2 oz)   SpO2 92%   BMI 29.02 kg/m    Limited visit exam done given COVID-19 precautions.   GENERAL APPEARANCE:  Alert but appears ill, deconditioned, grey in appearance, very [place] despite bilat hearing aids   RESP:  respiratory effort normal, no respiratory distress, on RA  CV:  JUAN for LE peripheral edema  ABDOMEN:  nondistended  M/S:   Gait and station with w/c for mobility, Digits and nails with arthritic changes, reduced muscle mass  SKIN:  Inspection and Palpation of skin and subcutaneous tissue grey  PSYCH:  insight and judgement, memory seemingly intact , affect and mood normal,  follows commands readily         Labs:   CBC RESULTS:   Recent Labs   Lab Test 04/17/20  1130 04/15/20  1215   WBC 13.0* 14.1*   RBC 3.78* 3.94   HGB 11.9 12.1   HCT 36.4 38.0   MCV 96 96   MCH 31.5 30.7   MCHC 32.7 31.8   RDW 14.1 14.4    267       Last Basic Metabolic Panel:  Recent Labs   Lab Test 04/17/20  1130 04/15/20  1215   * 130*   POTASSIUM 5.0 4.8   CHLORIDE 93* 100   ANNETTA 8.5 8.9   CO2 21 20   BUN 43* 38*   CR 1.30* 1.58*   * 155*       Liver Function Studies -   Recent Labs   Lab Test 06/19/18  0630 11/27/17  0817  11/10/17  1723 04/25/17  1206   PROTTOTAL  --   --   --  6.5* 6.9   ALBUMIN 3.4 2.5*   < > 3.5 3.9   BILITOTAL  --   --   --  0.4 0.2   ALKPHOS  --   --   --  68 63   AST  --   --   --  16 21   ALT  --   --   --  18 25    < > = values in this interval not displayed.       TSH   Date Value Ref Range Status   11/14/2017 2.91 0.40 - 4.00 mU/L Final   04/25/2017 2.40 0.40 - 4.00 mU/L Final   ]    No results found for: A1C      ASSESSMENT/PLAN:     Bradycardia  Hypotension, unspecified hypotension " type  Hyponatremia  Leukocytosis, unspecified type  Nausea  Acute kidney injury (H)  Poor fluid intake  S/P cervical spinal fusion  Cervicalgia  Chronic low back pain without sciatica, unspecified back pain laterality  Urticaria     Overall general decline noted.  Patient is unable to take many of her meds at this time so will decrease pill burden as much as possible.    Will extend Zofran through the weekend to assist with nausea. Encouraged patient to drink for kidney function.  With hyponatremia would expect this to not be recommendation but patient is not taking much in so no fear of drinking too much.   Nursing will update Cardiology to determine when she can be seen in clinic.  Patient voiced she likely would want a pacemaker if needed but would want to talk to her family about it.  Cardiology likely would like to have this discussion with her and her family.    Continue antibiotics in case of infection which may be driving the LILA, malaise, anorexia. Understand antibiotics may be causing anorexia as well but WBC is mildly coming down with 2 days of Rocephin.    Repeat BMP in one week or sooner if needed.    Will start Na tabs for hyponatremia as worsening.      Goal to treat patient in place but if she worsens over the weekend with worsening symptomatic bradycardia - would recommend patient to be seen in ED.        Orders written by provider at facility  1. discontinue fish oil  2. discontinue Glucosamine   3. discontinue MV  4. discontinue vitamin focus  5. discontinue Vitamin D as patient declining med   6. BMP in one week. Dx: LILA  7. Extend Zofran TID through Monday PM  8. Na HCO3 650 mg po BID Dx: hyponatremia   9. Add Zanaflex to allergy list.   10. Change Benadryl to PRN.     Electronically signed by:  CRISTAL Hoang CNP     Video-Visit Details  Type of service:  Video Visit  Video End Time (time video stopped): 1877  Distant Location (provider location):  [facility]

## 2020-04-17 NOTE — LETTER
"    4/17/2020        RE: Yuni Otoole  [address] [facility] [address]        [facility]   Yuni Otoole is being evaluated via a billable video visit due to the restrictions of the Covid-19 pandemic.   The patient has been notified of following:  \"This video visit will be conducted via a call between you and your provider. We have found that certain health care needs can be provided without the need for an in-person physical exam.  This service lets us provide the care you need with a video conversation. If during the course of the call the provider feels a video visit is not appropriate, you will not be charged for this service.\"   The provider has received verbal consent for a Video Visit from the patient or first contact? Yes  Patient  or facility staff would like the video invitation sent by: N/A   Video Start Time: 1215    [facility] Medical Record Number:  [id number]  Place of Location at the time of visit: [facility]  Chief Complaint   Patient presents with     Video Visit     Nursing Home Acute     HPI:  Yuni Otoole  is a 84 year old (1935), who is being seen today for a visit.  HPI information obtained from: facility chart records, facility staff, patient report and [facility] chart review. Today's concern is:     Bradycardia  Hypotension, unspecified hypotension type  Hyponatremia  Leukocytosis, unspecified type  Nausea  Acute kidney injury (H)  Poor fluid intake  S/P cervical spinal fusion  Cervicalgia  Chronic low back pain without sciatica, unspecified back pain laterality  Urticaria     Patient has been seen recently for ongoing malaise, bradycardia, nausea with anorexia.  She has been treated with scheduled Zofran x 3 days, IVF - now s/p 2 liters, Rocephin x 2 and start of po antibiotics this AM, discontinue of metoprolol,   Labs were drawn showing Na 130, LILA --> IVF  Labs drawn today showing worsening hyponatremia at 125 with Creat improving 1.58 --> " 1.30, GFR 35 --> 44  WBC 14.1 --> 13 now  Absol Neut 10.8 --> 10.2 now.     Patient recently was on Zanaflex which patient did not tolerate well so it so it was DC'd.  She has had a decline since that time.      Patient is met in her LTC SNF room where she is out of bed in a w/c but still appears on the paler/grey side.  She is very [place] but reports being tired and nauseous, poor po intake and anorexia.  She reports the Zofran scheduled has helped a little but not as much as she would have liked.      She denies any SOB (sats 92-94% on RA).  She has been afebrile.  She denies CP.  She endorses nausea with anorexia, was able to eat a small amount for lunch but not a ton.  She reports she is able to void (nursing reported only voiding twice since 2 L IVF).      Past Medical and Surgical History reviewed in Epic today.  MEDICATIONS:    Current Outpatient Medications   Medication Sig Dispense Refill     diphenhydrAMINE (BENADRYL) 25 MG tablet Take 1 tablet (25 mg) by mouth daily as needed for itching or allergies 30 tablet 3     Acetaminophen (TYLENOL ARTHRITIS PAIN PO) Take 1,300 mg by mouth 3 times daily        diclofenac (VOLTAREN) 1 % topical gel APPLY 4 GRAMS TOPICALLY TWO TIMES DAILY 400 g 1     Menthol, Topical Analgesic, (BIOFREEZE) 4 % GEL Externally apply topically 2 times daily as needed 1 Tube 3     polyethylene glycol-propylene glycol (SYSTANE ULTRA) 0.4-0.3 % SOLN ophthalmic solution Place 1 drop into both eyes 4 times daily 1 Bottle 3     pravastatin (PRAVACHOL) 10 MG tablet TAKE 1 TABLET BY MOUTH  DAILY 90 tablet 3     rOPINIRole (REQUIP) 2 MG tablet TAKE 2 TABLETS BY MOUTH AT  BEDTIME 180 tablet 11     senna-docusate (SENOKOT-S/PERICOLACE) 8.6-50 MG tablet 1 tablet by Oral or Feeding Tube route daily 100 tablet 3     traMADol (ULTRAM) 50 MG tablet 50 mg po q HS and 50 mg po BID PRN 60 tablet 0     traZODone (DESYREL) 50 MG tablet Take 0.5 tablets (25 mg) by mouth At Bedtime 60 tablet 1     REVIEW OF  "SYSTEMS: 10 point ROS of systems including Constitutional, Eyes, Respiratory, Cardiovascular, Gastroenterology, Genitourinary, Integumentary, Musculoskeletal, Psychiatric were all negative except for pertinent positives noted in my HPI.  Objective: /64   Pulse (!) 46   Temp 96.7  F (35.9  C)   Resp 16   Ht 1.499 m (4' 11\")   Wt 65.2 kg (143 lb 11.2 oz)   SpO2 92%   BMI 29.02 kg/m    Limited visit exam done given COVID-19 precautions.   GENERAL APPEARANCE:  Alert but appears ill, deconditioned, grey in appearance, very [place] despite bilat hearing aids   RESP:  respiratory effort normal, no respiratory distress, on RA  CV:  JUAN for LE peripheral edema  ABDOMEN:  nondistended  M/S:   Gait and station with w/c for mobility, Digits and nails with arthritic changes, reduced muscle mass  SKIN:  Inspection and Palpation of skin and subcutaneous tissue grey  PSYCH:  insight and judgement, memory seemingly intact , affect and mood normal,  follows commands readily         Labs:   CBC RESULTS:   Recent Labs   Lab Test 04/17/20  1130 04/15/20  1215   WBC 13.0* 14.1*   RBC 3.78* 3.94   HGB 11.9 12.1   HCT 36.4 38.0   MCV 96 96   MCH 31.5 30.7   MCHC 32.7 31.8   RDW 14.1 14.4    267       Last Basic Metabolic Panel:  Recent Labs   Lab Test 04/17/20  1130 04/15/20  1215   * 130*   POTASSIUM 5.0 4.8   CHLORIDE 93* 100   ANNETTA 8.5 8.9   CO2 21 20   BUN 43* 38*   CR 1.30* 1.58*   * 155*       Liver Function Studies -   Recent Labs   Lab Test 06/19/18  0630 11/27/17  0817  11/10/17  1723 04/25/17  1206   PROTTOTAL  --   --   --  6.5* 6.9   ALBUMIN 3.4 2.5*   < > 3.5 3.9   BILITOTAL  --   --   --  0.4 0.2   ALKPHOS  --   --   --  68 63   AST  --   --   --  16 21   ALT  --   --   --  18 25    < > = values in this interval not displayed.       TSH   Date Value Ref Range Status   11/14/2017 2.91 0.40 - 4.00 mU/L Final   04/25/2017 2.40 0.40 - 4.00 mU/L Final   ]    No results found for: " A1C      ASSESSMENT/PLAN:     Bradycardia  Hypotension, unspecified hypotension type  Hyponatremia  Leukocytosis, unspecified type  Nausea  Acute kidney injury (H)  Poor fluid intake  S/P cervical spinal fusion  Cervicalgia  Chronic low back pain without sciatica, unspecified back pain laterality  Urticaria     Overall general decline noted.  Patient is unable to take many of her meds at this time so will decrease pill burden as much as possible.    Will extend Zofran through the weekend to assist with nausea. Encouraged patient to drink for kidney function.  With hyponatremia would expect this to not be recommendation but patient is not taking much in so no fear of drinking too much.   Nursing will update Cardiology to determine when she can be seen in clinic.  Patient voiced she likely would want a pacemaker if needed but would want to talk to her family about it.  Cardiology likely would like to have this discussion with her and her family.    Continue antibiotics in case of infection which may be driving the LILA, malaise, anorexia. Understand antibiotics may be causing anorexia as well but WBC is mildly coming down with 2 days of Rocephin.    Repeat BMP in one week or sooner if needed.    Will start Na tabs for hyponatremia as worsening.      Goal to treat patient in place but if she worsens over the weekend with worsening symptomatic bradycardia - would recommend patient to be seen in ED.        Orders written by provider at facility  1. discontinue fish oil  2. discontinue Glucosamine   3. discontinue MV  4. discontinue vitamin focus  5. discontinue Vitamin D as patient declining med   6. BMP in one week. Dx: LILA  7. Extend Zofran TID through Monday PM  8. Na HCO3 650 mg po BID Dx: hyponatremia   9. Add Zanaflex to allergy list.   10. Change Benadryl to PRN.     Electronically signed by:  CRISTAL Hoang CNP     Video-Visit Details  Type of service:  Video Visit  Video End Time (time video stopped):  1227  Distant Location (provider location):  [facility]             Sincerely,        CRISTAL Hoang CNP

## 2020-04-21 ENCOUNTER — VIRTUAL VISIT (OUTPATIENT)
Dept: GERIATRICS | Facility: CLINIC | Age: 85
End: 2020-04-21
Payer: COMMERCIAL

## 2020-04-21 VITALS
SYSTOLIC BLOOD PRESSURE: 112 MMHG | TEMPERATURE: 96.8 F | BODY MASS INDEX: 29.02 KG/M2 | HEART RATE: 46 BPM | OXYGEN SATURATION: 95 % | WEIGHT: 143.7 LBS | DIASTOLIC BLOOD PRESSURE: 64 MMHG | RESPIRATION RATE: 16 BRPM

## 2020-04-21 DIAGNOSIS — I95.9 HYPOTENSION, UNSPECIFIED HYPOTENSION TYPE: ICD-10-CM

## 2020-04-21 DIAGNOSIS — Z98.1 S/P CERVICAL SPINAL FUSION: ICD-10-CM

## 2020-04-21 DIAGNOSIS — R00.1 BRADYCARDIA: Primary | ICD-10-CM

## 2020-04-21 DIAGNOSIS — R11.0 NAUSEA: ICD-10-CM

## 2020-04-21 DIAGNOSIS — E87.1 HYPONATREMIA: ICD-10-CM

## 2020-04-21 DIAGNOSIS — D72.829 LEUKOCYTOSIS, UNSPECIFIED TYPE: ICD-10-CM

## 2020-04-21 PROCEDURE — 99309 SBSQ NF CARE MODERATE MDM 30: CPT | Mod: 95 | Performed by: NURSE PRACTITIONER

## 2020-04-21 NOTE — LETTER
"    4/21/2020        RE: Yuni Otoole  [address]  [facility] [address]        [facility]   uYni Otoole is being evaluated via a billable video visit due to the restrictions of the Covid-19 pandemic.   The patient has been notified of following:  \"This video visit will be conducted via a call between you and your provider. We have found that certain health care needs can be provided without the need for an in-person physical exam.  This service lets us provide the care you need with a video conversation. If during the course of the call the provider feels a video visit is not appropriate, you will not be charged for this service.\"   The provider has received verbal consent for a Video Visit from the patient or first contact? Yes  Patient  or facility staff would like the video invitation sent by: N/A   Video Start Time: 9:10    Received verbal consent to use Care Everywhere in order to access labs, documents, histories, and all other needed information to provide care at current facility.    [facility] Medical Record Number:  [id number]  Place of Location at the time of visit: [facility]  Chief Complaint   Patient presents with     Video Visit     Epi     HPI:  Yuni Otoole  is a 84 year old (1935), who is being seen today for a visit.  HPI information obtained from: facility chart records, facility staff, patient report, [facility] chart review and Care Everywhere [facility] chart review. Today's concern is:  On going new * Bradycardia started ~ 2 weeks ago likely from S/E of zanaflex (now off for 2 weeks)  Further complications of:  Nausea/vomiting/LILA/Hypotension/Hyponatremia tx'ed with zofran, IVF, diuretic and CCB stopped and NA tabs started  Leukocytosis - left shift treated with Rocephin IM x 2 days now on oral ceftin      Yuni is a resident of the [facility] wing of [place] since 2017 s/p cervical spinal fusion. Noted recent medication change is her zanaflex. Robaxin wasn't " covered by insurance and changed to zanaflex on 3/25. Noted symptoms of bradycardia timing are correlated to the start of this medication.      Hx of Medical issues have been:  Acute/Chronic Pain control for generalized and local pain in marv hips, knees, shoulders and neck. In the past treated with localized cortisone injections, frequent adjustment of pain medication per pt's request of Tylenol and NSAIDs and HS ultram, pain clinic visit 11/14 which stopped NSAID and changed tylenol to tylenol arthritis, repeat X rays done,  Ortho consult, and possible future marv genicular nerve blocks and marv hip replacements, but noted high risk surgery. She is balancing/thinking about this still. Pain treatments used recently:   1/28: restart methocarbamol and burst of scheduled Ibuprofen for acute muscle strain/pain.  3/2 - 25: order to discontinue methocarbamol and start tizanidine 2 mg q am when methocarbamol is up (on 3/25 done)  4/2: Norvasc stopped and Diuretic held   4/6: chlorthalidone stopped - benadryl held  4/14: Bradycardia noted - Metoprolol stopped and Cards consulted.   4/15: IVF started, Rocephin , zofran and cefuroxime started.   4/17: non essential meds stopped (supplements)      Given her bradycardia, I called Cards- Dr. Stearns to ask if we can do a holter pre apt given on going bradycardia.    Visit today is difficult given her [place]-I emailed out a written update for her so she can read.   Noted she remains on ultram - scheduled and using PRN ~ every other day. - noted that med can prolong QT    Past Medical and Surgical History reviewed in Epic today.  MEDICATIONS: Reviewed.    REVIEW OF SYSTEMS: 4 point ROS including Respiratory, CV, GI and , other than that noted in the HPI,  is negative  Objective: /64   Pulse (!) 46   Temp 96.8  F (36  C)   Resp 16   Wt 65.2 kg (143 lb 11.2 oz)   SpO2 95%   BMI 29.02 kg/m    Limited visit exam done given COVID-19 precautions.   GENERAL APPEARANCE: Alert,  in no distress, cooperative.  ENT: Mouth and posterior oropharynx normal, moist mucous membranes, hearing acuity at baseline [place]   RESP: non labored breathing  CV: lower extremity edema trace marv.   SKIN: Inspection of skin and subcutaneous tissue baseline w/ fragility.  NEURO: 2-12 appear at baseline  PSYCH: Insight and judgement, memory baseline - some STM loss, but stable, affect and mood happy.    Labs:   Recent labs in [facility] reviewed by me today.     ASSESSMENT/PLAN:  Bradycardia  Stable in upper 40's - not often above. I called Cards this am requesting holter and did get a call back later this early afternoon - they will contact SNF about holter -   Cont to stay off meds taht lower HR. Noted Ultram - but likely safe - if HR lwoers - would stop.     Hypotension, unspecified hypotension type  Improved off medications  And tx with IVF - cont to monitor    Hyponatremia  On Na tabs - will recheck labs on Thur. with hope to stop     Leukocytosis, L shift - likey UTI -   Cont ABX - recheck labs on Thur.     Nausea  Resolved.     S/P cervical spinal fusion  Cont topical pain reliever, Tylenol and Ultram.       Orders:  1. BMP and CBC on Thur.   Cards will be in contact about holter  Message for pt to read/understand emailed to NH.     Electronically signed by:  CRISTAL Olmos CNP     Video-Visit Details  Type of service:  Video Visit  Video End Time (time video stopped): 9:20  Distant Location (provider location):  [facility]             Sincerely,        CRISTAL Olmos CNP

## 2020-04-21 NOTE — PROGRESS NOTES
"[facility]   Yuni Otoole is being evaluated via a billable video visit due to the restrictions of the Covid-19 pandemic.   The patient has been notified of following:  \"This video visit will be conducted via a call between you and your provider. We have found that certain health care needs can be provided without the need for an in-person physical exam.  This service lets us provide the care you need with a video conversation. If during the course of the call the provider feels a video visit is not appropriate, you will not be charged for this service.\"   The provider has received verbal consent for a Video Visit from the patient or first contact? Yes  Patient  or facility staff would like the video invitation sent by: N/A   Video Start Time: 9:10    Received verbal consent to use Care Everywhere in order to access labs, documents, histories, and all other needed information to provide care at current facility.    [facility] Medical Record Number:  [id number]  Place of Location at the time of visit: [facility]  Chief Complaint   Patient presents with     Video Visit     Epi     HPI:  Yuni Otoole  is a 84 year old (1935), who is being seen today for a visit.  HPI information obtained from: facility chart records, facility staff, patient report, [facility] chart review and Care Everywhere [facility] chart review. Today's concern is:  On going new * Bradycardia started ~ 2 weeks ago likely from S/E of zanaflex (now off for 2 weeks)  Further complications of:  Nausea/vomiting/LILA/Hypotension/Hyponatremia tx'ed with zofran, IVF, diuretic and CCB stopped and NA tabs started  Leukocytosis - left shift treated with Rocephin IM x 2 days now on oral ceftin      Yuni is a resident of the [facility] [place] since 2017 s/p cervical spinal fusion. Noted recent medication change is her zanaflex. Robaxin wasn't covered by insurance and changed to zanaflex on 3/25. Noted symptoms of bradycardia timing " are correlated to the start of this medication.      Hx of Medical issues have been:  Acute/Chronic Pain control for generalized and local pain in marv hips, knees, shoulders and neck. In the past treated with localized cortisone injections, frequent adjustment of pain medication per pt's request of Tylenol and NSAIDs and HS ultram, pain clinic visit 11/14 which stopped NSAID and changed tylenol to tylenol arthritis, repeat X rays done,  Ortho consult, and possible future marv genicular nerve blocks and marv hip replacements, but noted high risk surgery. She is balancing/thinking about this still. Pain treatments used recently:   1/28: restart methocarbamol and burst of scheduled Ibuprofen for acute muscle strain/pain.  3/2 - 25: order to discontinue methocarbamol and start tizanidine 2 mg q am when methocarbamol is up (on 3/25 done)  4/2: Norvasc stopped and Diuretic held   4/6: chlorthalidone stopped - benadryl held  4/14: Bradycardia noted - Metoprolol stopped and Cards consulted.   4/15: IVF started, Rocephin , zofran and cefuroxime started.   4/17: non essential meds stopped (supplements)      Given her bradycardia, I called Cards- Dr. Stearns to ask if we can do a holter pre apt given on going bradycardia.    Visit today is difficult given her [place]-I emailed out a written update for her so she can read.   Noted she remains on ultram - scheduled and using PRN ~ every other day. - noted that med can prolong QT    Past Medical and Surgical History reviewed in Epic today.  MEDICATIONS: Reviewed.    REVIEW OF SYSTEMS: 4 point ROS including Respiratory, CV, GI and , other than that noted in the HPI,  is negative  Objective: /64   Pulse (!) 46   Temp 96.8  F (36  C)   Resp 16   Wt 65.2 kg (143 lb 11.2 oz)   SpO2 95%   BMI 29.02 kg/m    Limited visit exam done given COVID-19 precautions.   GENERAL APPEARANCE: Alert, in no distress, cooperative.  ENT: Mouth and posterior oropharynx normal, moist mucous  membranes, hearing acuity at baseline [place]   RESP: non labored breathing  CV: lower extremity edema trace marv.   SKIN: Inspection of skin and subcutaneous tissue baseline w/ fragility.  NEURO: 2-12 appear at baseline  PSYCH: Insight and judgement, memory baseline - some STM loss, but stable, affect and mood happy.    Labs:   Recent labs in [facility] reviewed by me today.     ASSESSMENT/PLAN:  Bradycardia  Stable in upper 40's - not often above. I called Cards this am requesting holter and did get a call back later this early afternoon - they will contact SNF about holter -   Cont to stay off meds taht lower HR. Noted Ultram - but likely safe - if HR lwoers - would stop.     Hypotension, unspecified hypotension type  Improved off medications  And tx with IVF - cont to monitor    Hyponatremia  On Na tabs - will recheck labs on Thur. with hope to stop     Leukocytosis, L shift - likey UTI -   Cont ABX - recheck labs on Thur.     Nausea  Resolved.     S/P cervical spinal fusion  Cont topical pain reliever, Tylenol and Ultram.       Orders:  1. BMP and CBC on Thur.   Cards will be in contact about holter  Message for pt to read/understand emailed to NH.     Electronically signed by:  CRISTAL Olmos CNP     Video-Visit Details  Type of service:  Video Visit  Video End Time (time video stopped): 9:20  Distant Location (provider location):  [facility]

## 2020-04-23 ENCOUNTER — TELEPHONE (OUTPATIENT)
Dept: GERIATRICS | Facility: CLINIC | Age: 85
End: 2020-04-23

## 2020-04-23 ENCOUNTER — HOSPITAL LABORATORY (OUTPATIENT)
Dept: NURSING HOME | Facility: OTHER | Age: 85
End: 2020-04-23

## 2020-04-23 DIAGNOSIS — E78.2 MIXED HYPERLIPIDEMIA: ICD-10-CM

## 2020-04-23 LAB
ANION GAP SERPL CALCULATED.3IONS-SCNC: 6 MMOL/L (ref 3–14)
BUN SERPL-MCNC: 11 MG/DL (ref 7–30)
CALCIUM SERPL-MCNC: 8.4 MG/DL (ref 8.5–10.1)
CHLORIDE SERPL-SCNC: 101 MMOL/L (ref 94–109)
CO2 SERPL-SCNC: 28 MMOL/L (ref 20–32)
CREAT SERPL-MCNC: 0.58 MG/DL (ref 0.52–1.04)
ERYTHROCYTE [DISTWIDTH] IN BLOOD BY AUTOMATED COUNT: 15.7 % (ref 10–15)
GFR SERPL CREATININE-BSD FRML MDRD: 85 ML/MIN/{1.73_M2}
GLUCOSE SERPL-MCNC: 83 MG/DL (ref 70–99)
HCT VFR BLD AUTO: 33.2 % (ref 35–47)
HGB BLD-MCNC: 10.6 G/DL (ref 11.7–15.7)
MCH RBC QN AUTO: 31 PG (ref 26.5–33)
MCHC RBC AUTO-ENTMCNC: 31.9 G/DL (ref 31.5–36.5)
MCV RBC AUTO: 97 FL (ref 78–100)
PLATELET # BLD AUTO: 247 10E9/L (ref 150–450)
POTASSIUM SERPL-SCNC: 4.2 MMOL/L (ref 3.4–5.3)
RBC # BLD AUTO: 3.42 10E12/L (ref 3.8–5.2)
SODIUM SERPL-SCNC: 135 MMOL/L (ref 133–144)
WBC # BLD AUTO: 5.8 10E9/L (ref 4–11)

## 2020-04-23 RX ORDER — CHLORTHALIDONE 25 MG/1
25 TABLET ORAL SEE ADMIN INSTRUCTIONS
COMMUNITY
End: 2020-06-03

## 2020-04-23 RX ORDER — PRAVASTATIN SODIUM 10 MG
10 TABLET ORAL DAILY
Qty: 90 TABLET | Refills: 3 | Status: SHIPPED | OUTPATIENT
Start: 2020-04-23 | End: 2020-07-06

## 2020-04-23 RX ORDER — AMLODIPINE BESYLATE 2.5 MG/1
2.5 TABLET ORAL AT BEDTIME
COMMUNITY
End: 2020-07-15

## 2020-04-23 NOTE — TELEPHONE ENCOUNTER
Labs back  Results for orders placed or performed in visit on 04/23/20   Basic metabolic panel     Status: Abnormal   Result Value Ref Range    Sodium 135 133 - 144 mmol/L    Potassium 4.2 3.4 - 5.3 mmol/L    Chloride 101 94 - 109 mmol/L    Carbon Dioxide 28 20 - 32 mmol/L    Anion Gap 6 3 - 14 mmol/L    Glucose 83 70 - 99 mg/dL    Urea Nitrogen 11 7 - 30 mg/dL    Creatinine 0.58 0.52 - 1.04 mg/dL    GFR Estimate 85 >60 mL/min/[1.73_m2]    GFR Estimate If Black >90 >60 mL/min/[1.73_m2]    Calcium 8.4 (L) 8.5 - 10.1 mg/dL   CBC with platelets     Status: Abnormal   Result Value Ref Range    WBC 5.8 4.0 - 11.0 10e9/L    RBC Count 3.42 (L) 3.8 - 5.2 10e12/L    Hemoglobin 10.6 (L) 11.7 - 15.7 g/dL    Hematocrit 33.2 (L) 35.0 - 47.0 %    MCV 97 78 - 100 fl    MCH 31.0 26.5 - 33.0 pg    MCHC 31.9 31.5 - 36.5 g/dL    RDW 15.7 (H) 10.0 - 15.0 %    Platelet Count 247 150 - 450 10e9/L    v  Wt now up from 140 to 157   BP's now up to 170's    1. Decrease Na bicarb to 650 q day x 3 days then dC  2. Na and HGB in 1 week.   3. Chlorthalidone 25 mg po today and Friday and Sunday.   4. Wt on Friday and Monday.   5. Start Norvasc 2.5 mg po q HS.       Mixed hyperlipidemia  Sent to pharm.   - pravastatin (PRAVACHOL) 10 MG tablet; Take 1 tablet (10 mg) by mouth daily

## 2020-04-27 ENCOUNTER — TELEPHONE (OUTPATIENT)
Dept: AUDIOLOGY | Facility: CLINIC | Age: 85
End: 2020-04-27

## 2020-04-27 ENCOUNTER — TELEPHONE (OUTPATIENT)
Dept: GERIATRICS | Facility: CLINIC | Age: 85
End: 2020-04-27

## 2020-04-27 NOTE — TELEPHONE ENCOUNTER
Reason for Call:  Other call back    Detailed comments: Yuni's  called stating her hearing aid is not working and would like to see or speak with Courtney.  Please call    Phone Number Patient can be reached at: Home number on file [phone] (home)    Best Time: any    Can we leave a detailed message on this number? YES    Call taken on 4/27/2020 at 12:55 PM by Beatris Maya

## 2020-04-27 NOTE — TELEPHONE ENCOUNTER
Spoke with nsg today about pt's clinical update:  1. HR now resolved with pulses in 80's - will be sending holter back to cards - they will inspect rhythm. - unclear why rate now self corrected. Glad it did and nsg will update pt on correction, good news.     2. Bp's - Norvasc 2.5 mg restarted on 4/23 and sbp's stable 120 - 160's - with the trend to lower  - noted chlorthalidone also given Friday and Sunday and Na bicar stopped    3. Weights - question accuracy - but pt back to baseline closer to 145 (10 lb wt loss with chlorthalidone dose x 3 given in 3 days) - will cont to monitor and restart daily diuretic if needed.

## 2020-04-28 NOTE — TELEPHONE ENCOUNTER
Spoke with the patients  today 4/28/2020. He is going to drop the hearing aid off 4/30/2020 here in [place] at the main registration desk.  Bennett Gonzalez

## 2020-04-30 ENCOUNTER — HOSPITAL LABORATORY (OUTPATIENT)
Dept: NURSING HOME | Facility: OTHER | Age: 85
End: 2020-04-30

## 2020-04-30 ENCOUNTER — ALLIED HEALTH/NURSE VISIT (OUTPATIENT)
Dept: AUDIOLOGY | Facility: CLINIC | Age: 85
End: 2020-04-30
Payer: COMMERCIAL

## 2020-04-30 DIAGNOSIS — H90.3 SENSORINEURAL HEARING LOSS, BILATERAL: Primary | ICD-10-CM

## 2020-04-30 LAB
HGB BLD-MCNC: 11.4 G/DL (ref 11.7–15.7)
SODIUM SERPL-SCNC: 136 MMOL/L (ref 133–144)

## 2020-04-30 PROCEDURE — V5299 HEARING SERVICE: HCPCS | Performed by: AUDIOLOGIST

## 2020-04-30 PROCEDURE — 99207 ZZC NO CHARGE LOS: CPT | Performed by: AUDIOLOGIST

## 2020-04-30 NOTE — PROGRESS NOTES
Hearing Aid Check     SUBJECTIVE:  Yuni Otoole is a 84 year old year old female, her  dropped her hearing aids off for repair.  The right is weak and not functioning appropriately and they both need to go in to have the pressure vent added to the AP custom receivers, per Alee.        OBJECTIVE:  Biologic listening check of the left device revealed appropriate function, biologic listening check of the right device revealed it was weak and some distortion.  Moved the left  to the right hearing aid and function was restored, suggesting a problem with the right  and not the hearing aid.  The hearing aid is out of warranty and the AP custom  is in warranty.     PLAN: R  Receivers were sent to [facility], left to have pressure vent added, and right to have pressure vent added and to repair the .  Call Mr. Otoole when the device is back from repair for  or to send.  He also requested replacement wax guards.    Bennett Sharp Licensed Audiologist #[id number]

## 2020-05-06 VITALS
SYSTOLIC BLOOD PRESSURE: 140 MMHG | DIASTOLIC BLOOD PRESSURE: 83 MMHG | BODY MASS INDEX: 29.64 KG/M2 | WEIGHT: 147 LBS | OXYGEN SATURATION: 96 % | HEART RATE: 98 BPM | TEMPERATURE: 98.5 F | RESPIRATION RATE: 18 BRPM | HEIGHT: 59 IN

## 2020-05-06 ASSESSMENT — MIFFLIN-ST. JEOR: SCORE: 1032.42

## 2020-05-06 NOTE — PROGRESS NOTES
"[facility]   Yuni Otoole is being evaluated via a billable video visit due to the restrictions of the Covid-19 pandemic.   The patient has been notified of following:  \"This video visit will be conducted via a call between you and your provider. We have found that certain health care needs can be provided without the need for an in-person physical exam.  This service lets us provide the care you need with a video conversation. If during the course of the call the provider feels a video visit is not appropriate, you will not be charged for this service.\"   The provider has received verbal consent for a Video Visit from the patient or first contact? Yes  Patient or facility staff would like the video invitation sent by: N/A   Video Start Time: 11:29  [facility] Medical Record Number:  [id number]  Place of Location at the time of visit: [facility]  Chief Complaint   Patient presents with     Nursing [place] Regulatory   HPI:    Yuni Otoole is a 82 year old  (1935), who is being seen today for a federally mandated E/M visit at [facility]  .  HPI information obtained from: facility staff, patient report and [facility] Epic chart review.     Today's concerns are:  -  Resident seen in the presence of the nurse manager of the unit who reports that HR has been wnl. Pt denies any chest pain or palpitation. Endorses get winded with exertion, but no wheezing or cough.   - Pt denies fever, chills, lightheadedness.   - Pt reports feeling at baseline (ongoing pain in multiple joints, aching, off and on, some response to pain meds).    - Reports appetite/sleep/bowel movements are fine  - pt reports started a physical therapy and making some progress, continued to have weakness in the legs with standing up.   ------------------------------------------  - - Past Medical, social, family histories, medications, and allergies reviewed and updated  - Medications reviewed: in " "the chart and EHR.   - Case Management:   I have reviewed the care plan and MDS and do agree with the plan. Patient's desire to return to the community is not present.  Information reviewed:  Medications, vital signs, orders, and nursing notes.    :MEDICATIONS:  Current Outpatient Medications   Medication Sig Dispense Refill     Acetaminophen (TYLENOL ARTHRITIS PAIN PO) Take 1,300 mg by mouth 3 times daily        amLODIPine (NORVASC) 2.5 MG tablet Take 2.5 mg by mouth At Bedtime       chlorthalidone (HYGROTON) 25 MG tablet Take 25 mg by mouth See Admin Instructions Given dose on Friday and Sund       diclofenac (VOLTAREN) 1 % topical gel APPLY 4 GRAMS TOPICALLY TWO TIMES DAILY 400 g 1     diphenhydrAMINE (BENADRYL) 25 MG tablet Take 1 tablet (25 mg) by mouth daily as needed for itching or allergies 30 tablet 3     Menthol, Topical Analgesic, (BIOFREEZE) 4 % GEL Externally apply topically 2 times daily as needed 1 Tube 3     polyethylene glycol-propylene glycol (SYSTANE ULTRA) 0.4-0.3 % SOLN ophthalmic solution Place 1 drop into both eyes 4 times daily 1 Bottle 3     pravastatin (PRAVACHOL) 10 MG tablet Take 1 tablet (10 mg) by mouth daily 90 tablet 3     rOPINIRole (REQUIP) 2 MG tablet TAKE 2 TABLETS BY MOUTH AT  BEDTIME 180 tablet 11     senna-docusate (SENOKOT-S/PERICOLACE) 8.6-50 MG tablet 1 tablet by Oral or Feeding Tube route daily 100 tablet 3     traMADol (ULTRAM) 50 MG tablet 50 mg po q HS and 50 mg po BID PRN 60 tablet 0     traZODone (DESYREL) 50 MG tablet Take 0.5 tablets (25 mg) by mouth At Bedtime 60 tablet 1     ROS: 4 point ROS including Respiratory, CV, GI and , other than that noted in the HPI,  is negative    Exam:  Vitals: BP (!) 140/83   Pulse 98   Temp 98.5  F (36.9  C)   Resp 18   Ht 1.499 m (4' 11\")   Wt 66.7 kg (147 lb)   SpO2 96%   BMI 29.69 kg/m    BMI= Body mass index is 29.69 kg/m .  Limited visit exam done given COVID-19 precautions.   GENERAL APPEARANCE:  in no distress, " cooperative  RESP:  unlabored breathing.   M/S:   kyphosis.   SKIN: no rash noted  NEURO:   generalized muscles atrophy, hand  4/5 b/l.  Dorsiflexion 5/5 bilateral.   PSYCH:  affect and mood normal.     Lab/Diagnostic data: Reviewed in the chart and EHR.      ASSESSMENT/PLAN  ----------------------------------  Aortic Stenosis:  B/L Carotid Stenosis with hx of s/p Right CEA  Benign essential hypertension:    Hyperlipidemia  Recent bradycardia  - now stable, follow on Holter result.  Norvasc restarted.   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   - improving with rehab, continue until goal is achieved.     Osteoporosis /Vitamin D deficiency:   Off supplement now 2/2 de-prescribing amidst COVID 19 pandemic.     Frailty:  continues to require assistance from nursing. Up for meals only o/w spends the day resting in bed     Orders: See above, otherwise, continue the rest of the current POC.     Electronically signed by:  Ceasar Aburto MD     Video-Visit Details  Type of service:  Video Visit  Video End Time (time video stopped): 11:33  Distant Location (provider location):  [facility]

## 2020-05-07 ENCOUNTER — VIRTUAL VISIT (OUTPATIENT)
Dept: GERIATRICS | Facility: CLINIC | Age: 85
End: 2020-05-07
Payer: COMMERCIAL

## 2020-05-07 DIAGNOSIS — M54.50 CHRONIC LOW BACK PAIN WITHOUT SCIATICA, UNSPECIFIED BACK PAIN LATERALITY: ICD-10-CM

## 2020-05-07 DIAGNOSIS — R54 FRAIL ELDERLY: ICD-10-CM

## 2020-05-07 DIAGNOSIS — I35.0 AORTIC VALVE STENOSIS, ETIOLOGY OF CARDIAC VALVE DISEASE UNSPECIFIED: Primary | ICD-10-CM

## 2020-05-07 DIAGNOSIS — M81.0 OSTEOPOROSIS, UNSPECIFIED OSTEOPOROSIS TYPE, UNSPECIFIED PATHOLOGICAL FRACTURE PRESENCE: ICD-10-CM

## 2020-05-07 DIAGNOSIS — R00.1 BRADYCARDIA: ICD-10-CM

## 2020-05-07 DIAGNOSIS — E78.2 MIXED HYPERLIPIDEMIA: ICD-10-CM

## 2020-05-07 DIAGNOSIS — G89.29 CHRONIC LOW BACK PAIN WITHOUT SCIATICA, UNSPECIFIED BACK PAIN LATERALITY: ICD-10-CM

## 2020-05-07 DIAGNOSIS — Z98.1 S/P CERVICAL SPINAL FUSION: ICD-10-CM

## 2020-05-07 PROCEDURE — 99309 SBSQ NF CARE MODERATE MDM 30: CPT | Mod: GT | Performed by: FAMILY MEDICINE

## 2020-05-07 NOTE — LETTER
"    5/7/2020        RE: Yuni Otoole  [address] [facility] [address]        [place] [facility] Regulatory   Yuni Otoole is being evaluated via a billable video visit due to the restrictions of the Covid-19 pandemic.   The patient has been notified of following:  \"This video visit will be conducted via a call between you and your provider. We have found that certain health care needs can be provided without the need for an in-person physical exam.  This service lets us provide the care you need with a video conversation. If during the course of the call the provider feels a video visit is not appropriate, you will not be charged for this service.\"   The provider has received verbal consent for a Video Visit from the patient or first contact? Yes  Patient or facility staff would like the video invitation sent by: N/A   Video Start Time: 11:29  [place] Medical Record Number:  [id number]  Place of Location at the time of visit: [facility]  Chief Complaint   Patient presents with     Nursing Home Regulatory   HPI:    Yuni Otoole is a 82 year old  (1935), who is being seen today for a federally mandated E/M visit at [facility]  .  HPI information obtained from: facility staff, patient report and [place] Epic chart review.     Today's concerns are:  -  Resident seen in the presence of the nurse manager of the unit who reports that HR has been wnl. Pt denies any chest pain or palpitation. Endorses get winded with exertion, but no wheezing or cough.   - Pt denies fever, chills, lightheadedness.   - Pt reports feeling at baseline (ongoing pain in multiple joints, aching, off and on, some response to pain meds).    - Reports appetite/sleep/bowel movements are fine  - pt reports started a physical therapy and making some progress, continued to have weakness in the legs with standing up.   ------------------------------------------  - - Past Medical, social, family " "histories, medications, and allergies reviewed and updated  - Medications reviewed: in the chart and EHR.   - Case Management:   I have reviewed the care plan and MDS and do agree with the plan. Patient's desire to return to the community is not present.  Information reviewed:  Medications, vital signs, orders, and nursing notes.    :MEDICATIONS:  Current Outpatient Medications   Medication Sig Dispense Refill     Acetaminophen (TYLENOL ARTHRITIS PAIN PO) Take 1,300 mg by mouth 3 times daily        amLODIPine (NORVASC) 2.5 MG tablet Take 2.5 mg by mouth At Bedtime       chlorthalidone (HYGROTON) 25 MG tablet Take 25 mg by mouth See Admin Instructions Given dose on Friday and Sund       diclofenac (VOLTAREN) 1 % topical gel APPLY 4 GRAMS TOPICALLY TWO TIMES DAILY 400 g 1     diphenhydrAMINE (BENADRYL) 25 MG tablet Take 1 tablet (25 mg) by mouth daily as needed for itching or allergies 30 tablet 3     Menthol, Topical Analgesic, (BIOFREEZE) 4 % GEL Externally apply topically 2 times daily as needed 1 Tube 3     polyethylene glycol-propylene glycol (SYSTANE ULTRA) 0.4-0.3 % SOLN ophthalmic solution Place 1 drop into both eyes 4 times daily 1 Bottle 3     pravastatin (PRAVACHOL) 10 MG tablet Take 1 tablet (10 mg) by mouth daily 90 tablet 3     rOPINIRole (REQUIP) 2 MG tablet TAKE 2 TABLETS BY MOUTH AT  BEDTIME 180 tablet 11     senna-docusate (SENOKOT-S/PERICOLACE) 8.6-50 MG tablet 1 tablet by Oral or Feeding Tube route daily 100 tablet 3     traMADol (ULTRAM) 50 MG tablet 50 mg po q HS and 50 mg po BID PRN 60 tablet 0     traZODone (DESYREL) 50 MG tablet Take 0.5 tablets (25 mg) by mouth At Bedtime 60 tablet 1     ROS: 4 point ROS including Respiratory, CV, GI and , other than that noted in the HPI,  is negative    Exam:  Vitals: BP (!) 140/83   Pulse 98   Temp 98.5  F (36.9  C)   Resp 18   Ht 1.499 m (4' 11\")   Wt 66.7 kg (147 lb)   SpO2 96%   BMI 29.69 kg/m    BMI= Body mass index is 29.69 kg/m .  Limited " visit exam done given COVID-19 precautions.   GENERAL APPEARANCE:  in no distress, cooperative  RESP:  unlabored breathing.   M/S:   kyphosis.   SKIN: no rash noted  NEURO:   generalized muscles atrophy, hand  4/5 b/l.  Dorsiflexion 5/5 bilateral.   PSYCH:  affect and mood normal.     Lab/Diagnostic data: Reviewed in the chart and EHR.      ASSESSMENT/PLAN  ----------------------------------  Aortic Stenosis:  B/L Carotid Stenosis with hx of s/p Right CEA  Benign essential hypertension:    Hyperlipidemia  Recent bradycardia  - now stable, follow on Holter result.  Norvasc restarted.   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   - improving with rehab, continue until goal is achieved.     Osteoporosis /Vitamin D deficiency:   Off supplement now 2/2 de-prescribing amidst COVID 19 pandemic.     Frailty:  continues to require assistance from nursing. Up for meals only o/w spends the day resting in bed     Orders: See above, otherwise, continue the rest of the current POC.     Electronically signed by:  Ceasar Aburto MD     Video-Visit Details  Type of service:  Video Visit  Video End Time (time video stopped): 11:33  Distant Location (provider location):  [place] [facility]             Sincerely,        Ceasar Aburto MD

## 2020-05-11 ENCOUNTER — TELEPHONE (OUTPATIENT)
Dept: AUDIOLOGY | Facility: CLINIC | Age: 85
End: 2020-05-11
Payer: COMMERCIAL

## 2020-05-11 DIAGNOSIS — H90.3 SENSORINEURAL HEARING LOSS, BILATERAL: Primary | ICD-10-CM

## 2020-05-11 PROCEDURE — V5299 HEARING SERVICE: HCPCS | Performed by: AUDIOLOGIST

## 2020-05-11 PROCEDURE — 99207 ZZC NO CHARGE LOS: CPT | Performed by: AUDIOLOGIST

## 2020-05-11 NOTE — TELEPHONE ENCOUNTER
Left message for Mr. Otoole, stating Mrs. Otoole's hearing aids are ready for  at the main registration desk by the pharmacy here in [place].     Riri Gonzalez.

## 2020-05-20 DIAGNOSIS — S12.601S CLOSED NONDISPLACED FRACTURE OF SEVENTH CERVICAL VERTEBRA, UNSPECIFIED FRACTURE MORPHOLOGY, SEQUELA: ICD-10-CM

## 2020-05-20 DIAGNOSIS — F51.01 PRIMARY INSOMNIA: ICD-10-CM

## 2020-05-20 DIAGNOSIS — R11.0 NAUSEA: Primary | ICD-10-CM

## 2020-05-20 RX ORDER — TRAMADOL HYDROCHLORIDE 50 MG/1
TABLET ORAL
Qty: 60 TABLET | Refills: 0 | Status: SHIPPED | OUTPATIENT
Start: 2020-05-20 | End: 2020-05-27

## 2020-05-20 RX ORDER — ONDANSETRON 4 MG/1
4 TABLET, FILM COATED ORAL EVERY 8 HOURS PRN
Qty: 30 TABLET | Refills: 3 | Status: SHIPPED | OUTPATIENT
Start: 2020-05-20

## 2020-05-20 RX ORDER — TRAZODONE HYDROCHLORIDE 50 MG/1
25 TABLET, FILM COATED ORAL AT BEDTIME
Qty: 60 TABLET | Refills: 1 | Status: SHIPPED | OUTPATIENT
Start: 2020-05-20 | End: 2020-12-28

## 2020-05-20 NOTE — TELEPHONE ENCOUNTER
Can you refill:    Trazodone, Zofran, and Ropinirole from [facility] pharmacy.     Closed nondisplaced fracture of seventh cervical vertebra, unspecified fracture morphology, sequela    - traMADol (ULTRAM) 50 MG tablet; 50 mg po q HS and 50 mg po BID PRN    Primary insomnia    - traZODone (DESYREL) 50 MG tablet; Take 0.5 tablets (25 mg) by mouth At Bedtime    Nausea    - ondansetron (ZOFRAN) 4 MG tablet; Take 1 tablet (4 mg) by mouth every 8 hours as needed for nausea

## 2020-05-27 DIAGNOSIS — S12.601S CLOSED NONDISPLACED FRACTURE OF SEVENTH CERVICAL VERTEBRA, UNSPECIFIED FRACTURE MORPHOLOGY, SEQUELA: ICD-10-CM

## 2020-05-27 RX ORDER — TRAMADOL HYDROCHLORIDE 50 MG/1
TABLET ORAL
Qty: 60 TABLET | Refills: 3 | Status: SHIPPED | OUTPATIENT
Start: 2020-05-27 | End: 2020-07-13

## 2020-05-27 NOTE — TELEPHONE ENCOUNTER
Closed nondisplaced fracture of seventh cervical vertebra, unspecified fracture morphology, sequela  Sent to pharm.   - traMADol (ULTRAM) 50 MG tablet; 50 mg po q HS and 50 mg po BID PRN

## 2020-06-03 NOTE — TELEPHONE ENCOUNTER
nsg called to reorder chlorthalidone from pharm,   But I see from matrix and Epic we stopped it back in April.   Awaiting to hear back from ns.     nsg called back - agree med was stopped and doesn't need refill   Removed from Epic list.

## 2020-07-01 ASSESSMENT — MIFFLIN-ST. JEOR: SCORE: 1003.84

## 2020-07-02 NOTE — PROGRESS NOTES
[place] [facility]  Chief Complaint   Patient presents with     [facility] Regulatory     [place] Medical Record Number:  [id number]  Place of Service where encounter took place:  [facility] ([facility]) [[id number]]    HPI:    Yuni Otoole  is 84 year old (1935), who is being seen today for a federally mandated E/M visit.  HPI information obtained from: facility chart records, facility staff, patient report and [facility] chart review.     Yuni is a resident of the LT wing of [place] since 2017 s/p cervical spinal fusion. Noted recent medication change is her zanaflex. Robaxin wasn't covered by insurance and changed to zanaflex on 3/25. Noted symptoms of bradycardia timing are correlated to the start of this medication.      Hx of Medical issues have been:  Acute/Chronic Pain control for generalized and local pain in marv hips, knees, shoulders and neck. In the past treated with localized cortisone injections, frequent adjustment of pain medication per pt's request of Tylenol and NSAIDs and HS ultram, pain clinic visit 11/14 which stopped NSAID and changed tylenol to tylenol arthritis, repeat X rays done,  Ortho consult, and possible future marv genicular nerve blocks and marv hip replacements, but noted high risk surgery. She is balancing/thinking about this still. Pain treatments used recently:   1/28: restart methocarbamol and burst of scheduled Ibuprofen for acute muscle strain/pain.  3/2 - 25: order to discontinue methocarbamol and start tizanidine 2 mg q am when methocarbamol is up (on 3/25 done)  4/2: Norvasc stopped and Diuretic held   4/6: chlorthalidone stopped - benadryl held  4/14: Bradycardia noted - Metoprolol stopped and Cards consulted.   4/15: IVF started, Rocephin , zofran and cefuroxime started.   4/17: non essential meds stopped (supplements)  4/23: Decrease Na bicarb wean to off, start norvasc 2.5 q HS  4/25: cardiac monitor and bradycardia resolved.     Today  she notes she has her general aches and pains - discussed options to adjust current regimen, but she doesn't want changes - she would like to be on less medications, but not sure which ones.     ALLERGIES:Atorvastatin calcium and Zanaflex [tizanidine]  PAST MEDICAL HISTORY:   has a past medical history of Allergic rhinitis, cause unspecified, Head injury, Pure hypercholesterolemia, and Unspecified essential hypertension. She also has no past medical history of Diabetes (H).  PAST SURGICAL HISTORY:   has a past surgical history that includes REMOVAL OF TONSILS,14+ Y/O; colonoscopy (05/30/07); carotid endarterectomy (11/07/08); INJ EPIDURAL LUMBAR/SACRAL W/WO CONTRAST (2009); DRAIN/INJECT LARGE JOINT/BURSA (2009); DRAIN/INJECT LARGE JOINT/BURSA (2010); INJ EPIDURAL CERVICAL/THORACIC W/WO CONTRAST (2010); INJ TRANSFORAMIN EPIDURAL, LUMB/SACR SINGLE (2010); Optical Tracking System Fusion Posterior Cervical Three + Levels (N/A, 11/15/2017); Laminectomy cerivcal posterior three+ levels (N/A, 11/15/2017); Optical Tracking System Fusion Cervical Anterior Three + Levels (N/A, 11/15/2017); Fusion cervical posterior three+ levels (N/A, 11/15/2017); and Eye surgery.  FAMILY HISTORY: family history includes Cancer in her daughter and mother; Cardiovascular in her father; Cerebrovascular Disease in her paternal grandmother; Circulatory in her paternal grandmother; Diabetes in her maternal aunt; EYE* in her mother; Gastrointestinal Disease in her mother; Gynecology in her sister; Hypertension in her father; Lipids in her brother; Musculoskeletal Disorder in her daughter; Obesity in her maternal grandmother and paternal grandmother; Osteoporosis in her mother.  SOCIAL HISTORY:  reports that she quit smoking about 24 years ago. Her smoking use included cigarettes. She started smoking about 50 years ago. She smoked 0.00 packs per day for 10.00 years. She has never used smokeless tobacco. She reports that she does not drink alcohol or  "use drugs.    MEDICATIONS:  Current Outpatient Medications   Medication Sig Dispense Refill     diclofenac (VOLTAREN) 1 % topical gel Bid prn 400 g 1     diphenhydrAMINE (BENADRYL) 25 MG tablet Take 1 tablet (25 mg) by mouth At Bedtime 30 tablet 3     Menthol, Topical Analgesic, (BIOFREEZE) 4 % GEL Bid and BID  PRN 1 Tube 3     senna-docusate (SENOKOT-S/PERICOLACE) 8.6-50 MG tablet 1 tablet by Oral or Feeding Tube route 2 times daily as needed for constipation 100 tablet 3     Acetaminophen (TYLENOL ARTHRITIS PAIN PO) Take 1,300 mg by mouth 3 times daily        amLODIPine (NORVASC) 2.5 MG tablet Take 2.5 mg by mouth At Bedtime       ondansetron (ZOFRAN) 4 MG tablet Take 1 tablet (4 mg) by mouth every 8 hours as needed for nausea 30 tablet 3     polyethylene glycol-propylene glycol (SYSTANE ULTRA) 0.4-0.3 % SOLN ophthalmic solution Place 1 drop into both eyes 4 times daily 1 Bottle 3     pravastatin (PRAVACHOL) 10 MG tablet Take 1 tablet (10 mg) by mouth daily 90 tablet 3     rOPINIRole (REQUIP) 2 MG tablet TAKE 2 TABLETS BY MOUTH AT  BEDTIME 180 tablet 11     traMADol (ULTRAM) 50 MG tablet 50 mg po q HS and 50 mg po BID PRN 60 tablet 3     traZODone (DESYREL) 50 MG tablet Take 0.5 tablets (25 mg) by mouth At Bedtime 60 tablet 1     Case Management:  I have reviewed the care plan and MDS and do agree with the plan. Patient's desire to return to the community is present, but is not able due to care needs . Information reviewed:  Medications, vital signs, orders, and nursing notes.    ROS:  4 point ROS including Respiratory, CV, GI and , other than that noted in the HPI,  is negative    Vitals:  /88   Pulse 83   Temp 97.7  F (36.5  C)   Resp 18   Ht 1.499 m (4' 11\")   Wt 63.8 kg (140 lb 11.2 oz)   SpO2 97%   BMI 28.42 kg/m    Body mass index is 28.42 kg/m .  Exam:  GENERAL APPEARANCE:  Alert, in no distress  RESP:  respiratory effort and palpation of chest normal, auscultation of lungs clear - diinished  " , no respiratory distress  CV:  Palpation and auscultation of heart done , rate and rhythm reg,  NO peripheral edema  ABDOMEN:  normal bowel sounds, soft, nontender, no hepatosplenomegaly or other masses  M/S:   Gait and station baseline cervical/thoracic deformities, Digits and nails enlarged marv knee joints and obvious osteoarthritis in marv metacarpals  SKIN:  Inspection and Palpation of skin and subcutaneous tissue intact  NEURO: 2-12 in normal limits and at patient's baseline  PSYCH:  insight and judgement, memory STM loss noted.  , affect and mood Pleasant     Lab/Diagnostic data:   Recent labs in [facility] reviewed by me today.     ASSESSMENT/PLAN  Primary osteoarthritis involving multiple joints  Cont current regimen of voltaren, tylenol, biofreeze, and ultram -   Has consulted ortho and all agree surgery not likely helpful  Could advance to stronger narcotic if needed.     Bradycardia  Resolved after stopping BB and tiazidine    Essential hypertension  Stable -no change.     Polypharmacy  Noted, nsg does work with pt on medication reduction as able - will print off a list and review current medications with pt again to see if there are any she would like to reduce.     No new orders.     Electronically signed by:  CRISTAL Olmos CNP

## 2020-07-03 ENCOUNTER — NURSING HOME VISIT (OUTPATIENT)
Dept: GERIATRICS | Facility: CLINIC | Age: 85
End: 2020-07-03
Payer: COMMERCIAL

## 2020-07-03 DIAGNOSIS — R00.1 BRADYCARDIA: ICD-10-CM

## 2020-07-03 DIAGNOSIS — Z79.899 POLYPHARMACY: ICD-10-CM

## 2020-07-03 DIAGNOSIS — M54.2 CERVICALGIA: ICD-10-CM

## 2020-07-03 DIAGNOSIS — L50.9 URTICARIA: ICD-10-CM

## 2020-07-03 DIAGNOSIS — Z98.1 S/P CERVICAL SPINAL FUSION: ICD-10-CM

## 2020-07-03 DIAGNOSIS — E78.2 MIXED HYPERLIPIDEMIA: ICD-10-CM

## 2020-07-03 DIAGNOSIS — M48.02 CERVICAL STENOSIS OF SPINE: ICD-10-CM

## 2020-07-03 DIAGNOSIS — I10 ESSENTIAL HYPERTENSION: ICD-10-CM

## 2020-07-03 DIAGNOSIS — M15.0 PRIMARY OSTEOARTHRITIS INVOLVING MULTIPLE JOINTS: Primary | ICD-10-CM

## 2020-07-03 PROCEDURE — 99309 SBSQ NF CARE MODERATE MDM 30: CPT | Performed by: NURSE PRACTITIONER

## 2020-07-03 NOTE — LETTER
7/3/2020        RE: Yuni Otoole  [address]  [facility] [address]        [place] [facility]  Chief Complaint   Patient presents with     penitentiary Regulatory     [place] Medical Record Number:  [id number]  Place of Service where encounter took place:  [facility] ([facility]) [[id number]]    HPI:    Yuni Otoole  is 84 year old (1935), who is being seen today for a federally mandated E/M visit.  HPI information obtained from: facility chart records, facility staff, patient report and [facility] chart review.     Yuni is a resident of the LTC wing of [facility] since 2017 s/p cervical spinal fusion. Noted recent medication change is her zanaflex. Robaxin wasn't covered by insurance and changed to zanaflex on 3/25. Noted symptoms of bradycardia timing are correlated to the start of this medication.      Hx of Medical issues have been:  Acute/Chronic Pain control for generalized and local pain in marv hips, knees, shoulders and neck. In the past treated with localized cortisone injections, frequent adjustment of pain medication per pt's request of Tylenol and NSAIDs and HS ultram, pain clinic visit 11/14 which stopped NSAID and changed tylenol to tylenol arthritis, repeat X rays done,  Ortho consult, and possible future marv genicular nerve blocks and marv hip replacements, but noted high risk surgery. She is balancing/thinking about this still. Pain treatments used recently:   1/28: restart methocarbamol and burst of scheduled Ibuprofen for acute muscle strain/pain.  3/2 - 25: order to discontinue methocarbamol and start tizanidine 2 mg q am when methocarbamol is up (on 3/25 done)  4/2: Norvasc stopped and Diuretic held   4/6: chlorthalidone stopped - benadryl held  4/14: Bradycardia noted - Metoprolol stopped and Cards consulted.   4/15: IVF started, Rocephin , zofran and cefuroxime started.   4/17: non essential meds stopped (supplements)  4/23: Decrease Na bicarb wean to  off, start norvasc 2.5 q HS  4/25: cardiac monitor and bradycardia resolved.     Today she notes she has her general aches and pains - discussed options to adjust current regimen, but she doesn't want changes - she would like to be on less medications, but not sure which ones.     ALLERGIES:Atorvastatin calcium and Zanaflex [tizanidine]  PAST MEDICAL HISTORY:   has a past medical history of Allergic rhinitis, cause unspecified, Head injury, Pure hypercholesterolemia, and Unspecified essential hypertension. She also has no past medical history of Diabetes (H).  PAST SURGICAL HISTORY:   has a past surgical history that includes REMOVAL OF TONSILS,14+ Y/O; colonoscopy (05/30/07); carotid endarterectomy (11/07/08); INJ EPIDURAL LUMBAR/SACRAL W/WO CONTRAST (2009); DRAIN/INJECT LARGE JOINT/BURSA (2009); DRAIN/INJECT LARGE JOINT/BURSA (2010); INJ EPIDURAL CERVICAL/THORACIC W/WO CONTRAST (2010); INJ TRANSFORAMIN EPIDURAL, LUMB/SACR SINGLE (2010); Optical Tracking System Fusion Posterior Cervical Three + Levels (N/A, 11/15/2017); Laminectomy cerivcal posterior three+ levels (N/A, 11/15/2017); Optical Tracking System Fusion Cervical Anterior Three + Levels (N/A, 11/15/2017); Fusion cervical posterior three+ levels (N/A, 11/15/2017); and Eye surgery.  FAMILY HISTORY: family history includes Cancer in her daughter and mother; Cardiovascular in her father; Cerebrovascular Disease in her paternal grandmother; Circulatory in her paternal grandmother; Diabetes in her maternal aunt; EYE* in her mother; Gastrointestinal Disease in her mother; Gynecology in her sister; Hypertension in her father; Lipids in her brother; Musculoskeletal Disorder in her daughter; Obesity in her maternal grandmother and paternal grandmother; Osteoporosis in her mother.  SOCIAL HISTORY:  reports that she quit smoking about 24 years ago. Her smoking use included cigarettes. She started smoking about 50 years ago. She smoked 0.00 packs per day for 10.00  "years. She has never used smokeless tobacco. She reports that she does not drink alcohol or use drugs.    MEDICATIONS:  Current Outpatient Medications   Medication Sig Dispense Refill     diclofenac (VOLTAREN) 1 % topical gel Bid prn 400 g 1     diphenhydrAMINE (BENADRYL) 25 MG tablet Take 1 tablet (25 mg) by mouth At Bedtime 30 tablet 3     Menthol, Topical Analgesic, (BIOFREEZE) 4 % GEL Bid and BID  PRN 1 Tube 3     senna-docusate (SENOKOT-S/PERICOLACE) 8.6-50 MG tablet 1 tablet by Oral or Feeding Tube route 2 times daily as needed for constipation 100 tablet 3     Acetaminophen (TYLENOL ARTHRITIS PAIN PO) Take 1,300 mg by mouth 3 times daily        amLODIPine (NORVASC) 2.5 MG tablet Take 2.5 mg by mouth At Bedtime       ondansetron (ZOFRAN) 4 MG tablet Take 1 tablet (4 mg) by mouth every 8 hours as needed for nausea 30 tablet 3     polyethylene glycol-propylene glycol (SYSTANE ULTRA) 0.4-0.3 % SOLN ophthalmic solution Place 1 drop into both eyes 4 times daily 1 Bottle 3     pravastatin (PRAVACHOL) 10 MG tablet Take 1 tablet (10 mg) by mouth daily 90 tablet 3     rOPINIRole (REQUIP) 2 MG tablet TAKE 2 TABLETS BY MOUTH AT  BEDTIME 180 tablet 11     traMADol (ULTRAM) 50 MG tablet 50 mg po q HS and 50 mg po BID PRN 60 tablet 3     traZODone (DESYREL) 50 MG tablet Take 0.5 tablets (25 mg) by mouth At Bedtime 60 tablet 1     Case Management:  I have reviewed the care plan and MDS and do agree with the plan. Patient's desire to return to the community is present, but is not able due to care needs . Information reviewed:  Medications, vital signs, orders, and nursing notes.    ROS:  4 point ROS including Respiratory, CV, GI and , other than that noted in the HPI,  is negative    Vitals:  /88   Pulse 83   Temp 97.7  F (36.5  C)   Resp 18   Ht 1.499 m (4' 11\")   Wt 63.8 kg (140 lb 11.2 oz)   SpO2 97%   BMI 28.42 kg/m    Body mass index is 28.42 kg/m .  Exam:  GENERAL APPEARANCE:  Alert, in no distress  RESP: "  respiratory effort and palpation of chest normal, auscultation of lungs clear - diinished  , no respiratory distress  CV:  Palpation and auscultation of heart done , rate and rhythm reg,  NO peripheral edema  ABDOMEN:  normal bowel sounds, soft, nontender, no hepatosplenomegaly or other masses  M/S:   Gait and station baseline cervical/thoracic deformities, Digits and nails enlarged marv knee joints and obvious osteoarthritis in marv metacarpals  SKIN:  Inspection and Palpation of skin and subcutaneous tissue intact  NEURO: 2-12 in normal limits and at patient's baseline  PSYCH:  insight and judgement, memory STM loss noted.  , affect and mood Pleasant     Lab/Diagnostic data:   Recent labs in [facility] reviewed by me today.     ASSESSMENT/PLAN  Primary osteoarthritis involving multiple joints  Cont current regimen of voltaren, tylenol, biofreeze, and ultram -   Has consulted ortho and all agree surgery not likely helpful  Could advance to stronger narcotic if needed.     Bradycardia  Resolved after stopping BB and tiazidine    Essential hypertension  Stable -no change.     Polypharmacy  Noted, nsg does work with pt on medication reduction as able - will print off a list and review current medications with pt again to see if there are any she would like to reduce.     No new orders.     Electronically signed by:  CRISTAL Olmos CNP            Sincerely,        CRISTAL Olmos CNP

## 2020-07-06 VITALS
HEIGHT: 59 IN | TEMPERATURE: 97.7 F | DIASTOLIC BLOOD PRESSURE: 88 MMHG | WEIGHT: 140.7 LBS | RESPIRATION RATE: 18 BRPM | BODY MASS INDEX: 28.36 KG/M2 | SYSTOLIC BLOOD PRESSURE: 135 MMHG | HEART RATE: 83 BPM | OXYGEN SATURATION: 97 %

## 2020-07-06 RX ORDER — AMOXICILLIN 250 MG
1 CAPSULE ORAL 2 TIMES DAILY PRN
Qty: 100 TABLET | Refills: 3
Start: 2020-07-06

## 2020-07-06 RX ORDER — DIPHENHYDRAMINE HCL 25 MG
25 TABLET ORAL AT BEDTIME
Qty: 30 TABLET | Refills: 3
Start: 2020-07-06 | End: 2021-02-18

## 2020-07-06 RX ORDER — PRAVASTATIN SODIUM 10 MG
10 TABLET ORAL DAILY
Qty: 90 TABLET | Refills: 3 | Status: SHIPPED | OUTPATIENT
Start: 2020-07-06 | End: 2020-10-22

## 2020-07-13 DIAGNOSIS — S12.601S CLOSED NONDISPLACED FRACTURE OF SEVENTH CERVICAL VERTEBRA, UNSPECIFIED FRACTURE MORPHOLOGY, SEQUELA: ICD-10-CM

## 2020-07-13 RX ORDER — TRAMADOL HYDROCHLORIDE 50 MG/1
TABLET ORAL
Qty: 60 TABLET | Refills: 3 | Status: SHIPPED | OUTPATIENT
Start: 2020-07-13 | End: 2020-10-22

## 2020-07-13 NOTE — TELEPHONE ENCOUNTER
Closed nondisplaced fracture of seventh cervical vertebra, unspecified fracture morphology, sequela  Sent to [facility]  - traMADol (ULTRAM) 50 MG tablet; 50 mg po q HS and 50 mg po BID PRN

## 2020-07-15 DIAGNOSIS — I10 ESSENTIAL HYPERTENSION: Primary | ICD-10-CM

## 2020-07-15 RX ORDER — AMLODIPINE BESYLATE 2.5 MG/1
2.5 TABLET ORAL AT BEDTIME
Qty: 90 TABLET | Refills: 3 | Status: SHIPPED | OUTPATIENT
Start: 2020-07-15 | End: 2020-10-22

## 2020-07-15 NOTE — TELEPHONE ENCOUNTER
Essential hypertension  90 r 3  - amLODIPine (NORVASC) 2.5 MG tablet; Take 1 tablet (2.5 mg) by mouth At Bedtime

## 2020-08-27 DIAGNOSIS — G25.81 RESTLESS LEGS SYNDROME (RLS): ICD-10-CM

## 2020-08-27 RX ORDER — ROPINIROLE 2 MG/1
TABLET, FILM COATED ORAL
Qty: 180 TABLET | Refills: 11 | Status: SHIPPED | OUTPATIENT
Start: 2020-08-27

## 2020-08-27 NOTE — TELEPHONE ENCOUNTER
Restless legs syndrome (RLS)  Sent by facility request 180 refill 11    - rOPINIRole (REQUIP) 2 MG tablet; TAKE 2 TABLETS BY MOUTH AT  BEDTIME

## 2020-09-01 ENCOUNTER — TELEPHONE (OUTPATIENT)
Dept: AUDIOLOGY | Facility: CLINIC | Age: 85
End: 2020-09-01

## 2020-09-01 NOTE — TELEPHONE ENCOUNTER
Reason for Call:  Other appointment    Detailed comments: Patients  Mateus calling to get wax guards for her hearing aids. Both her and her  needs them. Will send a message for Mateus as well. Please mail them to their home      Phone Number Patient can be reached at: Home number on file [phone] (home)    Best Time: any      Can we leave a detailed message on this number? YES    Call taken on 9/1/2020 at 12:51 PM by Pam Zaragoza

## 2020-09-02 VITALS
RESPIRATION RATE: 19 BRPM | BODY MASS INDEX: 28.55 KG/M2 | HEART RATE: 68 BPM | SYSTOLIC BLOOD PRESSURE: 144 MMHG | OXYGEN SATURATION: 93 % | WEIGHT: 141.6 LBS | HEIGHT: 59 IN | TEMPERATURE: 98.4 F | DIASTOLIC BLOOD PRESSURE: 83 MMHG

## 2020-09-02 ASSESSMENT — MIFFLIN-ST. JEOR: SCORE: 1002.92

## 2020-09-02 NOTE — PROGRESS NOTES
"[facility] Regulatory   Yuni Otoole is being evaluated via a billable video visit due to the restrictions of the Covid-19 pandemic.   The patient has been notified of following:  \"This video visit will be conducted via a call between you and your provider. We have found that certain health care needs can be provided without the need for an in-person physical exam.  This service lets us provide the care you need with a video conversation. If during the course of the call the provider feels a video visit is not appropriate, you will not be charged for this service.\"   The provider has received verbal consent for a Video Visit from the patient or first contact? Yes  Patient or facility staff would like the video invitation sent by: N/A   Video Start Time: 10:39  [place] Medical Record Number:  [id number]  Place of Location at the time of visit: [facility]  Chief Complaint   Patient presents with     MCFP Regulatory     Video Visit   HPI:    Yuni Otoole is a 82 year old  (1935), who is being seen today for a federally mandated E/M visit at [facility]  .  HPI information obtained from: facility staff, patient report and [facility] chart review.     Today's concerns are:  - Pt seen in the presence of RN who graciously assisted with the virtual visit  -  Pt denies any chest pain or palpitation, wheezing or cough.   - Pt denies fever, chills, lightheadedness.   - Pt reports feeling at baseline (ongoing pain in multiple joints, aching, off and on, good response to pain meds).    - Reports appetite/sleep/bowel movements are fine  - DNP reports that Resident wants to keep Trazodone at HS.   ---------------------------------------------------------------  - - Past Medical, social, family histories, medications, and allergies reviewed and updated  - Medications reviewed: in the chart and EHR.   - Case Management:   I have reviewed the care plan and MDS and do " "agree with the plan. Patient's desire to return to the community is not present.  Information reviewed:  Medications, vital signs, orders, and nursing notes.    :MEDICATIONS:  Current Outpatient Medications   Medication Sig Dispense Refill     Acetaminophen (TYLENOL ARTHRITIS PAIN PO) Take 1,300 mg by mouth 3 times daily        amLODIPine (NORVASC) 2.5 MG tablet Take 1 tablet (2.5 mg) by mouth At Bedtime 90 tablet 3     diclofenac (VOLTAREN) 1 % topical gel Bid prn 400 g 1     diphenhydrAMINE (BENADRYL) 25 MG tablet Take 1 tablet (25 mg) by mouth At Bedtime 30 tablet 3     Menthol, Topical Analgesic, (BIOFREEZE) 4 % GEL Bid and BID  PRN 1 Tube 3     ondansetron (ZOFRAN) 4 MG tablet Take 1 tablet (4 mg) by mouth every 8 hours as needed for nausea 30 tablet 3     polyethylene glycol-propylene glycol (SYSTANE ULTRA) 0.4-0.3 % SOLN ophthalmic solution Place 1 drop into both eyes 4 times daily 1 Bottle 3     pravastatin (PRAVACHOL) 10 MG tablet Take 1 tablet (10 mg) by mouth daily 90 tablet 3     rOPINIRole (REQUIP) 2 MG tablet TAKE 2 TABLETS BY MOUTH AT  BEDTIME 180 tablet 11     senna-docusate (SENOKOT-S/PERICOLACE) 8.6-50 MG tablet 1 tablet by Oral or Feeding Tube route 2 times daily as needed for constipation 100 tablet 3     traMADol (ULTRAM) 50 MG tablet 50 mg po q HS and 50 mg po BID PRN 60 tablet 3     traZODone (DESYREL) 50 MG tablet Take 0.5 tablets (25 mg) by mouth At Bedtime 60 tablet 1     ROS: 4 point ROS including Respiratory, CV, GI and , other than that noted in the HPI,  is negative    Exam:  Vitals: BP (!) 144/83   Pulse 68   Temp 98.4  F (36.9  C)   Resp 19   Ht 1.499 m (4' 11\")   Wt 64.2 kg (141 lb 9.6 oz)   SpO2 93%   BMI 28.60 kg/m    BMI= Body mass index is 28.6 kg/m .  Limited visit exam done given COVID-19 precautions.   GENERAL APPEARANCE:  in no distress, cooperative  RESP:  unlabored breathing.   M/S:   kyphosis.   SKIN: no rash noted  NEURO:   generalized muscles atrophy, hand  " 4/5 b/l.  Dorsiflexion 5/5 bilateral.   PSYCH:  affect and mood normal.     Lab/Diagnostic data: Reviewed in the chart and EHR.      ASSESSMENT/PLAN  ----------------------------------  Aortic Stenosis:  B/L Carotid Stenosis with hx of s/p Right CEA  Benign essential hypertension:    Hyperlipidemia  Recent bradycardia  - controlled.     # Hx of C1-C3 Lami; C2-C3 Posterior Fusion; C2/3, C3/4, C6/7 Anterior Decompression and Fusion; C2/3, C4/5, and C6/7 Facet Osteotomies; C2-T3 Instrumentation   B/L Knee Joint OA:  Severe end stage DJD b/l shoulders, hips, knees with spinal stenosis due to severe spondylopathy  - analgesia optimal. At baseline.     Osteoporosis /Vitamin D deficiency:   Off supplement now 2/2 de-prescribing amidst COVID 19 pandemic.   RLS: on Requip. Stable.   Frailty:  continues to require assistance from nursing. Up for meals only o/w spends the day resting in bed     Orders: See above, otherwise, continue the rest of the current POC.     Electronically signed by:  Ceasar Aburto MD     Video-Visit Details  Type of service:  Video Visit  Video End Time (time video stopped): 10:41  Distant Location (provider location):  [facility]

## 2020-09-03 ENCOUNTER — VIRTUAL VISIT (OUTPATIENT)
Dept: GERIATRICS | Facility: CLINIC | Age: 85
End: 2020-09-03
Payer: COMMERCIAL

## 2020-09-03 DIAGNOSIS — S12.601S CLOSED NONDISPLACED FRACTURE OF SEVENTH CERVICAL VERTEBRA, UNSPECIFIED FRACTURE MORPHOLOGY, SEQUELA: ICD-10-CM

## 2020-09-03 DIAGNOSIS — M81.0 OSTEOPOROSIS, UNSPECIFIED OSTEOPOROSIS TYPE, UNSPECIFIED PATHOLOGICAL FRACTURE PRESENCE: ICD-10-CM

## 2020-09-03 DIAGNOSIS — M48.02 CERVICAL STENOSIS OF SPINE: ICD-10-CM

## 2020-09-03 DIAGNOSIS — E55.9 VITAMIN D DEFICIENCY: ICD-10-CM

## 2020-09-03 DIAGNOSIS — G25.81 RESTLESS LEGS SYNDROME (RLS): ICD-10-CM

## 2020-09-03 DIAGNOSIS — R54 FRAIL ELDERLY: ICD-10-CM

## 2020-09-03 DIAGNOSIS — E78.2 MIXED HYPERLIPIDEMIA: ICD-10-CM

## 2020-09-03 DIAGNOSIS — I35.0 AORTIC VALVE STENOSIS, ETIOLOGY OF CARDIAC VALVE DISEASE UNSPECIFIED: Primary | ICD-10-CM

## 2020-09-03 DIAGNOSIS — I10 ESSENTIAL HYPERTENSION: ICD-10-CM

## 2020-09-03 PROCEDURE — 99309 SBSQ NF CARE MODERATE MDM 30: CPT | Mod: GT | Performed by: FAMILY MEDICINE

## 2020-09-03 NOTE — LETTER
"    9/3/2020        RE: Yuni Otoole  [address] [facility] [address]        [facility] Regulatory   Yuni Otoole is being evaluated via a billable video visit due to the restrictions of the Covid-19 pandemic.   The patient has been notified of following:  \"This video visit will be conducted via a call between you and your provider. We have found that certain health care needs can be provided without the need for an in-person physical exam.  This service lets us provide the care you need with a video conversation. If during the course of the call the provider feels a video visit is not appropriate, you will not be charged for this service.\"   The provider has received verbal consent for a Video Visit from the patient or first contact? Yes  Patient or facility staff would like the video invitation sent by: N/A   Video Start Time: 10:39  [place] Medical Record Number:  [id number]  Place of Location at the time of visit: [facility]  Chief Complaint   Patient presents with     MCFP Regulatory     Video Visit   HPI:    Yuni Otoole is a 82 year old  (1935), who is being seen today for a federally mandated E/M visit at [facility]  .  HPI information obtained from: facility staff, patient report and [facility] chart review.     Today's concerns are:  - Pt seen in the presence of RN who graciously assisted with the virtual visit  -  Pt denies any chest pain or palpitation, wheezing or cough.   - Pt denies fever, chills, lightheadedness.   - Pt reports feeling at baseline (ongoing pain in multiple joints, aching, off and on, good response to pain meds).    - Reports appetite/sleep/bowel movements are fine  - DNP reports that Resident wants to keep Trazodone at HS.   ---------------------------------------------------------------  - - Past Medical, social, family histories, medications, and allergies reviewed and updated  - Medications reviewed: in " "the chart and EHR.   - Case Management:   I have reviewed the care plan and MDS and do agree with the plan. Patient's desire to return to the community is not present.  Information reviewed:  Medications, vital signs, orders, and nursing notes.    :MEDICATIONS:  Current Outpatient Medications   Medication Sig Dispense Refill     Acetaminophen (TYLENOL ARTHRITIS PAIN PO) Take 1,300 mg by mouth 3 times daily        amLODIPine (NORVASC) 2.5 MG tablet Take 1 tablet (2.5 mg) by mouth At Bedtime 90 tablet 3     diclofenac (VOLTAREN) 1 % topical gel Bid prn 400 g 1     diphenhydrAMINE (BENADRYL) 25 MG tablet Take 1 tablet (25 mg) by mouth At Bedtime 30 tablet 3     Menthol, Topical Analgesic, (BIOFREEZE) 4 % GEL Bid and BID  PRN 1 Tube 3     ondansetron (ZOFRAN) 4 MG tablet Take 1 tablet (4 mg) by mouth every 8 hours as needed for nausea 30 tablet 3     polyethylene glycol-propylene glycol (SYSTANE ULTRA) 0.4-0.3 % SOLN ophthalmic solution Place 1 drop into both eyes 4 times daily 1 Bottle 3     pravastatin (PRAVACHOL) 10 MG tablet Take 1 tablet (10 mg) by mouth daily 90 tablet 3     rOPINIRole (REQUIP) 2 MG tablet TAKE 2 TABLETS BY MOUTH AT  BEDTIME 180 tablet 11     senna-docusate (SENOKOT-S/PERICOLACE) 8.6-50 MG tablet 1 tablet by Oral or Feeding Tube route 2 times daily as needed for constipation 100 tablet 3     traMADol (ULTRAM) 50 MG tablet 50 mg po q HS and 50 mg po BID PRN 60 tablet 3     traZODone (DESYREL) 50 MG tablet Take 0.5 tablets (25 mg) by mouth At Bedtime 60 tablet 1     ROS: 4 point ROS including Respiratory, CV, GI and , other than that noted in the HPI,  is negative    Exam:  Vitals: BP (!) 144/83   Pulse 68   Temp 98.4  F (36.9  C)   Resp 19   Ht 1.499 m (4' 11\")   Wt 64.2 kg (141 lb 9.6 oz)   SpO2 93%   BMI 28.60 kg/m    BMI= Body mass index is 28.6 kg/m .  Limited visit exam done given COVID-19 precautions.   GENERAL APPEARANCE:  in no distress, cooperative  RESP:  unlabored breathing. "   M/S:   kyphosis.   SKIN: no rash noted  NEURO:   generalized muscles atrophy, hand  4/5 b/l.  Dorsiflexion 5/5 bilateral.   PSYCH:  affect and mood normal.     Lab/Diagnostic data: Reviewed in the chart and EHR.      ASSESSMENT/PLAN  ----------------------------------  Aortic Stenosis:  B/L Carotid Stenosis with hx of s/p Right CEA  Benign essential hypertension:    Hyperlipidemia  Recent bradycardia  - controlled.     # Hx of C1-C3 Lami; C2-C3 Posterior Fusion; C2/3, C3/4, C6/7 Anterior Decompression and Fusion; C2/3, C4/5, and C6/7 Facet Osteotomies; C2-T3 Instrumentation   B/L Knee Joint OA:  Severe end stage DJD b/l shoulders, hips, knees with spinal stenosis due to severe spondylopathy  - analgesia optimal. At baseline.     Osteoporosis /Vitamin D deficiency:   Off supplement now 2/2 de-prescribing amidst COVID 19 pandemic.   RLS: on Requip. Stable.   Frailty:  continues to require assistance from nursing. Up for meals only o/w spends the day resting in bed     Orders: See above, otherwise, continue the rest of the current POC.     Electronically signed by:  Ceasar Aburto MD     Video-Visit Details  Type of service:  Video Visit  Video End Time (time video stopped): 10:41  Distant Location (provider location):  [place] [facility]             Sincerely,        Ceasar Aburto MD

## 2020-09-14 NOTE — TELEPHONE ENCOUNTER
"Left message for Mr. Otoole re: Mrs. Otoole's wax filters, ordered replacement filters and will send as soon as they arrive.  Looks like delivery was attempted 9/12/2020, but \"delivery location not available\".  Riri Gonzalez."

## 2020-10-22 DIAGNOSIS — S12.601S CLOSED NONDISPLACED FRACTURE OF SEVENTH CERVICAL VERTEBRA, UNSPECIFIED FRACTURE MORPHOLOGY, SEQUELA: ICD-10-CM

## 2020-10-22 DIAGNOSIS — E78.2 MIXED HYPERLIPIDEMIA: ICD-10-CM

## 2020-10-22 DIAGNOSIS — I10 ESSENTIAL HYPERTENSION: ICD-10-CM

## 2020-10-22 RX ORDER — TRAMADOL HYDROCHLORIDE 50 MG/1
TABLET ORAL
Qty: 60 TABLET | Refills: 3 | Status: SHIPPED | OUTPATIENT
Start: 2020-10-22 | End: 2020-11-30

## 2020-10-22 RX ORDER — AMLODIPINE BESYLATE 2.5 MG/1
2.5 TABLET ORAL AT BEDTIME
Qty: 90 TABLET | Refills: 3 | Status: SHIPPED | OUTPATIENT
Start: 2020-10-22 | End: 2020-11-23

## 2020-10-22 RX ORDER — PRAVASTATIN SODIUM 10 MG
10 TABLET ORAL DAILY
Qty: 90 TABLET | Refills: 3 | Status: SHIPPED | OUTPATIENT
Start: 2020-10-22

## 2020-10-22 NOTE — TELEPHONE ENCOUNTER
Essential hypertension    - amLODIPine (NORVASC) 2.5 MG tablet; Take 1 tablet (2.5 mg) by mouth At Bedtime    Mixed hyperlipidemia    - pravastatin (PRAVACHOL) 10 MG tablet; Take 1 tablet (10 mg) by mouth daily    Closed nondisplaced fracture of seventh cervical vertebra, unspecified fracture morphology, sequela    - traMADol (ULTRAM) 50 MG tablet; 50 mg po q HS and 50 mg po BID PRN

## 2020-11-04 NOTE — PROGRESS NOTES
[facility]  Chief Complaint   Patient presents with     correction Regulatory     [place] Medical Record Number:  [id number]  Place of Service where encounter took place:  [facility] [[id number]]    HPI:    Yuni Otoole  is 85 year old (1935), who is being seen today for a federally mandated E/M visit.  HPI information obtained from: facility chart records, facility staff, patient report and [facility] chart review. Today's concerns are: c/o pain in all joints.      Yuni is a resident of the LT wing of [place] since 2017 s/p cervical spinal fusion. Noted recent medication change is her zanaflex. Robaxin wasn't covered by insurance and changed to zanaflex on 3/25. Noted symptoms of bradycardia timing are correlated to the start of this medication.      Hx of Medical issues have been:  Acute/Chronic Pain control for generalized and local pain in marv hips, knees, shoulders and neck. In the past treated with localized cortisone injections, frequent adjustment of pain medication per pt's request of Tylenol and NSAIDs and HS ultram, pain clinic visit 11/14 which stopped NSAID and changed tylenol to tylenol arthritis, repeat X rays done,  Ortho consult, and possible future marv genicular nerve blocks and marv hip replacements, but noted high risk surgery. She is balancing/thinking about this still. Pain treatments used recently:   1/28: restart methocarbamol and burst of scheduled Ibuprofen for acute muscle strain/pain.  3/2 - 25: order to discontinue methocarbamol and start tizanidine 2 mg q am when methocarbamol is up (on 3/25 done)  4/2: Norvasc stopped and Diuretic held   4/6: chlorthalidone stopped - benadryl held  4/14: Bradycardia noted - Metoprolol stopped and Cards consulted.   4/15: IVF started, Rocephin , zofran and cefuroxime started.   4/17: non essential meds stopped (supplements)  4/23: Decrease Na bicarb wean to off, start norvasc 2.5 q HS  4/25:  cardiac monitor and bradycardia resolved.    Today she is having more pain in her joints.   On Ultram 50 HS and PRN, but no use of PRN in the last 5 days. She is in tears today due to pain - marv hips and knees.     ALLERGIES:Atorvastatin calcium and Zanaflex [tizanidine]  PAST MEDICAL HISTORY:   has a past medical history of Allergic rhinitis, cause unspecified, Head injury, Pure hypercholesterolemia, and Unspecified essential hypertension. She also has no past medical history of Diabetes (H).  PAST SURGICAL HISTORY:   has a past surgical history that includes REMOVAL OF TONSILS,14+ Y/O; colonoscopy (05/30/07); carotid endarterectomy (11/07/08); INJ EPIDURAL LUMBAR/SACRAL W/WO CONTRAST (2009); DRAIN/INJECT LARGE JOINT/BURSA (2009); DRAIN/INJECT LARGE JOINT/BURSA (2010); INJ EPIDURAL CERVICAL/THORACIC W/WO CONTRAST (2010); INJ TRANSFORAMIN EPIDURAL, LUMB/SACR SINGLE (2010); Optical Tracking System Fusion Posterior Cervical Three + Levels (N/A, 11/15/2017); Laminectomy cerivcal posterior three+ levels (N/A, 11/15/2017); Optical Tracking System Fusion Cervical Anterior Three + Levels (N/A, 11/15/2017); Fusion cervical posterior three+ levels (N/A, 11/15/2017); and Eye surgery.  FAMILY HISTORY: family history includes Cancer in her daughter and mother; Cardiovascular in her father; Cerebrovascular Disease in her paternal grandmother; Circulatory in her paternal grandmother; Diabetes in her maternal aunt; EYE* in her mother; Gastrointestinal Disease in her mother; Gynecology in her sister; Hypertension in her father; Lipids in her brother; Musculoskeletal Disorder in her daughter; Obesity in her maternal grandmother and paternal grandmother; Osteoporosis in her mother.  SOCIAL HISTORY:  reports that she quit smoking about 24 years ago. Her smoking use included cigarettes. She started smoking about 50 years ago. She smoked 0.00 packs per day for 10.00 years. She has never used smokeless tobacco. She reports that she does  "not drink alcohol or use drugs.    MEDICATIONS:  Current Outpatient Medications   Medication Sig Dispense Refill     Acetaminophen (TYLENOL ARTHRITIS PAIN PO) Take 1,300 mg by mouth 3 times daily        amLODIPine (NORVASC) 2.5 MG tablet Take 1 tablet (2.5 mg) by mouth At Bedtime 90 tablet 3     diclofenac (VOLTAREN) 1 % topical gel Bid prn 400 g 1     diphenhydrAMINE (BENADRYL) 25 MG tablet Take 1 tablet (25 mg) by mouth At Bedtime 30 tablet 3     Menthol, Topical Analgesic, (BIOFREEZE) 4 % GEL Bid and BID  PRN 1 Tube 3     ondansetron (ZOFRAN) 4 MG tablet Take 1 tablet (4 mg) by mouth every 8 hours as needed for nausea 30 tablet 3     polyethylene glycol-propylene glycol (SYSTANE ULTRA) 0.4-0.3 % SOLN ophthalmic solution Place 1 drop into both eyes 4 times daily 1 Bottle 3     pravastatin (PRAVACHOL) 10 MG tablet Take 1 tablet (10 mg) by mouth daily 90 tablet 3     rOPINIRole (REQUIP) 2 MG tablet TAKE 2 TABLETS BY MOUTH AT  BEDTIME 180 tablet 11     senna-docusate (SENOKOT-S/PERICOLACE) 8.6-50 MG tablet 1 tablet by Oral or Feeding Tube route 2 times daily as needed for constipation 100 tablet 3     traMADol (ULTRAM) 50 MG tablet 50 mg po q HS and 50 mg po BID PRN 60 tablet 3     traZODone (DESYREL) 50 MG tablet Take 0.5 tablets (25 mg) by mouth At Bedtime 60 tablet 1       Case Management:  I have reviewed the care plan and MDS and do agree with the plan. Patient's desire to return to the community is present, but is not able due to care needs . Information reviewed:  Medications, vital signs, orders, and nursing notes.    ROS:  4 point ROS including Respiratory, CV, GI and , other than that noted in the HPI,  is negative    Vitals:  /69   Pulse 71   Temp 97.6  F (36.4  C)   Resp 18   Ht 1.499 m (4' 11\")   Wt 64.5 kg (142 lb 1.6 oz)   SpO2 93%   BMI 28.70 kg/m    Body mass index is 28.7 kg/m .  Exam:  GENERAL APPEARANCE:  Alert, in no distress  RESP:  respiratory effort and palpation of chest " normal, auscultation of lungs clear , no respiratory distress  CV:  Palpation and auscultation of heart done , rate and rhythm reg, 3/6murmur, no peripheral edema  ABDOMEN:  normal bowel sounds, soft, nontender, no hepatosplenomegaly or other masses  M/S:   Gait and station- ambulation with assist 2 2WW - but using w/c for distance. , Digits and nails intact  SKIN:  Inspection and Palpation of skin and subcutaneous tissue intact  NEURO: 2-12 in normal limits and at patient's baseline  PSYCH:  insight and judgement, memory fair with some STM loss , affect and mood Pleasant   Lab/Diagnostic data:   No new labs.     ASSESSMENT/PLAN  Primary osteoarthritis involving multiple joints  Discussed options - will increase ultram at HS and encourage her to use day time.   Could move to ER ultram    Primary insomnia  Stable with trazodone and benadryl - does not want to try GDR on this. - current dose is stable , helpful for her and not causing harm.     Essential hypertension  Stable - no new orders.     No new orders    Electronically signed by:  CRISTAL Olmos CNP

## 2020-11-05 ENCOUNTER — NURSING HOME VISIT (OUTPATIENT)
Dept: GERIATRICS | Facility: CLINIC | Age: 85
End: 2020-11-05
Payer: COMMERCIAL

## 2020-11-05 VITALS
HEIGHT: 59 IN | OXYGEN SATURATION: 93 % | SYSTOLIC BLOOD PRESSURE: 131 MMHG | RESPIRATION RATE: 18 BRPM | DIASTOLIC BLOOD PRESSURE: 69 MMHG | HEART RATE: 71 BPM | TEMPERATURE: 97.6 F | BODY MASS INDEX: 28.65 KG/M2 | WEIGHT: 142.1 LBS

## 2020-11-05 DIAGNOSIS — F51.01 PRIMARY INSOMNIA: ICD-10-CM

## 2020-11-05 DIAGNOSIS — I10 ESSENTIAL HYPERTENSION: ICD-10-CM

## 2020-11-05 DIAGNOSIS — M15.0 PRIMARY OSTEOARTHRITIS INVOLVING MULTIPLE JOINTS: Primary | ICD-10-CM

## 2020-11-05 PROCEDURE — 99309 SBSQ NF CARE MODERATE MDM 30: CPT | Performed by: NURSE PRACTITIONER

## 2020-11-05 ASSESSMENT — MIFFLIN-ST. JEOR: SCORE: 1005.19

## 2020-11-05 NOTE — LETTER
11/5/2020        RE: Yuni Otoole  [address]  [facility] [address]        [place] [facility]AT[facility]  Chief Complaint   Patient presents with     shelter Regulatory     [place] Medical Record Number:  [id number]  Place of Service where encounter took place:  [facility] AND REHAB [facility] ([facility]) [[id number]]    HPI:    Yuni Otoole  is 85 year old (1935), who is being seen today for a federally mandated E/M visit.  HPI information obtained from: facility chart records, facility staff, patient report and [facility] chart review. Today's concerns are: c/o pain in all joints.      Yuni is a resident of the LTC wing of [place] since 2017 s/p cervical spinal fusion. Noted recent medication change is her zanaflex. Robaxin wasn't covered by insurance and changed to zanaflex on 3/25. Noted symptoms of bradycardia timing are correlated to the start of this medication.      Hx of Medical issues have been:  Acute/Chronic Pain control for generalized and local pain in marv hips, knees, shoulders and neck. In the past treated with localized cortisone injections, frequent adjustment of pain medication per pt's request of Tylenol and NSAIDs and HS ultram, pain clinic visit 11/14 which stopped NSAID and changed tylenol to tylenol arthritis, repeat X rays done,  Ortho consult, and possible future marv genicular nerve blocks and marv hip replacements, but noted high risk surgery. She is balancing/thinking about this still. Pain treatments used recently:   1/28: restart methocarbamol and burst of scheduled Ibuprofen for acute muscle strain/pain.  3/2 - 25: order to discontinue methocarbamol and start tizanidine 2 mg q am when methocarbamol is up (on 3/25 done)  4/2: Norvasc stopped and Diuretic held   4/6: chlorthalidone stopped - benadryl held  4/14: Bradycardia noted - Metoprolol stopped and Cards consulted.   4/15: IVF started, Rocephin , zofran and cefuroxime started.   4/17: non essential meds stopped  (supplements)  4/23: Decrease Na bicarb wean to off, start norvasc 2.5 q HS  4/25: cardiac monitor and bradycardia resolved.    Today she is having more pain in her joints.   On Ultram 50 HS and PRN, but no use of PRN in the last 5 days. She is in tears today due to pain - marv hips and knees.     ALLERGIES:Atorvastatin calcium and Zanaflex [tizanidine]  PAST MEDICAL HISTORY:   has a past medical history of Allergic rhinitis, cause unspecified, Head injury, Pure hypercholesterolemia, and Unspecified essential hypertension. She also has no past medical history of Diabetes (H).  PAST SURGICAL HISTORY:   has a past surgical history that includes REMOVAL OF TONSILS,14+ Y/O; colonoscopy (05/30/07); carotid endarterectomy (11/07/08); INJ EPIDURAL LUMBAR/SACRAL W/WO CONTRAST (2009); DRAIN/INJECT LARGE JOINT/BURSA (2009); DRAIN/INJECT LARGE JOINT/BURSA (2010); INJ EPIDURAL CERVICAL/THORACIC W/WO CONTRAST (2010); INJ TRANSFORAMIN EPIDURAL, LUMB/SACR SINGLE (2010); Optical Tracking System Fusion Posterior Cervical Three + Levels (N/A, 11/15/2017); Laminectomy cerivcal posterior three+ levels (N/A, 11/15/2017); Optical Tracking System Fusion Cervical Anterior Three + Levels (N/A, 11/15/2017); Fusion cervical posterior three+ levels (N/A, 11/15/2017); and Eye surgery.  FAMILY HISTORY: family history includes Cancer in her daughter and mother; Cardiovascular in her father; Cerebrovascular Disease in her paternal grandmother; Circulatory in her paternal grandmother; Diabetes in her maternal aunt; EYE* in her mother; Gastrointestinal Disease in her mother; Gynecology in her sister; Hypertension in her father; Lipids in her brother; Musculoskeletal Disorder in her daughter; Obesity in her maternal grandmother and paternal grandmother; Osteoporosis in her mother.  SOCIAL HISTORY:  reports that she quit smoking about 24 years ago. Her smoking use included cigarettes. She started smoking about 50 years ago. She smoked 0.00 packs per  "day for 10.00 years. She has never used smokeless tobacco. She reports that she does not drink alcohol or use drugs.    MEDICATIONS:  Current Outpatient Medications   Medication Sig Dispense Refill     Acetaminophen (TYLENOL ARTHRITIS PAIN PO) Take 1,300 mg by mouth 3 times daily        amLODIPine (NORVASC) 2.5 MG tablet Take 1 tablet (2.5 mg) by mouth At Bedtime 90 tablet 3     diclofenac (VOLTAREN) 1 % topical gel Bid prn 400 g 1     diphenhydrAMINE (BENADRYL) 25 MG tablet Take 1 tablet (25 mg) by mouth At Bedtime 30 tablet 3     Menthol, Topical Analgesic, (BIOFREEZE) 4 % GEL Bid and BID  PRN 1 Tube 3     ondansetron (ZOFRAN) 4 MG tablet Take 1 tablet (4 mg) by mouth every 8 hours as needed for nausea 30 tablet 3     polyethylene glycol-propylene glycol (SYSTANE ULTRA) 0.4-0.3 % SOLN ophthalmic solution Place 1 drop into both eyes 4 times daily 1 Bottle 3     pravastatin (PRAVACHOL) 10 MG tablet Take 1 tablet (10 mg) by mouth daily 90 tablet 3     rOPINIRole (REQUIP) 2 MG tablet TAKE 2 TABLETS BY MOUTH AT  BEDTIME 180 tablet 11     senna-docusate (SENOKOT-S/PERICOLACE) 8.6-50 MG tablet 1 tablet by Oral or Feeding Tube route 2 times daily as needed for constipation 100 tablet 3     traMADol (ULTRAM) 50 MG tablet 50 mg po q HS and 50 mg po BID PRN 60 tablet 3     traZODone (DESYREL) 50 MG tablet Take 0.5 tablets (25 mg) by mouth At Bedtime 60 tablet 1       Case Management:  I have reviewed the care plan and MDS and do agree with the plan. Patient's desire to return to the community is present, but is not able due to care needs . Information reviewed:  Medications, vital signs, orders, and nursing notes.    ROS:  4 point ROS including Respiratory, CV, GI and , other than that noted in the HPI,  is negative    Vitals:  /69   Pulse 71   Temp 97.6  F (36.4  C)   Resp 18   Ht 1.499 m (4' 11\")   Wt 64.5 kg (142 lb 1.6 oz)   SpO2 93%   BMI 28.70 kg/m    Body mass index is 28.7 kg/m .  Exam:  GENERAL " APPEARANCE:  Alert, in no distress  RESP:  respiratory effort and palpation of chest normal, auscultation of lungs clear , no respiratory distress  CV:  Palpation and auscultation of heart done , rate and rhythm reg, 3/6murmur, no peripheral edema  ABDOMEN:  normal bowel sounds, soft, nontender, no hepatosplenomegaly or other masses  M/S:   Gait and station- ambulation with assist 2 2WW - but using w/c for distance. , Digits and nails intact  SKIN:  Inspection and Palpation of skin and subcutaneous tissue intact  NEURO: 2-12 in normal limits and at patient's baseline  PSYCH:  insight and judgement, memory fair with some STM loss , affect and mood Pleasant   Lab/Diagnostic data:   No new labs.     ASSESSMENT/PLAN  Primary osteoarthritis involving multiple joints  Discussed options - will increase ultram at HS and encourage her to use day time.   Could move to ER ultram    Primary insomnia  Stable with trazodone and benadryl - does not want to try GDR on this. - current dose is stable , helpful for her and not causing harm.     Essential hypertension  Stable - no new orders.     No new orders    Electronically signed by:  CRISTAL Olmos CNP                Sincerely,        CRISTAL Olmos CNP

## 2020-11-19 ENCOUNTER — NURSING HOME VISIT (OUTPATIENT)
Dept: GERIATRICS | Facility: CLINIC | Age: 85
End: 2020-11-19
Payer: COMMERCIAL

## 2020-11-19 VITALS
TEMPERATURE: 98.9 F | RESPIRATION RATE: 18 BRPM | DIASTOLIC BLOOD PRESSURE: 78 MMHG | SYSTOLIC BLOOD PRESSURE: 125 MMHG | OXYGEN SATURATION: 94 % | WEIGHT: 143.3 LBS | HEIGHT: 59 IN | BODY MASS INDEX: 28.89 KG/M2 | HEART RATE: 87 BPM

## 2020-11-19 DIAGNOSIS — U07.1 CLINICAL DIAGNOSIS OF COVID-19: Primary | ICD-10-CM

## 2020-11-19 PROCEDURE — 99308 SBSQ NF CARE LOW MDM 20: CPT | Performed by: NURSE PRACTITIONER

## 2020-11-19 ASSESSMENT — MIFFLIN-ST. JEOR: SCORE: 1010.63

## 2020-11-19 NOTE — LETTER
"    11/19/2020        RE: Yuni Otoole  [address]  [facility] [address]        [place] [facility]AT[facility]  [place] Medical Record Number:  [id number]  Place of Service where encounter took place:  [facility] AND REHAB [facility] ([facility]) [[id number]]  Chief Complaint   Patient presents with     Nursing Home Acute     HPI:    Yuni Otoole  is a 85 year old (1935), who is being seen today for an episodic care visit.  HPI information obtained from: facility chart records, facility staff, patient report and [facility] chart review. Today's concern is COVID-19 test positive from PCR test at an external lab on 11/16. Current experiencing NO symptoms. Code status is confirmed DNR/DNI and is updated at the facility and [facility]. Goals of care are Selective and pt/family would want hospitalization if symtpoms became unstable for SNF. Staff continues to offer fluid and food. PRN Tylenol, zofran, O2 available/ordered.    Please see 11/5 NP note for marin history.     Past Medical and Surgical History reviewed in Epic today.  MEDICATIONS: Reviewed.    REVIEW OF SYSTEMS:  4 point ROS including Respiratory, CV, GI and , other than that noted in the HPI,  is negative    Objective:  /78   Pulse 87   Temp 98.9  F (37.2  C)   Resp 18   Ht 1.499 m (4' 11\")   Wt 65 kg (143 lb 4.8 oz)   SpO2 94%   BMI 28.94 kg/m    Exam:  GENERAL APPEARANCE:  Alert, in no distress  RESP:  respiratory effort and palpation of chest normal, auscultation of lungs clear , no respiratory distress  CV:  Palpation and auscultation of heart done , rate and rhythm reg, 3/6 murmur, no peripheral edema  ABDOMEN:  normal bowel sounds, soft, nontender, no hepatosplenomegaly or other masses  M/S:   Gait and station w/c, Digits and nails intact  SKIN:  Inspection and Palpation of skin and subcutaneous tissue intact  NEURO: 2-12 in normal limits and at patient's baseline  PSYCH:  insight and judgement, memory intact with STM loss notable , " affect and mood Pleasant   Labs:   Recent labs in EPIC reviewed by me today.     ASSESSMENT/PLAN:  Clinical diagnosis of COVID-19  Doing well - encouraged her to keep breathing deep and exercising her lungs.     No new orders.     Electronically signed by:  CRISTAL Olmos CNP                 Sincerely,        CRISTAL Olmos CNP

## 2020-11-19 NOTE — PROGRESS NOTES
"[place] GERIATRIC SERVICES  [place] Medical Record Number:  [id number]  Place of Service where encounter took place:  [facility] AND REHAB [facility] ([facility] [[id number]]  Chief Complaint   Patient presents with     Nursing Home Acute     HPI:    Yuni Otoole  is a 85 year old (1935), who is being seen today for an episodic care visit.  HPI information obtained from: facility chart records, facility staff, patient report and [facility] chart review. Today's concern is COVID-19 test positive from PCR test at an external lab on 11/16. Current experiencing NO symptoms. Code status is confirmed DNR/DNI and is updated at the facility and [facility]. Goals of care are Selective and pt/family would want hospitalization if symtpoms became unstable for SNF. Staff continues to offer fluid and food. PRN Tylenol, zofran, O2 available/ordered.    Please see 11/5 NP note for marin history.     Past Medical and Surgical History reviewed in Epic today.  MEDICATIONS: Reviewed.    REVIEW OF SYSTEMS:  4 point ROS including Respiratory, CV, GI and , other than that noted in the HPI,  is negative    Objective:  /78   Pulse 87   Temp 98.9  F (37.2  C)   Resp 18   Ht 1.499 m (4' 11\")   Wt 65 kg (143 lb 4.8 oz)   SpO2 94%   BMI 28.94 kg/m    Exam:  GENERAL APPEARANCE:  Alert, in no distress  RESP:  respiratory effort and palpation of chest normal, auscultation of lungs clear , no respiratory distress  CV:  Palpation and auscultation of heart done , rate and rhythm reg, 3/6 murmur, no peripheral edema  ABDOMEN:  normal bowel sounds, soft, nontender, no hepatosplenomegaly or other masses  M/S:   Gait and station w/c, Digits and nails intact  SKIN:  Inspection and Palpation of skin and subcutaneous tissue intact  NEURO: 2-12 in normal limits and at patient's baseline  PSYCH:  insight and judgement, memory intact with STM loss notable , affect and mood Pleasant   Labs:   Recent labs in [facility] reviewed by me today. "     ASSESSMENT/PLAN:  Clinical diagnosis of COVID-19  Doing well - encouraged her to keep breathing deep and exercising her lungs.     No new orders.     Electronically signed by:  CRISTAL Olmos CNP

## 2020-11-23 ENCOUNTER — NURSING HOME VISIT (OUTPATIENT)
Dept: GERIATRICS | Facility: CLINIC | Age: 85
End: 2020-11-23
Payer: COMMERCIAL

## 2020-11-23 VITALS
HEIGHT: 59 IN | BODY MASS INDEX: 28.89 KG/M2 | TEMPERATURE: 98.3 F | OXYGEN SATURATION: 95 % | DIASTOLIC BLOOD PRESSURE: 98 MMHG | SYSTOLIC BLOOD PRESSURE: 178 MMHG | RESPIRATION RATE: 20 BRPM | HEART RATE: 92 BPM | WEIGHT: 143.3 LBS

## 2020-11-23 DIAGNOSIS — M15.0 PRIMARY OSTEOARTHRITIS INVOLVING MULTIPLE JOINTS: ICD-10-CM

## 2020-11-23 DIAGNOSIS — R60.0 LOCALIZED EDEMA: ICD-10-CM

## 2020-11-23 DIAGNOSIS — U07.1 CLINICAL DIAGNOSIS OF COVID-19: Primary | ICD-10-CM

## 2020-11-23 DIAGNOSIS — I10 ESSENTIAL HYPERTENSION: ICD-10-CM

## 2020-11-23 PROCEDURE — 99309 SBSQ NF CARE MODERATE MDM 30: CPT | Performed by: NURSE PRACTITIONER

## 2020-11-23 RX ORDER — AMLODIPINE BESYLATE 5 MG/1
5 TABLET ORAL AT BEDTIME
Start: 2020-11-23

## 2020-11-23 ASSESSMENT — MIFFLIN-ST. JEOR: SCORE: 1010.63

## 2020-11-23 NOTE — PROGRESS NOTES
[place] G[facility]AT[facility]  [place] Medical Record Number:  [id number]  Place of Service where encounter took place:  [facility] AND REHAB [facility] ([facility]) [[id number]]  Chief Complaint   Patient presents with     Nursing Home Acute     HPI:    Yuni Otoole  is a 85 year old (1935), who is being seen today for an episodic care visit.  HPI information obtained from: facility chart records, facility staff, patient report and [facility] chart review. Today's concern is worsening HTN given COVID dx. She c/o generalized pain t/o body today - she is quite sad about being in isolation and is crying.         Yuni is a resident of the [facility] wing of [place] since 2017 s/p cervical spinal fusion.   Hx of Medical issues have been:  Acute/Chronic Pain control for generalized and local pain in marv hips, knees, shoulders and neck. In the past treated with localized cortisone injections, frequent adjustment of pain medication per pt's request of Tylenol and NSAIDs and HS ultram, pain clinic visit 11/14 which stopped NSAID and changed tylenol to tylenol arthritis, repeat X rays done,  Ortho consult, and possible future marv genicular nerve blocks and marv hip replacements, but noted high risk surgery. She is balancing/thinking about this still. Pain treatments used recently:   1/28: restart methocarbamol and burst of scheduled Ibuprofen for acute muscle strain/pain.  3/2 - 25: order to discontinue methocarbamol and start tizanidine 2 mg q am when methocarbamol is up (on 3/25 done)  4/2: Norvasc stopped and Diuretic held   4/6: chlorthalidone stopped - benadryl held  4/14: Bradycardia noted - Metoprolol stopped and Cards consulted.   4/15: IVF started, Rocephin , zofran and cefuroxime started.   4/17: non essential meds stopped (supplements)  4/23: Decrease Na bicarb wean to off, start norvasc 2.5 q HS  4/25: cardiac monitor and bradycardia resolved.   11/5: Ultram increased to 100 HS and kept 50 bid PRN  11/16: COVID + PCR  "from  lab - DNR/DNI and WOULD want hospitalization if needed. Fairly asymptomatic except HTN and increased in general body aches.     Past Medical and Surgical History reviewed in Epic today.  MEDICATIONS: Reviewed.    REVIEW OF SYSTEMS:  4 point ROS including Respiratory, CV, GI and , other than that noted in the HPI,  is negative    Objective:  BP (!) 178/98   Pulse 92   Temp 98.3  F (36.8  C)   Resp 20   Ht 1.499 m (4' 11\")   Wt 65 kg (143 lb 4.8 oz)   SpO2 95%   BMI 28.94 kg/m    Exam:  GENERAL APPEARANCE:  Alert, in no distress  RESP:  respiratory effort and palpation of chest normal, auscultation of lungs clear , no respiratory distress  CV:  Palpation and auscultation of heart done , rate and rhythm reg, no peripheral edema  ABDOMEN:  normal bowel sounds, soft, nontender, no hepatosplenomegaly or other masses  M/S:   Gait and station w/c, Digits and nails intact  SKIN:  Inspection and Palpation of skin and subcutaneous tissue intact  NEURO: 2-12 in normal limits and at patient's baseline  PSYCH:  insight and judgement, memory fair- baseline , affect and mood sad, crying    Labs:   Recent labs in [facility] reviewed by me today.     ASSESSMENT/PLAN:  Clinical diagnosis of COVID-19  Still remains fairly asymptomatic except exacerbation of generalized pain -chronid pain due to OA t/o body and had prn Ultram to use if needed.     Essential hypertension and   Localized edema  Noted bp's higher than desired, noted hx of norvasc and edema, but will increase for now and monitor - can always restart diuretic if needed.   Suspect bp's will normalize as time from COVID dx progresses.       Orders written by provider at facility  Increase norvasc to 5 mg po q HS. Dx. HTN      Electronically signed by:  Shaista Mcrae, CRISTAL CNP           "

## 2020-11-23 NOTE — LETTER
11/23/2020        RE: Yuni Otoole  [address]  [facility] [address]        [place] GERIATRIC SERVICES  [place] Medical Record Number:  [id number]  Place of Service where encounter took place:  [facility] AND REHAB [facility] ([facility]) [[id number]]  Chief Complaint   Patient presents with     Nursing Home Acute     HPI:    Yuni Otoole  is a 85 year old (1935), who is being seen today for an episodic care visit.  HPI information obtained from: facility chart records, facility staff, patient report and [facility] chart review. Today's concern is worsening HTN given COVID dx. She c/o generalized pain t/o body today - she is quite sad about being in isolation and is crying.         Yuni is a resident of the LT wing of [place] since 2017 s/p cervical spinal fusion.   Hx of Medical issues have been:  Acute/Chronic Pain control for generalized and local pain in marv hips, knees, shoulders and neck. In the past treated with localized cortisone injections, frequent adjustment of pain medication per pt's request of Tylenol and NSAIDs and HS ultram, pain clinic visit 11/14 which stopped NSAID and changed tylenol to tylenol arthritis, repeat X rays done,  Ortho consult, and possible future marv genicular nerve blocks and marv hip replacements, but noted high risk surgery. She is balancing/thinking about this still. Pain treatments used recently:   1/28: restart methocarbamol and burst of scheduled Ibuprofen for acute muscle strain/pain.  3/2 - 25: order to discontinue methocarbamol and start tizanidine 2 mg q am when methocarbamol is up (on 3/25 done)  4/2: Norvasc stopped and Diuretic held   4/6: chlorthalidone stopped - benadryl held  4/14: Bradycardia noted - Metoprolol stopped and Cards consulted.   4/15: IVF started, Rocephin , zofran and cefuroxime started.   4/17: non essential meds stopped (supplements)  4/23: Decrease Na bicarb wean to off, start norvasc 2.5 q HS  4/25: cardiac monitor and  "bradycardia resolved.   11/5: Ultram increased to 100 HS and kept 50 bid PRN  11/16: COVID + PCR from [facility] lab - DNR/DNI and WOULD want hospitalization if needed. Fairly asymptomatic except HTN and increased in general body aches.     Past Medical and Surgical History reviewed in Epic today.  MEDICATIONS: Reviewed.    REVIEW OF SYSTEMS:  4 point ROS including Respiratory, CV, GI and , other than that noted in the HPI,  is negative    Objective:  BP (!) 178/98   Pulse 92   Temp 98.3  F (36.8  C)   Resp 20   Ht 1.499 m (4' 11\")   Wt 65 kg (143 lb 4.8 oz)   SpO2 95%   BMI 28.94 kg/m    Exam:  GENERAL APPEARANCE:  Alert, in no distress  RESP:  respiratory effort and palpation of chest normal, auscultation of lungs clear , no respiratory distress  CV:  Palpation and auscultation of heart done , rate and rhythm reg, no peripheral edema  ABDOMEN:  normal bowel sounds, soft, nontender, no hepatosplenomegaly or other masses  M/S:   Gait and station w/c, Digits and nails intact  SKIN:  Inspection and Palpation of skin and subcutaneous tissue intact  NEURO: 2-12 in normal limits and at patient's baseline  PSYCH:  insight and judgement, memory fair- baseline , affect and mood sad, crying    Labs:   Recent labs in [facility] reviewed by me today.     ASSESSMENT/PLAN:  Clinical diagnosis of COVID-19  Still remains fairly asymptomatic except exacerbation of generalized pain -chronid pain due to OA t/o body and had prn Ultram to use if needed.     Essential hypertension and   Localized edema  Noted bp's higher than desired, noted hx of norvasc and edema, but will increase for now and monitor - can always restart diuretic if needed.   Suspect bp's will normalize as time from COVID dx progresses.       Orders written by provider at facility  Increase norvasc to 5 mg po q HS. Dx. HTN      Electronically signed by:  CRISTAL Olmos CNP                 Sincerely,        CRISTAL Olmos CNP    "

## 2020-11-27 NOTE — PROGRESS NOTES
"[place] G[facility]  [place] Medical Record Number:  [id number]  Place of Service where encounter took place:  [facility] AND REHAB [facility] ([facility]) [[id number]]  Chief Complaint   Patient presents with     Nursing Home Acute     HPI:    Yuni Otoole  is a 85 year old (1935), who is being seen today for an episodic care visit.  HPI information obtained from: facility chart records, facility staff, patient report and [facility] chart review. Today's concern is f/u on HTN likely due to COVID vascular response.   Please see 11/23 NP note for full summary.   Most recent changes:  11/5: Ultram increased to 100 HS and kept 50 bid PRN  11/16: COVID + PCR from [facility] lab - DNR/DNI and WOULD want hospitalization if needed. Fairly asymptomatic except HTN and increased in general body aches.   11/23: increase norvasc to 5 mg po q HS  F/u today showing improvement on sbp's - no increase in edema.   nsg noted [facility] needing new ultram script as well as primary pharm.     Past Medical and Surgical History reviewed in Epic today.  MEDICATIONS: Reviewed.    REVIEW OF SYSTEMS:  4 point ROS including Respiratory, CV, GI and , other than that noted in the HPI,  is negative    Objective:  /70   Pulse 84   Temp 98  F (36.7  C)   Resp 20   Ht 1.499 m (4' 11\")   Wt 65 kg (143 lb 4.8 oz)   SpO2 95%   BMI 28.94 kg/m    Exam:  GENERAL APPEARANCE:  Alert, in no distress  RESP:  respiratory effort and palpation of chest normal, auscultation of lungs clear , no respiratory distress  CV:  Palpation and auscultation of heart done , rate and rhythm reg, no peripheral edema  ABDOMEN:  normal bowel sounds, soft, nontender, no hepatosplenomegaly or other masses  M/S:   Gait and station w/c- assist2 transfer, Digits and nails intact  SKIN:  Inspection and Palpation of skin and subcutaneous tissue   NEURO: 2-12 in normal limits and at patient's baseline  PSYCH:  insight and judgement, memory intact with STM deficits , " affect and mood Pleasant   Labs:   No new labs.     ASSESSMENT/PLAN:  Essential hypertension  Stable and improved with added norvasc- cont to monitor.     Clinical diagnosis of COVID-19  Stable -     Closed nondisplaced fracture of seventh cervical vertebra, unspecified fracture morphology, sequela - chronic pain  [facility] and Ultram  - traMADol (ULTRAM) 50 MG tablet; 50 mg po q HS and 50 mg po BID PRN    Electronically signed by:  CRISTAL Olmos CNP

## 2020-11-30 ENCOUNTER — NURSING HOME VISIT (OUTPATIENT)
Dept: GERIATRICS | Facility: CLINIC | Age: 85
End: 2020-11-30
Payer: COMMERCIAL

## 2020-11-30 VITALS
HEIGHT: 59 IN | DIASTOLIC BLOOD PRESSURE: 70 MMHG | BODY MASS INDEX: 28.89 KG/M2 | SYSTOLIC BLOOD PRESSURE: 134 MMHG | OXYGEN SATURATION: 95 % | TEMPERATURE: 98 F | HEART RATE: 84 BPM | RESPIRATION RATE: 20 BRPM | WEIGHT: 143.3 LBS

## 2020-11-30 DIAGNOSIS — I10 ESSENTIAL HYPERTENSION: Primary | ICD-10-CM

## 2020-11-30 DIAGNOSIS — S12.601S CLOSED NONDISPLACED FRACTURE OF SEVENTH CERVICAL VERTEBRA, UNSPECIFIED FRACTURE MORPHOLOGY, SEQUELA: ICD-10-CM

## 2020-11-30 DIAGNOSIS — U07.1 CLINICAL DIAGNOSIS OF COVID-19: ICD-10-CM

## 2020-11-30 PROCEDURE — 99308 SBSQ NF CARE LOW MDM 20: CPT | Performed by: NURSE PRACTITIONER

## 2020-11-30 RX ORDER — TRAMADOL HYDROCHLORIDE 50 MG/1
TABLET ORAL
Qty: 60 TABLET | Refills: 3 | Status: SHIPPED | OUTPATIENT
Start: 2020-11-30 | End: 2021-01-17

## 2020-11-30 RX ORDER — TRAMADOL HYDROCHLORIDE 50 MG/1
TABLET ORAL
Qty: 60 TABLET | Refills: 3 | Status: SHIPPED | OUTPATIENT
Start: 2020-11-30 | End: 2020-11-30

## 2020-11-30 ASSESSMENT — MIFFLIN-ST. JEOR: SCORE: 1010.63

## 2020-11-30 NOTE — LETTER
"    11/30/2020        RE: Yuni Otoole  [address]  [facility] [address]        [place] [facility]  [place] [facility] Record Number:  [id number]  Place of Service where encounter took place:  [facility] [[id number]]  Chief Complaint   Patient presents with     Nursing Home Acute     HPI:    Yuni Otoole  is a 85 year old (1935), who is being seen today for an episodic care visit.  HPI information obtained from: facility chart records, facility staff, patient report and [facility] chart review. Today's concern is f/u on HTN likely due to COVID vascular response.   Please see 11/23 NP note for full summary.   Most recent changes:  11/5: Ultram increased to 100 HS and kept 50 bid PRN  11/16: COVID + PCR from [facility] lab - DNR/DNI and WOULD want hospitalization if needed. Fairly asymptomatic except HTN and increased in general body aches.   11/23: increase norvasc to 5 mg po q HS  F/u today showing improvement on sbp's - no increase in edema.   nsg noted [facility] needing new ultram script as well as primary pharm.     Past Medical and Surgical History reviewed in Epic today.  MEDICATIONS: Reviewed.    REVIEW OF SYSTEMS:  4 point ROS including Respiratory, CV, GI and , other than that noted in the HPI,  is negative    Objective:  /70   Pulse 84   Temp 98  F (36.7  C)   Resp 20   Ht 1.499 m (4' 11\")   Wt 65 kg (143 lb 4.8 oz)   SpO2 95%   BMI 28.94 kg/m    Exam:  GENERAL APPEARANCE:  Alert, in no distress  RESP:  respiratory effort and palpation of chest normal, auscultation of lungs clear , no respiratory distress  CV:  Palpation and auscultation of heart done , rate and rhythm reg, no peripheral edema  ABDOMEN:  normal bowel sounds, soft, nontender, no hepatosplenomegaly or other masses  M/S:   Gait and station w/c- assist2 transfer, Digits and nails intact  SKIN:  Inspection and Palpation of skin and subcutaneous tissue   NEURO: 2-12 in normal limits and " at patient's baseline  PSYCH:  insight and judgement, memory intact with STM deficits , affect and mood Pleasant   Labs:   No new labs.     ASSESSMENT/PLAN:  Essential hypertension  Stable and improved with added norvasc- cont to monitor.     Clinical diagnosis of COVID-19  Stable -     Closed nondisplaced fracture of seventh cervical vertebra, unspecified fracture morphology, sequela - chronic pain  [facility] and [facility]  - traMADol (ULTRAM) 50 MG tablet; 50 mg po q HS and 50 mg po BID PRN    Electronically signed by:  CRISTAL Olmos CNP                 Sincerely,        CRISTAL Olmos CNP

## 2020-12-16 ENCOUNTER — TELEPHONE (OUTPATIENT)
Dept: AUDIOLOGY | Facility: CLINIC | Age: 85
End: 2020-12-16

## 2020-12-16 NOTE — TELEPHONE ENCOUNTER
Reason for Call:  Other call back    Detailed comments: Pt is having trouble with one of her earring aids and is wondering what to do.    Phone Number Patient can be reached at: Home number on file [phone] (home)    Best Time: ant    Can we leave a detailed message on this number? YES    Call taken on 12/16/2020 at 3:34 PM by Yeni Ortiz

## 2020-12-17 NOTE — TELEPHONE ENCOUNTER
Spoke with Yuni Ignacio's  and an appointment was scheduled for her 12/21/2020 at 2:45 pm. Bennett Gonzalez

## 2020-12-21 ENCOUNTER — OFFICE VISIT (OUTPATIENT)
Dept: AUDIOLOGY | Facility: CLINIC | Age: 85
End: 2020-12-21
Payer: COMMERCIAL

## 2020-12-21 DIAGNOSIS — H90.3 SENSORINEURAL HEARING LOSS, BILATERAL: Primary | ICD-10-CM

## 2020-12-21 PROCEDURE — V5299 HEARING SERVICE: HCPCS | Performed by: AUDIOLOGIST

## 2020-12-21 PROCEDURE — 99207 PR NO CHARGE LOS: CPT | Performed by: AUDIOLOGIST

## 2020-12-21 NOTE — PROGRESS NOTES
Hearing Aid Check     SUBJECTIVE:  Yuni Otoole is a 85 year old year old female, seen today for a [facility]. Patient reports the left device is non-functioning, [facility] with custom embedded .        OBJECTIVE:  Hearing aid maintenance was completed to clean and vacuum mics, removed wax filters vacuumed  and replaced was filters. Biologic check of right indicated appropriate function. Left biologic check very weak function, swapped right  to left hearing function not restored.     PLAN:   Send hearing aid to [facility] for out of warranty repair, call for .    Bennett Sharp Licensed Audiologist #[id number]

## 2020-12-28 DIAGNOSIS — F51.01 PRIMARY INSOMNIA: ICD-10-CM

## 2020-12-28 RX ORDER — TRAZODONE HYDROCHLORIDE 50 MG/1
TABLET, FILM COATED ORAL
Qty: 45 TABLET | Refills: 3 | Status: SHIPPED | OUTPATIENT
Start: 2020-12-28 | End: 2021-03-02

## 2020-12-30 ENCOUNTER — TELEPHONE (OUTPATIENT)
Dept: AUDIOLOGY | Facility: CLINIC | Age: 85
End: 2020-12-30

## 2020-12-30 NOTE — TELEPHONE ENCOUNTER
Reason for Call:  Other call back    Detailed comments: requesting update about hearing aid that was given to Courtney at her last appt     Phone Number Patient can be reached at: Home number on file [phone] (home)    Best Time: anytime    Can we leave a detailed message on this number? YES    Call taken on 12/30/2020 at 12:26 PM by Yesenia Morel

## 2020-12-31 NOTE — TELEPHONE ENCOUNTER
Called Mr. Otoole and let him know the hearing aid is still at [facility], in the testing phase. Should ship today or Monday.  I will call him for  when it is ready. Bennett Gonzalez

## 2021-01-06 NOTE — PROGRESS NOTES
"[place] [facility] Regulatory   Yuni Otoole is being evaluated via a billable video visit due to the restrictions of the Covid-19 pandemic.   The patient has been notified of following:  \"This video visit will be conducted via a call between you and your provider. We have found that certain health care needs can be provided without the need for an in-person physical exam.  This service lets us provide the care you need with a video conversation. If during the course of the call the provider feels a video visit is not appropriate, you will not be charged for this service.\"   The provider has received verbal consent for a Video Visit from the patient or first contact? Yes  Patient or facility staff would like the video invitation sent by: N/A   Video Start Time: 10:45  [place] Medical Record Number:  [id number]  Place of Location at the time of visit: [facility]  Chief Complaint   Patient presents with     [facility] Regulatory     Video Visit   HPI:    Yuni Otoole is a 82 year old  (1935), who is being seen today for a federally mandated E/M visit at [facility]  .  HPI information obtained from: facility staff, patient report and [facility] chart review.     Today's concerns are:  - Pt seen in the presence of RN who graciously assisted with the virtual visit    pt reports has been doing very good, \" just got done with therapy\". RN reports had covid19. Pt reports getting stronger with rehab.   - Heart: has no concern.   - sleep and appetite fine.   - reports breathing is fine.   ---------------------------------------------------------------  - - Past Medical, social, family histories, medications, and allergies reviewed and updated  - Medications reviewed: in the chart and EHR.   - Case Management:   I have reviewed the care plan and MDS and do agree with the plan. Patient's desire to return to the community is not present.  Information reviewed:  Medications, " "vital signs, orders, and nursing notes.    :MEDICATIONS:  Current Outpatient Medications   Medication Sig Dispense Refill     Acetaminophen (TYLENOL ARTHRITIS PAIN PO) Take 1,300 mg by mouth 3 times daily        amLODIPine (NORVASC) 5 MG tablet Take 1 tablet (5 mg) by mouth At Bedtime       diclofenac (VOLTAREN) 1 % topical gel Bid prn 400 g 1     diphenhydrAMINE (BENADRYL) 25 MG tablet Take 1 tablet (25 mg) by mouth At Bedtime 30 tablet 3     Menthol, Topical Analgesic, (BIOFREEZE) 4 % GEL Bid and BID  PRN 1 Tube 3     ondansetron (ZOFRAN) 4 MG tablet Take 1 tablet (4 mg) by mouth every 8 hours as needed for nausea 30 tablet 3     polyethylene glycol-propylene glycol (SYSTANE ULTRA) 0.4-0.3 % SOLN ophthalmic solution Place 1 drop into both eyes 4 times daily 1 Bottle 3     pravastatin (PRAVACHOL) 10 MG tablet Take 1 tablet (10 mg) by mouth daily 90 tablet 3     rOPINIRole (REQUIP) 2 MG tablet TAKE 2 TABLETS BY MOUTH AT  BEDTIME 180 tablet 11     senna-docusate (SENOKOT-S/PERICOLACE) 8.6-50 MG tablet 1 tablet by Oral or Feeding Tube route 2 times daily as needed for constipation 100 tablet 3     traMADol (ULTRAM) 50 MG tablet 50 mg po q HS and 50 mg po BID PRN 60 tablet 3     traZODone (DESYREL) 50 MG tablet TAKE ONE-HALF TABLET BY  MOUTH AT BEDTIME 45 tablet 3     ROS: 4 point ROS including Respiratory, CV, GI and , other than that noted in the HPI,  is negative    Vitals: BP (!) 153/85   Pulse 95   Temp 98.5  F (36.9  C)   Resp 16   Ht 1.499 m (4' 11\")   Wt 64.9 kg (143 lb 1.6 oz)   SpO2 93%   BMI 28.90 kg/m    BMI= Body mass index is 28.9 kg/m .  Limited visit exam done given COVID-19 precautions.   GENERAL APPEARANCE:  in no distress, cooperative. sitting in WC, conversant, has headphone on for hearing  RESP:  unlabored breathing.   M/S:   kyphosis.   SKIN: no rash noted  NEURO:   generalized muscles atrophy, hand  4/5 b/l.  Dorsiflexion 5/5 bilateral.   PSYCH:  affect and mood normal. "     Lab/Diagnostic data: Reviewed in the chart and EHR.      ASSESSMENT/PLAN  ----------------------------------  Aortic Stenosis:  B/L Carotid Stenosis with hx of s/p Right CEA  Benign essential hypertension:    Hyperlipidemia  - clinically compensated.        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 At baseline.     Osteoporosis /Vitamin D deficiency:   Off supplement now 2/2 de-prescribing amidst COVID 19 pandemic.  Consider resuming Vit D and Ca supplement.     RLS: on Requip. Stable.     [place]:  Limits communication.  Able to participate with conversation with quiet environment and 1:1 attention.    Orders: See above, otherwise, continue the rest of the current POC.     Electronically signed by:  Ceasar Aburto MD     Video-Visit Details  Type of service:  Video Visit  Video End Time (time video stopped): 10:48  Distant Location (provider location):  [facility]

## 2021-01-07 ENCOUNTER — VIRTUAL VISIT (OUTPATIENT)
Dept: GERIATRICS | Facility: CLINIC | Age: 86
End: 2021-01-07
Payer: COMMERCIAL

## 2021-01-07 VITALS
SYSTOLIC BLOOD PRESSURE: 153 MMHG | BODY MASS INDEX: 28.85 KG/M2 | OXYGEN SATURATION: 93 % | WEIGHT: 143.1 LBS | TEMPERATURE: 98.5 F | HEIGHT: 59 IN | HEART RATE: 95 BPM | RESPIRATION RATE: 16 BRPM | DIASTOLIC BLOOD PRESSURE: 85 MMHG

## 2021-01-07 DIAGNOSIS — I10 ESSENTIAL HYPERTENSION: ICD-10-CM

## 2021-01-07 DIAGNOSIS — G93.31 POST VIRAL SYNDROME: ICD-10-CM

## 2021-01-07 DIAGNOSIS — I35.0 AORTIC VALVE STENOSIS, ETIOLOGY OF CARDIAC VALVE DISEASE UNSPECIFIED: ICD-10-CM

## 2021-01-07 DIAGNOSIS — E78.2 MIXED HYPERLIPIDEMIA: ICD-10-CM

## 2021-01-07 DIAGNOSIS — M15.0 PRIMARY OSTEOARTHRITIS INVOLVING MULTIPLE JOINTS: ICD-10-CM

## 2021-01-07 DIAGNOSIS — U07.1 CLINICAL DIAGNOSIS OF COVID-19: Primary | ICD-10-CM

## 2021-01-07 DIAGNOSIS — M48.02 CERVICAL STENOSIS OF SPINE: ICD-10-CM

## 2021-01-07 DIAGNOSIS — G25.81 RESTLESS LEGS SYNDROME (RLS): ICD-10-CM

## 2021-01-07 DIAGNOSIS — M81.0 OSTEOPOROSIS, UNSPECIFIED OSTEOPOROSIS TYPE, UNSPECIFIED PATHOLOGICAL FRACTURE PRESENCE: ICD-10-CM

## 2021-01-07 PROCEDURE — 99309 SBSQ NF CARE MODERATE MDM 30: CPT | Mod: GT | Performed by: FAMILY MEDICINE

## 2021-01-07 ASSESSMENT — MIFFLIN-ST. JEOR: SCORE: 1009.73

## 2021-01-07 NOTE — LETTER
"    1/7/2021        RE: Yuni Otoole  [address] [facility] [address]        [facility] Regulatory   Yuni Otoole is being evaluated via a billable video visit due to the restrictions of the Covid-19 pandemic.   The patient has been notified of following:  \"This video visit will be conducted via a call between you and your provider. We have found that certain health care needs can be provided without the need for an in-person physical exam.  This service lets us provide the care you need with a video conversation. If during the course of the call the provider feels a video visit is not appropriate, you will not be charged for this service.\"   The provider has received verbal consent for a Video Visit from the patient or first contact? Yes  Patient or facility staff would like the video invitation sent by: N/A   Video Start Time: 10:45  [place] Medical Record Number:  [id number]  Place of Location at the time of visit: [facility]  Chief Complaint   Patient presents with     prison Regulatory     Video Visit   HPI:    uYni Otoole is a 82 year old  (1935), who is being seen today for a federally mandated E/M visit at [facility]  .  HPI information obtained from: facility staff, patient report and [facility] chart review.     Today's concerns are:  - Pt seen in the presence of RN who graciously assisted with the virtual visit    pt reports has been doing very good, \" just got done with therapy\". RN reports had covid19. Pt reports getting stronger with rehab.   - Heart: has no concern.   - sleep and appetite fine.   - reports breathing is fine.   ---------------------------------------------------------------  - - Past Medical, social, family histories, medications, and allergies reviewed and updated  - Medications reviewed: in the chart and EHR.   - Case Management:   I have reviewed the care plan and MDS and do agree with the plan. Patient's " "desire to return to the community is not present.  Information reviewed:  Medications, vital signs, orders, and nursing notes.    :MEDICATIONS:  Current Outpatient Medications   Medication Sig Dispense Refill     Acetaminophen (TYLENOL ARTHRITIS PAIN PO) Take 1,300 mg by mouth 3 times daily        amLODIPine (NORVASC) 5 MG tablet Take 1 tablet (5 mg) by mouth At Bedtime       diclofenac (VOLTAREN) 1 % topical gel Bid prn 400 g 1     diphenhydrAMINE (BENADRYL) 25 MG tablet Take 1 tablet (25 mg) by mouth At Bedtime 30 tablet 3     Menthol, Topical Analgesic, (BIOFREEZE) 4 % GEL Bid and BID  PRN 1 Tube 3     ondansetron (ZOFRAN) 4 MG tablet Take 1 tablet (4 mg) by mouth every 8 hours as needed for nausea 30 tablet 3     polyethylene glycol-propylene glycol (SYSTANE ULTRA) 0.4-0.3 % SOLN ophthalmic solution Place 1 drop into both eyes 4 times daily 1 Bottle 3     pravastatin (PRAVACHOL) 10 MG tablet Take 1 tablet (10 mg) by mouth daily 90 tablet 3     rOPINIRole (REQUIP) 2 MG tablet TAKE 2 TABLETS BY MOUTH AT  BEDTIME 180 tablet 11     senna-docusate (SENOKOT-S/PERICOLACE) 8.6-50 MG tablet 1 tablet by Oral or Feeding Tube route 2 times daily as needed for constipation 100 tablet 3     traMADol (ULTRAM) 50 MG tablet 50 mg po q HS and 50 mg po BID PRN 60 tablet 3     traZODone (DESYREL) 50 MG tablet TAKE ONE-HALF TABLET BY  MOUTH AT BEDTIME 45 tablet 3     ROS: 4 point ROS including Respiratory, CV, GI and , other than that noted in the HPI,  is negative    Vitals: BP (!) 153/85   Pulse 95   Temp 98.5  F (36.9  C)   Resp 16   Ht 1.499 m (4' 11\")   Wt 64.9 kg (143 lb 1.6 oz)   SpO2 93%   BMI 28.90 kg/m    BMI= Body mass index is 28.9 kg/m .  Limited visit exam done given COVID-19 precautions.   GENERAL APPEARANCE:  in no distress, cooperative. sitting in WC, conversant, has headphone on for hearing  RESP:  unlabored breathing.   M/S:   kyphosis.   SKIN: no rash noted  NEURO:   generalized muscles atrophy, hand "  4/5 b/l.  Dorsiflexion 5/5 bilateral.   PSYCH:  affect and mood normal.     Lab/Diagnostic data: Reviewed in the chart and EHR.      ASSESSMENT/PLAN  ----------------------------------  Aortic Stenosis:  B/L Carotid Stenosis with hx of s/p Right CEA  Benign essential hypertension:    Hyperlipidemia  - clinically compensated.        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 At baseline.     Osteoporosis /Vitamin D deficiency:   Off supplement now 2/2 de-prescribing amidst COVID 19 pandemic.  Consider resuming Vit D and Ca supplement.     RLS: on Requip. Stable.     [place]:  Limits communication.  Able to participate with conversation with quiet environment and 1:1 attention.    Orders: See above, otherwise, continue the rest of the current POC.     Electronically signed by:  Ceasar Aburto MD     Video-Visit Details  Type of service:  Video Visit  Video End Time (time video stopped): 10:48  Distant Location (provider location):  [place]               Sincerely,        Ceasar Aburto MD

## 2021-01-17 DIAGNOSIS — S12.601S CLOSED NONDISPLACED FRACTURE OF SEVENTH CERVICAL VERTEBRA, UNSPECIFIED FRACTURE MORPHOLOGY, SEQUELA: ICD-10-CM

## 2021-01-17 RX ORDER — TRAMADOL HYDROCHLORIDE 50 MG/1
TABLET ORAL
Qty: 60 TABLET | Refills: 3 | Status: SHIPPED | OUTPATIENT
Start: 2021-01-17 | End: 2021-03-11

## 2021-01-17 NOTE — TELEPHONE ENCOUNTER
[facility] Controlled Substance Medication Fill:  Yuni TURPIN Sydnie  1935  Effective Jan 1, 2021, The [place] Board of Pharmacy has a new legislative requirement that requires checking the [place]  database before initially prescribing an opiate and every three months for patients receiving an opiate for treatment of chronic pain.   Request for controlled substance medication:    Closed nondisplaced fracture of seventh cervical vertebra, unspecified fracture morphology, sequela  Sent to [facility]  - traMADol (ULTRAM) 50 MG tablet; 50 mg po q HS and 50 mg po BID PRN   not checked due to meeting exception criteria: 4. the prescriber and patient have a current or ongoing provider/patient relationship of a duration longer than one year;

## 2021-01-19 NOTE — TELEPHONE ENCOUNTER
Spoke with Mr. Otoole after calling [employer] and requesting an expedited drop ship to the their home address.  [facility] indicated the device should arrive Friday at the latest. Recommended he follow up if there are any programming needs after the hearing aid is received.  Riri Gonzalez.

## 2021-01-19 NOTE — TELEPHONE ENCOUNTER
Pt called to check the status of the hearing aids. Has not heard anything and feels it has been a long time.

## 2021-02-10 DIAGNOSIS — M54.50 CHRONIC BILATERAL LOW BACK PAIN WITHOUT SCIATICA: Primary | ICD-10-CM

## 2021-02-10 DIAGNOSIS — G89.29 CHRONIC BILATERAL LOW BACK PAIN WITHOUT SCIATICA: Primary | ICD-10-CM

## 2021-02-11 RX ORDER — SENNOSIDES 8.6 MG
CAPSULE ORAL
Qty: 168 TABLET | Refills: 4 | Status: SHIPPED | OUTPATIENT
Start: 2021-02-11

## 2021-02-18 ENCOUNTER — NURSING HOME VISIT (OUTPATIENT)
Dept: GERIATRICS | Facility: CLINIC | Age: 86
End: 2021-02-18
Payer: COMMERCIAL

## 2021-02-18 VITALS
BODY MASS INDEX: 28.43 KG/M2 | SYSTOLIC BLOOD PRESSURE: 144 MMHG | WEIGHT: 141 LBS | HEART RATE: 83 BPM | TEMPERATURE: 98 F | DIASTOLIC BLOOD PRESSURE: 74 MMHG | HEIGHT: 59 IN | OXYGEN SATURATION: 95 % | RESPIRATION RATE: 16 BRPM

## 2021-02-18 DIAGNOSIS — I10 ESSENTIAL HYPERTENSION: ICD-10-CM

## 2021-02-18 DIAGNOSIS — G89.29 CHRONIC BILATERAL LOW BACK PAIN WITHOUT SCIATICA: ICD-10-CM

## 2021-02-18 DIAGNOSIS — M81.0 AGE-RELATED OSTEOPOROSIS WITHOUT CURRENT PATHOLOGICAL FRACTURE: ICD-10-CM

## 2021-02-18 DIAGNOSIS — L50.9 URTICARIA: Primary | ICD-10-CM

## 2021-02-18 DIAGNOSIS — M54.50 CHRONIC BILATERAL LOW BACK PAIN WITHOUT SCIATICA: ICD-10-CM

## 2021-02-18 PROCEDURE — 99308 SBSQ NF CARE LOW MDM 20: CPT | Performed by: NURSE PRACTITIONER

## 2021-02-18 ASSESSMENT — MIFFLIN-ST. JEOR: SCORE: 1000.2

## 2021-02-18 NOTE — LETTER
"    2/18/2021        RE: Yuni Otoole  [address]  [facility] [address]        [place] [facility]    Chief Complaint   Patient presents with     Nursing Home Acute     HPI:    Yuni Otoole is a 85 year old  (1935), who is being seen today for an episodic care visit at: [facility] () [[id number]]  Today's concern is: being asked to see pt r/t medication questions from pharm D.   Noted on benadryl for itch - historically pt has not wanted to stop.   Also noted prn Tylenol is inaccurate order.   Additionally MD noted off Ca+ and question if needing to restart - discussed with pt.     PMH/PSH reviewed in EPIC today.  REVIEW OF SYSTEMS:  4 point ROS including Respiratory, CV, GI and , other than that noted in the HPI,  is negative    EXAM:  BP (!) 144/74   Pulse 83   Temp 98  F (36.7  C)   Resp 16   Ht 1.499 m (4' 11\")   Wt 64 kg (141 lb)   SpO2 95%   BMI 28.48 kg/m    Alert, no distress  nonlabored breathing,   No edema.     Recent labs in [facility] reviewed by me today.  and   Most Recent 3 BMP's:  Recent Labs   Lab Test 04/30/20  0634 04/23/20  0630 04/17/20  1130 04/15/20  1215    135 125* 130*   POTASSIUM  --  4.2 5.0 4.8   CHLORIDE  --  101 93* 100   CO2  --  28 21 20   BUN  --  11 43* 38*   CR  --  0.58 1.30* 1.58*   ANIONGAP  --  6 11 10   ANNETTA  --  8.4* 8.5 8.9   GLC  --  83 145* 155*     Most Recent 3 Hemoglobins:  Recent Labs   Lab Test 04/30/20  0634 04/23/20  0630 04/17/20  1130   HGB 11.4* 10.6* 11.9       Assessment/Plan:  Essential hypertension  Noted slightly elevated, but BP goals are ~130 -165/60 -90 mmHg.This is higher than ACC and AHA recommendations due to goals of care, risk of dizziness and falls, risk of tissue/cerebral hypoperfusion, frailty and Nanci et al (2018) found that sBPs greater than 170 had improved mortality and improved cognitive retention over sBPs under 140 in patients 85 years and older. Patient is stable with current plan of " care and routine assessment.    Urticaria  stable now - she is willing to try off oral hs benadryl    Age-related osteoporosis without current pathological fracture  Dicussed with pt about oral Ca+ -   She does not want more pills at this time. - agree this is a fair choice given she is no longer ambulatory.       Chronic bilateral low back pain without sciatica  Need to discontinue prn Tylenol given scheduled dose.      Orders:  discontinue prn Tylenol   Discontinue benadryl      Electronically signed by:  CRISTAL Olmos CNP          Sincerely,        CRISTAL Olmos CNP

## 2021-02-18 NOTE — PROGRESS NOTES
"[place] [facility]    Chief Complaint   Patient presents with     Nursing Home Acute     HPI:    Yuni Otoole is a 85 year old  (1935), who is being seen today for an episodic care visit at: [facility] () [[id number]]  Today's concern is: being asked to see pt r/t medication questions from pharm D.   Noted on benadryl for itch - historically pt has not wanted to stop.   Also noted prn Tylenol is inaccurate order.   Additionally MD noted off Ca+ and question if needing to restart - discussed with pt.     PMH/PSH reviewed in [facility] today.  REVIEW OF SYSTEMS:  4 point ROS including Respiratory, CV, GI and , other than that noted in the HPI,  is negative    EXAM:  BP (!) 144/74   Pulse 83   Temp 98  F (36.7  C)   Resp 16   Ht 1.499 m (4' 11\")   Wt 64 kg (141 lb)   SpO2 95%   BMI 28.48 kg/m    Alert, no distress  nonlabored breathing,   No edema.     Recent labs in [facility] reviewed by me today.  and   Most Recent 3 BMP's:  Recent Labs   Lab Test 04/30/20  0634 04/23/20  0630 04/17/20  1130 04/15/20  1215    135 125* 130*   POTASSIUM  --  4.2 5.0 4.8   CHLORIDE  --  101 93* 100   CO2  --  28 21 20   BUN  --  11 43* 38*   CR  --  0.58 1.30* 1.58*   ANIONGAP  --  6 11 10   ANNETTA  --  8.4* 8.5 8.9   GLC  --  83 145* 155*     Most Recent 3 Hemoglobins:  Recent Labs   Lab Test 04/30/20  0634 04/23/20  0630 04/17/20  1130   HGB 11.4* 10.6* 11.9       Assessment/Plan:  Essential hypertension  Noted slightly elevated, but BP goals are ~130 -165/60 -90 mmHg.This is higher than ACC and AHA recommendations due to goals of care, risk of dizziness and falls, risk of tissue/cerebral hypoperfusion, frailty and Nanci et al (2018) found that sBPs greater than 170 had improved mortality and improved cognitive retention over sBPs under 140 in patients 85 years and older. Patient is stable with current plan of care and routine assessment.    Urticaria  stable now - she is willing to try off oral " hs benadryl    Age-related osteoporosis without current pathological fracture  Dicussed with pt about oral Ca+ -   She does not want more pills at this time. - agree this is a fair choice given she is no longer ambulatory.       Chronic bilateral low back pain without sciatica  Need to discontinue prn Tylenol given scheduled dose.      Orders:  discontinue prn Tylenol   Discontinue benadryl      Electronically signed by:  CRISTAL Olmos CNP

## 2021-03-02 DIAGNOSIS — F51.01 PRIMARY INSOMNIA: ICD-10-CM

## 2021-03-02 RX ORDER — TRAZODONE HYDROCHLORIDE 50 MG/1
TABLET, FILM COATED ORAL
Qty: 90 TABLET | Refills: 3 | Status: SHIPPED | OUTPATIENT
Start: 2021-03-02

## 2021-03-02 NOTE — TELEPHONE ENCOUNTER
(F51.01) Primary insomnia  Comment:   Plan: traZODone (DESYREL) 50 MG tablet        Sent to pharm 90 d supply

## 2021-03-11 ENCOUNTER — NURSING HOME VISIT (OUTPATIENT)
Dept: GERIATRICS | Facility: CLINIC | Age: 86
End: 2021-03-11
Payer: COMMERCIAL

## 2021-03-11 VITALS
SYSTOLIC BLOOD PRESSURE: 117 MMHG | WEIGHT: 138.2 LBS | OXYGEN SATURATION: 95 % | RESPIRATION RATE: 16 BRPM | HEIGHT: 59 IN | DIASTOLIC BLOOD PRESSURE: 67 MMHG | TEMPERATURE: 98.3 F | BODY MASS INDEX: 27.86 KG/M2 | HEART RATE: 98 BPM

## 2021-03-11 DIAGNOSIS — H00.015 HORDEOLUM EXTERNUM OF LEFT LOWER EYELID: ICD-10-CM

## 2021-03-11 DIAGNOSIS — F51.01 PRIMARY INSOMNIA: Primary | ICD-10-CM

## 2021-03-11 DIAGNOSIS — L50.9 URTICARIA: ICD-10-CM

## 2021-03-11 DIAGNOSIS — S12.601S CLOSED NONDISPLACED FRACTURE OF SEVENTH CERVICAL VERTEBRA, UNSPECIFIED FRACTURE MORPHOLOGY, SEQUELA: ICD-10-CM

## 2021-03-11 DIAGNOSIS — M15.0 PRIMARY OSTEOARTHRITIS INVOLVING MULTIPLE JOINTS: ICD-10-CM

## 2021-03-11 PROCEDURE — 99309 SBSQ NF CARE MODERATE MDM 30: CPT | Performed by: NURSE PRACTITIONER

## 2021-03-11 RX ORDER — TRAMADOL HYDROCHLORIDE 50 MG/1
TABLET ORAL
Qty: 180 TABLET | Refills: 3 | Status: SHIPPED | OUTPATIENT
Start: 2021-03-11 | End: 2021-05-25

## 2021-03-11 ASSESSMENT — MIFFLIN-ST. JEOR: SCORE: 987.5

## 2021-03-11 NOTE — PROGRESS NOTES
[place] [facility]  Chief Complaint   Patient presents with     residential Regulatory     [place] Medical Record Number:  [id number]  Place of Service where encounter took place:  [facility] [place] [facility] () [[id number]]    HPI:    Yuni Otoole  is 85 year old (1935), who is being seen today for a federally mandated E/M visit.  HPI information obtained from: facility chart records, facility staff, patient report and [facility] chart review. Today's concerns are concerns about not getting a little red pill at night anymore - thinking it was a pill to help constipation - she notes she feels more constipated again. C/o chronic pain t/o body. She also has a stye on her L lower lid of her eye.   Please see 11/23 NP note for full summary.   Most recent changes:  11/5: Ultram increased to 100 HS and kept 50 bid PRN  11/16: COVID + PCR from [facility] lab - DNR/DNI and WOULD want hospitalization if needed. Fairly asymptomatic except HTN and increased in general body aches.   11/23: increase norvasc to 5 mg po q HS    ALLERGIES:Atorvastatin calcium and Zanaflex [tizanidine]  PAST MEDICAL HISTORY:   has a past medical history of Allergic rhinitis, cause unspecified, Head injury, Pure hypercholesterolemia, and Unspecified essential hypertension. She also has no past medical history of Diabetes (H).  PAST SURGICAL HISTORY:   has a past surgical history that includes REMOVAL OF TONSILS,14+ Y/O; colonoscopy (05/30/07); carotid endarterectomy (11/07/08); INJ EPIDURAL LUMBAR/SACRAL W/WO CONTRAST (2009); DRAIN/INJECT LARGE JOINT/BURSA (2009); DRAIN/INJECT LARGE JOINT/BURSA (2010); INJ EPIDURAL CERVICAL/THORACIC W/WO CONTRAST (2010); INJ TRANSFORAMIN EPIDURAL, LUMB/SACR SINGLE (2010); Optical Tracking System Fusion Posterior Cervical Three + Levels (N/A, 11/15/2017); Laminectomy cerivcal posterior three+ levels (N/A, 11/15/2017); Optical Tracking System Fusion Cervical Anterior Three + Levels (N/A, 11/15/2017);  Fusion cervical posterior three+ levels (N/A, 11/15/2017); and Eye surgery.  FAMILY HISTORY: family history includes Cancer in her daughter and mother; Cardiovascular in her father; Cerebrovascular Disease in her paternal grandmother; Circulatory in her paternal grandmother; Diabetes in her maternal aunt; EYE* in her mother; Gastrointestinal Disease in her mother; Gynecology in her sister; Hypertension in her father; Lipids in her brother; Musculoskeletal Disorder in her daughter; Obesity in her maternal grandmother and paternal grandmother; Osteoporosis in her mother.  SOCIAL HISTORY:  reports that she quit smoking about 25 years ago. Her smoking use included cigarettes. She started smoking about 50 years ago. She smoked 0.00 packs per day for 10.00 years. She has never used smokeless tobacco. She reports that she does not drink alcohol or use drugs.    MEDICATIONS:  Current Outpatient Medications   Medication Sig Dispense Refill     diclofenac (VOLTAREN) 1 % topical gel Apply topically 4 times daily as needed 2 gms to upper and 4 gm to lower joints QID PRN. - pt to choose between biofreeze prn vs this.       traMADol (ULTRAM) 50 MG tablet 100 mg po q HS and 50 mg po BID  tablet 3     acetaminophen (TYLENOL) 650 MG CR tablet TAKE 2 TABS (1,300MG) BY MOUTH THREE TIMES DAILY 168 tablet 4     amLODIPine (NORVASC) 5 MG tablet Take 1 tablet (5 mg) by mouth At Bedtime       Menthol, Topical Analgesic, (BIOFREEZE) 4 % GEL Bid and BID  PRN 1 Tube 3     ondansetron (ZOFRAN) 4 MG tablet Take 1 tablet (4 mg) by mouth every 8 hours as needed for nausea 30 tablet 3     polyethylene glycol-propylene glycol (SYSTANE ULTRA) 0.4-0.3 % SOLN ophthalmic solution Place 1 drop into both eyes 4 times daily 1 Bottle 3     pravastatin (PRAVACHOL) 10 MG tablet Take 1 tablet (10 mg) by mouth daily 90 tablet 3     rOPINIRole (REQUIP) 2 MG tablet TAKE 2 TABLETS BY MOUTH AT  BEDTIME 180 tablet 11     senna-docusate (SENOKOT-S/PERICOLACE)  "8.6-50 MG tablet 1 tablet by Oral or Feeding Tube route 2 times daily as needed for constipation 100 tablet 3     traZODone (DESYREL) 50 MG tablet TAKE ONE-HALF TABLET BY  MOUTH AT BEDTIME 90 tablet 3       Case Management:  I have reviewed the care plan and MDS and do agree with the plan. Patient's desire to return to the community is present, but is not able due to care needs . Information reviewed:  Medications, vital signs, orders, and nursing notes.    ROS:  4 point ROS including Respiratory, CV, GI and , other than that noted in the HPI,  is negative    Vitals:  /67   Pulse 98   Temp 98.3  F (36.8  C)   Resp 16   Ht 1.499 m (4' 11\")   Wt 62.7 kg (138 lb 3.2 oz)   SpO2 95%   BMI 27.91 kg/m    Body mass index is 27.91 kg/m .  Exam:  GENERAL APPEARANCE:  Alert, in no distress  ENT: L lower lid has dried stye - no drainage, no surrounding redness.   RESP:  respiratory effort and palpation of chest normal, auscultation of lungs clear , no respiratory distress  CV:  Palpation and auscultation of heart done , rate and rhythm reg, 3/6murmur, trace marv lower ext L>R peripheral edema  ABDOMEN:  normal bowel sounds, soft, nontender, no hepatosplenomegaly or other masses  M/S:   Gait and station w/c - assist 1 for transfer, - very deformed t/o multiple joints. Digits and nails intact  SKIN:  Inspection and Palpation of skin and subcutaneous tissue intact  NEURO: 2-12 in normal limits and at patient's baseline  PSYCH:  insight and judgement, memory fair , affect and mood Pleasant     Lab/Diagnostic data:   Recent labs in [facility] reviewed by me today.  and   Most Recent 3 CBC's:  Recent Labs   Lab Test 04/30/20  0634 04/23/20  0630 04/17/20  1130 04/15/20  1215   WBC  --  5.8 13.0* 14.1*   HGB 11.4* 10.6* 11.9 12.1   MCV  --  97 96 96   PLT  --  247 253 267     Most Recent 3 BMP's:  Recent Labs   Lab Test 04/30/20  0634 04/23/20  0630 04/17/20  1130 04/15/20  1215    135 125* 130*   POTASSIUM  --  4.2 5.0 " 4.8   CHLORIDE  --  101 93* 100   CO2  --  28 21 20   BUN  --  11 43* 38*   CR  --  0.58 1.30* 1.58*   ANIONGAP  --  6 11 10   ANNETTA  --  8.4* 8.5 8.9   GLC  --  83 145* 155*       ASSESSMENT/PLAN  Closed nondisplaced fracture of seventh cervical vertebra, unspecified fracture morphology, sequela and Primary osteoarthritis involving multiple joints  Continues to be in chronic pain - discussed about options for different pain medications.   - end up planning on staying on current regimen - refill sent to pharm.   - traMADol (ULTRAM) 50 MG tablet; 100 mg po q HS and 50 mg po BID PRN    Primary insomnia and Urticaria  Off benadryl now and doing well     Hordeolum externum of left lower eyelid  Discussed - will self resolve - cont warm moist wash clothes  If uncomfortable. Suspect will be done in 2-3 days    No new orders.     Electronically signed by:  CRISTAL Olmos CNP

## 2021-03-11 NOTE — LETTER
3/11/2021        RE: Yuni Otoole  [address]  [facility] [address]        [place] [facility]  Chief Complaint   Patient presents with     intermediate Regulatory     [place] Medical Record Number:  [id number]  Place of Service where encounter took place[facility] () [[id number]]    HPI:    Yuni Otoole  is 85 year old (1935), who is being seen today for a federally mandated E/M visit.  HPI information obtained from: facility chart records, facility staff, patient report and [facility] chart review. Today's concerns are concerns about not getting a little red pill at night anymore - thinking it was a pill to help constipation - she notes she feels more constipated again. C/o chronic pain t/o body. She also has a stye on her L lower lid of her eye.   Please see 11/23 NP note for full summary.   Most recent changes:  11/5: Ultram increased to 100 HS and kept 50 bid PRN  11/16: COVID + PCR from [facility] lab - DNR/DNI and WOULD want hospitalization if needed. Fairly asymptomatic except HTN and increased in general body aches.   11/23: increase norvasc to 5 mg po q HS    ALLERGIES:Atorvastatin calcium and Zanaflex [tizanidine]  PAST MEDICAL HISTORY:   has a past medical history of Allergic rhinitis, cause unspecified, Head injury, Pure hypercholesterolemia, and Unspecified essential hypertension. She also has no past medical history of Diabetes (H).  PAST SURGICAL HISTORY:   has a past surgical history that includes REMOVAL OF TONSILS,14+ Y/O; colonoscopy (05/30/07); carotid endarterectomy (11/07/08); INJ EPIDURAL LUMBAR/SACRAL W/WO CONTRAST (2009); DRAIN/INJECT LARGE JOINT/BURSA (2009); DRAIN/INJECT LARGE JOINT/BURSA (2010); INJ EPIDURAL CERVICAL/THORACIC W/WO CONTRAST (2010); INJ TRANSFORAMIN EPIDURAL, LUMB/SACR SINGLE (2010); Optical Tracking System Fusion Posterior Cervical Three + Levels (N/A, 11/15/2017); Laminectomy cerivcal posterior three+ levels (N/A,  11/15/2017); Optical Tracking System Fusion Cervical Anterior Three + Levels (N/A, 11/15/2017); Fusion cervical posterior three+ levels (N/A, 11/15/2017); and Eye surgery.  FAMILY HISTORY: family history includes Cancer in her daughter and mother; Cardiovascular in her father; Cerebrovascular Disease in her paternal grandmother; Circulatory in her paternal grandmother; Diabetes in her maternal aunt; EYE* in her mother; Gastrointestinal Disease in her mother; Gynecology in her sister; Hypertension in her father; Lipids in her brother; Musculoskeletal Disorder in her daughter; Obesity in her maternal grandmother and paternal grandmother; Osteoporosis in her mother.  SOCIAL HISTORY:  reports that she quit smoking about 25 years ago. Her smoking use included cigarettes. She started smoking about 50 years ago. She smoked 0.00 packs per day for 10.00 years. She has never used smokeless tobacco. She reports that she does not drink alcohol or use drugs.    MEDICATIONS:  Current Outpatient Medications   Medication Sig Dispense Refill     diclofenac (VOLTAREN) 1 % topical gel Apply topically 4 times daily as needed 2 gms to upper and 4 gm to lower joints QID PRN. - pt to choose between biofreeze prn vs this.       traMADol (ULTRAM) 50 MG tablet 100 mg po q HS and 50 mg po BID  tablet 3     acetaminophen (TYLENOL) 650 MG CR tablet TAKE 2 TABS (1,300MG) BY MOUTH THREE TIMES DAILY 168 tablet 4     amLODIPine (NORVASC) 5 MG tablet Take 1 tablet (5 mg) by mouth At Bedtime       Menthol, Topical Analgesic, (BIOFREEZE) 4 % GEL Bid and BID  PRN 1 Tube 3     ondansetron (ZOFRAN) 4 MG tablet Take 1 tablet (4 mg) by mouth every 8 hours as needed for nausea 30 tablet 3     polyethylene glycol-propylene glycol (SYSTANE ULTRA) 0.4-0.3 % SOLN ophthalmic solution Place 1 drop into both eyes 4 times daily 1 Bottle 3     pravastatin (PRAVACHOL) 10 MG tablet Take 1 tablet (10 mg) by mouth daily 90 tablet 3     rOPINIRole (REQUIP) 2 MG  "tablet TAKE 2 TABLETS BY MOUTH AT  BEDTIME 180 tablet 11     senna-docusate (SENOKOT-S/PERICOLACE) 8.6-50 MG tablet 1 tablet by Oral or Feeding Tube route 2 times daily as needed for constipation 100 tablet 3     traZODone (DESYREL) 50 MG tablet TAKE ONE-HALF TABLET BY  MOUTH AT BEDTIME 90 tablet 3       Case Management:  I have reviewed the care plan and MDS and do agree with the plan. Patient's desire to return to the community is present, but is not able due to care needs . Information reviewed:  Medications, vital signs, orders, and nursing notes.    ROS:  4 point ROS including Respiratory, CV, GI and , other than that noted in the HPI,  is negative    Vitals:  /67   Pulse 98   Temp 98.3  F (36.8  C)   Resp 16   Ht 1.499 m (4' 11\")   Wt 62.7 kg (138 lb 3.2 oz)   SpO2 95%   BMI 27.91 kg/m    Body mass index is 27.91 kg/m .  Exam:  GENERAL APPEARANCE:  Alert, in no distress  ENT: L lower lid has dried stye - no drainage, no surrounding redness.   RESP:  respiratory effort and palpation of chest normal, auscultation of lungs clear , no respiratory distress  CV:  Palpation and auscultation of heart done , rate and rhythm reg, 3/6murmur, trace marv lower ext L>R peripheral edema  ABDOMEN:  normal bowel sounds, soft, nontender, no hepatosplenomegaly or other masses  M/S:   Gait and station w/c - assist 1 for transfer, - very deformed t/o multiple joints. Digits and nails intact  SKIN:  Inspection and Palpation of skin and subcutaneous tissue intact  NEURO: 2-12 in normal limits and at patient's baseline  PSYCH:  insight and judgement, memory fair , affect and mood Pleasant     Lab/Diagnostic data:   Recent labs in [facility] reviewed by me today.  and   Most Recent 3 CBC's:  Recent Labs   Lab Test 04/30/20  0634 04/23/20  0630 04/17/20  1130 04/15/20  1215   WBC  --  5.8 13.0* 14.1*   HGB 11.4* 10.6* 11.9 12.1   MCV  --  97 96 96   PLT  --  247 253 267     Most Recent 3 BMP's:  Recent Labs   Lab Test " 04/30/20  0634 04/23/20  0630 04/17/20  1130 04/15/20  1215    135 125* 130*   POTASSIUM  --  4.2 5.0 4.8   CHLORIDE  --  101 93* 100   CO2  --  28 21 20   BUN  --  11 43* 38*   CR  --  0.58 1.30* 1.58*   ANIONGAP  --  6 11 10   ANNETTA  --  8.4* 8.5 8.9   GLC  --  83 145* 155*       ASSESSMENT/PLAN  Closed nondisplaced fracture of seventh cervical vertebra, unspecified fracture morphology, sequela and Primary osteoarthritis involving multiple joints  Continues to be in chronic pain - discussed about options for different pain medications.   - end up planning on staying on current regimen - refill sent to pharm.   - traMADol (ULTRAM) 50 MG tablet; 100 mg po q HS and 50 mg po BID PRN    Primary insomnia and Urticaria  Off benadryl now and doing well     Hordeolum externum of left lower eyelid  Discussed - will self resolve - cont warm moist wash clothes  If uncomfortable. Suspect will be done in 2-3 days    No new orders.     Electronically signed by:  CRISTAL Olmos CNP                Sincerely,        CRISTAL Olmos CNP

## 2021-04-06 ENCOUNTER — OFFICE VISIT (OUTPATIENT)
Dept: AUDIOLOGY | Facility: CLINIC | Age: 86
End: 2021-04-06
Payer: COMMERCIAL

## 2021-04-06 DIAGNOSIS — H90.3 SENSORINEURAL HEARING LOSS, BILATERAL: Primary | ICD-10-CM

## 2021-04-06 PROCEDURE — 99207 PR NO CHARGE LOS: CPT | Performed by: AUDIOLOGIST

## 2021-04-06 PROCEDURE — V5014 HEARING AID REPAIR/MODIFYING: HCPCS | Mod: GA | Performed by: AUDIOLOGIST

## 2021-04-08 NOTE — PROGRESS NOTES
Hearing Aid Check     SUBJECTIVE:  Yuni Otoole's , Mateus Otoole picked-up her repaired right Alee Wi Series i110 STORMY with AP  after out of warranty repair.     OBJECTIVE:  The microphone was replaced and the  was replaced.     PLAN:   Return to clinic as needed for hearing aid maintenance and programming.    Bennett [facility] Licensed Audiologist #[id number]    CHARGES:  [id number] Repair/mod of HA digital 12 mo factory $275 RT, [place]   Signed Waiver on file, [place] modifier.

## 2021-05-05 VITALS
DIASTOLIC BLOOD PRESSURE: 75 MMHG | SYSTOLIC BLOOD PRESSURE: 133 MMHG | BODY MASS INDEX: 29.01 KG/M2 | OXYGEN SATURATION: 99 % | HEIGHT: 59 IN | RESPIRATION RATE: 16 BRPM | WEIGHT: 143.9 LBS | TEMPERATURE: 98.3 F | HEART RATE: 95 BPM

## 2021-05-05 ASSESSMENT — MIFFLIN-ST. JEOR: SCORE: 1013.36

## 2021-05-05 NOTE — PROGRESS NOTES
"[place] [facility] Regulatory   Yuni Otoole is being evaluated via a billable video visit.   The patient has been notified of following:  \"This video visit will be conducted via a call between you and your provider. We have found that certain health care needs can be provided without the need for an in-person physical exam.  This service lets us provide the care you need with a video conversation. If during the course of the call the provider feels a video visit is not appropriate, you will not be charged for this service.\"   The provider has received verbal consent for a Video Visit from the patient or first contact? Yes  Patient or facility staff would like the video invitation sent by: N/A   Video Start Time: 11:25  [place] Medical Record Number:  [id number]  Place of Location at the time of visit: [facility]  Chief Complaint   Patient presents with     [facility] Regulatory     Video Visit     HPI:  Yuni Otoole  is a 85 year old (1935), who is being seen today for a Regulatory visit.  HPI information obtained from: facility staff, patient report and [facility] chart review.     Today's concern is:  - Pain: pt reports doing good. Reports pain is under control with meds  - reports sleep, appetite and BM are fine.   - RN and DNP have no concern    ====================================    Past Medical and Surgical History reviewed in Epic today.  MEDICATIONS:    Current Outpatient Medications   Medication Sig Dispense Refill     acetaminophen (TYLENOL) 650 MG CR tablet TAKE 2 TABS (1,300MG) BY MOUTH THREE TIMES DAILY 168 tablet 4     amLODIPine (NORVASC) 5 MG tablet Take 1 tablet (5 mg) by mouth At Bedtime       diclofenac (VOLTAREN) 1 % topical gel Apply topically 4 times daily as needed 2 gms to upper and 4 gm to lower joints QID PRN. - pt to choose between biofreeze prn vs this.       Menthol, Topical Analgesic, (BIOFREEZE) 4 % GEL Bid and BID  PRN 1 Tube 3     ondansetron " "(ZOFRAN) 4 MG tablet Take 1 tablet (4 mg) by mouth every 8 hours as needed for nausea 30 tablet 3     polyethylene glycol-propylene glycol (SYSTANE ULTRA) 0.4-0.3 % SOLN ophthalmic solution Place 1 drop into both eyes 4 times daily 1 Bottle 3     pravastatin (PRAVACHOL) 10 MG tablet Take 1 tablet (10 mg) by mouth daily 90 tablet 3     rOPINIRole (REQUIP) 2 MG tablet TAKE 2 TABLETS BY MOUTH AT  BEDTIME 180 tablet 11     senna-docusate (SENOKOT-S/PERICOLACE) 8.6-50 MG tablet 1 tablet by Oral or Feeding Tube route 2 times daily as needed for constipation 100 tablet 3     traMADol (ULTRAM) 50 MG tablet 100 mg po q HS and 50 mg po BID  tablet 3     traZODone (DESYREL) 50 MG tablet TAKE ONE-HALF TABLET BY  MOUTH AT BEDTIME 90 tablet 3     REVIEW OF SYSTEMS: 4 point ROS including Respiratory, CV, GI and , other than that noted in the HPI,  is negative  Objective: /75   Pulse 95   Temp 98.3  F (36.8  C)   Resp 16   Ht 1.499 m (4' 11\")   Wt 65.3 kg (143 lb 14.4 oz)   SpO2 99%   BMI 29.06 kg/m    Limited visit exam done given COVID-19 precautions.   GENERAL APPEARANCE:  in no distress  RESP:  unlabored breathing  M/S:   no joint deformity noted  SKIN:  no rash noted  NEURO:   no purposeful movement in upper and lower extremities  PSYCH:  affect and mood normal    Labs: Reviewed in the chart and EHR.        ASSESSMENT/PLAN:  Aortic Stenosis:  B/L Carotid Stenosis with hx of s/p Right CEA  Benign essential hypertension:    Hyperlipidemia  - clinically compensated.          # Hx of C1-C3 Lami; C2-C3 Posterior Fusion; C2/3, C3/4, C6/7 Anterior Decompression and Fusion; C2/3, C4/5, and C6/7 Facet Osteotomies; C2-T3 Instrumentation   B/L Knee Joint OA:  Severe end stage DJD b/l shoulders, hips, knees with spinal stenosis due to severe spondylopathy  - analgesia optimal. At baseline.      Osteoporosis /Vitamin D deficiency:   Off supplement now 2/2 de-prescribing amidst COVID 19 pandemic.  Consider resuming Vit " D and Ca supplement.      RLS: on Requip. Stable.      [place]:  Limits communication.  Able to participate with conversation with quiet environment and 1:1 attention.     Orders: See above, otherwise, continue the rest of the current POC.     Electronically signed by:  Ceasar Aburto MD     Video-Visit Details  Type of service:  Video Visit  Video End Time (time video stopped): 11:26  Distant Location (provider location):  [facility]

## 2021-05-06 ENCOUNTER — VIRTUAL VISIT (OUTPATIENT)
Dept: GERIATRICS | Facility: CLINIC | Age: 86
End: 2021-05-06
Payer: COMMERCIAL

## 2021-05-06 DIAGNOSIS — E78.2 MIXED HYPERLIPIDEMIA: ICD-10-CM

## 2021-05-06 DIAGNOSIS — I10 ESSENTIAL HYPERTENSION: Primary | ICD-10-CM

## 2021-05-06 DIAGNOSIS — M15.0 PRIMARY OSTEOARTHRITIS INVOLVING MULTIPLE JOINTS: ICD-10-CM

## 2021-05-06 DIAGNOSIS — M81.0 AGE-RELATED OSTEOPOROSIS WITHOUT CURRENT PATHOLOGICAL FRACTURE: ICD-10-CM

## 2021-05-06 DIAGNOSIS — E55.9 VITAMIN D DEFICIENCY: ICD-10-CM

## 2021-05-06 DIAGNOSIS — R54 FRAIL ELDERLY: ICD-10-CM

## 2021-05-06 DIAGNOSIS — I35.0 AORTIC VALVE STENOSIS, ETIOLOGY OF CARDIAC VALVE DISEASE UNSPECIFIED: ICD-10-CM

## 2021-05-06 PROCEDURE — 99309 SBSQ NF CARE MODERATE MDM 30: CPT | Mod: 95 | Performed by: FAMILY MEDICINE

## 2021-05-06 NOTE — LETTER
"    5/6/2021        RE: Yuni Otoole  [address] [facility] [address]        [place] [facility] Regulatory   Yuni Otoole is being evaluated via a billable video visit.   The patient has been notified of following:  \"This video visit will be conducted via a call between you and your provider. We have found that certain health care needs can be provided without the need for an in-person physical exam.  This service lets us provide the care you need with a video conversation. If during the course of the call the provider feels a video visit is not appropriate, you will not be charged for this service.\"   The provider has received verbal consent for a Video Visit from the patient or first contact? Yes  Patient or facility staff would like the video invitation sent by: N/A   Video Start Time: 11:25  [place] Medical Record Number:  [id number]  [facility] of [facility] at the time of visit[facility]  Chief Complaint   Patient presents with     prison Regulatory     Video Visit     HPI:  Yuni Otoole  is a 85 year old (1935), who is being seen today for a Regulatory visit.  HPI information obtained from: facility staff, patient report and [facility] chart review.     Today's concern is:  - Pain: pt reports doing good. Reports pain is under control with meds  - reports sleep, appetite and BM are fine.   - RN and DNP have no concern    ====================================    Past Medical and Surgical History reviewed in Epic today.  MEDICATIONS:    Current Outpatient Medications   Medication Sig Dispense Refill     acetaminophen (TYLENOL) 650 MG CR tablet TAKE 2 TABS (1,300MG) BY MOUTH THREE TIMES DAILY 168 tablet 4     amLODIPine (NORVASC) 5 MG tablet Take 1 tablet (5 mg) by mouth At Bedtime       diclofenac (VOLTAREN) 1 % topical gel Apply topically 4 times daily as needed 2 gms to upper and 4 gm to lower joints QID PRN. - pt to choose between biofreeze prn vs this.       Menthol, " "Topical Analgesic, (BIOFREEZE) 4 % GEL Bid and BID  PRN 1 Tube 3     ondansetron (ZOFRAN) 4 MG tablet Take 1 tablet (4 mg) by mouth every 8 hours as needed for nausea 30 tablet 3     polyethylene glycol-propylene glycol (SYSTANE ULTRA) 0.4-0.3 % SOLN ophthalmic solution Place 1 drop into both eyes 4 times daily 1 Bottle 3     pravastatin (PRAVACHOL) 10 MG tablet Take 1 tablet (10 mg) by mouth daily 90 tablet 3     rOPINIRole (REQUIP) 2 MG tablet TAKE 2 TABLETS BY MOUTH AT  BEDTIME 180 tablet 11     senna-docusate (SENOKOT-S/PERICOLACE) 8.6-50 MG tablet 1 tablet by Oral or Feeding Tube route 2 times daily as needed for constipation 100 tablet 3     traMADol (ULTRAM) 50 MG tablet 100 mg po q HS and 50 mg po BID  tablet 3     traZODone (DESYREL) 50 MG tablet TAKE ONE-HALF TABLET BY  MOUTH AT BEDTIME 90 tablet 3     REVIEW OF SYSTEMS: 4 point ROS including Respiratory, CV, GI and , other than that noted in the HPI,  is negative  Objective: /75   Pulse 95   Temp 98.3  F (36.8  C)   Resp 16   Ht 1.499 m (4' 11\")   Wt 65.3 kg (143 lb 14.4 oz)   SpO2 99%   BMI 29.06 kg/m    Limited visit exam done given COVID-19 precautions.   GENERAL APPEARANCE:  in no distress  RESP:  unlabored breathing  M/S:   no joint deformity noted  SKIN:  no rash noted  NEURO:   no purposeful movement in upper and lower extremities  PSYCH:  affect and mood normal    Labs: Reviewed in the chart and EHR.        ASSESSMENT/PLAN:  Aortic Stenosis:  B/L Carotid Stenosis with hx of s/p Right CEA  Benign essential hypertension:    Hyperlipidemia  - clinically compensated.          # Hx of C1-C3 Lami; C2-C3 Posterior Fusion; C2/3, C3/4, C6/7 Anterior Decompression and Fusion; C2/3, C4/5, and C6/7 Facet Osteotomies; C2-T3 Instrumentation   B/L Knee Joint OA:  Severe end stage DJD b/l shoulders, hips, knees with spinal stenosis due to severe spondylopathy  - analgesia optimal. At baseline.      Osteoporosis /Vitamin D deficiency:   Off " supplement now 2/2 de-prescribing amidst COVID 19 pandemic.  Consider resuming Vit D and Ca supplement.      RLS: on Requip. Stable.      [place]:  Limits communication.  Able to participate with conversation with quiet environment and 1:1 attention.     Orders: See above, otherwise, continue the rest of the current POC.     Electronically signed by:  Ceasar Aburto MD     Video-Visit Details  Type of service:  Video Visit  Video End Time (time video stopped): 11:26  Distant Location (provider location):  [place] [facility]               Sincerely,        Ceasar Aburto MD

## 2021-05-08 ENCOUNTER — HEALTH MAINTENANCE LETTER (OUTPATIENT)
Age: 86
End: 2021-05-08

## 2021-05-25 DIAGNOSIS — S12.601S CLOSED NONDISPLACED FRACTURE OF SEVENTH CERVICAL VERTEBRA, UNSPECIFIED FRACTURE MORPHOLOGY, SEQUELA: ICD-10-CM

## 2021-05-25 RX ORDER — TRAMADOL HYDROCHLORIDE 50 MG/1
TABLET ORAL
Qty: 180 TABLET | Refills: 3 | Status: SHIPPED | OUTPATIENT
Start: 2021-05-25

## 2021-05-25 NOTE — TELEPHONE ENCOUNTER
Closed nondisplaced fracture of seventh cervical vertebra, unspecified fracture morphology, sequela  [facility] t [facility]   - traMADol (ULTRAM) 50 MG tablet; 100 mg po q HS and 50 mg po BID PRN

## 2021-10-23 ENCOUNTER — HEALTH MAINTENANCE LETTER (OUTPATIENT)
Age: 86
End: 2021-10-23

## 2022-01-01 ENCOUNTER — ALLIED HEALTH/NURSE VISIT (OUTPATIENT)
Dept: AUDIOLOGY | Facility: CLINIC | Age: 87
End: 2022-01-01
Payer: COMMERCIAL

## 2022-01-01 ENCOUNTER — LAB REQUISITION (OUTPATIENT)
Dept: LAB | Facility: CLINIC | Age: 87
End: 2022-01-01
Payer: COMMERCIAL

## 2022-01-01 ENCOUNTER — HEALTH MAINTENANCE LETTER (OUTPATIENT)
Age: 87
End: 2022-01-01

## 2022-01-01 ENCOUNTER — TELEPHONE (OUTPATIENT)
Dept: AUDIOLOGY | Facility: CLINIC | Age: 87
End: 2022-01-01

## 2022-01-01 ENCOUNTER — OFFICE VISIT (OUTPATIENT)
Dept: AUDIOLOGY | Facility: CLINIC | Age: 87
End: 2022-01-01
Payer: COMMERCIAL

## 2022-01-01 DIAGNOSIS — Z13.9 ENCOUNTER FOR SCREENING, UNSPECIFIED: ICD-10-CM

## 2022-01-01 DIAGNOSIS — H90.3 SENSORINEURAL HEARING LOSS, BILATERAL: Primary | ICD-10-CM

## 2022-01-01 LAB
FLUAV AG SPEC QL IA: NEGATIVE
FLUBV AG SPEC QL IA: NEGATIVE

## 2022-01-01 PROCEDURE — 87804 INFLUENZA ASSAY W/OPTIC: CPT | Mod: ORL | Performed by: NURSE PRACTITIONER

## 2022-01-01 PROCEDURE — V5014 HEARING AID REPAIR/MODIFYING: HCPCS | Mod: RT | Performed by: AUDIOLOGIST

## 2022-01-01 PROCEDURE — V5014 HEARING AID REPAIR/MODIFYING: HCPCS | Performed by: AUDIOLOGIST

## 2022-01-01 PROCEDURE — 99207 PR NO CHARGE LOS: CPT | Performed by: AUDIOLOGIST

## 2022-01-01 PROCEDURE — V5010 ASSESSMENT FOR HEARING AID: HCPCS | Performed by: AUDIOLOGIST

## 2022-03-02 ENCOUNTER — LAB REQUISITION (OUTPATIENT)
Dept: LAB | Facility: CLINIC | Age: 87
End: 2022-03-02
Payer: MEDICARE

## 2022-03-02 DIAGNOSIS — I10 ESSENTIAL (PRIMARY) HYPERTENSION: ICD-10-CM

## 2022-03-02 DIAGNOSIS — D64.9 ANEMIA, UNSPECIFIED: ICD-10-CM

## 2022-03-03 LAB
ANION GAP SERPL CALCULATED.3IONS-SCNC: 4 MMOL/L (ref 3–14)
BUN SERPL-MCNC: 15 MG/DL (ref 7–30)
CALCIUM SERPL-MCNC: 9.4 MG/DL (ref 8.5–10.1)
CHLORIDE BLD-SCNC: 104 MMOL/L (ref 94–109)
CO2 SERPL-SCNC: 30 MMOL/L (ref 20–32)
CREAT SERPL-MCNC: 0.49 MG/DL (ref 0.52–1.04)
ERYTHROCYTE [DISTWIDTH] IN BLOOD BY AUTOMATED COUNT: 13.2 % (ref 10–15)
GFR SERPL CREATININE-BSD FRML MDRD: >90 ML/MIN/1.73M2
GLUCOSE BLD-MCNC: 87 MG/DL (ref 70–99)
HCT VFR BLD AUTO: 39.8 % (ref 35–47)
HGB BLD-MCNC: 12.8 G/DL (ref 11.7–15.7)
MCH RBC QN AUTO: 31.1 PG (ref 26.5–33)
MCHC RBC AUTO-ENTMCNC: 32.2 G/DL (ref 31.5–36.5)
MCV RBC AUTO: 97 FL (ref 78–100)
PLATELET # BLD AUTO: 322 10E3/UL (ref 150–450)
POTASSIUM BLD-SCNC: 3.7 MMOL/L (ref 3.4–5.3)
RBC # BLD AUTO: 4.12 10E6/UL (ref 3.8–5.2)
SODIUM SERPL-SCNC: 138 MMOL/L (ref 133–144)
WBC # BLD AUTO: 6.3 10E3/UL (ref 4–11)

## 2022-03-03 PROCEDURE — 80048 BASIC METABOLIC PNL TOTAL CA: CPT | Mod: ORL | Performed by: NURSE PRACTITIONER

## 2022-03-03 PROCEDURE — P9603 ONE-WAY ALLOW PRORATED MILES: HCPCS | Mod: ORL | Performed by: NURSE PRACTITIONER

## 2022-03-03 PROCEDURE — 36415 COLL VENOUS BLD VENIPUNCTURE: CPT | Mod: ORL | Performed by: NURSE PRACTITIONER

## 2022-03-03 PROCEDURE — 85027 COMPLETE CBC AUTOMATED: CPT | Mod: ORL | Performed by: NURSE PRACTITIONER

## 2022-06-03 NOTE — PROGRESS NOTES
Hearing Aid Check     SUBJECTIVE:  Yuni Otoole is a 86 year old year old female, seen today for a hearing aid check at [facility]. Patient reports she is not hearing speech (especially her soft spoken ) as well as she would like.       OBJECTIVE:  Otoscopic exam indicates cerumen in the right ear moderate non-occluding deep enough I could not remove it in clinic, left clear no wax.     Hearing aid maintenance was completed to include vacuuming mics an cleaning gasper filter, removing wax filter and vacuuming , replaced gasper filter.       PLAN:   Recommended follow up with audiology evaluation and hearing aid programming if she is not hearing better after hearing aid maintenance, otherwise as needed.     Riri Sharp.  [place] Licensed Audiologist #[id number]

## 2022-12-12 NOTE — TELEPHONE ENCOUNTER
Reason for Call:  Other appointment    Detailed comments:  calling as he believes her left hearing aid isn't working. Wondering if he can drop it off or what he should be doing.     Phone Number Patient can be reached at: Home number on file [phone] (home)    Best Time: any    Can we leave a detailed message on this number? YES    Call taken on 12/12/2022 at 8:39 AM by Pam Zaargoza

## 2022-12-15 NOTE — PROGRESS NOTES
Hearing Aid Check     SUBJECTIVE:  Yuni Otoole is a 87 year old year old female, her  Mateus brought in her right hearing aid for evaluation, it stopped working and they have an important appointment tomorrow.      OBJECTIVE:  Hearing aid maintenance was completed to include vacuuming mics an cleaning gasper filter, removing wax filter and vacuuming , replaced gasper filter. Also replaced , function was not restored. Device requires repair out of warranty repair.    Programmed [employer] for her use until her device is back from repair.     PLAN:   Call to  repaired hearing aids when it arrives.     Bennett Sharp Licensed Audiologist #[id number]

## 2022-12-21 NOTE — PROGRESS NOTES
Hearing Aid Check     SUBJECTIVE:  Yuni Otoole is a 87 year old year old female, her  Mateus picked up her right hearing aid after out of warranty repair, the microphone was replaced.    OBJECTIVE:  Notice of non-coverage was signed and charges, $315.00 were submitted. The hearing aid was returned at user settings. Biologic listening check revealed appropriate function.     PLAN:   Recommended return as needed for hearing aid maintenance and programming. Discussed that Yuni is eligible for replacement hearing aids through her insurance.     Riri Sharp.  [place] Licensed Audiologist #[id number]

## 2023-01-01 ENCOUNTER — LAB REQUISITION (OUTPATIENT)
Dept: LAB | Facility: CLINIC | Age: 88
End: 2023-01-01
Payer: COMMERCIAL

## 2023-01-01 DIAGNOSIS — F02.80 DEMENTIA IN OTHER DISEASES CLASSIFIED ELSEWHERE, UNSPECIFIED SEVERITY, WITHOUT BEHAVIORAL DISTURBANCE, PSYCHOTIC DISTURBANCE, MOOD DISTURBANCE, AND ANXIETY (H): ICD-10-CM

## 2023-01-01 DIAGNOSIS — R33.9 RETENTION OF URINE, UNSPECIFIED: ICD-10-CM

## 2023-01-01 LAB
ANION GAP SERPL CALCULATED.3IONS-SCNC: 9 MMOL/L (ref 7–15)
BACTERIA UR CULT: NO GROWTH
BASOPHILS # BLD AUTO: 0.1 10E3/UL (ref 0–0.2)
BASOPHILS NFR BLD AUTO: 1 %
BUN SERPL-MCNC: 15.5 MG/DL (ref 8–23)
CALCIUM SERPL-MCNC: 10 MG/DL (ref 8.8–10.2)
CHLORIDE SERPL-SCNC: 98 MMOL/L (ref 98–107)
CREAT SERPL-MCNC: 0.53 MG/DL (ref 0.51–0.95)
DEPRECATED HCO3 PLAS-SCNC: 31 MMOL/L (ref 22–29)
EOSINOPHIL # BLD AUTO: 0.4 10E3/UL (ref 0–0.7)
EOSINOPHIL NFR BLD AUTO: 6 %
ERYTHROCYTE [DISTWIDTH] IN BLOOD BY AUTOMATED COUNT: 13.3 % (ref 10–15)
GFR SERPL CREATININE-BSD FRML MDRD: 90 ML/MIN/1.73M2
GLUCOSE SERPL-MCNC: 101 MG/DL (ref 70–99)
HCT VFR BLD AUTO: 42.3 % (ref 35–47)
HGB BLD-MCNC: 13.2 G/DL (ref 11.7–15.7)
IMM GRANULOCYTES # BLD: 0 10E3/UL
IMM GRANULOCYTES NFR BLD: 0 %
LYMPHOCYTES # BLD AUTO: 1.5 10E3/UL (ref 0.8–5.3)
LYMPHOCYTES NFR BLD AUTO: 23 %
MCH RBC QN AUTO: 29.9 PG (ref 26.5–33)
MCHC RBC AUTO-ENTMCNC: 31.2 G/DL (ref 31.5–36.5)
MCV RBC AUTO: 96 FL (ref 78–100)
MONOCYTES # BLD AUTO: 0.7 10E3/UL (ref 0–1.3)
MONOCYTES NFR BLD AUTO: 10 %
NEUTROPHILS # BLD AUTO: 4.1 10E3/UL (ref 1.6–8.3)
NEUTROPHILS NFR BLD AUTO: 60 %
NRBC # BLD AUTO: 0 10E3/UL
NRBC BLD AUTO-RTO: 0 /100
PLATELET # BLD AUTO: 373 10E3/UL (ref 150–450)
POTASSIUM SERPL-SCNC: 4.1 MMOL/L (ref 3.4–5.3)
RBC # BLD AUTO: 4.42 10E6/UL (ref 3.8–5.2)
SODIUM SERPL-SCNC: 138 MMOL/L (ref 136–145)
WBC # BLD AUTO: 6.8 10E3/UL (ref 4–11)

## 2023-01-01 PROCEDURE — 87086 URINE CULTURE/COLONY COUNT: CPT | Mod: ORL | Performed by: NURSE PRACTITIONER

## 2023-01-01 PROCEDURE — 36415 COLL VENOUS BLD VENIPUNCTURE: CPT | Performed by: FAMILY MEDICINE

## 2023-01-01 PROCEDURE — P9603 ONE-WAY ALLOW PRORATED MILES: HCPCS | Mod: ORL | Performed by: NURSE PRACTITIONER

## 2023-01-01 PROCEDURE — 85025 COMPLETE CBC W/AUTO DIFF WBC: CPT | Performed by: FAMILY MEDICINE

## 2023-01-01 PROCEDURE — P9603 ONE-WAY ALLOW PRORATED MILES: HCPCS | Mod: ORL | Performed by: FAMILY MEDICINE

## 2023-01-01 PROCEDURE — 82310 ASSAY OF CALCIUM: CPT | Performed by: FAMILY MEDICINE

## 2023-04-17 NOTE — PROGRESS NOTES
[facility]  Chief Complaint   Patient presents with     correction Regulatory   [facility] Medical Record Number:  [id number]    HPI:    Yuni Otoole is a 82 year old  (1935), who is being seen today for a federally mandated E/M visit at [facility]  .  HPI information obtained from: facility staff, patient report and [facility] chart review.     Today's concerns are:  -  Resident seen and examined in her room in her  presence who is visiting today.   - reports completed the physical therapy, reports generalized soreness, aggravated with movements, better with rest. Reports walked this am with nausea, better now.   -Reports appetite/sleep/bowel movements are fine  -Reports that the small ganglion over left wrist is back but no pain, asked the writer what could be done.   - denies CP, SOB, wheezing, peripheral edema.  - GNP Resident frequently asks for medications changes.   - Rn has no concern.   ------------------------------------------  - - Past Medical, social, family histories, medications, and allergies reviewed and updated  - Medications reviewed: in the chart and EHR.   - Case Management:   I have reviewed the care plan and MDS and do agree with the plan. Patient's desire to return to the community is not present.  Information reviewed:  Medications, vital signs, orders, and nursing notes.    :MEDICATIONS:  Current Outpatient Medications   Medication Sig Dispense Refill     Acetaminophen (TYLENOL PO) Take 1,000 mg by mouth 2 times daily Also BID PRN       amLODIPine (NORVASC) 2.5 MG tablet Take 2.5 mg by mouth daily       Cholecalciferol (VITAMIN D-3 PO) Take 4,000 Units by mouth daily       DHA-EPA-Vitamin E (OMEGA-3 COMPLEX PO) Take 1 tablet by mouth 2 times daily       diclofenac (VOLTAREN) 1 % topical gel Place 4 g onto the skin 2 times daily       diphenhydrAMINE (BENADRYL) 25 MG tablet Take 25 mg by mouth At Bedtime        glucosamine 500 MG CAPS  "capsule Take 500 mg by mouth 2 times daily       ibuprofen (ADVIL/MOTRIN) 400 MG tablet Take 1 tablet (400 mg) by mouth 3 times daily 270 tablet 3     Menthol, Topical Analgesic, (BIOFREEZE EX) Externally apply topically 2 times daily as needed        metoprolol (LOPRESSOR) 25 MG tablet Take 12.5 mg by mouth 2 times daily  60 tablet      polyethylene glycol 0.4%- propylene glycol 0.3% (SYSTANE ULTRA) 0.4-0.3 % SOLN ophthalmic solution Place 1 drop into both eyes 4 times daily       pravastatin (PRAVACHOL) 10 MG tablet Take 1 tablet (10 mg) by mouth daily 90 tablet 3     rOPINIRole (REQUIP) 2 MG tablet Take 2 mg by mouth At Bedtime Also hs prn       senna-docusate (SENOKOT-S;PERICOLACE) 8.6-50 MG per tablet 1 tablet by Oral or Feeding Tube route daily  100 tablet      traMADol (ULTRAM) 50 MG tablet TAKE 1 TAB BY MOUTH AT BEDTIME 30 tablet 5     TRAZODONE HCL PO Take 25 mg by mouth nightly as needed (also 50 mg scheduled)        UNABLE TO FIND Take 1 tablet by mouth 2 times daily MEDICATION NAME: Vitamin Focus Select       ROS: 4 point ROS including Respiratory, CV, GI and , other than that noted in the HPI,  is negative    Exam:  Vitals: /74   Pulse 86   Temp 97.6  F (36.4  C)   Resp 16   Ht 1.499 m (4' 11\")   Wt 64.2 kg (141 lb 8 oz)   SpO2 96%   BMI 28.58 kg/m    BMI= Body mass index is 28.58 kg/m .  GENERAL APPEARANCE:  in no distress, cooperative  RESP:  lungs clear to auscultation , no wheezing  CV:  S1S2 audible , regular rate and rhythm, systolic over right upper sternal border murmur, rub, or gallop.   ABDOMEN:  normal bowel sounds, soft, nontender, no palpable masses  M/S:   Cracks sound with knee joints movements.   SKIN: soft mass over right lateral surface of deltoid, ill defined boundaries, no tenderness. Small ganglion like over distal ventral surface of left forearm, hard in consistency, mobile, no erythema.  Patient is oriented  NEURO:   generalized muscles atrophy, hand  4/5 b/l.  " Dorsiflexion 5/5 bilateral.   PSYCH:  affect and mood normal    Lab/Diagnostic data: Reviewed in the chart and EHR.      ASSESSMENT/PLAN  # hx of recent C1-C3 Lami; C2-C3 Posterior Fusion; C2/3, C3/4, C6/7 Anterior Decompression and Fusion; C2/3, C4/5, and C6/7 Facet Osteotomies; C2-T3 Instrumentation   B/L Knee Joint OA:  - analgesia optimal.     Benign essential hypertension:  controlled. Continue meds. In this age group keep SBP > 130 mmHg.      Osteoporosis /Vitamin D deficiency:   On supplement.     Right Deltoid Muscles mass: likley lipoma, recommend conservative measures.     Left Wrist synovial sheath ganglio: conservative measures.     Frailty:  - continues to require assistance from nursing. Up for meals only o/w spends the day resting in bed     Orders:  - See above, otherwise, continue the rest of the current POC.     Electronically signed by:  Ceasar Aburto MD   14-Apr-2023

## 2023-08-28 NOTE — PROGRESS NOTES
Social Work Services Progress Note    Hospital Day: 15  Date of Initial Social Work Evaluation:  11/21/17  Collaborated with:  SNF Admissions Coordinator's, pt's floor nurse (Cyndee), pt's daughter in law (Dior) and Dr. Huerta    Data:  Rehab placement is being pursued.  Per Dr. Huerta, pt's calorie counts have improved and they may be able to remove the nasal tube feeding this weekend.  If so, pt would be medically ready for discharge.      Intervention:  Spoke with Dr. Huerta.  Spoke with pt's floor nurse (Cyndee).  Per Cyndee, pt will require stretcher transport to the receiving facility as she is not able to tolerate sitting up the length of the ride due to pain .  SW contacted Admissions Coordinator's at the following facilities:  1.  [facility] - spoke with Luisa, they will not accept pt for admit on the weekend, The soonest they would accept is on Monday.  They accept nasal tube feedings  2.  [facility] - spoke with Emiliano.  Emiliano indicates that they are full with no anticipated weekend openings.  Per Emiliano, they do not accept nasal tube feedings  3.  [facility] - spoke with Lucy and weekend openings are anticipated. Faxed assessment materials.  They do not accept nasal tube feedings  4.  [facility] - spoke with Mónica who states that they may have a weekend opening.  Faxed assessment materials. They do not accept nasal tube feedings.    Returned a call to pt's daughter in law, Dior ([phone]) and updated in regards to discharge planning.    Assessment: Pt is not medically ready for discharge today.    Plan:    Anticipated Disposition:  Short term rehab placement in a community SNF.    Barriers to d/c plan:  Medical readiness    Follow Up:  SW will continue to follow for discharge planning.    LANG Zapata  Social Work, 6A  Phone:  [phone]  Pager:  [phone]  11/24/2017         Quality 111:Pneumonia Vaccination Status For Older Adults: Patient received any pneumococcal conjugate or polysaccharide vaccine on or after their 60th birthday and before the end of the measurement period Quality 431: Preventive Care And Screening: Unhealthy Alcohol Use - Screening: Patient not identified as an unhealthy alcohol user when screened for unhealthy alcohol use using a systematic screening method Quality 47: Advance Care Plan: Advance Care Planning discussed and documented; advance care plan or surrogate decision maker documented in the medical record. Quality 226: Preventive Care And Screening: Tobacco Use: Screening And Cessation Intervention: Patient screened for tobacco use and is an ex/non-smoker Quality 110: Preventive Care And Screening: Influenza Immunization: Influenza Immunization previously received during influenza season Detail Level: Detailed

## 2023-10-11 NOTE — TELEPHONE ENCOUNTER
Call placed to patient and spoke with him. Informed him of the recommendations of the AP. Pt is not sure he wants to pursue testing at this time. His symptoms have resolved. Nsg did give him the number for central scheduling to arrange for CT scan if he wishes to schedule. He will think about it and contact the office with any further questions or concerns he should have. Called and spoke with a representative at [facility] as I noticed that the patient had been seen there by an orthopedic provider, patient was then referred out to the  or  for further care.  Spoke to  and there was a bit of confusion regarding referrals and the need for a primary care provider.  Patient is a resident at [facility] in [place] and has a NP managing her care there.   requested that I make appointments for patient to follow up with spine surgeon at the  or  as well as an appointment with an orthopedic surgeon to discuss possible hip replacement surgery.    Message sent to scheduling team for Dr Enma López in Neurosurgery.  Orthopedics scheduling called: [phone]

## (undated) DEVICE — SU MONOCRYL 4-0 PS-2 27" UND Y426H

## (undated) DEVICE — SU ETHILON 3-0 PS-1 18" 1663H

## (undated) DEVICE — DRAPE MICROSCOPE LEICA 54X150" AR8033650

## (undated) DEVICE — CELL SAVER

## (undated) DEVICE — MARKER SPHERE PASSIVE 5 SPHERES/TRAY 8801002

## (undated) DEVICE — DRAPE POUCH INSTRUMENT 1018

## (undated) DEVICE — SU VICRYL 2-0 CT-2 27" UND J269H

## (undated) DEVICE — NDL SPINAL 18GA 3.5" 405184

## (undated) DEVICE — DRAPE SHEET MED 44X70" 9355

## (undated) DEVICE — PACK NEURO MINOR UMMC SNE32MNMU4

## (undated) DEVICE — SU VICRYL 2-0 CT-2 CR 8X18" J726D

## (undated) DEVICE — DRAPE C-ARMOR 5 SIDED 5523

## (undated) DEVICE — COVER BIG CASE BACK TABLE 6'X2' 420-HD-S

## (undated) DEVICE — ESU PENCIL W/COATED BLADE E2450H

## (undated) DEVICE — SYR 10ML LL W/O NDL 302995

## (undated) DEVICE — LINEN TOWEL PACK X30 5481

## (undated) DEVICE — SU ETHILON 2-0 FS 18" 664H

## (undated) DEVICE — DRAIN JACKSON PRATT 07MM FLAT SU130-1310

## (undated) DEVICE — DRAIN JACKSON PRATT 10MM FLAT 4/4 PERF SU130-1311

## (undated) DEVICE — DRAPE STERI TOWEL LG 1010

## (undated) DEVICE — SPONGE KITTNER 30-101

## (undated) DEVICE — BASIN SET SINGLE STERILE 13752-624

## (undated) DEVICE — Device

## (undated) DEVICE — RX BACITRACIN OINTMENT 0.9G 1/32OZ CUR001109

## (undated) DEVICE — DRAPE IOBAN INCISE 13X13" 6640EZ

## (undated) DEVICE — DECANTER VIAL 2006S

## (undated) DEVICE — BUR MATCHSTICK 3MM ANSPACH L-8NS-G1

## (undated) DEVICE — SPONGE SURGIFOAM 100 1974

## (undated) DEVICE — NDL BLUNT 17GA 1.5" 8881202330

## (undated) DEVICE — DRSG AQUACEL AG 3.5X9.75" HYDROFIBER 412011

## (undated) DEVICE — PIN SKULL MAYFIELD ADULT TITANIUM 3/PK A1120

## (undated) DEVICE — SOL WATER IRRIG 1000ML BOTTLE 2F7114

## (undated) DEVICE — SU DERMABOND ADVANCED .7ML DNX12

## (undated) DEVICE — LIGHT HANDLE X2

## (undated) DEVICE — DRAPE O ARM TUBE 9732722

## (undated) DEVICE — SYR 30ML LL W/O NDL 302832

## (undated) DEVICE — COVER CAMERA IN-LIGHT DISP LT-C02

## (undated) DEVICE — DRILL BIT W/STOP 2.4X65MM

## (undated) DEVICE — SOL NACL 0.9% 10ML VIAL 0409-4888-02

## (undated) DEVICE — DRAPE SHEET REV FOLD 3/4 9349

## (undated) DEVICE — APPLICATOR COTTON TIP 6"X2 STERILE LF 6012

## (undated) DEVICE — ADH FLOSEAL W/HUMAN THROMBIN 5ML 1503350

## (undated) DEVICE — PREP CHLORAPREP 26ML TINTED ORANGE  260815

## (undated) DEVICE — SU VICRYL 0 CT-1 CR 8X18" J740D

## (undated) DEVICE — NDL BLUNT 18GA 1" W/O FILTER 305181

## (undated) DEVICE — SPONGE COTTONOID 1X3" 20-10S

## (undated) DEVICE — DRAPE C-ARM W/STRAPS 42X72" 07-CA104

## (undated) DEVICE — CUP AND LID 2PK 2OZ STERILE  SSK9006A

## (undated) DEVICE — SU VICRYL 0 CT-1 27" UND J260H

## (undated) DEVICE — PAD CHUX UNDERPAD 23X24" 7136

## (undated) DEVICE — PROTECTOR ARM ONE-STEP TRENDELENBURG 40418

## (undated) DEVICE — STRAP UNIVERSAL POSITIONING 2-PIECE 4X47X76" 91-287

## (undated) DEVICE — ESU CORD BIPOLAR GREEN 10-4000

## (undated) DEVICE — CATH TRAY FOLEY SURESTEP 16FR W/TMP PRB STLK LATEX A319416AM

## (undated) DEVICE — SPONGE COTTONOID 1/2X1 1/2" 20-06S

## (undated) DEVICE — LINEN TOWEL PACK X6 WHITE 5487

## (undated) DEVICE — MOUSE O ARM 9732721

## (undated) DEVICE — PACK GOWN 3/PK DISP XL SBA32GPFCB

## (undated) DEVICE — ESU GROUND PAD ADULT W/CORD E7507

## (undated) DEVICE — DRAPE U SPLIT 74X120" 29440

## (undated) DEVICE — ESU PENCIL W/ROCKER SWITCH BLADE HOLSTER E2350HDB

## (undated) DEVICE — DRAPE MAYO STAND 23X54 8337

## (undated) DEVICE — SUCTION MANIFOLD DORNOCH ULTRA CART UL-CL500

## (undated) DEVICE — KIT PATIENT POSITIONING PIGAZZI LATEX FREE 40580

## (undated) DEVICE — BLADE BONE MILL STRK 3.2MM FINE 5400-702-000

## (undated) DEVICE — SOL NACL 0.9% IRRIG 1000ML BOTTLE 2F7124

## (undated) DEVICE — ESU ELEC BLADE 2.75" COATED/INSULATED E1455

## (undated) DEVICE — PREP CHLORAPREP CLEAR 3ML 260400

## (undated) DEVICE — SPONGE COTTONOID 1/2X1/2" 20-04S

## (undated) DEVICE — DRAIN JACKSON PRATT RESERVOIR 100ML SU130-1305

## (undated) DEVICE — SPONGE LAP 18X18" X8435

## (undated) DEVICE — KIT PATIENT CARE JACKSON SPINE PACK 5808PV

## (undated) RX ORDER — BACITRACIN 50000 [IU]/1
INJECTION, POWDER, FOR SOLUTION INTRAMUSCULAR
Status: DISPENSED
Start: 2017-11-15

## (undated) RX ORDER — CEFAZOLIN SODIUM 1 G/3ML
INJECTION, POWDER, FOR SOLUTION INTRAMUSCULAR; INTRAVENOUS
Status: DISPENSED
Start: 2017-11-15

## (undated) RX ORDER — PHENYLEPHRINE HCL IN 0.9% NACL 1 MG/10 ML
SYRINGE (ML) INTRAVENOUS
Status: DISPENSED
Start: 2017-11-15

## (undated) RX ORDER — PROPOFOL 10 MG/ML
INJECTION, EMULSION INTRAVENOUS
Status: DISPENSED
Start: 2017-11-15

## (undated) RX ORDER — LABETALOL HYDROCHLORIDE 5 MG/ML
INJECTION, SOLUTION INTRAVENOUS
Status: DISPENSED
Start: 2017-11-15

## (undated) RX ORDER — FENTANYL CITRATE 50 UG/ML
INJECTION, SOLUTION INTRAMUSCULAR; INTRAVENOUS
Status: DISPENSED
Start: 2017-11-12

## (undated) RX ORDER — FENTANYL CITRATE 50 UG/ML
INJECTION, SOLUTION INTRAMUSCULAR; INTRAVENOUS
Status: DISPENSED
Start: 2017-11-15

## (undated) RX ORDER — ALBUMIN, HUMAN INJ 5% 5 %
SOLUTION INTRAVENOUS
Status: DISPENSED
Start: 2017-11-15